# Patient Record
Sex: FEMALE | Race: WHITE | NOT HISPANIC OR LATINO | URBAN - METROPOLITAN AREA
[De-identification: names, ages, dates, MRNs, and addresses within clinical notes are randomized per-mention and may not be internally consistent; named-entity substitution may affect disease eponyms.]

---

## 2023-02-16 ENCOUNTER — INPATIENT (INPATIENT)
Facility: HOSPITAL | Age: 63
LOS: 8 days | Discharge: HOME | DRG: 433 | End: 2023-02-25
Attending: STUDENT IN AN ORGANIZED HEALTH CARE EDUCATION/TRAINING PROGRAM | Admitting: STUDENT IN AN ORGANIZED HEALTH CARE EDUCATION/TRAINING PROGRAM
Payer: COMMERCIAL

## 2023-02-16 VITALS
SYSTOLIC BLOOD PRESSURE: 91 MMHG | RESPIRATION RATE: 16 BRPM | HEART RATE: 100 BPM | WEIGHT: 192.02 LBS | TEMPERATURE: 97 F | OXYGEN SATURATION: 98 % | DIASTOLIC BLOOD PRESSURE: 58 MMHG | HEIGHT: 68 IN

## 2023-02-16 LAB
ALBUMIN SERPL ELPH-MCNC: 2.8 G/DL — LOW (ref 3.3–5)
ALP SERPL-CCNC: 161 U/L — HIGH (ref 40–120)
ALT FLD-CCNC: 14 U/L — SIGNIFICANT CHANGE UP (ref 10–45)
ANION GAP SERPL CALC-SCNC: 18 MMOL/L — HIGH (ref 5–17)
APTT BLD: 34.3 SEC — SIGNIFICANT CHANGE UP (ref 27.5–35.5)
AST SERPL-CCNC: 69 U/L — HIGH (ref 10–40)
BASE EXCESS BLDV CALC-SCNC: -7.6 MMOL/L — LOW (ref -2–3)
BILIRUB SERPL-MCNC: 12.9 MG/DL — HIGH (ref 0.2–1.2)
BUN SERPL-MCNC: 35 MG/DL — HIGH (ref 7–23)
CA-I SERPL-SCNC: 1.19 MMOL/L — SIGNIFICANT CHANGE UP (ref 1.15–1.33)
CALCIUM SERPL-MCNC: 9.2 MG/DL — SIGNIFICANT CHANGE UP (ref 8.4–10.5)
CHLORIDE BLDV-SCNC: 106 MMOL/L — SIGNIFICANT CHANGE UP (ref 96–108)
CHLORIDE SERPL-SCNC: 102 MMOL/L — SIGNIFICANT CHANGE UP (ref 96–108)
CO2 BLDV-SCNC: 20 MMOL/L — LOW (ref 22–26)
CO2 SERPL-SCNC: 14 MMOL/L — LOW (ref 22–31)
CREAT SERPL-MCNC: 2.1 MG/DL — HIGH (ref 0.5–1.3)
EGFR: 26 ML/MIN/1.73M2 — LOW
ETHANOL SERPL-MCNC: <10 MG/DL — SIGNIFICANT CHANGE UP (ref 0–10)
FLUAV AG NPH QL: SIGNIFICANT CHANGE UP
FLUBV AG NPH QL: SIGNIFICANT CHANGE UP
GAS PNL BLDV: 133 MMOL/L — LOW (ref 136–145)
GAS PNL BLDV: SIGNIFICANT CHANGE UP
GAS PNL BLDV: SIGNIFICANT CHANGE UP
GLUCOSE BLDV-MCNC: 106 MG/DL — HIGH (ref 70–99)
GLUCOSE SERPL-MCNC: 115 MG/DL — HIGH (ref 70–99)
HCO3 BLDV-SCNC: 19 MMOL/L — LOW (ref 22–29)
HCT VFR BLD CALC: 29.1 % — LOW (ref 34.5–45)
HCT VFR BLDA CALC: 29 % — LOW (ref 34.5–46.5)
HGB BLD CALC-MCNC: 9.7 G/DL — LOW (ref 11.7–16.1)
HGB BLD-MCNC: 9.2 G/DL — LOW (ref 11.5–15.5)
INR BLD: 1.6 RATIO — HIGH (ref 0.88–1.16)
LACTATE BLDV-MCNC: 3.3 MMOL/L — HIGH (ref 0.5–2)
MAGNESIUM SERPL-MCNC: 1.8 MG/DL — SIGNIFICANT CHANGE UP (ref 1.6–2.6)
MCHC RBC-ENTMCNC: 31.6 GM/DL — LOW (ref 32–36)
MCHC RBC-ENTMCNC: 36.8 PG — HIGH (ref 27–34)
MCV RBC AUTO: 116.4 FL — HIGH (ref 80–100)
NT-PROBNP SERPL-SCNC: 516 PG/ML — HIGH (ref 0–300)
PCO2 BLDV: 41 MMHG — SIGNIFICANT CHANGE UP (ref 39–42)
PH BLDV: 7.27 — LOW (ref 7.32–7.43)
PHOSPHATE SERPL-MCNC: 3.4 MG/DL — SIGNIFICANT CHANGE UP (ref 2.5–4.5)
PLATELET # BLD AUTO: 124 K/UL — LOW (ref 150–400)
PO2 BLDV: 29 MMHG — SIGNIFICANT CHANGE UP (ref 25–45)
POTASSIUM BLDV-SCNC: 4.6 MMOL/L — SIGNIFICANT CHANGE UP (ref 3.5–5.1)
POTASSIUM SERPL-MCNC: 4.2 MMOL/L — SIGNIFICANT CHANGE UP (ref 3.5–5.3)
POTASSIUM SERPL-SCNC: 4.2 MMOL/L — SIGNIFICANT CHANGE UP (ref 3.5–5.3)
PROT SERPL-MCNC: 8 G/DL — SIGNIFICANT CHANGE UP (ref 6–8.3)
PROTHROM AB SERPL-ACNC: 18.5 SEC — HIGH (ref 10.5–13.4)
RBC # BLD: 2.5 M/UL — LOW (ref 3.8–5.2)
RBC # FLD: 14.6 % — HIGH (ref 10.3–14.5)
RSV RNA NPH QL NAA+NON-PROBE: SIGNIFICANT CHANGE UP
SAO2 % BLDV: 33.7 % — LOW (ref 67–88)
SARS-COV-2 RNA SPEC QL NAA+PROBE: SIGNIFICANT CHANGE UP
SODIUM SERPL-SCNC: 134 MMOL/L — LOW (ref 135–145)
WBC # BLD: 9.56 K/UL — SIGNIFICANT CHANGE UP (ref 3.8–10.5)
WBC # FLD AUTO: 9.56 K/UL — SIGNIFICANT CHANGE UP (ref 3.8–10.5)

## 2023-02-16 PROCEDURE — 71046 X-RAY EXAM CHEST 2 VIEWS: CPT | Mod: 26

## 2023-02-16 PROCEDURE — 99285 EMERGENCY DEPT VISIT HI MDM: CPT

## 2023-02-16 NOTE — ED ADULT NURSE NOTE - OBJECTIVE STATEMENT
63YO female with PMH breat cancer, GERD and HTN via walk in presenting with complaints of  abdominal pain. Pt states for the last month she has been experiencing skin color discoloration with generalized pain on the left side, with in the last week pt noticed yellowish skin pigmentation, feelings of dizziness, and generalized weakness. pt was taken off HTN medication due to hypotensive BP. Pt had a GI apt today and was informed to come to ED due to low blood pressure and jaundice. Pt Axox4, respirations even, & non-labored. radial pulses strong and equal bilaterally. Skin warm, dry, and jaundice. pt denies HA, palpations, SOB, chest pain, nausea or urinary symptoms. Pt placed in position of comfort.  at bedside. Bed in lowest position, wheels locked, appropriate side rails raised. Pt denies needs at this time.

## 2023-02-17 ENCOUNTER — RESULT REVIEW (OUTPATIENT)
Age: 63
End: 2023-02-17

## 2023-02-17 DIAGNOSIS — Z98.890 OTHER SPECIFIED POSTPROCEDURAL STATES: Chronic | ICD-10-CM

## 2023-02-17 DIAGNOSIS — R17 UNSPECIFIED JAUNDICE: ICD-10-CM

## 2023-02-17 LAB
ALBUMIN FLD-MCNC: 0.6 G/DL — SIGNIFICANT CHANGE UP
APPEARANCE UR: ABNORMAL
APPEARANCE UR: ABNORMAL
B PERT IGG+IGM PNL SER: CLEAR — SIGNIFICANT CHANGE UP
BACTERIA # UR AUTO: ABNORMAL
BASOPHILS # BLD AUTO: 0 K/UL — SIGNIFICANT CHANGE UP (ref 0–0.2)
BASOPHILS NFR BLD AUTO: 0 % — SIGNIFICANT CHANGE UP (ref 0–2)
BILIRUB UR-MCNC: ABNORMAL
BILIRUB UR-MCNC: ABNORMAL
BURR CELLS BLD QL SMEAR: PRESENT — SIGNIFICANT CHANGE UP
COLOR FLD: YELLOW — SIGNIFICANT CHANGE UP
COLOR SPEC: ABNORMAL
COLOR SPEC: ABNORMAL
COMMENT - URINE: SIGNIFICANT CHANGE UP
DIFF PNL FLD: NEGATIVE — SIGNIFICANT CHANGE UP
DIFF PNL FLD: NEGATIVE — SIGNIFICANT CHANGE UP
EOSINOPHIL # BLD AUTO: 0.42 K/UL — SIGNIFICANT CHANGE UP (ref 0–0.5)
EOSINOPHIL NFR BLD AUTO: 4.4 % — SIGNIFICANT CHANGE UP (ref 0–6)
FLUID INTAKE SUBSTANCE CLASS: SIGNIFICANT CHANGE UP
GIANT PLATELETS BLD QL SMEAR: PRESENT — SIGNIFICANT CHANGE UP
GLUCOSE FLD-MCNC: 112 MG/DL — SIGNIFICANT CHANGE UP
GLUCOSE UR QL: NEGATIVE — SIGNIFICANT CHANGE UP
GLUCOSE UR QL: NEGATIVE — SIGNIFICANT CHANGE UP
KETONES UR-MCNC: SIGNIFICANT CHANGE UP
KETONES UR-MCNC: SIGNIFICANT CHANGE UP
LDH SERPL L TO P-CCNC: 43 U/L — SIGNIFICANT CHANGE UP
LEUKOCYTE ESTERASE UR-ACNC: ABNORMAL
LEUKOCYTE ESTERASE UR-ACNC: ABNORMAL
LYMPHOCYTES # BLD AUTO: 1.24 K/UL — SIGNIFICANT CHANGE UP (ref 1–3.3)
LYMPHOCYTES # BLD AUTO: 13 % — SIGNIFICANT CHANGE UP (ref 13–44)
LYMPHOCYTES # FLD: 48 % — SIGNIFICANT CHANGE UP
MACROCYTES BLD QL: SIGNIFICANT CHANGE UP
MANUAL SMEAR VERIFICATION: SIGNIFICANT CHANGE UP
MESOTHL CELL # FLD: 20 % — SIGNIFICANT CHANGE UP
MONOCYTES # BLD AUTO: 0.33 K/UL — SIGNIFICANT CHANGE UP (ref 0–0.9)
MONOCYTES NFR BLD AUTO: 3.5 % — SIGNIFICANT CHANGE UP (ref 2–14)
MONOS+MACROS # FLD: 24 % — SIGNIFICANT CHANGE UP
NEUTROPHILS # BLD AUTO: 7.56 K/UL — HIGH (ref 1.8–7.4)
NEUTROPHILS NFR BLD AUTO: 79.1 % — HIGH (ref 43–77)
NEUTROPHILS-BODY FLUID: 8 % — SIGNIFICANT CHANGE UP
NITRITE UR-MCNC: NEGATIVE — SIGNIFICANT CHANGE UP
NITRITE UR-MCNC: NEGATIVE — SIGNIFICANT CHANGE UP
OVALOCYTES BLD QL SMEAR: SLIGHT — SIGNIFICANT CHANGE UP
PH UR: 6 — SIGNIFICANT CHANGE UP (ref 5–8)
PH UR: 6.5 — SIGNIFICANT CHANGE UP (ref 5–8)
PLAT MORPH BLD: NORMAL — SIGNIFICANT CHANGE UP
POIKILOCYTOSIS BLD QL AUTO: SIGNIFICANT CHANGE UP
PROT FLD-MCNC: 1.5 G/DL — SIGNIFICANT CHANGE UP
PROT UR-MCNC: ABNORMAL
PROT UR-MCNC: ABNORMAL
RBC BLD AUTO: ABNORMAL
RBC CASTS # UR COMP ASSIST: SIGNIFICANT CHANGE UP /HPF (ref 0–4)
RCV VOL RI: < 2000 /UL — SIGNIFICANT CHANGE UP (ref 0–0)
SP GR SPEC: 1.03 — HIGH (ref 1.01–1.02)
SP GR SPEC: 1.05 — HIGH (ref 1.01–1.02)
TARGETS BLD QL SMEAR: SLIGHT — SIGNIFICANT CHANGE UP
TOTAL NUCLEATED CELL COUNT, BODY FLUID: 100 /UL — SIGNIFICANT CHANGE UP
TUBE TYPE: SIGNIFICANT CHANGE UP
UROBILINOGEN FLD QL: ABNORMAL
UROBILINOGEN FLD QL: ABNORMAL
WBC UR QL: >50

## 2023-02-17 PROCEDURE — 88112 CYTOPATH CELL ENHANCE TECH: CPT | Mod: 26

## 2023-02-17 PROCEDURE — 49083 ABD PARACENTESIS W/IMAGING: CPT

## 2023-02-17 PROCEDURE — 93976 VASCULAR STUDY: CPT | Mod: 26

## 2023-02-17 PROCEDURE — 93970 EXTREMITY STUDY: CPT | Mod: 26

## 2023-02-17 PROCEDURE — 76770 US EXAM ABDO BACK WALL COMP: CPT | Mod: 26,59

## 2023-02-17 PROCEDURE — 49082 ABD PARACENTESIS: CPT

## 2023-02-17 PROCEDURE — 99222 1ST HOSP IP/OBS MODERATE 55: CPT | Mod: GC

## 2023-02-17 PROCEDURE — 88305 TISSUE EXAM BY PATHOLOGIST: CPT | Mod: 26

## 2023-02-17 PROCEDURE — 74177 CT ABD & PELVIS W/CONTRAST: CPT | Mod: 26,MA

## 2023-02-17 RX ORDER — MIDODRINE HYDROCHLORIDE 2.5 MG/1
10 TABLET ORAL ONCE
Refills: 0 | Status: COMPLETED | OUTPATIENT
Start: 2023-02-17 | End: 2023-02-17

## 2023-02-17 RX ORDER — ALBUMIN HUMAN 25 %
250 VIAL (ML) INTRAVENOUS ONCE
Refills: 0 | Status: COMPLETED | OUTPATIENT
Start: 2023-02-17 | End: 2023-02-17

## 2023-02-17 RX ORDER — SODIUM CHLORIDE 9 MG/ML
500 INJECTION, SOLUTION INTRAVENOUS ONCE
Refills: 0 | Status: COMPLETED | OUTPATIENT
Start: 2023-02-17 | End: 2023-02-17

## 2023-02-17 RX ORDER — HEPARIN SODIUM 5000 [USP'U]/ML
5000 INJECTION INTRAVENOUS; SUBCUTANEOUS EVERY 8 HOURS
Refills: 0 | Status: DISCONTINUED | OUTPATIENT
Start: 2023-02-17 | End: 2023-02-25

## 2023-02-17 RX ORDER — SODIUM BICARBONATE 1 MEQ/ML
50 SYRINGE (ML) INTRAVENOUS ONCE
Refills: 0 | Status: COMPLETED | OUTPATIENT
Start: 2023-02-17 | End: 2023-02-17

## 2023-02-17 RX ORDER — ALBUMIN HUMAN 25 %
50 VIAL (ML) INTRAVENOUS ONCE
Refills: 0 | Status: COMPLETED | OUTPATIENT
Start: 2023-02-17 | End: 2023-02-17

## 2023-02-17 RX ORDER — LANOLIN ALCOHOL/MO/W.PET/CERES
3 CREAM (GRAM) TOPICAL AT BEDTIME
Refills: 0 | Status: DISCONTINUED | OUTPATIENT
Start: 2023-02-17 | End: 2023-02-25

## 2023-02-17 RX ORDER — CEFTRIAXONE 500 MG/1
1000 INJECTION, POWDER, FOR SOLUTION INTRAMUSCULAR; INTRAVENOUS ONCE
Refills: 0 | Status: DISCONTINUED | OUTPATIENT
Start: 2023-02-17 | End: 2023-02-17

## 2023-02-17 RX ORDER — SODIUM CHLORIDE 9 MG/ML
500 INJECTION INTRAMUSCULAR; INTRAVENOUS; SUBCUTANEOUS ONCE
Refills: 0 | Status: COMPLETED | OUTPATIENT
Start: 2023-02-17 | End: 2023-02-17

## 2023-02-17 RX ORDER — ONDANSETRON 8 MG/1
4 TABLET, FILM COATED ORAL EVERY 8 HOURS
Refills: 0 | Status: DISCONTINUED | OUTPATIENT
Start: 2023-02-17 | End: 2023-02-19

## 2023-02-17 RX ORDER — ACETAMINOPHEN 500 MG
650 TABLET ORAL EVERY 6 HOURS
Refills: 0 | Status: DISCONTINUED | OUTPATIENT
Start: 2023-02-17 | End: 2023-02-25

## 2023-02-17 RX ADMIN — Medication 50 MILLILITER(S): at 18:21

## 2023-02-17 RX ADMIN — SODIUM CHLORIDE 500 MILLILITER(S): 9 INJECTION, SOLUTION INTRAVENOUS at 00:50

## 2023-02-17 RX ADMIN — SODIUM CHLORIDE 500 MILLILITER(S): 9 INJECTION INTRAMUSCULAR; INTRAVENOUS; SUBCUTANEOUS at 05:39

## 2023-02-17 RX ADMIN — Medication 50 MILLIEQUIVALENT(S): at 22:21

## 2023-02-17 RX ADMIN — MIDODRINE HYDROCHLORIDE 10 MILLIGRAM(S): 2.5 TABLET ORAL at 21:09

## 2023-02-17 RX ADMIN — Medication 125 MILLILITER(S): at 05:54

## 2023-02-17 RX ADMIN — HEPARIN SODIUM 5000 UNIT(S): 5000 INJECTION INTRAVENOUS; SUBCUTANEOUS at 21:13

## 2023-02-17 NOTE — H&P ADULT - NSHPPHYSICALEXAM_GEN_ALL_CORE
11/03/20 1945   PICC Triple Lumen 10/08/20 Right Basilic   Placement Date/Time: 10/08/20 2057   Hand Hygiene Completed: Yes  Size (Fr): 5  Description (optional): (c)   Length (cm): 40 cm  Orientation: Right  Location: Basilic  Site Prep: Chlorhexidine isopropyl alcohol  All 5 Sterile Barriers Used (Gloves, G...   #3 Lumen Status No blood return  (unable to flush or give blood return)   Line Care Cap changed   Dressing Type Border Dressing;Transparent;Securing device;Antimicrobial dressing/disc   Dressing Intervention Dressing changed   Liquid Adhesive Applied   Dressing Change Due 11/10/20       I was asked to look at PICC line due to its sluggishness and inability to give blood return. I changed the dressing and found kink at insertion site.I repositioned the line and was able to flush without difficulty, and all 3 lumens gave blood return. Primary nurse Jackie and Jhony both informed.        
GENERAL: NAD, AAOx3, jaundiced  HEAD:  Atraumatic, Normocephalic  EYES: EOMI, PERRLA, sclera icterus  NECK: Supple, No JVD, No LAD  CHEST/LUNG: CTABL, No wheeze  HEART: Regular rate and rhythm; No murmurs, rubs, or gallops  ABDOMEN: Soft, mild tenderness RUQ, neg zavala, distended; Bowel sounds present  EXTREMITIES:  2+ Peripheral Pulses, +1 le edema bl

## 2023-02-17 NOTE — ED PROVIDER NOTE - PROGRESS NOTE DETAILS
Debora Mccurdy, PGY1, MD: pt's maps now 64 Debora Mccurdy, PGY1, MD: pts maps 55, will give 500 fluid bolus and reassess. pt with liver disease, pt can likely tolerate maps of 60. Debora Mccurdy, PGY1, MD: pts maps 55, will give 500 fluid bolus and reassess. pt with liver disease, pt can likely tolerate maps of 60.  GI team emailed. Debora Mccurdy, PGY1, MD:  Patient with evidence of UTI on UA, reassessed patient at bedside patient adamantly denies any dysuria, pelvic  pain, or increased urinary frequency.  Patient noticed some darker urine yesterday but did not think anything of it.  Patient still without any fevers, elevation white count, abdominal pain.  Will defer treatment of UTI and abx coverage for SBP at this time given no physical evidence of active infection.  Patient counseled on this and agrees with plan to defer ceftriaxone until after she gets an inpatient paracentesis.

## 2023-02-17 NOTE — CONSULT NOTE ADULT - ASSESSMENT
Interventional Radiology    Evaluate for Procedure: Paracentesis     HPI:62-year-old female history of breast cancer (diagnosed 3 years ago status post lumpectomy, previously on Letrozole), alcohol use disorder (last drink 1/20/23 with no reported withdrawal), s/p live hepatitis vaccine, hypertension presenting for evaluation of jaundice. Decompensated liver cirrhosis likely related to alcohol use disorder with ascites, consulted for paracentesis.    Allergies:   Medications (Abx/Cardiac/Anticoagulation/Blood Products)      Data:  172.7  87.1  T(C): 36.8  HR: 81  BP: 91/53  RR: 19  SpO2: 98%    -WBC 9.56 / HgB 9.2 / Hct 29.1 / Plt 124  -Na 134 / Cl 102 / BUN 35 / Glucose 115  -K 4.2 / CO2 14 / Cr 2.10  -ALT 14 / Alk Phos 161 / T.Bili 12.9  -INR 1.60 / PTT 34.3    Flu With COVID-19 By ALICIA (02.16.23 @ 22:38)    SARS-CoV-2 Result: [facility]: This Respiratory Panel uses polymerase chain reaction (PCR) to detect for  influenza A; influenza B; respiratory syncytial virus; and SARS-CoV-2.  This test was validated by [facility] and is in use under the FDA  Emergency Use Authorization (EUA) for clinical labs CLIA-certified to  perform high complexity testing. Test results should be correlated with  clinical presentation, patient history, and epidemiology.    Influenza A Result: [facility]    Influenza B Result: [facility]    Resp Syn Virus Result: [facility]          Radiology:     Assessment/Plan::62-year-old female history of breast cancer (diagnosed 3 years ago status post lumpectomy, previously on Letrozole), alcohol use disorder (last drink 1/20/23 with no reported withdrawal), s/p live hepatitis vaccine, hypertension presenting for evaluation of jaundice. Decompensated liver cirrhosis likely related to alcohol use disorder with ascites, consulted for paracentesis.    - Will plan for procedure Paracentesis on 2/17.  - Place order under Lino.   - Pt  Doesn't need to be NPO.  - Please hold Alll A/C.

## 2023-02-17 NOTE — H&P ADULT - ASSESSMENT
63yo F PMHx breast CA sp lumpectomy, etoh abuse, hepatitis, HTN, sent by GI for jaundice and abd distention.     # cirrhosis with ascites  # hepatitis  # hx etoh abuse  # hyperbilirubinemia  CT a/p Cirrhosis, small to moderate volume abdominopelvic ascites, and sequela of portal hypertension. Contracted gallbladder with gallstones  GI consult called  UA with leukos and WBC, pt without urinary symptoms  MRCP   ABD US ro PVT, acute debbie    # ELIZABETH  renal consult called  renal US ro hydro    # breast CA sp lumpectomy    # HTN    Please call [employer] with questions [phone].   63yo F PMHx breast CA sp lumpectomy, etoh abuse, hepatitis, HTN, sent by GI for jaundice and abd distention.     # cirrhosis with ascites  # hepatitis  # hx etoh abuse  # hyperbilirubinemia  CT a/p Cirrhosis, small to moderate volume abdominopelvic ascites, and sequela of portal hypertension. Contracted gallbladder with gallstones  GI consult called  UA with leukos and WBC, pt without urinary symptoms, hold abx  MRCP ro CBD stone  ABD US ro PVT, acute debbie  hep panel  doppler LE ro dvt    # ELIZABETH  renal consult called  renal US ro hydro  urine studies    # breast CA sp lumpectomy  was taking letrozole but was told to stop with jaundice    # HTN  outpt stopped medication for hypotension    dvt ppx HSQ    dw pt and  at bedside    Please call [employer] with questions [phone].

## 2023-02-17 NOTE — ED PROVIDER NOTE - PHYSICAL EXAMINATION
GENERAL: well appearing in no acute distress, non-toxic appearing, grossly jaundice   HEAD: normocephalic, atraumatic  HEENT: scleral icterus, oral mucosa moist, uvula midline, sublingual jaundice   CARDIAC: regular rate and rhythm, no appreciable murmurs, 2+ pulses in UE/LE b/l  PULM: normal breath sounds, clear to ascultation bilaterally, no rales, rhonchi, wheezing  GI: abdomen distended, soft, nontender, no guarding, rebound tenderness, liver nonpalpable   : no suprapubic tenderness  NEURO: no focal motor or sensory deficits, no tremor, AAOx3  MSK: no peripheral edema, no calf tenderness b/l  SKIN: well-perfused, extremities warm, no visible rashes, diffusely jaundiced.   PSYCH: appropriate mood and affect

## 2023-02-17 NOTE — CONSULT NOTE ADULT - ATTENDING COMMENTS
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 May need liver transplant evaluation if does not improve

## 2023-02-17 NOTE — CONSULT NOTE ADULT - SUBJECTIVE AND OBJECTIVE BOX
Chief Complaint:  Patient is a 62y old  Female who presents with a chief complaint of     Date of service: 23 @ 09:40    HPI:    The patient is a 62-year-old female history of breast cancer dx'ed 3 years ago status post lumpectomy, alcohol use disorder stopped drinking on 23, 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 Pt saw PCP when she noted she was turning yellow in , PCP did blood work and sent pt to GI. Patient was evaluated in her GIs office Dr. Lazcano who sent her into the emergency department for admission for MRCP and further evaluation.  Patient states she used to have 2 glasses of vodka daily for 3 years to treat her chronic pain from her breast cancer.  Patient quit cold turkey on  without experiencing any symptoms of withdrawal.  Patient states her eyes and skin have been progressively getting more yellow since .  Patient denies nausea, vomiting, fevers, chills, abdominal pain, dark stools, pale stools, changes in urine color.  Patient reports she was on Letrozole for her breast cancer, and was taking Tylenol daily at recommended doses to treat her pain, she is concerned that the drug interaction may have damaged her liver.  Patient has stopped taking Tylenol for several months now.            Allergies:  No Known Allergies      Home Medications:    Hospital Medications:  acetaminophen     Tablet .. 650 milliGRAM(s) Oral every 6 hours PRN  aluminum hydroxide/magnesium hydroxide/simethicone Suspension 30 milliLiter(s) Oral every 4 hours PRN  heparin   Injectable 5000 Unit(s) SubCutaneous every 8 hours  melatonin 3 milliGRAM(s) Oral at bedtime PRN  ondansetron Injectable 4 milliGRAM(s) IV Push every 8 hours PRN      PMHX/PSHX:  Breast cancer, left    Mild alcohol use disorder    H/O hepatitis    H/O lumpectomy        Family history:      Social History:   Denies ethanol use.  Denies illicit drug use.    ROS:     General:  No wt loss, fevers, chills, night sweats, fatigue,   Eyes:  Good vision, no reported pain  ENT:  No sore throat, pain, runny nose, dysphagia  CV:  No pain, palpitations, hypo/hypertension  Resp:  No dyspnea, cough, tachypnea, wheezing  GI:  See HPI  :  No pain, bleeding, incontinence, nocturia  Muscle:  No pain, weakness  Neuro:  No weakness, tingling, memory problems  Psych:  No fatigue, insomnia, mood problems, depression  Endocrine:  No polyuria, polydipsia, cold/heat intolerance  Heme:  No petechiae, ecchymosis, easy bruisability  Integumentary:  No rash, edema      PHYSICAL EXAM:     GENERAL:  Appears stated age, well-groomed, well-nourished, no distress  HEENT:  NC/AT,  conjunctivae anicteric, clear and pink,   NECK: supple, trachea midline  CHEST:  Full & symmetric excursion, no increased effort, breath sounds clear  HEART:  Regular rhythm, no JVD  ABDOMEN:  Soft, non-tender, non-distended, normoactive bowel sounds,  no masses , no hepatosplenomegaly  EXTREMITIES:  no cyanosis,clubbing or edema  SKIN:  No rash, erythema, or, ecchymoses, no jaundice  NEURO:  Alert, non-focal, no asterixis  PSYCH: Appropriate affect, oriented to place and time  RECTAL: Deferred      Vital Signs:  Vital Signs Last 24 Hrs  T(C): 36.8 (2023 08:51), Max: 36.8 (2023 08:51)  T(F): 98.2 (2023 08:51), Max: 98.2 (2023 08:51)  HR: 88 (2023 08:51) (79 - 100)  BP: 89/53 (2023 08:51) (78/44 - 107/48)  BP(mean): 65 (2023 06:00) (58 - 70)  RR: 19 (2023 08:51) (16 - 20)  SpO2: 97% (2023 08:51) (97% - 100%)    Parameters below as of 2023 08:51  Patient On (Oxygen Delivery Method): room air      Daily Height in cm: 172.72 (2023 16:27)    Daily     LABS: Labs personally reviewed by me:                        9.2    9.56  )-----------( 124      ( 2023 22:40 )             29.1     02-16    134<L>  |  102  |  35<H>  ----------------------------<  115<H>  4.2   |  14<L>  |  2.10<H>    Ca    9.2      2023 22:40  Phos  3.4       Mg     1.8         TPro  8.0  /  Alb  2.8<L>  /  TBili  12.9<H>  /  DBili  x   /  AST  69<H>  /  ALT  14  /  AlkPhos  161<H>      LIVER FUNCTIONS - ( 2023 22:40 )  Alb: 2.8 g/dL / Pro: 8.0 g/dL / ALK PHOS: 161 U/L / ALT: 14 U/L / AST: 69 U/L / GGT: x           PT/INR - ( 2023 22:40 )   PT: 18.5 sec;   INR: 1.60 ratio         PTT - ( 2023 22:40 )  PTT:34.3 sec  Urinalysis Basic - ( 2023 03:20 )    Color: Dark Orange / Appearance: Turbid / S.052 / pH: x  Gluc: x / Ketone: Trace  / Bili: Large / Urobili: 4 mg/dL   Blood: x / Protein: 30 mg/dL / Nitrite: Negative   Leuk Esterase: Large / RBC: 5-10 /hpf / WBC >50   Sq Epi: x / Non Sq Epi: x / Bacteria: Many          Imaging personally reviewed by me:           Chief Complaint:  Patient is a 62y old  Female who presents with a chief complaint of     Date of service: 23 @ 09:40    HPI:    The patient is a 62 year old female history of breast cancer dx'ed 3 years ago s/p lumpectomy, alcohol use disorder (stopped drinking on 23), hepatitis secondary to live hepatitis vaccine, HTN who presented with jaundice and abdominal distention since . Was seen by per PCP in January when she developed jaundice. Saw GI Dr. Lazcano who sent her to ED for further workup. On exam today, patient sclera and skin jaundiced. States she began drinking daily 3 years ago when she developed breast cancer and had pain from it. Admits to drinking 2 glasses of selzer with vodka daily in the evening after work. States she stopped drinking "cold turkey" on , denies any withdrawal symptoms. Labs reveal elevated LFTs; states she was taking Letrozole for breast cancer and Tylenol (at recommended safe amount) for pain.      Denies abdominal pain, nausea, vomiting, diarrhea, melena, brbpr. Reports occasional pain in throat when eating. Has never had a colonoscopy or endoscopy.       Allergies:  No Known Allergies      Home Medications:    Hospital Medications:  acetaminophen     Tablet .. 650 milliGRAM(s) Oral every 6 hours PRN  aluminum hydroxide/magnesium hydroxide/simethicone Suspension 30 milliLiter(s) Oral every 4 hours PRN  heparin   Injectable 5000 Unit(s) SubCutaneous every 8 hours  melatonin 3 milliGRAM(s) Oral at bedtime PRN  ondansetron Injectable 4 milliGRAM(s) IV Push every 8 hours PRN      PMHX/PSHX:  Breast cancer, left    Mild alcohol use disorder    H/O hepatitis    H/O lumpectomy        Family history:      Social History:   Denies ethanol use.  Denies illicit drug use.    ROS:     General:  No wt loss, fevers, chills, night sweats, fatigue,   Eyes:  Good vision, no reported pain  ENT:  No sore throat, pain, runny nose, dysphagia  CV:  No pain, palpitations, hypo/hypertension  Resp:  No dyspnea, cough, tachypnea, wheezing  GI:  See HPI  :  No pain, bleeding, incontinence, nocturia  Muscle:  No pain, weakness  Neuro:  No weakness, tingling, memory problems  Psych:  No fatigue, insomnia, mood problems, depression  Endocrine:  No polyuria, polydipsia, cold/heat intolerance  Heme:  No petechiae, ecchymosis, easy bruisability  Integumentary:  No rash, edema      PHYSICAL EXAM:     GENERAL:  Appears stated age, well-groomed, well-nourished, no distress  HEENT:  NC/AT,  conjunctivae anicteric, clear and pink,   NECK: supple, trachea midline  CHEST:  Full & symmetric excursion, no increased effort, breath sounds clear  HEART:  Regular rhythm, no JVD  ABDOMEN:  Soft, non-tender, +distended, normoactive bowel sounds,  no masses , no hepatosplenomegaly  EXTREMITIES:  no cyanosis,clubbing or edema  SKIN:  No rash, erythema, or, ecchymoses, no jaundice  NEURO:  Alert, non-focal, no asterixis  PSYCH: Appropriate affect, oriented to place and time  RECTAL: Deferred      Vital Signs:  Vital Signs Last 24 Hrs  T(C): 36.8 (2023 08:51), Max: 36.8 (2023 08:51)  T(F): 98.2 (2023 08:51), Max: 98.2 (2023 08:51)  HR: 88 (2023 08:51) (79 - 100)  BP: 89/53 (2023 08:51) (78/44 - 107/48)  BP(mean): 65 (2023 06:00) (58 - 70)  RR: 19 (2023 08:51) (16 - 20)  SpO2: 97% (2023 08:51) (97% - 100%)    Parameters below as of 2023 08:51  Patient On (Oxygen Delivery Method): room air      Daily Height in cm: 172.72 (2023 16:27)    Daily     LABS: Labs personally reviewed by me:                        9.2    9.56  )-----------( 124      ( 2023 22:40 )             29.1     02-16    134<L>  |  102  |  35<H>  ----------------------------<  115<H>  4.2   |  14<L>  |  2.10<H>    Ca    9.2      2023 22:40  Phos  3.4     02-  Mg     1.8     -    TPro  8.0  /  Alb  2.8<L>  /  TBili  12.9<H>  /  DBili  x   /  AST  69<H>  /  ALT  14  /  AlkPhos  161<H>  -16    LIVER FUNCTIONS - ( 2023 22:40 )  Alb: 2.8 g/dL / Pro: 8.0 g/dL / ALK PHOS: 161 U/L / ALT: 14 U/L / AST: 69 U/L / GGT: x           PT/INR - ( 2023 22:40 )   PT: 18.5 sec;   INR: 1.60 ratio         PTT - ( 2023 22:40 )  PTT:34.3 sec  Urinalysis Basic - ( 2023 03:20 )    Color: Dark Orange / Appearance: Turbid / S.052 / pH: x  Gluc: x / Ketone: Trace  / Bili: Large / Urobili: 4 mg/dL   Blood: x / Protein: 30 mg/dL / Nitrite: Negative   Leuk Esterase: Large / RBC: 5-10 /hpf / WBC >50   Sq Epi: x / Non Sq Epi: x / Bacteria: Many          Imaging personally reviewed by me:

## 2023-02-17 NOTE — ED PROVIDER NOTE - ATTENDING CONTRIBUTION TO CARE
Attending (Anthony Edwards M.D.):  I have personally seen and examined this patient. I have performed a substantive portion of the visit including all aspects of the medical decision making. Resident and/or ACP note reviewed. I agree on the plan of care except where noted.

## 2023-02-17 NOTE — ED PROVIDER NOTE - CLINICAL SUMMARY MEDICAL DECISION MAKING FREE TEXT BOX
62-year-old female history of breast cancer dx'ed 3 years ago status post lumpectomy, alcohol use disorder stopped drinking on 1/20 with no sequela, 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  Patient was evaluated in her GIs office Dr. Lazcano who sent her into the emergency department for admission for MRCP and further evaluation. Patient is afebrile with pressures of 91/58, otherwise hemodynamically stable with diffuse jaundice, abdominal distention, nausea no abdominal pain.    Concern for acute liver failure secondary to possible alcohol use disorder versus medication induced liver damage versus cirrhosis versus biliary obstruction from possible mass versus gallstone.  Patient is afebrile with no abdominal pain, unlikely to be an acute SBP at this time, will get CT abdomen pelvis to evaluate for possible ascites, if there is a tappable pocket we will do paracentesis.  If there is an elevated white count will consider covering with ceftriaxone.  Patient will be admitted, will consult GI.  Will maintain maps above 60

## 2023-02-17 NOTE — PRE PROCEDURE NOTE - PRE PROCEDURE EVALUATION
Interventional Radiology    HPI:  62-year-old female history of breast cancer (diagnosed 3 years ago status post lumpectomy, previously on Letrozole), alcohol use disorder (last drink 1/20/23 with no reported withdrawal), s/p live hepatitis vaccine, hypertension presenting for evaluation of jaundice. Decompensated liver cirrhosis likely related to alcohol use disorder with ascites, consulted for paracentesis.  Per hepatology team , perform diagnostic paracentesis 2/2 low SBP.     Allergies:   Medications (Abx/Cardiac/Anticoagulation/Blood Products)      Data:    T(C): 36.8  HR: 82  BP: 87/50  RR: 18  SpO2: 99%    Jaundice    Handoff    MEWS Score    Breast cancer, left    Mild alcohol use disorder    H/O hepatitis    Painless jaundice    H/O lumpectomy    ABD ASCITIS    90+    [email or web address]        Exam  General: No acute distress  Chest: Non labored breathing    -WBC 9.56 / HgB 9.2 / Hct 29.1 / Plt 124  -Na 134 / Cl 102 / BUN 35 / Glucose 115  -K 4.2 / CO2 14 / Cr 2.10  -ALT 14 / Alk Phos 161 / T.Bili 12.9  -INR1.60    Imaging:     Plan: 62y Female presents for diagnostic paracentesis.   -Risks/Benefits/alternatives explained with the patient  and witnessed informed consent obtained.

## 2023-02-17 NOTE — CONSULT NOTE ADULT - ASSESSMENT
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 Pt saw PCP when she noted she was turning yellow in Jan, PCP did blood work and sent pt to GI. Patient was evaluated in her [facility] office Dr. Lazcano who sent her into the emergency department for admission for MRCP and further evaluation.  Patient states she used to have 2 glasses of vodka daily for 3 years to treat her chronic pain from her breast cancer.  Patient quit cold turkey on 1/20 without experiencing any symptoms of withdrawal.  Patient states her eyes and skin have been progressively getting more yellow since 1/20.  Patient denies nausea, vomiting, fevers, chills, abdominal pain, dark stools, pale stools, changes in urine color.  Patient reports she was on Letrozole for her breast cancer, and was taking Tylenol daily at recommended doses to treat her pain, she is concerned that the drug interaction may have damaged her liver.  Patient has stopped taking Tylenol for several months now. Nephrology consulted for renal failure.     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 Pt saw PCP when she noted she was turning yellow in Jan, PCP did blood work and sent pt to GI. Patient was evaluated in her [facility] office Dr. Lazcano who sent her into the emergency department for admission for MRCP and further evaluation.  Patient states she used to have 2 glasses of vodka daily for 3 years to treat her chronic pain from her breast cancer.  Patient quit cold turkey on 1/20 without experiencing any symptoms of withdrawal.  Patient states her eyes and skin have been progressively getting more yellow since 1/20.  Patient denies nausea, vomiting, fevers, chills, abdominal pain, dark stools, pale stools, changes in urine color.  Patient reports she was on Letrozole for her breast cancer, and was taking Tylenol daily at recommended doses to treat her pain, she is concerned that the drug interaction may have damaged her liver.  Patient has stopped taking Tylenol for several months now. Nephrology consulted for renal failure.     A/P    ELIZABETH   unclear baseline   possibly 2/2 hepatorenal   ascites   may need paracentesis,   max 10L of fluid removal with 1:1 albumin   suggest to get urine Na, Urine cr  pending US Abdomen   ct with contrast 02/17/23 - monitor contrast induce nephropathy   will avoid ivf in setting of fluid overload   Avoid further nephrotoxins, NSAIDS, RCA   monitor BMP, U/O closely     Proteinuria/ hematuria   +Leuk Esterase: Large  repeat UA     Acidosis with anion gap   lactate is elevated  monitor  62-year-old female history of breast cancer dx'ed 3 years ago status post lumpectomy, alcohol use disorder stopped drinking on 1/20/23, 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 Pt saw PCP when she noted she was turning yellow in Jan, PCP did blood work and sent pt to GI. Patient was evaluated in her [facility] office Dr. Lazcano who sent her into the emergency department for admission for MRCP and further evaluation.  Patient states she used to have 2 glasses of vodka daily for 3 years to treat her chronic pain from her breast cancer.  Patient quit cold turkey on 1/20 without experiencing any symptoms of withdrawal.  Patient states her eyes and skin have been progressively getting more yellow since 1/20.  Patient denies nausea, vomiting, fevers, chills, abdominal pain, dark stools, pale stools, changes in urine color.  Patient reports she was on Letrozole for her breast cancer, and was taking Tylenol daily at recommended doses to treat her pain, she is concerned that the drug interaction may have damaged her liver.  Patient has stopped taking Tylenol for several months now. Nephrology consulted for renal failure.     A/P    ELIZABETH   unclear baseline   possibly 2/2 hepatorenal   ascites   may need paracentesis,   max 10L of fluid removal with 1:1 albumin   suggest to get urine Na, Urine cr  pending US Abdomen   ct with contrast 02/17/23 - monitor contrast induce nephropathy   will avoid ivf in setting of fluid overload   Avoid further nephrotoxins, NSAIDS, RCA   monitor BMP, U/O closely     hyponatremia   2/2 fluid overload   fluid restriction <1L a day   monitor Na     Proteinuria/ hematuria   +Leuk Esterase: Large  repeat UA     Acidosis with anion gap   lactate is elevated  monitor  62-year-old female history of breast cancer dx'ed 3 years ago status post lumpectomy, alcohol use disorder stopped drinking on 1/20/23, 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 Pt saw PCP when she noted she was turning yellow in Jan, PCP did blood work and sent pt to GI. Patient was evaluated in her [facility] office Dr. Lazcano who sent her into the emergency department for admission for MRCP and further evaluation.  Patient states she used to have 2 glasses of vodka daily for 3 years to treat her chronic pain from her breast cancer.  Patient quit cold turkey on 1/20 without experiencing any symptoms of withdrawal.  Patient states her eyes and skin have been progressively getting more yellow since 1/20.  Patient denies nausea, vomiting, fevers, chills, abdominal pain, dark stools, pale stools, changes in urine color.  Patient reports she was on Letrozole for her breast cancer, and was taking Tylenol daily at recommended doses to treat her pain, she is concerned that the drug interaction may have damaged her liver.  Patient has stopped taking Tylenol for several months now. Nephrology consulted for renal failure.     A/P    ELIZABETH   unclear baseline   possibly 2/2 hepatorenal   ascites   may need paracentesis,   max 10L of fluid removal with 1:1 albumin   suggest to get urine Na, Urine cr  pending US Abdomen   ct with contrast 02/17/23 - monitor contrast induce nephropathy   will avoid ivf in setting of fluid overload   Avoid further nephrotoxins, NSAIDS, RCA   monitor BMP, U/O closely     hyponatremia   2/2 fluid overload   fluid restriction <1L a day   monitor Na     Proteinuria/ hematuria   +Leuk Esterase: Large  repeat UA     Acidosis with anion gap   lactate is elevated  suggest to give 1ample of bicarb   lasix prn   monitor

## 2023-02-17 NOTE — PROVIDER CONTACT NOTE (OTHER) - BACKGROUND
pts BP has been hypotensive as seen on flowsheets. Pt has already received albumin this AM as well as 500ml NS bolus.

## 2023-02-17 NOTE — ED PROVIDER NOTE - NSICDXPASTMEDICALHX_GEN_ALL_CORE_FT
PAST MEDICAL HISTORY:  Breast cancer, left     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    Mild alcohol use disorder

## 2023-02-17 NOTE — H&P ADULT - NSHPLABSRESULTS_GEN_ALL_CORE
LABS:                        9.2    9.56  )-----------( 124      ( 2023 22:40 )             29.1     02-16    134<L>  |  102  |  35<H>  ----------------------------<  115<H>  4.2   |  14<L>  |  2.10<H>    Ca    9.2      2023 22:40  Phos  3.4     02-16  Mg     1.8     -    TPro  8.0  /  Alb  2.8<L>  /  TBili  12.9<H>  /  DBili  x   /  AST  69<H>  /  ALT  14  /  AlkPhos  161<H>      PT/INR - ( 2023 22:40 )   PT: 18.5 sec;   INR: 1.60 ratio         PTT - ( 2023 22:40 )  PTT:34.3 sec  CAPILLARY BLOOD GLUCOSE      POCT Blood Glucose.: 106 mg/dL (2023 17:34)        Urinalysis Basic - ( 2023 03:20 )    Color: Dark Orange / Appearance: Turbid / S.052 / pH: x  Gluc: x / Ketone: Trace  / Bili: Large / Urobili: 4 mg/dL   Blood: x / Protein: 30 mg/dL / Nitrite: Negative   Leuk Esterase: Large / RBC: 5-10 /hpf / WBC >50   Sq Epi: x / Non Sq Epi: x / Bacteria: Many        RADIOLOGY & ADDITIONAL TESTS:    Imaging Personally Reviewed:  [x] YES  [ ] NO    Consultant(s) Notes Reviewed:  [x] YES  [ ] NO    Care Discussed with Consultants/Other Providers [x] YES  [ ] NO

## 2023-02-17 NOTE — CONSULT NOTE ADULT - SUBJECTIVE AND OBJECTIVE BOX
HPI:    Allergies:  No Known Allergies      Home Medications:    Hospital Medications:  acetaminophen     Tablet .. 650 milliGRAM(s) Oral every 6 hours PRN  aluminum hydroxide/magnesium hydroxide/simethicone Suspension 30 milliLiter(s) Oral every 4 hours PRN  melatonin 3 milliGRAM(s) Oral at bedtime PRN  ondansetron Injectable 4 milliGRAM(s) IV Push every 8 hours PRN      PMHX/PSHX:  Breast cancer, left    Mild alcohol use disorder    H/O hepatitis    H/O lumpectomy        Family history:      Denies family history of colon cancer/polyps, stomach cancer/polyps, pancreatic cancer/masses, liver cancer/disease, ovarian cancer and endometrial cancer.    Social History:   Tob: Denies  EtOH: Denies  Illicit Drugs: Denies    ROS:     General:  No wt loss, fevers, chills, night sweats, fatigue  Eyes:  Good vision, no reported pain  ENT:  No sore throat, pain, runny nose, dysphagia  CV:  No pain, palpitations, hypo/hypertension  Pulm:  No dyspnea, cough, tachypnea, wheezing  GI:  see HPI  :  No pain, bleeding, incontinence, nocturia  Muscle:  No pain, weakness  Neuro:  No weakness, tingling, memory problems  Psych:  No fatigue, insomnia, mood problems, depression  Endocrine:  No polyuria, polydipsia, cold/heat intolerance  Heme:  No petechiae, ecchymosis, easy bruisability  Skin:  No rash, tattoos, scars, edema    PHYSICAL EXAM:     GENERAL:  No acute distress  HEENT:  NCAT, no scleral icterus   CHEST:  no respiratory distress  HEART:  Regular rate and rhythm  ABDOMEN:  Soft, non-tender, non-distended, normoactive bowel sounds,  no masses  EXTREMITIES: No edema  SKIN:  No rash/erythema/ecchymoses/petechiae/wounds/abscess/warm/dry  NEURO:  Alert and oriented x 3, no asterixis    Vital Signs:  Vital Signs Last 24 Hrs  T(C): 36.8 (2023 08:51), Max: 36.8 (2023 08:51)  T(F): 98.2 (2023 08:51), Max: 98.2 (2023 08:51)  HR: 88 (2023 08:51) (79 - 100)  BP: 89/53 (2023 08:51) (78/44 - 107/48)  BP(mean): 65 (2023 06:00) (58 - 70)  RR: 19 (2023 08:51) (16 - 20)  SpO2: 97% (2023 08:51) (97% - 100%)    Parameters below as of 2023 08:51  Patient On (Oxygen Delivery Method): room air      Daily Height in cm: 172.72 (2023 16:27)    Daily     LABS:                        9.2    9.56  )-----------( 124      ( 2023 22:40 )             29.1     Mean Cell Volume: 116.4 fl (- @ 22:40)    -    134<L>  |  102  |  35<H>  ----------------------------<  115<H>  4.2   |  14<L>  |  2.10<H>    Ca    9.2      2023 22:40  Phos  3.4     -  Mg     1.8     -    TPro  8.0  /  Alb  2.8<L>  /  TBili  12.9<H>  /  DBili  x   /  AST  69<H>  /  ALT  14  /  AlkPhos  161<H>  -16    LIVER FUNCTIONS - ( 2023 22:40 )  Alb: 2.8 g/dL / Pro: 8.0 g/dL / ALK PHOS: 161 U/L / ALT: 14 U/L / AST: 69 U/L / GGT: x           PT/INR - ( 2023 22:40 )   PT: 18.5 sec;   INR: 1.60 ratio         PTT - ( 2023 22:40 )  PTT:34.3 sec  Urinalysis Basic - ( 2023 03:20 )    Color: Dark Orange / Appearance: Turbid / S.052 / pH: x  Gluc: x / Ketone: Trace  / Bili: Large / Urobili: 4 mg/dL   Blood: x / Protein: 30 mg/dL / Nitrite: Negative   Leuk Esterase: Large / RBC: 5-10 /hpf / WBC >50   Sq Epi: x / Non Sq Epi: x / Bacteria: Many                              9.2    9.56  )-----------( 124      ( 2023 22:40 )             29.1       Imaging:             HPI: 62-year-old female history of breast cancer (diagnosed 3 years ago status post lumpectomy, previously on Letrozole), alcohol use disorder (last drink 23 with no reported withdrawal), s/p live hepatitis vaccine, hypertension presenting for evaluation of jaundice.    Patient reports alcohol use with maximum 4 alcoholic beverages on weekends prior to diagnosis of breast cancer. Since three years ago, patient with average 2 cups of vodka per day, although she may occasionally have 4 drinks per day. Unclear if alcohol use actually more than reported. Patient had also been on Tylenol at recommended dosages for pain control but has not had Tylenol for several months. New medications in the past 6 months include valsartan and HCTZ. Patient denies recreational drugs, herbal supplements, former smoker with 1 pack per week for 10 years.     Patient follows with [facility] for majority of care and went to [facility] for GI. Labs from  with alk phosp 193, AST 83, ALT 24, Tbili 14.8. Labs from  with Alk phosp 342, , ALT 23, Tbili 4.5.     Allergies:  No Known Allergies      Home Medications:    Hospital Medications:  acetaminophen     Tablet .. 650 milliGRAM(s) Oral every 6 hours PRN  aluminum hydroxide/magnesium hydroxide/simethicone Suspension 30 milliLiter(s) Oral every 4 hours PRN  melatonin 3 milliGRAM(s) Oral at bedtime PRN  ondansetron Injectable 4 milliGRAM(s) IV Push every 8 hours PRN      PMHX/PSHX:  Breast cancer, left    Mild alcohol use disorder    H/O hepatitis    H/O lumpectomy        Family history:      Denies family history of colon cancer/polyps, stomach cancer/polyps, pancreatic cancer/masses, liver cancer/disease, ovarian cancer and endometrial cancer.    Social History:   Tob: Denies  EtOH: Denies  Illicit Drugs: Denies    ROS:     General:  No wt loss, fevers, chills, night sweats, fatigue  Eyes:  Good vision, no reported pain  ENT:  No sore throat, pain, runny nose, dysphagia  CV:  No pain, palpitations, hypo/hypertension  Pulm:  No dyspnea, cough, tachypnea, wheezing  GI:  see HPI  :  No pain, bleeding, incontinence, nocturia  Muscle:  No pain, weakness  Neuro:  No weakness, tingling, memory problems  Psych:  No fatigue, insomnia, mood problems, depression  Endocrine:  No polyuria, polydipsia, cold/heat intolerance  Heme:  No petechiae, ecchymosis, easy bruisability  Skin:  No rash, tattoos, scars, edema    PHYSICAL EXAM:     GENERAL:  No acute distress, pt is lying in stretcher comfortably   HEENT:  NCAT, + scleral icterus   CHEST:  no respiratory distress  HEART:  Regular rate and rhythm  ABDOMEN:  Soft, non-tender, mildly distended, no masses  EXTREMITIES: No edema  SKIN:  No rash/erythema/ecchymoses/petechiae/wounds/abscess/warm/dry  NEURO:  Alert and oriented x 3, no asterixis    Vital Signs:  Vital Signs Last 24 Hrs  T(C): 36.8 (2023 08:51), Max: 36.8 (2023 08:51)  T(F): 98.2 (2023 08:51), Max: 98.2 (2023 08:51)  HR: 88 (2023 08:51) (79 - 100)  BP: 89/53 (2023 08:51) (78/44 - 107/48)  BP(mean): 65 (2023 06:00) (58 - 70)  RR: 19 (2023 08:51) (16 - 20)  SpO2: 97% (2023 08:51) (97% - 100%)    Parameters below as of 2023 08:51  Patient On (Oxygen Delivery Method): room air      Daily Height in cm: 172.72 (2023 16:27)    Daily     LABS:                        9.2    9.56  )-----------( 124      ( 2023 22:40 )             29.1     Mean Cell Volume: 116.4 fl (-23 @ 22:40)    02-16    134<L>  |  102  |  35<H>  ----------------------------<  115<H>  4.2   |  14<L>  |  2.10<H>    Ca    9.2      2023 22:40  Phos  3.4     02-  Mg     1.8     02-16    TPro  8.0  /  Alb  2.8<L>  /  TBili  12.9<H>  /  DBili  x   /  AST  69<H>  /  ALT  14  /  AlkPhos  161<H>      LIVER FUNCTIONS - ( 2023 22:40 )  Alb: 2.8 g/dL / Pro: 8.0 g/dL / ALK PHOS: 161 U/L / ALT: 14 U/L / AST: 69 U/L / GGT: x           PT/INR - ( 2023 22:40 )   PT: 18.5 sec;   INR: 1.60 ratio         PTT - ( 2023 22:40 )  PTT:34.3 sec  Urinalysis Basic - ( 2023 03:20 )    Color: Dark Orange / Appearance: Turbid / S.052 / pH: x  Gluc: x / Ketone: Trace  / Bili: Large / Urobili: 4 mg/dL   Blood: x / Protein: 30 mg/dL / Nitrite: Negative   Leuk Esterase: Large / RBC: 5-10 /hpf / WBC >50   Sq Epi: x / Non Sq Epi: x / Bacteria: Many                              9.2    9.56  )-----------( 124      ( 2023 22:40 )             29.1       Imaging:

## 2023-02-17 NOTE — CONSULT NOTE ADULT - SUBJECTIVE AND OBJECTIVE BOX
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 Pt saw PCP when she noted she was turning yellow in , PCP did blood work and sent pt to GI. Patient was evaluated in her GIs office Dr. Lazcano who sent her into the emergency department for admission for MRCP and further evaluation.  Patient states she used to have 2 glasses of vodka daily for 3 years to treat her chronic pain from her breast cancer.  Patient quit cold turkey on  without experiencing any symptoms of withdrawal.  Patient states her eyes and skin have been progressively getting more yellow since .  Patient denies nausea, vomiting, fevers, chills, abdominal pain, dark stools, pale stools, changes in urine color.  Patient reports she was on Letrozole for her breast cancer, and was taking Tylenol daily at recommended doses to treat her pain, she is concerned that the drug interaction may have damaged her liver.  Patient has stopped taking Tylenol for several months now. Nephrology consulted for renal failure.         Allergies:  No Known Allergies      PAST MEDICAL & SURGICAL HISTORY:  Breast cancer, left      Mild alcohol use disorder      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      H/O lumpectomy          Home Medications Reviewed    Hospital Medications:   MEDICATIONS  (STANDING):  heparin   Injectable 5000 Unit(s) SubCutaneous every 8 hours      SOCIAL HISTORY:  alcohol use disorder     FAMILY HISTORY:      REVIEW OF SYSTEMS:  CONSTITUTIONAL: No weakness, fevers or chills  EYES/ENT: No visual changes;  No vertigo or throat pain   NECK: No pain or stiffness  RESPIRATORY: No cough, wheezing, hemoptysis; No shortness of breath  CARDIOVASCULAR: No chest pain or palpitations.  GASTROINTESTINAL: No abdominal or epigastric pain. No nausea, vomiting, or hematemesis; No diarrhea or constipation. No melena or hematochezia.  GENITOURINARY: No dysuria, frequency, foamy urine, urinary urgency, incontinence or hematuria  NEUROLOGICAL: No numbness or weakness  SKIN: No itching, burning, rashes, or lesions   VASCULAR: No bilateral lower extremity edema.   All other review of systems is negative unless indicated above.    VITALS:  T(F): 98.2 (23 @ 08:51), Max: 98.2 (23 @ 08:51)  HR: 88 (23 @ 08:51)  BP: 89/53 (23 @ 08:51)  RR: 19 (23 @ 08:51)  SpO2: 97% (23 @ 08:51)  Wt(kg): --    Height (cm): 172.7 ( @ :27)  Weight (kg): 87.1 ( @ :27)  BMI (kg/m2): 29.2 ( @ :27)  BSA (m2): 2.01 (:27)    PHYSICAL EXAM:  Constitutional: NAD  HEENT: anicteric sclera, oropharynx clear, MMM  Neck: No JVD  Respiratory: CTAB, no wheezes, rales or rhonchi  Cardiovascular: S1, S2, RRR  Gastrointestinal: BS+, soft, NT/ND  Extremities: No cyanosis or clubbing. No peripheral edema  Neurological: A/O x 3, no focal deficits  Psychiatric: Normal mood, normal affect  : No CVA tenderness. No corrales.   Skin: No rashes  Vascular Access:    LABS:      134<L>  |  102  |  35<H>  ----------------------------<  115<H>  4.2   |  14<L>  |  2.10<H>    Ca    9.2      2023 22:40  Phos  3.4       Mg     1.8         TPro  8.0  /  Alb  2.8<L>  /  TBili  12.9<H>  /  DBili      /  AST  69<H>  /  ALT  14  /  AlkPhos  161<H>      Creatinine Trend: 2.10 <--                        9.2    9.56  )-----------( 124      ( 2023 22:40 )             29.1     Urine Studies:  Urinalysis Basic - ( 2023 03:20 )    Color: Dark Orange / Appearance: Turbid / S.052 / pH:   Gluc:  / Ketone: Trace  / Bili: Large / Urobili: 4 mg/dL   Blood:  / Protein: 30 mg/dL / Nitrite: Negative   Leuk Esterase: Large / RBC: 5-10 /hpf / WBC >50   Sq Epi:  / Non Sq Epi:  / Bacteria: Many          RADIOLOGY & ADDITIONAL STUDIES:                 [facility] [place] [facility]   MD CHARMAINE KOENIG MD, DO ANGELA WONG, PA INJUNG KO NP    TEL:  OFFICE: [phone]    From 5pm-7am answering service [phone]    --- INITIAL RENAL CONSULT NOTE ---date of service 23 @ 10:12    HPI:  62-year-old female history of breast cancer dx'ed 3 years ago status post lumpectomy, alcohol use disorder stopped drinking on 23, 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 Pt saw PCP when she noted she was turning yellow in , PCP did blood work and sent pt to GI. Patient was evaluated in her GIs office Dr. Lazcano who sent her into the emergency department for admission for MRCP and further evaluation.  Patient states she used to have 2 glasses of vodka daily for 3 years to treat her chronic pain from her breast cancer.  Patient quit cold turkey on  without experiencing any symptoms of withdrawal.  Patient states her eyes and skin have been progressively getting more yellow since .  Patient denies nausea, vomiting, fevers, chills, abdominal pain, dark stools, pale stools, changes in urine color.  Patient reports she was on Letrozole for her breast cancer, and was taking Tylenol daily at recommended doses to treat her pain, she is concerned that the drug interaction may have damaged her liver.  Patient has stopped taking Tylenol for several months now. Nephrology consulted for renal failure.         Allergies:  No Known Allergies      PAST MEDICAL & SURGICAL HISTORY:  Breast cancer, left      Mild alcohol use disorder      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      H/O lumpectomy          Home Medications Reviewed    Hospital Medications:   MEDICATIONS  (STANDING):  heparin   Injectable 5000 Unit(s) SubCutaneous every 8 hours      SOCIAL HISTORY:  alcohol use disorder     FAMILY HISTORY:      REVIEW OF SYSTEMS:  CONSTITUTIONAL: + weakness,no fevers or chills  EYES/ENT: No visual changes;  No vertigo or throat pain   NECK: No pain or stiffness  RESPIRATORY: No cough, wheezing, hemoptysis; No shortness of breath  CARDIOVASCULAR: No chest pain or palpitations.  GASTROINTESTINAL: No abdominal or epigastric pain. No nausea, vomiting, or hematemesis; No diarrhea or constipation. No melena or hematochezia.  GENITOURINARY: No dysuria, frequency, foamy urine, urinary urgency, incontinence or hematuria  NEUROLOGICAL: No numbness or weakness  SKIN: No itching, burning, rashes, or lesions   VASCULAR: +bilateral lower extremity edema.   All other review of systems is negative unless indicated above.    VITALS:  T(F): 98.2 (23 @ 08:51), Max: 98.2 (23 @ 08:51)  HR: 88 (23 @ 08:51)  BP: 89/53 (23 @ 08:51)  RR: 19 (23 @ 08:51)  SpO2: 97% (23 @ 08:51)  Wt(kg): --    Height (cm): 172.7 ( @ :27)  Weight (kg): 87.1 (:27)  BMI (kg/m2): 29.2 (:27)  BSA (m2): 2.01 (:27)    PHYSICAL EXAM:  Constitutional: NAD  HEENT: anicteric sclera, oropharynx clear, MMM  Neck: No JVD  Respiratory: CTAB, no wheezes, rales or rhonchi  Cardiovascular: S1, S2, RRR  Gastrointestinal: ascites   Extremities: No cyanosis or clubbing. + peripheral edema  Neurological: A/O x 3, no focal deficits  Psychiatric: Normal mood, normal affect  : No CVA tenderness. No corrales.   Skin: jaundice       LABS:      134<L>  |  102  |  35<H>  ----------------------------<  115<H>  4.2   |  14<L>  |  2.10<H>    Ca    9.2      2023 22:40  Phos  3.4       Mg     1.8         TPro  8.0  /  Alb  2.8<L>  /  TBili  12.9<H>  /  DBili      /  AST  69<H>  /  ALT  14  /  AlkPhos  161<H>      Creatinine Trend: 2.10 <--                        9.2    9.56  )-----------( 124      ( 2023 22:40 )             29.1     Urine Studies:  Urinalysis Basic - ( 2023 03:20 )    Color: Dark Orange / Appearance: Turbid / S.052 / pH:   Gluc:  / Ketone: Trace  / Bili: Large / Urobili: 4 mg/dL   Blood:  / Protein: 30 mg/dL / Nitrite: Negative   Leuk Esterase: Large / RBC: 5-10 /hpf / WBC >50   Sq Epi:  / Non Sq Epi:  / Bacteria: Many          RADIOLOGY & ADDITIONAL STUDIES:

## 2023-02-17 NOTE — CONSULT NOTE ADULT - ASSESSMENT
Impression:     Recommendations:   - send Hepatitis A IgM  - send Hepatitis B core IgM, core Ab total, surface Ag, surface Ab  - send Hepatitis E Ab and RNA (requires yellow downtime form for lab)  - please send THAI, anti-mitochondrial antibody, anti-smooth muscle antibody, immunoglobulins (IgG, IgM, IgA quantitative) for autoimmune etiologies   - acetaminophen level     All recommendations are tentative until note is attested by attending.     Carol Almeida, PGY-4   Gastroenterology/Hepatology Fellow  Available on [employer] Teams  [phone] ([facility] Short Range Pager)  [phone] ([facility] Long Range Pager)    After 5pm, please contact the on-call GI fellow. [phone] 62-year-old female history of breast cancer (diagnosed 3 years ago status post lumpectomy, previously on Letrozole), alcohol use disorder (last drink 1/20/23 with no reported withdrawal), s/p live hepatitis vaccine, hypertension presenting for evaluation of jaundice.    #decompensated liver cirrhosis likely related to alcohol use disorder   #?alcohol use disorder   - ascites: small to moderate volume ascites   - HCC: CT with no liver lesions (2/17)   - HE: negative   - Varices: unknown  - splenomegaly on CT 2/17   - MELD 29 (2/16)     Recommendations:   - diagnostic/ therapeutic paracentesis if able   - send Hepatitis A IgM  - send Hepatitis B core IgM, core Ab total, surface Ag, surface Ab  - send Hepatitis E Ab and RNA (requires yellow downtime form for lab)  - please send THAI, anti-mitochondrial antibody, anti-smooth muscle antibody, immunoglobulins (IgG, IgM, IgA quantitative) for autoimmune etiologies   - acetaminophen level   - rest of care per primary team     All recommendations are tentative until note is attested by attending.     Carol Almeida, PGY-4   Gastroenterology/Hepatology Fellow  Available on Microsoft Teams  [phone] ([facility] Short Range Pager)  [phone] ([facility] Long Range Pager)    After 5pm, please contact the on-call GI fellow. [phone] 62-year-old female history of breast cancer (diagnosed 3 years ago status post lumpectomy, previously on Letrozole), alcohol use disorder (last drink 1/20/23 with no reported withdrawal), s/p live hepatitis vaccine, hypertension presenting for evaluation of jaundice.    #decompensated liver cirrhosis likely related to alcohol use disorder   #?alcohol use disorder   - ascites: small to moderate volume ascites   - HCC: CT with no liver lesions (2/17)   - HE: negative   - Varices: unknown  - splenomegaly on CT 2/17   - MELD 29 (2/16)     Recommendations:   - PETH level   - diagnostic/ therapeutic paracentesis if able   - send Hepatitis A IgM  - send Hepatitis B core IgM, core Ab total, surface Ag, surface Ab  - send Hepatitis E Ab and RNA (requires yellow downtime form for lab)  - please send THAI, anti-mitochondrial antibody, anti-smooth muscle antibody, immunoglobulins (IgG, IgM, IgA quantitative) for autoimmune etiologies   - acetaminophen level   - rest of care per primary team     All recommendations are tentative until note is attested by attending.     Carol Almeida, PGY-4   Gastroenterology/Hepatology Fellow  Available on Microsoft Teams  [phone] ([facility] Short Range Pager)  [phone] ([facility] Long Range Pager)    After 5pm, please contact the on-call GI fellow. [phone] 62-year-old female history of breast cancer (diagnosed 3 years ago status post lumpectomy, previously on Letrozole), alcohol use disorder (last drink 1/20/23 with no reported withdrawal), s/p live hepatitis vaccine, hypertension presenting for evaluation of jaundice.    #decompensated liver cirrhosis likely related to alcohol use disorder   #?alcohol use disorder   - ascites: small to moderate volume ascites   - HCC: CT with no liver lesions (2/17)   - HE: negative   - Varices: unknown  - splenomegaly on CT 2/17   - MELD 29 (2/16)     Recommendations:   - PETH level   - urine with tox and ethanol  - diagnostic/ therapeutic paracentesis if able   - send Hepatitis A IgM  - send Hepatitis B core IgM, core Ab total, surface Ag, surface Ab  - send Hepatitis E Ab and RNA (requires yellow downtime form for lab)  - please send THAI, anti-mitochondrial antibody, anti-smooth muscle antibody, immunoglobulins (IgG, IgM, IgA quantitative) for autoimmune etiologies   - acetaminophen level   - infectious workup with blood culutres, CXR, UA   - albumin 100 TID  - rest of care per primary team     All recommendations are tentative until note is attested by attending.     Carol Almeida, PGY-4   Gastroenterology/Hepatology Fellow  Available on [employer] Teams  [phone] ([facility] Short Range Pager)  [phone] ([facility] Long Range Pager)    After 5pm, please contact the on-call GI fellow. [phone]

## 2023-02-17 NOTE — CONSULT NOTE ADULT - ASSESSMENT
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     1. New decompensated cirrhosis 2/2 ETOH with ascites   - CT abd/ pelvis reveals small to moderate volume ascites; pending paracentesis with IR 2/17  - hepatitis serologies, autoimmune serologies pending   - pending US Abdomen doppler to r/o PVT    2. Splenomegaly on CT    3. Contracted gallbladder with gallstones  - asymptomatic     4. ELIZABETH  - per nephrology     5. Hx of breast ca, s/p lumpectomy  - on Letrazole         Attending supervision statement: I have personally seen and examined the patient. I fully participated in the care of this patient. I have made amendments to the documentation where necessary, and agree with the history, physical exam, and plan as outlined by the ACP. 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     1. New decompensated cirrhosis 2/2 ETOH with ascites   - CT abd/ pelvis reveals small to moderate volume ascites; pending paracentesis with IR 2/17  Supplement with Albumin post procedure   - hepatitis serologies, autoimmune serologies pending   - pending US Abdomen doppler to r/o PVT    2. Splenomegaly on CT  - monitor     3. Contracted gallbladder with gallstones  - asymptomatic     4. ELIZABETH  - per nephrology     5. Hx of breast ca, s/p lumpectomy  - on Letrazole         Attending supervision statement: I have personally seen and examined the patient. I fully participated in the care of this patient. I have made amendments to the documentation where necessary, and agree with the history, physical exam, and plan as outlined by the ACP.    Advanced care planning forms were discussed. Code status including forceful chest compressions, defibrillation and intubation were discussed. The risks benefits and alternatives to pertinent gastrointestinal procedures and interventions were discussed in detail and all questions were answered. Duration: 15 Minutes.    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     1. New decompensated cirrhosis 2/2 ETOH with ascites   - MELD 29  - CT abd/ pelvis reveals small to moderate volume ascites; pending paracentesis with IR 2/17  Supplement with Albumin post procedure   - hepatitis serologies, autoimmune serologies pending   - pending US Abdomen doppler to r/o PVT  - check CMP and INR daily     2. Splenomegaly on CT  - monitor     3. Contracted gallbladder with gallstones  - asymptomatic     4. ELIZABETH  - per nephrology     5. Hx of breast ca, s/p lumpectomy  - on Letrazole         Attending supervision statement: I have personally seen and examined the patient. I fully participated in the care of this patient. I have made amendments to the documentation where necessary, and agree with the history, physical exam, and plan as outlined by the ACP.    Advanced care planning forms were discussed. Code status including forceful chest compressions, defibrillation and intubation were discussed. The risks benefits and alternatives to pertinent gastrointestinal procedures and interventions were discussed in detail and all questions were answered. Duration: 15 Minutes[place]    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     1. New decompensated cirrhosis 2/2 ETOH with ascites   - MELD 29  - CT abd/ pelvis reveals small to moderate volume ascites; pending paracentesis with IR 2/17  Supplement with Albumin post procedure   - hepatitis serologies, autoimmune serologies pending   - pending US Abdomen doppler to r/o PVT  - check CMP and INR daily.  - can hold off on MRI, labs are typical for alcohol hepatitis, ducts nondilated on other imaging    2. Splenomegaly on CT  - monitor     3. Contracted gallbladder with gallstones  - asymptomatic     4. ELIZABETH  - per nephrology     5. Hx of breast ca, s/p lumpectomy  - on Letrazole         Attending supervision statement: I have personally seen and examined the patient. I fully participated in the care of this patient. I have made amendments to the documentation where necessary, and agree with the history, physical exam, and plan as outlined by the ACP.    Advanced care planning forms were discussed. Code status including forceful chest compressions, defibrillation and intubation were discussed. The risks benefits and alternatives to pertinent gastrointestinal procedures and interventions were discussed in detail and all questions were answered. Duration: 15 Minutes.    [address] [address]  Office: [phone]

## 2023-02-17 NOTE — H&P ADULT - HISTORY OF PRESENT ILLNESS
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  Patient was evaluated in her GIs office Dr. Lazcano who sent her into the emergency department for admission for MRCP and further evaluation.  Patient states she used to have 2 glasses of vodka daily for 3 years to treat her chronic pain from her breast cancer.  Patient quit cold turkey on 1/20 without experiencing any symptoms of withdrawal.  Patient states her eyes and skin have been progressively getting more yellow since 1/20.  Patient denies nausea, vomiting, fevers, chills, abdominal pain, dark stools, pale stools, changes in urine color.  Patient reports she was on Letrozole for her breast cancer, and was taking Tylenol daily at recommended doses to treat her pain, she is concerned that the drug interaction may have damaged her liver.  Patient has stopped taking Tylenol for several months now. 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 Pt saw PCP when she noted she was turning yellow in Jan, PCP did blood work and sent pt to GI. Patient was evaluated in her GIs office Dr. Lazcano who sent her into the emergency department for admission for MRCP and further evaluation.  Patient states she used to have 2 glasses of vodka daily for 3 years to treat her chronic pain from her breast cancer.  Patient quit cold turkey on 1/20 without experiencing any symptoms of withdrawal.  Patient states her eyes and skin have been progressively getting more yellow since 1/20.  Patient denies nausea, vomiting, fevers, chills, abdominal pain, dark stools, pale stools, changes in urine color.  Patient reports she was on Letrozole for her breast cancer, and was taking Tylenol daily at recommended doses to treat her pain, she is concerned that the drug interaction may have damaged her liver.  Patient has stopped taking Tylenol for several months now.

## 2023-02-17 NOTE — ED PROVIDER NOTE - OBJECTIVE STATEMENT
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  Patient was evaluated in her GIs office Dr. Lazcano who sent her into the emergency department for admission for MRCP and further evaluation.  Patient states she used to have 2 glasses of vodka daily for 3 years to treat her chronic pain from her breast cancer.  Patient quit cold turkey on 1/20 without experiencing any symptoms of withdrawal.  Patient states her eyes and skin have been progressively getting more yellow since 1/20.  Patient denies nausea, vomiting, fevers, chills, abdominal pain, dark stools, pale stools, changes in urine color.  Patient reports she was on Letrozole for her breast cancer, and was taking Tylenol daily at recommended doses to treat her pain, she is concerned that the drug interaction may have damaged her liver.  Patient has stopped taking Tylenol for several months now.

## 2023-02-18 LAB
ALBUMIN SERPL ELPH-MCNC: 2.6 G/DL — LOW (ref 3.3–5)
ALBUMIN SERPL ELPH-MCNC: 3.1 G/DL — LOW (ref 3.3–5)
ALP SERPL-CCNC: 135 U/L — HIGH (ref 40–120)
ALP SERPL-CCNC: 143 U/L — HIGH (ref 40–120)
ALT FLD-CCNC: 10 U/L — SIGNIFICANT CHANGE UP (ref 10–45)
ALT FLD-CCNC: 11 U/L — SIGNIFICANT CHANGE UP (ref 10–45)
ANION GAP SERPL CALC-SCNC: 13 MMOL/L — SIGNIFICANT CHANGE UP (ref 5–17)
ANION GAP SERPL CALC-SCNC: 15 MMOL/L — SIGNIFICANT CHANGE UP (ref 5–17)
APAP SERPL-MCNC: <15 UG/ML — SIGNIFICANT CHANGE UP (ref 10–30)
AST SERPL-CCNC: 51 U/L — HIGH (ref 10–40)
AST SERPL-CCNC: 52 U/L — HIGH (ref 10–40)
BILIRUB SERPL-MCNC: 10.4 MG/DL — HIGH (ref 0.2–1.2)
BILIRUB SERPL-MCNC: 10.5 MG/DL — HIGH (ref 0.2–1.2)
BILIRUB SERPL-MCNC: 10.5 MG/DL — HIGH (ref 0.2–1.2)
BUN SERPL-MCNC: 30 MG/DL — HIGH (ref 7–23)
BUN SERPL-MCNC: 30 MG/DL — HIGH (ref 7–23)
CALCIUM SERPL-MCNC: 8.8 MG/DL — SIGNIFICANT CHANGE UP (ref 8.4–10.5)
CALCIUM SERPL-MCNC: 9.1 MG/DL — SIGNIFICANT CHANGE UP (ref 8.4–10.5)
CHLORIDE SERPL-SCNC: 108 MMOL/L — SIGNIFICANT CHANGE UP (ref 96–108)
CHLORIDE SERPL-SCNC: 108 MMOL/L — SIGNIFICANT CHANGE UP (ref 96–108)
CO2 SERPL-SCNC: 16 MMOL/L — LOW (ref 22–31)
CO2 SERPL-SCNC: 18 MMOL/L — LOW (ref 22–31)
CREAT SERPL-MCNC: 1.37 MG/DL — HIGH (ref 0.5–1.3)
CREAT SERPL-MCNC: 1.39 MG/DL — HIGH (ref 0.5–1.3)
CREAT SERPL-MCNC: 1.42 MG/DL — HIGH (ref 0.5–1.3)
EGFR: 42 ML/MIN/1.73M2 — LOW
EGFR: 43 ML/MIN/1.73M2 — LOW
EGFR: 44 ML/MIN/1.73M2 — LOW
ETHANOL SERPL-MCNC: <10 MG/DL — SIGNIFICANT CHANGE UP (ref 0–10)
GLUCOSE SERPL-MCNC: 112 MG/DL — HIGH (ref 70–99)
GLUCOSE SERPL-MCNC: 135 MG/DL — HIGH (ref 70–99)
GRAM STN FLD: SIGNIFICANT CHANGE UP
HAV IGM SER-ACNC: SIGNIFICANT CHANGE UP
HAV IGM SER-ACNC: SIGNIFICANT CHANGE UP
HBV CORE AB SER-ACNC: SIGNIFICANT CHANGE UP
HBV CORE IGM SER-ACNC: SIGNIFICANT CHANGE UP
HBV CORE IGM SER-ACNC: SIGNIFICANT CHANGE UP
HBV SURFACE AB SER-ACNC: <3 MIU/ML — LOW
HBV SURFACE AB SER-ACNC: SIGNIFICANT CHANGE UP
HBV SURFACE AG SER-ACNC: SIGNIFICANT CHANGE UP
HBV SURFACE AG SER-ACNC: SIGNIFICANT CHANGE UP
HCT VFR BLD CALC: 24.6 % — LOW (ref 34.5–45)
HCT VFR BLD CALC: 24.9 % — LOW (ref 34.5–45)
HCV AB S/CO SERPL IA: 0.17 S/CO — SIGNIFICANT CHANGE UP (ref 0–0.99)
HCV AB SERPL-IMP: SIGNIFICANT CHANGE UP
HGB BLD-MCNC: 7.8 G/DL — LOW (ref 11.5–15.5)
HGB BLD-MCNC: 7.8 G/DL — LOW (ref 11.5–15.5)
INR BLD: 1.82 RATIO — HIGH (ref 0.88–1.16)
LACTATE SERPL-SCNC: 2 MMOL/L — SIGNIFICANT CHANGE UP (ref 0.5–2)
MCHC RBC-ENTMCNC: 31.3 GM/DL — LOW (ref 32–36)
MCHC RBC-ENTMCNC: 31.7 GM/DL — LOW (ref 32–36)
MCHC RBC-ENTMCNC: 36.6 PG — HIGH (ref 27–34)
MCHC RBC-ENTMCNC: 36.8 PG — HIGH (ref 27–34)
MCV RBC AUTO: 116 FL — HIGH (ref 80–100)
MCV RBC AUTO: 116.9 FL — HIGH (ref 80–100)
MELD SCORE WITH DIALYSIS: 35 POINTS — SIGNIFICANT CHANGE UP
MELD SCORE WITHOUT DIALYSIS: 25 POINTS — SIGNIFICANT CHANGE UP
NRBC # BLD: 0 /100 WBCS — SIGNIFICANT CHANGE UP (ref 0–0)
NRBC # BLD: 0 /100 WBCS — SIGNIFICANT CHANGE UP (ref 0–0)
PCP SPEC-MCNC: SIGNIFICANT CHANGE UP
PLATELET # BLD AUTO: 67 K/UL — LOW (ref 150–400)
PLATELET # BLD AUTO: 78 K/UL — LOW (ref 150–400)
POTASSIUM SERPL-MCNC: 3.7 MMOL/L — SIGNIFICANT CHANGE UP (ref 3.5–5.3)
POTASSIUM SERPL-MCNC: 4.4 MMOL/L — SIGNIFICANT CHANGE UP (ref 3.5–5.3)
POTASSIUM SERPL-SCNC: 3.7 MMOL/L — SIGNIFICANT CHANGE UP (ref 3.5–5.3)
POTASSIUM SERPL-SCNC: 4.4 MMOL/L — SIGNIFICANT CHANGE UP (ref 3.5–5.3)
PROT SERPL-MCNC: 7 G/DL — SIGNIFICANT CHANGE UP (ref 6–8.3)
PROT SERPL-MCNC: 7.2 G/DL — SIGNIFICANT CHANGE UP (ref 6–8.3)
PROTHROM AB SERPL-ACNC: 21 SEC — HIGH (ref 10.5–13.4)
RBC # BLD: 2.12 M/UL — LOW (ref 3.8–5.2)
RBC # BLD: 2.13 M/UL — LOW (ref 3.8–5.2)
RBC # FLD: 14.6 % — HIGH (ref 10.3–14.5)
RBC # FLD: 14.9 % — HIGH (ref 10.3–14.5)
SODIUM SERPL-SCNC: 138 MMOL/L — SIGNIFICANT CHANGE UP (ref 135–145)
SODIUM SERPL-SCNC: 139 MMOL/L — SIGNIFICANT CHANGE UP (ref 135–145)
SODIUM SERPL-SCNC: 139 MMOL/L — SIGNIFICANT CHANGE UP (ref 135–145)
SPECIMEN SOURCE: SIGNIFICANT CHANGE UP
WBC # BLD: 4.84 K/UL — SIGNIFICANT CHANGE UP (ref 3.8–10.5)
WBC # BLD: 7.68 K/UL — SIGNIFICANT CHANGE UP (ref 3.8–10.5)
WBC # FLD AUTO: 4.84 K/UL — SIGNIFICANT CHANGE UP (ref 3.8–10.5)
WBC # FLD AUTO: 7.68 K/UL — SIGNIFICANT CHANGE UP (ref 3.8–10.5)

## 2023-02-18 RX ORDER — HYDROMORPHONE HYDROCHLORIDE 2 MG/ML
0.5 INJECTION INTRAMUSCULAR; INTRAVENOUS; SUBCUTANEOUS EVERY 6 HOURS
Refills: 0 | Status: DISCONTINUED | OUTPATIENT
Start: 2023-02-18 | End: 2023-02-25

## 2023-02-18 RX ORDER — HYDROMORPHONE HYDROCHLORIDE 2 MG/ML
0.25 INJECTION INTRAMUSCULAR; INTRAVENOUS; SUBCUTANEOUS ONCE
Refills: 0 | Status: DISCONTINUED | OUTPATIENT
Start: 2023-02-18 | End: 2023-02-18

## 2023-02-18 RX ORDER — ALBUMIN HUMAN 25 %
100 VIAL (ML) INTRAVENOUS EVERY 8 HOURS
Refills: 0 | Status: DISCONTINUED | OUTPATIENT
Start: 2023-02-18 | End: 2023-02-22

## 2023-02-18 RX ORDER — CEFTRIAXONE 500 MG/1
INJECTION, POWDER, FOR SOLUTION INTRAMUSCULAR; INTRAVENOUS
Refills: 0 | Status: DISCONTINUED | OUTPATIENT
Start: 2023-02-18 | End: 2023-02-21

## 2023-02-18 RX ORDER — MORPHINE SULFATE 50 MG/1
1 CAPSULE, EXTENDED RELEASE ORAL ONCE
Refills: 0 | Status: DISCONTINUED | OUTPATIENT
Start: 2023-02-18 | End: 2023-02-18

## 2023-02-18 RX ORDER — SODIUM BICARBONATE 1 MEQ/ML
1300 SYRINGE (ML) INTRAVENOUS THREE TIMES A DAY
Refills: 0 | Status: DISCONTINUED | OUTPATIENT
Start: 2023-02-18 | End: 2023-02-25

## 2023-02-18 RX ORDER — CEFTRIAXONE 500 MG/1
2000 INJECTION, POWDER, FOR SOLUTION INTRAMUSCULAR; INTRAVENOUS EVERY 24 HOURS
Refills: 0 | Status: DISCONTINUED | OUTPATIENT
Start: 2023-02-19 | End: 2023-02-21

## 2023-02-18 RX ORDER — CEFTRIAXONE 500 MG/1
2000 INJECTION, POWDER, FOR SOLUTION INTRAMUSCULAR; INTRAVENOUS ONCE
Refills: 0 | Status: COMPLETED | OUTPATIENT
Start: 2023-02-18 | End: 2023-02-18

## 2023-02-18 RX ADMIN — HYDROMORPHONE HYDROCHLORIDE 0.5 MILLIGRAM(S): 2 INJECTION INTRAMUSCULAR; INTRAVENOUS; SUBCUTANEOUS at 18:30

## 2023-02-18 RX ADMIN — Medication 650 MILLIGRAM(S): at 04:18

## 2023-02-18 RX ADMIN — Medication 650 MILLIGRAM(S): at 17:50

## 2023-02-18 RX ADMIN — ONDANSETRON 4 MILLIGRAM(S): 8 TABLET, FILM COATED ORAL at 04:12

## 2023-02-18 RX ADMIN — Medication 1300 MILLIGRAM(S): at 15:15

## 2023-02-18 RX ADMIN — Medication 650 MILLIGRAM(S): at 17:11

## 2023-02-18 RX ADMIN — CEFTRIAXONE 100 MILLIGRAM(S): 500 INJECTION, POWDER, FOR SOLUTION INTRAMUSCULAR; INTRAVENOUS at 09:22

## 2023-02-18 RX ADMIN — Medication 50 MILLILITER(S): at 15:43

## 2023-02-18 RX ADMIN — HEPARIN SODIUM 5000 UNIT(S): 5000 INJECTION INTRAVENOUS; SUBCUTANEOUS at 15:16

## 2023-02-18 RX ADMIN — HYDROMORPHONE HYDROCHLORIDE 0.5 MILLIGRAM(S): 2 INJECTION INTRAMUSCULAR; INTRAVENOUS; SUBCUTANEOUS at 17:52

## 2023-02-18 RX ADMIN — ONDANSETRON 4 MILLIGRAM(S): 8 TABLET, FILM COATED ORAL at 16:39

## 2023-02-18 RX ADMIN — HYDROMORPHONE HYDROCHLORIDE 0.25 MILLIGRAM(S): 2 INJECTION INTRAMUSCULAR; INTRAVENOUS; SUBCUTANEOUS at 05:49

## 2023-02-18 NOTE — PROGRESS NOTE ADULT - ASSESSMENT
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     1. New decompensated cirrhosis 2/2 ETOH with ascites   - MELD 29 (2/17)  - CT abd/ pelvis reveals small to moderate volume ascites  - s/p paracentesis with IR 2/17; 2050ml removed  - albumin 25% 100cc q8hr  - hepatitis serologies, autoimmune serologies pending   - US Abdomen doppler negative for PVT  - check CMP and INR daily  - can hold off on MRI, labs are typical for alcohol hepatitis, ducts nondilated on other imaging    2. Splenomegaly on CT  - monitor     3. Contracted gallbladder with gallstones  - asymptomatic     4. ELIZABETH  - per nephrology     5. Hx of breast ca, s/p lumpectomy  - on Letrazole         Attending supervision statement: I have personally seen and examined the patient. I fully participated in the care of this patient. I have made amendments to the documentation where necessary, and agree with the history, physical exam, and plan as outlined by the ACP.    Advanced care planning forms were discussed. Code status including forceful chest compressions, defibrillation and intubation were discussed. The risks benefits and alternatives to pertinent gastrointestinal procedures and interventions were discussed in detail and all questions were answered. Duration: 15 Minutes.    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     1. New decompensated cirrhosis 2/2 ETOH with ascites   - MELD 29 (2/17)  - CT abd/ pelvis reveals small to moderate volume ascites  - s/p paracentesis with IR 2/17; 2050ml removed  - albumin 25% 100cc q8hr  - hepatitis serologies, autoimmune serologies pending   - US Abdomen doppler negative for PVT  - check CMP and INR daily  - infectious workup underway, blood cultures ordered   - can hold off on MRI, labs are typical for alcohol hepatitis, ducts nondilated on other imaging    2. Splenomegaly on CT  - monitor     3. Contracted gallbladder with gallstones  - asymptomatic     4. ELIZABETH  - per nephrology     5. Hx of breast ca, s/p lumpectomy  - on Letrazole         Attending supervision statement: I have personally seen and examined the patient. I fully participated in the care of this patient. I have made amendments to the documentation where necessary, and agree with the history, physical exam, and plan as outlined by the ACP.    Advanced care planning forms were discussed. Code status including forceful chest compressions, defibrillation and intubation were discussed. The risks benefits and alternatives to pertinent gastrointestinal procedures and interventions were discussed in detail and all questions were answered. Duration: 15 Minutes.    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     1. New decompensated cirrhosis 2/2 ETOH with ascites   - MELD 29 (2/17)  - CT abd/ pelvis reveals small to moderate volume ascites  - s/p paracentesis with IR 2/17; 2050ml removed  - albumin 25% 100cc q8hr  - ceftriaxone for SBP ppx   - hepatitis serologies, autoimmune serologies pending   - US Abdomen doppler negative for PVT  - check CMP and INR daily  - infectious workup underway, blood cultures ordered   - can hold off on MRI, labs are typical for alcohol hepatitis, ducts nondilated on other imaging    2. Splenomegaly on CT  - monitor     3. Contracted gallbladder with gallstones  - asymptomatic     4. ELIZABETH  - per nephrology     5. Hx of breast ca, s/p lumpectomy  - on Letrazole         Attending supervision statement: I have personally seen and examined the patient. I fully participated in the care of this patient. I have made amendments to the documentation where necessary, and agree with the history, physical exam, and plan as outlined by the ACP.    Advanced care planning forms were discussed. Code status including forceful chest compressions, defibrillation and intubation were discussed. The risks benefits and alternatives to pertinent gastrointestinal procedures and interventions were discussed in detail and all questions were answered. Duration: 15 Minutes.    [address] [address]  Office: [phone]

## 2023-02-18 NOTE — PROGRESS NOTE ADULT - ASSESSMENT
62-year-old female history of breast cancer (diagnosed 3 years ago status post lumpectomy, previously on Letrozole), alcohol use disorder (last drink 1/20/23 with no reported withdrawal), s/p live hepatitis vaccine, hypertension presenting for evaluation of jaundice.    #decompensated liver cirrhosis likely related to alcohol use disorder   #?alcohol use disorder   - ascites: small to moderate volume ascites, s/p 2 L removed on 2/17 negative for SBP  - HCC: CT with no liver lesions (2/17)   - HE: negative   - Varices: unknown  - splenomegaly on CT 2/17   - MELD 29 (2/16) , no labs today to calculate MELD    Recommendations:   - f/up PETH level    - f/up  Hepatitis A IgM  - f/up Hepatitis B core IgM, core Ab total, surface Ag, surface Ab  - f/up Hepatitis E Ab and RNA (requires yellow downtime form for lab)  - please send THAI, anti-mitochondrial antibody, anti-smooth muscle antibody, immunoglobulins (IgG, IgM, IgA quantitative) for autoimmune etiologies   - acetaminophen level   - f/up blood cultures CXR, UA  - Okay to continue abx pending infectious work up   - albumin 25% 100cc q8H  - Obtain Urine sodium and Cr  - daily CMP and INR  - rest of care per primary team     Recommendations preliminary until signed by attending.     Jasson Harrison MD  Gastroenterology/Hepatology Fellow  Contact on-call GI fellow via answering service ([phone]) from 5pm-8am AND on weekends/holidays           62-year-old female history of breast cancer (diagnosed 3 years ago status post lumpectomy, previously on Letrozole), alcohol use disorder (last drink 1/20/23 with no reported withdrawal), s/p live hepatitis vaccine, hypertension presenting for evaluation of jaundice.    #decompensated liver cirrhosis likely related to alcohol use disorder   #?alcohol use disorder   - ascites: small to moderate volume ascites, s/p 2 L removed on 2/17 negative for SBP  - HCC: CT with no liver lesions (2/17)   - HE: negative   - Varices: unknown  - splenomegaly on CT 2/17   - MELD 29 (2/16) , no labs today to calculate MELD    -Infectious work up with UA +, urine culture pending. Blood culture pending. CXR clear lungs. Tap negative for SBP.     Recommendations:   - f/up PETH level    - f/up  Hepatitis A IgM  - f/up Hepatitis B core IgM, core Ab total, surface Ag, surface Ab  - f/up Hepatitis E Ab and RNA (requires yellow downtime form for lab)  - please send THAI, anti-mitochondrial antibody, anti-smooth muscle antibody, immunoglobulins (IgG, IgM, IgA quantitative) for autoimmune etiologies   - acetaminophen level   - f/up blood cultures CXR, UA  - Okay to continue abx pending infectious work up   - albumin 25% 100cc q8H  - Obtain Urine sodium and Cr  - daily CMP and INR  - rest of care per primary team     Recommendations preliminary until signed by attending.     Jasson Harrison MD  Gastroenterology/Hepatology Fellow  Contact on-call GI fellow via answering service ([phone]) from 5pm-8am AND on weekends/holidays

## 2023-02-18 NOTE — PROGRESS NOTE ADULT - SUBJECTIVE AND OBJECTIVE BOX
[facility]   MD CHARMAINE KOENIG MD RUORU WONG, PA    TEL:  FROM 9 AM to 5 PM ---OFFICE: [phone]    FROM 5 PM - 9 AM PLEASE CALL ANSWERING SERVICE: [phone]    RENAL FOLLOW UP NOTE--Date of Service 02-18-23 @ 08:17  --------------------------------------------------------------------------------  HPI:      Pt seen and examined at bedside.       PAST HISTORY  --------------------------------------------------------------------------------  No significant changes to PMH, PSH, FHx, SHx, unless otherwise noted    ALLERGIES & MEDICATIONS  --------------------------------------------------------------------------------  Allergies    No Known Allergies    Intolerances      Standing Inpatient Medications  heparin   Injectable 5000 Unit(s) SubCutaneous every 8 hours    PRN Inpatient Medications  acetaminophen     Tablet .. 650 milliGRAM(s) Oral every 6 hours PRN  aluminum hydroxide/magnesium hydroxide/simethicone Suspension 30 milliLiter(s) Oral every 4 hours PRN  melatonin 3 milliGRAM(s) Oral at bedtime PRN  ondansetron Injectable 4 milliGRAM(s) IV Push every 8 hours PRN      REVIEW OF SYSTEMS  --------------------------------------------------------------------------------  General: no fever    MSK: + edema     VITALS/PHYSICAL EXAM  --------------------------------------------------------------------------------  T(C): 37.2 (02-18-23 @ 04:36), Max: 37.2 (02-18-23 @ 04:36)  HR: 89 (02-18-23 @ 04:36) (81 - 89)  BP: 105/62 (02-18-23 @ 04:36) (87/50 - 105/62)  RR: 18 (02-18-23 @ 04:36) (18 - 19)  SpO2: 96% (02-18-23 @ 04:36) (96% - 99%)  Wt(kg): --  Height (cm): 172.7 (02-16-23 @ 16:27)  Weight (kg): 87.1 (02-16-23 @ 16:27)  BMI (kg/m2): 29.2 (02-16-23 @ 16:27)  BSA (m2): 2.01 (02-16-23 @ 16:27)      Physical Exam:  	Gen: NAD  	HEENT: MMM  	Pulm: CTA B/L  	CV: S1S2  	Abd: Soft, +BS  	Ext: +LE edema B/L                      Neuro: Awake   	Skin: Warm and Dry   	Vascular access: NO HD catheter             no antoni  LABS/STUDIES  --------------------------------------------------------------------------------              9.2    9.56  >-----------<  124      [02-16-23 @ 22:40]              29.1     134  |  102  |  35  ----------------------------<  115      [02-16-23 @ 22:40]  4.2   |  14  |  2.10        Ca     9.2     [02-16-23 @ 22:40]      Mg     1.8     [02-16-23 @ 22:40]      Phos  3.4     [02-16-23 @ 22:40]    TPro  8.0  /  Alb  2.8  /  TBili  12.9  /  DBili  x   /  AST  69  /  ALT  14  /  AlkPhos  161  [02-16-23 @ 22:40]    PT/INR: PT 18.5 , INR 1.60       [02-16-23 @ 22:40]  PTT: 34.3       [02-16-23 @ 22:40]      Creatinine Trend:  SCr 2.10 [02-16 @ 22:40]    Urinalysis - [02-17-23 @ 18:55]      Color Dark Yellow / Appearance Slightly Turbid / SG 1.035 / pH 6.5      Gluc Negative / Ketone Trace  / Bili Moderate / Urobili 6 mg/dL       Blood Negative / Protein 30 mg/dL / Leuk Est Large / Nitrite Negative      RBC 1 / WBC 63 / Hyaline 1 / Gran  / Sq Epi  / Non Sq Epi 2 / Bacteria Many

## 2023-02-18 NOTE — PROGRESS NOTE ADULT - SUBJECTIVE AND OBJECTIVE BOX
Chief Complaint:  Patient is a 62y old  Female who presents with a chief complaint of     Date of service 23 @ 10:56      Interval Events:   Patient seen and examined.  s/p paracentesis   Reports intermittent abdominal discomfort.   No nausea or vomiting.     Hospital Medications:  acetaminophen     Tablet .. 650 milliGRAM(s) Oral every 6 hours PRN  albumin human 25% IVPB 100 milliLiter(s) IV Intermittent every 8 hours  aluminum hydroxide/magnesium hydroxide/simethicone Suspension 30 milliLiter(s) Oral every 4 hours PRN  cefTRIAXone   IVPB      heparin   Injectable 5000 Unit(s) SubCutaneous every 8 hours  melatonin 3 milliGRAM(s) Oral at bedtime PRN  ondansetron Injectable 4 milliGRAM(s) IV Push every 8 hours PRN        Review of Systems:  General:  No wt loss, fevers, chills, night sweats, fatigue,   Eyes:  Good vision, no reported pain  ENT:  No sore throat, pain, runny nose, dysphagia  CV:  No pain, palpitations, hypo/hypertension  Resp:  No dyspnea, cough, tachypnea, wheezing  GI:  See HPI  :  No pain, bleeding, incontinence, nocturia  Muscle:  No pain, weakness  Neuro:  No weakness, tingling, memory problems  Psych:  No fatigue, insomnia, mood problems, depression  Endocrine:  No polyuria, polydipsia, cold/heat intolerance  Heme:  No petechiae, ecchymosis, easy bruisability  Integumentary:  No rash, edema    PHYSICAL EXAM:   Vital Signs:  Vital Signs Last 24 Hrs  T(C): 37.2 (2023 04:36), Max: 37.2 (2023 04:36)  T(F): 99 (2023 04:36), Max: 99 (2023 04:36)  HR: 89 (2023 04:36) (81 - 89)  BP: 105/62 (2023 04:36) (87/50 - 105/62)  BP(mean): --  RR: 18 (2023 04:36) (18 - 19)  SpO2: 96% (2023 04:36) (96% - 99%)    Parameters below as of 2023 04:36  Patient On (Oxygen Delivery Method): room air      Daily     Daily       PHYSICAL EXAM:     GENERAL:  Appears stated age, well-groomed, well-nourished, no distress  HEENT:  NC/AT,  conjunctivae anicteric, clear and pink,   NECK: supple, trachea midline  CHEST:  Full & symmetric excursion, no increased effort, breath sounds clear  HEART:  Regular rhythm, no JVD  ABDOMEN:  Soft, non-tender, non-distended, normoactive bowel sounds,  no masses , no hepatosplenomegaly  EXTREMITIES:  no cyanosis,clubbing or edema  SKIN:  No rash, erythema, or, ecchymoses, no jaundice  NEURO:  Alert, non-focal, no asterixis  PSYCH: Appropriate affect, oriented to place and time  RECTAL: Deferred      LABS Personally reviewed by me:                        7.8    4.84  )-----------( 67       ( 2023 10:21 )             24.6     Mean Cell Volume: 116.0 fl (23 @ 10:21)    -    134<L>  |  102  |  35<H>  ----------------------------<  115<H>  4.2   |  14<L>  |  2.10<H>    Ca    9.2      2023 22:40  Phos  3.4     -  Mg     1.8         TPro  8.0  /  Alb  2.8<L>  /  TBili  12.9<H>  /  DBili  x   /  AST  69<H>  /  ALT  14  /  AlkPhos  161<H>  16    LIVER FUNCTIONS - ( 2023 22:40 )  Alb: 2.8 g/dL / Pro: 8.0 g/dL / ALK PHOS: 161 U/L / ALT: 14 U/L / AST: 69 U/L / GGT: x           PT/INR - ( 2023 22:40 )   PT: 18.5 sec;   INR: 1.60 ratio         PTT - ( 2023 22:40 )  PTT:34.3 sec  Urinalysis Basic - ( 2023 18:55 )    Color: Dark Yellow / Appearance: Slightly Turbid / S.035 / pH: x  Gluc: x / Ketone: Trace  / Bili: Moderate / Urobili: 6 mg/dL   Blood: x / Protein: 30 mg/dL / Nitrite: Negative   Leuk Esterase: Large / RBC: 1 /hpf / WBC 63 /HPF   Sq Epi: x / Non Sq Epi: 2 / Bacteria: Many                              7.8    4.84  )-----------( 67       ( 2023 10:21 )             24.6                         9.2    9.56  )-----------( 124      ( 2023 22:40 )             29.1       Imaging personally reviewed by me:

## 2023-02-18 NOTE — CHART NOTE - NSCHARTNOTEFT_GEN_A_CORE
Notified by RN that patient is experiencing 10/10 abdominal pain despite receiving zofran and tylenol   Patient seen and examined at bedside; patient noticeably in pain with tenderness to palpation maximally in the epigastric region     Vital Signs Last 24 Hrs  T(C): 37 (18 Feb 2023 12:30), Max: 37.2 (18 Feb 2023 04:36)  T(F): 98.6 (18 Feb 2023 12:30), Max: 99 (18 Feb 2023 04:36)  HR: 77 (18 Feb 2023 12:30) (77 - 89)  BP: 89/51 (18 Feb 2023 12:30) (89/51 - 105/62)  BP(mean): --  RR: 18 (18 Feb 2023 12:30) (18 - 18)  SpO2: 95% (18 Feb 2023 12:30) (95% - 98%)    Parameters below as of 18 Feb 2023 12:30  Patient On (Oxygen Delivery Method): room air    >Discussed case with attending Dr. Conteh  >Dilaudid 0.5 mg q6h for severe pain ordered   >STAT CBC, CMP and lactate ordered   >Will continue to monitor   >Will endorse to night team       Luz Burgos PA-C  Dept of Medicine

## 2023-02-18 NOTE — PROGRESS NOTE ADULT - SUBJECTIVE AND OBJECTIVE BOX
Interval Events:   no acute events overnight  some transient abdominal pain, resolved this am    Hospital Medications:  acetaminophen     Tablet .. 650 milliGRAM(s) Oral every 6 hours PRN  albumin human 25% IVPB 100 milliLiter(s) IV Intermittent every 8 hours  aluminum hydroxide/magnesium hydroxide/simethicone Suspension 30 milliLiter(s) Oral every 4 hours PRN  cefTRIAXone   IVPB      heparin   Injectable 5000 Unit(s) SubCutaneous every 8 hours  melatonin 3 milliGRAM(s) Oral at bedtime PRN  ondansetron Injectable 4 milliGRAM(s) IV Push every 8 hours PRN      ROS: All system reviewed and negative except as mentioned above.    PHYSICAL EXAM:   Vital Signs:  Vital Signs Last 24 Hrs  T(C): 37.2 (2023 04:36), Max: 37.2 (2023 04:36)  T(F): 99 (2023 04:36), Max: 99 (2023 04:36)  HR: 89 (2023 04:36) (81 - 89)  BP: 105/62 (2023 04:36) (87/50 - 105/62)  BP(mean): --  RR: 18 (2023 04:36) (18 - 19)  SpO2: 96% (2023 04:36) (96% - 99%)    Parameters below as of 2023 04:36  Patient On (Oxygen Delivery Method): room air      Daily     Daily     GENERAL:  NAD, Appears stated age  HEENT:  NC/AT,  conjunctivae clear and pink, sclera icterus  CHEST:  Normal Effort, Breath sounds clear  HEART:  RRR, S1 + S2, no murmurs  ABDOMEN:  Soft, non-tender, non-distended, normoactive bowel sounds,  no masses  EXTREMITIES:  mild edema  SKIN:  Warm & Dry. No rash or erythema  NEURO:  Alert, oriented, no focal deficit    LABS:                        9.2    9.56  )-----------( 124      ( 2023 22:40 )             29.1       02-16    134<L>  |  102  |  35<H>  ----------------------------<  115<H>  4.2   |  14<L>  |  2.10<H>    Ca    9.2      2023 22:40  Phos  3.4     02-16  Mg     1.8     02-16    TPro  8.0  /  Alb  2.8<L>  /  TBili  12.9<H>  /  DBili  x   /  AST  69<H>  /  ALT  14  /  AlkPhos  161<H>  02-16    LIVER FUNCTIONS - ( 2023 22:40 )  Alb: 2.8 g/dL / Pro: 8.0 g/dL / ALK PHOS: 161 U/L / ALT: 14 U/L / AST: 69 U/L / GGT: x           PT/INR - ( 2023 22:40 )   PT: 18.5 sec;   INR: 1.60 ratio         PTT - ( 2023 22:40 )  PTT:34.3 sec  Urinalysis Basic - ( 2023 18:55 )    Color: Dark Yellow / Appearance: Slightly Turbid / S.035 / pH: x  Gluc: x / Ketone: Trace  / Bili: Moderate / Urobili: 6 mg/dL   Blood: x / Protein: 30 mg/dL / Nitrite: Negative   Leuk Esterase: Large / RBC: 1 /hpf / WBC 63 /HPF   Sq Epi: x / Non Sq Epi: 2 / Bacteria: Many                              9.2    9.56  )-----------( 124      ( 2023 22:40 )             29.1       Imaging: Images reviewed.    < from: US Abdomen Doppler (23 @ 16:09) >    FINDINGS:  Vasculature: The main, left and the right portal veins are patent with   normal directional waveform. The hepatic artery is also patent with a low   resistance waveform. The hepatic veins are not well visualized.  Changes of cirrhosis with portal hypertension but better evaluated on   recent CT.    IMPRESSION:  No evidence of portal vein thrombosis.    < end of copied text >  < from: CT Abdomen and Pelvis w/ IV Cont (23 @ 00:52) >    FINDINGS:  LOWER CHEST: Within normal limits.    LIVER: Cirrhotic morphology. No focal liver lesion on this single phase   study.  BILE DUCTS: Normal caliber.  GALLBLADDER: Contracted around gallstones.Irregular wall thickening could   be due to the contracted state.  SPLEEN: Splenomegaly measuring up to 16 cm.  PANCREAS: Within normal limits.  ADRENALS: Within normal limits.  KIDNEYS/URETERS: Within normal limits.    BLADDER: Underdistended, limiting evaluation  REPRODUCTIVE ORGANS: Uterus and adnexa within normallimits.    BOWEL: No bowel obstruction. Nonspecific submucosal fatty deposition in   the ascending colon. Retrocecal appendix appears normal.  PERITONEUM: Small to moderate volume abdominopelvic ascites.  VESSELS: Recanalized paraumbilical vein. Gastric and splenorenal varices.  RETROPERITONEUM/LYMPH NODES: Prominent periportal lymphadenopathy. For   reference a 1.2 cm short axis lymph noted (3:33).  ABDOMINAL WALL: Subcutaneous edema.  BONES: Degenerative changes.    IMPRESSION:  Cirrhosis, small to moderate volume abdominopelvic ascites, and sequela   of portal hypertension.    Contracted gallbladder with gallstones.      < end of copied text >

## 2023-02-18 NOTE — PROGRESS NOTE ADULT - ASSESSMENT
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 Pt saw PCP when she noted she was turning yellow in Jan, PCP did blood work and sent pt to GI. Patient was evaluated in her GIs office Dr. Lazcano who sent her into the emergency department for admission for MRCP and further evaluation.  Patient states she used to have 2 glasses of vodka daily for 3 years to treat her chronic pain from her breast cancer.  Patient quit cold turkey on 1/20 without experiencing any symptoms of withdrawal.  Patient states her eyes and skin have been progressively getting more yellow since 1/20.  Patient denies nausea, vomiting, fevers, chills, abdominal pain, dark stools, pale stools, changes in urine color.  Patient reports she was on Letrozole for her breast cancer, and was taking Tylenol daily at recommended doses to treat her pain, she is concerned that the drug interaction may have damaged her liver.  Patient has stopped taking Tylenol for several months now. Nephrology consulted for renal failure.     A/P    ELIZABETH   unclear baseline   renal failure possilby sec to hepatorenal vs pre renal  s/p paracentesis on 2/17 with 2 L removal   pending labs today  suggest to get urine Na, Urine cr  ct with contrast 02/17/23 - monitor contrast induce nephropathy   will avoid ivf in setting of fluid overload   Avoid further nephrotoxins, NSAIDS, RCA   monitor BMP, U/O closely     hyponatremia   sec to fluid overload   fluid restriction <1L a day   monitor Na     Proteinuria/ hematuria   +Leuk Esterase: Large  repeat UA     Acidosis with anion gap   Renal failure + lactate is elevated  Pending labs today  lasix prn   monitor

## 2023-02-18 NOTE — PROGRESS NOTE ADULT - ASSESSMENT
63yo F PMHx breast CA sp lumpectomy, etoh abuse, hepatitis, HTN, sent by GI for jaundice and abd distention.     # etoh liver cirrhosis with ascites  CT a/p Cirrhosis, small to moderate volume abdominopelvic ascites, and sequela of portal hypertension. Contracted gallbladder with gallstones  appreciate GI and hepatology recs, fu recs  ABD US no PVT  doppler LE no dvt  fu PETH level & u tox, fu alcohol lvl  sp diagnostic/ therapeutic paracentesis, fu cultures  fu hep panel and labs ordered  fu THAI, anti-mitochondrial antibody, anti-smooth muscle antibody, immunoglobulins (IgG, IgM, IgA quantitative) for autoimmune etiologies   cont albumin 100 TID  cont ceftriaxone for SBP ppx    # ELIZABETH  renal following, renal US no hydro    # breast CA sp lumpectomy  was taking letrozole but was told to stop with jaundice    # HTN  outpt stopped medication for hypotension    dvt ppx HSQ    Please call [facility] with questions [phone].

## 2023-02-18 NOTE — PROVIDER CONTACT NOTE (OTHER) - ACTION/TREATMENT ORDERED:
EDNA Burgos made aware. No further action ordered at this time. Will continue to monitor.
ACP made aware as per ACP due to pt receiving IVF and albumin recently, RN to recheck BP in one hour (10AM)

## 2023-02-19 LAB
-  AMIKACIN: SIGNIFICANT CHANGE UP
-  AMOXICILLIN/CLAVULANIC ACID: SIGNIFICANT CHANGE UP
-  AMPICILLIN/SULBACTAM: SIGNIFICANT CHANGE UP
-  AMPICILLIN: SIGNIFICANT CHANGE UP
-  AZTREONAM: SIGNIFICANT CHANGE UP
-  CEFAZOLIN: SIGNIFICANT CHANGE UP
-  CEFEPIME: SIGNIFICANT CHANGE UP
-  CEFOXITIN: SIGNIFICANT CHANGE UP
-  CEFTRIAXONE: SIGNIFICANT CHANGE UP
-  CEFUROXIME: SIGNIFICANT CHANGE UP
-  CIPROFLOXACIN: SIGNIFICANT CHANGE UP
-  ERTAPENEM: SIGNIFICANT CHANGE UP
-  GENTAMICIN: SIGNIFICANT CHANGE UP
-  IMIPENEM: SIGNIFICANT CHANGE UP
-  LEVOFLOXACIN: SIGNIFICANT CHANGE UP
-  MEROPENEM: SIGNIFICANT CHANGE UP
-  NITROFURANTOIN: SIGNIFICANT CHANGE UP
-  PIPERACILLIN/TAZOBACTAM: SIGNIFICANT CHANGE UP
-  TOBRAMYCIN: SIGNIFICANT CHANGE UP
-  TRIMETHOPRIM/SULFAMETHOXAZOLE: SIGNIFICANT CHANGE UP
ALBUMIN SERPL ELPH-MCNC: 3.2 G/DL — LOW (ref 3.3–5)
ALP SERPL-CCNC: 123 U/L — HIGH (ref 40–120)
ALT FLD-CCNC: 10 U/L — SIGNIFICANT CHANGE UP (ref 10–45)
ANION GAP SERPL CALC-SCNC: 10 MMOL/L — SIGNIFICANT CHANGE UP (ref 5–17)
AST SERPL-CCNC: 49 U/L — HIGH (ref 10–40)
BILIRUB SERPL-MCNC: 10.4 MG/DL — HIGH (ref 0.2–1.2)
BUN SERPL-MCNC: 28 MG/DL — HIGH (ref 7–23)
CALCIUM SERPL-MCNC: 9.1 MG/DL — SIGNIFICANT CHANGE UP (ref 8.4–10.5)
CHLORIDE SERPL-SCNC: 108 MMOL/L — SIGNIFICANT CHANGE UP (ref 96–108)
CO2 SERPL-SCNC: 21 MMOL/L — LOW (ref 22–31)
CREAT SERPL-MCNC: 1.25 MG/DL — SIGNIFICANT CHANGE UP (ref 0.5–1.3)
CULTURE RESULTS: SIGNIFICANT CHANGE UP
EGFR: 49 ML/MIN/1.73M2 — LOW
GLUCOSE SERPL-MCNC: 110 MG/DL — HIGH (ref 70–99)
HCT VFR BLD CALC: 22.6 % — LOW (ref 34.5–45)
HCT VFR BLD CALC: 25.6 % — LOW (ref 34.5–45)
HGB BLD-MCNC: 7.1 G/DL — LOW (ref 11.5–15.5)
HGB BLD-MCNC: 8.1 G/DL — LOW (ref 11.5–15.5)
INR BLD: 2.03 RATIO — HIGH (ref 0.88–1.16)
MCHC RBC-ENTMCNC: 31.4 GM/DL — LOW (ref 32–36)
MCHC RBC-ENTMCNC: 31.6 GM/DL — LOW (ref 32–36)
MCHC RBC-ENTMCNC: 36.6 PG — HIGH (ref 27–34)
MCHC RBC-ENTMCNC: 37.3 PG — HIGH (ref 27–34)
MCV RBC AUTO: 116.5 FL — HIGH (ref 80–100)
MCV RBC AUTO: 118 FL — HIGH (ref 80–100)
METHOD TYPE: SIGNIFICANT CHANGE UP
NRBC # BLD: 0 /100 WBCS — SIGNIFICANT CHANGE UP (ref 0–0)
NRBC # BLD: 0 /100 WBCS — SIGNIFICANT CHANGE UP (ref 0–0)
ORGANISM # SPEC MICROSCOPIC CNT: SIGNIFICANT CHANGE UP
ORGANISM # SPEC MICROSCOPIC CNT: SIGNIFICANT CHANGE UP
PLATELET # BLD AUTO: 62 K/UL — LOW (ref 150–400)
PLATELET # BLD AUTO: 71 K/UL — LOW (ref 150–400)
POTASSIUM SERPL-MCNC: 4.4 MMOL/L — SIGNIFICANT CHANGE UP (ref 3.5–5.3)
POTASSIUM SERPL-SCNC: 4.4 MMOL/L — SIGNIFICANT CHANGE UP (ref 3.5–5.3)
PROT SERPL-MCNC: 6.9 G/DL — SIGNIFICANT CHANGE UP (ref 6–8.3)
PROTHROM AB SERPL-ACNC: 23.7 SEC — HIGH (ref 10.5–13.4)
RBC # BLD: 1.94 M/UL — LOW (ref 3.8–5.2)
RBC # BLD: 2.17 M/UL — LOW (ref 3.8–5.2)
RBC # FLD: 14.7 % — HIGH (ref 10.3–14.5)
RBC # FLD: 14.8 % — HIGH (ref 10.3–14.5)
SODIUM SERPL-SCNC: 139 MMOL/L — SIGNIFICANT CHANGE UP (ref 135–145)
SPECIMEN SOURCE: SIGNIFICANT CHANGE UP
WBC # BLD: 4.33 K/UL — SIGNIFICANT CHANGE UP (ref 3.8–10.5)
WBC # BLD: 5.58 K/UL — SIGNIFICANT CHANGE UP (ref 3.8–10.5)
WBC # FLD AUTO: 4.33 K/UL — SIGNIFICANT CHANGE UP (ref 3.8–10.5)
WBC # FLD AUTO: 5.58 K/UL — SIGNIFICANT CHANGE UP (ref 3.8–10.5)

## 2023-02-19 RX ORDER — PHYTONADIONE (VIT K1) 5 MG
10 TABLET ORAL DAILY
Refills: 0 | Status: COMPLETED | OUTPATIENT
Start: 2023-02-19 | End: 2023-02-22

## 2023-02-19 RX ORDER — PROCHLORPERAZINE MALEATE 5 MG
10 TABLET ORAL EVERY 8 HOURS
Refills: 0 | Status: DISCONTINUED | OUTPATIENT
Start: 2023-02-19 | End: 2023-02-25

## 2023-02-19 RX ADMIN — HEPARIN SODIUM 5000 UNIT(S): 5000 INJECTION INTRAVENOUS; SUBCUTANEOUS at 15:20

## 2023-02-19 RX ADMIN — Medication 1300 MILLIGRAM(S): at 00:04

## 2023-02-19 RX ADMIN — Medication 50 MILLILITER(S): at 05:30

## 2023-02-19 RX ADMIN — Medication 50 MILLILITER(S): at 00:03

## 2023-02-19 RX ADMIN — Medication 102 MILLIGRAM(S): at 18:46

## 2023-02-19 RX ADMIN — HYDROMORPHONE HYDROCHLORIDE 0.5 MILLIGRAM(S): 2 INJECTION INTRAMUSCULAR; INTRAVENOUS; SUBCUTANEOUS at 00:04

## 2023-02-19 RX ADMIN — Medication 1300 MILLIGRAM(S): at 22:13

## 2023-02-19 RX ADMIN — Medication 1300 MILLIGRAM(S): at 05:28

## 2023-02-19 RX ADMIN — CEFTRIAXONE 100 MILLIGRAM(S): 500 INJECTION, POWDER, FOR SOLUTION INTRAMUSCULAR; INTRAVENOUS at 08:10

## 2023-02-19 RX ADMIN — HYDROMORPHONE HYDROCHLORIDE 0.5 MILLIGRAM(S): 2 INJECTION INTRAMUSCULAR; INTRAVENOUS; SUBCUTANEOUS at 20:07

## 2023-02-19 RX ADMIN — Medication 1300 MILLIGRAM(S): at 15:19

## 2023-02-19 RX ADMIN — HYDROMORPHONE HYDROCHLORIDE 0.5 MILLIGRAM(S): 2 INJECTION INTRAMUSCULAR; INTRAVENOUS; SUBCUTANEOUS at 00:44

## 2023-02-19 RX ADMIN — Medication 50 MILLILITER(S): at 15:22

## 2023-02-19 RX ADMIN — HYDROMORPHONE HYDROCHLORIDE 0.5 MILLIGRAM(S): 2 INJECTION INTRAMUSCULAR; INTRAVENOUS; SUBCUTANEOUS at 21:00

## 2023-02-19 RX ADMIN — HEPARIN SODIUM 5000 UNIT(S): 5000 INJECTION INTRAVENOUS; SUBCUTANEOUS at 22:13

## 2023-02-19 RX ADMIN — Medication 50 MILLILITER(S): at 22:11

## 2023-02-19 NOTE — PROGRESS NOTE ADULT - ASSESSMENT
61yo F PMHx breast CA sp lumpectomy, etoh abuse, hepatitis, HTN, sent by GI for jaundice and abd distention.     # etoh liver cirrhosis with ascites  CT a/p Cirrhosis, small to moderate volume abdominopelvic ascites, and sequela of portal hypertension. Contracted gallbladder with gallstones  GI and hepatology following  ABD US no PVT  doppler LE no dvt  sp diagnostic/ therapeutic paracentesis, fu cultures  cont albumin 100 TID  cont ceftriaxone for SBP ppx  daily CMP and INR    # ELIZABETH  renal following, renal US no hydro  improving    # breast CA sp lumpectomy  was taking letrozole but was told to stop with jaundice, outpt fu    # HTN  outpt stopped medication for hypotension    dvt ppx HSQ    dw  at bedside    Please call "[employer]" with questions [phone]. 63yo F PMHx breast CA sp lumpectomy, etoh abuse, hepatitis, HTN, sent by GI for jaundice and abd distention.     # etoh liver cirrhosis with ascites  # anemia, thrombocytopenia  CT a/p Cirrhosis, small to moderate volume abdominopelvic ascites, and sequela of portal hypertension. Contracted gallbladder with gallstones  GI and hepatology following  ABD US no PVT  doppler LE no dvt  sp diagnostic/ therapeutic paracentesis, fu cultures  cont albumin 100 TID  cont ceftriaxone for SBP ppx  daily CMP and INR  transfuse hgb <7    # ELIZABETH  renal following, renal US no hydro  improving    # breast CA sp lumpectomy  was taking letrozole but was told to stop with jaundice, outpt fu    # HTN  outpt stopped medication for hypotension    dvt ppx HSQ    dw  at bedside    Please call [employer] with questions [phone].

## 2023-02-19 NOTE — PROGRESS NOTE ADULT - SUBJECTIVE AND OBJECTIVE BOX
Interval Events:   No acute events, ongoing intermittent abd pain    Hospital Medications:  acetaminophen     Tablet .. 650 milliGRAM(s) Oral every 6 hours PRN  albumin human 25% IVPB 100 milliLiter(s) IV Intermittent every 8 hours  aluminum hydroxide/magnesium hydroxide/simethicone Suspension 30 milliLiter(s) Oral every 4 hours PRN  cefTRIAXone   IVPB      cefTRIAXone   IVPB 2000 milliGRAM(s) IV Intermittent every 24 hours  heparin   Injectable 5000 Unit(s) SubCutaneous every 8 hours  HYDROmorphone  Injectable 0.5 milliGRAM(s) IV Push every 6 hours PRN  melatonin 3 milliGRAM(s) Oral at bedtime PRN  ondansetron Injectable 4 milliGRAM(s) IV Push every 8 hours PRN  sodium bicarbonate 1300 milliGRAM(s) Oral three times a day      ROS: All system reviewed and negative except as mentioned above.    PHYSICAL EXAM:   Vital Signs:  Vital Signs Last 24 Hrs  T(C): 36.6 (2023 04:47), Max: 37 (2023 12:30)  T(F): 97.9 (2023 04:47), Max: 98.6 (2023 12:30)  HR: 75 (2023 04:47) (59 - 77)  BP: 93/47 (2023 04:47) (89/51 - 113/67)  BP(mean): --  RR: 18 (2023 04:47) (18 - 18)  SpO2: 98% (2023 04:47) (95% - 99%)    Parameters below as of 2023 04:47  Patient On (Oxygen Delivery Method): room air      Daily     Daily       GENERAL:  NAD, Appears stated age  HEENT:  NC/AT,  conjunctivae clear and pink, sclera icterus  CHEST:  Normal Effort, Breath sounds clear  HEART:  RRR, S1 + S2, no murmurs  ABDOMEN:  Soft, non-tender, non-distended, normoactive bowel sounds,  no masses  EXTREMITIES:  mild edema  SKIN:  Warm & Dry. No rash or erythema  NEURO:  Alert, oriented, no focal deficit    LABS:                        7.1    4.33  )-----------( 62       ( 2023 07:05 )             22.6     Mean Cell Volume: 116.5 fl (23 @ 07:05)        139  |  108  |  28<H>  ----------------------------<  110<H>  4.4   |  21<L>  |  1.25    Ca    9.1      2023 07:04    TPro  6.9  /  Alb  3.2<L>  /  TBili  10.4<H>  /  DBili  x   /  AST  49<H>  /  ALT  10  /  AlkPhos  123<H>      LIVER FUNCTIONS - ( 2023 07:04 )  Alb: 3.2 g/dL / Pro: 6.9 g/dL / ALK PHOS: 123 U/L / ALT: 10 U/L / AST: 49 U/L / GGT: x           PT/INR - ( 2023 07:05 )   PT: 23.7 sec;   INR: 2.03 ratio           Urinalysis Basic - ( 2023 18:55 )    Color: Dark Yellow / Appearance: Slightly Turbid / S.035 / pH: x  Gluc: x / Ketone: Trace  / Bili: Moderate / Urobili: 6 mg/dL   Blood: x / Protein: 30 mg/dL / Nitrite: Negative   Leuk Esterase: Large / RBC: 1 /hpf / WBC 63 /HPF   Sq Epi: x / Non Sq Epi: 2 / Bacteria: Many                              7.1    4.33  )-----------( 62       ( 2023 07:05 )             22.6                         7.8    7.68  )-----------( 78       ( 2023 18:43 )             24.9                         7.8    4.84  )-----------( 67       ( 2023 10:21 )             24.6                         9.2    9.56  )-----------( 124      ( 2023 22:40 )             29.1       Imaging: Images reviewed.

## 2023-02-19 NOTE — PROGRESS NOTE ADULT - SUBJECTIVE AND OBJECTIVE BOX
Patient is a 62y old  Female who presents with a chief complaint of jaundice    SUBJECTIVE / OVERNIGHT EVENTS:    Patient seen and examined. abd pain yesterday with nausea and vomiting. feels better today.      Vital Signs Last 24 Hrs  T(C): 36.6 (2023 04:47), Max: 37 (2023 12:30)  T(F): 97.9 (2023 04:47), Max: 98.6 (2023 12:30)  HR: 75 (2023 04:47) (59 - 77)  BP: 93/47 (2023 04:47) (89/51 - 113/67)  BP(mean): --  RR: 18 (2023 04:47) (18 - 18)  SpO2: 98% (2023 04:47) (95% - 99%)    Parameters below as of 2023 04:47  Patient On (Oxygen Delivery Method): room air      I&O's Summary    2023 07:01  -  2023 11:07  --------------------------------------------------------  IN: 240 mL / OUT: 0 mL / NET: 240 mL        PE:  GENERAL: NAD, AAOx3, mild jaundice  CHEST/LUNG: CTABL, No wheeze  HEART: Regular rate and rhythm; no murmur  ABDOMEN: Soft, Nontender, Nondistended; Bowel sounds present  EXTREMITIES:  2+ Peripheral Pulses, +1 le edema  NEURO: No focal deficits    LABS:                        7.1    4.33  )-----------( 62       ( 2023 07:05 )             22.6     02-19    139  |  108  |  28<H>  ----------------------------<  110<H>  4.4   |  21<L>  |  1.25    Ca    9.1      2023 07:04    TPro  6.9  /  Alb  3.2<L>  /  TBili  10.4<H>  /  DBili  x   /  AST  49<H>  /  ALT  10  /  AlkPhos  123<H>  02-19    PT/INR - ( 2023 07:05 )   PT: 23.7 sec;   INR: 2.03 ratio           CAPILLARY BLOOD GLUCOSE            Urinalysis Basic - ( 2023 18:55 )    Color: Dark Yellow / Appearance: Slightly Turbid / S.035 / pH: x  Gluc: x / Ketone: Trace  / Bili: Moderate / Urobili: 6 mg/dL   Blood: x / Protein: 30 mg/dL / Nitrite: Negative   Leuk Esterase: Large / RBC: 1 /hpf / WBC 63 /HPF   Sq Epi: x / Non Sq Epi: 2 / Bacteria: Many        RADIOLOGY & ADDITIONAL TESTS:    Imaging Personally Reviewed:  [x] YES  [ ] NO    Consultant(s) Notes Reviewed:  [x] YES  [ ] NO    MEDICATIONS  (STANDING):  albumin human 25% IVPB 100 milliLiter(s) IV Intermittent every 8 hours  cefTRIAXone   IVPB      cefTRIAXone   IVPB 2000 milliGRAM(s) IV Intermittent every 24 hours  heparin   Injectable 5000 Unit(s) SubCutaneous every 8 hours  sodium bicarbonate 1300 milliGRAM(s) Oral three times a day    MEDICATIONS  (PRN):  acetaminophen     Tablet .. 650 milliGRAM(s) Oral every 6 hours PRN Temp greater or equal to 38C (100.4F), Mild Pain (1 - 3)  aluminum hydroxide/magnesium hydroxide/simethicone Suspension 30 milliLiter(s) Oral every 4 hours PRN Dyspepsia  HYDROmorphone  Injectable 0.5 milliGRAM(s) IV Push every 6 hours PRN Severe Pain (7 - 10)  melatonin 3 milliGRAM(s) Oral at bedtime PRN Insomnia  ondansetron Injectable 4 milliGRAM(s) IV Push every 8 hours PRN Nausea and/or Vomiting      Care Discussed with Consultants/Other Providers [x] YES  [ ] NO    HEALTH ISSUES - PROBLEM Dx:

## 2023-02-19 NOTE — PROGRESS NOTE ADULT - ASSESSMENT
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 Pt saw PCP when she noted she was turning yellow in Jan, PCP did blood work and sent pt to GI. Patient was evaluated in her GIs office Dr. Lazcano who sent her into the emergency department for admission for MRCP and further evaluation.  Patient states she used to have 2 glasses of vodka daily for 3 years to treat her chronic pain from her breast cancer.  Patient quit cold turkey on 1/20 without experiencing any symptoms of withdrawal.  Patient states her eyes and skin have been progressively getting more yellow since 1/20.  Patient denies nausea, vomiting, fevers, chills, abdominal pain, dark stools, pale stools, changes in urine color.  Patient reports she was on Letrozole for her breast cancer, and was taking Tylenol daily at recommended doses to treat her pain, she is concerned that the drug interaction may have damaged her liver.  Patient has stopped taking Tylenol for several months now. Nephrology consulted for renal failure.     A/P    ELIZABETH   unclear baseline   renal failure possilby sec to hepatorenal vs pre renal  s/p paracentesis on 2/17 with 2 L removal   REnal function improving   suggest to get urine Na, Urine cr  ct with contrast 02/17/23 - monitor contrast induce nephropathy   will avoid ivf in setting of fluid overload   Avoid further nephrotoxins, NSAIDS, RCA   monitor BMP, U/O closely     hyponatremia   sec to fluid overload   fluid restriction <1L a day   monitor Na     Proteinuria/ hematuria   +Leuk Esterase: Large  repeat UA post tx    Acidosis with anion gap   Renal failure + lactate is elevated  improving  lasix prn   monitor

## 2023-02-19 NOTE — PROGRESS NOTE ADULT - ASSESSMENT
62-year-old female history of breast cancer (diagnosed 3 years ago status post lumpectomy, previously on Letrozole), alcohol use disorder (last drink 1/20/23 with no reported withdrawal), s/p live hepatitis vaccine, hypertension presenting for evaluation of jaundice.    #decompensated liver cirrhosis likely related to alcohol use disorder   #?alcohol use disorder   - ascites: small to moderate volume ascites, s/p 2 L removed on 2/17 negative for SBP  - HCC: CT with no liver lesions (2/17)   - HE: negative   - Varices: unknown  - splenomegaly on CT 2/17   - MELD 29 (2/16) , no labs today to calculate MELD    -Infectious work up with UA +, urine culture with E coli. Blood culture pending. CXR clear lungs. Tap negative for SBP.     Recommendations:   - f/up PETH level    - c/w abx for UTI  - albumin 25% 100cc q8H  - Please give Vit K IV x 3 days  - daily CMP and INR  - rest of care per primary team     Recommendations preliminary until signed by attending.     Jasson Harrison MD  Gastroenterology/Hepatology Fellow  Contact on-call GI fellow via answering service ([phone]) from 5pm-8am AND on weekends/holidays     62-year-old female history of breast cancer (diagnosed 3 years ago status post lumpectomy, previously on Letrozole), alcohol use disorder (last drink 1/20/23 with no reported withdrawal), s/p live hepatitis vaccine, hypertension presenting for evaluation of jaundice.    #decompensated liver cirrhosis likely related to alcohol use disorder   #?alcohol use disorder   - ascites: small to moderate volume ascites, s/p 2 L removed on 2/17 negative for SBP  - HCC: CT with no liver lesions (2/17)   - HE: negative   - Varices: unknown  - splenomegaly on CT 2/17   - MELD 26 on 2/19, improved from admission.     -Infectious work up with UA +, urine culture with E coli. Blood culture pending. CXR clear lungs. Tap negative for SBP.     Recommendations:   - f/up PETH level    - c/w abx for UTI  - albumin 25% 100cc q8H  - Please give Vit K IV x 3 days  - daily CMP and INR  - rest of care per primary team     Recommendations preliminary until signed by attending.     Jasson Harrison MD  Gastroenterology/Hepatology Fellow  Contact on-call GI fellow via answering service ([phone]) from 5pm-8am AND on weekends/holidays

## 2023-02-19 NOTE — PROGRESS NOTE ADULT - ASSESSMENT
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     1. New decompensated cirrhosis 2/2 ETOH with ascites   - MELD 29 (2/17)  - s/p paracentesis  -varices status unknown    2. Alcohol hepatitis with high MDF.  -will defer to hepatology re: Steroids after completion of abx for UTI  -inr trending up despite vit K    3. Contracted gallbladder with gallstones  - asymptomatic     4. ELIZABETH  - per nephrology     5. Hx of breast ca, s/p lumpectomy  - on Letrazole         Attending supervision statement: I have personally seen and examined the patient. I fully participated in the care of this patient. I have made amendments to the documentation where necessary, and agree with the history, physical exam, and plan as outlined by the ACP.    Advanced care planning forms were discussed. Code status including forceful chest compressions, defibrillation and intubation were discussed. The risks benefits and alternatives to pertinent gastrointestinal procedures and interventions were discussed in detail and all questions were answered. Duration: 15 Minutes.    [address] [address]  Office: [phone]

## 2023-02-19 NOTE — PROGRESS NOTE ADULT - SUBJECTIVE AND OBJECTIVE BOX
[facility] [place] [facility]   MD CHARMAINE KOENIG MD RUORU WONG, PA    TEL:  FROM 9 AM to 5 PM ---OFFICE: [phone]    FROM 5 PM - 9 AM PLEASE CALL ANSWERING SERVICE: [phone]    RENAL FOLLOW UP NOTE--Date of Service 02-19-23 @ 07:57  --------------------------------------------------------------------------------  HPI:      Pt seen and examined at bedside.   Nadeem SOB, chest pain     PAST HISTORY  --------------------------------------------------------------------------------  No significant changes to PMH, PSH, FHx, SHx, unless otherwise noted    ALLERGIES & MEDICATIONS  --------------------------------------------------------------------------------  Allergies    No Known Allergies    Intolerances      Standing Inpatient Medications  albumin human 25% IVPB 100 milliLiter(s) IV Intermittent every 8 hours  cefTRIAXone   IVPB      cefTRIAXone   IVPB 2000 milliGRAM(s) IV Intermittent every 24 hours  heparin   Injectable 5000 Unit(s) SubCutaneous every 8 hours  sodium bicarbonate 1300 milliGRAM(s) Oral three times a day    PRN Inpatient Medications  acetaminophen     Tablet .. 650 milliGRAM(s) Oral every 6 hours PRN  aluminum hydroxide/magnesium hydroxide/simethicone Suspension 30 milliLiter(s) Oral every 4 hours PRN  HYDROmorphone  Injectable 0.5 milliGRAM(s) IV Push every 6 hours PRN  melatonin 3 milliGRAM(s) Oral at bedtime PRN  ondansetron Injectable 4 milliGRAM(s) IV Push every 8 hours PRN      REVIEW OF SYSTEMS  --------------------------------------------------------------------------------  General: no fever    MSK: + edema     VITALS/PHYSICAL EXAM  --------------------------------------------------------------------------------  T(C): 36.6 (02-19-23 @ 04:47), Max: 37 (02-18-23 @ 12:30)  HR: 75 (02-19-23 @ 04:47) (59 - 77)  BP: 93/47 (02-19-23 @ 04:47) (89/51 - 113/67)  RR: 18 (02-19-23 @ 04:47) (18 - 18)  SpO2: 98% (02-19-23 @ 04:47) (95% - 99%)  Wt(kg): --        Physical Exam:  	Gen: NAD  	HEENT: MMM  	Pulm: CTA B/L  	CV: S1S2  	Abd: Soft, +BS  	Ext: + LE edema B/L                      Neuro: Awake   	Skin: Warm and Dry , jaundiced  	Vascular access: NO HD catheter             no antoni  LABS/STUDIES  --------------------------------------------------------------------------------              7.1    4.33  >-----------<  62       [02-19-23 @ 07:05]              22.6     139  |  108  |  28  ----------------------------<  110      [02-19-23 @ 07:04]  4.4   |  21  |  1.25        Ca     9.1     [02-19-23 @ 07:04]    TPro  6.9  /  Alb  3.2  /  TBili  10.4  /  DBili  x   /  AST  49  /  ALT  10  /  AlkPhos  123  [02-19-23 @ 07:04]    PT/INR: PT 23.7 , INR 2.03       [02-19-23 @ 07:05]      Creatinine Trend:  SCr 1.25 [02-19 @ 07:04]  SCr 1.42 [02-18 @ 18:43]  SCr 1.37 [02-18 @ 10:21]  SCr 2.10 [02-16 @ 22:40]    Urinalysis - [02-17-23 @ 18:55]      Color Dark Yellow / Appearance Slightly Turbid / SG 1.035 / pH 6.5      Gluc Negative / Ketone Trace  / Bili Moderate / Urobili 6 mg/dL       Blood Negative / Protein 30 mg/dL / Leuk Est Large / Nitrite Negative      RBC 1 / WBC 63 / Hyaline 1 / Gran  / Sq Epi  / Non Sq Epi 2 / Bacteria Many        HBsAb <3.0      [02-18-23 @ 10:21]  HBsAb Nonreact      [02-18-23 @ 10:21]  HBsAg Nonreact      [02-18-23 @ 10:21]  HBcAb Nonreact      [02-18-23 @ 10:21]  HCV 0.17, Nonreact      [02-18-23 @ 10:21]

## 2023-02-20 LAB
ALBUMIN SERPL ELPH-MCNC: 3.3 G/DL — SIGNIFICANT CHANGE UP (ref 3.3–5)
ALP SERPL-CCNC: 122 U/L — HIGH (ref 40–120)
ALT FLD-CCNC: 9 U/L — LOW (ref 10–45)
ANION GAP SERPL CALC-SCNC: 11 MMOL/L — SIGNIFICANT CHANGE UP (ref 5–17)
APTT BLD: 45.3 SEC — HIGH (ref 27.5–35.5)
AST SERPL-CCNC: 54 U/L — HIGH (ref 10–40)
BILIRUB SERPL-MCNC: 9.3 MG/DL — HIGH (ref 0.2–1.2)
BUN SERPL-MCNC: 20 MG/DL — SIGNIFICANT CHANGE UP (ref 7–23)
CALCIUM SERPL-MCNC: 8.7 MG/DL — SIGNIFICANT CHANGE UP (ref 8.4–10.5)
CHLORIDE SERPL-SCNC: 107 MMOL/L — SIGNIFICANT CHANGE UP (ref 96–108)
CO2 SERPL-SCNC: 20 MMOL/L — LOW (ref 22–31)
CREAT ?TM UR-MCNC: 178 MG/DL — SIGNIFICANT CHANGE UP
CREAT ?TM UR-MCNC: 178 MG/DL — SIGNIFICANT CHANGE UP
CREAT SERPL-MCNC: 0.97 MG/DL — SIGNIFICANT CHANGE UP (ref 0.5–1.3)
EGFR: 67 ML/MIN/1.73M2 — SIGNIFICANT CHANGE UP
GLUCOSE SERPL-MCNC: 88 MG/DL — SIGNIFICANT CHANGE UP (ref 70–99)
HCT VFR BLD CALC: 22.4 % — LOW (ref 34.5–45)
HGB BLD-MCNC: 7.1 G/DL — LOW (ref 11.5–15.5)
IGA FLD-MCNC: 657 MG/DL — HIGH (ref 84–499)
IGG FLD-MCNC: 2702 MG/DL — HIGH (ref 610–1660)
IGM SERPL-MCNC: 313 MG/DL — HIGH (ref 35–242)
INR BLD: 2.12 RATIO — HIGH (ref 0.88–1.16)
KAPPA LC SER QL IFE: 19.29 MG/DL — HIGH (ref 0.33–1.94)
KAPPA/LAMBDA FREE LIGHT CHAIN RATIO, SERUM: 1.38 RATIO — SIGNIFICANT CHANGE UP (ref 0.26–1.65)
LAMBDA LC SER QL IFE: 13.94 MG/DL — HIGH (ref 0.57–2.63)
MCHC RBC-ENTMCNC: 31.7 GM/DL — LOW (ref 32–36)
MCHC RBC-ENTMCNC: 37.4 PG — HIGH (ref 27–34)
MCV RBC AUTO: 117.9 FL — HIGH (ref 80–100)
NRBC # BLD: 0 /100 WBCS — SIGNIFICANT CHANGE UP (ref 0–0)
OSMOLALITY UR: 633 MOS/KG — SIGNIFICANT CHANGE UP (ref 300–900)
PLATELET # BLD AUTO: 54 K/UL — LOW (ref 150–400)
POTASSIUM SERPL-MCNC: 4.1 MMOL/L — SIGNIFICANT CHANGE UP (ref 3.5–5.3)
POTASSIUM SERPL-SCNC: 4.1 MMOL/L — SIGNIFICANT CHANGE UP (ref 3.5–5.3)
POTASSIUM UR-SCNC: 50 MMOL/L — SIGNIFICANT CHANGE UP
PROT ?TM UR-MCNC: 39 MG/DL — HIGH (ref 0–12)
PROT SERPL-MCNC: 6.5 G/DL — SIGNIFICANT CHANGE UP (ref 6–8.3)
PROT/CREAT UR-RTO: 0.2 RATIO — SIGNIFICANT CHANGE UP (ref 0–0.2)
PROTHROM AB SERPL-ACNC: 24.6 SEC — HIGH (ref 10.5–13.4)
RBC # BLD: 1.9 M/UL — LOW (ref 3.8–5.2)
RBC # FLD: 14.8 % — HIGH (ref 10.3–14.5)
SODIUM SERPL-SCNC: 138 MMOL/L — SIGNIFICANT CHANGE UP (ref 135–145)
SODIUM UR-SCNC: 44 MMOL/L — SIGNIFICANT CHANGE UP
SODIUM UR-SCNC: 44 MMOL/L — SIGNIFICANT CHANGE UP
UUN UR-MCNC: 1079 MG/DL — SIGNIFICANT CHANGE UP
WBC # BLD: 3.97 K/UL — SIGNIFICANT CHANGE UP (ref 3.8–10.5)
WBC # FLD AUTO: 3.97 K/UL — SIGNIFICANT CHANGE UP (ref 3.8–10.5)

## 2023-02-20 RX ORDER — SENNA PLUS 8.6 MG/1
2 TABLET ORAL AT BEDTIME
Refills: 0 | Status: DISCONTINUED | OUTPATIENT
Start: 2023-02-20 | End: 2023-02-25

## 2023-02-20 RX ORDER — POLYETHYLENE GLYCOL 3350 17 G/17G
17 POWDER, FOR SOLUTION ORAL DAILY
Refills: 0 | Status: DISCONTINUED | OUTPATIENT
Start: 2023-02-20 | End: 2023-02-21

## 2023-02-20 RX ADMIN — Medication 102 MILLIGRAM(S): at 11:53

## 2023-02-20 RX ADMIN — HYDROMORPHONE HYDROCHLORIDE 0.5 MILLIGRAM(S): 2 INJECTION INTRAMUSCULAR; INTRAVENOUS; SUBCUTANEOUS at 20:35

## 2023-02-20 RX ADMIN — HYDROMORPHONE HYDROCHLORIDE 0.5 MILLIGRAM(S): 2 INJECTION INTRAMUSCULAR; INTRAVENOUS; SUBCUTANEOUS at 14:03

## 2023-02-20 RX ADMIN — Medication 50 MILLILITER(S): at 14:03

## 2023-02-20 RX ADMIN — HYDROMORPHONE HYDROCHLORIDE 0.5 MILLIGRAM(S): 2 INJECTION INTRAMUSCULAR; INTRAVENOUS; SUBCUTANEOUS at 14:30

## 2023-02-20 RX ADMIN — Medication 50 MILLILITER(S): at 22:52

## 2023-02-20 RX ADMIN — POLYETHYLENE GLYCOL 3350 17 GRAM(S): 17 POWDER, FOR SOLUTION ORAL at 20:05

## 2023-02-20 RX ADMIN — CEFTRIAXONE 100 MILLIGRAM(S): 500 INJECTION, POWDER, FOR SOLUTION INTRAMUSCULAR; INTRAVENOUS at 09:00

## 2023-02-20 RX ADMIN — SENNA PLUS 2 TABLET(S): 8.6 TABLET ORAL at 22:52

## 2023-02-20 RX ADMIN — HEPARIN SODIUM 5000 UNIT(S): 5000 INJECTION INTRAVENOUS; SUBCUTANEOUS at 06:53

## 2023-02-20 RX ADMIN — HEPARIN SODIUM 5000 UNIT(S): 5000 INJECTION INTRAVENOUS; SUBCUTANEOUS at 14:03

## 2023-02-20 RX ADMIN — HYDROMORPHONE HYDROCHLORIDE 0.5 MILLIGRAM(S): 2 INJECTION INTRAMUSCULAR; INTRAVENOUS; SUBCUTANEOUS at 07:38

## 2023-02-20 RX ADMIN — HYDROMORPHONE HYDROCHLORIDE 0.5 MILLIGRAM(S): 2 INJECTION INTRAMUSCULAR; INTRAVENOUS; SUBCUTANEOUS at 08:34

## 2023-02-20 RX ADMIN — Medication 1300 MILLIGRAM(S): at 06:53

## 2023-02-20 RX ADMIN — Medication 1300 MILLIGRAM(S): at 14:04

## 2023-02-20 RX ADMIN — HYDROMORPHONE HYDROCHLORIDE 0.5 MILLIGRAM(S): 2 INJECTION INTRAMUSCULAR; INTRAVENOUS; SUBCUTANEOUS at 20:05

## 2023-02-20 RX ADMIN — Medication 1300 MILLIGRAM(S): at 23:00

## 2023-02-20 RX ADMIN — Medication 50 MILLILITER(S): at 06:52

## 2023-02-20 RX ADMIN — HEPARIN SODIUM 5000 UNIT(S): 5000 INJECTION INTRAVENOUS; SUBCUTANEOUS at 23:00

## 2023-02-20 NOTE — PROGRESS NOTE ADULT - ASSESSMENT
61yo F PMHx breast CA sp lumpectomy, etoh abuse, hepatitis, HTN, sent by GI for jaundice and abd distention.     # etoh liver cirrhosis with ascites  # anemia, thrombocytopenia  CT a/p Cirrhosis, small to moderate volume abdominopelvic ascites, and sequela of portal hypertension. Contracted gallbladder with gallstones  GI and hepatology following  ABD US no PVT  doppler LE no dvt  sp diagnostic/ therapeutic paracentesis, cultures NTD  cont albumin 100 TID  ceftriaxone day 3/3, stop abx tomorrow  daily CMP and INR  transfuse hgb <7    # ELIZABETH  renal following, renal US no hydro  improving    # breast CA sp lumpectomy  was taking letrozole but was told to stop with jaundice, outpt fu    # HTN  outpt stopped medication for hypotension    dvt ppx HSQ    dw  at bedside    Please call [employer] with questions [phone].

## 2023-02-20 NOTE — PROGRESS NOTE ADULT - SUBJECTIVE AND OBJECTIVE BOX
[facility] [place] [facility]   MD CHARMAINE KOENIG MD, PA KRISTINE SOLTANPOUR, DO INJUNG KO, NP    TEL:  OFFICE: [phone]    From 5pm-7am Answering Service [phone]    -- RENAL FOLLOW UP NOTE ---Date of Service 02-20-23 @ 14:21    Patient is a 62y old  Female who presents with a chief complaint of     Patient seen and examined at bedside. No chest pain/sob    VITALS:  T(F): 98.6 (02-20-23 @ 12:53), Max: 98.6 (02-20-23 @ 12:53)  HR: 80 (02-20-23 @ 12:53)  BP: 109/66 (02-20-23 @ 12:53)  RR: 18 (02-20-23 @ 12:53)  SpO2: 99% (02-20-23 @ 12:53)  Wt(kg): --    02-19 @ 07:01  -  02-20 @ 07:00  --------------------------------------------------------  IN: 480 mL / OUT: 0 mL / NET: 480 mL          PHYSICAL EXAM:  Constitutional: NAD  Neck: No JVD  Respiratory: CTAB, no wheezes, rales or rhonchi  Cardiovascular: S1, S2, RRR  Gastrointestinal: BS+, soft, NT/ND  Extremities: No peripheral edema    Hospital Medications:   MEDICATIONS  (STANDING):  albumin human 25% IVPB 100 milliLiter(s) IV Intermittent every 8 hours  cefTRIAXone   IVPB      cefTRIAXone   IVPB 2000 milliGRAM(s) IV Intermittent every 24 hours  heparin   Injectable 5000 Unit(s) SubCutaneous every 8 hours  phytonadione  IVPB 10 milliGRAM(s) IV Intermittent daily  sodium bicarbonate 1300 milliGRAM(s) Oral three times a day      LABS:  02-20    138  |  107  |  20  ----------------------------<  88  4.1   |  20<L>  |  0.97    Ca    8.7      20 Feb 2023 07:08    TPro  6.5  /  Alb  3.3  /  TBili  9.3<H>  /  DBili      /  AST  54<H>  /  ALT  9<L>  /  AlkPhos  122<H>  02-20    Creatinine Trend: 0.97 <--, 1.25 <--, 1.42 <--, 1.37 <--, 2.10 <--    Albumin, Serum: 3.3 g/dL (02-20 @ 07:08)                              7.1    3.97  )-----------( 54       ( 20 Feb 2023 07:08 )             22.4     Urine Studies:  Urinalysis - [02-17-23 @ 18:55]      Color Dark Yellow / Appearance Slightly Turbid / SG 1.035 / pH 6.5      Gluc Negative / Ketone Trace  / Bili Moderate / Urobili 6 mg/dL       Blood Negative / Protein 30 mg/dL / Leuk Est Large / Nitrite Negative      RBC 1 / WBC 63 / Hyaline 1 / Gran  / Sq Epi  / Non Sq Epi 2 / Bacteria Many    Urine Creatinine 178      [02-20-23 @ 01:04]  Urine Protein 39      [02-20-23 @ 01:04]  Urine Sodium 44      [02-20-23 @ 01:04]  Urine Urea Nitrogen 1079      [02-20-23 @ 01:04]  Urine Potassium 50      [02-20-23 @ 01:04]  Urine Osmolality 633      [02-20-23 @ 01:04]      HBsAb <3.0      [02-18-23 @ 10:21]  HBsAb Nonreact      [02-18-23 @ 10:21]  HBsAg Nonreact      [02-18-23 @ 10:21]  HBcAb Nonreact      [02-18-23 @ 10:21]  HCV 0.17, Nonreact      [02-18-23 @ 10:21]      RADIOLOGY & ADDITIONAL STUDIES:

## 2023-02-20 NOTE — PROGRESS NOTE ADULT - SUBJECTIVE AND OBJECTIVE BOX
Patient is a 62y old  Female who presents with a chief complaint of abd pain    SUBJECTIVE / OVERNIGHT EVENTS:    Patient seen and examined. feels fine. some upper burning.       Vital Signs Last 24 Hrs  T(C): 36.8 (20 Feb 2023 05:45), Max: 37.1 (19 Feb 2023 11:54)  T(F): 98.3 (20 Feb 2023 05:45), Max: 98.7 (19 Feb 2023 11:54)  HR: 84 (20 Feb 2023 05:45) (71 - 84)  BP: 100/63 (20 Feb 2023 05:45) (98/63 - 102/68)  BP(mean): --  RR: 18 (20 Feb 2023 05:45) (18 - 18)  SpO2: 99% (20 Feb 2023 05:45) (96% - 99%)    Parameters below as of 20 Feb 2023 05:45  Patient On (Oxygen Delivery Method): room air      I&O's Summary    19 Feb 2023 07:01  -  20 Feb 2023 07:00  --------------------------------------------------------  IN: 480 mL / OUT: 0 mL / NET: 480 mL        PE:  GENERAL: NAD, AAOx3, jaundice, scleral icterus  CHEST/LUNG: CTABL, No wheeze  HEART: Regular rate and rhythm; no murmur  ABDOMEN: Soft, Nontender, Nondistended; Bowel sounds present  EXTREMITIES:  2+ Peripheral Pulses, +1 le edema  NEURO: No focal deficits      LABS:                        7.1    3.97  )-----------( 54       ( 20 Feb 2023 07:08 )             22.4     02-20    138  |  107  |  20  ----------------------------<  88  4.1   |  20<L>  |  0.97    Ca    8.7      20 Feb 2023 07:08    TPro  6.5  /  Alb  3.3  /  TBili  9.3<H>  /  DBili  x   /  AST  54<H>  /  ALT  9<L>  /  AlkPhos  122<H>  02-20    PT/INR - ( 20 Feb 2023 10:26 )   PT: 24.6 sec;   INR: 2.12 ratio         PTT - ( 20 Feb 2023 10:26 )  PTT:45.3 sec  CAPILLARY BLOOD GLUCOSE                RADIOLOGY & ADDITIONAL TESTS:    Imaging Personally Reviewed:  [x] YES  [ ] NO    Consultant(s) Notes Reviewed:  [x] YES  [ ] NO    MEDICATIONS  (STANDING):  albumin human 25% IVPB 100 milliLiter(s) IV Intermittent every 8 hours  cefTRIAXone   IVPB      cefTRIAXone   IVPB 2000 milliGRAM(s) IV Intermittent every 24 hours  heparin   Injectable 5000 Unit(s) SubCutaneous every 8 hours  phytonadione  IVPB 10 milliGRAM(s) IV Intermittent daily  sodium bicarbonate 1300 milliGRAM(s) Oral three times a day    MEDICATIONS  (PRN):  acetaminophen     Tablet .. 650 milliGRAM(s) Oral every 6 hours PRN Temp greater or equal to 38C (100.4F), Mild Pain (1 - 3)  aluminum hydroxide/magnesium hydroxide/simethicone Suspension 30 milliLiter(s) Oral every 4 hours PRN Dyspepsia  HYDROmorphone  Injectable 0.5 milliGRAM(s) IV Push every 6 hours PRN Severe Pain (7 - 10)  melatonin 3 milliGRAM(s) Oral at bedtime PRN Insomnia  prochlorperazine   IVPB 10 milliGRAM(s) IV Intermittent every 8 hours PRN nausea      Care Discussed with Consultants/Other Providers [x] YES  [ ] NO    HEALTH ISSUES - PROBLEM Dx:

## 2023-02-20 NOTE — PROGRESS NOTE ADULT - ASSESSMENT
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 Pt saw PCP when she noted she was turning yellow in Jan, PCP did blood work and sent pt to GI. Patient was evaluated in her GIs office Dr. Lazcano who sent her into the emergency department for admission for MRCP and further evaluation.  Patient states she used to have 2 glasses of vodka daily for 3 years to treat her chronic pain from her breast cancer.  Patient quit cold turkey on 1/20 without experiencing any symptoms of withdrawal.  Patient states her eyes and skin have been progressively getting more yellow since 1/20.  Patient denies nausea, vomiting, fevers, chills, abdominal pain, dark stools, pale stools, changes in urine color.  Patient reports she was on Letrozole for her breast cancer, and was taking Tylenol daily at recommended doses to treat her pain, she is concerned that the drug interaction may have damaged her liver.  Patient has stopped taking Tylenol for several months now. Nephrology consulted for renal failure.     A/P    ELIZABETH   unclear baseline   renal failure possilby sec to hepatorenal vs pre renal  s/p paracentesis on 2/17 with 2 L removal   REnal function improved  ct with contrast 02/17/23 - monitor contrast induce nephropathy   will avoid ivf in setting of fluid overload   Avoid further nephrotoxins, NSAIDS, RCA   monitor BMP, U/O closely     hyponatremia   sec to fluid overload   improved   fluid restriction <1L a day   monitor Na     Proteinuria/ hematuria   +Leuk Esterase: Large  repeat UA post tx    Acidosis with/without anion gap   Renal failure + lactate is elevated  improving  on oral bicarb   monitor

## 2023-02-20 NOTE — PROGRESS NOTE ADULT - SUBJECTIVE AND OBJECTIVE BOX
Chief Complaint:  Patient is a 62y old  Female who presents with a chief complaint of     Date of service 02-20-23 @ 09:34      Interval Events:     Hospital Medications:  acetaminophen     Tablet .. 650 milliGRAM(s) Oral every 6 hours PRN  albumin human 25% IVPB 100 milliLiter(s) IV Intermittent every 8 hours  aluminum hydroxide/magnesium hydroxide/simethicone Suspension 30 milliLiter(s) Oral every 4 hours PRN  cefTRIAXone   IVPB      cefTRIAXone   IVPB 2000 milliGRAM(s) IV Intermittent every 24 hours  heparin   Injectable 5000 Unit(s) SubCutaneous every 8 hours  HYDROmorphone  Injectable 0.5 milliGRAM(s) IV Push every 6 hours PRN  melatonin 3 milliGRAM(s) Oral at bedtime PRN  phytonadione  IVPB 10 milliGRAM(s) IV Intermittent daily  prochlorperazine   IVPB 10 milliGRAM(s) IV Intermittent every 8 hours PRN  sodium bicarbonate 1300 milliGRAM(s) Oral three times a day        Review of Systems:  General:  No wt loss, fevers, chills, night sweats, fatigue,   Eyes:  Good vision, no reported pain  ENT:  No sore throat, pain, runny nose, dysphagia  CV:  No pain, palpitations, hypo/hypertension  Resp:  No dyspnea, cough, tachypnea, wheezing  GI:  See HPI  :  No pain, bleeding, incontinence, nocturia  Muscle:  No pain, weakness  Neuro:  No weakness, tingling, memory problems  Psych:  No fatigue, insomnia, mood problems, depression  Endocrine:  No polyuria, polydipsia, cold/heat intolerance  Heme:  No petechiae, ecchymosis, easy bruisability  Integumentary:  No rash, edema    PHYSICAL EXAM:   Vital Signs:  Vital Signs Last 24 Hrs  T(C): 36.8 (20 Feb 2023 05:45), Max: 37.1 (19 Feb 2023 11:54)  T(F): 98.3 (20 Feb 2023 05:45), Max: 98.7 (19 Feb 2023 11:54)  HR: 84 (20 Feb 2023 05:45) (71 - 84)  BP: 100/63 (20 Feb 2023 05:45) (98/63 - 102/68)  BP(mean): --  RR: 18 (20 Feb 2023 05:45) (18 - 18)  SpO2: 99% (20 Feb 2023 05:45) (96% - 99%)    Parameters below as of 20 Feb 2023 05:45  Patient On (Oxygen Delivery Method): room air      Daily     Daily       PHYSICAL EXAM:     GENERAL:  Appears stated age, well-groomed, well-nourished, no distress  HEENT:  NC/AT,  conjunctivae anicteric, clear and pink,   NECK: supple, trachea midline  CHEST:  Full & symmetric excursion, no increased effort, breath sounds clear  HEART:  Regular rhythm, no JVD  ABDOMEN:  Soft, non-tender, non-distended, normoactive bowel sounds,  no masses , no hepatosplenomegaly  EXTREMITIES:  no cyanosis,clubbing or edema  SKIN:  No rash, erythema, or, ecchymoses, no jaundice  NEURO:  Alert, non-focal, no asterixis  PSYCH: Appropriate affect, oriented to place and time  RECTAL: Deferred      LABS Personally reviewed by me:                        7.1    3.97  )-----------( 54       ( 20 Feb 2023 07:08 )             22.4     Mean Cell Volume: 117.9 fl (02-20-23 @ 07:08)    02-20    138  |  107  |  20  ----------------------------<  88  4.1   |  20<L>  |  0.97    Ca    8.7      20 Feb 2023 07:08    TPro  6.5  /  Alb  3.3  /  TBili  9.3<H>  /  DBili  x   /  AST  54<H>  /  ALT  9<L>  /  AlkPhos  122<H>  02-20    LIVER FUNCTIONS - ( 20 Feb 2023 07:08 )  Alb: 3.3 g/dL / Pro: 6.5 g/dL / ALK PHOS: 122 U/L / ALT: 9 U/L / AST: 54 U/L / GGT: x           PT/INR - ( 19 Feb 2023 07:05 )   PT: 23.7 sec;   INR: 2.03 ratio                                     7.1    3.97  )-----------( 54       ( 20 Feb 2023 07:08 )             22.4                         8.1    5.58  )-----------( 71       ( 19 Feb 2023 12:00 )             25.6                         7.1    4.33  )-----------( 62       ( 19 Feb 2023 07:05 )             22.6                         7.8    7.68  )-----------( 78       ( 18 Feb 2023 18:43 )             24.9                         7.8    4.84  )-----------( 67       ( 18 Feb 2023 10:21 )             24.6       Imaging personally reviewed by me:             Chief Complaint:  Patient is a 62y old  Female who presents with a chief complaint of     Date of service 02-20-23 @ 09:34      Interval Events:   feels improved, tolerating PO diet     Hospital Medications:  acetaminophen     Tablet .. 650 milliGRAM(s) Oral every 6 hours PRN  albumin human 25% IVPB 100 milliLiter(s) IV Intermittent every 8 hours  aluminum hydroxide/magnesium hydroxide/simethicone Suspension 30 milliLiter(s) Oral every 4 hours PRN  cefTRIAXone   IVPB      cefTRIAXone   IVPB 2000 milliGRAM(s) IV Intermittent every 24 hours  heparin   Injectable 5000 Unit(s) SubCutaneous every 8 hours  HYDROmorphone  Injectable 0.5 milliGRAM(s) IV Push every 6 hours PRN  melatonin 3 milliGRAM(s) Oral at bedtime PRN  phytonadione  IVPB 10 milliGRAM(s) IV Intermittent daily  prochlorperazine   IVPB 10 milliGRAM(s) IV Intermittent every 8 hours PRN  sodium bicarbonate 1300 milliGRAM(s) Oral three times a day        Review of Systems:  General:  No wt loss, fevers, chills, night sweats, fatigue,   Eyes:  Good vision, no reported pain  ENT:  No sore throat, pain, runny nose, dysphagia  CV:  No pain, palpitations, hypo/hypertension  Resp:  No dyspnea, cough, tachypnea, wheezing  GI:  See HPI  :  No pain, bleeding, incontinence, nocturia  Muscle:  No pain, weakness  Neuro:  No weakness, tingling, memory problems  Psych:  No fatigue, insomnia, mood problems, depression  Endocrine:  No polyuria, polydipsia, cold/heat intolerance  Heme:  No petechiae, ecchymosis, easy bruisability  Integumentary:  No rash, edema    PHYSICAL EXAM:   Vital Signs:  Vital Signs Last 24 Hrs  T(C): 36.8 (20 Feb 2023 05:45), Max: 37.1 (19 Feb 2023 11:54)  T(F): 98.3 (20 Feb 2023 05:45), Max: 98.7 (19 Feb 2023 11:54)  HR: 84 (20 Feb 2023 05:45) (71 - 84)  BP: 100/63 (20 Feb 2023 05:45) (98/63 - 102/68)  BP(mean): --  RR: 18 (20 Feb 2023 05:45) (18 - 18)  SpO2: 99% (20 Feb 2023 05:45) (96% - 99%)    Parameters below as of 20 Feb 2023 05:45  Patient On (Oxygen Delivery Method): room air      Daily     Daily       PHYSICAL EXAM:     GENERAL:  Appears stated age, well-groomed, well-nourished, no distress  HEENT:  NC/AT,  conjunctivae anicteric, clear and pink,   NECK: supple, trachea midline  CHEST:  Full & symmetric excursion, no increased effort, breath sounds clear  HEART:  Regular rhythm, no JVD  ABDOMEN:  Soft, non-tender, non-distended, normoactive bowel sounds,  no masses , no hepatosplenomegaly  EXTREMITIES:  no cyanosis,clubbing or edema  SKIN:  No rash, erythema, or, ecchymoses, no jaundice  NEURO:  Alert, non-focal, no asterixis  PSYCH: Appropriate affect, oriented to place and time  RECTAL: Deferred      LABS Personally reviewed by me:                        7.1    3.97  )-----------( 54       ( 20 Feb 2023 07:08 )             22.4     Mean Cell Volume: 117.9 fl (02-20-23 @ 07:08)    02-20    138  |  107  |  20  ----------------------------<  88  4.1   |  20<L>  |  0.97    Ca    8.7      20 Feb 2023 07:08    TPro  6.5  /  Alb  3.3  /  TBili  9.3<H>  /  DBili  x   /  AST  54<H>  /  ALT  9<L>  /  AlkPhos  122<H>  02-20    LIVER FUNCTIONS - ( 20 Feb 2023 07:08 )  Alb: 3.3 g/dL / Pro: 6.5 g/dL / ALK PHOS: 122 U/L / ALT: 9 U/L / AST: 54 U/L / GGT: x           PT/INR - ( 19 Feb 2023 07:05 )   PT: 23.7 sec;   INR: 2.03 ratio                                     7.1    3.97  )-----------( 54       ( 20 Feb 2023 07:08 )             22.4                         8.1    5.58  )-----------( 71       ( 19 Feb 2023 12:00 )             25.6                         7.1    4.33  )-----------( 62       ( 19 Feb 2023 07:05 )             22.6                         7.8    7.68  )-----------( 78       ( 18 Feb 2023 18:43 )             24.9                         7.8    4.84  )-----------( 67       ( 18 Feb 2023 10:21 )             24.6       Imaging personally reviewed by me:

## 2023-02-20 NOTE — PROGRESS NOTE ADULT - ASSESSMENT
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     1. New decompensated cirrhosis 2/2 ETOH with ascites   - MELD 29 (2/17)  - s/p paracentesis  -varices status unknown    2. Alcohol hepatitis with high MDF.  -will defer to hepatology re: Steroids after completion of abx for UTI  -inr trending up despite vit K    3. Contracted gallbladder with gallstones  - asymptomatic     4. ELIZABETH  - per nephrology     5. 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     1. Decompensated ETOH cirrhosis c/b ascites   - MELD 29 (2/17) > 24 today   - s/p paracentesis 2/17, no SBP  - HCC screening: CT A/P 2/17 no focal liver lesions  - Gastric and splenorenal varices on CT, no recent endoscopy   - to complete 3 haroldo of Vit K   - tx hepatology following     2. UTI   - on abx    3. asymptomatic cholelithiasis    4. ELIZABETH, resolved    5. Hx of breast ca, s/p lumpectomy  - on [facility]  [address] [place] [id number]  Office: [phone]

## 2023-02-21 LAB
ALBUMIN SERPL ELPH-MCNC: 3.6 G/DL — SIGNIFICANT CHANGE UP (ref 3.3–5)
ALP SERPL-CCNC: 102 U/L — SIGNIFICANT CHANGE UP (ref 40–120)
ALT FLD-CCNC: 9 U/L — LOW (ref 10–45)
ANION GAP SERPL CALC-SCNC: 13 MMOL/L — SIGNIFICANT CHANGE UP (ref 5–17)
APTT BLD: 42.7 SEC — HIGH (ref 27.5–35.5)
AST SERPL-CCNC: 47 U/L — HIGH (ref 10–40)
BILIRUB SERPL-MCNC: 10.5 MG/DL — HIGH (ref 0.2–1.2)
BLD GP AB SCN SERPL QL: NEGATIVE — SIGNIFICANT CHANGE UP
BUN SERPL-MCNC: 16 MG/DL — SIGNIFICANT CHANGE UP (ref 7–23)
CALCIUM SERPL-MCNC: 8.8 MG/DL — SIGNIFICANT CHANGE UP (ref 8.4–10.5)
CHLORIDE SERPL-SCNC: 105 MMOL/L — SIGNIFICANT CHANGE UP (ref 96–108)
CO2 SERPL-SCNC: 21 MMOL/L — LOW (ref 22–31)
CREAT SERPL-MCNC: 0.87 MG/DL — SIGNIFICANT CHANGE UP (ref 0.5–1.3)
EGFR: 76 ML/MIN/1.73M2 — SIGNIFICANT CHANGE UP
GLUCOSE SERPL-MCNC: 118 MG/DL — HIGH (ref 70–99)
HCT VFR BLD CALC: 22.3 % — LOW (ref 34.5–45)
HGB BLD-MCNC: 7 G/DL — CRITICAL LOW (ref 11.5–15.5)
INR BLD: 2.05 RATIO — HIGH (ref 0.88–1.16)
INR BLD: 2.05 RATIO — HIGH (ref 0.88–1.16)
MCHC RBC-ENTMCNC: 31.4 GM/DL — LOW (ref 32–36)
MCHC RBC-ENTMCNC: 37.2 PG — HIGH (ref 27–34)
MCV RBC AUTO: 118.6 FL — HIGH (ref 80–100)
MELD SCORE WITH DIALYSIS: 36 POINTS — SIGNIFICANT CHANGE UP
MELD SCORE WITHOUT DIALYSIS: 23 POINTS — SIGNIFICANT CHANGE UP
MITOCHONDRIA AB SER-ACNC: SIGNIFICANT CHANGE UP
NRBC # BLD: 0 /100 WBCS — SIGNIFICANT CHANGE UP (ref 0–0)
PLATELET # BLD AUTO: 56 K/UL — LOW (ref 150–400)
POTASSIUM SERPL-MCNC: 3.6 MMOL/L — SIGNIFICANT CHANGE UP (ref 3.5–5.3)
POTASSIUM SERPL-SCNC: 3.6 MMOL/L — SIGNIFICANT CHANGE UP (ref 3.5–5.3)
PROT SERPL-MCNC: 6.6 G/DL — SIGNIFICANT CHANGE UP (ref 6–8.3)
PROTHROM AB SERPL-ACNC: 23.7 SEC — HIGH (ref 10.5–13.4)
PROTHROM AB SERPL-ACNC: 23.8 SEC — HIGH (ref 10.5–13.4)
RBC # BLD: 1.88 M/UL — LOW (ref 3.8–5.2)
RBC # FLD: 14.7 % — HIGH (ref 10.3–14.5)
RH IG SCN BLD-IMP: POSITIVE — SIGNIFICANT CHANGE UP
SMOOTH MUSCLE AB SER-ACNC: SIGNIFICANT CHANGE UP
SODIUM SERPL-SCNC: 139 MMOL/L — SIGNIFICANT CHANGE UP (ref 135–145)
WBC # BLD: 4.15 K/UL — SIGNIFICANT CHANGE UP (ref 3.8–10.5)
WBC # FLD AUTO: 4.15 K/UL — SIGNIFICANT CHANGE UP (ref 3.8–10.5)

## 2023-02-21 PROCEDURE — 99233 SBSQ HOSP IP/OBS HIGH 50: CPT | Mod: GC

## 2023-02-21 RX ORDER — PREDNISOLONE 5 MG
40 TABLET ORAL DAILY
Refills: 0 | Status: DISCONTINUED | OUTPATIENT
Start: 2023-02-21 | End: 2023-02-25

## 2023-02-21 RX ORDER — POLYETHYLENE GLYCOL 3350 17 G/17G
17 POWDER, FOR SOLUTION ORAL
Refills: 0 | Status: DISCONTINUED | OUTPATIENT
Start: 2023-02-21 | End: 2023-02-25

## 2023-02-21 RX ADMIN — HYDROMORPHONE HYDROCHLORIDE 0.5 MILLIGRAM(S): 2 INJECTION INTRAMUSCULAR; INTRAVENOUS; SUBCUTANEOUS at 15:38

## 2023-02-21 RX ADMIN — HYDROMORPHONE HYDROCHLORIDE 0.5 MILLIGRAM(S): 2 INJECTION INTRAMUSCULAR; INTRAVENOUS; SUBCUTANEOUS at 03:23

## 2023-02-21 RX ADMIN — Medication 102 MILLIGRAM(S): at 11:46

## 2023-02-21 RX ADMIN — HYDROMORPHONE HYDROCHLORIDE 0.5 MILLIGRAM(S): 2 INJECTION INTRAMUSCULAR; INTRAVENOUS; SUBCUTANEOUS at 02:53

## 2023-02-21 RX ADMIN — Medication 50 MILLILITER(S): at 21:45

## 2023-02-21 RX ADMIN — Medication 1300 MILLIGRAM(S): at 13:43

## 2023-02-21 RX ADMIN — Medication 1300 MILLIGRAM(S): at 21:46

## 2023-02-21 RX ADMIN — HYDROMORPHONE HYDROCHLORIDE 0.5 MILLIGRAM(S): 2 INJECTION INTRAMUSCULAR; INTRAVENOUS; SUBCUTANEOUS at 09:13

## 2023-02-21 RX ADMIN — HEPARIN SODIUM 5000 UNIT(S): 5000 INJECTION INTRAVENOUS; SUBCUTANEOUS at 23:08

## 2023-02-21 RX ADMIN — Medication 1300 MILLIGRAM(S): at 06:36

## 2023-02-21 RX ADMIN — SENNA PLUS 2 TABLET(S): 8.6 TABLET ORAL at 21:47

## 2023-02-21 RX ADMIN — Medication 50 MILLILITER(S): at 06:18

## 2023-02-21 RX ADMIN — Medication 50 MILLILITER(S): at 13:43

## 2023-02-21 RX ADMIN — HYDROMORPHONE HYDROCHLORIDE 0.5 MILLIGRAM(S): 2 INJECTION INTRAMUSCULAR; INTRAVENOUS; SUBCUTANEOUS at 21:47

## 2023-02-21 RX ADMIN — HYDROMORPHONE HYDROCHLORIDE 0.5 MILLIGRAM(S): 2 INJECTION INTRAMUSCULAR; INTRAVENOUS; SUBCUTANEOUS at 10:53

## 2023-02-21 RX ADMIN — HEPARIN SODIUM 5000 UNIT(S): 5000 INJECTION INTRAVENOUS; SUBCUTANEOUS at 06:37

## 2023-02-21 RX ADMIN — HEPARIN SODIUM 5000 UNIT(S): 5000 INJECTION INTRAVENOUS; SUBCUTANEOUS at 13:43

## 2023-02-21 RX ADMIN — POLYETHYLENE GLYCOL 3350 17 GRAM(S): 17 POWDER, FOR SOLUTION ORAL at 10:17

## 2023-02-21 RX ADMIN — HYDROMORPHONE HYDROCHLORIDE 0.5 MILLIGRAM(S): 2 INJECTION INTRAMUSCULAR; INTRAVENOUS; SUBCUTANEOUS at 17:08

## 2023-02-21 NOTE — PROGRESS NOTE ADULT - SUBJECTIVE AND OBJECTIVE BOX
[facility]   MD CHARMAINE KOENIG MD, PA KRISTINE SOLTANPOUR, DO INJUNG KO, NP    TEL:  OFFICE: [phone]    From 5pm-7am Answering Service [phone]    -- RENAL FOLLOW UP NOTE ---Date of Service 02-21-23 @ 13:30    Patient is a 62y old  Female who presents with a chief complaint of     Patient seen and examined at bedside. No chest pain/sob    VITALS:  T(F): 98.8 (02-21-23 @ 11:53), Max: 98.8 (02-21-23 @ 11:53)  HR: 109 (02-21-23 @ 11:53)  BP: 95/61 (02-21-23 @ 11:53)  RR: 18 (02-21-23 @ 11:53)  SpO2: 97% (02-21-23 @ 11:53)  Wt(kg): --    02-20 @ 07:01  -  02-21 @ 07:00  --------------------------------------------------------  IN: 360 mL / OUT: 0 mL / NET: 360 mL    02-21 @ 07:01  -  02-21 @ 13:30  --------------------------------------------------------  IN: 240 mL / OUT: 0 mL / NET: 240 mL          PHYSICAL EXAM:  Constitutional: NAD  Neck: No JVD  Respiratory: CTAB, no wheezes, rales or rhonchi  Cardiovascular: S1, S2, RRR  Gastrointestinal: BS+, soft, NT/ND  Extremities: No peripheral edema    Hospital Medications:   MEDICATIONS  (STANDING):  albumin human 25% IVPB 100 milliLiter(s) IV Intermittent every 8 hours  heparin   Injectable 5000 Unit(s) SubCutaneous every 8 hours  phytonadione  IVPB 10 milliGRAM(s) IV Intermittent daily  polyethylene glycol 3350 17 Gram(s) Oral two times a day  senna 2 Tablet(s) Oral at bedtime  sodium bicarbonate 1300 milliGRAM(s) Oral three times a day      LABS:  02-21    139  |  105  |  16  ----------------------------<  118<H>  3.6   |  21<L>  |  0.87    Ca    8.8      21 Feb 2023 10:16    TPro  6.6  /  Alb  3.6  /  TBili  10.5<H>  /  DBili      /  AST  47<H>  /  ALT  9<L>  /  AlkPhos  102  02-21    Creatinine Trend: 0.87 <--, 0.97 <--, 1.25 <--, 1.42 <--, 1.37 <--, 2.10 <--    Albumin, Serum: 3.6 g/dL (02-21 @ 10:16)                              7.0    4.15  )-----------( 56       ( 21 Feb 2023 10:16 )             22.3     Urine Studies:  Urinalysis - [02-17-23 @ 18:55]      Color Dark Yellow / Appearance Slightly Turbid / SG 1.035 / pH 6.5      Gluc Negative / Ketone Trace  / Bili Moderate / Urobili 6 mg/dL       Blood Negative / Protein 30 mg/dL / Leuk Est Large / Nitrite Negative      RBC 1 / WBC 63 / Hyaline 1 / Gran  / Sq Epi  / Non Sq Epi 2 / Bacteria Many    Urine Creatinine 178      [02-20-23 @ 01:04]  Urine Protein 39      [02-20-23 @ 01:04]  Urine Sodium 44      [02-20-23 @ 01:04]  Urine Urea Nitrogen 1079      [02-20-23 @ 01:04]  Urine Potassium 50      [02-20-23 @ 01:04]  Urine Osmolality 633      [02-20-23 @ 01:04]      HBsAb <3.0      [02-18-23 @ 10:21]  HBsAb Nonreact      [02-18-23 @ 10:21]  HBsAg Nonreact      [02-18-23 @ 10:21]  HBcAb Nonreact      [02-18-23 @ 10:21]  HCV 0.17, Nonreact      [02-18-23 @ 10:21]    Free Light Chains: kappa 19.29, lambda 13.94, ratio = 1.38      [02-18 @ 10:21]    RADIOLOGY & ADDITIONAL STUDIES:

## 2023-02-21 NOTE — CONSULT NOTE ADULT - ASSESSMENT
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     Decompensated alcoholic liver cirrhosis with ascites  S/p paracentesis 2/17 with removal of 2050ml ascitic fluid   cultures negative to date  no evidence of SBP  Hepatology and GI following     E.coli UTI  UA with pyuria on admission  urine culture with pansensitive E.coli   No current urinary symptoms noted, no pain  Renal U/S with no hydronephrosis  afebrile and WBC wnl, ELIZABETH resolved   discontinue ceftriaxone - complete 3d course on 2/20  monitor off antibiotics   monitor temps/CBC      D/w Dr. Wero Syed M.D.  [facility], Division of Infectious Diseases  [phone]  After 5pm on weekdays and all day on weekends - please call [phone]

## 2023-02-21 NOTE — PROGRESS NOTE ADULT - SUBJECTIVE AND OBJECTIVE BOX
Patient is a 62y old  Female who presents with a chief complaint of jaubdice    SUBJECTIVE / OVERNIGHT EVENTS:    Patient seen and examined. sp 3 days of ceft. no complaints.      Vital Signs Last 24 Hrs  T(C): 37.1 (21 Feb 2023 04:27), Max: 37.1 (21 Feb 2023 04:27)  T(F): 98.7 (21 Feb 2023 04:27), Max: 98.7 (21 Feb 2023 04:27)  HR: 84 (21 Feb 2023 04:27) (74 - 84)  BP: 104/64 (21 Feb 2023 04:27) (103/68 - 109/66)  BP(mean): --  RR: 18 (21 Feb 2023 04:27) (18 - 18)  SpO2: 96% (21 Feb 2023 04:27) (96% - 99%)    Parameters below as of 21 Feb 2023 04:27  Patient On (Oxygen Delivery Method): room air      I&O's Summary    20 Feb 2023 07:01  -  21 Feb 2023 07:00  --------------------------------------------------------  IN: 360 mL / OUT: 0 mL / NET: 360 mL        PE:  GENERAL: NAD, AAOx3, jaundice, scleral icterus  CHEST/LUNG: CTABL, No wheeze  HEART: Regular rate and rhythm; no murmur  ABDOMEN: Soft, Nontender, Nondistended; Bowel sounds present  EXTREMITIES:  2+ Peripheral Pulses, +1 le edema  NEURO: No focal deficits    LABS:                        7.1    3.97  )-----------( 54       ( 20 Feb 2023 07:08 )             22.4     02-20    138  |  107  |  20  ----------------------------<  88  4.1   |  20<L>  |  0.97    Ca    8.7      20 Feb 2023 07:08    TPro  6.5  /  Alb  3.3  /  TBili  9.3<H>  /  DBili  x   /  AST  54<H>  /  ALT  9<L>  /  AlkPhos  122<H>  02-20    PT/INR - ( 21 Feb 2023 06:39 )   PT: 23.7 sec;   INR: 2.05 ratio         PTT - ( 21 Feb 2023 06:39 )  PTT:42.7 sec  CAPILLARY BLOOD GLUCOSE                RADIOLOGY & ADDITIONAL TESTS:    Imaging Personally Reviewed:  [x] YES  [ ] NO    Consultant(s) Notes Reviewed:  [x] YES  [ ] NO    MEDICATIONS  (STANDING):  albumin human 25% IVPB 100 milliLiter(s) IV Intermittent every 8 hours  heparin   Injectable 5000 Unit(s) SubCutaneous every 8 hours  phytonadione  IVPB 10 milliGRAM(s) IV Intermittent daily  polyethylene glycol 3350 17 Gram(s) Oral daily  senna 2 Tablet(s) Oral at bedtime  sodium bicarbonate 1300 milliGRAM(s) Oral three times a day    MEDICATIONS  (PRN):  acetaminophen     Tablet .. 650 milliGRAM(s) Oral every 6 hours PRN Temp greater or equal to 38C (100.4F), Mild Pain (1 - 3)  aluminum hydroxide/magnesium hydroxide/simethicone Suspension 30 milliLiter(s) Oral every 4 hours PRN Dyspepsia  HYDROmorphone  Injectable 0.5 milliGRAM(s) IV Push every 6 hours PRN Severe Pain (7 - 10)  melatonin 3 milliGRAM(s) Oral at bedtime PRN Insomnia  prochlorperazine   IVPB 10 milliGRAM(s) IV Intermittent every 8 hours PRN nausea      Care Discussed with Consultants/Other Providers [x] YES  [ ] NO    HEALTH ISSUES - PROBLEM Dx:       Patient is a 62y old  Female who presents with a chief complaint of jaubdice    SUBJECTIVE / OVERNIGHT EVENTS:    Patient seen and examined. sp 3 days of ceft. no complaints except no BM.      Vital Signs Last 24 Hrs  T(C): 37.1 (21 Feb 2023 04:27), Max: 37.1 (21 Feb 2023 04:27)  T(F): 98.7 (21 Feb 2023 04:27), Max: 98.7 (21 Feb 2023 04:27)  HR: 84 (21 Feb 2023 04:27) (74 - 84)  BP: 104/64 (21 Feb 2023 04:27) (103/68 - 109/66)  BP(mean): --  RR: 18 (21 Feb 2023 04:27) (18 - 18)  SpO2: 96% (21 Feb 2023 04:27) (96% - 99%)    Parameters below as of 21 Feb 2023 04:27  Patient On (Oxygen Delivery Method): room air      I&O's Summary    20 Feb 2023 07:01  -  21 Feb 2023 07:00  --------------------------------------------------------  IN: 360 mL / OUT: 0 mL / NET: 360 mL        PE:  GENERAL: NAD, AAOx3, jaundice, scleral icterus  CHEST/LUNG: CTABL, No wheeze  HEART: Regular rate and rhythm; no murmur  ABDOMEN: Soft, Nontender, Nondistended; Bowel sounds present  EXTREMITIES:  2+ Peripheral Pulses, +1 le edema  NEURO: No focal deficits    LABS:                        7.1    3.97  )-----------( 54       ( 20 Feb 2023 07:08 )             22.4     02-20    138  |  107  |  20  ----------------------------<  88  4.1   |  20<L>  |  0.97    Ca    8.7      20 Feb 2023 07:08    TPro  6.5  /  Alb  3.3  /  TBili  9.3<H>  /  DBili  x   /  AST  54<H>  /  ALT  9<L>  /  AlkPhos  122<H>  02-20    PT/INR - ( 21 Feb 2023 06:39 )   PT: 23.7 sec;   INR: 2.05 ratio         PTT - ( 21 Feb 2023 06:39 )  PTT:42.7 sec  CAPILLARY BLOOD GLUCOSE                RADIOLOGY & ADDITIONAL TESTS:    Imaging Personally Reviewed:  [x] YES  [ ] NO    Consultant(s) Notes Reviewed:  [x] YES  [ ] NO    MEDICATIONS  (STANDING):  albumin human 25% IVPB 100 milliLiter(s) IV Intermittent every 8 hours  heparin   Injectable 5000 Unit(s) SubCutaneous every 8 hours  phytonadione  IVPB 10 milliGRAM(s) IV Intermittent daily  polyethylene glycol 3350 17 Gram(s) Oral daily  senna 2 Tablet(s) Oral at bedtime  sodium bicarbonate 1300 milliGRAM(s) Oral three times a day    MEDICATIONS  (PRN):  acetaminophen     Tablet .. 650 milliGRAM(s) Oral every 6 hours PRN Temp greater or equal to 38C (100.4F), Mild Pain (1 - 3)  aluminum hydroxide/magnesium hydroxide/simethicone Suspension 30 milliLiter(s) Oral every 4 hours PRN Dyspepsia  HYDROmorphone  Injectable 0.5 milliGRAM(s) IV Push every 6 hours PRN Severe Pain (7 - 10)  melatonin 3 milliGRAM(s) Oral at bedtime PRN Insomnia  prochlorperazine   IVPB 10 milliGRAM(s) IV Intermittent every 8 hours PRN nausea      Care Discussed with Consultants/Other Providers [x] YES  [ ] NO    HEALTH ISSUES - PROBLEM Dx:

## 2023-02-21 NOTE — CONSULT NOTE ADULT - SUBJECTIVE AND OBJECTIVE BOX
[facility] INFECTIOUS DISEASES  PRAFUL Menendez S. Shah, Y. Patel, G. 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 Pt saw PCP when she noted she was turning yellow in Jan, PCP did blood work and sent pt to GI. Patient was evaluated in her GIs office Dr. Lazcano who sent her into the emergency department for admission for MRCP and further evaluation.  Patient states she used to have 2 glasses of vodka daily for 3 years to treat her chronic pain from her breast cancer.  Patient quit cold turkey on 1/20 without experiencing any symptoms of withdrawal.  Patient states her eyes and skin have been progressively getting more yellow since 1/20.  Patient denies nausea, vomiting, fevers, chills, abdominal pain, dark stools, pale stools, changes in urine color.  Patient reports she was on Letrozole for her breast cancer, and was taking Tylenol daily at recommended doses to treat her pain, she is concerned that the drug interaction may have damaged her liver.  Patient has stopped taking Tylenol for several months now. (17 Feb 2023 08:28)  Patient seen and examined at bedside this morning, son at bedside. Patient state she she has upper abdominal pain, does feel it is better after taking pain medication. She states she has no dysuria, urgency, increased frequency, hesitancy, suprapubic or flank pain. Denies nausea or vomiting. States she has not had a BM in the last 4 days. Has not felt any fever or chills.   ROS: 14 point review of systems completed, pertinent positives and negatives as per HPI.    Allergies: No Known Allergies    PMH -- Breast cancer, left  Mild alcohol use disorder  H/O hepatitis    PSH -- H/O lumpectomy  FH -- noncontributory   Social History -- stopped alcohol use in January, denies current tobacco or illicit drug use    Physical Exam--  Vital Signs Last 24 Hrs  T(F): 98.8 (21 Feb 2023 11:53), Max: 98.8 (21 Feb 2023 11:53)  HR: 109 (21 Feb 2023 11:53) (74 - 109)  BP: 95/61 (21 Feb 2023 11:53) (95/61 - 104/64)  RR: 18 (21 Feb 2023 11:53) (18 - 18)  SpO2: 97% (21 Feb 2023 11:53) (96% - 97%)  General: no acute distress  HEENT: NC/AT, EOMI, scleral icterus, neck supple  Lungs: Clear bilaterally without rales, wheezing or rhonchi  Heart: S1, S2 present, RRR. No murmur, rub or gallop.  Abdomen: Soft. Distended. Mild TTP at paracentesis site   Neuro: AAOx3, no obvious focal deficits   Extremities: No cyanosis. LE edema.   Skin: Warm. Dry. Jaundiced. No visible rash.   Psychiatric: Appropriate affect and mood for situation.   Lines: PIV    Laboratory & Imaging Data--  CBC:                       7.0    4.15  )-----------( 56       ( 21 Feb 2023 10:16 )             22.3     WBC Count: 4.15 K/uL (02-21-23 @ 10:16)  WBC Count: 3.97 K/uL (02-20-23 @ 07:08)  WBC Count: 5.58 K/uL (02-19-23 @ 12:00)  WBC Count: 4.33 K/uL (02-19-23 @ 07:05)  WBC Count: 7.68 K/uL (02-18-23 @ 18:43)  WBC Count: 4.84 K/uL (02-18-23 @ 10:21)  WBC Count: 9.56 K/uL (02-16-23 @ 22:40)    CMP: 02-21    139  |  105  |  16  ----------------------------<  118<H>  3.6   |  21<L>  |  0.87    Ca    8.8      21 Feb 2023 10:16    TPro  6.6  /  Alb  3.6  /  TBili  10.5<H>  /  DBili  x   /  AST  47<H>  /  ALT  9<L>  /  AlkPhos  102  02-21    LIVER FUNCTIONS - ( 21 Feb 2023 10:16 )  Alb: 3.6 g/dL / Pro: 6.6 g/dL / ALK PHOS: 102 U/L / ALT: 9 U/L / AST: 47 U/L / GGT: x           Microbiology: reviewed  Culture - Blood (collected 02-18-23 @ 09:48)  Source: .Blood Blood-Peripheral  Preliminary Report (02-19-23 @ 18:01):    No growth to date.    Culture - Fungal, Body Fluid (collected 02-17-23 @ 19:07)  Source: Peritoneal Peritoneal Fluid  Preliminary Report (02-18-23 @ 07:41):    Testing in progress    Culture - Body Fluid with Gram Stain (collected 02-17-23 @ 19:07)  Source: Peritoneal Peritoneal Fluid  Gram Stain (02-18-23 @ 02:54):    polymorphonuclear leukocytes seen    No organisms seen    by cytocentrifuge  Preliminary Report (02-19-23 @ 09:56):    No growth    Culture - Urine (collected 02-17-23 @ 03:20)  Source: Clean Catch Clean Catch (Midstream)  Final Report (02-19-23 @ 11:03):    >100,000 CFU/ml Escherichia coli  Organism: Escherichia coli (02-19-23 @ 11:03)  Organism: Escherichia coli (02-19-23 @ 11:03)      -  Amikacin: S <=16      -  Amoxicillin/Clavulanic Acid: S <=8/4      -  Ampicillin: S <=8 These ampicillin results predict results for amoxicillin      -  Ampicillin/Sulbactam: S <=4/2 Enterobacter, Klebsiella aerogenes, Citrobacter, and Serratia may develop resistance during prolonged therapy (3-4 days)      -  Aztreonam: S <=4      -  Cefazolin: S <=2 For uncomplicated UTI with K. pneumoniae, E. coli, or P. mirablis: TIN <=16 is sensitive and TIN >=32 is resistant. This also predicts results for oral agents cefaclor, cefdinir, cefpodoxime, cefprozil, cefuroxime axetil, cephalexin and locarbef for uncomplicated UTI. Note that some isolates may be susceptible to these agents while testing resistant to cefazolin.      -  Cefepime: S <=2      -  Cefoxitin: S <=8      -  Ceftriaxone: S <=1 Enterobacter, Klebsiella aerogenes, Citrobacter, and Serratia may develop resistance during prolonged therapy      -  Cefuroxime: S <=4      -  Ciprofloxacin: S <=0.25      -  Ertapenem: S <=0.5      -  Gentamicin: S <=2      -  Imipenem: S <=1      -  Levofloxacin: S <=0.5      -  Meropenem: S <=1      -  Nitrofurantoin: S <=32 Should not be used to treat pyelonephritis      -  Piperacillin/Tazobactam: S <=8      -  Tobramycin: S <=2      -  Trimethoprim/Sulfamethoxazole: S <=0.5/9.5      Method Type: TIN    SARS-CoV-2 Result: [facility] (16 Feb 2023 22:38)    Radiology--reviewed    Active Medications--  acetaminophen     Tablet .. 650 milliGRAM(s) Oral every 6 hours PRN  albumin human 25% IVPB 100 milliLiter(s) IV Intermittent every 8 hours  aluminum hydroxide/magnesium hydroxide/simethicone Suspension 30 milliLiter(s) Oral every 4 hours PRN  heparin   Injectable 5000 Unit(s) SubCutaneous every 8 hours  HYDROmorphone  Injectable 0.5 milliGRAM(s) IV Push every 6 hours PRN  melatonin 3 milliGRAM(s) Oral at bedtime PRN  phytonadione  IVPB 10 milliGRAM(s) IV Intermittent daily  polyethylene glycol 3350 17 Gram(s) Oral two times a day  prochlorperazine   IVPB 10 milliGRAM(s) IV Intermittent every 8 hours PRN  senna 2 Tablet(s) Oral at bedtime  sodium bicarbonate 1300 milliGRAM(s) Oral three times a day    Current Antimicrobials:     Prior/Completed Antimicrobials:  cefTRIAXone   IVPB    Immunologic:

## 2023-02-21 NOTE — CHART NOTE - NSCHARTNOTEFT_GEN_A_CORE
62-year-old female history of breast cancer (diagnosed 3 years ago status post lumpectomy, previously on Letrozole), alcohol use disorder (last drink 1/20/23 with no reported withdrawal) now presenting with constipation that now resolved. Hgb 7.0, one unit prbc and prednisone 40mg  ordered as recommended by Dr. Almeida- Hepatologist. 62-year-old female history of breast cancer (diagnosed 3 years ago status post lumpectomy, previously on Letrozole), alcohol use disorder (last drink 1/20/23 with no reported withdrawal) now presenting with jaundice, hepatology following;  Hgb 7.0, one unit prbc and prednisone 40mg  ordered as recommended by Dr. Almeida- Hepatologist.

## 2023-02-21 NOTE — PROGRESS NOTE ADULT - ASSESSMENT
61yo F PMHx breast CA sp lumpectomy, etoh abuse, hepatitis, HTN, sent by GI for jaundice and abd distention.     # etoh liver cirrhosis with ascites  # anemia, thrombocytopenia  CT a/p Cirrhosis, small to moderate volume abdominopelvic ascites, and sequela of portal hypertension. Contracted gallbladder with gallstones  sp diagnostic/ therapeutic paracentesis, cultures NTD  ABD US no PVT  doppler LE no dvt  cont albumin 100 TID  completed ceftriaxone 3 days for UTI, no signs of SBP  daily CMP and INR  transfuse hgb <7  fu hepatology recs    # ELIZABETH  renal following, renal US no hydro    # breast CA sp lumpectomy  was taking letrozole now on hold, outpt fu    # HTN  meds on hold for hypotension    dvt ppx HSQ    dw  at bedside    Please call [facility] with questions [phone]. 61yo F PMHx breast CA sp lumpectomy, etoh abuse, hepatitis, HTN, sent by GI for jaundice and abd distention.     # etoh liver cirrhosis with ascites  # anemia, thrombocytopenia  CT a/p Cirrhosis, small to moderate volume abdominopelvic ascites, and sequela of portal hypertension. Contracted gallbladder with gallstones  sp diagnostic/ therapeutic paracentesis, cultures NTD  ABD US no PVT  doppler LE no dvt  cont albumin 100 TID  completed ceftriaxone 3 days for UTI, no signs of SBP  daily CMP and INR  transfuse hgb <7  fu hepatology recs  miralax BIC, add tap water enema, lactulose if no BM    # ELIZABETH  renal following, renal US no hydro    # breast CA sp lumpectomy  was taking letrozole now on hold, outpt fu    # HTN  meds on hold for hypotension    dvt ppx HSQ    dw  at bedside    Please call [facility] with questions [phone].

## 2023-02-21 NOTE — PROGRESS NOTE ADULT - ASSESSMENT
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     1. Decompensated ETOH cirrhosis c/b ascites   - MELD 29 (2/17) > 24 today   - s/p paracentesis 2/17, no SBP  - HCC screening: CT A/P 2/17 no focal liver lesions  - Gastric and splenorenal varices on CT, no recent endoscopy   - to complete 3 haroldo of Vit K   - tx hepatology following     2. UTI   - on abx    3. asymptomatic cholelithiasis    4. ELIZABETH, resolved    5. 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     1. Decompensated ETOH cirrhosis c/b ascites   - MELD 29 (2/17) > 24 today   - s/p paracentesis 2/17, no SBP  - HCC screening: CT A/P 2/17 no focal liver lesions  - Gastric and splenorenal varices on CT, no recent endoscopy   - to complete 3 haroldo of Vit K   - tx hepatology following: started on prednisolone 40mg (D1: 2/21)     2. UTI   - on abx    3. asymptomatic cholelithiasis    4. ELIZABETH, resolved    5. Hx of breast ca, s/p lumpectomy  - on Letrazole     6. Anemia, possibly dilutional   - no overt GI bleeding  - recc transfusing 1 unit PRBC  - trend CBCs    7. Constipation  - MIralax 17g BID, senna 2 tabs qhs  - prn tap water enemas      Attending supervision statement: I have personally seen and examined the patient. I fully participated in the care of this patient. I have made amendments to the documentation where necessary, and agree with the history, physical exam, and plan as outlined by the ACP.    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     1. alc hep vs. decompensated ETOH cirrhosis   - MELD 29 (2/17) > 24 (2/20)  - s/p paracentesis 2/17, no SBP  - HCC screening: CT A/P 2/17 no focal liver lesions  - Gastric and splenorenal varices on CT, no recent endoscopy   - to complete 3 doses of Vit K   - tx hepatology following: started on prednisolone 40mg (D1: 2/21)     2. UTI   - on abx    3. asymptomatic cholelithiasis    4. ELIZABETH, resolved    5. Hx of breast ca, s/p lumpectomy  - on Letrazole     6. Anemia, possibly dilutional   - no overt GI bleeding  - recc transfusing 1 unit PRBC  - trend CBCs    7. Constipation  - Miralax 17g BID, senna 2 tabs qhs  - prn tap water enemas  - if no improvement, start Lactulose 15 ml BID       Attending supervision statement: I have personally seen and examined the patient. I fully participated in the care of this patient. I have made amendments to the documentation where necessary, and agree with the history, physical exam, and plan as outlined by the ACP.    [address] [address]  Office: [phone]

## 2023-02-21 NOTE — PROGRESS NOTE ADULT - SUBJECTIVE AND OBJECTIVE BOX
Chief Complaint:  Patient is a 62y old  Female who presents with a chief complaint of     Date of service 02-21-23 @ 09:04      Interval Events:     Hospital Medications:  acetaminophen     Tablet .. 650 milliGRAM(s) Oral every 6 hours PRN  albumin human 25% IVPB 100 milliLiter(s) IV Intermittent every 8 hours  aluminum hydroxide/magnesium hydroxide/simethicone Suspension 30 milliLiter(s) Oral every 4 hours PRN  heparin   Injectable 5000 Unit(s) SubCutaneous every 8 hours  HYDROmorphone  Injectable 0.5 milliGRAM(s) IV Push every 6 hours PRN  melatonin 3 milliGRAM(s) Oral at bedtime PRN  phytonadione  IVPB 10 milliGRAM(s) IV Intermittent daily  polyethylene glycol 3350 17 Gram(s) Oral daily  prochlorperazine   IVPB 10 milliGRAM(s) IV Intermittent every 8 hours PRN  senna 2 Tablet(s) Oral at bedtime  sodium bicarbonate 1300 milliGRAM(s) Oral three times a day        Review of Systems:  General:  No wt loss, fevers, chills, night sweats, fatigue,   Eyes:  Good vision, no reported pain  ENT:  No sore throat, pain, runny nose, dysphagia  CV:  No pain, palpitations, hypo/hypertension  Resp:  No dyspnea, cough, tachypnea, wheezing  GI:  See HPI  :  No pain, bleeding, incontinence, nocturia  Muscle:  No pain, weakness  Neuro:  No weakness, tingling, memory problems  Psych:  No fatigue, insomnia, mood problems, depression  Endocrine:  No polyuria, polydipsia, cold/heat intolerance  Heme:  No petechiae, ecchymosis, easy bruisability  Integumentary:  No rash, edema    PHYSICAL EXAM:   Vital Signs:  Vital Signs Last 24 Hrs  T(C): 37.1 (21 Feb 2023 04:27), Max: 37.1 (21 Feb 2023 04:27)  T(F): 98.7 (21 Feb 2023 04:27), Max: 98.7 (21 Feb 2023 04:27)  HR: 84 (21 Feb 2023 04:27) (74 - 84)  BP: 104/64 (21 Feb 2023 04:27) (103/68 - 109/66)  BP(mean): --  RR: 18 (21 Feb 2023 04:27) (18 - 18)  SpO2: 96% (21 Feb 2023 04:27) (96% - 99%)    Parameters below as of 21 Feb 2023 04:27  Patient On (Oxygen Delivery Method): room air      Daily     Daily       PHYSICAL EXAM:     GENERAL:  Appears stated age, well-groomed, well-nourished, no distress  HEENT:  NC/AT,  conjunctivae anicteric, clear and pink,   NECK: supple, trachea midline  CHEST:  Full & symmetric excursion, no increased effort, breath sounds clear  HEART:  Regular rhythm, no JVD  ABDOMEN:  Soft, non-tender, non-distended, normoactive bowel sounds,  no masses , no hepatosplenomegaly  EXTREMITIES:  no cyanosis,clubbing or edema  SKIN:  No rash, erythema, or, ecchymoses, no jaundice  NEURO:  Alert, non-focal, no asterixis  PSYCH: Appropriate affect, oriented to place and time  RECTAL: Deferred      LABS Personally reviewed by me:                        7.1    3.97  )-----------( 54       ( 20 Feb 2023 07:08 )             22.4       02-20    138  |  107  |  20  ----------------------------<  88  4.1   |  20<L>  |  0.97    Ca    8.7      20 Feb 2023 07:08    TPro  6.5  /  Alb  3.3  /  TBili  9.3<H>  /  DBili  x   /  AST  54<H>  /  ALT  9<L>  /  AlkPhos  122<H>  02-20    LIVER FUNCTIONS - ( 20 Feb 2023 07:08 )  Alb: 3.3 g/dL / Pro: 6.5 g/dL / ALK PHOS: 122 U/L / ALT: 9 U/L / AST: 54 U/L / GGT: x           PT/INR - ( 21 Feb 2023 06:39 )   PT: 23.7 sec;   INR: 2.05 ratio         PTT - ( 21 Feb 2023 06:39 )  PTT:42.7 sec                            7.1    3.97  )-----------( 54       ( 20 Feb 2023 07:08 )             22.4                         8.1    5.58  )-----------( 71       ( 19 Feb 2023 12:00 )             25.6                         7.1    4.33  )-----------( 62       ( 19 Feb 2023 07:05 )             22.6                         7.8    7.68  )-----------( 78       ( 18 Feb 2023 18:43 )             24.9                         7.8    4.84  )-----------( 67       ( 18 Feb 2023 10:21 )             24.6       Imaging personally reviewed by me:             Chief Complaint:  Patient is a 62y old  Female who presents with a chief complaint of     Date of service 02-21-23 @ 09:04      Interval Events:   Patient seen and examined.   Endorses constipation, last BM x ~5 days ago.  Given enema overnight and had small BM.     Hospital Medications:  acetaminophen     Tablet .. 650 milliGRAM(s) Oral every 6 hours PRN  albumin human 25% IVPB 100 milliLiter(s) IV Intermittent every 8 hours  aluminum hydroxide/magnesium hydroxide/simethicone Suspension 30 milliLiter(s) Oral every 4 hours PRN  heparin   Injectable 5000 Unit(s) SubCutaneous every 8 hours  HYDROmorphone  Injectable 0.5 milliGRAM(s) IV Push every 6 hours PRN  melatonin 3 milliGRAM(s) Oral at bedtime PRN  phytonadione  IVPB 10 milliGRAM(s) IV Intermittent daily  polyethylene glycol 3350 17 Gram(s) Oral daily  prochlorperazine   IVPB 10 milliGRAM(s) IV Intermittent every 8 hours PRN  senna 2 Tablet(s) Oral at bedtime  sodium bicarbonate 1300 milliGRAM(s) Oral three times a day        Review of Systems:  General:  No wt loss, fevers, chills, night sweats, fatigue,   Eyes:  Good vision, no reported pain  ENT:  No sore throat, pain, runny nose, dysphagia  CV:  No pain, palpitations, hypo/hypertension  Resp:  No dyspnea, cough, tachypnea, wheezing  GI:  See HPI  :  No pain, bleeding, incontinence, nocturia  Muscle:  No pain, weakness  Neuro:  No weakness, tingling, memory problems  Psych:  No fatigue, insomnia, mood problems, depression  Endocrine:  No polyuria, polydipsia, cold/heat intolerance  Heme:  No petechiae, ecchymosis, easy bruisability  Integumentary:  No rash, edema    PHYSICAL EXAM:   Vital Signs:  Vital Signs Last 24 Hrs  T(C): 37.1 (21 Feb 2023 04:27), Max: 37.1 (21 Feb 2023 04:27)  T(F): 98.7 (21 Feb 2023 04:27), Max: 98.7 (21 Feb 2023 04:27)  HR: 84 (21 Feb 2023 04:27) (74 - 84)  BP: 104/64 (21 Feb 2023 04:27) (103/68 - 109/66)  BP(mean): --  RR: 18 (21 Feb 2023 04:27) (18 - 18)  SpO2: 96% (21 Feb 2023 04:27) (96% - 99%)    Parameters below as of 21 Feb 2023 04:27  Patient On (Oxygen Delivery Method): room air      Daily     Daily       PHYSICAL EXAM:     GENERAL:  Appears stated age, well-groomed, well-nourished, no distress  HEENT:  NC/AT,  conjunctivae anicteric, clear and pink,   NECK: supple, trachea midline  CHEST:  Full & symmetric excursion, no increased effort, breath sounds clear  HEART:  Regular rhythm, no JVD  ABDOMEN:  Soft, non-tender, non-distended, normoactive bowel sounds,  no masses , no hepatosplenomegaly  EXTREMITIES:  no cyanosis,clubbing or edema  SKIN:  No rash, erythema, or, ecchymoses, no jaundice  NEURO:  Alert, non-focal, no asterixis  PSYCH: Appropriate affect, oriented to place and time  RECTAL: Deferred      LABS Personally reviewed by me:                        7.1    3.97  )-----------( 54       ( 20 Feb 2023 07:08 )             22.4       02-20    138  |  107  |  20  ----------------------------<  88  4.1   |  20<L>  |  0.97    Ca    8.7      20 Feb 2023 07:08    TPro  6.5  /  Alb  3.3  /  TBili  9.3<H>  /  DBili  x   /  AST  54<H>  /  ALT  9<L>  /  AlkPhos  122<H>  02-20    LIVER FUNCTIONS - ( 20 Feb 2023 07:08 )  Alb: 3.3 g/dL / Pro: 6.5 g/dL / ALK PHOS: 122 U/L / ALT: 9 U/L / AST: 54 U/L / GGT: x           PT/INR - ( 21 Feb 2023 06:39 )   PT: 23.7 sec;   INR: 2.05 ratio         PTT - ( 21 Feb 2023 06:39 )  PTT:42.7 sec                            7.1    3.97  )-----------( 54       ( 20 Feb 2023 07:08 )             22.4                         8.1    5.58  )-----------( 71       ( 19 Feb 2023 12:00 )             25.6                         7.1    4.33  )-----------( 62       ( 19 Feb 2023 07:05 )             22.6                         7.8    7.68  )-----------( 78       ( 18 Feb 2023 18:43 )             24.9                         7.8    4.84  )-----------( 67       ( 18 Feb 2023 10:21 )             24.6       Imaging personally reviewed by me:             Chief Complaint:  Patient is a 62y old  Female who presents with a chief complaint of     Date of service 02-21-23 @ 09:04      Interval Events:   Patient seen and examined.   Constipation, last BM x ~5 days ago.  Given enema overnight and had small BM.     Hospital Medications:  acetaminophen     Tablet .. 650 milliGRAM(s) Oral every 6 hours PRN  albumin human 25% IVPB 100 milliLiter(s) IV Intermittent every 8 hours  aluminum hydroxide/magnesium hydroxide/simethicone Suspension 30 milliLiter(s) Oral every 4 hours PRN  heparin   Injectable 5000 Unit(s) SubCutaneous every 8 hours  HYDROmorphone  Injectable 0.5 milliGRAM(s) IV Push every 6 hours PRN  melatonin 3 milliGRAM(s) Oral at bedtime PRN  phytonadione  IVPB 10 milliGRAM(s) IV Intermittent daily  polyethylene glycol 3350 17 Gram(s) Oral daily  prochlorperazine   IVPB 10 milliGRAM(s) IV Intermittent every 8 hours PRN  senna 2 Tablet(s) Oral at bedtime  sodium bicarbonate 1300 milliGRAM(s) Oral three times a day        Review of Systems:  General:  No wt loss, fevers, chills, night sweats, fatigue,   Eyes:  Good vision, no reported pain  ENT:  No sore throat, pain, runny nose, dysphagia  CV:  No pain, palpitations, hypo/hypertension  Resp:  No dyspnea, cough, tachypnea, wheezing  GI:  See HPI  :  No pain, bleeding, incontinence, nocturia  Muscle:  No pain, weakness  Neuro:  No weakness, tingling, memory problems  Psych:  No fatigue, insomnia, mood problems, depression  Endocrine:  No polyuria, polydipsia, cold/heat intolerance  Heme:  No petechiae, ecchymosis, easy bruisability  Integumentary:  No rash, edema    PHYSICAL EXAM:   Vital Signs:  Vital Signs Last 24 Hrs  T(C): 37.1 (21 Feb 2023 04:27), Max: 37.1 (21 Feb 2023 04:27)  T(F): 98.7 (21 Feb 2023 04:27), Max: 98.7 (21 Feb 2023 04:27)  HR: 84 (21 Feb 2023 04:27) (74 - 84)  BP: 104/64 (21 Feb 2023 04:27) (103/68 - 109/66)  BP(mean): --  RR: 18 (21 Feb 2023 04:27) (18 - 18)  SpO2: 96% (21 Feb 2023 04:27) (96% - 99%)    Parameters below as of 21 Feb 2023 04:27  Patient On (Oxygen Delivery Method): room air      Daily     Daily       PHYSICAL EXAM:     GENERAL:  Appears stated age, well-groomed, well-nourished, no distress  HEENT:  NC/AT,  conjunctivae anicteric, clear and pink,   NECK: supple, trachea midline  CHEST:  Full & symmetric excursion, no increased effort, breath sounds clear  HEART:  Regular rhythm, no JVD  ABDOMEN:  Soft, non-tender, non-distended, normoactive bowel sounds,  no masses , no hepatosplenomegaly  EXTREMITIES:  no cyanosis,clubbing or edema  SKIN:  No rash, erythema, or, ecchymoses, no jaundice  NEURO:  Alert, non-focal, no asterixis  PSYCH: Appropriate affect, oriented to place and time  RECTAL: Deferred      LABS Personally reviewed by me:                        7.1    3.97  )-----------( 54       ( 20 Feb 2023 07:08 )             22.4       02-20    138  |  107  |  20  ----------------------------<  88  4.1   |  20<L>  |  0.97    Ca    8.7      20 Feb 2023 07:08    TPro  6.5  /  Alb  3.3  /  TBili  9.3<H>  /  DBili  x   /  AST  54<H>  /  ALT  9<L>  /  AlkPhos  122<H>  02-20    LIVER FUNCTIONS - ( 20 Feb 2023 07:08 )  Alb: 3.3 g/dL / Pro: 6.5 g/dL / ALK PHOS: 122 U/L / ALT: 9 U/L / AST: 54 U/L / GGT: x           PT/INR - ( 21 Feb 2023 06:39 )   PT: 23.7 sec;   INR: 2.05 ratio         PTT - ( 21 Feb 2023 06:39 )  PTT:42.7 sec                            7.1    3.97  )-----------( 54       ( 20 Feb 2023 07:08 )             22.4                         8.1    5.58  )-----------( 71       ( 19 Feb 2023 12:00 )             25.6                         7.1    4.33  )-----------( 62       ( 19 Feb 2023 07:05 )             22.6                         7.8    7.68  )-----------( 78       ( 18 Feb 2023 18:43 )             24.9                         7.8    4.84  )-----------( 67       ( 18 Feb 2023 10:21 )             24.6       Imaging personally reviewed by me:

## 2023-02-21 NOTE — PROGRESS NOTE ADULT - ASSESSMENT
62-year-old female history of breast cancer (diagnosed 3 years ago status post lumpectomy, previously on Letrozole), alcohol use disorder (last drink 1/20/23 with no reported withdrawal), s/p live hepatitis vaccine, hypertension presenting for evaluation of jaundice.    #decompensated liver cirrhosis likely related to alcohol use disorder   #?alcohol use disorder   - ascites: small to moderate volume ascites, s/p 2 L removed on 2/17 negative for SBP  - HCC: CT with no liver lesions (2/17)   - HE: negative   - Varices: unknown  - splenomegaly on CT 2/17   - MELD 24 on 2/20, improved from admission.     -Infectious work up with UA +, urine culture with E coli. Blood culture pending. CXR clear lungs. Tap negative for SBP.     Recommendations:   - f/up PETH level    - c/w abx for UTI  - albumin 25% 100cc q8H  - Please give Vit K IV x 3 days  - please give tap water enema today for no BM since thursday   - please increase Miralax to TID scheduled, can titrate for 3-5 BM daily   - daily CMP and INR  - rest of care per primary team     All recommendations are tentative until note is attested by attending.     Carol Almeida, PGY-4   Gastroenterology/Hepatology Fellow  Available on [employer] Teams  [phone] ([employer] Short Range Pager)  [phone] ([facility] Long Range Pager)    After 5pm, please contact the on-call GI fellow. [phone] 62-year-old female history of breast cancer (diagnosed 3 years ago status post lumpectomy, previously on Letrozole), alcohol use disorder (last drink 1/20/23 with no reported withdrawal), s/p live hepatitis vaccine, hypertension presenting for evaluation of jaundice.    #decompensated liver cirrhosis likely related to alcohol use disorder   #?alcohol use disorder   - ascites: small to moderate volume ascites, s/p 2 L removed on 2/17 negative for SBP  - HCC: CT with no liver lesions (2/17)   - HE: negative   - Varices: unknown  - splenomegaly on CT 2/17   - MELD 24 on 2/20, improved from admission.     -Infectious work up with UA +, urine culture with E coli. Blood culture pending. CXR clear lungs. Tap negative for SBP.     Recommendations:   - f/up PETH level    - c/w abx for UTI  - albumin 25% 100cc q8H  - Please give Vit K IV x 3 days  - start prednisolone 40mg (D1: 2/21)   - please transfuse 1U pRBC (anemia likely dilutional)   - please give tap water enema today for no BM since thursday   - please increase Miralax to TID scheduled, can titrate for 3-5 BM daily   - daily CMP and INR  - rest of care per primary team     All recommendations are tentative until note is attested by attending.     Carol Almeida, PGY-4   Gastroenterology/Hepatology Fellow  Available on [employer] Teams  [phone] ([employer] Short Range Pager)  [phone] ([facility] Long Range Pager)    After 5pm, please contact the on-call GI fellow. [phone]

## 2023-02-21 NOTE — PROGRESS NOTE ADULT - SUBJECTIVE AND OBJECTIVE BOX
Gastroenterology/Hepatology Progress Note    Interval Events:   - patient with no BM since Thursday     Allergies:  No Known Allergies      Hospital Medications:  acetaminophen     Tablet .. 650 milliGRAM(s) Oral every 6 hours PRN  albumin human 25% IVPB 100 milliLiter(s) IV Intermittent every 8 hours  aluminum hydroxide/magnesium hydroxide/simethicone Suspension 30 milliLiter(s) Oral every 4 hours PRN  heparin   Injectable 5000 Unit(s) SubCutaneous every 8 hours  HYDROmorphone  Injectable 0.5 milliGRAM(s) IV Push every 6 hours PRN  melatonin 3 milliGRAM(s) Oral at bedtime PRN  phytonadione  IVPB 10 milliGRAM(s) IV Intermittent daily  polyethylene glycol 3350 17 Gram(s) Oral daily  prochlorperazine   IVPB 10 milliGRAM(s) IV Intermittent every 8 hours PRN  senna 2 Tablet(s) Oral at bedtime  sodium bicarbonate 1300 milliGRAM(s) Oral three times a day      ROS: 14 point ROS negative unless otherwise state in subjective    PHYSICAL EXAM:   Vital Signs:  Vital Signs Last 24 Hrs  T(C): 37.1 (21 Feb 2023 04:27), Max: 37.1 (21 Feb 2023 04:27)  T(F): 98.7 (21 Feb 2023 04:27), Max: 98.7 (21 Feb 2023 04:27)  HR: 84 (21 Feb 2023 04:27) (74 - 84)  BP: 104/64 (21 Feb 2023 04:27) (103/68 - 109/66)  BP(mean): --  RR: 18 (21 Feb 2023 04:27) (18 - 18)  SpO2: 96% (21 Feb 2023 04:27) (96% - 99%)    Parameters below as of 21 Feb 2023 04:27  Patient On (Oxygen Delivery Method): room air      Daily     Daily     GENERAL:  NAD, Appears stated age  HEENT:  NC/AT,  conjunctivae clear and pink, sclera icterus  CHEST:  Normal Effort, Breath sounds clear  HEART:  RRR, no murmurs  ABDOMEN:  Soft, non-tender, non-distended, normoactive bowel sounds,  no masses  EXTREMITIES:  mild edema  SKIN:  Warm & Dry. No rash or erythema. Jaundiced   NEURO:  Alert, oriented, no focal deficit    LABS:                        7.1    3.97  )-----------( 54       ( 20 Feb 2023 07:08 )             22.4       02-20    138  |  107  |  20  ----------------------------<  88  4.1   |  20<L>  |  0.97    Ca    8.7      20 Feb 2023 07:08    TPro  6.5  /  Alb  3.3  /  TBili  9.3<H>  /  DBili  x   /  AST  54<H>  /  ALT  9<L>  /  AlkPhos  122<H>  02-20    LIVER FUNCTIONS - ( 20 Feb 2023 07:08 )  Alb: 3.3 g/dL / Pro: 6.5 g/dL / ALK PHOS: 122 U/L / ALT: 9 U/L / AST: 54 U/L / GGT: x           PT/INR - ( 21 Feb 2023 06:39 )   PT: 23.7 sec;   INR: 2.05 ratio         PTT - ( 21 Feb 2023 06:39 )  PTT:42.7 sec          Imaging:

## 2023-02-22 LAB
ALBUMIN SERPL ELPH-MCNC: 4 G/DL — SIGNIFICANT CHANGE UP (ref 3.3–5)
ALP SERPL-CCNC: 99 U/L — SIGNIFICANT CHANGE UP (ref 40–120)
ALT FLD-CCNC: 9 U/L — LOW (ref 10–45)
ANA PAT FLD IF-IMP: ABNORMAL
ANA TITR SER: ABNORMAL
ANION GAP SERPL CALC-SCNC: 14 MMOL/L — SIGNIFICANT CHANGE UP (ref 5–17)
AST SERPL-CCNC: 43 U/L — HIGH (ref 10–40)
BILIRUB SERPL-MCNC: 13.8 MG/DL — HIGH (ref 0.2–1.2)
BUN SERPL-MCNC: 15 MG/DL — SIGNIFICANT CHANGE UP (ref 7–23)
CALCIUM SERPL-MCNC: 9 MG/DL — SIGNIFICANT CHANGE UP (ref 8.4–10.5)
CHLORIDE SERPL-SCNC: 104 MMOL/L — SIGNIFICANT CHANGE UP (ref 96–108)
CO2 SERPL-SCNC: 20 MMOL/L — LOW (ref 22–31)
CREAT SERPL-MCNC: 0.85 MG/DL — SIGNIFICANT CHANGE UP (ref 0.5–1.3)
EGFR: 78 ML/MIN/1.73M2 — SIGNIFICANT CHANGE UP
GLUCOSE SERPL-MCNC: 201 MG/DL — HIGH (ref 70–99)
HCT VFR BLD CALC: 23.6 % — LOW (ref 34.5–45)
HGB BLD-MCNC: 7.5 G/DL — LOW (ref 11.5–15.5)
INR BLD: 2.06 RATIO — HIGH (ref 0.88–1.16)
MCHC RBC-ENTMCNC: 31.8 GM/DL — LOW (ref 32–36)
MCHC RBC-ENTMCNC: 36.4 PG — HIGH (ref 27–34)
MCV RBC AUTO: 114.6 FL — HIGH (ref 80–100)
NRBC # BLD: 0 /100 WBCS — SIGNIFICANT CHANGE UP (ref 0–0)
PLATELET # BLD AUTO: 54 K/UL — LOW (ref 150–400)
POTASSIUM SERPL-MCNC: 3.9 MMOL/L — SIGNIFICANT CHANGE UP (ref 3.5–5.3)
POTASSIUM SERPL-SCNC: 3.9 MMOL/L — SIGNIFICANT CHANGE UP (ref 3.5–5.3)
PROT SERPL-MCNC: 7 G/DL — SIGNIFICANT CHANGE UP (ref 6–8.3)
PROTHROM AB SERPL-ACNC: 23.9 SEC — HIGH (ref 10.5–13.4)
RBC # BLD: 2.06 M/UL — LOW (ref 3.8–5.2)
RBC # FLD: 17.3 % — HIGH (ref 10.3–14.5)
SODIUM SERPL-SCNC: 138 MMOL/L — SIGNIFICANT CHANGE UP (ref 135–145)
WBC # BLD: 3.94 K/UL — SIGNIFICANT CHANGE UP (ref 3.8–10.5)
WBC # FLD AUTO: 3.94 K/UL — SIGNIFICANT CHANGE UP (ref 3.8–10.5)

## 2023-02-22 RX ADMIN — POLYETHYLENE GLYCOL 3350 17 GRAM(S): 17 POWDER, FOR SOLUTION ORAL at 19:04

## 2023-02-22 RX ADMIN — Medication 40 MILLIGRAM(S): at 05:30

## 2023-02-22 RX ADMIN — HEPARIN SODIUM 5000 UNIT(S): 5000 INJECTION INTRAVENOUS; SUBCUTANEOUS at 05:25

## 2023-02-22 RX ADMIN — Medication 50 MILLILITER(S): at 04:55

## 2023-02-22 RX ADMIN — HYDROMORPHONE HYDROCHLORIDE 0.5 MILLIGRAM(S): 2 INJECTION INTRAMUSCULAR; INTRAVENOUS; SUBCUTANEOUS at 20:32

## 2023-02-22 RX ADMIN — HYDROMORPHONE HYDROCHLORIDE 0.5 MILLIGRAM(S): 2 INJECTION INTRAMUSCULAR; INTRAVENOUS; SUBCUTANEOUS at 12:50

## 2023-02-22 RX ADMIN — HYDROMORPHONE HYDROCHLORIDE 0.5 MILLIGRAM(S): 2 INJECTION INTRAMUSCULAR; INTRAVENOUS; SUBCUTANEOUS at 13:05

## 2023-02-22 RX ADMIN — HEPARIN SODIUM 5000 UNIT(S): 5000 INJECTION INTRAVENOUS; SUBCUTANEOUS at 23:01

## 2023-02-22 RX ADMIN — Medication 1300 MILLIGRAM(S): at 04:54

## 2023-02-22 RX ADMIN — POLYETHYLENE GLYCOL 3350 17 GRAM(S): 17 POWDER, FOR SOLUTION ORAL at 05:25

## 2023-02-22 RX ADMIN — Medication 1300 MILLIGRAM(S): at 23:02

## 2023-02-22 RX ADMIN — HYDROMORPHONE HYDROCHLORIDE 0.5 MILLIGRAM(S): 2 INJECTION INTRAMUSCULAR; INTRAVENOUS; SUBCUTANEOUS at 04:56

## 2023-02-22 RX ADMIN — Medication 102 MILLIGRAM(S): at 12:53

## 2023-02-22 RX ADMIN — SENNA PLUS 2 TABLET(S): 8.6 TABLET ORAL at 23:01

## 2023-02-22 RX ADMIN — Medication 1300 MILLIGRAM(S): at 14:28

## 2023-02-22 NOTE — PROGRESS NOTE ADULT - SUBJECTIVE AND OBJECTIVE BOX
[facility]   MD CHARMAINE KOENIG MD, PA KRISTINE SOLTANPOUR, DO INJUNG KO, NP    TEL:  OFFICE: [phone]    From 5pm-7am Answering Service [phone]    -- RENAL FOLLOW UP NOTE ---Date of Service 02-22-23 @ 14:24    Patient is a 62y old  Female who presents with a chief complaint of     Patient seen and examined at bedside. No chest pain/sob    VITALS:  T(F): 98.3 (02-22-23 @ 12:47), Max: 98.8 (02-22-23 @ 04:05)  HR: 80 (02-22-23 @ 12:47)  BP: 108/68 (02-22-23 @ 12:47)  RR: 18 (02-22-23 @ 12:47)  SpO2: 95% (02-22-23 @ 12:47)  Wt(kg): --    02-21 @ 07:01  -  02-22 @ 07:00  --------------------------------------------------------  IN: 240 mL / OUT: 0 mL / NET: 240 mL          PHYSICAL EXAM:  Constitutional: NAD  Neck: No JVD  Respiratory: CTAB, no wheezes, rales or rhonchi  Cardiovascular: S1, S2, RRR  Gastrointestinal: BS+, soft, NT/ND  Extremities: No peripheral edema    Hospital Medications:   MEDICATIONS  (STANDING):  heparin   Injectable 5000 Unit(s) SubCutaneous every 8 hours  polyethylene glycol 3350 17 Gram(s) Oral two times a day  prednisoLONE    3 mG/mL Solution (ORAPRED) 40 milliGRAM(s) Oral daily  senna 2 Tablet(s) Oral at bedtime  sodium bicarbonate 1300 milliGRAM(s) Oral three times a day      LABS:  02-22    138  |  104  |  15  ----------------------------<  201<H>  3.9   |  20<L>  |  0.85    Ca    9.0      22 Feb 2023 10:36    TPro  7.0  /  Alb  4.0  /  TBili  13.8<H>  /  DBili      /  AST  43<H>  /  ALT  9<L>  /  AlkPhos  99  02-22    Creatinine Trend: 0.85 <--, 0.87 <--, 0.97 <--, 1.25 <--, 1.42 <--, 1.37 <--, 2.10 <--    Albumin, Serum: 4.0 g/dL (02-22 @ 10:36)                              7.5    3.94  )-----------( 54       ( 22 Feb 2023 07:34 )             23.6     Urine Studies:  Urinalysis - [02-17-23 @ 18:55]      Color Dark Yellow / Appearance Slightly Turbid / SG 1.035 / pH 6.5      Gluc Negative / Ketone Trace  / Bili Moderate / Urobili 6 mg/dL       Blood Negative / Protein 30 mg/dL / Leuk Est Large / Nitrite Negative      RBC 1 / WBC 63 / Hyaline 1 / Gran  / Sq Epi  / Non Sq Epi 2 / Bacteria Many    Urine Creatinine 178      [02-20-23 @ 01:04]  Urine Protein 39      [02-20-23 @ 01:04]  Urine Sodium 44      [02-20-23 @ 01:04]  Urine Urea Nitrogen 1079      [02-20-23 @ 01:04]  Urine Potassium 50      [02-20-23 @ 01:04]  Urine Osmolality 633      [02-20-23 @ 01:04]      HBsAb <3.0      [02-18-23 @ 10:21]  HBsAb Nonreact      [02-18-23 @ 10:21]  HBsAg Nonreact      [02-18-23 @ 10:21]  HBcAb Nonreact      [02-18-23 @ 10:21]  HCV 0.17, Nonreact      [02-18-23 @ 10:21]    THAI: titer 1:640, pattern Homogeneous      [02-18-23 @ 10:21]  Free Light Chains: kappa 19.29, lambda 13.94, ratio = 1.38      [02-18 @ 10:21]    RADIOLOGY & ADDITIONAL STUDIES:

## 2023-02-22 NOTE — PROGRESS NOTE ADULT - SUBJECTIVE AND OBJECTIVE BOX
[facility] DISE[facility]ES  PRAFUL Menendez Y. Patel, S. Shah, G. Casimir  [phone]  ([phone] - weekdays after 5pm and weekends)    Name: ABBY MALAGON  Age/Gender: 62y Female  MRN: [id number]    Interval History:  Patient seen and examined this morning.   Feels better, mild abd pain, had BM yesterday.   Patient with no new complaints.   Notes reviewed  No concerning overnight events  Afebrile   Allergies: No Known Allergies    Objective:  Vitals:   T(F): 98.8 (02-22-23 @ 04:05), Max: 98.8 (02-21-23 @ 11:53)  HR: 77 (02-22-23 @ 04:05) (77 - 109)  BP: 106/67 (02-22-23 @ 04:05) (95/61 - 118/73)  RR: 18 (02-22-23 @ 04:05) (18 - 18)  SpO2: 95% (02-22-23 @ 04:05) (95% - 97%)  Physical Examination:  General: no acute distress  HEENT: NC/AT, scleral icterus, neck supple  Respiratory: no acc muscle use, breathing comfortably  Cardiovascular: S1 and S2 present  Gastrointestinal: normal appearing, distended  Neuro: AAOx3, no obvious focal deficits   Extremities: LE edema, no cyanosis  Skin: no visible rash; jaundice     Laboratory Studies:  CBC:                       7.0    4.15  )-----------( 56       ( 21 Feb 2023 10:16 )             22.3     WBC Trend:  4.15 02-21-23 @ 10:16  3.97 02-20-23 @ 07:08  5.58 02-19-23 @ 12:00  4.33 02-19-23 @ 07:05  7.68 02-18-23 @ 18:43  4.84 02-18-23 @ 10:21  9.56 02-16-23 @ 22:40    CMP: 02-21    139  |  105  |  16  ----------------------------<  118<H>  3.6   |  21<L>  |  0.87    Ca    8.8      21 Feb 2023 10:16    TPro  6.6  /  Alb  3.6  /  TBili  10.5<H>  /  DBili  x   /  AST  47<H>  /  ALT  9<L>  /  AlkPhos  102  02-21    Creatinine, Serum: 0.87 mg/dL (02-21-23 @ 10:16)  Creatinine, Serum: 0.97 mg/dL (02-20-23 @ 07:08)  Creatinine, Serum: 1.25 mg/dL (02-19-23 @ 07:04)  Creatinine, Serum: 1.42 mg/dL (02-18-23 @ 18:43)  Creatinine, Serum: 1.37 mg/dL (02-18-23 @ 10:21)  Creatinine, Serum: 1.39 mg/dL (02-18-23 @ 10:21)  Creatinine, Serum: 2.10 mg/dL (02-16-23 @ 22:40)    LIVER FUNCTIONS - ( 21 Feb 2023 10:16 )  Alb: 3.6 g/dL / Pro: 6.6 g/dL / ALK PHOS: 102 U/L / ALT: 9 U/L / AST: 47 U/L / GGT: x           Microbiology: reviewed   Culture - Blood (collected 02-18-23 @ 09:48)  Source: .Blood Blood-Peripheral  Preliminary Report (02-19-23 @ 18:01):    No growth to date.    Culture - Fungal, Body Fluid (collected 02-17-23 @ 19:07)  Source: Peritoneal Peritoneal Fluid  Preliminary Report (02-18-23 @ 07:41):    Testing in progress    Culture - Body Fluid with Gram Stain (collected 02-17-23 @ 19:07)  Source: Peritoneal Peritoneal Fluid  Gram Stain (02-18-23 @ 02:54):    polymorphonuclear leukocytes seen    No organisms seen    by cytocentrifuge  Preliminary Report (02-19-23 @ 09:56):    No growth    Culture - Urine (collected 02-17-23 @ 03:20)  Source: Clean Catch Clean Catch (Midstream)  Final Report (02-19-23 @ 11:03):    >100,000 CFU/ml Escherichia coli  Organism: Escherichia coli (02-19-23 @ 11:03)  Organism: Escherichia coli (02-19-23 @ 11:03)      -  Amikacin: S <=16      -  Amoxicillin/Clavulanic Acid: S <=8/4      -  Ampicillin: S <=8 These ampicillin results predict results for amoxicillin      -  Ampicillin/Sulbactam: S <=4/2 Enterobacter, Klebsiella aerogenes, Citrobacter, and Serratia may develop resistance during prolonged therapy (3-4 days)      -  Aztreonam: S <=4      -  Cefazolin: S <=2 For uncomplicated UTI with K. pneumoniae, E. coli, or P. mirablis: TIN <=16 is sensitive and TIN >=32 is resistant. This also predicts results for oral agents cefaclor, cefdinir, cefpodoxime, cefprozil, cefuroxime axetil, cephalexin and locarbef for uncomplicated UTI. Note that some isolates may be susceptible to these agents while testing resistant to cefazolin.      -  Cefepime: S <=2      -  Cefoxitin: S <=8      -  Ceftriaxone: S <=1 Enterobacter, Klebsiella aerogenes, Citrobacter, and Serratia may develop resistance during prolonged therapy      -  Cefuroxime: S <=4      -  Ciprofloxacin: S <=0.25      -  Ertapenem: S <=0.5      -  Gentamicin: S <=2      -  Imipenem: S <=1      -  Levofloxacin: S <=0.5      -  Meropenem: S <=1      -  Nitrofurantoin: S <=32 Should not be used to treat pyelonephritis      -  Piperacillin/Tazobactam: S <=8      -  Tobramycin: S <=2      -  Trimethoprim/Sulfamethoxazole: S <=0.5/9.5      Method Type: TIN    SARS-CoV-2 Result: [facility] (16 Feb 2023 22:38)    Radiology: reviewed     Medications:  acetaminophen     Tablet .. 650 milliGRAM(s) Oral every 6 hours PRN  albumin human 25% IVPB 100 milliLiter(s) IV Intermittent every 8 hours  aluminum hydroxide/magnesium hydroxide/simethicone Suspension 30 milliLiter(s) Oral every 4 hours PRN  heparin   Injectable 5000 Unit(s) SubCutaneous every 8 hours  HYDROmorphone  Injectable 0.5 milliGRAM(s) IV Push every 6 hours PRN  melatonin 3 milliGRAM(s) Oral at bedtime PRN  phytonadione  IVPB 10 milliGRAM(s) IV Intermittent daily  polyethylene glycol 3350 17 Gram(s) Oral two times a day  prednisoLONE    3 mG/mL Solution (ORAPRED) 40 milliGRAM(s) Oral daily  prochlorperazine   IVPB 10 milliGRAM(s) IV Intermittent every 8 hours PRN  senna 2 Tablet(s) Oral at bedtime  sodium bicarbonate 1300 milliGRAM(s) Oral three times a day    Prior/Completed Antimicrobials:  cefTRIAXone   IVPB

## 2023-02-22 NOTE — PROGRESS NOTE ADULT - ASSESSMENT
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     1. alc hep vs. decompensated ETOH cirrhosis   - MELD 29 (2/17) > 24 (2/20)  - s/p paracentesis 2/17, no SBP  - HCC screening: CT A/P 2/17 no focal liver lesions  - Gastric and splenorenal varices on CT, no recent endoscopy   - to complete 3 doses of Vit K   - tx hepatology following: started on prednisolone 40mg (D1: 2/21)   - bilirubin increasing, ?transplant candidate- per hepatology     2. UTI   - on abx    3. asymptomatic cholelithiasis    4. ELIZABETH, resolved    5. Hx of breast ca, s/p lumpectomy  - on Letrazole     6. Anemia, possibly dilutional   - no overt GI bleeding  - s/p 1 unit PRBC yesterday  - trend CBCs    7. Constipation  - Miralax 17g BID, senna 2 tabs qhs  - prn tap water enemas  - if no improvement, start Lactulose 15 ml BID       Attending supervision statement: I have personally seen and examined the patient. I fully participated in the care of this patient. I have made amendments to the documentation where necessary, and agree with the history, physical exam, and plan as outlined by the ACP.    [address] [address]  Office: [phone]

## 2023-02-22 NOTE — PROGRESS NOTE ADULT - SUBJECTIVE AND OBJECTIVE BOX
Patient is a 62y old  Female who presents with a chief complaint of     SUBJECTIVE / OVERNIGHT EVENTS:  Patient seen and examined.   No over night events.      Vital Signs Last 24 Hrs  T(C): 36.8 (22 Feb 2023 11:15), Max: 37.1 (21 Feb 2023 11:53)  T(F): 98.2 (22 Feb 2023 11:15), Max: 98.8 (21 Feb 2023 11:53)  HR: 71 (22 Feb 2023 11:15) (71 - 109)  BP: 108/67 (22 Feb 2023 11:15) (95/61 - 118/73)  BP(mean): --  RR: 18 (22 Feb 2023 11:15) (18 - 18)  SpO2: 97% (22 Feb 2023 11:15) (95% - 97%)    Parameters below as of 22 Feb 2023 11:15  Patient On (Oxygen Delivery Method): room air      I&O's Summary    21 Feb 2023 07:01  -  22 Feb 2023 07:00  --------------------------------------------------------  IN: 240 mL / OUT: 0 mL / NET: 240 mL          PE:  GENERAL: NAD, AAOx3, jaundice, scleral icterus  CHEST/LUNG: CTABL, No wheeze  HEART: Regular rate and rhythm; no murmur  ABDOMEN: Soft, Nontender, Nondistended; Bowel sounds present  EXTREMITIES:  2+ Peripheral Pulses, +1 le edema  NEURO: No focal deficits    LABS:                        7.5    3.94  )-----------( 54       ( 22 Feb 2023 07:34 )             23.6     02-22    138  |  104  |  15  ----------------------------<  201<H>  3.9   |  20<L>  |  0.85    Ca    9.0      22 Feb 2023 10:36    TPro  7.0  /  Alb  4.0  /  TBili  13.8<H>  /  DBili  x   /  AST  43<H>  /  ALT  9<L>  /  AlkPhos  99  02-22    PT/INR - ( 22 Feb 2023 10:36 )   PT: 23.9 sec;   INR: 2.06 ratio         PTT - ( 21 Feb 2023 06:39 )  PTT:42.7 sec  CAPILLARY BLOOD GLUCOSE                RADIOLOGY & ADDITIONAL TESTS:    Imaging Personally Reviewed:  [x] YES  [ ] NO    Consultant(s) Notes Reviewed:  [x] YES  [ ] NO      MEDICATIONS  (STANDING):  heparin   Injectable 5000 Unit(s) SubCutaneous every 8 hours  phytonadione  IVPB 10 milliGRAM(s) IV Intermittent daily  polyethylene glycol 3350 17 Gram(s) Oral two times a day  prednisoLONE    3 mG/mL Solution (ORAPRED) 40 milliGRAM(s) Oral daily  senna 2 Tablet(s) Oral at bedtime  sodium bicarbonate 1300 milliGRAM(s) Oral three times a day    MEDICATIONS  (PRN):  acetaminophen     Tablet .. 650 milliGRAM(s) Oral every 6 hours PRN Temp greater or equal to 38C (100.4F), Mild Pain (1 - 3)  aluminum hydroxide/magnesium hydroxide/simethicone Suspension 30 milliLiter(s) Oral every 4 hours PRN Dyspepsia  HYDROmorphone  Injectable 0.5 milliGRAM(s) IV Push every 6 hours PRN Severe Pain (7 - 10)  melatonin 3 milliGRAM(s) Oral at bedtime PRN Insomnia  prochlorperazine   IVPB 10 milliGRAM(s) IV Intermittent every 8 hours PRN nausea      Care Discussed with Consultants/Other Providers [x] YES  [ ] NO    HEALTH ISSUES - PROBLEM Dx:

## 2023-02-22 NOTE — PROGRESS NOTE ADULT - SUBJECTIVE AND OBJECTIVE BOX
Chief Complaint:  Patient is a 62y old  Female who presents with a chief complaint of     Date of service 02-22-23 @ 12:50      Interval Events:   Patient seen and examined.   Endorses abdominal discomfort and pain  from breast surgeries.   s/p 1 unit PRBC yesterday.     Hospital Medications:  acetaminophen     Tablet .. 650 milliGRAM(s) Oral every 6 hours PRN  aluminum hydroxide/magnesium hydroxide/simethicone Suspension 30 milliLiter(s) Oral every 4 hours PRN  heparin   Injectable 5000 Unit(s) SubCutaneous every 8 hours  HYDROmorphone  Injectable 0.5 milliGRAM(s) IV Push every 6 hours PRN  melatonin 3 milliGRAM(s) Oral at bedtime PRN  phytonadione  IVPB 10 milliGRAM(s) IV Intermittent daily  polyethylene glycol 3350 17 Gram(s) Oral two times a day  prednisoLONE    3 mG/mL Solution (ORAPRED) 40 milliGRAM(s) Oral daily  prochlorperazine   IVPB 10 milliGRAM(s) IV Intermittent every 8 hours PRN  senna 2 Tablet(s) Oral at bedtime  sodium bicarbonate 1300 milliGRAM(s) Oral three times a day        Review of Systems:  General:  No wt loss, fevers, chills, night sweats, fatigue,   Eyes:  Good vision, no reported pain  ENT:  No sore throat, pain, runny nose, dysphagia  CV:  No pain, palpitations, hypo/hypertension  Resp:  No dyspnea, cough, tachypnea, wheezing  GI:  See HPI  :  No pain, bleeding, incontinence, nocturia  Muscle:  No pain, weakness  Neuro:  No weakness, tingling, memory problems  Psych:  No fatigue, insomnia, mood problems, depression  Endocrine:  No polyuria, polydipsia, cold/heat intolerance  Heme:  No petechiae, ecchymosis, easy bruisability  Integumentary:  No rash, edema    PHYSICAL EXAM:   Vital Signs:  Vital Signs Last 24 Hrs  T(C): 36.8 (22 Feb 2023 12:47), Max: 37.1 (22 Feb 2023 04:05)  T(F): 98.3 (22 Feb 2023 12:47), Max: 98.8 (22 Feb 2023 04:05)  HR: 80 (22 Feb 2023 12:47) (71 - 80)  BP: 108/68 (22 Feb 2023 12:47) (104/68 - 118/73)  BP(mean): --  RR: 18 (22 Feb 2023 12:47) (18 - 18)  SpO2: 95% (22 Feb 2023 12:47) (95% - 97%)    Parameters below as of 22 Feb 2023 12:47  Patient On (Oxygen Delivery Method): room air      Daily     Daily       PHYSICAL EXAM:     GENERAL:  Appears stated age, well-groomed, well-nourished, no distress  HEENT:  NC/AT,  conjunctivae anicteric, clear and pink,   NECK: supple, trachea midline  CHEST:  Full & symmetric excursion, no increased effort, breath sounds clear  HEART:  Regular rhythm, no JVD  ABDOMEN:  Soft, non-tender, non-distended, normoactive bowel sounds,  no masses , no hepatosplenomegaly  EXTREMITIES:  no cyanosis,clubbing or edema  SKIN:  No rash, erythema, or, ecchymoses, no jaundice  NEURO:  Alert, non-focal, no asterixis  PSYCH: Appropriate affect, oriented to place and time  RECTAL: Deferred      LABS Personally reviewed by me:                        7.5    3.94  )-----------( 54       ( 22 Feb 2023 07:34 )             23.6     Mean Cell Volume: 114.6 fl (02-22-23 @ 07:34)    02-22    138  |  104  |  15  ----------------------------<  201<H>  3.9   |  20<L>  |  0.85    Ca    9.0      22 Feb 2023 10:36    TPro  7.0  /  Alb  4.0  /  TBili  13.8<H>  /  DBili  x   /  AST  43<H>  /  ALT  9<L>  /  AlkPhos  99  02-22    LIVER FUNCTIONS - ( 22 Feb 2023 10:36 )  Alb: 4.0 g/dL / Pro: 7.0 g/dL / ALK PHOS: 99 U/L / ALT: 9 U/L / AST: 43 U/L / GGT: x           PT/INR - ( 22 Feb 2023 10:36 )   PT: 23.9 sec;   INR: 2.06 ratio         PTT - ( 21 Feb 2023 06:39 )  PTT:42.7 sec                            7.5    3.94  )-----------( 54       ( 22 Feb 2023 07:34 )             23.6                         7.0    4.15  )-----------( 56       ( 21 Feb 2023 10:16 )             22.3                         7.1    3.97  )-----------( 54       ( 20 Feb 2023 07:08 )             22.4       Imaging personally reviewed by me:

## 2023-02-22 NOTE — PROGRESS NOTE ADULT - ASSESSMENT
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     Decompensated alcoholic liver cirrhosis with ascites  S/p paracentesis 2/17 with removal of 2050ml ascitic fluid   cultures negative to date  no evidence of SBP  Hepatology and GI following     E.coli UTI  UA with pyuria on admission  urine culture with pansensitive E.coli   No current urinary symptoms noted  Renal U/S with no hydronephrosis  afebrile and WBC wnl, ELIZABETH resolved   s/p ceftriaxone - completed 3d course on 2/20  continue to observe off antibiotics   monitor temps/CBC      David Syed M.D.  [facility], Division of Infectious Diseases  [phone]  After 5pm on weekdays and all day on weekends - please call [phone]

## 2023-02-23 LAB
ALBUMIN SERPL ELPH-MCNC: 3.7 G/DL — SIGNIFICANT CHANGE UP (ref 3.3–5)
ALP SERPL-CCNC: 98 U/L — SIGNIFICANT CHANGE UP (ref 40–120)
ALT FLD-CCNC: 9 U/L — LOW (ref 10–45)
ANION GAP SERPL CALC-SCNC: 12 MMOL/L — SIGNIFICANT CHANGE UP (ref 5–17)
AST SERPL-CCNC: 39 U/L — SIGNIFICANT CHANGE UP (ref 10–40)
BILIRUB SERPL-MCNC: 11.6 MG/DL — HIGH (ref 0.2–1.2)
BUN SERPL-MCNC: 22 MG/DL — SIGNIFICANT CHANGE UP (ref 7–23)
CALCIUM SERPL-MCNC: 8.9 MG/DL — SIGNIFICANT CHANGE UP (ref 8.4–10.5)
CHLORIDE SERPL-SCNC: 106 MMOL/L — SIGNIFICANT CHANGE UP (ref 96–108)
CO2 SERPL-SCNC: 21 MMOL/L — LOW (ref 22–31)
CREAT SERPL-MCNC: 1 MG/DL — SIGNIFICANT CHANGE UP (ref 0.5–1.3)
CULTURE RESULTS: SIGNIFICANT CHANGE UP
EGFR: 64 ML/MIN/1.73M2 — SIGNIFICANT CHANGE UP
GLUCOSE SERPL-MCNC: 139 MG/DL — HIGH (ref 70–99)
HCT VFR BLD CALC: 23.3 % — LOW (ref 34.5–45)
HGB BLD-MCNC: 7.5 G/DL — LOW (ref 11.5–15.5)
INR BLD: 1.89 RATIO — HIGH (ref 0.88–1.16)
MCHC RBC-ENTMCNC: 32.2 GM/DL — SIGNIFICANT CHANGE UP (ref 32–36)
MCHC RBC-ENTMCNC: 36.8 PG — HIGH (ref 27–34)
MCV RBC AUTO: 114.2 FL — HIGH (ref 80–100)
NRBC # BLD: 0 /100 WBCS — SIGNIFICANT CHANGE UP (ref 0–0)
PLATELET # BLD AUTO: 48 K/UL — LOW (ref 150–400)
POTASSIUM SERPL-MCNC: 3.6 MMOL/L — SIGNIFICANT CHANGE UP (ref 3.5–5.3)
POTASSIUM SERPL-SCNC: 3.6 MMOL/L — SIGNIFICANT CHANGE UP (ref 3.5–5.3)
PROT SERPL-MCNC: 6.8 G/DL — SIGNIFICANT CHANGE UP (ref 6–8.3)
PROTHROM AB SERPL-ACNC: 22.1 SEC — HIGH (ref 10.5–13.4)
RBC # BLD: 2.04 M/UL — LOW (ref 3.8–5.2)
RBC # FLD: 17.2 % — HIGH (ref 10.3–14.5)
SODIUM SERPL-SCNC: 139 MMOL/L — SIGNIFICANT CHANGE UP (ref 135–145)
SPECIMEN SOURCE: SIGNIFICANT CHANGE UP
WBC # BLD: 5.65 K/UL — SIGNIFICANT CHANGE UP (ref 3.8–10.5)
WBC # FLD AUTO: 5.65 K/UL — SIGNIFICANT CHANGE UP (ref 3.8–10.5)

## 2023-02-23 RX ADMIN — SENNA PLUS 2 TABLET(S): 8.6 TABLET ORAL at 21:46

## 2023-02-23 RX ADMIN — HEPARIN SODIUM 5000 UNIT(S): 5000 INJECTION INTRAVENOUS; SUBCUTANEOUS at 05:23

## 2023-02-23 RX ADMIN — HEPARIN SODIUM 5000 UNIT(S): 5000 INJECTION INTRAVENOUS; SUBCUTANEOUS at 13:52

## 2023-02-23 RX ADMIN — Medication 40 MILLIGRAM(S): at 05:24

## 2023-02-23 RX ADMIN — HYDROMORPHONE HYDROCHLORIDE 0.5 MILLIGRAM(S): 2 INJECTION INTRAMUSCULAR; INTRAVENOUS; SUBCUTANEOUS at 11:55

## 2023-02-23 RX ADMIN — HEPARIN SODIUM 5000 UNIT(S): 5000 INJECTION INTRAVENOUS; SUBCUTANEOUS at 21:45

## 2023-02-23 RX ADMIN — HYDROMORPHONE HYDROCHLORIDE 0.5 MILLIGRAM(S): 2 INJECTION INTRAMUSCULAR; INTRAVENOUS; SUBCUTANEOUS at 05:31

## 2023-02-23 RX ADMIN — HYDROMORPHONE HYDROCHLORIDE 0.5 MILLIGRAM(S): 2 INJECTION INTRAMUSCULAR; INTRAVENOUS; SUBCUTANEOUS at 19:56

## 2023-02-23 RX ADMIN — Medication 1300 MILLIGRAM(S): at 05:23

## 2023-02-23 RX ADMIN — HYDROMORPHONE HYDROCHLORIDE 0.5 MILLIGRAM(S): 2 INJECTION INTRAMUSCULAR; INTRAVENOUS; SUBCUTANEOUS at 11:25

## 2023-02-23 RX ADMIN — HYDROMORPHONE HYDROCHLORIDE 0.5 MILLIGRAM(S): 2 INJECTION INTRAMUSCULAR; INTRAVENOUS; SUBCUTANEOUS at 20:24

## 2023-02-23 RX ADMIN — Medication 1300 MILLIGRAM(S): at 21:46

## 2023-02-23 RX ADMIN — Medication 1300 MILLIGRAM(S): at 13:52

## 2023-02-23 NOTE — PROGRESS NOTE ADULT - SUBJECTIVE AND OBJECTIVE BOX
[facility]   MD CHARMAINE KOENIG MD, DO KARLIE SORENSEN NP    TEL:  OFFICE: [phone]    From 5pm-7am Answering Service [phone]    -- RENAL FOLLOW UP NOTE ---Date of Service 02-23-23 @ 14:27    Patient is a 62y old  Female who presents with a chief complaint of     Patient seen and examined at bedside. No chest pain/sob    VITALS:  T(F): 98.6 (02-23-23 @ 11:21), Max: 98.6 (02-23-23 @ 11:21)  HR: 64 (02-23-23 @ 11:21)  BP: 100/66 (02-23-23 @ 11:21)  RR: 18 (02-23-23 @ 11:21)  SpO2: 95% (02-23-23 @ 11:21)  Wt(kg): --    02-23 @ 07:01  -  02-23 @ 14:27  --------------------------------------------------------  IN: 200 mL / OUT: 0 mL / NET: 200 mL          PHYSICAL EXAM:  Constitutional: NAD  Neck: No JVD  Respiratory: CTAB, no wheezes, rales or rhonchi  Cardiovascular: S1, S2, RRR  Gastrointestinal: BS+, soft, NT/ND  Extremities: No peripheral edema    Hospital Medications:   MEDICATIONS  (STANDING):  heparin   Injectable 5000 Unit(s) SubCutaneous every 8 hours  polyethylene glycol 3350 17 Gram(s) Oral two times a day  prednisoLONE    3 mG/mL Solution (ORAPRED) 40 milliGRAM(s) Oral daily  senna 2 Tablet(s) Oral at bedtime  sodium bicarbonate 1300 milliGRAM(s) Oral three times a day      LABS:  02-23    139  |  106  |  22  ----------------------------<  139<H>  3.6   |  21<L>  |  1.00    Ca    8.9      23 Feb 2023 06:56    TPro  6.8  /  Alb  3.7  /  TBili  11.6<H>  /  DBili      /  AST  39  /  ALT  9<L>  /  AlkPhos  98  02-23    Creatinine Trend: 1.00 <--, 0.85 <--, 0.87 <--, 0.97 <--, 1.25 <--, 1.42 <--, 1.37 <--, 2.10 <--    Albumin, Serum: 3.7 g/dL (02-23 @ 06:56)                              7.5    5.65  )-----------( 48       ( 23 Feb 2023 06:54 )             23.3     Urine Studies:  Urinalysis - [02-17-23 @ 18:55]      Color Dark Yellow / Appearance Slightly Turbid / SG 1.035 / pH 6.5      Gluc Negative / Ketone Trace  / Bili Moderate / Urobili 6 mg/dL       Blood Negative / Protein 30 mg/dL / Leuk Est Large / Nitrite Negative      RBC 1 / WBC 63 / Hyaline 1 / Gran  / Sq Epi  / Non Sq Epi 2 / Bacteria Many    Urine Creatinine 178      [02-20-23 @ 01:04]  Urine Protein 39      [02-20-23 @ 01:04]  Urine Sodium 44      [02-20-23 @ 01:04]  Urine Urea Nitrogen 1079      [02-20-23 @ 01:04]  Urine Potassium 50      [02-20-23 @ 01:04]  Urine Osmolality 633      [02-20-23 @ 01:04]      HBsAb <3.0      [02-18-23 @ 10:21]  HBsAb Nonreact      [02-18-23 @ 10:21]  HBsAg Nonreact      [02-18-23 @ 10:21]  HBcAb Nonreact      [02-18-23 @ 10:21]  HCV 0.17, Nonreact      [02-18-23 @ 10:21]    THAI: titer 1:640, pattern Homogeneous      [02-18-23 @ 10:21]  Free Light Chains: kappa 19.29, lambda 13.94, ratio = 1.38      [02-18 @ 10:21]    RADIOLOGY & ADDITIONAL STUDIES:

## 2023-02-23 NOTE — PROGRESS NOTE ADULT - SUBJECTIVE AND OBJECTIVE BOX
[facility] DIV[facility]EASES  PRAFUL Menendez Y. Patel, S. Shah, G. Casimir  [phone]  ([phone] - weekdays after 5pm and weekends)    Name: ABBY MALAGON  Age/Gender: 62y Female  MRN: [id number]    Interval History:  Patient seen and examined this morning.   Resting comfortably, has no new complaints  Notes reviewed. Afebrile   Allergies: No Known Allergies    Objective:  Vitals:   T(F): 97.7 (02-23-23 @ 05:02), Max: 98.3 (02-22-23 @ 12:47)  HR: 69 (02-23-23 @ 05:02) (69 - 80)  BP: 98/62 (02-23-23 @ 05:02) (98/62 - 108/68)  RR: 18 (02-23-23 @ 05:02) (18 - 18)  SpO2: 93% (02-23-23 @ 05:02) (93% - 97%)  Physical Examination:  General: no acute distress  HEENT: NC/AT, scleral icterus, neck supple  Respiratory: no acc muscle use, breathing comfortably  Cardiovascular: S1 and S2 present  Gastrointestinal: normal appearing, distended  Neuro: AAOx3, no obvious focal deficits   Extremities: LE edema, no cyanosis  Skin: no visible rash; jaundice    Laboratory Studies:  CBC:                       7.5    3.94  )-----------( 54       ( 22 Feb 2023 07:34 )             23.6     WBC Trend:  3.94 02-22-23 @ 07:34  4.15 02-21-23 @ 10:16  3.97 02-20-23 @ 07:08  5.58 02-19-23 @ 12:00  4.33 02-19-23 @ 07:05  7.68 02-18-23 @ 18:43  4.84 02-18-23 @ 10:21  9.56 02-16-23 @ 22:40    CMP: 02-22    138  |  104  |  15  ----------------------------<  201<H>  3.9   |  20<L>  |  0.85    Ca    9.0      22 Feb 2023 10:36    TPro  7.0  /  Alb  4.0  /  TBili  13.8<H>  /  DBili  x   /  AST  43<H>  /  ALT  9<L>  /  AlkPhos  99  02-22    Creatinine, Serum: 0.85 mg/dL (02-22-23 @ 10:36)  Creatinine, Serum: 0.87 mg/dL (02-21-23 @ 10:16)  Creatinine, Serum: 0.97 mg/dL (02-20-23 @ 07:08)  Creatinine, Serum: 1.25 mg/dL (02-19-23 @ 07:04)  Creatinine, Serum: 1.42 mg/dL (02-18-23 @ 18:43)  Creatinine, Serum: 1.37 mg/dL (02-18-23 @ 10:21)  Creatinine, Serum: 1.39 mg/dL (02-18-23 @ 10:21)  Creatinine, Serum: 2.10 mg/dL (02-16-23 @ 22:40)    LIVER FUNCTIONS - ( 22 Feb 2023 10:36 )  Alb: 4.0 g/dL / Pro: 7.0 g/dL / ALK PHOS: 99 U/L / ALT: 9 U/L / AST: 43 U/L / GGT: x           Microbiology: reviewed   Culture - Blood (collected 02-18-23 @ 09:48)  Source: .Blood Blood-Peripheral  Preliminary Report (02-19-23 @ 18:01):    No growth to date.    Culture - Fungal, Body Fluid (collected 02-17-23 @ 19:07)  Source: Peritoneal Peritoneal Fluid  Preliminary Report (02-22-23 @ 15:03):    Culture is being performed. Fungal cultures are held for 4 weeks.    Culture - Body Fluid with Gram Stain (collected 02-17-23 @ 19:07)  Source: Peritoneal Peritoneal Fluid  Gram Stain (02-18-23 @ 02:54):    polymorphonuclear leukocytes seen    No organisms seen    by cytocentrifuge  Preliminary Report (02-19-23 @ 09:56):    No growth    Culture - Urine (collected 02-17-23 @ 03:20)  Source: Clean Catch Clean Catch (Midstream)  Final Report (02-19-23 @ 11:03):    >100,000 CFU/ml Escherichia coli  Organism: Escherichia coli (02-19-23 @ 11:03)  Organism: Escherichia coli (02-19-23 @ 11:03)      -  Amikacin: S <=16      -  Amoxicillin/Clavulanic Acid: S <=8/4      -  Ampicillin: S <=8 These ampicillin results predict results for amoxicillin      -  Ampicillin/Sulbactam: S <=4/2 Enterobacter, Klebsiella aerogenes, Citrobacter, and Serratia may develop resistance during prolonged therapy (3-4 days)      -  Aztreonam: S <=4      -  Cefazolin: S <=2 For uncomplicated UTI with K. pneumoniae, E. coli, or P. mirablis: TIN <=16 is sensitive and TIN >=32 is resistant. This also predicts results for oral agents cefaclor, cefdinir, cefpodoxime, cefprozil, cefuroxime axetil, cephalexin and locarbef for uncomplicated UTI. Note that some isolates may be susceptible to these agents while testing resistant to cefazolin.      -  Cefepime: S <=2      -  Cefoxitin: S <=8      -  Ceftriaxone: S <=1 Enterobacter, Klebsiella aerogenes, Citrobacter, and Serratia may develop resistance during prolonged therapy      -  Cefuroxime: S <=4      -  Ciprofloxacin: S <=0.25      -  Ertapenem: S <=0.5      -  Gentamicin: S <=2      -  Imipenem: S <=1      -  Levofloxacin: S <=0.5      -  Meropenem: S <=1      -  Nitrofurantoin: S <=32 Should not be used to treat pyelonephritis      -  Piperacillin/Tazobactam: S <=8      -  Tobramycin: S <=2      -  Trimethoprim/Sulfamethoxazole: S <=0.5/9.5      Method Type: TIN    SARS-CoV-2 Result: [facility] (16 Feb 2023 22:38)    Radiology: reviewed     Medications:  acetaminophen     Tablet .. 650 milliGRAM(s) Oral every 6 hours PRN  aluminum hydroxide/magnesium hydroxide/simethicone Suspension 30 milliLiter(s) Oral every 4 hours PRN  heparin   Injectable 5000 Unit(s) SubCutaneous every 8 hours  HYDROmorphone  Injectable 0.5 milliGRAM(s) IV Push every 6 hours PRN  melatonin 3 milliGRAM(s) Oral at bedtime PRN  polyethylene glycol 3350 17 Gram(s) Oral two times a day  prednisoLONE    3 mG/mL Solution (ORAPRED) 40 milliGRAM(s) Oral daily  prochlorperazine   IVPB 10 milliGRAM(s) IV Intermittent every 8 hours PRN  senna 2 Tablet(s) Oral at bedtime  sodium bicarbonate 1300 milliGRAM(s) Oral three times a day    Prior/Completed Antimicrobials:  cefTRIAXone   IVPB

## 2023-02-23 NOTE — PROGRESS NOTE ADULT - ASSESSMENT
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     Decompensated alcoholic liver cirrhosis with ascites  S/p paracentesis 2/17 with removal of 2050ml ascitic fluid   cultures negative to date  no evidence of SBP  Hepatology and GI following, started on prednisolone     E.coli UTI  UA with pyuria on admission  urine culture with pansensitive E.coli   No current urinary symptoms noted  Renal U/S with no hydronephrosis  afebrile and WBC wnl, ELIZABETH resolved   s/p ceftriaxone - completed 3d course on 2/20  continue to observe off antibiotics   monitor temps/CBC      David Syed M.D.  [facility], Division of Infectious Diseases  [phone]  After 5pm on weekdays and all day on weekends - please call [phone]

## 2023-02-23 NOTE — PROGRESS NOTE ADULT - SUBJECTIVE AND OBJECTIVE BOX
Patient is a 62y old  Female who presents with a chief complaint of     SUBJECTIVE / OVERNIGHT EVENTS:  Patient seen and examined .   Doing well.     Vital Signs Last 24 Hrs  T(C): 37 (23 Feb 2023 11:21), Max: 37 (23 Feb 2023 11:21)  T(F): 98.6 (23 Feb 2023 11:21), Max: 98.6 (23 Feb 2023 11:21)  HR: 64 (23 Feb 2023 11:21) (64 - 69)  BP: 100/66 (23 Feb 2023 11:21) (98/62 - 104/66)  BP(mean): --  RR: 18 (23 Feb 2023 11:21) (18 - 18)  SpO2: 95% (23 Feb 2023 11:21) (93% - 95%)    Parameters below as of 23 Feb 2023 11:21  Patient On (Oxygen Delivery Method): room air      I&O's Summary    23 Feb 2023 07:01  -  23 Feb 2023 13:30  --------------------------------------------------------  IN: 200 mL / OUT: 0 mL / NET: 200 mL        PE:  GENERAL: NAD, AAOx3, jaundice, scleral icterus  CHEST/LUNG: CTABL, No wheeze  HEART: Regular rate and rhythm; no murmur  ABDOMEN: Soft, Nontender, Nondistended; Bowel sounds present  EXTREMITIES:  2+ Peripheral Pulses, +1 le edema  NEURO: No focal deficits    LABS:                        7.5    5.65  )-----------( 48       ( 23 Feb 2023 06:54 )             23.3     02-23    139  |  106  |  22  ----------------------------<  139<H>  3.6   |  21<L>  |  1.00    Ca    8.9      23 Feb 2023 06:56    TPro  6.8  /  Alb  3.7  /  TBili  11.6<H>  /  DBili  x   /  AST  39  /  ALT  9<L>  /  AlkPhos  98  02-23    PT/INR - ( 23 Feb 2023 06:56 )   PT: 22.1 sec;   INR: 1.89 ratio           CAPILLARY BLOOD GLUCOSE                RADIOLOGY & ADDITIONAL TESTS:    Imaging Personally Reviewed:  [x] YES  [ ] NO    Consultant(s) Notes Reviewed:  [x] YES  [ ] NO      MEDICATIONS  (STANDING):  heparin   Injectable 5000 Unit(s) SubCutaneous every 8 hours  polyethylene glycol 3350 17 Gram(s) Oral two times a day  prednisoLONE    3 mG/mL Solution (ORAPRED) 40 milliGRAM(s) Oral daily  senna 2 Tablet(s) Oral at bedtime  sodium bicarbonate 1300 milliGRAM(s) Oral three times a day    MEDICATIONS  (PRN):  acetaminophen     Tablet .. 650 milliGRAM(s) Oral every 6 hours PRN Temp greater or equal to 38C (100.4F), Mild Pain (1 - 3)  aluminum hydroxide/magnesium hydroxide/simethicone Suspension 30 milliLiter(s) Oral every 4 hours PRN Dyspepsia  HYDROmorphone  Injectable 0.5 milliGRAM(s) IV Push every 6 hours PRN Severe Pain (7 - 10)  melatonin 3 milliGRAM(s) Oral at bedtime PRN Insomnia  prochlorperazine   IVPB 10 milliGRAM(s) IV Intermittent every 8 hours PRN nausea      Care Discussed with Consultants/Other Providers [x] YES  [ ] NO    HEALTH ISSUES - PROBLEM Dx:

## 2023-02-23 NOTE — PROGRESS NOTE ADULT - ASSESSMENT
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 Pt saw PCP when she noted she was turning yellow in Jan, PCP did blood work and sent pt to GI. Patient was evaluated in her GIs office Dr. Lazcano who sent her into the emergency department for admission for MRCP and further evaluation.  Patient states she used to have 2 glasses of vodka daily for 3 years to treat her chronic pain from her breast cancer.  Patient quit cold turkey on 1/20 without experiencing any symptoms of withdrawal.  Patient states her eyes and skin have been progressively getting more yellow since 1/20.  Patient denies nausea, vomiting, fevers, chills, abdominal pain, dark stools, pale stools, changes in urine color.  Patient reports she was on Letrozole for her breast cancer, and was taking Tylenol daily at recommended doses to treat her pain, she is concerned that the drug interaction may have damaged her liver.  Patient has stopped taking Tylenol for several months now. Nephrology consulted for renal failure.     A/P    ELIZABETH   unclear baseline   renal failure possilby sec to hepatorenal vs pre renal  s/p paracentesis on 2/17 with 2 L removal   REnal function improved  ct with contrast 02/17/23 - monitor contrast induce nephropathy   on steroid per hepatology -BUN remains stable   will avoid ivf in setting of fluid overload   Avoid further nephrotoxins, NSAIDS, RCA   monitor BMP, U/O closely     hyponatremia   sec to fluid overload   improved   fluid restriction <1L a day   monitor Na     Proteinuria/ hematuria   +Leuk Esterase: Large  repeat UA post tx    Acidosis with/without anion gap   Renal failure + lactate is elevated  continue oral bicarb   monitor

## 2023-02-23 NOTE — PROGRESS NOTE ADULT - ASSESSMENT
61yo F PMHx breast CA sp lumpectomy, etoh abuse, hepatitis, HTN, sent by GI for jaundice and abd distention.     #Etoh liver cirrhosis with ascites  #Anemia, thrombocytopenia  CT a/p Cirrhosis, small to moderate volume abdominopelvic ascites, and sequela of portal hypertension. Contracted gallbladder with gallstones  sp diagnostic/ therapeutic paracentesis, cultures NTD  ABD US no PVT  doppler LE no dvt  completed ceftriaxone 3 days for UTI, no signs of SBP  daily CMP and INR  transfuse hgb <7  fu hepatology recs--started on prednisone; bilirubin improving today; f/u am bilirubin   miralax BId, add tap water enema, lactulose if no BM    # ELIZABETH  renal following, renal US no hydro    # breast CA sp lumpectomy  was taking letrozole now on hold, outpt fu    # HTN  meds on hold for hypotension    dvt ppx HSQ    dw  at bedside    Please call [facility] with questions [phone].

## 2023-02-24 ENCOUNTER — TRANSCRIPTION ENCOUNTER (OUTPATIENT)
Age: 63
End: 2023-02-24

## 2023-02-24 LAB
ALBUMIN SERPL ELPH-MCNC: 3.8 G/DL — SIGNIFICANT CHANGE UP (ref 3.3–5)
ALP SERPL-CCNC: 131 U/L — HIGH (ref 40–120)
ALT FLD-CCNC: 11 U/L — SIGNIFICANT CHANGE UP (ref 10–45)
ANION GAP SERPL CALC-SCNC: 14 MMOL/L — SIGNIFICANT CHANGE UP (ref 5–17)
AST SERPL-CCNC: 44 U/L — HIGH (ref 10–40)
BILIRUB SERPL-MCNC: 11.1 MG/DL — HIGH (ref 0.2–1.2)
BUN SERPL-MCNC: 28 MG/DL — HIGH (ref 7–23)
CALCIUM SERPL-MCNC: 9.2 MG/DL — SIGNIFICANT CHANGE UP (ref 8.4–10.5)
CHLORIDE SERPL-SCNC: 105 MMOL/L — SIGNIFICANT CHANGE UP (ref 96–108)
CO2 SERPL-SCNC: 21 MMOL/L — LOW (ref 22–31)
CREAT SERPL-MCNC: 1.08 MG/DL — SIGNIFICANT CHANGE UP (ref 0.5–1.3)
EGFR: 59 ML/MIN/1.73M2 — LOW
GLUCOSE SERPL-MCNC: 108 MG/DL — HIGH (ref 70–99)
HCT VFR BLD CALC: 24.1 % — LOW (ref 34.5–45)
HEV IGM SER QL: SIGNIFICANT CHANGE UP
HGB BLD-MCNC: 7.7 G/DL — LOW (ref 11.5–15.5)
INR BLD: 1.71 RATIO — HIGH (ref 0.88–1.16)
MCHC RBC-ENTMCNC: 32 GM/DL — SIGNIFICANT CHANGE UP (ref 32–36)
MCHC RBC-ENTMCNC: 36.7 PG — HIGH (ref 27–34)
MCV RBC AUTO: 114.8 FL — HIGH (ref 80–100)
NRBC # BLD: 0 /100 WBCS — SIGNIFICANT CHANGE UP (ref 0–0)
PLATELET # BLD AUTO: 57 K/UL — LOW (ref 150–400)
POTASSIUM SERPL-MCNC: 3.8 MMOL/L — SIGNIFICANT CHANGE UP (ref 3.5–5.3)
POTASSIUM SERPL-SCNC: 3.8 MMOL/L — SIGNIFICANT CHANGE UP (ref 3.5–5.3)
PROT SERPL-MCNC: 7.1 G/DL — SIGNIFICANT CHANGE UP (ref 6–8.3)
PROTHROM AB SERPL-ACNC: 19.8 SEC — HIGH (ref 10.5–13.4)
RBC # BLD: 2.1 M/UL — LOW (ref 3.8–5.2)
RBC # FLD: 17.2 % — HIGH (ref 10.3–14.5)
SODIUM SERPL-SCNC: 140 MMOL/L — SIGNIFICANT CHANGE UP (ref 135–145)
WBC # BLD: 6.48 K/UL — SIGNIFICANT CHANGE UP (ref 3.8–10.5)
WBC # FLD AUTO: 6.48 K/UL — SIGNIFICANT CHANGE UP (ref 3.8–10.5)

## 2023-02-24 PROCEDURE — 99232 SBSQ HOSP IP/OBS MODERATE 35: CPT | Mod: GC

## 2023-02-24 RX ADMIN — HYDROMORPHONE HYDROCHLORIDE 0.5 MILLIGRAM(S): 2 INJECTION INTRAMUSCULAR; INTRAVENOUS; SUBCUTANEOUS at 12:11

## 2023-02-24 RX ADMIN — HEPARIN SODIUM 5000 UNIT(S): 5000 INJECTION INTRAVENOUS; SUBCUTANEOUS at 14:02

## 2023-02-24 RX ADMIN — HEPARIN SODIUM 5000 UNIT(S): 5000 INJECTION INTRAVENOUS; SUBCUTANEOUS at 22:15

## 2023-02-24 RX ADMIN — HYDROMORPHONE HYDROCHLORIDE 0.5 MILLIGRAM(S): 2 INJECTION INTRAMUSCULAR; INTRAVENOUS; SUBCUTANEOUS at 11:41

## 2023-02-24 RX ADMIN — SENNA PLUS 2 TABLET(S): 8.6 TABLET ORAL at 22:16

## 2023-02-24 RX ADMIN — HEPARIN SODIUM 5000 UNIT(S): 5000 INJECTION INTRAVENOUS; SUBCUTANEOUS at 05:06

## 2023-02-24 RX ADMIN — HYDROMORPHONE HYDROCHLORIDE 0.5 MILLIGRAM(S): 2 INJECTION INTRAMUSCULAR; INTRAVENOUS; SUBCUTANEOUS at 05:06

## 2023-02-24 RX ADMIN — Medication 1300 MILLIGRAM(S): at 14:02

## 2023-02-24 RX ADMIN — Medication 1300 MILLIGRAM(S): at 22:15

## 2023-02-24 RX ADMIN — HYDROMORPHONE HYDROCHLORIDE 0.5 MILLIGRAM(S): 2 INJECTION INTRAMUSCULAR; INTRAVENOUS; SUBCUTANEOUS at 22:21

## 2023-02-24 RX ADMIN — Medication 1300 MILLIGRAM(S): at 05:06

## 2023-02-24 RX ADMIN — Medication 40 MILLIGRAM(S): at 05:06

## 2023-02-24 RX ADMIN — HYDROMORPHONE HYDROCHLORIDE 0.5 MILLIGRAM(S): 2 INJECTION INTRAMUSCULAR; INTRAVENOUS; SUBCUTANEOUS at 22:51

## 2023-02-24 NOTE — PROGRESS NOTE ADULT - SUBJECTIVE AND OBJECTIVE BOX
Chief Complaint:  Patient is a 62y old  Female who presents with a chief complaint of Jandice/abd distention (24 Feb 2023 09:23)      Date of service 02-24-23 @ 14:07      Interval Events:   Patient seen and examined.   Feels okay. Having BMs.     Hospital Medications:  acetaminophen     Tablet .. 650 milliGRAM(s) Oral every 6 hours PRN  aluminum hydroxide/magnesium hydroxide/simethicone Suspension 30 milliLiter(s) Oral every 4 hours PRN  heparin   Injectable 5000 Unit(s) SubCutaneous every 8 hours  HYDROmorphone  Injectable 0.5 milliGRAM(s) IV Push every 6 hours PRN  melatonin 3 milliGRAM(s) Oral at bedtime PRN  polyethylene glycol 3350 17 Gram(s) Oral two times a day  prednisoLONE    3 mG/mL Solution (ORAPRED) 40 milliGRAM(s) Oral daily  prochlorperazine   IVPB 10 milliGRAM(s) IV Intermittent every 8 hours PRN  senna 2 Tablet(s) Oral at bedtime  sodium bicarbonate 1300 milliGRAM(s) Oral three times a day        Review of Systems:  General:  No wt loss, fevers, chills, night sweats, fatigue,   Eyes:  Good vision, no reported pain  ENT:  No sore throat, pain, runny nose, dysphagia  CV:  No pain, palpitations, hypo/hypertension  Resp:  No dyspnea, cough, tachypnea, wheezing  GI:  See HPI  :  No pain, bleeding, incontinence, nocturia  Muscle:  No pain, weakness  Neuro:  No weakness, tingling, memory problems  Psych:  No fatigue, insomnia, mood problems, depression  Endocrine:  No polyuria, polydipsia, cold/heat intolerance  Heme:  No petechiae, ecchymosis, easy bruisability  Integumentary:  No rash, edema    PHYSICAL EXAM:   Vital Signs:  Vital Signs Last 24 Hrs  T(C): 36.6 (24 Feb 2023 12:16), Max: 36.9 (24 Feb 2023 04:35)  T(F): 97.9 (24 Feb 2023 12:16), Max: 98.4 (24 Feb 2023 04:35)  HR: 67 (24 Feb 2023 12:16) (66 - 68)  BP: 97/57 (24 Feb 2023 12:16) (97/57 - 109/64)  BP(mean): --  RR: 18 (24 Feb 2023 12:16) (18 - 18)  SpO2: 96% (24 Feb 2023 12:16) (94% - 98%)    Parameters below as of 24 Feb 2023 12:16  Patient On (Oxygen Delivery Method): room air      Daily     Daily       PHYSICAL EXAM:     GENERAL:  Appears stated age, well-groomed, well-nourished, no distress  HEENT:  NC/AT,  conjunctivae anicteric, clear and pink,   NECK: supple, trachea midline  CHEST:  Full & symmetric excursion, no increased effort, breath sounds clear  HEART:  Regular rhythm, no JVD  ABDOMEN:  Soft, non-tender, non-distended, normoactive bowel sounds,  no masses , no hepatosplenomegaly  EXTREMITIES:  no cyanosis,clubbing or edema  SKIN:  No rash, erythema, or, ecchymoses, no jaundice  NEURO:  Alert, non-focal, no asterixis  PSYCH: Appropriate affect, oriented to place and time  RECTAL: Deferred      LABS Personally reviewed by me:                        7.7    6.48  )-----------( 57       ( 24 Feb 2023 07:07 )             24.1     Mean Cell Volume: 114.8 fl (02-24-23 @ 07:07)    02-24    140  |  105  |  28<H>  ----------------------------<  108<H>  3.8   |  21<L>  |  1.08    Ca    9.2      24 Feb 2023 07:06    TPro  7.1  /  Alb  3.8  /  TBili  11.1<H>  /  DBili  x   /  AST  44<H>  /  ALT  11  /  AlkPhos  131<H>  02-24    LIVER FUNCTIONS - ( 24 Feb 2023 07:06 )  Alb: 3.8 g/dL / Pro: 7.1 g/dL / ALK PHOS: 131 U/L / ALT: 11 U/L / AST: 44 U/L / GGT: x           PT/INR - ( 24 Feb 2023 07:07 )   PT: 19.8 sec;   INR: 1.71 ratio                                     7.7    6.48  )-----------( 57       ( 24 Feb 2023 07:07 )             24.1                         7.5    5.65  )-----------( 48       ( 23 Feb 2023 06:54 )             23.3                         7.5    3.94  )-----------( 54       ( 22 Feb 2023 07:34 )             23.6       Imaging personally reviewed by me:

## 2023-02-24 NOTE — DISCHARGE NOTE PROVIDER - NSDCCPCAREPLAN_GEN_ALL_CORE_FT
PRINCIPAL DISCHARGE DIAGNOSIS  Diagnosis: Painless jaundice  Assessment and Plan of Treatment: Improved      SECONDARY DISCHARGE DIAGNOSES  Diagnosis: Cirrhosis of liver with ascites  Assessment and Plan of Treatment: S/p paracentesis 2/17 with removal of 2050ml ascitic fluid   cultures negative to date  no evidence of infection    Diagnosis: Acute UTI  Assessment and Plan of Treatment: HOME CARE INSTRUCTIONS  Drink enough water and fluids to keep your urine clear or pale yellow.  Avoid caffeine, tea, and carbonated beverages. They tend to irritate your bladder.  Empty your bladder often. Avoid holding urine for long periods of time.  Empty your bladder before and after sexual intercourse.  After a bowel movement, women should cleanse from front to back. Use each tissue only once.  SEEK MEDICAL CARE IF:  You have back pain.  You develop a fever.  Your symptoms do not begin to resolve within 3 days.  SEEK IMMEDIATE MEDICAL CARE IF:  You have severe back pain or lower abdominal pain.  You develop chills.  You have nausea or vomiting.  You have continued burning or discomfort with urination.    Diagnosis: Alcohol abuse  Assessment and Plan of Treatment: Alcohol Abuse  Alcohol intoxication occurs when the amount of alcohol that a person has consumed impairs his or her ability to mentally and physically function. Chronic alcohol consumption can also lead to a variety of health issues including neurological disease, stomach disease, heart disease, liver disease, etc. Do not drive after drinking alcohol. Drinking enough alcohol to end up in an Emergency Room suggests you may have an alcohol abuse problem. Seek help at a drug addiction center.  SEEK IMMEDIATE MEDICAL CARE IF YOU HAVE ANY OF THE FOLLOWING SYMPTOMS: seizures, vomiting blood, blood in your stool, lightheadedness/dizziness, or becoming shaky to tremulous when you stop drinking.     PRINCIPAL DISCHARGE DIAGNOSIS  Diagnosis: Painless jaundice  Assessment and Plan of Treatment: Improved. Continue Prednoisolone 40mg for 7 days and follow up with Hepatology Dr Hand within 1 week for tapering dose of Prednisolone instructions which will taper down over a 28 day cycle. Call office to confirm appointment. No alcohol consumption is critical to your recovery.      SECONDARY DISCHARGE DIAGNOSES  Diagnosis: Cirrhosis of liver with ascites  Assessment and Plan of Treatment: S/p paracentesis 2/17 with removal of 2050ml ascitic fluid   cultures negative to date  no evidence of infection    Diagnosis: Acute UTI  Assessment and Plan of Treatment: HOME CARE INSTRUCTIONS  You had an acute kidney injury and need to follow up with the kidney doctor Dr Eduardo Sanchez after discharge from hospital. Limit water/fluid intake to 1 liter daily and continue the Bicarb until seen by Dr Sanchez in a week. Call office to confirm follow up appointment.  Drink enough water and fluids to keep your urine clear or pale yellow.  Avoid caffeine, tea, and carbonated beverages. They tend to irritate your bladder.  Empty your bladder often. Avoid holding urine for long periods of time.  Empty your bladder before and after sexual intercourse.  After a bowel movement, women should cleanse from front to back. Use each tissue only once.  SEEK MEDICAL CARE IF:  You have back pain.  You develop a fever.  Your symptoms do not begin to resolve within 3 days.  SEEK IMMEDIATE MEDICAL CARE IF:  You have severe back pain or lower abdominal pain.  You develop chills.  You have nausea or vomiting.  You have continued burning or discomfort with urination.    Diagnosis: Alcohol abuse  Assessment and Plan of Treatment: Alcohol Abuse  Alcohol intoxication occurs when the amount of alcohol that a person has consumed impairs his or her ability to mentally and physically function. Chronic alcohol consumption can also lead to a variety of health issues including neurological disease, stomach disease, heart disease, liver disease, etc. Do not drive after drinking alcohol. Drinking enough alcohol to end up in an Emergency Room suggests you may have an alcohol abuse problem. Seek help at a drug addiction center.  SEEK IMMEDIATE MEDICAL CARE IF YOU HAVE ANY OF THE FOLLOWING SYMPTOMS: seizures, vomiting blood, blood in your stool, lightheadedness/dizziness, or becoming shaky to tremulous when you stop drinking.

## 2023-02-24 NOTE — PROGRESS NOTE ADULT - ASSESSMENT
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     Decompensated alcoholic liver cirrhosis with ascites  S/p paracentesis 2/17 with removal of 2050ml ascitic fluid   cultures negative to date  no evidence of SBP  Hepatology and GI following, started on prednisolone     E.coli UTI  UA with pyuria on admission  urine culture with pansensitive E.coli   No current urinary symptoms noted  Renal U/S with no hydronephrosis  afebrile and WBC wnl, ELIZABETH resolved   s/p ceftriaxone - completed 3d course on 2/20  continue to observe off antibiotics   monitor temps/CBC    Stable from ID standpoint  Over the weekend Dr. Magdy Zepeda will be covering for our group.  If you have any questions, concerns or new micro data, please reach out to them at [phone].    David Syed M.D.  [facility], Division of Infectious Diseases  [phone]  After 5pm on weekdays and all day on weekends - please call [phone]

## 2023-02-24 NOTE — PROGRESS NOTE ADULT - ASSESSMENT
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     1. alc hep vs. decompensated ETOH cirrhosis   - MELD 29 (2/17) > 24 (2/20)  - s/p paracentesis 2/17, no SBP  - HCC screening: CT A/P 2/17 no focal liver lesions  - Gastric and splenorenal varices on CT, no recent endoscopy   - to complete 3 doses of Vit K   - added high protein Ensures to diet   - tx hepatology following: started on prednisolone 40mg (D1: 2/21)   - bilirubin increasing, ?transplant candidate- per hepatology   - f/u outpatient hepatology clinic     2. UTI   - on abx    3. asymptomatic cholelithiasis    4. ELIZABETH, resolved    5. Hx of breast ca, s/p lumpectomy  - on Letrazole     6. Anemia, possibly dilutional   - no overt GI bleeding  - s/p 1 unit PRBC 2/21  - trend CBCs    7. Constipation-- resolved   - cw Miralax 17g BID, senna 2 tabs qhs  - prn tap water enemas        Attending supervision statement: I have personally seen and examined the patient. I fully participated in the care of this patient. I have made amendments to the documentation where necessary, and agree with the history, physical exam, and plan as outlined by the ACP.    [address] [address]  Office: [phone]

## 2023-02-24 NOTE — DISCHARGE NOTE PROVIDER - CARE PROVIDERS DIRECT ADDRESSES
,DirectAddress_Unknown ,DirectAddress_Unknown,[email or web address],DirectAddress_Unknown

## 2023-02-24 NOTE — PROGRESS NOTE ADULT - SUBJECTIVE AND OBJECTIVE BOX
[facility]   MD CHARMAINE KOENIG MD, PA KRISTINE SOLTANPOUR, DO INJUNG KO, NP    TEL:  OFFICE: [phone]    From 5pm-7am Answering Service [phone]    -- RENAL FOLLOW UP NOTE ---Date of Service 02-24-23 @ 13:05    Patient is a 62y old  Female who presents with a chief complaint of Jandice/abd distention (24 Feb 2023 09:23)      Patient seen and examined at bedside. No chest pain/sob    VITALS:  T(F): 97.9 (02-24-23 @ 12:16), Max: 98.4 (02-24-23 @ 04:35)  HR: 67 (02-24-23 @ 12:16)  BP: 97/57 (02-24-23 @ 12:16)  RR: 18 (02-24-23 @ 12:16)  SpO2: 96% (02-24-23 @ 12:16)  Wt(kg): --    02-23 @ 07:01  -  02-24 @ 07:00  --------------------------------------------------------  IN: 200 mL / OUT: 0 mL / NET: 200 mL          PHYSICAL EXAM:  Constitutional: NAD  Neck: No JVD  Respiratory: CTAB, no wheezes, rales or rhonchi  Cardiovascular: S1, S2, RRR  Gastrointestinal: BS+, soft, NT/ND  Extremities: No peripheral edema    Hospital Medications:   MEDICATIONS  (STANDING):  heparin   Injectable 5000 Unit(s) SubCutaneous every 8 hours  polyethylene glycol 3350 17 Gram(s) Oral two times a day  prednisoLONE    3 mG/mL Solution (ORAPRED) 40 milliGRAM(s) Oral daily  senna 2 Tablet(s) Oral at bedtime  sodium bicarbonate 1300 milliGRAM(s) Oral three times a day      LABS:  02-24    140  |  105  |  28<H>  ----------------------------<  108<H>  3.8   |  21<L>  |  1.08    Ca    9.2      24 Feb 2023 07:06    TPro  7.1  /  Alb  3.8  /  TBili  11.1<H>  /  DBili      /  AST  44<H>  /  ALT  11  /  AlkPhos  131<H>  02-24    Creatinine Trend: 1.08 <--, 1.00 <--, 0.85 <--, 0.87 <--, 0.97 <--, 1.25 <--, 1.42 <--, 1.37 <--    Albumin, Serum: 3.8 g/dL (02-24 @ 07:06)                              7.7    6.48  )-----------( 57       ( 24 Feb 2023 07:07 )             24.1     Urine Studies:  Urinalysis - [02-17-23 @ 18:55]      Color Dark Yellow / Appearance Slightly Turbid / SG 1.035 / pH 6.5      Gluc Negative / Ketone Trace  / Bili Moderate / Urobili 6 mg/dL       Blood Negative / Protein 30 mg/dL / Leuk Est Large / Nitrite Negative      RBC 1 / WBC 63 / Hyaline 1 / Gran  / Sq Epi  / Non Sq Epi 2 / Bacteria Many    Urine Creatinine 178      [02-20-23 @ 01:04]  Urine Protein 39      [02-20-23 @ 01:04]  Urine Sodium 44      [02-20-23 @ 01:04]  Urine Urea Nitrogen 1079      [02-20-23 @ 01:04]  Urine Potassium 50      [02-20-23 @ 01:04]  Urine Osmolality 633      [02-20-23 @ 01:04]      HBsAb <3.0      [02-18-23 @ 10:21]  HBsAb Nonreact      [02-18-23 @ 10:21]  HBsAg Nonreact      [02-18-23 @ 10:21]  HBcAb Nonreact      [02-18-23 @ 10:21]  HCV 0.17, Nonreact      [02-18-23 @ 10:21]    THAI: titer 1:640, pattern Homogeneous      [02-18-23 @ 10:21]  Free Light Chains: kappa 19.29, lambda 13.94, ratio = 1.38      [02-18 @ 10:21]    RADIOLOGY & ADDITIONAL STUDIES:

## 2023-02-24 NOTE — DISCHARGE NOTE PROVIDER - HOSPITAL COURSE
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     Decompensated alcoholic liver cirrhosis with ascites  S/p paracentesis 2/17 with removal of 2050ml ascitic fluid   cultures negative to date  no evidence of SBP  Hepatology and GI following     E.coli UTI  UA with pyuria on admission  urine culture with pansensitive E.coli   No current urinary symptoms noted  Renal U/S with no hydronephrosis  afebrile and WBC wnl, ELIZABETH resolved   s/p ceftriaxone - completed 3d course on 2/20  continue to observe off antibiotics   monitor temps/CBC    # ELIZABETH  renal following, renal US no hydro    # breast CA sp lumpectomy  was taking letrozole now on hold, outpt fu    # HTN  meds on hold for hypotension    Acute issues resolved  D/C to home.   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   Admitted with decompensated alcoholic liver cirrhosis with ascites, s/p paracentesis 2/17 with removal of 2050ml ascitic fluid   cultures negative to date, no evidence of SBP, Hepatology and GI followed.  Pt also with E.coli UTI, urine culture with pansensitive E.coli, s/p ceftriaxone - completed 3d course on 2/20, afebrile and WBC wnl,   s/p ceftriaxone - completed 3d course on 2/20.  Course c/b ELIZABETH-renal followed renal US no hydro, ELIZABETH resolved.  Pt with breast CA sp lumpectomy, was taking letrozole now on hold, outpt follow up.  Pt also with HTN.    Acute issues resolved  D/C to home.   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   Admitted with decompensated alcoholic liver cirrhosis with ascites, s/p paracentesis 2/17 with removal of 2050ml ascitic fluid   cultures negative to date, no evidence of SBP, Hepatology and GI followed. Pt to continue on Prednisolone 40mg QD with weekly taper thru 28 day course. Must have ETOH cessation. Pt also with E.coli UTI, urine culture with pansensitive E.coli, s/p ceftriaxone - completed 3d course on 2/20, afebrile and WBC wnl,   s/p ceftriaxone - completed 3d course on 2/20.  Course c/b ELIZABETH-renal followed renal US no hydro, ELIZABETH resolved, to F/U with Nephro outpt.  Pt with breast CA sp lumpectomy, was taking letrozole now on hold, outpt follow up.  Pt also with HTN.    Acute issues resolved  D/C to home w/F/U with PMD Dr Shamika Valentin within 1 week and Hepatology Dr Hand in 1 week

## 2023-02-24 NOTE — PROGRESS NOTE ADULT - SUBJECTIVE AND OBJECTIVE BOX
Gastroenterology/Hepatology Progress Note    Interval Events:   - no acute ON events     Allergies:  No Known Allergies      Hospital Medications:  acetaminophen     Tablet .. 650 milliGRAM(s) Oral every 6 hours PRN  aluminum hydroxide/magnesium hydroxide/simethicone Suspension 30 milliLiter(s) Oral every 4 hours PRN  heparin   Injectable 5000 Unit(s) SubCutaneous every 8 hours  HYDROmorphone  Injectable 0.5 milliGRAM(s) IV Push every 6 hours PRN  melatonin 3 milliGRAM(s) Oral at bedtime PRN  polyethylene glycol 3350 17 Gram(s) Oral two times a day  prednisoLONE    3 mG/mL Solution (ORAPRED) 40 milliGRAM(s) Oral daily  prochlorperazine   IVPB 10 milliGRAM(s) IV Intermittent every 8 hours PRN  senna 2 Tablet(s) Oral at bedtime  sodium bicarbonate 1300 milliGRAM(s) Oral three times a day      ROS: 14 point ROS negative unless otherwise state in subjective    PHYSICAL EXAM:   Vital Signs:  Vital Signs Last 24 Hrs  T(C): 36.9 (24 Feb 2023 04:35), Max: 37 (23 Feb 2023 11:21)  T(F): 98.4 (24 Feb 2023 04:35), Max: 98.6 (23 Feb 2023 11:21)  HR: 68 (24 Feb 2023 04:35) (64 - 68)  BP: 109/64 (24 Feb 2023 04:35) (99/62 - 109/64)  BP(mean): --  RR: 18 (24 Feb 2023 04:35) (18 - 18)  SpO2: 98% (24 Feb 2023 04:35) (94% - 98%)    Parameters below as of 24 Feb 2023 04:35  Patient On (Oxygen Delivery Method): room air      Daily     Daily     GENERAL:  NAD, Appears stated age  HEENT:  NC/AT,  sclera icterus  CHEST:  Normal Effort, Breath sounds clear  HEART:  RRR, no murmurs  ABDOMEN:  Soft, non-tender, distended, normoactive bowel sounds,  no masses  EXTREMITIES:  mild edema  SKIN:  Warm & Dry. No rash or erythema. Jaundiced   NEURO:  Alert, oriented, no focal deficit    LABS:                        7.7    6.48  )-----------( 57       ( 24 Feb 2023 07:07 )             24.1     Mean Cell Volume: 114.8 fl (02-24-23 @ 07:07)    02-24    140  |  105  |  28<H>  ----------------------------<  108<H>  3.8   |  21<L>  |  1.08    Ca    9.2      24 Feb 2023 07:06    TPro  7.1  /  Alb  3.8  /  TBili  11.1<H>  /  DBili  x   /  AST  44<H>  /  ALT  11  /  AlkPhos  131<H>  02-24    LIVER FUNCTIONS - ( 24 Feb 2023 07:06 )  Alb: 3.8 g/dL / Pro: 7.1 g/dL / ALK PHOS: 131 U/L / ALT: 11 U/L / AST: 44 U/L / GGT: x           PT/INR - ( 24 Feb 2023 07:07 )   PT: 19.8 sec;   INR: 1.71 ratio                   Imaging:

## 2023-02-24 NOTE — DISCHARGE NOTE PROVIDER - PROVIDER TOKENS
PROVIDER:[TOKEN:[[id number]],FOLLOWUP:[1 week]] PROVIDER:[TOKEN:[[id number]],FOLLOWUP:[1 week]],PROVIDER:[TOKEN:[[id number]],FOLLOWUP:[1 week]],PROVIDER:[TOKEN:[[id number]],FOLLOWUP:[1 week]]

## 2023-02-24 NOTE — PROGRESS NOTE ADULT - ATTENDING COMMENTS
62F with breast CA - chronic ETOH use with ACLF likely 2/2 ETOH Hepatitis  UTI treated with Abx  MDF > 32  High MELD  Had extensive discussion with patient in regards to natural hx of liver disease, and cirrhosis  Hope that with steroids liver function may recover  If there is steroid failure and liver tests and function worsen transplant evaluation will need to be considered  Patient is present with her   There is use of Letrozole - has a possibility of DILI but unlikely in this situation    Would start patient on 40mg Prednisolone   Check Lille score Day 4  Transfuse 1u PRBC - likely dilutional from albumin - can stop albumin  Daily MELD
62F ACLF from ETOH Hepatitis and ETOH Cirrhosis - treating with steroids with current favorable Maddreys score on Day 3  Would continue Prednisolone 40mg Daily and taper weekly as outpatient for 28d course  Needs follow up with me next week - already emailed  Discussed further in regards to ETOH Cessation and possible need for transplant in future if no further clinical improvement

## 2023-02-24 NOTE — PROGRESS NOTE ADULT - SUBJECTIVE AND OBJECTIVE BOX
Patient is a 62y old  Female who presents with a chief complaint of Jandice/abd distention (24 Feb 2023 09:23)      SUBJECTIVE / OVERNIGHT EVENTS:  Patient seen and examined.   Feels better today.      Vital Signs Last 24 Hrs  T(C): 36.6 (24 Feb 2023 12:16), Max: 36.9 (24 Feb 2023 04:35)  T(F): 97.9 (24 Feb 2023 12:16), Max: 98.4 (24 Feb 2023 04:35)  HR: 67 (24 Feb 2023 12:16) (66 - 68)  BP: 97/57 (24 Feb 2023 12:16) (97/57 - 109/64)  BP(mean): --  RR: 18 (24 Feb 2023 12:16) (18 - 18)  SpO2: 96% (24 Feb 2023 12:16) (94% - 98%)    Parameters below as of 24 Feb 2023 12:16  Patient On (Oxygen Delivery Method): room air      I&O's Summary    23 Feb 2023 07:01  -  24 Feb 2023 07:00  --------------------------------------------------------  IN: 200 mL / OUT: 0 mL / NET: 200 mL        PE:  GENERAL: NAD, AAOx3, jaundice, scleral icterus  CHEST/LUNG: CTABL, No wheeze  HEART: Regular rate and rhythm; no murmur  ABDOMEN: Soft, Nontender, Nondistended; Bowel sounds present  EXTREMITIES:  2+ Peripheral Pulses, +1 le edema  NEURO: No focal deficits  LABS:                        7.7    6.48  )-----------( 57       ( 24 Feb 2023 07:07 )             24.1     02-24    140  |  105  |  28<H>  ----------------------------<  108<H>  3.8   |  21<L>  |  1.08    Ca    9.2      24 Feb 2023 07:06    TPro  7.1  /  Alb  3.8  /  TBili  11.1<H>  /  DBili  x   /  AST  44<H>  /  ALT  11  /  AlkPhos  131<H>  02-24    PT/INR - ( 24 Feb 2023 07:07 )   PT: 19.8 sec;   INR: 1.71 ratio           CAPILLARY BLOOD GLUCOSE                RADIOLOGY & ADDITIONAL TESTS:    Imaging Personally Reviewed:  [x] YES  [ ] NO    Consultant(s) Notes Reviewed:  [x] YES  [ ] NO      MEDICATIONS  (STANDING):  heparin   Injectable 5000 Unit(s) SubCutaneous every 8 hours  polyethylene glycol 3350 17 Gram(s) Oral two times a day  prednisoLONE    3 mG/mL Solution (ORAPRED) 40 milliGRAM(s) Oral daily  senna 2 Tablet(s) Oral at bedtime  sodium bicarbonate 1300 milliGRAM(s) Oral three times a day    MEDICATIONS  (PRN):  acetaminophen     Tablet .. 650 milliGRAM(s) Oral every 6 hours PRN Temp greater or equal to 38C (100.4F), Mild Pain (1 - 3)  aluminum hydroxide/magnesium hydroxide/simethicone Suspension 30 milliLiter(s) Oral every 4 hours PRN Dyspepsia  HYDROmorphone  Injectable 0.5 milliGRAM(s) IV Push every 6 hours PRN Severe Pain (7 - 10)  melatonin 3 milliGRAM(s) Oral at bedtime PRN Insomnia  prochlorperazine   IVPB 10 milliGRAM(s) IV Intermittent every 8 hours PRN nausea      Care Discussed with Consultants/Other Providers [x] YES  [ ] NO    HEALTH ISSUES - PROBLEM Dx:

## 2023-02-24 NOTE — DISCHARGE NOTE PROVIDER - CARE PROVIDER_API CALL
GRADY [facility]  Internal Medicine  [address] [place] [address]  Phone: [phone]  Fax: [phone]  Follow Up Time: 1 week   GRADY LISA  Internal Medicine  [address] [place] [address]  Phone: [phone]  Fax: [phone]  Follow Up Time: 1 week    Eduardo Sanchez)  Internal Medicine  [address] [place] [address]  Phone: [phone]  Fax: [phone]  Follow Up Time: 1 week    Rene Hand)  Gastroenterology; Internal Medicine; Transplant Hepatology  [address] [place] [address]  Phone: [phone]  Fax: [phone]  Follow Up Time: 1 week

## 2023-02-24 NOTE — PROGRESS NOTE ADULT - SUBJECTIVE AND OBJECTIVE BOX
[facility] DIV[facility] OF INFECTIOUS DISEASES  PRAFUL Menendez Y. Patel, S. Shah, G. Casimir  [phone]  ([phone] - weekdays after 5pm and weekends)    Name: ABBY MALAGON  Age/Gender: 62y Female  MRN: [id number]    Interval History:  Patient seen and examined this morning.   Feeling better, no new complaints.   Notes reviewed  No concerning overnight events  Afebrile   Allergies: No Known Allergies    Objective:  Vitals:   T(F): 98.4 (02-24-23 @ 04:35), Max: 98.6 (02-23-23 @ 11:21)  HR: 68 (02-24-23 @ 04:35) (64 - 68)  BP: 109/64 (02-24-23 @ 04:35) (99/62 - 109/64)  RR: 18 (02-24-23 @ 04:35) (18 - 18)  SpO2: 98% (02-24-23 @ 04:35) (94% - 98%)  Physical Examination:  General: no acute distress  HEENT: NC/AT, scleral icterus, neck supple  Respiratory: no acc muscle use, breathing comfortably  Cardiovascular: S1 and S2 present  Gastrointestinal: normal appearing, distended  Neuro: AAOx3, no obvious focal deficits   Extremities: LE edema, no cyanosis  Skin: no visible rash; jaundice    Laboratory Studies:  CBC:                       7.7    6.48  )-----------( 57       ( 24 Feb 2023 07:07 )             24.1     WBC Trend:  6.48 02-24-23 @ 07:07  5.65 02-23-23 @ 06:54  3.94 02-22-23 @ 07:34  4.15 02-21-23 @ 10:16  3.97 02-20-23 @ 07:08  5.58 02-19-23 @ 12:00  4.33 02-19-23 @ 07:05  7.68 02-18-23 @ 18:43  4.84 02-18-23 @ 10:21    CMP: 02-24    140  |  105  |  28<H>  ----------------------------<  108<H>  3.8   |  21<L>  |  1.08    Ca    9.2      24 Feb 2023 07:06    TPro  7.1  /  Alb  3.8  /  TBili  11.1<H>  /  DBili  x   /  AST  44<H>  /  ALT  11  /  AlkPhos  131<H>  02-24    Creatinine, Serum: 1.08 mg/dL (02-24-23 @ 07:06)  Creatinine, Serum: 1.00 mg/dL (02-23-23 @ 06:56)  Creatinine, Serum: 0.85 mg/dL (02-22-23 @ 10:36)  Creatinine, Serum: 0.87 mg/dL (02-21-23 @ 10:16)  Creatinine, Serum: 0.97 mg/dL (02-20-23 @ 07:08)  Creatinine, Serum: 1.25 mg/dL (02-19-23 @ 07:04)  Creatinine, Serum: 1.42 mg/dL (02-18-23 @ 18:43)  Creatinine, Serum: 1.37 mg/dL (02-18-23 @ 10:21)  Creatinine, Serum: 1.39 mg/dL (02-18-23 @ 10:21)    LIVER FUNCTIONS - ( 24 Feb 2023 07:06 )  Alb: 3.8 g/dL / Pro: 7.1 g/dL / ALK PHOS: 131 U/L / ALT: 11 U/L / AST: 44 U/L / GGT: x           Microbiology: reviewed   Culture - Blood (collected 02-18-23 @ 09:48)  Source: .Blood Blood-Peripheral  Final Report (02-23-23 @ 18:00):    No Growth Final    Culture - Fungal, Body Fluid (collected 02-17-23 @ 19:07)  Source: Peritoneal Peritoneal Fluid  Preliminary Report (02-22-23 @ 15:03):    Culture is being performed. Fungal cultures are held for 4 weeks.    Culture - Body Fluid with Gram Stain (collected 02-17-23 @ 19:07)  Source: Peritoneal Peritoneal Fluid  Gram Stain (02-18-23 @ 02:54):    polymorphonuclear leukocytes seen    No organisms seen    by cytocentrifuge  Final Report (02-23-23 @ 09:08):    No growth at 5 days    Culture - Urine (collected 02-17-23 @ 03:20)  Source: Clean Catch Clean Catch (Midstream)  Final Report (02-19-23 @ 11:03):    >100,000 CFU/ml Escherichia coli  Organism: Escherichia coli (02-19-23 @ 11:03)  Organism: Escherichia coli (02-19-23 @ 11:03)      -  Amikacin: S <=16      -  Amoxicillin/Clavulanic Acid: S <=8/4      -  Ampicillin: S <=8 These ampicillin results predict results for amoxicillin      -  Ampicillin/Sulbactam: S <=4/2 Enterobacter, Klebsiella aerogenes, Citrobacter, and Serratia may develop resistance during prolonged therapy (3-4 days)      -  Aztreonam: S <=4      -  Cefazolin: S <=2 For uncomplicated UTI with K. pneumoniae, E. coli, or P. mirablis: TIN <=16 is sensitive and TIN >=32 is resistant. This also predicts results for oral agents cefaclor, cefdinir, cefpodoxime, cefprozil, cefuroxime axetil, cephalexin and locarbef for uncomplicated UTI. Note that some isolates may be susceptible to these agents while testing resistant to cefazolin.      -  Cefepime: S <=2      -  Cefoxitin: S <=8      -  Ceftriaxone: S <=1 Enterobacter, Klebsiella aerogenes, Citrobacter, and Serratia may develop resistance during prolonged therapy      -  Cefuroxime: S <=4      -  Ciprofloxacin: S <=0.25      -  Ertapenem: S <=0.5      -  Gentamicin: S <=2      -  Imipenem: S <=1      -  Levofloxacin: S <=0.5      -  Meropenem: S <=1      -  Nitrofurantoin: S <=32 Should not be used to treat pyelonephritis      -  Piperacillin/Tazobactam: S <=8      -  Tobramycin: S <=2      -  Trimethoprim/Sulfamethoxazole: S <=0.5/9.5      Method Type: TIN    SARS-CoV-2 Result: Cash (16 Feb 2023 22:38)    Radiology: reviewed     Medications:  acetaminophen     Tablet .. 650 milliGRAM(s) Oral every 6 hours PRN  aluminum hydroxide/magnesium hydroxide/simethicone Suspension 30 milliLiter(s) Oral every 4 hours PRN  heparin   Injectable 5000 Unit(s) SubCutaneous every 8 hours  HYDROmorphone  Injectable 0.5 milliGRAM(s) IV Push every 6 hours PRN  melatonin 3 milliGRAM(s) Oral at bedtime PRN  polyethylene glycol 3350 17 Gram(s) Oral two times a day  prednisoLONE    3 mG/mL Solution (ORAPRED) 40 milliGRAM(s) Oral daily  prochlorperazine   IVPB 10 milliGRAM(s) IV Intermittent every 8 hours PRN  senna 2 Tablet(s) Oral at bedtime  sodium bicarbonate 1300 milliGRAM(s) Oral three times a day    Prior/Completed Antimicrobials:  cefTRIAXone   IVPB

## 2023-02-24 NOTE — PROGRESS NOTE ADULT - ASSESSMENT
61yo F PMHx breast CA sp lumpectomy, etoh abuse, hepatitis, HTN, sent by GI for jaundice and abd distention.     #Etoh liver cirrhosis with ascites  #Anemia, thrombocytopenia  CT a/p Cirrhosis, small to moderate volume abdominopelvic ascites, and sequela of portal hypertension. Contracted gallbladder with gallstones  sp diagnostic/ therapeutic paracentesis, cultures NTD  ABD US no PVT  doppler LE no dvt  completed ceftriaxone 3 days for UTI, no signs of SBP  daily CMP and INR  transfuse hgb <7  fu hepatology recs--started on prednisone; bilirubin improving today; f/u am bilirubin   miralax BId, add tap water enema, lactulose if no BM    # ELIZABETH  renal following, renal US no hydro    # breast CA sp lumpectomy  was taking letrozole now on hold, outpt fu    # HTN  meds on hold for hypotension    dvt ppx HSQ    dw  at bedside    Please call [employer] with questions [phone].

## 2023-02-24 NOTE — DISCHARGE NOTE PROVIDER - NSDCMRMEDTOKEN_GEN_ALL_CORE_FT
Artificial Tears ophthalmic solution: 1 drop(s) to each affected eye , As Needed  esomeprazole 40 mg oral delayed release capsule: 1 cap(s) orally once a day   aluminum hydroxide-magnesium hydroxide 200 mg-200 mg/5 mL oral suspension: 30 milliliter(s) orally every 4 hours, As needed, Dyspepsia  Artificial Tears ophthalmic solution: 1 drop(s) to each affected eye , As Needed  prednisoLONE (as sodium phosphate) 15 mg/5 mL oral liquid: 13.33 milliliter(s) orally once a day  sodium bicarbonate 650 mg oral tablet: 2 tab(s) orally 3 times a day

## 2023-02-24 NOTE — PROGRESS NOTE ADULT - ASSESSMENT
62-year-old female history of breast cancer (diagnosed 3 years ago status post lumpectomy, previously on Letrozole), alcohol use disorder (last drink 1/20/23 with no reported withdrawal), s/p live hepatitis vaccine, hypertension presenting for evaluation of jaundice.    #decompensated liver cirrhosis likely related to alcohol use disorder   #?alcohol use disorder   - ascites: small to moderate volume ascites, s/p 2 L removed on 2/17 negative for SBP  - HCC: CT with no liver lesions (2/17)   - HE: negative   - Varices: unknown  - splenomegaly on CT 2/17   - MELD 22 on 2/24    #alcoholic hepatitis   - Maddrey score on 2/21 59.7 (PT 23.7, Tbili 10.5)   - Lille score on 2/25   - s/p Vit K x3d     -Infectious work up with UA +, urine culture with E coli. Blood culture pending. CXR clear lungs. Tap negative for SBP.     Recommendations:   - f/up PETH level    - start prednisolone 40mg (D1: 2/21)   - Miralax to TID scheduled, can titrate for 3-5 BM daily   - daily CMP and INR  - rest of care per primary team   - Please provide patient with Hepatology Clinic outpatient follow up number for an appointment; [phone] ([facility]) or [phone] ([facility] at [address] [place] [address]) when patient is ready for discharge     All recommendations are tentative until note is attested by attending.     Carol Almeida, PGY-4   Gastroenterology/Hepatology Fellow  Available on [employer] Teams  [phone] ([facility] Short Range Pager)  [phone] ([facility] Long Range Pager)    After 5pm, please contact the on-call GI fellow. [phone] 62-year-old female history of breast cancer (diagnosed 3 years ago status post lumpectomy, previously on Letrozole), alcohol use disorder (last drink 1/20/23 with no reported withdrawal), s/p live hepatitis vaccine, hypertension presenting for evaluation of jaundice.    #decompensated liver cirrhosis likely related to alcohol use disorder   #?alcohol use disorder   - ascites: small to moderate volume ascites, s/p 2 L removed on 2/17 negative for SBP  - HCC: CT with no liver lesions (2/17)   - HE: negative   - Varices: unknown  - splenomegaly on CT 2/17   - MELD 22 on 2/24    #alcoholic hepatitis   - Maddrey score on 2/21 59.7 (PT 23.7, Creatinine 0.87, Tbili 10.5, albumin 3.6)   - Lille score on 2/25   - s/p Vit K x3d     -Infectious work up with UA +, urine culture with E coli. Blood culture pending. CXR clear lungs. Tap negative for SBP.     Recommendations:   - f/up PETH level    - start prednisolone 40mg (D1: 2/21)   - Miralax to TID scheduled, can titrate for 3-5 BM daily   - daily CMP and INR  - rest of care per primary team   - Please provide patient with Hepatology Clinic outpatient follow up number for an appointment; [phone] ([facility]) or [phone] ([facility] at [address] [place] [address]) when patient is ready for discharge     All recommendations are tentative until note is attested by attending.     Carol Almeida, PGY-4   Gastroenterology/Hepatology Fellow  Available on [employer] Teams  [phone] ([facility] Short Range Pager)  [phone] ([facility] Long Range Pager)    After 5pm, please contact the on-call GI fellow. [phone]

## 2023-02-25 ENCOUNTER — TRANSCRIPTION ENCOUNTER (OUTPATIENT)
Age: 63
End: 2023-02-25

## 2023-02-25 VITALS
DIASTOLIC BLOOD PRESSURE: 61 MMHG | TEMPERATURE: 98 F | RESPIRATION RATE: 18 BRPM | SYSTOLIC BLOOD PRESSURE: 104 MMHG | OXYGEN SATURATION: 96 % | HEART RATE: 69 BPM

## 2023-02-25 LAB
ALBUMIN SERPL ELPH-MCNC: 3.7 G/DL — SIGNIFICANT CHANGE UP (ref 3.3–5)
ALP SERPL-CCNC: 137 U/L — HIGH (ref 40–120)
ALT FLD-CCNC: 14 U/L — SIGNIFICANT CHANGE UP (ref 10–45)
ANION GAP SERPL CALC-SCNC: 13 MMOL/L — SIGNIFICANT CHANGE UP (ref 5–17)
AST SERPL-CCNC: 49 U/L — HIGH (ref 10–40)
BILIRUB SERPL-MCNC: 10 MG/DL — HIGH (ref 0.2–1.2)
BUN SERPL-MCNC: 34 MG/DL — HIGH (ref 7–23)
CALCIUM SERPL-MCNC: 9 MG/DL — SIGNIFICANT CHANGE UP (ref 8.4–10.5)
CHLORIDE SERPL-SCNC: 105 MMOL/L — SIGNIFICANT CHANGE UP (ref 96–108)
CO2 SERPL-SCNC: 22 MMOL/L — SIGNIFICANT CHANGE UP (ref 22–31)
CREAT SERPL-MCNC: 1.08 MG/DL — SIGNIFICANT CHANGE UP (ref 0.5–1.3)
EGFR: 59 ML/MIN/1.73M2 — LOW
GLUCOSE SERPL-MCNC: 100 MG/DL — HIGH (ref 70–99)
INR BLD: 1.73 RATIO — HIGH (ref 0.88–1.16)
PHOSPHATIDYLETHANOL (PETH) - RESULT: NEGATIVE NG/ML — SIGNIFICANT CHANGE UP
POTASSIUM SERPL-MCNC: 3.8 MMOL/L — SIGNIFICANT CHANGE UP (ref 3.5–5.3)
POTASSIUM SERPL-SCNC: 3.8 MMOL/L — SIGNIFICANT CHANGE UP (ref 3.5–5.3)
PROT SERPL-MCNC: 7.3 G/DL — SIGNIFICANT CHANGE UP (ref 6–8.3)
PROTHROM AB SERPL-ACNC: 20.2 SEC — HIGH (ref 10.5–13.4)
SODIUM SERPL-SCNC: 140 MMOL/L — SIGNIFICANT CHANGE UP (ref 135–145)

## 2023-02-25 PROCEDURE — 84132 ASSAY OF SERUM POTASSIUM: CPT

## 2023-02-25 PROCEDURE — 86900 BLOOD TYPING SEROLOGIC ABO: CPT

## 2023-02-25 PROCEDURE — P9045: CPT

## 2023-02-25 PROCEDURE — 89051 BODY FLUID CELL COUNT: CPT

## 2023-02-25 PROCEDURE — 96374 THER/PROPH/DIAG INJ IV PUSH: CPT

## 2023-02-25 PROCEDURE — 88305 TISSUE EXAM BY PATHOLOGIST: CPT

## 2023-02-25 PROCEDURE — 36430 TRANSFUSION BLD/BLD COMPNT: CPT

## 2023-02-25 PROCEDURE — 85025 COMPLETE CBC W/AUTO DIFF WBC: CPT

## 2023-02-25 PROCEDURE — 87040 BLOOD CULTURE FOR BACTERIA: CPT

## 2023-02-25 PROCEDURE — 86255 FLUORESCENT ANTIBODY SCREEN: CPT

## 2023-02-25 PROCEDURE — 87070 CULTURE OTHR SPECIMN AEROBIC: CPT

## 2023-02-25 PROCEDURE — 80053 COMPREHEN METABOLIC PANEL: CPT

## 2023-02-25 PROCEDURE — 83605 ASSAY OF LACTIC ACID: CPT

## 2023-02-25 PROCEDURE — 83880 ASSAY OF NATRIURETIC PEPTIDE: CPT

## 2023-02-25 PROCEDURE — 99285 EMERGENCY DEPT VISIT HI MDM: CPT

## 2023-02-25 PROCEDURE — 86709 HEPATITIS A IGM ANTIBODY: CPT

## 2023-02-25 PROCEDURE — 87075 CULTR BACTERIA EXCEPT BLOOD: CPT

## 2023-02-25 PROCEDURE — 81001 URINALYSIS AUTO W/SCOPE: CPT

## 2023-02-25 PROCEDURE — 82962 GLUCOSE BLOOD TEST: CPT

## 2023-02-25 PROCEDURE — 83935 ASSAY OF URINE OSMOLALITY: CPT

## 2023-02-25 PROCEDURE — 85018 HEMOGLOBIN: CPT

## 2023-02-25 PROCEDURE — 71046 X-RAY EXAM CHEST 2 VIEWS: CPT

## 2023-02-25 PROCEDURE — 82042 OTHER SOURCE ALBUMIN QUAN EA: CPT

## 2023-02-25 PROCEDURE — 96375 TX/PRO/DX INJ NEW DRUG ADDON: CPT

## 2023-02-25 PROCEDURE — 82784 ASSAY IGA/IGD/IGG/IGM EACH: CPT

## 2023-02-25 PROCEDURE — 86850 RBC ANTIBODY SCREEN: CPT

## 2023-02-25 PROCEDURE — 87637 SARSCOV2&INF A&B&RSV AMP PRB: CPT

## 2023-02-25 PROCEDURE — C1729: CPT

## 2023-02-25 PROCEDURE — P9047: CPT

## 2023-02-25 PROCEDURE — 86705 HEP B CORE ANTIBODY IGM: CPT

## 2023-02-25 PROCEDURE — P9016: CPT

## 2023-02-25 PROCEDURE — 49083 ABD PARACENTESIS W/IMAGING: CPT

## 2023-02-25 PROCEDURE — 74177 CT ABD & PELVIS W/CONTRAST: CPT | Mod: MA

## 2023-02-25 PROCEDURE — 87086 URINE CULTURE/COLONY COUNT: CPT

## 2023-02-25 PROCEDURE — 84157 ASSAY OF PROTEIN OTHER: CPT

## 2023-02-25 PROCEDURE — 86704 HEP B CORE ANTIBODY TOTAL: CPT

## 2023-02-25 PROCEDURE — 84156 ASSAY OF PROTEIN URINE: CPT

## 2023-02-25 PROCEDURE — 76770 US EXAM ABDO BACK WALL COMP: CPT

## 2023-02-25 PROCEDURE — 82565 ASSAY OF CREATININE: CPT

## 2023-02-25 PROCEDURE — 86923 COMPATIBILITY TEST ELECTRIC: CPT

## 2023-02-25 PROCEDURE — 86038 ANTINUCLEAR ANTIBODIES: CPT

## 2023-02-25 PROCEDURE — 93970 EXTREMITY STUDY: CPT

## 2023-02-25 PROCEDURE — 82947 ASSAY GLUCOSE BLOOD QUANT: CPT

## 2023-02-25 PROCEDURE — 84295 ASSAY OF SERUM SODIUM: CPT

## 2023-02-25 PROCEDURE — 86381 MITOCHONDRIAL ANTIBODY EACH: CPT

## 2023-02-25 PROCEDURE — 85730 THROMBOPLASTIN TIME PARTIAL: CPT

## 2023-02-25 PROCEDURE — 85610 PROTHROMBIN TIME: CPT

## 2023-02-25 PROCEDURE — 84300 ASSAY OF URINE SODIUM: CPT

## 2023-02-25 PROCEDURE — 86901 BLOOD TYPING SEROLOGIC RH(D): CPT

## 2023-02-25 PROCEDURE — 82330 ASSAY OF CALCIUM: CPT

## 2023-02-25 PROCEDURE — 87340 HEPATITIS B SURFACE AG IA: CPT

## 2023-02-25 PROCEDURE — 86706 HEP B SURFACE ANTIBODY: CPT

## 2023-02-25 PROCEDURE — 93975 VASCULAR STUDY: CPT

## 2023-02-25 PROCEDURE — 82570 ASSAY OF URINE CREATININE: CPT

## 2023-02-25 PROCEDURE — 84100 ASSAY OF PHOSPHORUS: CPT

## 2023-02-25 PROCEDURE — 96376 TX/PRO/DX INJ SAME DRUG ADON: CPT

## 2023-02-25 PROCEDURE — 87205 SMEAR GRAM STAIN: CPT

## 2023-02-25 PROCEDURE — 86790 VIRUS ANTIBODY NOS: CPT

## 2023-02-25 PROCEDURE — 83615 LACTATE (LD) (LDH) ENZYME: CPT

## 2023-02-25 PROCEDURE — 36415 COLL VENOUS BLD VENIPUNCTURE: CPT

## 2023-02-25 PROCEDURE — 82945 GLUCOSE OTHER FLUID: CPT

## 2023-02-25 PROCEDURE — 84540 ASSAY OF URINE/UREA-N: CPT

## 2023-02-25 PROCEDURE — 84133 ASSAY OF URINE POTASSIUM: CPT

## 2023-02-25 PROCEDURE — 82435 ASSAY OF BLOOD CHLORIDE: CPT

## 2023-02-25 PROCEDURE — 80307 DRUG TEST PRSMV CHEM ANLYZR: CPT

## 2023-02-25 PROCEDURE — 85014 HEMATOCRIT: CPT

## 2023-02-25 PROCEDURE — 82803 BLOOD GASES ANY COMBINATION: CPT

## 2023-02-25 PROCEDURE — 87102 FUNGUS ISOLATION CULTURE: CPT

## 2023-02-25 PROCEDURE — 83735 ASSAY OF MAGNESIUM: CPT

## 2023-02-25 PROCEDURE — 80074 ACUTE HEPATITIS PANEL: CPT

## 2023-02-25 PROCEDURE — 88112 CYTOPATH CELL ENHANCE TECH: CPT

## 2023-02-25 PROCEDURE — G0480: CPT

## 2023-02-25 PROCEDURE — 87186 SC STD MICRODIL/AGAR DIL: CPT

## 2023-02-25 PROCEDURE — 85027 COMPLETE CBC AUTOMATED: CPT

## 2023-02-25 RX ORDER — ESOMEPRAZOLE MAGNESIUM 40 MG/1
1 CAPSULE, DELAYED RELEASE ORAL
Qty: 0 | Refills: 0 | DISCHARGE

## 2023-02-25 RX ORDER — PREDNISOLONE 5 MG
13.33 TABLET ORAL
Qty: 399.9 | Refills: 0
Start: 2023-02-25 | End: 2023-03-26

## 2023-02-25 RX ORDER — SODIUM BICARBONATE 1 MEQ/ML
2 SYRINGE (ML) INTRAVENOUS
Qty: 84 | Refills: 0
Start: 2023-02-25 | End: 2023-03-10

## 2023-02-25 RX ADMIN — HYDROMORPHONE HYDROCHLORIDE 0.5 MILLIGRAM(S): 2 INJECTION INTRAMUSCULAR; INTRAVENOUS; SUBCUTANEOUS at 13:30

## 2023-02-25 RX ADMIN — HEPARIN SODIUM 5000 UNIT(S): 5000 INJECTION INTRAVENOUS; SUBCUTANEOUS at 05:11

## 2023-02-25 RX ADMIN — HYDROMORPHONE HYDROCHLORIDE 0.5 MILLIGRAM(S): 2 INJECTION INTRAMUSCULAR; INTRAVENOUS; SUBCUTANEOUS at 14:15

## 2023-02-25 RX ADMIN — HEPARIN SODIUM 5000 UNIT(S): 5000 INJECTION INTRAVENOUS; SUBCUTANEOUS at 13:20

## 2023-02-25 RX ADMIN — Medication 40 MILLIGRAM(S): at 05:12

## 2023-02-25 RX ADMIN — HYDROMORPHONE HYDROCHLORIDE 0.5 MILLIGRAM(S): 2 INJECTION INTRAMUSCULAR; INTRAVENOUS; SUBCUTANEOUS at 05:11

## 2023-02-25 RX ADMIN — Medication 1300 MILLIGRAM(S): at 05:11

## 2023-02-25 RX ADMIN — Medication 1300 MILLIGRAM(S): at 13:20

## 2023-02-25 NOTE — PROGRESS NOTE ADULT - SUBJECTIVE AND OBJECTIVE BOX
Chief Complaint:  Patient is a 62y old  Female who presents with a chief complaint of Jandice/abd distention (24 Feb 2023 09:23)      Date of service 02-25-23 @ 09:08      Interval Events:     Hospital Medications:  acetaminophen     Tablet .. 650 milliGRAM(s) Oral every 6 hours PRN  aluminum hydroxide/magnesium hydroxide/simethicone Suspension 30 milliLiter(s) Oral every 4 hours PRN  heparin   Injectable 5000 Unit(s) SubCutaneous every 8 hours  HYDROmorphone  Injectable 0.5 milliGRAM(s) IV Push every 6 hours PRN  melatonin 3 milliGRAM(s) Oral at bedtime PRN  polyethylene glycol 3350 17 Gram(s) Oral two times a day  prednisoLONE    3 mG/mL Solution (ORAPRED) 40 milliGRAM(s) Oral daily  prochlorperazine   IVPB 10 milliGRAM(s) IV Intermittent every 8 hours PRN  senna 2 Tablet(s) Oral at bedtime  sodium bicarbonate 1300 milliGRAM(s) Oral three times a day        Review of Systems:  General:  No wt loss, fevers, chills, night sweats, fatigue,   Eyes:  Good vision, no reported pain  ENT:  No sore throat, pain, runny nose, dysphagia  CV:  No pain, palpitations, hypo/hypertension  Resp:  No dyspnea, cough, tachypnea, wheezing  GI:  See HPI  :  No pain, bleeding, incontinence, nocturia  Muscle:  No pain, weakness  Neuro:  No weakness, tingling, memory problems  Psych:  No fatigue, insomnia, mood problems, depression  Endocrine:  No polyuria, polydipsia, cold/heat intolerance  Heme:  No petechiae, ecchymosis, easy bruisability  Integumentary:  No rash, edema    PHYSICAL EXAM:   Vital Signs:  Vital Signs Last 24 Hrs  T(C): 36.9 (25 Feb 2023 04:26), Max: 36.9 (25 Feb 2023 04:26)  T(F): 98.5 (25 Feb 2023 04:26), Max: 98.5 (25 Feb 2023 04:26)  HR: 64 (25 Feb 2023 04:26) (64 - 69)  BP: 99/58 (25 Feb 2023 04:26) (97/57 - 105/62)  BP(mean): --  RR: 17 (25 Feb 2023 04:26) (17 - 18)  SpO2: 95% (25 Feb 2023 04:26) (95% - 97%)    Parameters below as of 25 Feb 2023 04:26  Patient On (Oxygen Delivery Method): room air      Daily     Daily       PHYSICAL EXAM:     GENERAL:  Appears stated age, well-groomed, well-nourished, no distress  HEENT:  NC/AT,  conjunctivae anicteric, clear and pink,   NECK: supple, trachea midline  CHEST:  Full & symmetric excursion, no increased effort, breath sounds clear  HEART:  Regular rhythm, no JVD  ABDOMEN:  Soft, non-tender, non-distended, normoactive bowel sounds,  no masses , no hepatosplenomegaly  EXTREMITIES:  no cyanosis,clubbing or edema  SKIN:  No rash, erythema, or, ecchymoses, no jaundice  NEURO:  Alert, non-focal, no asterixis  PSYCH: Appropriate affect, oriented to place and time  RECTAL: Deferred      LABS Personally reviewed by me:                        7.7    6.48  )-----------( 57       ( 24 Feb 2023 07:07 )             24.1       02-25    140  |  105  |  34<H>  ----------------------------<  100<H>  3.8   |  22  |  1.08    Ca    9.0      25 Feb 2023 07:19    TPro  7.3  /  Alb  3.7  /  TBili  10.0<H>  /  DBili  x   /  AST  49<H>  /  ALT  14  /  AlkPhos  137<H>  02-25    LIVER FUNCTIONS - ( 25 Feb 2023 07:19 )  Alb: 3.7 g/dL / Pro: 7.3 g/dL / ALK PHOS: 137 U/L / ALT: 14 U/L / AST: 49 U/L / GGT: x           PT/INR - ( 25 Feb 2023 07:19 )   PT: 20.2 sec;   INR: 1.73 ratio                                     7.7    6.48  )-----------( 57       ( 24 Feb 2023 07:07 )             24.1                         7.5    5.65  )-----------( 48       ( 23 Feb 2023 06:54 )             23.3       Imaging personally reviewed by me:             Chief Complaint:  Patient is a 62y old  Female who presents with a chief complaint of Jandice/abd distention (24 Feb 2023 09:23)      Date of service 02-25-23 @ 09:08      Interval Events:   no acute events    Hospital Medications:  acetaminophen     Tablet .. 650 milliGRAM(s) Oral every 6 hours PRN  aluminum hydroxide/magnesium hydroxide/simethicone Suspension 30 milliLiter(s) Oral every 4 hours PRN  heparin   Injectable 5000 Unit(s) SubCutaneous every 8 hours  HYDROmorphone  Injectable 0.5 milliGRAM(s) IV Push every 6 hours PRN  melatonin 3 milliGRAM(s) Oral at bedtime PRN  polyethylene glycol 3350 17 Gram(s) Oral two times a day  prednisoLONE    3 mG/mL Solution (ORAPRED) 40 milliGRAM(s) Oral daily  prochlorperazine   IVPB 10 milliGRAM(s) IV Intermittent every 8 hours PRN  senna 2 Tablet(s) Oral at bedtime  sodium bicarbonate 1300 milliGRAM(s) Oral three times a day        Review of Systems:  General:  No wt loss, fevers, chills, night sweats, fatigue,   Eyes:  Good vision, no reported pain  ENT:  No sore throat, pain, runny nose, dysphagia  CV:  No pain, palpitations, hypo/hypertension  Resp:  No dyspnea, cough, tachypnea, wheezing  GI:  See HPI  :  No pain, bleeding, incontinence, nocturia  Muscle:  No pain, weakness  Neuro:  No weakness, tingling, memory problems  Psych:  No fatigue, insomnia, mood problems, depression  Endocrine:  No polyuria, polydipsia, cold/heat intolerance  Heme:  No petechiae, ecchymosis, easy bruisability  Integumentary:  No rash, edema    PHYSICAL EXAM:   Vital Signs:  Vital Signs Last 24 Hrs  T(C): 36.9 (25 Feb 2023 04:26), Max: 36.9 (25 Feb 2023 04:26)  T(F): 98.5 (25 Feb 2023 04:26), Max: 98.5 (25 Feb 2023 04:26)  HR: 64 (25 Feb 2023 04:26) (64 - 69)  BP: 99/58 (25 Feb 2023 04:26) (97/57 - 105/62)  BP(mean): --  RR: 17 (25 Feb 2023 04:26) (17 - 18)  SpO2: 95% (25 Feb 2023 04:26) (95% - 97%)    Parameters below as of 25 Feb 2023 04:26  Patient On (Oxygen Delivery Method): room air      Daily     Daily       PHYSICAL EXAM:     GENERAL:  Appears stated age, well-groomed, well-nourished, no distress  HEENT:  NC/AT,  conjunctivae anicteric, clear and pink,   NECK: supple, trachea midline  CHEST:  Full & symmetric excursion, no increased effort, breath sounds clear  HEART:  Regular rhythm, no JVD  ABDOMEN:  Soft, non-tender, non-distended, normoactive bowel sounds,  no masses , no hepatosplenomegaly  EXTREMITIES:  no cyanosis,clubbing or edema  SKIN:  No rash, erythema, or, ecchymoses, no jaundice  NEURO:  Alert, non-focal, no asterixis  PSYCH: Appropriate affect, oriented to place and time  RECTAL: Deferred      LABS Personally reviewed by me:                        7.7    6.48  )-----------( 57       ( 24 Feb 2023 07:07 )             24.1       02-25    140  |  105  |  34<H>  ----------------------------<  100<H>  3.8   |  22  |  1.08    Ca    9.0      25 Feb 2023 07:19    TPro  7.3  /  Alb  3.7  /  TBili  10.0<H>  /  DBili  x   /  AST  49<H>  /  ALT  14  /  AlkPhos  137<H>  02-25    LIVER FUNCTIONS - ( 25 Feb 2023 07:19 )  Alb: 3.7 g/dL / Pro: 7.3 g/dL / ALK PHOS: 137 U/L / ALT: 14 U/L / AST: 49 U/L / GGT: x           PT/INR - ( 25 Feb 2023 07:19 )   PT: 20.2 sec;   INR: 1.73 ratio                                     7.7    6.48  )-----------( 57       ( 24 Feb 2023 07:07 )             24.1                         7.5    5.65  )-----------( 48       ( 23 Feb 2023 06:54 )             23.3       Imaging personally reviewed by me:

## 2023-02-25 NOTE — DISCHARGE NOTE NURSING/CASE MANAGEMENT/SOCIAL WORK - NSDCPEFALRISK_GEN_ALL_CORE
For information on Fall & Injury Prevention, visit: [email or web address] OR  [email or web address] OR  https://www.cdc.gov/steadi/patient.html

## 2023-02-25 NOTE — PROGRESS NOTE ADULT - ASSESSMENT
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     1. alc hep superimposed on ETOH cirrhosis, high MELD  - s/p paracentesis 2/17, no SBP  - HCC screening: CT A/P 2/17 no focal liver lesions  - Gastric and splenorenal varices on CT, no recent endoscopy   - tx hepatology following: started on prednisolone 40 mg QD, Day 5. Lille score < 0.45.  - has outpt followup schedule with tx hepatology     2. UTI   - on abx    3. asymptomatic cholelithiasis    4. ELIZABETH, resolved    5. Hx of breast ca, s/p lumpectomy  - on Letrazole     6. Anemia, no overt GI bleeding. Hgb stable.     7. 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     1. alc hep superimposed on ETOH cirrhosis, high MELD  - s/p paracentesis 2/17, no SBP  - HCC screening: CT A/P 2/17 no focal liver lesions  - Gastric and splenorenal varices on CT, no recent endoscopy   - tx hepatology following: started on prednisolone 40 mg QD, Day 5. Lille score < 0.45.  - planned for outpt followup with tx hepatology     2. UTI   - on abx    3. asymptomatic cholelithiasis    4. ELIZABETH, resolved    5. Hx of breast ca, s/p lumpectomy  - on Letrazole     6. Anemia, no overt GI bleeding. Hgb stable.     7. Constipation-- resolved   - cw Miralax 17g BID, senna 2 tabs qhs  - prn tap water enemas    [facility] [address] [address]  Office: [phone]

## 2023-02-25 NOTE — PROGRESS NOTE ADULT - PROVIDER SPECIALTY LIST ADULT
Gastroenterology
Hepatology
Internal Medicine
Nephrology
Gastroenterology
Hepatology
Infectious Disease
Infectious Disease
Internal Medicine
Nephrology
Gastroenterology
Hepatology
Hepatology
Infectious Disease
Internal Medicine
Nephrology

## 2023-02-25 NOTE — PROGRESS NOTE ADULT - ASSESSMENT
61yo F PMHx breast CA sp lumpectomy, etoh abuse, hepatitis, HTN, sent by GI for jaundice and abd distention.     #Etoh liver cirrhosis with ascites  #Anemia, thrombocytopenia  CT a/p Cirrhosis, small to moderate volume abdominopelvic ascites, and sequela of portal hypertension. Contracted gallbladder with gallstones  sp diagnostic/ therapeutic paracentesis, cultures NTD  ABD US no PVT  doppler LE no dvt  completed ceftriaxone 3 days for UTI, no signs of SBP  daily CMP and INR  transfuse hgb <7  fu hepatology recs--started on prednisone; bilirubin improving today  miralax BId, add tap water enema, lactulose if no BM    # ELIZABETH  renal following, renal US no hydro    # breast CA sp lumpectomy  was taking letrozole now on hold, outpt fu    # HTN  meds on hold for hypotension    dvt ppx HSQ    dw  at bedside    Dispo: discharge on prednisolone 40 per hepatology, with outpatient follow up with Dr. Rene Hand.    Please call Hitch with questions [phone].

## 2023-02-25 NOTE — DISCHARGE NOTE NURSING/CASE MANAGEMENT/SOCIAL WORK - PATIENT PORTAL LINK FT
You can access the FollowMyHealth Patient Portal offered by [facility] by registering at the following website: [email or web address]. By joining [facility]’s FollowMyHealth portal, you will also be able to view your health information using other applications (apps) compatible with our system.

## 2023-02-25 NOTE — PROGRESS NOTE ADULT - SUBJECTIVE AND OBJECTIVE BOX
[facility]   MD CHARMAINE KOENIG MD KRISTINE SOLTANPOUR, EDNA AVINA    TEL:  OFFICE: [phone]  From 5pm-7am Answering Service [phone]    -- RENAL FOLLOW UP NOTE ---Date of Service 02-25-23 @ 12:02    Patient is a 62y old  Female who presents with a chief complaint of Jandice/abd distention (24 Feb 2023 09:23)      Patient seen and examined at bedside. No chest pain/sob    VITALS:  T(F): 98.1 (02-25-23 @ 15:40), Max: 98.6 (02-25-23 @ 11:29)  HR: 69 (02-25-23 @ 15:40)  BP: 104/61 (02-25-23 @ 15:40)  RR: 18 (02-25-23 @ 15:40)  SpO2: 96% (02-25-23 @ 15:40)  Wt(kg): --        PHYSICAL EXAM:  General: NAD  Neck: No JVD  Respiratory: CTAB, no wheezes, rales or rhonchi  Cardiovascular: S1, S2, RRR  Gastrointestinal: BS+, soft, NT/ND  Extremities: No peripheral edema    Hospital Medications:   MEDICATIONS  (STANDING):  heparin   Injectable 5000 Unit(s) SubCutaneous every 8 hours  polyethylene glycol 3350 17 Gram(s) Oral two times a day  prednisoLONE    3 mG/mL Solution (ORAPRED) 40 milliGRAM(s) Oral daily  senna 2 Tablet(s) Oral at bedtime  sodium bicarbonate 1300 milliGRAM(s) Oral three times a day      LABS:  02-25    140  |  105  |  34<H>  ----------------------------<  100<H>  3.8   |  22  |  1.08    Ca    9.0      25 Feb 2023 07:19    TPro  7.3  /  Alb  3.7  /  TBili  10.0<H>  /  DBili      /  AST  49<H>  /  ALT  14  /  AlkPhos  137<H>  02-25    Creatinine Trend: 1.08 <--, 1.08 <--, 1.00 <--, 0.85 <--, 0.87 <--, 0.97 <--, 1.25 <--, 1.42 <--    Albumin, Serum: 3.7 g/dL (02-25 @ 07:19)                              7.7    6.48  )-----------( 57       ( 24 Feb 2023 07:07 )             24.1     Urine Studies:  Urinalysis - [02-17-23 @ 18:55]      Color Dark Yellow / Appearance Slightly Turbid / SG 1.035 / pH 6.5      Gluc Negative / Ketone Trace  / Bili Moderate / Urobili 6 mg/dL       Blood Negative / Protein 30 mg/dL / Leuk Est Large / Nitrite Negative      RBC 1 / WBC 63 / Hyaline 1 / Gran  / Sq Epi  / Non Sq Epi 2 / Bacteria Many    Urine Creatinine 178      [02-20-23 @ 01:04]  Urine Protein 39      [02-20-23 @ 01:04]  Urine Sodium 44      [02-20-23 @ 01:04]  Urine Urea Nitrogen 1079      [02-20-23 @ 01:04]  Urine Potassium 50      [02-20-23 @ 01:04]  Urine Osmolality 633      [02-20-23 @ 01:04]      HBsAb <3.0      [02-18-23 @ 10:21]  HBsAb Nonreact      [02-18-23 @ 10:21]  HBsAg Nonreact      [02-18-23 @ 10:21]  HBcAb Nonreact      [02-18-23 @ 10:21]  HCV 0.17, Nonreact      [02-18-23 @ 10:21]    THAI: titer 1:640, pattern Homogeneous      [02-18-23 @ 10:21]  Free Light Chains: kappa 19.29, lambda 13.94, ratio = 1.38      [02-18 @ 10:21]    RADIOLOGY & ADDITIONAL STUDIES:

## 2023-02-25 NOTE — PROGRESS NOTE ADULT - ASSESSMENT
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 . Nephrology consulted for renal failure.     A/P    ELIZABETH   unclear baseline   Scr improving  sec to hepatorenal vs pre renal  s/p paracentesis on 2/17 with 2 L removal   ct with contrast 02/17/23 - monitor contrast induce nephropathy   on steroid per hepatology -BUN remains stable    Avoid further nephrotoxins, NSAIDS, RCA   monitor BMP, U/O closely     hyponatremia   resolved  fluid restriction <1L a day   monitor Na     Proteinuria/ hematuria   +Leuk Esterase: Large  repeat UA post tx    Acidosis with/without anion gap  resolved  discontinue bicarb

## 2023-02-25 NOTE — PROGRESS NOTE ADULT - SUBJECTIVE AND OBJECTIVE BOX
Patient is a 62y old  Female who presents with a chief complaint of Jandice/abd distention (24 Feb 2023 09:23)      SUBJECTIVE / OVERNIGHT EVENTS:  Patient seen and examined.   Doing well,  no complaints.       Vital Signs Last 24 Hrs  T(C): 37 (25 Feb 2023 11:29), Max: 37 (25 Feb 2023 11:29)  T(F): 98.6 (25 Feb 2023 11:29), Max: 98.6 (25 Feb 2023 11:29)  HR: 66 (25 Feb 2023 11:29) (64 - 69)  BP: 107/61 (25 Feb 2023 11:29) (97/57 - 107/61)  BP(mean): --  RR: 18 (25 Feb 2023 11:29) (17 - 18)  SpO2: 96% (25 Feb 2023 11:29) (95% - 97%)    Parameters below as of 25 Feb 2023 11:29  Patient On (Oxygen Delivery Method): room air      I&O's Summary      PE:  GENERAL: NAD, AAOx3, jaundice, scleral icterus  CHEST/LUNG: CTABL, No wheeze  HEART: Regular rate and rhythm; no murmur  ABDOMEN: Soft, Nontender, Nondistended; Bowel sounds present  EXTREMITIES:  2+ Peripheral Pulses, +1 le edema  NEURO: No focal deficits    LABS:                        7.7    6.48  )-----------( 57       ( 24 Feb 2023 07:07 )             24.1     02-25    140  |  105  |  34<H>  ----------------------------<  100<H>  3.8   |  22  |  1.08    Ca    9.0      25 Feb 2023 07:19    TPro  7.3  /  Alb  3.7  /  TBili  10.0<H>  /  DBili  x   /  AST  49<H>  /  ALT  14  /  AlkPhos  137<H>  02-25    PT/INR - ( 25 Feb 2023 07:19 )   PT: 20.2 sec;   INR: 1.73 ratio           CAPILLARY BLOOD GLUCOSE                RADIOLOGY & ADDITIONAL TESTS:    Imaging Personally Reviewed:  [x] YES  [ ] NO    Consultant(s) Notes Reviewed:  [x] YES  [ ] NO      MEDICATIONS  (STANDING):  heparin   Injectable 5000 Unit(s) SubCutaneous every 8 hours  polyethylene glycol 3350 17 Gram(s) Oral two times a day  prednisoLONE    3 mG/mL Solution (ORAPRED) 40 milliGRAM(s) Oral daily  senna 2 Tablet(s) Oral at bedtime  sodium bicarbonate 1300 milliGRAM(s) Oral three times a day    MEDICATIONS  (PRN):  acetaminophen     Tablet .. 650 milliGRAM(s) Oral every 6 hours PRN Temp greater or equal to 38C (100.4F), Mild Pain (1 - 3)  aluminum hydroxide/magnesium hydroxide/simethicone Suspension 30 milliLiter(s) Oral every 4 hours PRN Dyspepsia  HYDROmorphone  Injectable 0.5 milliGRAM(s) IV Push every 6 hours PRN Severe Pain (7 - 10)  melatonin 3 milliGRAM(s) Oral at bedtime PRN Insomnia  prochlorperazine   IVPB 10 milliGRAM(s) IV Intermittent every 8 hours PRN nausea      Care Discussed with Consultants/Other Providers [x] YES  [ ] NO    HEALTH ISSUES - PROBLEM Dx:

## 2023-02-27 PROBLEM — F10.10 ALCOHOL ABUSE, UNCOMPLICATED: Chronic | Status: ACTIVE | Noted: 2023-02-17

## 2023-02-27 PROBLEM — Z86.19 PERSONAL HISTORY OF OTHER INFECTIOUS AND PARASITIC DISEASES: Chronic | Status: ACTIVE | Noted: 2023-02-17

## 2023-02-27 PROBLEM — Z00.00 ENCOUNTER FOR PREVENTIVE HEALTH EXAMINATION: Status: ACTIVE | Noted: 2023-02-27

## 2023-02-27 PROBLEM — C50.912 MALIGNANT NEOPLASM OF UNSPECIFIED SITE OF LEFT FEMALE BREAST: Chronic | Status: ACTIVE | Noted: 2023-02-17

## 2023-02-27 LAB — NON-GYNECOLOGICAL CYTOLOGY STUDY: SIGNIFICANT CHANGE UP

## 2023-03-02 ENCOUNTER — APPOINTMENT (OUTPATIENT)
Dept: HEPATOLOGY | Facility: CLINIC | Age: 63
End: 2023-03-02

## 2023-03-02 ENCOUNTER — TRANSCRIPTION ENCOUNTER (OUTPATIENT)
Age: 63
End: 2023-03-02

## 2023-03-02 ENCOUNTER — LABORATORY RESULT (OUTPATIENT)
Age: 63
End: 2023-03-02

## 2023-03-02 ENCOUNTER — APPOINTMENT (OUTPATIENT)
Dept: HEPATOLOGY | Facility: CLINIC | Age: 63
End: 2023-03-02
Payer: COMMERCIAL

## 2023-03-02 VITALS
HEART RATE: 88 BPM | SYSTOLIC BLOOD PRESSURE: 140 MMHG | WEIGHT: 201 LBS | RESPIRATION RATE: 16 BRPM | BODY MASS INDEX: 30.46 KG/M2 | OXYGEN SATURATION: 97 % | DIASTOLIC BLOOD PRESSURE: 80 MMHG | HEIGHT: 68 IN | TEMPERATURE: 97.8 F

## 2023-03-02 LAB
ALBUMIN SERPL ELPH-MCNC: 3.9 G/DL
ALP BLD-CCNC: 136 U/L
ALT SERPL-CCNC: 30 U/L
ANION GAP SERPL CALC-SCNC: 14 MMOL/L
AST SERPL-CCNC: 70 U/L
BILIRUB SERPL-MCNC: 11 MG/DL
BUN SERPL-MCNC: 26 MG/DL
CALCIUM SERPL-MCNC: 9.4 MG/DL
CHLORIDE SERPL-SCNC: 101 MMOL/L
CO2 SERPL-SCNC: 25 MMOL/L
CREAT SERPL-MCNC: 0.88 MG/DL
EGFR: 75 ML/MIN/1.73M2
GLUCOSE SERPL-MCNC: 148 MG/DL
INR PPP: 1.58 RATIO
POTASSIUM SERPL-SCNC: 3.9 MMOL/L
PROT SERPL-MCNC: 7.1 G/DL
PT BLD: 18.4 SEC
SODIUM SERPL-SCNC: 140 MMOL/L

## 2023-03-02 PROCEDURE — 99214 OFFICE O/P EST MOD 30 MIN: CPT

## 2023-03-03 LAB
BASOPHILS # BLD AUTO: 0 K/UL
BASOPHILS NFR BLD AUTO: 0 %
EOSINOPHIL # BLD AUTO: 0.02 K/UL
EOSINOPHIL NFR BLD AUTO: 0.2 %
HCT VFR BLD CALC: 29.2 %
HGB BLD-MCNC: 8.8 G/DL
IMM GRANULOCYTES NFR BLD AUTO: 0.8 %
LYMPHOCYTES # BLD AUTO: 0.57 K/UL
LYMPHOCYTES NFR BLD AUTO: 6.5 %
MAN DIFF?: NORMAL
MCHC RBC-ENTMCNC: 30.1 GM/DL
MCHC RBC-ENTMCNC: 35.1 PG
MCV RBC AUTO: 116.3 FL
MONOCYTES # BLD AUTO: 0.46 K/UL
MONOCYTES NFR BLD AUTO: 5.3 %
NEUTROPHILS # BLD AUTO: 7.63 K/UL
NEUTROPHILS NFR BLD AUTO: 87.2 %
PLATELET # BLD AUTO: 56 K/UL
RBC # BLD: 2.51 M/UL
RBC # FLD: 16.7 %
WBC # FLD AUTO: 8.75 K/UL

## 2023-03-03 NOTE — ASSESSMENT
[FreeTextEntry1] : Ms. ABBY MALAGON is a 62 year old female with a PMH of breast CA s/p lumpectomy, ETOH abuse, and HTN. She has remained abstinent from alcohol since Jan 20, 2023, when she was initially diagnosed with cirrhosis.\par \par PLAN\par #Alcoholic Hepatitis/Decompensated Cirrhosis\par She was discharged on 13.33 mL of Prednisolone. Plan is to decrease to 10mL of prednisolone starting tomorrow.\par \par #Ascites\par Last paracentesis was 2/17: 2L removed; negative for SBP\par Patient was discharged on sodium bicarbonate and noted to have bilateral lower extremity edema and mild abdominal distension. Advised her to stop sodium bicarbonate.\par \par #HCC Surveillance\par CT Abd performed during hospitalization on 2/17: no liver lesions\par \par Discussed plan with Dr Hand.\par Repeat labs today. Dr Hand will call with results.\par Return to clinic next week with labs.

## 2023-03-03 NOTE — HISTORY OF PRESENT ILLNESS
[de-identified] : Ms. ABBY MALAGON is a 62 year old female with a PMH of breast CA s/p lumpectomy, ETOH abuse, and HTN. She was recently admitted to [facility] from 2/17-2/25 for abdominal distention and jaundice. She originally saw PCP in Jan 2023 when she noted she was turning yellow, PCP did bloodwork and referred her to GI- Dr Lazcano. Dr Lazcano sent her to ER for admission for MRCP and further eval. \par She states she used to have 2 glasses of vodka daily for 3 years to treat her chronic pain from her breast cancer - her last drink was 1/20/23.\par She reports she was on Letrozole for her breast caner and was taking Tylenol daily at recommended doses to treat her pain, she is concerned that the drug interaction may have damaged her liver. 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

## 2023-03-09 LAB
ALBUMIN SERPL ELPH-MCNC: 3.7 G/DL
ALP BLD-CCNC: 132 U/L
ALT SERPL-CCNC: 41 U/L
ANION GAP SERPL CALC-SCNC: 13 MMOL/L
AST SERPL-CCNC: 73 U/L
BASOPHILS # BLD AUTO: 0.01 K/UL
BASOPHILS NFR BLD AUTO: 0.1 %
BILIRUB SERPL-MCNC: 10.8 MG/DL
BUN SERPL-MCNC: 20 MG/DL
CALCIUM SERPL-MCNC: 9.2 MG/DL
CHLORIDE SERPL-SCNC: 105 MMOL/L
CO2 SERPL-SCNC: 23 MMOL/L
CREAT SERPL-MCNC: 0.76 MG/DL
EGFR: 90 ML/MIN/1.73M2
EOSINOPHIL # BLD AUTO: 0.12 K/UL
EOSINOPHIL NFR BLD AUTO: 1.3 %
GLUCOSE SERPL-MCNC: 116 MG/DL
HCT VFR BLD CALC: 29.1 %
HGB BLD-MCNC: 9.3 G/DL
IMM GRANULOCYTES NFR BLD AUTO: 0.6 %
INR PPP: 1.55 RATIO
LYMPHOCYTES # BLD AUTO: 1.03 K/UL
LYMPHOCYTES NFR BLD AUTO: 10.8 %
MAN DIFF?: NORMAL
MCHC RBC-ENTMCNC: 32 GM/DL
MCHC RBC-ENTMCNC: 36.9 PG
MCV RBC AUTO: 115.5 FL
MONOCYTES # BLD AUTO: 0.58 K/UL
MONOCYTES NFR BLD AUTO: 6.1 %
NEUTROPHILS # BLD AUTO: 7.76 K/UL
NEUTROPHILS NFR BLD AUTO: 81.1 %
PLATELET # BLD AUTO: 60 K/UL
POTASSIUM SERPL-SCNC: 3.3 MMOL/L
PROT SERPL-MCNC: 7.1 G/DL
PT BLD: 18.1 SEC
RBC # BLD: 2.52 M/UL
RBC # FLD: 17.2 %
SODIUM SERPL-SCNC: 141 MMOL/L
WBC # FLD AUTO: 9.56 K/UL

## 2023-03-14 ENCOUNTER — LABORATORY RESULT (OUTPATIENT)
Age: 63
End: 2023-03-14

## 2023-03-15 ENCOUNTER — APPOINTMENT (OUTPATIENT)
Dept: HEPATOLOGY | Facility: CLINIC | Age: 63
End: 2023-03-15
Payer: COMMERCIAL

## 2023-03-15 VITALS
SYSTOLIC BLOOD PRESSURE: 151 MMHG | OXYGEN SATURATION: 98 % | HEART RATE: 80 BPM | WEIGHT: 204 LBS | BODY MASS INDEX: 30.92 KG/M2 | TEMPERATURE: 98.7 F | HEIGHT: 68 IN | DIASTOLIC BLOOD PRESSURE: 82 MMHG | RESPIRATION RATE: 14 BRPM

## 2023-03-15 LAB
ALBUMIN SERPL ELPH-MCNC: 3.8 G/DL
ALP BLD-CCNC: 143 U/L
ALT SERPL-CCNC: 37 U/L
ANION GAP SERPL CALC-SCNC: 14 MMOL/L
AST SERPL-CCNC: 61 U/L
BASOPHILS # BLD AUTO: 0.01 K/UL
BASOPHILS NFR BLD AUTO: 0.2 %
BILIRUB SERPL-MCNC: 11.8 MG/DL
BUN SERPL-MCNC: 16 MG/DL
CALCIUM SERPL-MCNC: 9.6 MG/DL
CHLORIDE SERPL-SCNC: 106 MMOL/L
CO2 SERPL-SCNC: 21 MMOL/L
CREAT SERPL-MCNC: 0.74 MG/DL
EGFR: 93 ML/MIN/1.73M2
EOSINOPHIL # BLD AUTO: 0.04 K/UL
EOSINOPHIL NFR BLD AUTO: 0.6 %
GLUCOSE SERPL-MCNC: 127 MG/DL
HCT VFR BLD CALC: 29.8 %
HGB BLD-MCNC: 9.2 G/DL
IMM GRANULOCYTES NFR BLD AUTO: 0.5 %
INR PPP: 1.56 RATIO
LYMPHOCYTES # BLD AUTO: 0.46 K/UL
LYMPHOCYTES NFR BLD AUTO: 7.3 %
MAN DIFF?: NORMAL
MCHC RBC-ENTMCNC: 30.9 GM/DL
MCHC RBC-ENTMCNC: 35.7 PG
MCV RBC AUTO: 115.5 FL
MONOCYTES # BLD AUTO: 0.22 K/UL
MONOCYTES NFR BLD AUTO: 3.5 %
NEUTROPHILS # BLD AUTO: 5.58 K/UL
NEUTROPHILS NFR BLD AUTO: 87.9 %
PLATELET # BLD AUTO: 50 K/UL
POTASSIUM SERPL-SCNC: 4 MMOL/L
PROT SERPL-MCNC: 7.4 G/DL
PT BLD: 18.5 SEC
RBC # BLD: 2.58 M/UL
RBC # FLD: 17.8 %
SODIUM SERPL-SCNC: 140 MMOL/L
WBC # FLD AUTO: 6.34 K/UL

## 2023-03-15 PROCEDURE — 99215 OFFICE O/P EST HI 40 MIN: CPT

## 2023-03-15 RX ORDER — SPIRONOLACTONE 50 MG/1
50 TABLET ORAL DAILY
Qty: 90 | Refills: 3 | Status: DISCONTINUED | COMMUNITY
Start: 2023-03-09 | End: 2023-03-15

## 2023-03-16 NOTE — HISTORY OF PRESENT ILLNESS
[de-identified] : Ms. ABBY MALAGON is a 62 year old female with a PMH of breast CA s/p lumpectomy, ETOH abuse, and HTN. She was recently admitted to [facility] from 2/17-2/25 for abdominal distention and jaundice. She originally saw PCP in Jan 2023 when she noted she was turning yellow, PCP did bloodwork and referred her to GI- Dr Lazcano. Dr Lazcano sent her to ER for admission for MRCP and further eval. \par \par She states she used to have 2 glasses of vodka daily for 3 years to treat her chronic pain from her breast cancer - her last drink was 1/20/23.\par She reports she was on Letrozole for her breast caner and was taking Tylenol daily at recommended doses to treat her pain, she is concerned that the drug interaction may have damaged her liver. 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

## 2023-03-16 NOTE — PHYSICAL EXAM
[Scleral Icterus] : Scleral Icterus noted [Spider Angioma] : Spider angioma(s) was(were) observed [Abdominal  Ascites] : ascites [Jaundice] : Jaundice [General Appearance - Alert] : alert [General Appearance - In No Acute Distress] : in no acute distress [PERRL With Normal Accommodation] : pupils were equal in size, round, and reactive to light [Extraocular Movements] : extraocular movements were intact [Auscultation Breath Sounds / Voice Sounds] : lungs were clear to auscultation bilaterally [] : no respiratory distress [Heart Rate And Rhythm] : heart rate was normal and rhythm regular [Heart Sounds] : normal S1 and S2 [Heart Sounds Gallop] : no gallops [Murmurs] : no murmurs [Heart Sounds Pericardial Friction Rub] : no pericardial rub [Full Pulse] : the pedal pulses are present [Edema] : there was no peripheral edema [Abdomen Soft] : soft [Abdomen Tenderness] : non-tender [Abnormal Walk] : normal gait [Nail Clubbing] : no clubbing  or cyanosis of the fingernails [Musculoskeletal - Swelling] : no joint swelling seen [Motor Tone] : muscle strength and tone were normal [Deep Tendon Reflexes (DTR)] : deep tendon reflexes were 2+ and symmetric [Sensation] : the sensory exam was normal to light touch and pinprick [No Focal Deficits] : no focal deficits [Oriented To Time, Place, And Person] : oriented to person, place, and time [Impaired Insight] : insight and judgment were intact [Affect] : the affect was normal [Asterixis] : no asterixis observed [Depression] : no depression [Hallucinations] : ~T no ~M hallucinations [FreeTextEntry1] : +LE Edema

## 2023-03-16 NOTE — ASSESSMENT
[FreeTextEntry1] : 62F with ETOH Cirrhosis  / ETOH hepatitis presenting for follow up \par Lille score favorable but bili has not improved - will rapidly taper off steroids\par \par Reduce Prednisolone to 5ML/day x 3 days then 3ML/Day x 3 days then off\par For LE Edema will Increase 100mg Aldactone Start 40mg Lasix\par Low salt diet encouraged\par \par Discussed natural history of cirrhosis with patient\par Discussed salt restriction and abstinence from ETOH\par Stressed importance of close follow up - including regular interval labs and screening for HCC\par \par Her imaging for HCC is up to date - will repeat in 5 months\par She is due for EGD/Colon - will discuss at next appointment\par \par Her MELD score remains > 20\par I discussed that with time things may improve but steroids don’t appear to be working at this time\par If MELD remains elevated will consider referral for OLT evaluation\par No evidence of encephalopathy or asterixis\par \par Repeat labs in 2 weeks\par \par RTC 4 weeks

## 2023-03-18 LAB
CULTURE RESULTS: SIGNIFICANT CHANGE UP
SPECIMEN SOURCE: SIGNIFICANT CHANGE UP

## 2023-03-31 LAB
ALBUMIN SERPL ELPH-MCNC: 3.2 G/DL
ALP BLD-CCNC: 159 U/L
ALT SERPL-CCNC: 20 U/L
ANION GAP SERPL CALC-SCNC: 14 MMOL/L
AST SERPL-CCNC: 54 U/L
BASOPHILS # BLD AUTO: 0.04 K/UL
BASOPHILS NFR BLD AUTO: 0.6 %
BILIRUB SERPL-MCNC: 13.1 MG/DL
BUN SERPL-MCNC: 15 MG/DL
CALCIUM SERPL-MCNC: 9.1 MG/DL
CHLORIDE SERPL-SCNC: 98 MMOL/L
CO2 SERPL-SCNC: 24 MMOL/L
CREAT SERPL-MCNC: 0.86 MG/DL
EGFR: 77 ML/MIN/1.73M2
EOSINOPHIL # BLD AUTO: 0.13 K/UL
EOSINOPHIL NFR BLD AUTO: 2 %
GLUCOSE SERPL-MCNC: 119 MG/DL
HCT VFR BLD CALC: 31.4 %
HGB BLD-MCNC: 10.2 G/DL
IMM GRANULOCYTES NFR BLD AUTO: 0.3 %
INR PPP: 1.5 RATIO
LYMPHOCYTES # BLD AUTO: 1.7 K/UL
LYMPHOCYTES NFR BLD AUTO: 26.7 %
MAN DIFF?: NORMAL
MCHC RBC-ENTMCNC: 32.5 GM/DL
MCHC RBC-ENTMCNC: 36.3 PG
MCV RBC AUTO: 111.7 FL
MONOCYTES # BLD AUTO: 0.6 K/UL
MONOCYTES NFR BLD AUTO: 9.4 %
NEUTROPHILS # BLD AUTO: 3.87 K/UL
NEUTROPHILS NFR BLD AUTO: 61 %
PLATELET # BLD AUTO: 75 K/UL
POTASSIUM SERPL-SCNC: 3.4 MMOL/L
PROT SERPL-MCNC: 7.3 G/DL
PT BLD: 17.8 SEC
RBC # BLD: 2.81 M/UL
RBC # FLD: 17.5 %
SODIUM SERPL-SCNC: 136 MMOL/L
WBC # FLD AUTO: 6.36 K/UL

## 2023-04-14 ENCOUNTER — INPATIENT (INPATIENT)
Facility: HOSPITAL | Age: 63
LOS: 2 days | Discharge: HOME | DRG: 433 | End: 2023-04-17
Attending: HOSPITALIST | Admitting: HOSPITALIST
Payer: COMMERCIAL

## 2023-04-14 VITALS
SYSTOLIC BLOOD PRESSURE: 117 MMHG | RESPIRATION RATE: 16 BRPM | TEMPERATURE: 98 F | HEART RATE: 102 BPM | WEIGHT: 179.9 LBS | HEIGHT: 68 IN | DIASTOLIC BLOOD PRESSURE: 72 MMHG | OXYGEN SATURATION: 95 %

## 2023-04-14 DIAGNOSIS — Z98.890 OTHER SPECIFIED POSTPROCEDURAL STATES: Chronic | ICD-10-CM

## 2023-04-14 DIAGNOSIS — R18.8 OTHER ASCITES: ICD-10-CM

## 2023-04-14 LAB
ALBUMIN FLD-MCNC: 0.6 G/DL — SIGNIFICANT CHANGE UP
ALBUMIN SERPL ELPH-MCNC: 2.8 G/DL — LOW (ref 3.3–5)
ALP SERPL-CCNC: 167 U/L — HIGH (ref 40–120)
ALT FLD-CCNC: 17 U/L — SIGNIFICANT CHANGE UP (ref 10–45)
ANION GAP SERPL CALC-SCNC: 10 MMOL/L — SIGNIFICANT CHANGE UP (ref 5–17)
ANION GAP SERPL CALC-SCNC: 10 MMOL/L — SIGNIFICANT CHANGE UP (ref 5–17)
ANISOCYTOSIS BLD QL: SLIGHT — SIGNIFICANT CHANGE UP
APTT BLD: 33.4 SEC — SIGNIFICANT CHANGE UP (ref 27.5–35.5)
AST SERPL-CCNC: 58 U/L — HIGH (ref 10–40)
B PERT IGG+IGM PNL SER: ABNORMAL
BASOPHILS # BLD AUTO: 0 K/UL — SIGNIFICANT CHANGE UP (ref 0–0.2)
BASOPHILS NFR BLD AUTO: 0 % — SIGNIFICANT CHANGE UP (ref 0–2)
BILIRUB SERPL-MCNC: 8.1 MG/DL — HIGH (ref 0.2–1.2)
BUN SERPL-MCNC: 16 MG/DL — SIGNIFICANT CHANGE UP (ref 7–23)
BUN SERPL-MCNC: 17 MG/DL — SIGNIFICANT CHANGE UP (ref 7–23)
CALCIUM SERPL-MCNC: 8.1 MG/DL — LOW (ref 8.4–10.5)
CALCIUM SERPL-MCNC: 8.5 MG/DL — SIGNIFICANT CHANGE UP (ref 8.4–10.5)
CHLORIDE SERPL-SCNC: 102 MMOL/L — SIGNIFICANT CHANGE UP (ref 96–108)
CHLORIDE SERPL-SCNC: 106 MMOL/L — SIGNIFICANT CHANGE UP (ref 96–108)
CO2 SERPL-SCNC: 21 MMOL/L — LOW (ref 22–31)
CO2 SERPL-SCNC: 22 MMOL/L — SIGNIFICANT CHANGE UP (ref 22–31)
COLOR FLD: YELLOW — SIGNIFICANT CHANGE UP
CREAT SERPL-MCNC: 0.72 MG/DL — SIGNIFICANT CHANGE UP (ref 0.5–1.3)
CREAT SERPL-MCNC: 0.85 MG/DL — SIGNIFICANT CHANGE UP (ref 0.5–1.3)
DACRYOCYTES BLD QL SMEAR: SLIGHT — SIGNIFICANT CHANGE UP
EGFR: 78 ML/MIN/1.73M2 — SIGNIFICANT CHANGE UP
EGFR: 95 ML/MIN/1.73M2 — SIGNIFICANT CHANGE UP
EOSINOPHIL # BLD AUTO: 0.22 K/UL — SIGNIFICANT CHANGE UP (ref 0–0.5)
EOSINOPHIL NFR BLD AUTO: 3.4 % — SIGNIFICANT CHANGE UP (ref 0–6)
FLUAV AG NPH QL: SIGNIFICANT CHANGE UP
FLUBV AG NPH QL: SIGNIFICANT CHANGE UP
FLUID INTAKE SUBSTANCE CLASS: SIGNIFICANT CHANGE UP
GLUCOSE FLD-MCNC: 124 MG/DL — SIGNIFICANT CHANGE UP
GLUCOSE SERPL-MCNC: 144 MG/DL — HIGH (ref 70–99)
GLUCOSE SERPL-MCNC: 93 MG/DL — SIGNIFICANT CHANGE UP (ref 70–99)
HCT VFR BLD CALC: 28.5 % — LOW (ref 34.5–45)
HGB BLD-MCNC: 9.4 G/DL — LOW (ref 11.5–15.5)
INR BLD: 1.57 RATIO — HIGH (ref 0.88–1.16)
LDH SERPL L TO P-CCNC: 50 U/L — SIGNIFICANT CHANGE UP
LIDOCAIN IGE QN: 62 U/L — HIGH (ref 7–60)
LYMPHOCYTES # BLD AUTO: 1.19 K/UL — SIGNIFICANT CHANGE UP (ref 1–3.3)
LYMPHOCYTES # BLD AUTO: 18.1 % — SIGNIFICANT CHANGE UP (ref 13–44)
LYMPHOCYTES # FLD: 73 % — SIGNIFICANT CHANGE UP
MACROCYTES BLD QL: SLIGHT — SIGNIFICANT CHANGE UP
MAGNESIUM SERPL-MCNC: 1.8 MG/DL — SIGNIFICANT CHANGE UP (ref 1.6–2.6)
MANUAL SMEAR VERIFICATION: SIGNIFICANT CHANGE UP
MCHC RBC-ENTMCNC: 33 GM/DL — SIGNIFICANT CHANGE UP (ref 32–36)
MCHC RBC-ENTMCNC: 36.9 PG — HIGH (ref 27–34)
MCV RBC AUTO: 111.8 FL — HIGH (ref 80–100)
MESOTHL CELL # FLD: 7 % — SIGNIFICANT CHANGE UP
MONOCYTES # BLD AUTO: 0.45 K/UL — SIGNIFICANT CHANGE UP (ref 0–0.9)
MONOCYTES NFR BLD AUTO: 6.9 % — SIGNIFICANT CHANGE UP (ref 2–14)
MONOS+MACROS # FLD: 18 % — SIGNIFICANT CHANGE UP
NEUTROPHILS # BLD AUTO: 4.66 K/UL — SIGNIFICANT CHANGE UP (ref 1.8–7.4)
NEUTROPHILS NFR BLD AUTO: 69.8 % — SIGNIFICANT CHANGE UP (ref 43–77)
NEUTROPHILS-BODY FLUID: 2 % — SIGNIFICANT CHANGE UP
NEUTS BAND # BLD: 0.9 % — SIGNIFICANT CHANGE UP (ref 0–8)
PHOSPHATE SERPL-MCNC: 2.2 MG/DL — LOW (ref 2.5–4.5)
PLAT MORPH BLD: NORMAL — SIGNIFICANT CHANGE UP
PLATELET # BLD AUTO: 92 K/UL — LOW (ref 150–400)
POIKILOCYTOSIS BLD QL AUTO: SLIGHT — SIGNIFICANT CHANGE UP
POLYCHROMASIA BLD QL SMEAR: SLIGHT — SIGNIFICANT CHANGE UP
POTASSIUM SERPL-MCNC: 2.8 MMOL/L — CRITICAL LOW (ref 3.5–5.3)
POTASSIUM SERPL-MCNC: 3.2 MMOL/L — LOW (ref 3.5–5.3)
POTASSIUM SERPL-SCNC: 2.8 MMOL/L — CRITICAL LOW (ref 3.5–5.3)
POTASSIUM SERPL-SCNC: 3.2 MMOL/L — LOW (ref 3.5–5.3)
PROT FLD-MCNC: 1.4 G/DL — SIGNIFICANT CHANGE UP
PROT SERPL-MCNC: 7.3 G/DL — SIGNIFICANT CHANGE UP (ref 6–8.3)
PROTHROM AB SERPL-ACNC: 18.1 SEC — HIGH (ref 10.5–13.4)
RBC # BLD: 2.55 M/UL — LOW (ref 3.8–5.2)
RBC # FLD: 17 % — HIGH (ref 10.3–14.5)
RBC BLD AUTO: ABNORMAL
RCV VOL RI: < 2000 /UL — SIGNIFICANT CHANGE UP (ref 0–0)
RSV RNA NPH QL NAA+NON-PROBE: SIGNIFICANT CHANGE UP
SARS-COV-2 RNA SPEC QL NAA+PROBE: SIGNIFICANT CHANGE UP
SODIUM SERPL-SCNC: 134 MMOL/L — LOW (ref 135–145)
SODIUM SERPL-SCNC: 137 MMOL/L — SIGNIFICANT CHANGE UP (ref 135–145)
TOTAL NUCLEATED CELL COUNT, BODY FLUID: 168 /UL — SIGNIFICANT CHANGE UP
TUBE TYPE: SIGNIFICANT CHANGE UP
VARIANT LYMPHS # BLD: 0.9 % — SIGNIFICANT CHANGE UP (ref 0–6)
WBC # BLD: 6.59 K/UL — SIGNIFICANT CHANGE UP (ref 3.8–10.5)
WBC # FLD AUTO: 6.59 K/UL — SIGNIFICANT CHANGE UP (ref 3.8–10.5)

## 2023-04-14 PROCEDURE — 49083 ABD PARACENTESIS W/IMAGING: CPT

## 2023-04-14 PROCEDURE — 76700 US EXAM ABDOM COMPLETE: CPT | Mod: 26

## 2023-04-14 PROCEDURE — 99285 EMERGENCY DEPT VISIT HI MDM: CPT

## 2023-04-14 PROCEDURE — 71046 X-RAY EXAM CHEST 2 VIEWS: CPT | Mod: 26

## 2023-04-14 RX ORDER — ONDANSETRON 8 MG/1
4 TABLET, FILM COATED ORAL EVERY 8 HOURS
Refills: 0 | Status: ACTIVE | OUTPATIENT
Start: 2023-04-14 | End: 2024-03-12

## 2023-04-14 RX ORDER — ALBUMIN HUMAN 25 %
50 VIAL (ML) INTRAVENOUS ONCE
Refills: 0 | Status: COMPLETED | OUTPATIENT
Start: 2023-04-14 | End: 2023-04-14

## 2023-04-14 RX ORDER — ACETAMINOPHEN 500 MG
1000 TABLET ORAL ONCE
Refills: 0 | Status: COMPLETED | OUTPATIENT
Start: 2023-04-14 | End: 2023-04-14

## 2023-04-14 RX ORDER — POTASSIUM CHLORIDE 20 MEQ
40 PACKET (EA) ORAL ONCE
Refills: 0 | Status: COMPLETED | OUTPATIENT
Start: 2023-04-14 | End: 2023-04-14

## 2023-04-14 RX ORDER — ACETAMINOPHEN 500 MG
650 TABLET ORAL EVERY 6 HOURS
Refills: 0 | Status: ACTIVE | OUTPATIENT
Start: 2023-04-14 | End: 2024-03-12

## 2023-04-14 RX ORDER — PANTOPRAZOLE SODIUM 20 MG/1
40 TABLET, DELAYED RELEASE ORAL
Refills: 0 | Status: ACTIVE | OUTPATIENT
Start: 2023-04-14 | End: 2024-03-12

## 2023-04-14 RX ORDER — LANOLIN ALCOHOL/MO/W.PET/CERES
3 CREAM (GRAM) TOPICAL AT BEDTIME
Refills: 0 | Status: ACTIVE | OUTPATIENT
Start: 2023-04-14 | End: 2024-03-12

## 2023-04-14 RX ORDER — ESOMEPRAZOLE MAGNESIUM 40 MG/1
1 CAPSULE, DELAYED RELEASE ORAL
Refills: 0 | DISCHARGE

## 2023-04-14 RX ORDER — OXYCODONE HYDROCHLORIDE 5 MG/1
5 TABLET ORAL EVERY 6 HOURS
Refills: 0 | Status: DISCONTINUED | OUTPATIENT
Start: 2023-04-14 | End: 2023-04-14

## 2023-04-14 RX ORDER — POTASSIUM CHLORIDE 20 MEQ
10 PACKET (EA) ORAL
Refills: 0 | Status: COMPLETED | OUTPATIENT
Start: 2023-04-14 | End: 2023-04-14

## 2023-04-14 RX ORDER — HYDROMORPHONE HYDROCHLORIDE 2 MG/ML
0.25 INJECTION INTRAMUSCULAR; INTRAVENOUS; SUBCUTANEOUS ONCE
Refills: 0 | Status: DISCONTINUED | OUTPATIENT
Start: 2023-04-14 | End: 2023-04-14

## 2023-04-14 RX ADMIN — Medication 50 MILLILITER(S): at 18:45

## 2023-04-14 RX ADMIN — Medication 100 MILLIEQUIVALENT(S): at 15:23

## 2023-04-14 RX ADMIN — Medication 1000 MILLIGRAM(S): at 13:48

## 2023-04-14 RX ADMIN — Medication 3 MILLIGRAM(S): at 23:53

## 2023-04-14 RX ADMIN — HYDROMORPHONE HYDROCHLORIDE 0.25 MILLIGRAM(S): 2 INJECTION INTRAMUSCULAR; INTRAVENOUS; SUBCUTANEOUS at 21:00

## 2023-04-14 RX ADMIN — Medication 50 MILLILITER(S): at 19:02

## 2023-04-14 RX ADMIN — HYDROMORPHONE HYDROCHLORIDE 0.25 MILLIGRAM(S): 2 INJECTION INTRAMUSCULAR; INTRAVENOUS; SUBCUTANEOUS at 20:29

## 2023-04-14 RX ADMIN — Medication 40 MILLIEQUIVALENT(S): at 14:13

## 2023-04-14 RX ADMIN — Medication 650 MILLIGRAM(S): at 23:53

## 2023-04-14 RX ADMIN — Medication 400 MILLIGRAM(S): at 12:38

## 2023-04-14 RX ADMIN — Medication 100 MILLIEQUIVALENT(S): at 14:14

## 2023-04-14 RX ADMIN — Medication 100 MILLIEQUIVALENT(S): at 16:37

## 2023-04-14 NOTE — ED PROVIDER NOTE - CARE PLAN
1 Principal Discharge DX:	Abdominal ascites   Principal Discharge DX:	Abdominal ascites  Secondary Diagnosis:	Hypokalemia

## 2023-04-14 NOTE — ED CLERICAL - NS ED CLERK NOTE PRE-ARRIVAL INFORMATION; ADDITIONAL PRE-ARRIVAL INFORMATION
CC/Reason For referral:  abdominal pain, ascites.  alcohol liver cirrhosis. will need paracentesis  Preferred Consultant(if applicable):  Who admits for you (if needed): [facility]   Do you have documents you would like to fax over?  Would you still like to speak to an ED attending?  please call after patient is seen

## 2023-04-14 NOTE — H&P ADULT - NSHPREVIEWOFSYSTEMS_GEN_ALL_CORE
REVIEW OF SYSTEMS:  General: no weakness, no fever/chills, no weight loss/gain, +abd discomfort, +abd distention   Skin/Breast: no rash, no jaundice  Ophthalmologic: no vision changes, no dry eyes   Respiratory and Thorax: no cough, no wheezing, no hemoptysis, no dyspnea  Cardiovascular: no chest pain, no shortness of breath, no orthopnea  Gastrointestinal: no n/v/d, +abdominal pain, no dysphagia   Genitourinary: no dysuria, no frequency, no nocturia, no hematuria  Musculoskeletal: no trauma, no sprain/strain, no myalgias, no arthralgias, no fracture  Neurological: no HA, no dizziness, no weakness, no numbness  Psychiatric: no depression, no SI/HI  Hematology/Lymphatics: no easy bruising  Endocrine: no heat or cold intolerance. no weight gain or loss  Allergic/Immunologic: no allergy or recent reaction

## 2023-04-14 NOTE — H&P ADULT - NSHPADDITIONALINFOADULT_GEN_ALL_CORE
d/w audrey and KERRY Shaffer.  d/w RN.  d/w IR.    - Dr. TARA Guadalupe ([facility])  - [phone] d/w pt and KERRY Shaffer.  d/w RN.  d/w IR.  d/w the  at bedside.     - Dr. TARA Guadalupe ([facility])  - [phone]

## 2023-04-14 NOTE — H&P ADULT - NSHPLABSRESULTS_GEN_ALL_CORE
LABS:                        9.4    6.59  )-----------( 92       ( 14 Apr 2023 12:59 )             28.5     04-14    134<L>  |  102  |  17  ----------------------------<  144<H>  2.8<LL>   |  22  |  0.85    Ca    8.5      14 Apr 2023 12:59  Phos  2.2     04-14  Mg     1.8     04-14    TPro  7.3  /  Alb  2.8<L>  /  TBili  8.1<H>  /  DBili  x   /  AST  58<H>  /  ALT  17  /  AlkPhos  167<H>  04-14    PT/INR - ( 14 Apr 2023 12:59 )   PT: 18.1 sec;   INR: 1.57 ratio         PTT - ( 14 Apr 2023 12:59 )  PTT:33.4 sec  CAPILLARY BLOOD GLUCOSE                RADIOLOGY & ADDITIONAL TESTS:    Imaging Personally Reviewed:  [x] YES  [ ] NO    Consultant(s) Notes Reviewed:  [x] YES  [ ] NO    Care Discussed with Consultants/Other Providers [x] YES  [ ] NO

## 2023-04-14 NOTE — CHART NOTE - NSCHARTNOTEFT_GEN_A_CORE
62-year-old female history of breast cancer (diagnosed 3 years ago status post lumpectomy, previously on Letrozole), alcohol use disorder (last drink 1/20/23 with no reported withdrawal), s/p live hepatitis vaccine, hypertension presenting for evaluation of jaundice. Decompensated liver cirrhosis likely related to alcohol use disorder with ascites, last para 2/2023 w/ 2100cc removed.       -  IR consulted today 4/14  for para- patient large vol ascites. uncomfortable  -  3:1 albumin replacement.     -  place ir procedure order under pa jalili

## 2023-04-14 NOTE — PRE PROCEDURE NOTE - PRE PROCEDURE EVALUATION
Interventional Radiology    HPI: 63y female history of breast cancer (diagnosed 3 years ago status post lumpectomy, previously on Letrozole), alcohol use disorder (last drink 1/20/23 with no reported withdrawal), s/p live hepatitis vaccine, hypertension presenting for evaluation of jaundice. Decompensated liver cirrhosis likely related to alcohol use disorder with ascites, last para 2/2023 w/ 2100cc removed.   3:1 albumin replacement.       Allergies: No Known Allergies    Medications (Abx/Cardiac/Anticoagulation/Blood Products)      Data:  172.7  81.6  T(C): 36.7  HR: 82  BP: 103/60  RR: 20  SpO2: 100%    Other ascites    Handoff    MEWS Score    Breast cancer, left    Mild alcohol use disorder    H/O hepatitis    Abdominal ascites    H/O lumpectomy    NEED MY ABD DRAINED    47    Hypokalemia    [email or web address]        Exam  General: No acute distress  Chest: Non labored breathing    -WBC 6.59 / HgB 9.4 / Hct 28.5 / Plt 92  -Na 134 / Cl 102 / BUN 17 / Glucose 144  -K 2.8 / CO2 22 / Cr 0.85  -ALT 17 / Alk Phos 167 / T.Bili 8.1  -INR1.57    Imaging:     Plan: 63y Female presents for paracentesis.  -Risks/Benefits/alternatives explained with the patient and/or healthcare proxy and witnessed informed consent obtained.

## 2023-04-14 NOTE — H&P ADULT - HISTORY OF PRESENT ILLNESS
63 y F, hx of liver cirrhosis 2/2 alcohol use, breast CA (s/p surgery, radiation/chemo, in remission), now sent in by outpatient GI provider for paracentesis. Patient reports that her last paracentesis was in February - removed 2100 mL of fluid. Now complaining of RUQ pain, abdominal distension. Denies fever, nausea, vomiting, diarrhea, blood in stool, chest pain, shortness of breath. Last drink was in Jan 2023. D/w IR, tentative plan for procedure this evening. Pt not on any blood thinners, not on any NSAID.

## 2023-04-14 NOTE — PHARMACOTHERAPY INTERVENTION NOTE - COMMENTS
Confirmed home medications with patient and pharmacy, updated in Outpatient Medication Review.    Home Medications:  omeprazole 20 mg oral delayed release tablet: 1 tab(s) orally every other day    Patient filled furosemide 40mg and spironolactone 100mg at pharmacy but says she has not been taking them. Pharmacy also filled valsartan 80mg, but patient never picked up, and has not been taking that either.    Katie Calderon, PharmD  PGY1 Pharmacy Resident  Available on Inst[employer] [id number]

## 2023-04-14 NOTE — ED ADULT NURSE NOTE - NSIMPLEMENTINTERV_GEN_ALL_ED
Implemented All Universal Safety Interventions:  [place] to call system. Call bell, personal items and telephone within reach. Instruct patient to call for assistance. Room bathroom lighting operational. Non-slip footwear when patient is off stretcher. Physically safe environment: no spills, clutter or unnecessary equipment. Stretcher in lowest position, wheels locked, appropriate side rails in place.

## 2023-04-14 NOTE — PROCEDURE NOTE - PROCEDURE FINDINGS AND DETAILS
US guided paracentesis performed in the LLQ, via 5 Fr Yueh centesis catheter. Total volume of 6800cc clear yellow fluid drained.

## 2023-04-14 NOTE — ED ADULT NURSE NOTE - NURSING MUSC JOINTS
no pain, swelling or deformity of joints
84y female pt PMHx HTN, CVA, dementia, non verbal who presents to the ED with daughter for concerns of UTI. Per daughter, patient has been grimacing, placing hands down diaper and eating less for the past day. Usually experiences similar symptoms with UTIs in the past. Denies fevers, chills, hematuria, vomiting or diarrhea. Denies other changes in behavior, fevers, vomiting, diarrhea, or rash.

## 2023-04-14 NOTE — PATIENT PROFILE ADULT - FALL HARM RISK - RISK INTERVENTIONS
Assistance OOB with selected safe patient handling equipment/Assistance with ambulation/Communicate Fall Risk and Risk Factors to all staff, patient, and family/Monitor gait and stability/Reinforce activity limits and safety measures with patient and family/Sit up slowly, dangle for a short time, stand at bedside before walking/Use of alarms - bed, chair and/or voice tab/Visual Cue: Yellow wristband/Bed in lowest position, wheels locked, appropriate side rails in place/Call bell, personal items and telephone in reach/Instruct patient to call for assistance before getting out of bed or chair/Non-slip footwear when patient is out of bed/[place] to call system/Physically safe environment - no spills, clutter or unnecessary equipment/Purposeful Proactive Rounding/Room/bathroom lighting operational, light cord in reach

## 2023-04-14 NOTE — H&P ADULT - NSHPPHYSICALEXAM_GEN_ALL_CORE
Vital Signs Last 24 Hrs  T(C): 36.7 (14 Apr 2023 16:45), Max: 36.8 (14 Apr 2023 11:26)  T(F): 98 (14 Apr 2023 16:45), Max: 98.2 (14 Apr 2023 11:26)  HR: 82 (14 Apr 2023 16:45) (82 - 102)  BP: 103/60 (14 Apr 2023 16:45) (103/60 - 119/75)  BP(mean): 73 (14 Apr 2023 16:45) (73 - 73)  RR: 20 (14 Apr 2023 16:45) (16 - 20)  SpO2: 100% (14 Apr 2023 16:45) (95% - 100%)    Parameters below as of 14 Apr 2023 16:45  Patient On (Oxygen Delivery Method): room air      PHYSICAL EXAM:  GENERAL: NAD, well-developed, comfortable, on room air  HEAD:  Atraumatic, Normocephalic  EYES: EOMI, PERRLA, sclera icterus  NECK: Supple, No JVD  CHEST/LUNG: mild decrease breath sounds bilaterally; No wheeze   HEART: Regular rate and rhythm; No murmurs, rubs, or gallops  ABDOMEN: Soft, +mild diffuse abd tenderness, +distended; Bowel sounds present  Neuro: AAOx3, no focal weakness, 5/5 b/l extremity strength  EXTREMITIES:  2+ Peripheral Pulses, No clubbing, cyanosis, or edema  SKIN: No rashes or lesions

## 2023-04-14 NOTE — ED ADULT NURSE NOTE - OBJECTIVE STATEMENT
63 year old female presented to ED from home with  at bedside with c/o of abdominal distension, hx liver cirrhosis, referred to ED for paracentesis. last paracentesis was in February. pt denies CP, SOB, nausea/vomiting, numbness/tingling, fever, cough, chills, dizziness, headache, blurred vision, neuro intact. pt a&ox3, lung sounds clear, heart rate regular, abdomen soft nontender distended to palp. skin intact but jaundiced, sclera of eye jaundiced. IV in right AC 20G and patent. Will continue to monitor and assess while offering support and reassurance.

## 2023-04-14 NOTE — ED PROVIDER NOTE - OBJECTIVE STATEMENT
63 y F, hx of liver cirrhosis 2/2 alcohol use, breast CA in remission, now sent in by outpatient GI provider for paracentesis. Patient reports that her last paracentesis was in February - removed 2L of fluid. Now complaining of RUQ pain, abdominal distension. Denies fever, nausea, vomiting, diarrhea, blood in stool, chest pain, shortness of breath. Last drink was in Jan 2023.

## 2023-04-14 NOTE — ED PROVIDER NOTE - ATTENDING CONTRIBUTION TO CARE
------------ATTENDING NOTE------------  pt w/  c/o weeks of gradually increasing abdominal swelling from ascites, feels fullness in abdomen limited bending/twisting, feels slight sob from abdominal distension, no fevers, +fluid wave w/o peritoneal findings, awaiting labs/imaging and close reassessments -->  - Abe Corcoran MD   ----------------------------------------------- ------------ATTENDING NOTE------------  pt w/  c/o weeks of gradually increasing abdominal swelling from ascites, feels fullness in abdomen limited bending/twisting, feels slight sob from abdominal distension, no fevers, +fluid wave w/o peritoneal findings, awaiting labs/imaging and close reassessments --> admitting for continued khan, Hepatology consult, correct lyes, optimize medical mgmt, therapeutic paracentesis, outpt needs assessments.  - Abe Corcoran MD   -----------------------------------------------

## 2023-04-14 NOTE — ED PROVIDER NOTE - PHYSICAL EXAMINATION
Gen: Patient is NAD, AAOx3, jaundiced   HEENT: NCAT, normal conjunctiva, sclera is icteric, tongue midline, oral mucosa moist  Lung: CTAB, no respiratory distress, no wheezes/rhonchi/rales B/L, speaking in full sentences  CV: RRR, no murmurs, rubs or gallops, distal pulses 2+ b/l  Abd: soft, RUQ tenderness, distended abdomen, +fluid wave, no guarding, no rigidity, no rebound tenderness, no CVA tenderness   MSK: no visible deformities, ROM normal in UE/LE  Neuro: No focal sensory or motor deficits  Skin: Warm, well perfused, macular rash noted in upper chest, no leg swelling  Psych: normal affect, calm

## 2023-04-14 NOTE — H&P ADULT - ASSESSMENT
63 y F, hx of liver cirrhosis 2/2 alcohol use, breast CA (s/p surgery, radiation/chemo, in remission), now sent in by outpatient GI provider for paracentesis. Patient reports that her last paracentesis was in February - removed 2100 mL of fluid. Now complaining of RUQ pain, abdominal distension. Denies fever, nausea, vomiting, diarrhea, blood in stool, chest pain, shortness of breath. Last drink was in Jan 2023. D/w IR, tentative plan for procedure this evening. Pt not on any blood thinners, not on any NSAID.  63 y F, hx of liver cirrhosis 2/2 alcohol use, breast CA (s/p surgery, radiation/chemo, in remission), now sent in by outpatient GI provider for paracentesis. Patient reports that her last paracentesis was in February - removed 2100 mL of fluid. Now complaining of RUQ pain, abdominal distension. Denies fever, nausea, vomiting, diarrhea, blood in stool, chest pain, shortness of breath. Last drink was in Jan 2023. D/w IR, tentative plan for procedure this evening. Pt not on any blood thinners, not on any NSAID.       # Abdominal distention: EtOH liver cirrhosis with ascites  - recent admission for similar event, s/p 2100 ml fluid removed.   - abd US with mod to large ascities  - uncomfortable. (states Oxy make her hallucinate. tolerated dilaudid IV in the past, low dose x 1 ordered)  - already received 1000 ml IV Tylenol. caution with liver cirrhosis.  - Leg edema much improved per pt and the    - no signs of infection.    - Pt completed prednisolone course per hepatology last admission. outpatient follow up with Dr. Rene Hand.  - given abd discomfort, pt and family eager to get procedure done.  - d/w ED (Dr. Corcoran left already. Dr. Melendez covering. 30 pts in the waiting room, will not be able to do procedure).  - d/w IR, case accepted. Mostly will be therapeutic. (can send diagnositic basic fluid studies if possible). Already had recent work up.     # Constipation  - miralax, add tap water enema, lactulose if no BM  - states miralax doesn't work well    #Anemia, thrombocytopenia  - due to cirrhosis  - close monitoring       # Hx of Breast CA sp lumpectomy, radiation/ chemo.  was taking letrozole now on hold, outpt fu    # Hypotension  borderline BP.   albumin IV post paracentesis    dvt ppx: hold AC given pending procedure.

## 2023-04-14 NOTE — ED PROVIDER NOTE - NS ED ROS FT
Constitutional:  (-) fever, (-) chills, (-) lethargy  Eyes:  (-) eye pain (-) visual changes  ENMT: (-) nasal discharge, (-) sore throat. (-) neck pain or stiffness  Cardiac: (-) chest pain (-) palpitations  Respiratory:  (-) cough (-) respiratory distress.   GI:  (-) nausea (-) vomiting (-) diarrhea (+) abdominal pain (+) abdominal distension  :  (-) dysuria (-) frequency (-) burning.  MS:  (-) back pain (-) joint pain.  Neuro:  (-) headache (-) numbness (-) tingling (-) focal weakness  Skin:  (-) rash  Except as documented in the HPI,  all other systems are negative

## 2023-04-15 LAB
ANION GAP SERPL CALC-SCNC: 11 MMOL/L — SIGNIFICANT CHANGE UP (ref 5–17)
BUN SERPL-MCNC: 16 MG/DL — SIGNIFICANT CHANGE UP (ref 7–23)
CALCIUM SERPL-MCNC: 7.9 MG/DL — LOW (ref 8.4–10.5)
CHLORIDE SERPL-SCNC: 104 MMOL/L — SIGNIFICANT CHANGE UP (ref 96–108)
CO2 SERPL-SCNC: 21 MMOL/L — LOW (ref 22–31)
CREAT SERPL-MCNC: 0.73 MG/DL — SIGNIFICANT CHANGE UP (ref 0.5–1.3)
EGFR: 94 ML/MIN/1.73M2 — SIGNIFICANT CHANGE UP
GLUCOSE SERPL-MCNC: 98 MG/DL — SIGNIFICANT CHANGE UP (ref 70–99)
GRAM STN FLD: SIGNIFICANT CHANGE UP
HCT VFR BLD CALC: 25.7 % — LOW (ref 34.5–45)
HCV AB S/CO SERPL IA: 0.25 S/CO — SIGNIFICANT CHANGE UP (ref 0–0.99)
HCV AB SERPL-IMP: SIGNIFICANT CHANGE UP
HGB BLD-MCNC: 8.3 G/DL — LOW (ref 11.5–15.5)
MAGNESIUM SERPL-MCNC: 1.7 MG/DL — SIGNIFICANT CHANGE UP (ref 1.6–2.6)
MCHC RBC-ENTMCNC: 32.3 GM/DL — SIGNIFICANT CHANGE UP (ref 32–36)
MCHC RBC-ENTMCNC: 36.2 PG — HIGH (ref 27–34)
MCV RBC AUTO: 112.2 FL — HIGH (ref 80–100)
NRBC # BLD: 0 /100 WBCS — SIGNIFICANT CHANGE UP (ref 0–0)
PLATELET # BLD AUTO: 71 K/UL — LOW (ref 150–400)
POTASSIUM SERPL-MCNC: 3.2 MMOL/L — LOW (ref 3.5–5.3)
POTASSIUM SERPL-SCNC: 3.2 MMOL/L — LOW (ref 3.5–5.3)
RBC # BLD: 2.29 M/UL — LOW (ref 3.8–5.2)
RBC # FLD: 16.9 % — HIGH (ref 10.3–14.5)
SODIUM SERPL-SCNC: 136 MMOL/L — SIGNIFICANT CHANGE UP (ref 135–145)
SPECIMEN SOURCE: SIGNIFICANT CHANGE UP
WBC # BLD: 4.32 K/UL — SIGNIFICANT CHANGE UP (ref 3.8–10.5)
WBC # FLD AUTO: 4.32 K/UL — SIGNIFICANT CHANGE UP (ref 3.8–10.5)

## 2023-04-15 PROCEDURE — 99233 SBSQ HOSP IP/OBS HIGH 50: CPT

## 2023-04-15 PROCEDURE — 74176 CT ABD & PELVIS W/O CONTRAST: CPT | Mod: 26

## 2023-04-15 RX ORDER — HYDROMORPHONE HYDROCHLORIDE 2 MG/ML
0.25 INJECTION INTRAMUSCULAR; INTRAVENOUS; SUBCUTANEOUS ONCE
Refills: 0 | Status: DISCONTINUED | OUTPATIENT
Start: 2023-04-15 | End: 2023-04-15

## 2023-04-15 RX ORDER — SENNA PLUS 8.6 MG/1
2 TABLET ORAL AT BEDTIME
Refills: 0 | Status: ACTIVE | OUTPATIENT
Start: 2023-04-15 | End: 2024-03-13

## 2023-04-15 RX ORDER — MAGNESIUM SULFATE 500 MG/ML
2 VIAL (ML) INJECTION ONCE
Refills: 0 | Status: COMPLETED | OUTPATIENT
Start: 2023-04-15 | End: 2023-04-15

## 2023-04-15 RX ORDER — SPIRONOLACTONE 25 MG/1
50 TABLET, FILM COATED ORAL DAILY
Refills: 0 | Status: DISCONTINUED | OUTPATIENT
Start: 2023-04-15 | End: 2023-04-17

## 2023-04-15 RX ORDER — FUROSEMIDE 40 MG
20 TABLET ORAL DAILY
Refills: 0 | Status: DISCONTINUED | OUTPATIENT
Start: 2023-04-15 | End: 2023-04-17

## 2023-04-15 RX ORDER — POTASSIUM CHLORIDE 20 MEQ
40 PACKET (EA) ORAL EVERY 4 HOURS
Refills: 0 | Status: COMPLETED | OUTPATIENT
Start: 2023-04-15 | End: 2023-04-15

## 2023-04-15 RX ADMIN — HYDROMORPHONE HYDROCHLORIDE 0.25 MILLIGRAM(S): 2 INJECTION INTRAMUSCULAR; INTRAVENOUS; SUBCUTANEOUS at 01:42

## 2023-04-15 RX ADMIN — HYDROMORPHONE HYDROCHLORIDE 0.25 MILLIGRAM(S): 2 INJECTION INTRAMUSCULAR; INTRAVENOUS; SUBCUTANEOUS at 19:20

## 2023-04-15 RX ADMIN — HYDROMORPHONE HYDROCHLORIDE 0.25 MILLIGRAM(S): 2 INJECTION INTRAMUSCULAR; INTRAVENOUS; SUBCUTANEOUS at 22:20

## 2023-04-15 RX ADMIN — Medication 40 MILLIEQUIVALENT(S): at 18:54

## 2023-04-15 RX ADMIN — HYDROMORPHONE HYDROCHLORIDE 0.25 MILLIGRAM(S): 2 INJECTION INTRAMUSCULAR; INTRAVENOUS; SUBCUTANEOUS at 12:27

## 2023-04-15 RX ADMIN — PANTOPRAZOLE SODIUM 40 MILLIGRAM(S): 20 TABLET, DELAYED RELEASE ORAL at 05:11

## 2023-04-15 RX ADMIN — HYDROMORPHONE HYDROCHLORIDE 0.25 MILLIGRAM(S): 2 INJECTION INTRAMUSCULAR; INTRAVENOUS; SUBCUTANEOUS at 18:53

## 2023-04-15 RX ADMIN — Medication 25 GRAM(S): at 12:27

## 2023-04-15 RX ADMIN — HYDROMORPHONE HYDROCHLORIDE 0.25 MILLIGRAM(S): 2 INJECTION INTRAMUSCULAR; INTRAVENOUS; SUBCUTANEOUS at 23:00

## 2023-04-15 RX ADMIN — Medication 40 MILLIEQUIVALENT(S): at 16:23

## 2023-04-15 RX ADMIN — HYDROMORPHONE HYDROCHLORIDE 0.25 MILLIGRAM(S): 2 INJECTION INTRAMUSCULAR; INTRAVENOUS; SUBCUTANEOUS at 01:13

## 2023-04-15 RX ADMIN — HYDROMORPHONE HYDROCHLORIDE 0.25 MILLIGRAM(S): 2 INJECTION INTRAMUSCULAR; INTRAVENOUS; SUBCUTANEOUS at 12:50

## 2023-04-15 RX ADMIN — Medication 650 MILLIGRAM(S): at 00:32

## 2023-04-15 NOTE — PROGRESS NOTE ADULT - NSPROGADDITIONALINFOA_GEN_ALL_CORE
d/w pt and the  at bedside.  d/w hepatology.    - Dr. TARA Guadalupe ([facility])  - [phone]

## 2023-04-15 NOTE — CONSULT NOTE ADULT - SUBJECTIVE AND OBJECTIVE BOX
63-year-old patient with  cirrhosis related to alcohol use disorder  She is admitted to the hospital because of abdominal distention and ascites.  Paracentesis was done and there was no evidence of SBP.  Review of systems remarkable for fatigue, occasional right upper quadrant abdominal pain.  No history of encephalopathy.  No history of GI bleeding.  No nausea vomiting or diarrhea.  No chest pain or shortness of breath.  Labs and imaging were reviewed  Stable vital signs  Evidence of moderate muscle loss on upper extremities and upper body.  Evidence of a spider angioma on upper chest.  Pulse oximetry is 97% on room air  Mildly distended abdomen with no tenderness.  Mild edema of extremities  Awake alert and oriented    Impression/suggestions  Decompensated cirrhosis with ascites.  Patient is currently off diuretics.  I recommend to start the diuretics with spironolactone 50 mg and Lasix 20 mg.  This is probably less than what she needs but a trial of higher dose in the past was not well received by the patient.  Patient has evidence of muscle loss.  Value of high-protein low-salt diet was discussed with her and  in details.  Transplant work-up can be initiated as inpatient or outpatient depends on her progress.  A dobutamine stress echo will be a part of transplant work-up  Recent history of breast cancer is a barrier to transplant listing.  She had lumpectomy and radiation therapy.  The natural history of her cancer and the chance of 5-year recurrence will be discussed with oncologist to further clarify her transplant candidacy.  Please check UA and urine sodium  No obvious clinical evidence of cholecystitis.  Patient is getting HIDA scan considering the findings of gallbladder wall thickness seen on CAT scan.

## 2023-04-15 NOTE — CONSULT NOTE ADULT - SUBJECTIVE AND OBJECTIVE BOX
Chief Complaint:  Patient is a 63y old  Female who presents with a chief complaint of abdominal distention (14 Apr 2023 17:42)      Date of service: 04-15-23 @ 10:02    HPI:    The patient is a 63 year old female with a PMH of breast cancer (dx 3 years ago) s/p lumpectomy, alcohol use disorder (stopped drinking on 1/20/23), ETOH Cirrhosis, HTN, and recent admission in 02/2023 for alc hep, now presents with abdominal distention. Her last paracentesis was in February. The patient denies dysphagia, nausea and vomiting, abdominal pain, diarrhea, unintentional weight loss, change in bowel habits or NSAID use.      Allergies:  No Known Allergies      Home Medications:    Hospital Medications:  acetaminophen     Tablet .. 650 milliGRAM(s) Oral every 6 hours PRN  aluminum hydroxide/magnesium hydroxide/simethicone Suspension 30 milliLiter(s) Oral every 4 hours PRN  melatonin 3 milliGRAM(s) Oral at bedtime PRN  ondansetron Injectable 4 milliGRAM(s) IV Push every 8 hours PRN  pantoprazole    Tablet 40 milliGRAM(s) Oral before breakfast      PMHX/PSHX:  Breast cancer, left    Mild alcohol use disorder    H/O hepatitis    H/O lumpectomy        Family history:      Social History:   Denies ethanol use.  Denies illicit drug use.    ROS:     General:  No wt loss, fevers, chills, night sweats, fatigue,   Eyes:  Good vision, no reported pain  ENT:  No sore throat, pain, runny nose, dysphagia  CV:  No pain, palpitations, hypo/hypertension  Resp:  No dyspnea, cough, tachypnea, wheezing  GI:  See HPI  :  No pain, bleeding, incontinence, nocturia  Muscle:  No pain, weakness  Neuro:  No weakness, tingling, memory problems  Psych:  No fatigue, insomnia, mood problems, depression  Endocrine:  No polyuria, polydipsia, cold/heat intolerance  Heme:  No petechiae, ecchymosis, easy bruisability  Integumentary:  No rash, edema      PHYSICAL EXAM:     GENERAL:  Appears stated age, well-groomed, well-nourished, no distress  HEENT:  NC/AT,  conjunctivae anicteric, clear and pink,   NECK: supple, trachea midline  CHEST:  Full & symmetric excursion, no increased effort, breath sounds clear  HEART:  Regular rhythm, no JVD  ABDOMEN:  Soft, non-tender, non-distended, normoactive bowel sounds,  no masses , no hepatosplenomegaly  EXTREMITIES:  no cyanosis,clubbing or edema  SKIN:  No rash, erythema, or, ecchymoses, no jaundice  NEURO:  Alert, non-focal, no asterixis  PSYCH: Appropriate affect, oriented to place and time  RECTAL: Deferred      Vital Signs:  Vital Signs Last 24 Hrs  T(C): 36.7 (15 Apr 2023 05:42), Max: 36.8 (14 Apr 2023 11:26)  T(F): 98 (15 Apr 2023 05:42), Max: 98.2 (14 Apr 2023 11:26)  HR: 84 (15 Apr 2023 05:42) (79 - 102)  BP: 95/57 (15 Apr 2023 05:42) (95/57 - 119/75)  BP(mean): 73 (14 Apr 2023 16:45) (73 - 73)  RR: 18 (15 Apr 2023 05:42) (16 - 20)  SpO2: 98% (15 Apr 2023 05:42) (95% - 100%)    Parameters below as of 15 Apr 2023 05:42  Patient On (Oxygen Delivery Method): room air      Daily Height in cm: 172.72 (14 Apr 2023 11:26)    Daily     LABS: Labs personally reviewed by me:                        Sulema.3    4.32  )-----------( 71       ( 15 Apr 2023 07:15 )             25.7     04-15    136  |  104  |  16  ----------------------------<  98  3.2<L>   |  21<L>  |  0.73    Ca    7.9<L>      15 Apr 2023 07:15  Phos  2.2     04-14  Mg     1.7     04-15    TPro  7.3  /  Alb  2.8<L>  /  TBili  8.1<H>  /  DBili  x   /  AST  58<H>  /  ALT  17  /  AlkPhos  167<H>  04-14    LIVER FUNCTIONS - ( 14 Apr 2023 12:59 )  Alb: 2.8 g/dL / Pro: 7.3 g/dL / ALK PHOS: 167 U/L / ALT: 17 U/L / AST: 58 U/L / GGT: x           PT/INR - ( 14 Apr 2023 12:59 )   PT: 18.1 sec;   INR: 1.57 ratio         PTT - ( 14 Apr 2023 12:59 )  PTT:33.4 sec    Amylase Serum--      Lipase serum62       Ammonia--      Imaging personally reviewed by me:           Chief Complaint:  Patient is a 63y old  Female who presents with a chief complaint of abdominal distention (14 Apr 2023 17:42)      Date of service: 04-15-23 @ 10:02    HPI:    The patient is a 63 year old female with a PMH of breast cancer (dx 3 years ago) s/p lumpectomy, alcohol use disorder (stopped drinking on 1/20/23), ETOH Cirrhosis, HTN, and recent admission in 02/2023 for alc hep, now presents with abdominal pain. Since discharge she has been following with Dr. Hand from Hepatology. She was on Spironolactone 100 mg and Lasix 40 mg for ~ 1 week but stopped them as she lost a lot of weight and was concerned. More constipated on diuretics, bad reaction to Miralax, takes OTC stool softener PRN. Recently notes increasing abdominal distention. No confusion or bleeding symptoms. Also has RUQ pain. She is s/p paracentesis yesterday - the pressure discomfort has improved but still has significant RUQ pain. No nausea/vomiting, lower abdominal pain.      Allergies:  No Known Allergies      Home Medications:    Hospital Medications:  acetaminophen     Tablet .. 650 milliGRAM(s) Oral every 6 hours PRN  aluminum hydroxide/magnesium hydroxide/simethicone Suspension 30 milliLiter(s) Oral every 4 hours PRN  melatonin 3 milliGRAM(s) Oral at bedtime PRN  ondansetron Injectable 4 milliGRAM(s) IV Push every 8 hours PRN  pantoprazole    Tablet 40 milliGRAM(s) Oral before breakfast      PMHX/PSHX:  Breast cancer, left    Mild alcohol use disorder    H/O hepatitis    H/O lumpectomy        Family history:      Social History:   Denies ethanol use.  Denies illicit drug use.    ROS:     General:  No wt loss, fevers, chills, night sweats, fatigue,   Eyes:  Good vision, no reported pain  ENT:  No sore throat, pain, runny nose, dysphagia  CV:  No pain, palpitations, hypo/hypertension  Resp:  No dyspnea, cough, tachypnea, wheezing  GI:  See HPI  :  No pain, bleeding, incontinence, nocturia  Muscle:  No pain, weakness  Neuro:  No weakness, tingling, memory problems  Psych:  No fatigue, insomnia, mood problems, depression  Endocrine:  No polyuria, polydipsia, cold/heat intolerance  Heme:  No petechiae, ecchymosis, easy bruisability  Integumentary:  No rash, edema      PHYSICAL EXAM:     GENERAL:  Appears stated age, well-groomed, well-nourished, no distress  HEENT:  NC/AT,  conjunctivae anicteric, clear and pink,   NECK: supple, trachea midline  CHEST:  Full & symmetric excursion, no increased effort, breath sounds clear  HEART:  Regular rhythm, no JVD  ABDOMEN:  Soft, non-tender, non-distended, normoactive bowel sounds,  no masses , no hepatosplenomegaly  EXTREMITIES:  no cyanosis,clubbing or edema  SKIN:  No rash, erythema, or, ecchymoses, no jaundice  NEURO:  Alert, non-focal, no asterixis  PSYCH: Appropriate affect, oriented to place and time  RECTAL: Deferred      Vital Signs:  Vital Signs Last 24 Hrs  T(C): 36.7 (15 Apr 2023 05:42), Max: 36.8 (14 Apr 2023 11:26)  T(F): 98 (15 Apr 2023 05:42), Max: 98.2 (14 Apr 2023 11:26)  HR: 84 (15 Apr 2023 05:42) (79 - 102)  BP: 95/57 (15 Apr 2023 05:42) (95/57 - 119/75)  BP(mean): 73 (14 Apr 2023 16:45) (73 - 73)  RR: 18 (15 Apr 2023 05:42) (16 - 20)  SpO2: 98% (15 Apr 2023 05:42) (95% - 100%)    Parameters below as of 15 Apr 2023 05:42  Patient On (Oxygen Delivery Method): room air      Daily Height in cm: 172.72 (14 Apr 2023 11:26)    Daily     LABS: Labs personally reviewed by me:                        8.3    4.32  )-----------( 71       ( 15 Apr 2023 07:15 )             25.7     04-15    136  |  104  |  16  ----------------------------<  98  3.2<L>   |  21<L>  |  0.73    Ca    7.9<L>      15 Apr 2023 07:15  Phos  2.2     04-14  Mg     1.7     04-15    TPro  7.3  /  Alb  2.8<L>  /  TBili  8.1<H>  /  DBili  x   /  AST  58<H>  /  ALT  17  /  AlkPhos  167<H>  04-14    LIVER FUNCTIONS - ( 14 Apr 2023 12:59 )  Alb: 2.8 g/dL / Pro: 7.3 g/dL / ALK PHOS: 167 U/L / ALT: 17 U/L / AST: 58 U/L / GGT: x           PT/INR - ( 14 Apr 2023 12:59 )   PT: 18.1 sec;   INR: 1.57 ratio         PTT - ( 14 Apr 2023 12:59 )  PTT:33.4 sec    Amylase Serum--      Lipase serum62       Ammonia--      Imaging personally reviewed by me:

## 2023-04-15 NOTE — CONSULT NOTE ADULT - ASSESSMENT
1. CPT C ETOH Cirrhosis, c/b ascites   - MELD-Na 22   - awaiting LVP   - HCC screening: CT A/P 2/17 no focal liver lesions  - Gastric and splenorenal varices on CT, no recent endoscopy     2. Hx of breast ca, s/p lumpectomy  - on Letrazole     3. Anemia, no overt GI bleeding. Hgb stable.  - iron panel, B12, folate ordered        I had a prolonged conversation with the patient regarding the hospital course, differential diagnosis, results of diagnostic tests this far, and therapeutic modalities available. Plan of care discussed with the patient after the evaluation. Patient expresses a clear understanding of the plan of care. Sixty five minutes spent on the total encounter, of which more than fifty percent of the encounter was spent on counseling and/or coordinating care by the attending physician.    Advanced care planning forms were discussed. Code status including forceful chest compressions, defibrillation and intubation were discussed. The risks benefits and alternatives to pertinent gastrointestinal procedures and interventions were discussed in detail and all questions were answered. Duration: 15 Minutes.    Ariela Stevenson M.D.   Gastroenterology and Hepatology  [address] [place], [place]  Office: [phone]  Cell: [phone]   1. CPT C ETOH Cirrhosis, c/b ascites. MELD-NA 22. No HE or bleeding symptoms.    - s/p LVP, no SBP  - would consider resuming diuretics  - will consult tx hepatology; pt follow with Dr. Hand  - HCC screening: CT A/P 2/17 no focal liver lesions  - Gastric and splenorenal varices on CT, no recent endoscopy     2. RUQ pain. No improvement after paracentesis. Pt with known cholelithiases, abd US shows mild GB wall thickening, CBD 5 mm. No fever or leukocytosis so acute debbie less likely.    - obtain HIDA scan     3. Constipation   - start senna 2 tabs QHS   - dulcolax suppository PRN     4. Anemia, no overt GI bleeding. Hgb stable.  - iron panel, B12, folate ordered      5. Hx of breast ca, s/p lumpectomy      I had a prolonged conversation with the patient regarding the hospital course, differential diagnosis, results of diagnostic tests this far, and therapeutic modalities available. Plan of care discussed with the patient after the evaluation. Patient expresses a clear understanding of the plan of care. Sixty five minutes spent on the total encounter, of which more than fifty percent of the encounter was spent on counseling and/or coordinating care by the attending physician.    Advanced care planning forms were discussed. Code status including forceful chest compressions, defibrillation and intubation were discussed. The risks benefits and alternatives to pertinent gastrointestinal procedures and interventions were discussed in detail and all questions were answered. Duration: 15 Minutes.    Ariela Stevenson M.D.   Gastroenterology and Hepatology  [address] [place], [place]  Office: [phone]  Cell: [phone]

## 2023-04-15 NOTE — CHART NOTE - NSCHARTNOTEFT_GEN_A_CORE
Patient was seen for abdominal pain earlier.   A & O X3, follows commands. reports dull abdominal pain 6-7/10, started 3 days before admission. reports poor appetite.   Denies dizziness, vision changes, SOB, nausea, diarrhea  + diffuse abdominal tenderness, Bowel sound present, soft.   Discussed with Dr Guadalupe. - Will check CT of A/P noncontrast. s/p Dilausis 0.25 mg IV X1    Rosa Gutierrez NP-C  #[id number]

## 2023-04-15 NOTE — CONSULT NOTE ADULT - SUBJECTIVE AND OBJECTIVE BOX
HPI:    Allergies:  No Known Allergies    Home Medications:  omeprazole 20 mg oral delayed release tablet: 1 tab(s) orally every other day (14 Apr 2023 15:50)    Hospital Medications:  acetaminophen     Tablet .. 650 milliGRAM(s) Oral every 6 hours PRN  aluminum hydroxide/magnesium hydroxide/simethicone Suspension 30 milliLiter(s) Oral every 4 hours PRN  melatonin 3 milliGRAM(s) Oral at bedtime PRN  ondansetron Injectable 4 milliGRAM(s) IV Push every 8 hours PRN  pantoprazole    Tablet 40 milliGRAM(s) Oral before breakfast  potassium chloride    Tablet ER 40 milliEquivalent(s) Oral every 4 hours    PMHX/PSHX:  Breast cancer, left    Mild alcohol use disorder    H/O hepatitis    H/O lumpectomy        Family history:      Denies family history of colon cancer/polyps, stomach cancer/polyps, pancreatic cancer/masses, liver cancer/disease, ovarian cancer and endometrial cancer.    Social History:   Tob: Denies  EtOH: Denies  Illicit Drugs: Denies    ROS:     General:  No wt loss, fevers, chills, night sweats, fatigue  Eyes:  Good vision, no reported pain  ENT:  No sore throat, pain, runny nose, dysphagia  CV:  No pain, palpitations, hypo/hypertension  Pulm:  No dyspnea, cough, tachypnea, wheezing  GI:  see HPI  :  No pain, bleeding, incontinence, nocturia  Muscle:  No pain, weakness  Neuro:  No weakness, tingling, memory problems  Psych:  No fatigue, insomnia, mood problems, depression  Endocrine:  No polyuria, polydipsia, cold/heat intolerance  Heme:  No petechiae, ecchymosis, easy bruisability  Skin:  No rash, tattoos, scars, edema    PHYSICAL EXAM:     GENERAL:  No acute distress  HEENT:  NCAT, no scleral icterus   CHEST:  no respiratory distress  HEART:  Regular rate and rhythm  ABDOMEN:  Soft, non-tender, non-distended, normoactive bowel sounds,  no masses  EXTREMITIES: No edema  SKIN:  No rash/erythema/ecchymoses/petechiae/wounds/abscess/warm/dry  NEURO:  Alert and oriented x 3, no asterixis    Vital Signs:  Vital Signs Last 24 Hrs  T(C): 36.6 (15 Apr 2023 13:50), Max: 36.8 (15 Apr 2023 09:50)  T(F): 97.9 (15 Apr 2023 13:50), Max: 98.2 (15 Apr 2023 09:50)  HR: 82 (15 Apr 2023 13:50) (79 - 92)  BP: 101/64 (15 Apr 2023 13:50) (95/57 - 113/63)  BP(mean): --  RR: 18 (15 Apr 2023 13:50) (18 - 18)  SpO2: 96% (15 Apr 2023 13:50) (96% - 100%)    Parameters below as of 15 Apr 2023 13:50  Patient On (Oxygen Delivery Method): room air      Daily     Daily     LABS:                        8.3    4.32  )-----------( 71       ( 15 Apr 2023 07:15 )             25.7     Mean Cell Volume: 112.2 fl (04-15-23 @ 07:15)    04-15    136  |  104  |  16  ----------------------------<  98  3.2<L>   |  21<L>  |  0.73    Ca    7.9<L>      15 Apr 2023 07:15  Phos  2.2     04-14  Mg     1.7     04-15    TPro  7.3  /  Alb  2.8<L>  /  TBili  8.1<H>  /  DBili  x   /  AST  58<H>  /  ALT  17  /  AlkPhos  167<H>  04-14    LIVER FUNCTIONS - ( 14 Apr 2023 12:59 )  Alb: 2.8 g/dL / Pro: 7.3 g/dL / ALK PHOS: 167 U/L / ALT: 17 U/L / AST: 58 U/L / GGT: x           PT/INR - ( 14 Apr 2023 12:59 )   PT: 18.1 sec;   INR: 1.57 ratio         PTT - ( 14 Apr 2023 12:59 )  PTT:33.4 sec                            8.3    4.32  )-----------( 71       ( 15 Apr 2023 07:15 )             25.7                         9.4    6.59  )-----------( 92       ( 14 Apr 2023 12:59 )             28.5       Imaging:

## 2023-04-16 ENCOUNTER — TRANSCRIPTION ENCOUNTER (OUTPATIENT)
Age: 63
End: 2023-04-16

## 2023-04-16 LAB
ANION GAP SERPL CALC-SCNC: 10 MMOL/L — SIGNIFICANT CHANGE UP (ref 5–17)
BUN SERPL-MCNC: 17 MG/DL — SIGNIFICANT CHANGE UP (ref 7–23)
CALCIUM SERPL-MCNC: 8.4 MG/DL — SIGNIFICANT CHANGE UP (ref 8.4–10.5)
CHLORIDE SERPL-SCNC: 106 MMOL/L — SIGNIFICANT CHANGE UP (ref 96–108)
CO2 SERPL-SCNC: 19 MMOL/L — LOW (ref 22–31)
CREAT SERPL-MCNC: 0.72 MG/DL — SIGNIFICANT CHANGE UP (ref 0.5–1.3)
EGFR: 95 ML/MIN/1.73M2 — SIGNIFICANT CHANGE UP
FERRITIN SERPL-MCNC: 110 NG/ML — SIGNIFICANT CHANGE UP (ref 15–150)
FOLATE SERPL-MCNC: 15.5 NG/ML — SIGNIFICANT CHANGE UP
GLUCOSE SERPL-MCNC: 87 MG/DL — SIGNIFICANT CHANGE UP (ref 70–99)
HCT VFR BLD CALC: 27.1 % — LOW (ref 34.5–45)
HGB BLD-MCNC: 8.6 G/DL — LOW (ref 11.5–15.5)
IRON SATN MFR SERPL: 54 % — HIGH (ref 14–50)
IRON SATN MFR SERPL: 71 UG/DL — SIGNIFICANT CHANGE UP (ref 30–160)
MAGNESIUM SERPL-MCNC: 2.2 MG/DL — SIGNIFICANT CHANGE UP (ref 1.6–2.6)
MCHC RBC-ENTMCNC: 31.7 GM/DL — LOW (ref 32–36)
MCHC RBC-ENTMCNC: 36 PG — HIGH (ref 27–34)
MCV RBC AUTO: 113.4 FL — HIGH (ref 80–100)
NRBC # BLD: 0 /100 WBCS — SIGNIFICANT CHANGE UP (ref 0–0)
PLATELET # BLD AUTO: 70 K/UL — LOW (ref 150–400)
POTASSIUM SERPL-MCNC: 4 MMOL/L — SIGNIFICANT CHANGE UP (ref 3.5–5.3)
POTASSIUM SERPL-SCNC: 4 MMOL/L — SIGNIFICANT CHANGE UP (ref 3.5–5.3)
RBC # BLD: 2.39 M/UL — LOW (ref 3.8–5.2)
RBC # FLD: 16.8 % — HIGH (ref 10.3–14.5)
SODIUM SERPL-SCNC: 135 MMOL/L — SIGNIFICANT CHANGE UP (ref 135–145)
TIBC SERPL-MCNC: 131 UG/DL — LOW (ref 220–430)
UIBC SERPL-MCNC: 60 UG/DL — LOW (ref 110–370)
VIT B12 SERPL-MCNC: 1579 PG/ML — HIGH (ref 232–1245)
WBC # BLD: 4.17 K/UL — SIGNIFICANT CHANGE UP (ref 3.8–10.5)
WBC # FLD AUTO: 4.17 K/UL — SIGNIFICANT CHANGE UP (ref 3.8–10.5)

## 2023-04-16 PROCEDURE — 78226 HEPATOBILIARY SYSTEM IMAGING: CPT | Mod: 26

## 2023-04-16 PROCEDURE — 99232 SBSQ HOSP IP/OBS MODERATE 35: CPT

## 2023-04-16 RX ORDER — HYDROMORPHONE HYDROCHLORIDE 2 MG/ML
0.25 INJECTION INTRAMUSCULAR; INTRAVENOUS; SUBCUTANEOUS ONCE
Refills: 0 | Status: DISCONTINUED | OUTPATIENT
Start: 2023-04-16 | End: 2023-04-16

## 2023-04-16 RX ADMIN — HYDROMORPHONE HYDROCHLORIDE 0.25 MILLIGRAM(S): 2 INJECTION INTRAMUSCULAR; INTRAVENOUS; SUBCUTANEOUS at 21:07

## 2023-04-16 RX ADMIN — SPIRONOLACTONE 50 MILLIGRAM(S): 25 TABLET, FILM COATED ORAL at 05:36

## 2023-04-16 RX ADMIN — SENNA PLUS 2 TABLET(S): 8.6 TABLET ORAL at 05:35

## 2023-04-16 RX ADMIN — HYDROMORPHONE HYDROCHLORIDE 0.25 MILLIGRAM(S): 2 INJECTION INTRAMUSCULAR; INTRAVENOUS; SUBCUTANEOUS at 05:35

## 2023-04-16 RX ADMIN — PANTOPRAZOLE SODIUM 40 MILLIGRAM(S): 20 TABLET, DELAYED RELEASE ORAL at 05:35

## 2023-04-16 RX ADMIN — Medication 20 MILLIGRAM(S): at 08:14

## 2023-04-16 RX ADMIN — HYDROMORPHONE HYDROCHLORIDE 0.25 MILLIGRAM(S): 2 INJECTION INTRAMUSCULAR; INTRAVENOUS; SUBCUTANEOUS at 21:37

## 2023-04-16 RX ADMIN — HYDROMORPHONE HYDROCHLORIDE 0.25 MILLIGRAM(S): 2 INJECTION INTRAMUSCULAR; INTRAVENOUS; SUBCUTANEOUS at 13:15

## 2023-04-16 RX ADMIN — HYDROMORPHONE HYDROCHLORIDE 0.25 MILLIGRAM(S): 2 INJECTION INTRAMUSCULAR; INTRAVENOUS; SUBCUTANEOUS at 05:48

## 2023-04-16 RX ADMIN — HYDROMORPHONE HYDROCHLORIDE 0.25 MILLIGRAM(S): 2 INJECTION INTRAMUSCULAR; INTRAVENOUS; SUBCUTANEOUS at 12:59

## 2023-04-16 RX ADMIN — SENNA PLUS 2 TABLET(S): 8.6 TABLET ORAL at 21:40

## 2023-04-16 NOTE — DISCHARGE NOTE PROVIDER - HOSPITAL COURSE
63    # Abdominal distention: EtOH liver cirrhosis with ascites  - recent admission for similar event, s/p 2100 ml fluid removed.   - abd US with mod to large ascities  - uncomfortable. (states Oxy make her hallucinate. tolerated dilaudid IV in the past, low dose x 1 ordered)  - already received 1000 ml IV Tylenol. caution with liver cirrhosis.  - Leg edema much improved per pt and the    - no signs of infection.    - Pt completed prednisolone course per hepatology last admission. outpatient follow up with Dr. López Htut.  - given abd discomfort, pt and family eager to get procedure done.  - d/w ED (Dr. Corcoran left already. Dr. Melendez covering. 30 pts in the waiting room, will not be able to do procedure).  - d/w IR, case accepted. Mostly will be therapeutic. (can send diagnositic basic fluid studies if possible). Already had recent work up.     # Constipation  - miralax, add tap water enema, lactulose if no BM  - states miralax doesn't work well    #Anemia, thrombocytopenia  - due to cirrhosis  - close monitoring       # Hx of Breast CA sp lumpectomy, radiation/ chemo.  was taking letrozole now on hold, outpt fu    # Hypotension  borderline BP.   albumin IV post paracentesis    dvt ppx: hold AC given pending procedure.            y F, hx of liver cirrhosis 2/2 alcohol use, breast CA (s/p surgery, radiation/chemo, in remission), now sent in by outpatient GI provider for paracentesis. 64 yo F, hx of liver cirrhosis 2/2 alcohol use, breast CA (s/p surgery, radiation/chemo, in remission),   - sent in by outpatient GI provider for paracentesis.    # Abdominal distention: EtOH liver cirrhosis with ascites  - recent admission for similar event, s/p 2100 ml fluid removed.   - abd US with mod to large ascities  - uncomfortable. (states Oxy make her hallucinate. tolerated dilaudid IV in the past, low dose x 1 ordered)  - already received 1000 ml IV Tylenol. caution with liver cirrhosis.  - Leg edema much improved per pt and the    - no signs of infection.    - Pt completed prednisolone course per hepatology last admission. outpatient follow up with Dr. Rene Mirzaut.  - d/w IR, s/p therapeutic paracentesis (can send diagnositic basic fluid studies if possible). Already had recent work up.     # Constipation  - miralax, add tap water enema, lactulose if no BM  - continue senna    #Anemia, thrombocytopenia  - due to cirrhosis  - close monitoring       # Hx of Breast CA sp lumpectomy, radiation/ chemo.  was taking letrozole now on hold, outpt fu    # Hypotension  borderline BP.   albumin IV post paracentesis    dvt ppx: hold AC given pending procedure.   Cleared by hepatology/transplant for

## 2023-04-16 NOTE — DISCHARGE NOTE PROVIDER - CARE PROVIDER_API CALL
Jose Alberto Guadalupe ()  Internal Medicine  [address] [place] [address]  Phone: [phone]  Fax: [phone]  Follow Up Time:    Rene Hand)  Gastroenterology; Internal Medicine; Transplant Hepatology  [address] [place] [address]  Phone: [phone]  Fax: [phone]  Follow Up Time: 1-3 days

## 2023-04-16 NOTE — DISCHARGE NOTE PROVIDER - NSDCFUSCHEDAPPT_GEN_ALL_CORE_FT
Rene Hand  [facility]  HEPATOLOGY [address] [facility]   Scheduled Appointment: 04/19/2023    [facility]  TRANSPLANT [address] [facility]   Scheduled Appointment: 05/16/2023    Jaleesa Solomon  [facility]  TRANSPLANT [address] [facility]   Scheduled Appointment: 05/16/2023

## 2023-04-16 NOTE — DISCHARGE NOTE PROVIDER - NSDCCPCAREPLAN_GEN_ALL_CORE_FT
PRINCIPAL DISCHARGE DIAGNOSIS  Diagnosis: Abdominal ascites  Assessment and Plan of Treatment: s/p paracentesis, follow up with hepatology for repeat paracentesis outpatient      SECONDARY DISCHARGE DIAGNOSES  Diagnosis: Hypokalemia  Assessment and Plan of Treatment: resolved    Diagnosis: Cirrhosis of liver  Assessment and Plan of Treatment: Follow up hepatology

## 2023-04-16 NOTE — DISCHARGE NOTE PROVIDER - PROVIDER TOKENS
PROVIDER:[TOKEN:[[id number]]] PROVIDER:[TOKEN:[[id number]],FOLLOWUP:[1-3 days]]

## 2023-04-16 NOTE — DISCHARGE NOTE PROVIDER - NSDCMRMEDTOKEN_GEN_ALL_CORE_FT
omeprazole 20 mg oral delayed release tablet: 1 tab(s) orally every other day   furosemide 40 mg oral tablet: 1 tab(s) orally once a day  pantoprazole 40 mg oral delayed release tablet: 1 tab(s) orally once a day (before a meal)  senna leaf extract oral tablet: 2 tab(s) orally once a day (at bedtime)  spironolactone 25 mg oral tablet: 4 tab(s) orally once a day

## 2023-04-17 ENCOUNTER — TRANSCRIPTION ENCOUNTER (OUTPATIENT)
Age: 63
End: 2023-04-17

## 2023-04-17 VITALS
RESPIRATION RATE: 18 BRPM | HEART RATE: 88 BPM | TEMPERATURE: 99 F | OXYGEN SATURATION: 97 % | DIASTOLIC BLOOD PRESSURE: 63 MMHG | SYSTOLIC BLOOD PRESSURE: 105 MMHG

## 2023-04-17 LAB
ALBUMIN SERPL ELPH-MCNC: 2.5 G/DL — LOW (ref 3.3–5)
ALP SERPL-CCNC: 126 U/L — HIGH (ref 40–120)
ALT FLD-CCNC: 13 U/L — SIGNIFICANT CHANGE UP (ref 10–45)
ANION GAP SERPL CALC-SCNC: 10 MMOL/L — SIGNIFICANT CHANGE UP (ref 5–17)
AST SERPL-CCNC: 48 U/L — HIGH (ref 10–40)
BILIRUB SERPL-MCNC: 6.6 MG/DL — HIGH (ref 0.2–1.2)
BUN SERPL-MCNC: 18 MG/DL — SIGNIFICANT CHANGE UP (ref 7–23)
CALCIUM SERPL-MCNC: 8.6 MG/DL — SIGNIFICANT CHANGE UP (ref 8.4–10.5)
CHLORIDE SERPL-SCNC: 105 MMOL/L — SIGNIFICANT CHANGE UP (ref 96–108)
CO2 SERPL-SCNC: 20 MMOL/L — LOW (ref 22–31)
CREAT SERPL-MCNC: 0.77 MG/DL — SIGNIFICANT CHANGE UP (ref 0.5–1.3)
EGFR: 88 ML/MIN/1.73M2 — SIGNIFICANT CHANGE UP
GLUCOSE SERPL-MCNC: 100 MG/DL — HIGH (ref 70–99)
HCT VFR BLD CALC: 27.4 % — LOW (ref 34.5–45)
HGB BLD-MCNC: 8.9 G/DL — LOW (ref 11.5–15.5)
MCHC RBC-ENTMCNC: 32.5 GM/DL — SIGNIFICANT CHANGE UP (ref 32–36)
MCHC RBC-ENTMCNC: 36.5 PG — HIGH (ref 27–34)
MCV RBC AUTO: 112.3 FL — HIGH (ref 80–100)
NRBC # BLD: 0 /100 WBCS — SIGNIFICANT CHANGE UP (ref 0–0)
PLATELET # BLD AUTO: 68 K/UL — LOW (ref 150–400)
POTASSIUM SERPL-MCNC: 3.7 MMOL/L — SIGNIFICANT CHANGE UP (ref 3.5–5.3)
POTASSIUM SERPL-SCNC: 3.7 MMOL/L — SIGNIFICANT CHANGE UP (ref 3.5–5.3)
PROT SERPL-MCNC: 6.3 G/DL — SIGNIFICANT CHANGE UP (ref 6–8.3)
RBC # BLD: 2.44 M/UL — LOW (ref 3.8–5.2)
RBC # FLD: 16.8 % — HIGH (ref 10.3–14.5)
SODIUM SERPL-SCNC: 135 MMOL/L — SIGNIFICANT CHANGE UP (ref 135–145)
WBC # BLD: 4.7 K/UL — SIGNIFICANT CHANGE UP (ref 3.8–10.5)
WBC # FLD AUTO: 4.7 K/UL — SIGNIFICANT CHANGE UP (ref 3.8–10.5)

## 2023-04-17 PROCEDURE — 99233 SBSQ HOSP IP/OBS HIGH 50: CPT

## 2023-04-17 PROCEDURE — 83550 IRON BINDING TEST: CPT

## 2023-04-17 PROCEDURE — 82728 ASSAY OF FERRITIN: CPT

## 2023-04-17 PROCEDURE — 82746 ASSAY OF FOLIC ACID SERUM: CPT

## 2023-04-17 PROCEDURE — 87637 SARSCOV2&INF A&B&RSV AMP PRB: CPT

## 2023-04-17 PROCEDURE — 87102 FUNGUS ISOLATION CULTURE: CPT

## 2023-04-17 PROCEDURE — 85027 COMPLETE CBC AUTOMATED: CPT

## 2023-04-17 PROCEDURE — 96374 THER/PROPH/DIAG INJ IV PUSH: CPT

## 2023-04-17 PROCEDURE — 85730 THROMBOPLASTIN TIME PARTIAL: CPT

## 2023-04-17 PROCEDURE — 89051 BODY FLUID CELL COUNT: CPT

## 2023-04-17 PROCEDURE — 93320 DOPPLER ECHO COMPLETE: CPT

## 2023-04-17 PROCEDURE — 83735 ASSAY OF MAGNESIUM: CPT

## 2023-04-17 PROCEDURE — 76705 ECHO EXAM OF ABDOMEN: CPT | Mod: 26

## 2023-04-17 PROCEDURE — P9047: CPT

## 2023-04-17 PROCEDURE — 93018 CV STRESS TEST I&R ONLY: CPT

## 2023-04-17 PROCEDURE — 82042 OTHER SOURCE ALBUMIN QUAN EA: CPT

## 2023-04-17 PROCEDURE — 84100 ASSAY OF PHOSPHORUS: CPT

## 2023-04-17 PROCEDURE — 78494 HEART IMAGE SPECT: CPT

## 2023-04-17 PROCEDURE — 83615 LACTATE (LD) (LDH) ENZYME: CPT

## 2023-04-17 PROCEDURE — C1729: CPT

## 2023-04-17 PROCEDURE — 85025 COMPLETE CBC W/AUTO DIFF WBC: CPT

## 2023-04-17 PROCEDURE — 86803 HEPATITIS C AB TEST: CPT

## 2023-04-17 PROCEDURE — 85610 PROTHROMBIN TIME: CPT

## 2023-04-17 PROCEDURE — 87070 CULTURE OTHR SPECIMN AEROBIC: CPT

## 2023-04-17 PROCEDURE — 93017 CV STRESS TEST TRACING ONLY: CPT

## 2023-04-17 PROCEDURE — 76376 3D RENDER W/INTRP POSTPROCES: CPT

## 2023-04-17 PROCEDURE — 78226 HEPATOBILIARY SYSTEM IMAGING: CPT

## 2023-04-17 PROCEDURE — 83540 ASSAY OF IRON: CPT

## 2023-04-17 PROCEDURE — 71046 X-RAY EXAM CHEST 2 VIEWS: CPT

## 2023-04-17 PROCEDURE — 82945 GLUCOSE OTHER FLUID: CPT

## 2023-04-17 PROCEDURE — 78494 HEART IMAGE SPECT: CPT | Mod: 26

## 2023-04-17 PROCEDURE — 74176 CT ABD & PELVIS W/O CONTRAST: CPT

## 2023-04-17 PROCEDURE — 76705 ECHO EXAM OF ABDOMEN: CPT

## 2023-04-17 PROCEDURE — 80053 COMPREHEN METABOLIC PANEL: CPT

## 2023-04-17 PROCEDURE — 81003 URINALYSIS AUTO W/O SCOPE: CPT

## 2023-04-17 PROCEDURE — 93016 CV STRESS TEST SUPVJ ONLY: CPT

## 2023-04-17 PROCEDURE — 76700 US EXAM ABDOM COMPLETE: CPT

## 2023-04-17 PROCEDURE — 82607 VITAMIN B-12: CPT

## 2023-04-17 PROCEDURE — 84157 ASSAY OF PROTEIN OTHER: CPT

## 2023-04-17 PROCEDURE — 96375 TX/PRO/DX INJ NEW DRUG ADDON: CPT

## 2023-04-17 PROCEDURE — 87075 CULTR BACTERIA EXCEPT BLOOD: CPT

## 2023-04-17 PROCEDURE — 80048 BASIC METABOLIC PNL TOTAL CA: CPT

## 2023-04-17 PROCEDURE — 49083 ABD PARACENTESIS W/IMAGING: CPT

## 2023-04-17 PROCEDURE — 94640 AIRWAY INHALATION TREATMENT: CPT

## 2023-04-17 PROCEDURE — 99285 EMERGENCY DEPT VISIT HI MDM: CPT

## 2023-04-17 PROCEDURE — 83690 ASSAY OF LIPASE: CPT

## 2023-04-17 PROCEDURE — A9537: CPT

## 2023-04-17 PROCEDURE — 36415 COLL VENOUS BLD VENIPUNCTURE: CPT

## 2023-04-17 PROCEDURE — 93325 DOPPLER ECHO COLOR FLOW MAPG: CPT

## 2023-04-17 PROCEDURE — 87205 SMEAR GRAM STAIN: CPT

## 2023-04-17 PROCEDURE — 76376 3D RENDER W/INTRP POSTPROCES: CPT | Mod: 26

## 2023-04-17 RX ORDER — SPIRONOLACTONE 25 MG/1
100 TABLET, FILM COATED ORAL DAILY
Refills: 0 | Status: DISCONTINUED | OUTPATIENT
Start: 2023-04-18 | End: 2023-04-17

## 2023-04-17 RX ORDER — HYDROMORPHONE HYDROCHLORIDE 2 MG/ML
0.25 INJECTION INTRAMUSCULAR; INTRAVENOUS; SUBCUTANEOUS ONCE
Refills: 0 | Status: DISCONTINUED | OUTPATIENT
Start: 2023-04-17 | End: 2023-04-17

## 2023-04-17 RX ORDER — FUROSEMIDE 40 MG
40 TABLET ORAL DAILY
Refills: 0 | Status: DISCONTINUED | OUTPATIENT
Start: 2023-04-18 | End: 2023-04-17

## 2023-04-17 RX ORDER — PANTOPRAZOLE SODIUM 20 MG/1
1 TABLET, DELAYED RELEASE ORAL
Qty: 30 | Refills: 0
Start: 2023-04-17 | End: 2023-05-16

## 2023-04-17 RX ORDER — OMEPRAZOLE 10 MG/1
1 CAPSULE, DELAYED RELEASE ORAL
Refills: 0 | DISCHARGE

## 2023-04-17 RX ORDER — SPIRONOLACTONE 25 MG/1
4 TABLET, FILM COATED ORAL
Qty: 120 | Refills: 0
Start: 2023-04-17 | End: 2023-05-16

## 2023-04-17 RX ORDER — FUROSEMIDE 40 MG
1 TABLET ORAL
Qty: 30 | Refills: 0
Start: 2023-04-17 | End: 2023-05-16

## 2023-04-17 RX ORDER — SENNA PLUS 8.6 MG/1
2 TABLET ORAL
Qty: 60 | Refills: 0
Start: 2023-04-17 | End: 2023-05-16

## 2023-04-17 RX ADMIN — Medication 20 MILLIGRAM(S): at 05:33

## 2023-04-17 RX ADMIN — HYDROMORPHONE HYDROCHLORIDE 0.25 MILLIGRAM(S): 2 INJECTION INTRAMUSCULAR; INTRAVENOUS; SUBCUTANEOUS at 03:28

## 2023-04-17 RX ADMIN — HYDROMORPHONE HYDROCHLORIDE 0.25 MILLIGRAM(S): 2 INJECTION INTRAMUSCULAR; INTRAVENOUS; SUBCUTANEOUS at 11:47

## 2023-04-17 RX ADMIN — PANTOPRAZOLE SODIUM 40 MILLIGRAM(S): 20 TABLET, DELAYED RELEASE ORAL at 05:33

## 2023-04-17 RX ADMIN — SPIRONOLACTONE 50 MILLIGRAM(S): 25 TABLET, FILM COATED ORAL at 05:33

## 2023-04-17 RX ADMIN — HYDROMORPHONE HYDROCHLORIDE 0.25 MILLIGRAM(S): 2 INJECTION INTRAMUSCULAR; INTRAVENOUS; SUBCUTANEOUS at 03:58

## 2023-04-17 NOTE — DIETITIAN INITIAL EVALUATION ADULT - LITERATURE/VIDEOS GIVEN
HPI     ARLYN: 1 year  Glasses? no  Contacts? no  H/o eye surgery, injections or laser: no  H/o eye injury: no  Known eye conditions? no  Family h/o eye conditions? no  Eye gtts?no    (-) Flashes (-) Floaters (-) Mucous   (+) Tearing (-) Itching (-) Burning   (-) Headaches (-) Eye Pain/discomfort (-) Irritation   (-) Redness (-) Double vision (-) Blurry vision    Diabetic? (+)  A1c?  (Hemoglobin A1C       Date                     Value               Ref Range             Status                01/21/2020               9.0 (H)             4.0 - 5.6 %           Final              Comment:    ADA Screening Guidelines:  5.7-6.4%  Consistent with   prediabetes  >or=6.5%  Consistent with diabetes  High levels of fetal   hemoglobin interfere with the HbA1C  assay. Heterozygous hemoglobin   variants (HbS, HgC, etc)do  not significantly interfere with this assay.     However, presence of multiple variants may affect accuracy.    ----------)        Last edited by Rosa Martinez, OD on 2/17/2020  3:07 PM. (History)            Assessment /Plan     For exam results, see Encounter Report.    Type 2 diabetes mellitus without retinopathy    Astigmatism of right eye with presbyopia    Hyperopia with astigmatism and presbyopia, left    Nuclear sclerosis of both eyes      1. BS control. No signs of diabetic retinopathy. Monitor with annual exam.  2-3. SRx released to patient. Patient educated on lens options. Normal ocular health. RTC 1 year for routine exam.   4. Nuclear sclerotic cataract - not visually significant. Observe.                   
Cirrhosis Nutrition Therapy

## 2023-04-17 NOTE — DIETITIAN INITIAL EVALUATION ADULT - NS FNS DIET ORDER
Diet, Regular:   DASH/TLC {Sodium & Cholesterol Restricted} (DASH) (04-14-23 @ 20:48) [Active]

## 2023-04-17 NOTE — PROGRESS NOTE ADULT - ASSESSMENT
1. CPT C ETOH Cirrhosis, c/b ascites. MELD-NA 22. No HE or bleeding symptoms.    - s/p LVP, no SBP  - hepatology recs appreciated > diuretics resumed  - HCC screening: CT A/P 2/17 no focal liver lesions  - needs elective outpt EGD for EV screening   - pending abdominal US to r/o residual ascites, ?need for paracentesis     2. RUQ pain. No improvement after paracentesis. Pt with known cholelithiases, abd US shows mild GB wall thickening, CBD 5 mm. No fever or leukocytosis so acute debbie less likely.    - HIDA negative     3. Constipation   - conitnue senna 2 tabs QHS   - dulcolax suppository PRN     4. Anemia, no overt GI bleeding. Hgb stable.  - folate wnl, ferritin wnl, b12 elevated, total iron wnl    5. Hx of breast ca, s/p lumpectomy      Attending supervision statement: I have personally seen and examined the patient. I fully participated in the care of this patient. I have made amendments to the documentation where necessary, and agree with the history, physical exam, and plan as outlined by the ACP.    [facility]  Gastroenterology and Hepatology  [address] [place] [id number]  Office: [phone]  Cell: [phone]  
63 y F, hx of liver cirrhosis 2/2 alcohol use, breast CA (s/p surgery, radiation/chemo, in remission), now sent in by outpatient GI provider for paracentesis. Patient reports that her last paracentesis was in February - removed 2100 mL of fluid. Now complaining of RUQ pain, abdominal distension. Denies fever, nausea, vomiting, diarrhea, blood in stool, chest pain, shortness of breath. Last drink was in Jan 2023. D/w IR, tentative plan for procedure this evening. Pt not on any blood thinners, not on any NSAID.       # Abdominal distention: EtOH liver cirrhosis with ascites  - recent admission for similar event, s/p 2100 ml fluid removed.   - abd US with mod to large ascities  - uncomfortable. (states Oxy make her hallucinate. tolerated dilaudid IV in the past, low dose x 1 ordered)  - already received 1000 ml IV Tylenol. caution with liver cirrhosis.  - Leg edema much improved per pt and the    - no signs of infection.    - Pt completed prednisolone course per hepatology last admission. outpatient follow up with Dr. Rene Hand.  - IR consulted, pt s/p 6800 ml fluid removed. abd distention much improved.  - Hepatology started Lasix/Spironolactone. Dobutamine stress test as part of transplant eval.  - intermittent abd pain. on PRN IV dilaudid 0.25 mg  - HIDA scan for thicken gb wall   - GI/hepatology evals appreciated.     # Constipation  - miralax, add tap water enema, lactulose if no BM  - states miralax doesn't work well    #Anemia, thrombocytopenia  - due to cirrhosis  - close monitoring       # Hx of Breast CA sp lumpectomy, radiation/ chemo.  was taking letrozole now on hold, outpt fu    # Hypotension  borderline BP.   albumin IV post paracentesis    dvt ppx: hold AC given recent procedure.  will consider starting hep SQ in am.     
1. CPT C ETOH Cirrhosis, c/b ascites. MELD-NA 22. No HE or bleeding symptoms.    - s/p LVP, no SBP  - hepatology recs appreciated > diuretics resumed  - HCC screening: CT A/P 2/17 no focal liver lesions  - needs elective outpt EGD for EV screening     2. RUQ pain. No improvement after paracentesis. Pt with known cholelithiases, abd US shows mild GB wall thickening, CBD 5 mm. No fever or leukocytosis so acute debbie less likely.    - pending HIDA scan    3. Constipation   - conitnue senna 2 tabs QHS   - dulcolax suppository PRN     4. Anemia, no overt GI bleeding. Hgb stable.  - iron panel, B12, folate ordered      5. Hx of breast ca, s/p lumpectomy      Ariela Stevenson M.D.   Gastroenterology and Hepatology  [address] [place], [place]  Office: [phone]  Cell: [phone]  
63 y F, hx of liver cirrhosis 2/2 alcohol use, breast CA (s/p surgery, radiation/chemo, in remission), now sent in by outpatient GI provider for paracentesis. Patient reports that her last paracentesis was in February - removed 2100 mL of fluid. Now complaining of RUQ pain, abdominal distension. Denies fever, nausea, vomiting, diarrhea, blood in stool, chest pain, shortness of breath. Last drink was in Jan 2023. D/w IR, tentative plan for procedure this evening. Pt not on any blood thinners, not on any NSAID.       # Abdominal distention: EtOH liver cirrhosis with ascites  - recent admission for similar event, s/p 2100 ml fluid removed.   - abd US with mod to large ascities  - uncomfortable. (states Oxy make her hallucinate. tolerated dilaudid IV in the past, low dose x 1 ordered)  - already received 1000 ml IV Tylenol. caution with liver cirrhosis.  - Leg edema much improved per pt and the    - no signs of infection.    - Pt completed prednisolone course per hepatology last admission. outpatient follow up with Dr. Rene Hand.  - IR consulted, pt s/p 6800 ml fluid removed. abd distention much improved.  - Hepatology started Lasix/Spironolactone. Dobutamine stress test as part of transplant eval.  - intermittent abd pain. on PRN IV dilaudid 0.25 mg  - HIDA scan 4/16/23 (-) for acute cholecystitis  - repeat limited abd U/S ordered 4/17/23  - GI/hepatology evals appreciated.     # Constipation  - miralax, add tap water enema, lactulose if no BM  - states miralax doesn't work well    #Anemia, thrombocytopenia  - due to cirrhosis  - close monitoring     # Hx of Breast CA sp lumpectomy, radiation/ chemo.  was taking letrozole now on hold, outpt fu    # Hypotension  borderline BP.   albumin IV post paracentesis    dvt ppx: hold AC given recent procedure.  will consider starting hep SQ if prolonged bed rest    
Impression/suggestions  Decompensated cirrhosis with ascites.    She is currently on Lasix 20 mg and Aldactone 50 mg  Transplant work-up can be initiated as and outpatient   She has an appointment with her hepatologist this week in our office.    A dobutamine stress echo was performed during this hospitalization.  She will need an outpatient EGD to screen for varices with Dr. Hand.  Recent history of breast cancer is a barrier to transplant listing.  She had lumpectomy and radiation therapy.  The natural history of her cancer and the chance of 5-year recurrence will be discussed with oncologist to further clarify her transplant candidacy.  No obvious clinical evidence of cholecystitis.  Negative HIDA scan.   Given that she has evidence of ascites on exam as well as imaging we have recommended that she gets a paracentesis while in the hospital. Alternatively, if she does not want to wait  her diuretics can be increased to Lasix 40 mg and Aldactone 100 mg to see if her ascites goes down. We can also arrange outpatient paracentesis for her.  We will discuss this with her hospitalist.  Monitor CBC, CMP and INR while in the hospital.     Joleen Garibay  GI Fellow        
63 y F, hx of liver cirrhosis 2/2 alcohol use, breast CA (s/p surgery, radiation/chemo, in remission), now sent in by outpatient GI provider for paracentesis. Patient reports that her last paracentesis was in February - removed 2100 mL of fluid. Now complaining of RUQ pain, abdominal distension. Denies fever, nausea, vomiting, diarrhea, blood in stool, chest pain, shortness of breath. Last drink was in Jan 2023. D/w IR, tentative plan for procedure this evening. Pt not on any blood thinners, not on any NSAID.       # Abdominal distention: EtOH liver cirrhosis with ascites  - recent admission for similar event, s/p 2100 ml fluid removed.   - abd US with mod to large ascities  - uncomfortable. (states Oxy make her hallucinate. tolerated dilaudid IV in the past, low dose x 1 ordered)  - already received 1000 ml IV Tylenol. caution with liver cirrhosis.  - Leg edema much improved per pt and the    - no signs of infection.    - Pt completed prednisolone course per hepatology last admission. outpatient follow up with Dr. Rene Hand.  - IR consulted, pt s/p 6800 ml fluid removed. abd distention much improved.  - Hepatology started Lasix/Spironolactone. Dobutamine stress test as part of transplant eval.  - intermittent abd pain. on PRN IV dilaudid 0.25 mg  - HIDA scan 4/16/23 (-) for acute cholecystitis  - GI/hepatology evals appreciated.     # Constipation  - miralax, add tap water enema, lactulose if no BM  - states miralax doesn't work well    #Anemia, thrombocytopenia  - due to cirrhosis  - close monitoring       # Hx of Breast CA sp lumpectomy, radiation/ chemo.  was taking letrozole now on hold, outpt fu    # Hypotension  borderline BP.   albumin IV post paracentesis    dvt ppx: hold AC given recent procedure.  will consider starting hep SQ if prolonged bed rest

## 2023-04-17 NOTE — DISCHARGE NOTE NURSING/CASE MANAGEMENT/SOCIAL WORK - PATIENT PORTAL LINK FT
57 M hx of HTN, HLD, CAD/MI/PCI, T2DM on insulin/metformin with hx of DKA, afib on Eliquis, chronic pain syndrome (s/p C2-6 fusion) pw dizziness and weakness with hypotension and +COVID    # COVID  high risk   normal sats.  IV Remdesivir.   check inflammatory markers per COVID protocol  DVT proph - on Eliquis already.   vit C, D, zinc.    #Hypotension   related likely to poor appetite  hold Coreg for now.   IVFs overnight.   check trops x 2.     #T2DM on insulin with hx of DKA  cont lantus 22 qam  Lispro 6 u tid.     #chronic pain syndrome  cont methadone and dilaudid.  see chart note for istop.     #anxiety  cont zoloft.  cont klonopin  see chart note for istop.  You can access the FollowMyHealth Patient Portal offered by [facility] by registering at the following website: [email or web address]. By joining [employer]’s FollowMyHealth portal, you will also be able to view your health information using other applications (apps) compatible with our system.

## 2023-04-17 NOTE — DIETITIAN INITIAL EVALUATION ADULT - PERTINENT LABORATORY DATA
04-17    135  |  105  |  18  ----------------------------<  100<H>  3.7   |  20<L>  |  0.77    Ca    8.6      17 Apr 2023 07:01  Mg     2.2     04-16    TPro  6.3  /  Alb  2.5<L>  /  TBili  6.6<H>  /  DBili  x   /  AST  48<H>  /  ALT  13  /  AlkPhos  126<H>  04-17

## 2023-04-17 NOTE — PROGRESS NOTE ADULT - ATTENDING COMMENTS
Patient is seen at bedside.  No change in clinical exam  Moderate ascites on clinical exam  Patient is on spironolactone 50 mg and furosemide 20 mg  Dobutamine stress echo did not show ischemic heart disease  Patient has an appointment on Wednesday with her hepatologist in our office.  My suggestion is to increase the diuretics to spironolactone 100 mg and furosemide 40 mg with a close follow-up to be done in the next 2 days.  She may require another paracentesis if she does not respond to diuretics.  She will contact her GI physician to arrange outpatient upper endoscopy.    I have educated her and  regarding the value of low-salt high-protein diet.  Fall precaution was discussed.    please check CBC, CMP and PT/INR if she stays in the hospital.  I will be happy to transfer her to our service if she requires more hospitalization.

## 2023-04-17 NOTE — DIETITIAN INITIAL EVALUATION ADULT - OTHER INFO
Weight: pt noted with weight fluctuations due to fluids shifts, started on diuretics in house (Lasix, aldactone). Weight per [facility] as follows 193lbs (2/16/23), 201lbs (3/5/23). Current dosing weight is 179.5 lbs (4/14). Pt s/p paracentesis on 4/14, with 6.8L fluid removed, suspect additional weight loss, will continue to monitor.

## 2023-04-17 NOTE — DIETITIAN INITIAL EVALUATION ADULT - ORAL INTAKE PTA/DIET HISTORY
Pt reports overall fair to good PO intake and appetite PTA, consumes regular diet, likes to cooks at home. Pt noted with past hospital admission in February for abdominal pain and distention. NKFA. Pt denies chewing/swallowing difficulty, nausea, vomiting, diarrhea, constipation.

## 2023-04-17 NOTE — DIETITIAN INITIAL EVALUATION ADULT - ADD RECOMMEND
1) Continue current diet as tolerated. 2) Encourage PO intake of protein-rich foods. 3) Diet education provided, reinforce as needed.  1) Continue current diet as tolerated. 2) Encourage PO intake of protein-rich foods. 3) Recommend addition of Ensure Plus High Protein 1x daily. 4) Diet education provided, reinforce as needed. 5) Malnutrition alert placed in EMR.

## 2023-04-17 NOTE — DIETITIAN INITIAL EVALUATION ADULT - PERSON TAUGHT/METHOD
Physician Notification of Admission      To: Maddie Sharif P.A.-C.    [address]  [place] [address]    From: No att. providers found    Re: Iris Rowland, 2016    Admitted on: 7/18/2019  2:54 PM    Admitting Diagnosis:    Bright red blood per rectum  Bright red blood per rectum    Dear Maddie Sharif P.A.-C.,      Our records indicate that we have admitted a patient to [facility] department who has listed you as their primary care provider, and we wanted to make sure you were aware of this admission. We strive to improve patient care by facilitating active communication with our medical colleagues from around the region.    To speak with a member of the patients care team, please contact the [facility] Pediatric department at [phone].   Thank you for allowing us to participate in the care of your patient.      verbal instruction/written material/patient instructed

## 2023-04-17 NOTE — DIETITIAN INITIAL EVALUATION ADULT - REASON FOR ADMISSION
Other ascites    Chart reviewed, events noted. This is a "63 y F, hx of liver cirrhosis 2/2 alcohol use, breast CA (s/p surgery, radiation/chemo, in remission), now sent in by outpatient GI provider for paracentesis. Patient reports that her last paracentesis was in February - removed 2100 mL of fluid. Now complaining of RUQ pain, abdominal distension. Denies fever, nausea, vomiting, diarrhea, blood in stool, chest pain, shortness of breath. Last drink was in Jan 2023. " s/p paracentesis on 4/14, 6.8L fluid removal.

## 2023-04-17 NOTE — PROGRESS NOTE ADULT - SUBJECTIVE AND OBJECTIVE BOX
Pt seen and examined at bedside. She feels that her abdomen is more distended today.     PERTINENT REVIEW OF SYSTEMS:  CONSTITUTIONAL: No weakness, fevers or chills  HEENT: No visual changes; No vertigo or throat pain   GASTROINTESTINAL: Abdominal distention  NEUROLOGICAL: No numbness or weakness  SKIN: No itching, burning, rashes, or lesions     Allergies    No Known Allergies    Intolerances      MEDICATIONS:  MEDICATIONS  (STANDING):  furosemide    Tablet 20 milliGRAM(s) Oral daily  pantoprazole    Tablet 40 milliGRAM(s) Oral before breakfast  senna 2 Tablet(s) Oral at bedtime  spironolactone 50 milliGRAM(s) Oral daily    MEDICATIONS  (PRN):  acetaminophen     Tablet .. 650 milliGRAM(s) Oral every 6 hours PRN Temp greater or equal to 38C (100.4F), Mild Pain (1 - 3)  aluminum hydroxide/magnesium hydroxide/simethicone Suspension 30 milliLiter(s) Oral every 4 hours PRN Dyspepsia  melatonin 3 milliGRAM(s) Oral at bedtime PRN Insomnia  ondansetron Injectable 4 milliGRAM(s) IV Push every 8 hours PRN Nausea and/or Vomiting    Vital Signs Last 24 Hrs  T(C): 36.8 (2023 13:10), Max: 37.1 (2023 05:53)  T(F): 98.2 (2023 13:10), Max: 98.8 (2023 05:53)  HR: 90 (2023 13:10) (86 - 90)  BP: 113/64 (2023 13:10) (101/65 - 113/64)  BP(mean): --  RR: 18 (2023 13:10) (17 - 18)  SpO2: 98% (2023 13:10) (98% - 99%)    Parameters below as of 2023 13:10  Patient On (Oxygen Delivery Method): room air        -16 @ 07:01  -   @ 07:00  --------------------------------------------------------  IN: 320 mL / OUT: 700 mL / NET: -380 mL     @ 07:01  -   @ 16:04  --------------------------------------------------------  IN: 250 mL / OUT: 400 mL / NET: -150 mL      PHYSICAL EXAM:    General: Well developed; well nourished; in no acute distress  HEENT: MMM, conjunctiva and scleral icterus   Gastrointestinal: Soft non-tender, mild to moderate ascites  Skin: Warm and dry. No obvious rash    LABS:                        8.9    4.70  )-----------( 68       ( 2023 07:06 )             27.4         135  |  105  |  18  ----------------------------<  100<H>  3.7   |  20<L>  |  0.77    Ca    8.6      2023 07:01  Mg     2.2         TPro  6.3  /  Alb  2.5<L>  /  TBili  6.6<H>  /  DBili  x   /  AST  48<H>  /  ALT  13  /  AlkPhos  126<H>            Urinalysis Basic - ( 2023 11:55 )    Color: Light Yellow / Appearance: Clear / S.008 / pH: x  Gluc: x / Ketone: Negative  / Bili: Negative / Urobili: Negative   Blood: x / Protein: Negative / Nitrite: Negative   Leuk Esterase: Negative / RBC: x / WBC x   Sq Epi: x / Non Sq Epi: x / Bacteria: x                Culture - Fungal, Body Fluid (collected 2023 19:52)  Source: Peritoneal Peritoneal Fluid  Preliminary Report (2023 10:57):    Testing in progress    Culture - Body Fluid with Gram Stain (collected 2023 19:52)  Source: Ascites Fl Ascites Fluid  Gram Stain (15 Apr 2023 07:00):    No polymorphonuclear leukocytes seen    No organisms seen    by cytocentrifuge  Preliminary Report (2023 08:43):    No growth      RADIOLOGY & ADDITIONAL STUDIES:
 @ bedside  All ?s answered to the best of my abilities    Vital Signs Last 24 Hrs  T(C): 36.7 (2023 16:03), Max: 36.8 (15 Apr 2023 21:05)  T(F): 98 (2023 16:03), Max: 98.3 (15 Apr 2023 21:05)  HR: 83 (2023 16:03) (82 - 90)  BP: 107/68 (2023 16:03) (104/62 - 110/65)  BP(mean): --  RR: 18 (2023 16:03) (18 - 18)  SpO2: 99% (2023 16:03) (96% - 99%)    I&O's Summary    04-15-23 @ 07:01  -  23 @ 07:00  --------------------------------------------------------  IN: 600 mL / OUT: 400 mL / NET: 200 mL    23 @ 07:01  -  23 @ 17:15  --------------------------------------------------------  IN: 320 mL / OUT: 300 mL / NET: 20 mL          PHYSICAL EXAM:  GENERAL: NAD, thin-female, comfortable on room air  HEAD:  Atraumatic, Normocephalic  EYES: EOMI, PERRLA, conjunctiva and sclera clear  NECK: Supple, No JVD  CHEST/LUNG: mild decrease breath sounds bilaterally; No wheeze   HEART: Regular rate and rhythm; No murmurs, rubs, or gallops  ABDOMEN: Soft, Nontender, distend abd much improved; Bowel sounds present  Neuro: AAOx3, no focal weakness   EXTREMITIES:  2+ Peripheral Pulses, No clubbing, cyanosis; mild LE edema b/l    LABS:                        8.6    4.17  )-----------( 70       ( 2023 07:08 )             27.1     04-16    135  |  106  |  17  ----------------------------<  87  4.0   |  19<L>  |  0.72    Ca    8.4      2023 07:11  Mg     2.2             CAPILLARY BLOOD GLUCOSE            Urinalysis Basic - ( 2023 11:55 )    Color: Light Yellow / Appearance: Clear / S.008 / pH: x  Gluc: x / Ketone: Negative  / Bili: Negative / Urobili: Negative   Blood: x / Protein: Negative / Nitrite: Negative   Leuk Esterase: Negative / RBC: x / WBC x   Sq Epi: x / Non Sq Epi: x / Bacteria: x        RADIOLOGY & ADDITIONAL TESTS:    Imaging Personally Reviewed:  [x] YES  [ ] NO    Case discussed with NPP:  [X] YES  [ ] NO
  63-year-old patient with  cirrhosis related to alcohol use disorder  She is admitted to the hospital because of abdominal distention and ascites.  Paracentesis was done and there was no evidence of SBP.  Review of systems remarkable for fatigue, occasional right upper quadrant abdominal pain.  No history of encephalopathy.  No history of GI bleeding.  No nausea vomiting or diarrhea.  No chest pain or shortness of breath.  Labs and imaging were reviewed  Stable vital signs  Evidence of moderate muscle loss on upper extremities and upper body.  Evidence of a spider angioma on upper chest.  Mildly distended abdomen with no tenderness.  Mild edema of extremities  Awake alert and oriented  No HE    Impression/suggestions  Decompensated cirrhosis with ascites.    Patient was off diuretics at the time of admission.  Patient has evidence of muscle loss.  Value of high-protein low-salt diet was discussed with her and  in details.  Transplant work-up can be initiated as inpatient.   She has an appointment with her hepatologist this week in our office.  I have provided her with my contact info as a backup.  A dobutamine stress echo will be a part of transplant work-up  Recent history of breast cancer is a barrier to transplant listing.  She had lumpectomy and radiation therapy.  The natural history of her cancer and the chance of 5-year recurrence will be discussed with oncologist to further clarify her transplant candidacy.  No obvious clinical evidence of cholecystitis.  Negative HIDA scan.       
  Chief Complaint:  Patient is a 63y old  Female who presents with a chief complaint of Abdominal distention (16 Apr 2023 05:59)      Date of service 04-16-23 @ 07:58      Interval Events:   c/o RUQ pain    Hospital Medications:  acetaminophen     Tablet .. 650 milliGRAM(s) Oral every 6 hours PRN  aluminum hydroxide/magnesium hydroxide/simethicone Suspension 30 milliLiter(s) Oral every 4 hours PRN  furosemide    Tablet 20 milliGRAM(s) Oral daily  melatonin 3 milliGRAM(s) Oral at bedtime PRN  ondansetron Injectable 4 milliGRAM(s) IV Push every 8 hours PRN  pantoprazole    Tablet 40 milliGRAM(s) Oral before breakfast  senna 2 Tablet(s) Oral at bedtime  spironolactone 50 milliGRAM(s) Oral daily        Review of Systems:  General:  No wt loss, fevers, chills, night sweats, fatigue,   Eyes:  Good vision, no reported pain  ENT:  No sore throat, pain, runny nose, dysphagia  CV:  No pain, palpitations, hypo/hypertension  Resp:  No dyspnea, cough, tachypnea, wheezing  GI:  See HPI  :  No pain, bleeding, incontinence, nocturia  Muscle:  No pain, weakness  Neuro:  No weakness, tingling, memory problems  Psych:  No fatigue, insomnia, mood problems, depression  Endocrine:  No polyuria, polydipsia, cold/heat intolerance  Heme:  No petechiae, ecchymosis, easy bruisability  Integumentary:  No rash, edema    PHYSICAL EXAM:   Vital Signs:  Vital Signs Last 24 Hrs  T(C): 36.8 (16 Apr 2023 05:03), Max: 36.9 (15 Apr 2023 16:47)  T(F): 98.3 (16 Apr 2023 05:03), Max: 98.4 (15 Apr 2023 16:47)  HR: 83 (16 Apr 2023 05:03) (79 - 95)  BP: 104/62 (16 Apr 2023 05:03) (99/62 - 110/65)  BP(mean): --  RR: 18 (16 Apr 2023 05:03) (18 - 18)  SpO2: 97% (16 Apr 2023 05:03) (96% - 97%)    Parameters below as of 16 Apr 2023 05:03  Patient On (Oxygen Delivery Method): room air      Daily     Daily       PHYSICAL EXAM:     GENERAL:  Appears stated age, well-groomed, well-nourished, no distress  HEENT:  NC/AT,  conjunctivae anicteric, clear and pink,   NECK: supple, trachea midline  CHEST:  Full & symmetric excursion, no increased effort, breath sounds clear  HEART:  Regular rhythm, no JVD  ABDOMEN:  Soft, non-tender, non-distended, normoactive bowel sounds,  no masses , no hepatosplenomegaly  EXTREMITIES:  no cyanosis,clubbing or edema  SKIN:  No rash, erythema, or, ecchymoses, no jaundice  NEURO:  Alert, non-focal, no asterixis  PSYCH: Appropriate affect, oriented to place and time  RECTAL: Deferred      LABS Personally reviewed by me:                        8.6    4.17  )-----------( 70       ( 16 Apr 2023 07:08 )             27.1     Mean Cell Volume: 113.4 fl (04-16-23 @ 07:08)    04-15    136  |  104  |  16  ----------------------------<  98  3.2<L>   |  21<L>  |  0.73    Ca    7.9<L>      15 Apr 2023 07:15  Phos  2.2     04-14  Mg     1.7     04-15    TPro  7.3  /  Alb  2.8<L>  /  TBili  8.1<H>  /  DBili  x   /  AST  58<H>  /  ALT  17  /  AlkPhos  167<H>  04-14    LIVER FUNCTIONS - ( 14 Apr 2023 12:59 )  Alb: 2.8 g/dL / Pro: 7.3 g/dL / ALK PHOS: 167 U/L / ALT: 17 U/L / AST: 58 U/L / GGT: x           PT/INR - ( 14 Apr 2023 12:59 )   PT: 18.1 sec;   INR: 1.57 ratio         PTT - ( 14 Apr 2023 12:59 )  PTT:33.4 sec                            8.6    4.17  )-----------( 70       ( 16 Apr 2023 07:08 )             27.1                         8.3    4.32  )-----------( 71       ( 15 Apr 2023 07:15 )             25.7                         9.4    6.59  )-----------( 92       ( 14 Apr 2023 12:59 )             28.5       Imaging personally reviewed by me:          
SUBJECTIVE/ OVERNIGHT EVENTS:  Pt seen and examined at bedside.   No overnight event.  Feeling better. intermittent abd pain. on PRN IV dilaudid 0.25 mg  no cp, no sob, no n/v/d.   s/p 6800 ml fluid removed  the  at bedside.  hepatology at bedside.     --------------------------------------------------------------------------------------------  LABS:                        8.3    4.32  )-----------( 71       ( 15 Apr 2023 07:15 )             25.7     04-15    136  |  104  |  16  ----------------------------<  98  3.2<L>   |  21<L>  |  0.73    Ca    7.9<L>      15 Apr 2023 07:15  Phos  2.2     04-14  Mg     1.7     04-15    TPro  7.3  /  Alb  2.8<L>  /  TBili  8.1<H>  /  DBili  x   /  AST  58<H>  /  ALT  17  /  AlkPhos  167<H>  04-14    PT/INR - ( 14 Apr 2023 12:59 )   PT: 18.1 sec;   INR: 1.57 ratio         PTT - ( 14 Apr 2023 12:59 )  PTT:33.4 sec  CAPILLARY BLOOD GLUCOSE                RADIOLOGY & ADDITIONAL TESTS:    Imaging Personally Reviewed:  [x] YES  [ ] NO    Consultant(s) Notes Reviewed:  [x] YES  [ ] NO    MEDICATIONS  (STANDING):  furosemide    Tablet 20 milliGRAM(s) Oral daily  pantoprazole    Tablet 40 milliGRAM(s) Oral before breakfast  spironolactone 50 milliGRAM(s) Oral daily    MEDICATIONS  (PRN):  acetaminophen     Tablet .. 650 milliGRAM(s) Oral every 6 hours PRN Temp greater or equal to 38C (100.4F), Mild Pain (1 - 3)  aluminum hydroxide/magnesium hydroxide/simethicone Suspension 30 milliLiter(s) Oral every 4 hours PRN Dyspepsia  melatonin 3 milliGRAM(s) Oral at bedtime PRN Insomnia  ondansetron Injectable 4 milliGRAM(s) IV Push every 8 hours PRN Nausea and/or Vomiting      Care Discussed with Consultants/Other Providers [x] YES  [ ] NO    Vital Signs Last 24 Hrs  T(C): 36.9 (15 Apr 2023 16:47), Max: 36.9 (15 Apr 2023 16:47)  T(F): 98.4 (15 Apr 2023 16:47), Max: 98.4 (15 Apr 2023 16:47)  HR: 95 (15 Apr 2023 16:47) (79 - 95)  BP: 105/66 (15 Apr 2023 16:47) (95/57 - 105/66)  BP(mean): --  RR: 18 (15 Apr 2023 16:47) (18 - 18)  SpO2: 96% (15 Apr 2023 16:47) (96% - 98%)    Parameters below as of 15 Apr 2023 16:47  Patient On (Oxygen Delivery Method): room air      I&O's Summary    14 Apr 2023 07:01  -  15 Apr 2023 07:00  --------------------------------------------------------  IN: 0 mL / OUT: 450 mL / NET: -450 mL    15 Apr 2023 07:01  -  15 Apr 2023 19:58  --------------------------------------------------------  IN: 360 mL / OUT: 200 mL / NET: 160 mL        PHYSICAL EXAM:  GENERAL: NAD, thin-female, comfortable on room air  HEAD:  Atraumatic, Normocephalic  EYES: EOMI, PERRLA, conjunctiva and sclera clear  NECK: Supple, No JVD  CHEST/LUNG: mild decrease breath sounds bilaterally; No wheeze   HEART: Regular rate and rhythm; No murmurs, rubs, or gallops  ABDOMEN: Soft, Nontender, distend abd much improved; Bowel sounds present  Neuro: AAOx3, no focal weakness   EXTREMITIES:  2+ Peripheral Pulses, No clubbing, cyanosis, or edema  SKIN: No rashes or lesions 
  Chief Complaint:  Patient is a 63y old  Female who presents with a chief complaint of abdominal distention (2023 11:29)      Date of service 23 @ 13:23      Interval Events:   Patient seen and examined.   Had stress test this morning.   Having BMs., Tolerating diet.   Pain controlled, no nausea or vomiting.     Hospital Medications:  acetaminophen     Tablet .. 650 milliGRAM(s) Oral every 6 hours PRN  aluminum hydroxide/magnesium hydroxide/simethicone Suspension 30 milliLiter(s) Oral every 4 hours PRN  furosemide    Tablet 20 milliGRAM(s) Oral daily  melatonin 3 milliGRAM(s) Oral at bedtime PRN  ondansetron Injectable 4 milliGRAM(s) IV Push every 8 hours PRN  pantoprazole    Tablet 40 milliGRAM(s) Oral before breakfast  senna 2 Tablet(s) Oral at bedtime  spironolactone 50 milliGRAM(s) Oral daily        Review of Systems:  General:  No wt loss, fevers, chills, night sweats, fatigue,   Eyes:  Good vision, no reported pain  ENT:  No sore throat, pain, runny nose, dysphagia  CV:  No pain, palpitations, hypo/hypertension  Resp:  No dyspnea, cough, tachypnea, wheezing  GI:  See HPI  :  No pain, bleeding, incontinence, nocturia  Muscle:  No pain, weakness  Neuro:  No weakness, tingling, memory problems  Psych:  No fatigue, insomnia, mood problems, depression  Endocrine:  No polyuria, polydipsia, cold/heat intolerance  Heme:  No petechiae, ecchymosis, easy bruisability  Integumentary:  No rash, edema    PHYSICAL EXAM:   Vital Signs:  Vital Signs Last 24 Hrs  T(C): 36.8 (2023 13:10), Max: 37.1 (2023 05:53)  T(F): 98.2 (2023 13:10), Max: 98.8 (2023 05:53)  HR: 90 (2023 13:10) (83 - 90)  BP: 113/64 (2023 13:10) (101/65 - 113/64)  BP(mean): --  RR: 18 (2023 13:10) (17 - 18)  SpO2: 98% (2023 13:10) (98% - 99%)    Parameters below as of 2023 13:10  Patient On (Oxygen Delivery Method): room air      Daily     Daily       PHYSICAL EXAM:     GENERAL:  Appears stated age, well-groomed, well-nourished, no distress  HEENT:  NC/AT,  conjunctivae anicteric, clear and pink,   NECK: supple, trachea midline  CHEST:  Full & symmetric excursion, no increased effort, breath sounds clear  HEART:  Regular rhythm, no JVD  ABDOMEN:  Soft, non-tender, +distended, normoactive bowel sounds,  no masses , no hepatosplenomegaly  EXTREMITIES:  no cyanosis,clubbing or edema  SKIN:  No rash, erythema, or, ecchymoses, no jaundice  NEURO:  Alert, non-focal, no asterixis  PSYCH: Appropriate affect, oriented to place and time  RECTAL: Deferred      LABS Personally reviewed by me:                        8.9    4.70  )-----------( 68       ( 2023 07:06 )             27.4     Mean Cell Volume: 112.3 fl (- @ 07:06)        135  |  105  |  18  ----------------------------<  100<H>  3.7   |  20<L>  |  0.77    Ca    8.6      2023 07:01  Mg     2.2     -    TPro  6.3  /  Alb  2.5<L>  /  TBili  6.6<H>  /  DBili  x   /  AST  48<H>  /  ALT  13  /  AlkPhos  126<H>      LIVER FUNCTIONS - ( 2023 07:01 )  Alb: 2.5 g/dL / Pro: 6.3 g/dL / ALK PHOS: 126 U/L / ALT: 13 U/L / AST: 48 U/L / GGT: x             Urinalysis Basic - ( 2023 11:55 )    Color: Light Yellow / Appearance: Clear / S.008 / pH: x  Gluc: x / Ketone: Negative  / Bili: Negative / Urobili: Negative   Blood: x / Protein: Negative / Nitrite: Negative   Leuk Esterase: Negative / RBC: x / WBC x   Sq Epi: x / Non Sq Epi: x / Bacteria: x                              8.9    4.70  )-----------( 68       ( 2023 07:06 )             27.4                         8.6    4.17  )-----------( 70       ( 2023 07:08 )             27.1                         8.3    4.32  )-----------( 71       ( 15 Apr 2023 07:15 )             25.7       Imaging personally reviewed by me:          
 @ bedside  BM this morning  Pt feels abdomen feels more distended despite the BM    Vital Signs Last 24 Hrs  T(C): 37.1 (2023 05:53), Max: 37.1 (2023 05:53)  T(F): 98.8 (2023 05:53), Max: 98.8 (2023 05:53)  HR: 88 (2023 05:53) (83 - 88)  BP: 101/65 (2023 05:53) (101/65 - 113/62)  BP(mean): --  RR: 18 (2023 05:53) (17 - 18)  SpO2: 98% (2023 05:53) (98% - 99%)    I&O's Summary    23 @ 07:01  -  23 @ 07:00  --------------------------------------------------------  IN: 320 mL / OUT: 700 mL / NET: -380 mL        PHYSICAL EXAM:  GENERAL: NAD, thin-female, comfortable on room air  HEAD:  Atraumatic, Normocephalic  EYES: EOMI, PERRLA, conjunctiva and sclera clear  NECK: Supple, No JVD  CHEST/LUNG: mild decrease breath sounds bilaterally; No wheeze   HEART: Regular rate and rhythm; No murmurs, rubs, or gallops  ABDOMEN: Soft, Nontender, distend abd much improved; Bowel sounds present  Neuro: AAOx3, no focal weakness   EXTREMITIES:  2+ Peripheral Pulses, No clubbing, cyanosis; mild LE edema b/l    LABS:                        8.9    4.70  )-----------( 68       ( 2023 07:06 )             27.4     -    135  |  105  |  18  ----------------------------<  100<H>  3.7   |  20<L>  |  0.77    Ca    8.6      2023 07:01  Mg     2.2     -    TPro  6.3  /  Alb  2.5<L>  /  TBili  6.6<H>  /  DBili  x   /  AST  48<H>  /  ALT  13  /  AlkPhos  126<H>  04-17      CAPILLARY BLOOD GLUCOSE            Urinalysis Basic - ( 2023 11:55 )    Color: Light Yellow / Appearance: Clear / S.008 / pH: x  Gluc: x / Ketone: Negative  / Bili: Negative / Urobili: Negative   Blood: x / Protein: Negative / Nitrite: Negative   Leuk Esterase: Negative / RBC: x / WBC x   Sq Epi: x / Non Sq Epi: x / Bacteria: x        RADIOLOGY & ADDITIONAL TESTS:    Imaging Personally Reviewed:  [x] YES  [ ] NO    Case discussed with NPP:  [X] YES  [ ] NO

## 2023-04-17 NOTE — DIETITIAN INITIAL EVALUATION ADULT - SIGNS/SYMPTOMS
7.25% weight loss x 2 months, meeting </= 75% of estimated needs >/= 1 month pt with  cirrhosis related to alcohol use disorder

## 2023-04-17 NOTE — PROGRESS NOTE ADULT - PROVIDER SPECIALTY LIST ADULT
Gastroenterology
Internal Medicine
Hepatology
Internal Medicine
Gastroenterology
Internal Medicine
Transplant Hepatology

## 2023-04-17 NOTE — DIETITIAN INITIAL EVALUATION ADULT - PERTINENT MEDS FT
MEDICATIONS  (STANDING):  furosemide    Tablet 20 milliGRAM(s) Oral daily  pantoprazole    Tablet 40 milliGRAM(s) Oral before breakfast  senna 2 Tablet(s) Oral at bedtime  spironolactone 50 milliGRAM(s) Oral daily    MEDICATIONS  (PRN):  acetaminophen     Tablet .. 650 milliGRAM(s) Oral every 6 hours PRN Temp greater or equal to 38C (100.4F), Mild Pain (1 - 3)  aluminum hydroxide/magnesium hydroxide/simethicone Suspension 30 milliLiter(s) Oral every 4 hours PRN Dyspepsia  melatonin 3 milliGRAM(s) Oral at bedtime PRN Insomnia  ondansetron Injectable 4 milliGRAM(s) IV Push every 8 hours PRN Nausea and/or Vomiting

## 2023-04-17 NOTE — PROGRESS NOTE ADULT - NUTRITIONAL ASSESSMENT
This patient has been assessed with a concern for Malnutrition and has been determined to have a diagnosis/diagnoses of Moderate protein-calorie malnutrition.    This patient is being managed with:   Diet Regular-  DASH/TLC {Sodium & Cholesterol Restricted} (DASH)  Entered: Apr 14 2023  8:48PM    The following pending diet order is being considered for treatment of Moderate protein-calorie malnutrition:  Diet DASH/TLC-  Sodium & Cholesterol Restricted  Supplement Feeding Modality:  Oral  Ensure Plus High Protein Cans or Servings Per Day:  1       Frequency:  Daily  Entered: Apr 17 2023 10:56AM

## 2023-04-17 NOTE — DIETITIAN INITIAL EVALUATION ADULT - ENERGY INTAKE
in-house pt reports poor PO intake, consuming < 50% of meals which patient attributes to dislike of institutionalized foods. Denies any nausea, emesis. Last bowel movement 4/17 . Encourage intake of low sodium foods and prioritizing lean protein with meals. Written information provided.  Fair (50-75%) In-house pt reports poor PO intake x 3 -4 days, consuming < 50% of meals which patient attributes to dislike of institutionalized foods. Denies any nausea, emesis. Last bowel movement 4/17 . Encourage intake of low sodium foods and prioritizing lean protein with meals. Discussed with patient importance of PO intake and prioritizing sources of protein to maintain lean body mass. Written information provided regarding Cirrhosis Nutrition Therapy.

## 2023-04-19 ENCOUNTER — APPOINTMENT (OUTPATIENT)
Dept: HEPATOLOGY | Facility: CLINIC | Age: 63
End: 2023-04-19
Payer: COMMERCIAL

## 2023-04-19 VITALS
SYSTOLIC BLOOD PRESSURE: 111 MMHG | RESPIRATION RATE: 14 BRPM | DIASTOLIC BLOOD PRESSURE: 68 MMHG | TEMPERATURE: 98.2 F | BODY MASS INDEX: 25.61 KG/M2 | HEIGHT: 68 IN | HEART RATE: 95 BPM | OXYGEN SATURATION: 99 % | WEIGHT: 169 LBS

## 2023-04-19 PROCEDURE — 99215 OFFICE O/P EST HI 40 MIN: CPT

## 2023-04-19 NOTE — PHYSICAL EXAM
[Scleral Icterus] : Scleral Icterus noted [Spider Angioma] : Spider angioma(s) was(were) observed [Abdominal  Ascites] : ascites [Jaundice] : Jaundice [General Appearance - Alert] : alert [General Appearance - In No Acute Distress] : in no acute distress [PERRL With Normal Accommodation] : pupils were equal in size, round, and reactive to light [Extraocular Movements] : extraocular movements were intact [] : no respiratory distress [Auscultation Breath Sounds / Voice Sounds] : lungs were clear to auscultation bilaterally [Heart Rate And Rhythm] : heart rate was normal and rhythm regular [Heart Sounds] : normal S1 and S2 [Heart Sounds Gallop] : no gallops [Murmurs] : no murmurs [Heart Sounds Pericardial Friction Rub] : no pericardial rub [Full Pulse] : the pedal pulses are present [Edema] : there was no peripheral edema [Abdomen Soft] : soft [Abdomen Tenderness] : non-tender [Nail Clubbing] : no clubbing  or cyanosis of the fingernails [Abnormal Walk] : normal gait [Musculoskeletal - Swelling] : no joint swelling seen [Motor Tone] : muscle strength and tone were normal [Deep Tendon Reflexes (DTR)] : deep tendon reflexes were 2+ and symmetric [Sensation] : the sensory exam was normal to light touch and pinprick [No Focal Deficits] : no focal deficits [Oriented To Time, Place, And Person] : oriented to person, place, and time [Impaired Insight] : insight and judgment were intact [Affect] : the affect was normal [Ascites Fluid Wave] : positive ascites fluid wave [Ascites Shifting Dullness] : shifting dullness if ascites [Ascites Tense] : ascites is not tense [Asterixis] : no asterixis observed [Depression] : no depression [Hallucinations] : ~T no ~M hallucinations [FreeTextEntry1] : +LE Edema

## 2023-04-19 NOTE — HISTORY OF PRESENT ILLNESS
[FreeTextEntry1] : Ms. ABBY MALAGON is a 62 year old female with a PMH of breast CA s/p lumpectomy, ETOH abuse, and HTN. She was recently admitted to [facility] from 2/17-2/25 for abdominal distention and jaundice. She originally saw PCP in Jan 2023 when she noted she was turning yellow, PCP did bloodwork and referred her to GI- Dr Lazcano. \par \par Dr Lazcano sent her to ER for admission for MRCP and further eval. \par \par Jai Jaleesa\par [phone]\par \par She states she used to have 2 glasses of vodka daily for 3 years to treat her chronic pain from her breast cancer - her last drink was 1/20/23.\par \par She reports she was on Letrozole for her breast caner and was taking Tylenol daily at recommended doses to treat her pain, she is concerned that the drug interaction may have damaged her liver. 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.\par \par Had paracentesis 2.1L in hospital in Feb\par \par She was discharged with steroid taper due to favorable Lille score however bilirubin has not improved\par Her LOV with me 3/15 - MELD score still elevated - referred for OLT evaluation\par Patient with increased abd distention and LE edema - increased diuretics to 100mg aldactone and 40mg lasix with low salt diet\par \par Interval Hx\par - Steroids tapered off\par - admitted to [facility] with increasing abd distention - had paracentesis done - 6.8L - negative for SBP\par - discharged on lasix 40mg and aldactone 50mg \par - prior to admission patient was not fully taking increased dose of diuetics - sticking with low salt diet\par - she had OLT evaluation scheduled for next month\par - she has no residual edema at this time \par - no f/c/n/v/cp/sob since d/c\par - no encephalopathy \par \par CT:\par Age-indeterminate L5 compression fracture though new from 2/17/2023.\par Cirrhosis with evidence of portal hypertension including splenomegaly, varices and moderate volume ascites. Increased ascites when compared with 2/17/2023.\par Contracted gallbladder with gallstones.\par \par

## 2023-04-19 NOTE — ASSESSMENT
[FreeTextEntry1] : 62F with ETOH Cirrhosis  / ETOH hepatitis presenting for follow up - MELD 20\par For LE Edema and ascites  will Increase 100mg Aldactone Continue Lasix 40mg daily\par Low salt diet encouraged\par \par Discussed natural history of cirrhosis with patient\par Discussed salt restriction and abstinence from ETOH\par Stressed importance of close follow up - including regular interval labs and screening for HCC\par \par Her imaging for HCC is up to date - will repeat 6 months - recent imaging in hospital\par She is due for EGD/Colon - should be arranged with Dr. Lazcano - outpatient GI vs Dr. Stevenson whose group saw her in hospital\par \par Her MELD score remains around 20\par Referred for OLT evakuation - for next month\par RTC 2 weeks with labs\par \par Breast cancer hx needs further clarification - Stage 1 disease typically does not require any waiting time to transplant. She had hx of lumpectomy with radiation and hormonal therapy\par Treatment occuredat [facility] \par \par Hopeful patients liver disease can continue to stabilize without need for transplant but given ascites and portal HTN - would need to proceed with transplant evaluation

## 2023-04-19 NOTE — PHYSICAL EXAM
[Scleral Icterus] : Scleral Icterus noted [Spider Angioma] : Spider angioma(s) was(were) observed [Abdominal  Ascites] : ascites [Jaundice] : Jaundice [General Appearance - Alert] : alert [General Appearance - In No Acute Distress] : in no acute distress [PERRL With Normal Accommodation] : pupils were equal in size, round, and reactive to light [Extraocular Movements] : extraocular movements were intact [] : no respiratory distress [Auscultation Breath Sounds / Voice Sounds] : lungs were clear to auscultation bilaterally [Heart Rate And Rhythm] : heart rate was normal and rhythm regular [Heart Sounds] : normal S1 and S2 [Heart Sounds Gallop] : no gallops [Murmurs] : no murmurs [Heart Sounds Pericardial Friction Rub] : no pericardial rub [Full Pulse] : the pedal pulses are present [Edema] : there was no peripheral edema [Abdomen Tenderness] : non-tender [Abdomen Soft] : soft [Nail Clubbing] : no clubbing  or cyanosis of the fingernails [Abnormal Walk] : normal gait [Musculoskeletal - Swelling] : no joint swelling seen [Motor Tone] : muscle strength and tone were normal [Deep Tendon Reflexes (DTR)] : deep tendon reflexes were 2+ and symmetric [Sensation] : the sensory exam was normal to light touch and pinprick [No Focal Deficits] : no focal deficits [Oriented To Time, Place, And Person] : oriented to person, place, and time [Impaired Insight] : insight and judgment were intact [Affect] : the affect was normal [Ascites Fluid Wave] : positive ascites fluid wave [Ascites Shifting Dullness] : shifting dullness if ascites [Ascites Tense] : ascites is not tense [Asterixis] : no asterixis observed [Depression] : no depression [Hallucinations] : ~T no ~M hallucinations [FreeTextEntry1] : +LE Edema

## 2023-04-19 NOTE — HISTORY OF PRESENT ILLNESS
[FreeTextEntry1] : Ms. ABBY MALAGON is a 62 year old female with a PMH of breast CA s/p lumpectomy, ETOH abuse, and HTN. She was recently admitted to [facility] from 2/17-2/25 for abdominal distention and jaundice. She originally saw PCP in Jan 2023 when she noted she was turning yellow, PCP did bloodwork and referred her to GI- Dr Lazcano. \par \par Dr Lazcano sent her to ER for admission for MRCP and further eval. \par \par Jai Jaleesa\par [phone]\par \par She states she used to have 2 glasses of vodka daily for 3 years to treat her chronic pain from her breast cancer - her last drink was 1/20/23.\par \par She reports she was on Letrozole for her breast caner and was taking Tylenol daily at recommended doses to treat her pain, she is concerned that the drug interaction may have damaged her liver. 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.\par \par Had paracentesis 2.1L in hospital in Feb\par \par She was discharged with steroid taper due to favorable Lille score however bilirubin has not improved\par Her LOV with me 3/15 - MELD score still elevated - referred for OLT evaluation\par Patient with increased abd distention and LE edema - increased diuretics to 100mg aldactone and 40mg lasix with low salt diet\par \par Interval Hx\par - Steroids tapered off\par - admitted to [facility] with increasing abd distention - had paracentesis done - 6.8L - negative for SBP\par - discharged on lasix 40mg and aldactone 50mg \par - prior to admission patient was not fully taking increased dose of diuetics - sticking with low salt diet\par - she had OLT evaluation scheduled for next month\par - she has no residual edema at this time \par - no f/c/n/v/cp/sob since d/c\par - no encephalopathy \par \par CT:\par Age-indeterminate L5 compression fracture though new from 2/17/2023.\par Cirrhosis with evidence of portal hypertension including splenomegaly, varices and moderate volume ascites. Increased ascites when compared with 2/17/2023.\par Contracted gallbladder with gallstones.\par \par

## 2023-04-19 NOTE — ASSESSMENT
[FreeTextEntry1] : 62F with ETOH Cirrhosis  / ETOH hepatitis presenting for follow up - MELD 20\par For LE Edema and ascites  will Increase 100mg Aldactone Continue Lasix 40mg daily\par Low salt diet encouraged\par \par Discussed natural history of cirrhosis with patient\par Discussed salt restriction and abstinence from ETOH\par Stressed importance of close follow up - including regular interval labs and screening for HCC\par \par Her imaging for HCC is up to date - will repeat 6 months - recent imaging in hospital\par She is due for EGD/Colon - should be arranged with Dr. Lazcano - outpatient GI vs Dr. Stevenson whose group saw her in hospital\par \par Her MELD score remains around 20\par Referred for OLT evakuation - for next month\par RTC 2 weeks with labs\par \par Breast cancer hx needs further clarification - Stage 1 disease typically does not require any waiting time to transplant. She had hx of lumpectomy with radiation and hormonal therapy\par Treatment occuredat [facility] \par \par Hopeful patients liver disease can continue to stabilize without need for transplant but given ascites and portal HTN - would need to proceed with transplant evaluation

## 2023-04-20 LAB
CULTURE RESULTS: SIGNIFICANT CHANGE UP
SPECIMEN SOURCE: SIGNIFICANT CHANGE UP

## 2023-05-02 LAB
ALBUMIN SERPL ELPH-MCNC: 2.9 G/DL
ALP BLD-CCNC: 131 U/L
ALT SERPL-CCNC: 17 U/L
ANION GAP SERPL CALC-SCNC: 12 MMOL/L
AST SERPL-CCNC: 56 U/L
BASOPHILS # BLD AUTO: 0.03 K/UL
BASOPHILS NFR BLD AUTO: 0.4 %
BILIRUB SERPL-MCNC: 6.8 MG/DL
BUN SERPL-MCNC: 20 MG/DL
CALCIUM SERPL-MCNC: 8.9 MG/DL
CHLORIDE SERPL-SCNC: 99 MMOL/L
CO2 SERPL-SCNC: 24 MMOL/L
CREAT SERPL-MCNC: 0.94 MG/DL
EGFR: 69 ML/MIN/1.73M2
EOSINOPHIL # BLD AUTO: 0.28 K/UL
EOSINOPHIL NFR BLD AUTO: 4.1 %
GLUCOSE SERPL-MCNC: 116 MG/DL
HCT VFR BLD CALC: 30 %
HGB BLD-MCNC: 9.7 G/DL
IMM GRANULOCYTES NFR BLD AUTO: 0.1 %
INR PPP: 1.42 RATIO
LYMPHOCYTES # BLD AUTO: 1.52 K/UL
LYMPHOCYTES NFR BLD AUTO: 22.1 %
MAN DIFF?: NORMAL
MCHC RBC-ENTMCNC: 32.3 GM/DL
MCHC RBC-ENTMCNC: 36.3 PG
MCV RBC AUTO: 112.4 FL
MONOCYTES # BLD AUTO: 0.6 K/UL
MONOCYTES NFR BLD AUTO: 8.7 %
NEUTROPHILS # BLD AUTO: 4.45 K/UL
NEUTROPHILS NFR BLD AUTO: 64.6 %
PLATELET # BLD AUTO: 102 K/UL
POTASSIUM SERPL-SCNC: 3.5 MMOL/L
PROT SERPL-MCNC: 7.7 G/DL
PT BLD: 16.8 SEC
RBC # BLD: 2.67 M/UL
RBC # FLD: 15.2 %
SODIUM SERPL-SCNC: 135 MMOL/L
WBC # FLD AUTO: 6.89 K/UL

## 2023-05-04 ENCOUNTER — APPOINTMENT (OUTPATIENT)
Dept: HEPATOLOGY | Facility: CLINIC | Age: 63
End: 2023-05-04
Payer: COMMERCIAL

## 2023-05-04 VITALS
HEART RATE: 95 BPM | RESPIRATION RATE: 15 BRPM | BODY MASS INDEX: 25.61 KG/M2 | WEIGHT: 169 LBS | OXYGEN SATURATION: 99 % | TEMPERATURE: 97.8 F | HEIGHT: 68 IN | SYSTOLIC BLOOD PRESSURE: 113 MMHG | DIASTOLIC BLOOD PRESSURE: 75 MMHG

## 2023-05-04 PROCEDURE — 99214 OFFICE O/P EST MOD 30 MIN: CPT

## 2023-05-04 RX ORDER — PREDNISOLONE ORAL 15 MG/5ML
15 SOLUTION ORAL
Refills: 0 | Status: DISCONTINUED | COMMUNITY
Start: 2023-03-02 | End: 2023-05-04

## 2023-05-04 RX ORDER — ALUMINUM HYDROXIDE, MAGNESIUM HYDROXIDE, AND DIMETHICONE 200; 20; 200 MG/5ML; MG/5ML; MG/5ML
200-200-20 SUSPENSION ORAL
Refills: 0 | Status: DISCONTINUED | COMMUNITY
Start: 2023-03-02 | End: 2023-05-04

## 2023-05-05 NOTE — HISTORY OF PRESENT ILLNESS
[FreeTextEntry1] : Ms. ABBY MALAGON is a 63 year old female with a PMH of breast CA s/p lumpectomy, ETOH abuse, and HTN. She was recently admitted to [facility] from 2/17-2/25 for abdominal distention and jaundice. She originally saw PCP in Jan 2023 when she noted she was turning yellow, PCP did bloodwork and referred her to GI- Dr Lazcano. \par \par Dr Lazcano sent her to ER for admission for MRCP and further eval. \par \par Jai Jaleesa\par [phone]\par \par She states she used to have 2 glasses of vodka daily for 3 years to treat her chronic pain from her breast cancer - her last drink was 1/20/23.\par \par She reports she was on Letrozole for her breast caner and was taking Tylenol daily at recommended doses to treat her pain, she is concerned that the drug interaction may have damaged her liver. She has since stopped taking Tylenol. 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.\par \par Had paracentesis 2.1L in hospital in Feb\par \par She was discharged with steroid taper due to favorable Lille score however bilirubin has not improved\par Her LOV with me 3/15 - MELD score still elevated - referred for OLT evaluation\par Patient with increased abd distention and LE edema - increased diuretics to 100mg aldactone and 40mg lasix with low salt diet\par \par Interval Hx\par - Steroids tapered off\par - admitted to [facility] with increasing abd distention - had paracentesis done - 6.8L - negative for SBP\par - discharged on lasix 40mg and aldactone 50mg - now on BID\par - prior to admission patient was not fully taking increased dose of diuetics - sticking with low salt diet\par - she had OLT evaluation scheduled for next week\par - she has no residual edema at this time \par - no f/c/n/v/cp/sob since d/c\par - no encephalopathy \par \par CT:\par Age-indeterminate L5 compression fracture though new from 2/17/2023.\par Cirrhosis with evidence of portal hypertension including splenomegaly, varices and moderate volume ascites. Increased ascites when compared with 2/17/2023.\par Contracted gallbladder with gallstones.\par \par

## 2023-05-05 NOTE — PHYSICAL EXAM
[Scleral Icterus] : Scleral Icterus noted [Spider Angioma] : Spider angioma(s) was(were) observed [Abdominal  Ascites] : ascites [Ascites Fluid Wave] : positive ascites fluid wave [Ascites Tense] : ascites is not tense [Ascites Shifting Dullness] : shifting dullness if ascites [Asterixis] : no asterixis observed [Jaundice] : Jaundice [Depression] : no depression [Hallucinations] : ~T no ~M hallucinations [General Appearance - Alert] : alert [General Appearance - In No Acute Distress] : in no acute distress [PERRL With Normal Accommodation] : pupils were equal in size, round, and reactive to light [Extraocular Movements] : extraocular movements were intact [] : no respiratory distress [Auscultation Breath Sounds / Voice Sounds] : lungs were clear to auscultation bilaterally [Heart Rate And Rhythm] : heart rate was normal and rhythm regular [Heart Sounds] : normal S1 and S2 [Heart Sounds Gallop] : no gallops [Heart Sounds Pericardial Friction Rub] : no pericardial rub [Murmurs] : no murmurs [Full Pulse] : the pedal pulses are present [Edema] : there was no peripheral edema [Abdomen Soft] : soft [Abdomen Tenderness] : non-tender [Abnormal Walk] : normal gait [Nail Clubbing] : no clubbing  or cyanosis of the fingernails [Musculoskeletal - Swelling] : no joint swelling seen [Motor Tone] : muscle strength and tone were normal [FreeTextEntry1] : +LE Edema  [Deep Tendon Reflexes (DTR)] : deep tendon reflexes were 2+ and symmetric [Sensation] : the sensory exam was normal to light touch and pinprick [No Focal Deficits] : no focal deficits [Oriented To Time, Place, And Person] : oriented to person, place, and time [Impaired Insight] : insight and judgment were intact [Affect] : the affect was normal

## 2023-05-05 NOTE — ASSESSMENT
[FreeTextEntry1] : 63F with ETOH Cirrhosis  / ETOH hepatitis presenting for follow up - MELD 20\par For LE Edema and ascites  will Increase 50mg  BID Aldactone Continue Lasix 40mg daily\par Low salt diet encouraged\par \par Discussed natural history of cirrhosis with patient\par Discussed salt restriction and abstinence from ETOH\par Stressed importance of close follow up - including regular interval labs and screening for HCC\par \par Her imaging for HCC is up to date - will repeat 6 months\par She is due for EGD/Colon - should be arranged with Dr. Lazcano - outpatient GI vs Dr. Stevenson whose group saw her in hospital\par \par Her MELD score remains around 19-20\par OLT eval next week\par \par Breast cancer hx needs further clarification - Stage 1 disease typically does not require any waiting time to transplant. She had hx of lumpectomy with radiation and hormonal therapy\par Treatment occurred [facility] \par \par Hopeful patients liver disease can continue to stabilize without need for transplant but given ascites and portal HTN with recent admission - would need to proceed with transplant evaluation

## 2023-05-11 LAB — PHOSPHATIDYETHANOL (PETH), WHOLE BLOOD: NEGATIVE

## 2023-05-13 LAB
CULTURE RESULTS: SIGNIFICANT CHANGE UP
SPECIMEN SOURCE: SIGNIFICANT CHANGE UP

## 2023-05-16 ENCOUNTER — APPOINTMENT (OUTPATIENT)
Dept: TRANSPLANT | Facility: CLINIC | Age: 63
End: 2023-05-16
Payer: COMMERCIAL

## 2023-05-16 ENCOUNTER — LABORATORY RESULT (OUTPATIENT)
Age: 63
End: 2023-05-16

## 2023-05-16 ENCOUNTER — NON-APPOINTMENT (OUTPATIENT)
Age: 63
End: 2023-05-16

## 2023-05-16 VITALS
TEMPERATURE: 98 F | OXYGEN SATURATION: 100 % | WEIGHT: 171 LBS | SYSTOLIC BLOOD PRESSURE: 101 MMHG | BODY MASS INDEX: 25.91 KG/M2 | RESPIRATION RATE: 15 BRPM | DIASTOLIC BLOOD PRESSURE: 63 MMHG | HEIGHT: 68 IN | HEART RATE: 93 BPM

## 2023-05-16 LAB
ESTIMATED AVERAGE GLUCOSE: 82 MG/DL
ETHANOL BLD-MCNC: <10 MG/DL
HBA1C MFR BLD HPLC: 4.5 %
INR PPP: 1.5 RATIO
PT BLD: 17.5 SEC

## 2023-05-16 PROCEDURE — 99205 OFFICE O/P NEW HI 60 MIN: CPT

## 2023-05-16 NOTE — PLAN
Spontaneous, unlabored and symmetrical [Patient agrees to proceed with the full evaluation for liver transplantation.] : Patient agrees to proceed with the full evaluation for liver transplantation.

## 2023-05-16 NOTE — REASON FOR VISIT
[Initial] : an initial visit for [Liver Transplant Evaluation] : liver transplant evaluation [Consent for evaluation completed] : Consent for evaluation completed

## 2023-05-16 NOTE — PHYSICAL EXAM
[Alert] : alert [No Acute Distress] : no acute distress [Scleral Icterus] : scleral icterus [No Thyromegaly] : no thyromegaly [No Abnormal Masses] : no abnormal masses [Clear to Auscultation] : lungs were clear to auscultation bilaterally [Breathing Comfortably on room air] : breathing comfortably on room air [Normal Rate] : normal rate [Regular Rhythm] : regular rhythm [Abdominal Ascites] : abdominal ascites [Soft] : soft [No Masses] : no abdominal mass palpated [Spider Angioma] : spider angioma [Jaundice] : jaundice [FreeTextEntry1] : mild lower ext edema

## 2023-05-16 NOTE — ASSESSMENT
[Good candidate] : a good candidate. We should proceed with our protocol for evaluation for liver transplantation. [FreeTextEntry1] : 63 year old female with alcoholic cirrhosis\par decompensated with jaundice and recurrent ascites\par good candidate for transplant evaluation\par MELD around 18; been improving\par history of breast cancer in 2020; probably ok to proceed with transplant evaluation\par imaging; no HCC

## 2023-05-16 NOTE — END OF VISIT
[Attestation] : The patient was explained alternatives, benefits and risk of liver transplantation, including but not limited to infection, bleeding, hepatic artery or portal vein thrombosis, primary dysfunction or primary non-function of the liver allograft, cardiopulmonary arrest, intra-operative death and other surgical, medical and psychosocial risks as outlined in the evaluation consent form.  The patient understands these risks and is willing to proceed with liver transplantation. The patient was also explained the need to remain on lifelong anti-rejection medications.  We discussed the risks and side effects of immunosuppressive medications including, but not limited to infection, cancer, weight gain, new onset or worsening of diabetes or hypertension in a temporary or permanent state, kidney dysfunction, water retention, back pain, constipation, diarrhea, dizziness, headache, joint pain, loss of appetite, nausea, stomach pain or upset, trouble sleeping, vomiting, and mental or mood changes.  An overview of the follow-up protocol was reviewed including outpatient visits, blood tests and the potential for hospital readmission.  The patient understands these risks and is willing to proceed with liver transplantation. We also discussed the available donor organ pool. We discussed the assessment of the  donor including age, cause of death, cardiac arrest, electrolyte abnormalities, course and length of hospital stay, use of vasopressors, hepatitis and HIV testing. We reviewed organ donor risk factors that could affect the success of the graft or the health of the patient, including, but not limited to, the donor's history, condition or age of the organs used, or the patient's potential risk of jasmin the human immunodeficiency virus and other infectious diseases if the disease cannot be detected in an infected donor.  We discussed and defined the option of an extended criteria for cadaveric donors (Hepatitis B core Ab positive donor, Hepatitis C Ab positive donor, steatosis, older donors, split livers and DCD donors) and early and late outcomes of graft survival after transplantation. The options of  donor liver transplantation vs. live donor liver transplantation were discussed with the patient.  Differences between donation after cardiac death (DCD) compared to donation after brain death (DBD) liver transplantation were also fully disclosed and included lower graft survival rates, the increased incidence of hepatic artery stenosis, bile duct injury, ischemic cholangiopathy and increased re transplant rates seen in recipients of DCD donor livers. The use of the [place] Public Health Services (PHS) Guideline has defined some donors as "Increased Risk Donors" based on their history which may suggest socially increased risk behaviors was discussed. The patient is aware that if PHS increased risk donor is offered to the candidate, the transplant team will discuss the specifics to assist with making an informed decision. We discussed our post-transplant protocol of serology testing if the candidate receives an organ that is PHS increased risk. The patient was made aware that it is against the law to be paid or to pay to donate an organ.  If any money was given or will be given in exchange for receiving an organ, the patient may be subject to criminal prosecution, and any insurance coverage may no longer apply and patient may become personally responsible for all the health care costs associated with the donation, and private health information will be available to law enforcement agencies. We explained that we store vessels for subsequent later use in transplants.  Again, we discussed the extensive testing done on  donors prior to donation, however despite an extensive evaluation on the donor, there is potential risk a recipient may contract infectious diseases (HIV or Hepatitis) or cancer if they cannot be detected in the donor. In the cases where PHS Increased Risk donor vessels are used, we test the recipient per protocol post-transplant for any potential infectious disease transmission. The patient understands that there is the potential of use of  donor vessels and PHS increased risk donor vessels. The patient understands these risks and is willing to receive potentially PHS increased risk donor vessels. We also discussed the MELD allocation system in depth and the one-year observed and expected patient and graft survival rates according to latest data from the Scientific Registry for Transplant Recipients. These center specific outcomes were provided in comparison to the national one-year averages as described in the evaluation consent forms. Prior to signing consent, the patient was given an opportunity to ask questions.  After all concerns were addressed, informed consent was signed. Patient is aware that they may withdraw their consent for transplantation at any time.  Further, the patient is aware of the right to refuse an organ offer without penalty at any time.

## 2023-05-16 NOTE — HISTORY OF PRESENT ILLNESS
[Alcoholic Liver Disease] : Alcoholic Liver Disease [Ascites] : Ascites [FreeTextEntry1] : 17 [[id number]] : 63 year old female with alcoholic cirrhosis\par she first knew of her disease in January 2023 when she was admitted with jaundice and ascites\par she had LVP twice since then; January and march\par no GI bleeding and no encephalopathy \par she has long history of alcohol use which increased in the past 3 years; vodka daily\par \par PMH; history of breast cancer in 2020; treated with lumpectomy and radiation therapy; she was also on letrozole; now stopped\par social; stopped drinking in january 2023\par non smoker\par  and has one 30 year old daughter\par she is a dentist and still works\par \par \par \par

## 2023-05-17 LAB
ABO + RH PNL BLD: NORMAL
ABO + RH PNL BLD: NORMAL
AFP-TM SERPL-MCNC: 5.6 NG/ML
ALBUMIN SERPL ELPH-MCNC: 2.9 G/DL
ALP BLD-CCNC: 171 U/L
ALT SERPL-CCNC: 14 U/L
ANION GAP SERPL CALC-SCNC: 16 MMOL/L
AST SERPL-CCNC: 56 U/L
BASOPHILS # BLD AUTO: 0.01 K/UL
BASOPHILS NFR BLD AUTO: 0.2 %
BILIRUB SERPL-MCNC: 5.8 MG/DL
BUN SERPL-MCNC: 19 MG/DL
CALCIUM SERPL-MCNC: 8.9 MG/DL
CHLORIDE SERPL-SCNC: 101 MMOL/L
CHOLEST SERPL-MCNC: 198 MG/DL
CMV IGG SERPL QL: >10 U/ML
CMV IGG SERPL-IMP: POSITIVE
CO2 SERPL-SCNC: 20 MMOL/L
COVID-19 SPIKE DOMAIN ANTIBODY INTERPRETATION: POSITIVE
CREAT SERPL-MCNC: 0.96 MG/DL
EBV EA AB SER IA-ACNC: 18.1 U/ML
EBV EA AB TITR SER IF: POSITIVE
EBV EA IGG SER QL IA: >600 U/ML
EBV EA IGG SER-ACNC: POSITIVE
EBV EA IGM SER IA-ACNC: NEGATIVE
EBV PATRN SPEC IB-IMP: NORMAL
EBV VCA IGG SER IA-ACNC: >750 U/ML
EBV VCA IGM SER QL IA: 19 U/ML
EGFR: 67 ML/MIN/1.73M2
EOSINOPHIL # BLD AUTO: 0.11 K/UL
EOSINOPHIL NFR BLD AUTO: 1.9 %
EPSTEIN-BARR VIRUS CAPSID ANTIGEN IGG: POSITIVE
GLUCOSE SERPL-MCNC: 117 MG/DL
HBV CORE IGG+IGM SER QL: NONREACTIVE
HBV SURFACE AB SER QL: NONREACTIVE
HBV SURFACE AB SERPL IA-ACNC: <3 MIU/ML
HBV SURFACE AG SER QL: NONREACTIVE
HCG SERPL-MCNC: <1 MIU/ML
HCT VFR BLD CALC: 30.6 %
HCV AB SER QL: NONREACTIVE
HCV S/CO RATIO: 0.24 S/CO
HDLC SERPL-MCNC: 28 MG/DL
HEPATITIS A IGG ANTIBODY: REACTIVE
HGB BLD-MCNC: 9.9 G/DL
HIV1+2 AB SPEC QL IA.RAPID: NONREACTIVE
HSV 1+2 IGG SER IA-IMP: NEGATIVE
HSV 1+2 IGG SER IA-IMP: POSITIVE
HSV1 IGG SER QL: 45.5 INDEX
HSV2 IGG SER QL: 0.29 INDEX
IMM GRANULOCYTES NFR BLD AUTO: 0.2 %
LDLC SERPL CALC-MCNC: 144 MG/DL
LYMPHOCYTES # BLD AUTO: 1.01 K/UL
LYMPHOCYTES NFR BLD AUTO: 17.7 %
MAGNESIUM SERPL-MCNC: 1.5 MG/DL
MAN DIFF?: NORMAL
MCHC RBC-ENTMCNC: 32.4 GM/DL
MCHC RBC-ENTMCNC: 36.3 PG
MCV RBC AUTO: 112.1 FL
MONOCYTES # BLD AUTO: 0.41 K/UL
MONOCYTES NFR BLD AUTO: 7.2 %
NEUTROPHILS # BLD AUTO: 4.16 K/UL
NEUTROPHILS NFR BLD AUTO: 72.8 %
NONHDLC SERPL-MCNC: 171 MG/DL
PHOSPHATE SERPL-MCNC: 2.4 MG/DL
PLATELET # BLD AUTO: 87 K/UL
POTASSIUM SERPL-SCNC: 3.2 MMOL/L
PROT SERPL-MCNC: 8.2 G/DL
RBC # BLD: 2.73 M/UL
RBC # FLD: 14.5 %
RUBV IGG FLD-ACNC: 29.7 INDEX
RUBV IGG SER-IMP: POSITIVE
SARS-COV-2 AB SERPL IA-ACNC: >250 U/ML
SODIUM SERPL-SCNC: 138 MMOL/L
T GONDII AB SER-IMP: NEGATIVE
T GONDII IGG SER QL: <3 IU/ML
T PALLIDUM AB SER QL IA: NEGATIVE
TRIGL SERPL-MCNC: 131 MG/DL
VZV AB TITR SER: POSITIVE
VZV IGG SER IF-ACNC: 2875 INDEX
WBC # FLD AUTO: 5.71 K/UL

## 2023-05-18 LAB
M TB IFN-G BLD-IMP: NEGATIVE
QUANTIFERON TB PLUS MITOGEN MINUS NIL: 0.94 IU/ML
QUANTIFERON TB PLUS NIL: 0.01 IU/ML
QUANTIFERON TB PLUS TB1 MINUS NIL: 0 IU/ML
QUANTIFERON TB PLUS TB2 MINUS NIL: 0.01 IU/ML
STRONGYLOIDES AB SER IA-ACNC: NEGATIVE

## 2023-05-20 NOTE — PATIENT PROFILE ADULT - FALL HARM RISK - HARM RISK INTERVENTIONS
8 (severe pain)
Communicate Risk of Fall with Harm to all staff/Reinforce activity limits and safety measures with patient and family/Tailored Fall Risk Interventions/Visual Cue: Yellow wristband and red socks/Bed in lowest position, wheels locked, appropriate side rails in place/Call bell, personal items and telephone in reach/Instruct patient to call for assistance before getting out of bed or chair/Non-slip footwear when patient is out of bed/[place] to call system/Physically safe environment - no spills, clutter or unnecessary equipment/Purposeful Proactive Rounding/Room/bathroom lighting operational, light cord in reach

## 2023-05-22 LAB — DRUG ABUSE PANEL-9, SERUM: NORMAL

## 2023-05-23 ENCOUNTER — NON-APPOINTMENT (OUTPATIENT)
Age: 63
End: 2023-05-23

## 2023-05-25 ENCOUNTER — APPOINTMENT (OUTPATIENT)
Dept: ULTRASOUND IMAGING | Facility: IMAGING CENTER | Age: 63
End: 2023-05-25
Payer: COMMERCIAL

## 2023-05-25 ENCOUNTER — RESULT REVIEW (OUTPATIENT)
Age: 63
End: 2023-05-25

## 2023-05-25 ENCOUNTER — OUTPATIENT (OUTPATIENT)
Dept: OUTPATIENT SERVICES | Facility: HOSPITAL | Age: 63
LOS: 1 days | End: 2023-05-25
Payer: COMMERCIAL

## 2023-05-25 DIAGNOSIS — K70.31 ALCOHOLIC CIRRHOSIS OF LIVER WITH ASCITES: ICD-10-CM

## 2023-05-25 DIAGNOSIS — Z98.890 OTHER SPECIFIED POSTPROCEDURAL STATES: Chronic | ICD-10-CM

## 2023-05-25 PROCEDURE — 49083 ABD PARACENTESIS W/IMAGING: CPT

## 2023-05-26 ENCOUNTER — APPOINTMENT (OUTPATIENT)
Dept: CARDIOLOGY | Facility: CLINIC | Age: 63
End: 2023-05-26
Payer: COMMERCIAL

## 2023-05-26 ENCOUNTER — NON-APPOINTMENT (OUTPATIENT)
Age: 63
End: 2023-05-26

## 2023-05-26 VITALS
RESPIRATION RATE: 17 BRPM | DIASTOLIC BLOOD PRESSURE: 75 MMHG | HEART RATE: 88 BPM | WEIGHT: 158 LBS | BODY MASS INDEX: 23.95 KG/M2 | SYSTOLIC BLOOD PRESSURE: 114 MMHG | HEIGHT: 68 IN | OXYGEN SATURATION: 100 %

## 2023-05-26 PROCEDURE — 93000 ELECTROCARDIOGRAM COMPLETE: CPT | Mod: NC

## 2023-05-26 PROCEDURE — 99204 OFFICE O/P NEW MOD 45 MIN: CPT

## 2023-05-26 NOTE — DISCUSSION/SUMMARY
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 \par Recent echocardiogram and stress echocardiogram as above.\par Continue spironolactone and furosemide for fluid volume management. \par \par Most recent  mg/dL. Will discuss with hepatology for possible treatment options towards overall cardiovascular risk reduction.\par \par No further cardiac testing is required prior to transplant consideration.

## 2023-05-26 NOTE — HISTORY OF PRESENT ILLNESS
[FreeTextEntry1] : Teresa Aguilar presents today for  preoperative cardiovascular evaluation prior to possible liver transplant.\par She is a 63 year old with alcoholic liver disease diagnosed in January of this year during hospitalization for jaundice and ascites. Since that time she has had LVP x 3, most recently a removal of 6 L yesterday. She denies any issues with encephalopathy and is yet to undergo endoscopy. She has a long history of alcohol use which increased following her breast cancer diagnosis. She would drink vodka nightly to help manage her pain. Last drink was in 2023.\par \par She has known cardiac risk factors of hypertension (previously on medications, became hypotensive in 2023, now maintained on oral diuretics), dyslipidemia ( mg/dL) and a strong family history of heart disease. \par \par Otherwise her history is significant for breast cancer s/p lumpectomy x 3 with removal of 2 lymph nodes (treated with chemotherapeutics), basal cell carcinoma removed from left face, and right ganglion cyst removal.\par \par Today she reports feeling well and has no specific complaints. \par She does not formally exercise, but denies any chest discomfort, shortness of breath, palpitations, lightheadedness or syncope. She has no known history of stroke, diabetes mellitus or smoking. \par \par She is working as a dentist. \par Her father is . He  7 years ago with end stage dementia. \par She has a strong family history of heart disease. Her family is in [facility].\par She is  and has one daughter, age 31.

## 2023-05-26 NOTE — CARDIOLOGY SUMMARY
[de-identified] : CONCLUSIONS:\par \par  1. Normal left ventricular cavity size. The left ventricular wall thickness is normal. The left ventricular systolic function is normal.\par  2. The patient underwent pharmacological stress testing using the IV dobutamine protocol.\par  3. Following dobutamine infusion, normal augmentaion in thickening of all segments with a decrease in cavity size is seen.\par  4. The heart rate response was normal.\par  5. Physiologic blood pressure response to exercise.\par \par ________________________________________________________________________________________\par Procedure/Exam Protocol\par Dobutamine Pharmacological stress echocardiogram was performed. The patient underwent pharmacological stress testing using the Dobutamine protocol.\par  \par Stress Test Results\par     Heart Rate: Rest: 85 bpm --- Peak: 137 bpm (86 % max predicted).\par    HR Response: The heart rate response was normal.\par Blood Pressure: Rest: 128/81 mmHg --- Peak: 115/64 mmHg.\par    BP Response: Physiologic blood pressure response to exercise.\par \par  \par +--------+-------------+----------------+--------------+\par |  Time  | Dobutamine  |Heart Rate (bpm)|Blood Pressure|\par +--------+-------------+----------------+--------------+\par |Baseline|Pre-Injection|       85       |    128/81    |\par +--------+-------------+----------------+--------------+\par \par  \par ---------------------------------------------------------------------------------------------------------------------------------------------------------ECG:\par Stress ECG: No ischemic ST segment changes.\par \par ________________________________________________________________________________________\par FINDINGS:\par  \par Baseline Echo Findings:\par The baseline left ventricular cavity size was normal.\par ________________________________________________________________________________________\par STRESS FINDINGS\par  \par Stress ECG and Blood Pressure:\par Physiologic blood pressure response to exercise.\par  \par Stress Echo Findings:\par Immediate post-stress, the left ventricular cavity size was normal. Following dobutamine infusion, normal augmentaion in thickening of all segments with a decrease in cavity size is seen.\par Heart Rates:                          Blood Pressures:\par  \par Max Age Predicted HR (MAPHR): 159     Blood Pressure Response: physiologic\par Target HR (85% of MAPHR):     135 bpm\par

## 2023-05-26 NOTE — PHYSICAL EXAM

## 2023-05-31 ENCOUNTER — OUTPATIENT (OUTPATIENT)
Dept: OUTPATIENT SERVICES | Facility: HOSPITAL | Age: 63
LOS: 1 days | End: 2023-05-31
Payer: COMMERCIAL

## 2023-05-31 ENCOUNTER — APPOINTMENT (OUTPATIENT)
Dept: CT IMAGING | Facility: IMAGING CENTER | Age: 63
End: 2023-05-31
Payer: COMMERCIAL

## 2023-05-31 DIAGNOSIS — Z01.818 ENCOUNTER FOR OTHER PREPROCEDURAL EXAMINATION: ICD-10-CM

## 2023-05-31 DIAGNOSIS — Z98.890 OTHER SPECIFIED POSTPROCEDURAL STATES: Chronic | ICD-10-CM

## 2023-05-31 PROCEDURE — 71250 CT THORAX DX C-: CPT | Mod: 26

## 2023-05-31 PROCEDURE — 71250 CT THORAX DX C-: CPT

## 2023-06-01 LAB — PHOSPHATIDYETHANOL (PETH), WHOLE BLOOD: NORMAL

## 2023-06-07 ENCOUNTER — APPOINTMENT (OUTPATIENT)
Dept: OBGYN | Facility: CLINIC | Age: 63
End: 2023-06-07
Payer: COMMERCIAL

## 2023-06-07 VITALS
SYSTOLIC BLOOD PRESSURE: 133 MMHG | BODY MASS INDEX: 25.31 KG/M2 | WEIGHT: 167 LBS | DIASTOLIC BLOOD PRESSURE: 80 MMHG | HEIGHT: 68 IN

## 2023-06-07 PROCEDURE — 99386 PREV VISIT NEW AGE 40-64: CPT

## 2023-06-07 NOTE — HISTORY OF PRESENT ILLNESS
[Patient reported mammogram was normal] : Patient reported mammogram was normal [FreeTextEntry1] : Patient on liver transplant list \par Patient with breast cancer 2020\par Patient with lumpectomy on the left side\par RT and chemo \par She is not on letrozole secondary to side effects  [Mammogramdate] : 2022 [PapSmeardate] : 4 years ago  [TextBox_31] : normal

## 2023-06-07 NOTE — PHYSICAL EXAM
[Chaperone Declined] : Patient declined chaperone [Appropriately responsive] : appropriately responsive [Alert] : alert [No Acute Distress] : no acute distress [No Lymphadenopathy] : no lymphadenopathy [Non-tender] : non-tender [No Lesions] : no lesions [No Mass] : no mass [Oriented x3] : oriented x3 [FreeTextEntry6] : left s/p lumpectomy and RT. Scarring and lumps present  [FreeTextEntry7] : distended /hepatomegaly [___cm] : a ~M [unfilled] ~Ucm superior lateral quadrant mass was palpated

## 2023-06-07 NOTE — PLAN
[FreeTextEntry1] : Breast cancer 2020- follow with breast surgeon and alternates MRI/mammo q 6 months \par on liver transplant list\par Pap done

## 2023-06-08 ENCOUNTER — NON-APPOINTMENT (OUTPATIENT)
Age: 63
End: 2023-06-08

## 2023-06-12 LAB
CYTOLOGY CVX/VAG DOC THIN PREP: ABNORMAL
HPV HIGH+LOW RISK DNA PNL CVX: NOT DETECTED

## 2023-06-14 ENCOUNTER — APPOINTMENT (OUTPATIENT)
Dept: HEPATOLOGY | Facility: CLINIC | Age: 63
End: 2023-06-14

## 2023-06-19 ENCOUNTER — LABORATORY RESULT (OUTPATIENT)
Age: 63
End: 2023-06-19

## 2023-06-20 LAB
ALBUMIN SERPL ELPH-MCNC: 2.7 G/DL
ALP BLD-CCNC: 155 U/L
ALT SERPL-CCNC: 15 U/L
ANION GAP SERPL CALC-SCNC: 11 MMOL/L
AST SERPL-CCNC: 56 U/L
BILIRUB SERPL-MCNC: 5.6 MG/DL
BUN SERPL-MCNC: 18 MG/DL
CALCIUM SERPL-MCNC: 8.5 MG/DL
CHLORIDE SERPL-SCNC: 102 MMOL/L
CO2 SERPL-SCNC: 22 MMOL/L
CREAT SERPL-MCNC: 0.96 MG/DL
EGFR: 67 ML/MIN/1.73M2
GLUCOSE SERPL-MCNC: 162 MG/DL
INR PPP: 1.49 RATIO
POTASSIUM SERPL-SCNC: 4 MMOL/L
PROT SERPL-MCNC: 7.5 G/DL
PT BLD: 17.3 SEC
SODIUM SERPL-SCNC: 135 MMOL/L

## 2023-06-21 ENCOUNTER — APPOINTMENT (OUTPATIENT)
Dept: INFECTIOUS DISEASE | Facility: CLINIC | Age: 63
End: 2023-06-21
Payer: COMMERCIAL

## 2023-06-21 ENCOUNTER — APPOINTMENT (OUTPATIENT)
Dept: HEPATOLOGY | Facility: CLINIC | Age: 63
End: 2023-06-21
Payer: COMMERCIAL

## 2023-06-21 VITALS
HEART RATE: 85 BPM | DIASTOLIC BLOOD PRESSURE: 73 MMHG | SYSTOLIC BLOOD PRESSURE: 118 MMHG | HEIGHT: 68 IN | RESPIRATION RATE: 14 BRPM | WEIGHT: 172 LBS | TEMPERATURE: 97 F | OXYGEN SATURATION: 100 % | BODY MASS INDEX: 26.07 KG/M2

## 2023-06-21 DIAGNOSIS — Z23 ENCOUNTER FOR IMMUNIZATION: ICD-10-CM

## 2023-06-21 DIAGNOSIS — Z71.89 OTHER SPECIFIED COUNSELING: ICD-10-CM

## 2023-06-21 PROCEDURE — 99205 OFFICE O/P NEW HI 60 MIN: CPT | Mod: 95

## 2023-06-21 PROCEDURE — 99214 OFFICE O/P EST MOD 30 MIN: CPT

## 2023-06-21 RX ORDER — ZOSTER VACCINE RECOMBINANT, ADJUVANTED 50 MCG/0.5
50 KIT INTRAMUSCULAR
Qty: 1 | Refills: 1 | Status: ACTIVE | COMMUNITY
Start: 2023-06-21 | End: 1900-01-01

## 2023-06-21 RX ORDER — PNEUMOCOCCAL 20-VALENT CONJUGATE VACCINE 2.2; 2.2; 2.2; 2.2; 2.2; 2.2; 2.2; 2.2; 2.2; 2.2; 2.2; 2.2; 2.2; 2.2; 2.2; 2.2; 4.4; 2.2; 2.2; 2.2 UG/.5ML; UG/.5ML; UG/.5ML; UG/.5ML; UG/.5ML; UG/.5ML; UG/.5ML; UG/.5ML; UG/.5ML; UG/.5ML; UG/.5ML; UG/.5ML; UG/.5ML; UG/.5ML; UG/.5ML; UG/.5ML; UG/.5ML; UG/.5ML; UG/.5ML; UG/.5ML
INJECTION, SUSPENSION INTRAMUSCULAR
Qty: 1 | Refills: 0 | Status: ACTIVE | COMMUNITY
Start: 2023-06-21 | End: 1900-01-01

## 2023-06-22 LAB — ETHYL GLUCURONIDE UR QL SCN: NEGATIVE

## 2023-06-23 NOTE — HISTORY OF PRESENT ILLNESS
[FreeTextEntry1] : Ms. ABBY MALAGON is a 63 year old female with a PMH of breast CA s/p lumpectomy, ETOH abuse, and HTN. She was recently admitted to [facility] from 2/17-2/25 for abdominal distention and jaundice. She originally saw PCP in Jan 2023 when she noted she was turning yellow, PCP did bloodwork and referred her to GI- Dr Lazcano. \par \par Dr Lazcano sent her to ER for admission for MRCP and further eval. \par \par Jai Jaleesa\par [phone]\par \par She states she used to have 2 glasses of vodka daily for 3 years to treat her chronic pain from her breast cancer - her last drink was 1/20/23.\par \par She reports she was on Letrozole for her breast caner and was taking Tylenol daily at recommended doses to treat her pain, she is concerned that the drug interaction may have damaged her liver. She has since stopped taking Tylenol. 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.\par \par Had paracentesis 2.1L in hospital in Feb\par \par She was discharged with steroid taper due to favorable Lille score however bilirubin has not improved and steroids stopped\par Patient with increased abd distention and LE edema - increased diuretics to 100mg aldactone and 40mg lasix with low salt diet\par \par LOV 5/4/23\par Interval Hx\par - admitted to [facility] with increasing abd distention - had paracentesis done - 6.8L - negative for SBP\par - prior to admission patient was not fully taking increased dose of diuretics - sticking with low salt diet\par - She is currently under eval for OLT\par - she has recurrent edema and recurrent ascites and is due for another paracentesis next week\par - no f/c/n/v/cp/sob since d/c\par - no encephalopathy \par - she saw Transplant ID today as well

## 2023-06-23 NOTE — PHYSICAL EXAM
[Scleral Icterus] : Scleral Icterus noted [Spider Angioma] : Spider angioma(s) was(were) observed [Abdominal  Ascites] : ascites [Ascites Fluid Wave] : positive ascites fluid wave [Ascites Shifting Dullness] : shifting dullness if ascites [Jaundice] : Jaundice [General Appearance - Alert] : alert [General Appearance - In No Acute Distress] : in no acute distress [PERRL With Normal Accommodation] : pupils were equal in size, round, and reactive to light [Extraocular Movements] : extraocular movements were intact [] : no respiratory distress [Auscultation Breath Sounds / Voice Sounds] : lungs were clear to auscultation bilaterally [Heart Rate And Rhythm] : heart rate was normal and rhythm regular [Heart Sounds] : normal S1 and S2 [Heart Sounds Gallop] : no gallops [Murmurs] : no murmurs [Heart Sounds Pericardial Friction Rub] : no pericardial rub [Full Pulse] : the pedal pulses are present [Edema] : there was no peripheral edema [Abdomen Soft] : soft [Abdomen Tenderness] : non-tender [Abnormal Walk] : normal gait [Nail Clubbing] : no clubbing  or cyanosis of the fingernails [Musculoskeletal - Swelling] : no joint swelling seen [Motor Tone] : muscle strength and tone were normal [Deep Tendon Reflexes (DTR)] : deep tendon reflexes were 2+ and symmetric [Sensation] : the sensory exam was normal to light touch and pinprick [No Focal Deficits] : no focal deficits [Oriented To Time, Place, And Person] : oriented to person, place, and time [Impaired Insight] : insight and judgment were intact [Affect] : the affect was normal [Ascites Tense] : ascites is not tense [Asterixis] : no asterixis observed [Depression] : no depression [Hallucinations] : ~T no ~M hallucinations [FreeTextEntry1] : +LE Edema

## 2023-06-23 NOTE — ASSESSMENT
[FreeTextEntry1] : 63F with ETOH Cirrhosis  / ETOH hepatitis presenting for follow up - MELD 20\par For LE Edema and ascites  will increase to 40mg  Lasix BID and Aldactone 100mg BID\par Low salt diet encouraged\par Planned paracentesis this week\par \par Discussed natural history of cirrhosis with patient\par Discussed salt restriction and abstinence from ETOH\par Stressed importance of close follow up - including regular interval labs and screening for HCC\par \par Her MELD score remains around 19-20\par \par Breast cancer hx needs further clarification - Stage 1 disease typically does not require any waiting time to transplant. She had hx of lumpectomy with radiation and hormonal therapy\par Treatment occurred [facility] \par Will have visit with oncology for clearance\par \par Plan:\par Para next week - ordered for repeat 4-6 W after\par EGD/COLOn  - need to find availability for procedure \par F/U Psych\par Increase diuretics to BID\par no overt evidence of encephalopathy at this time\par F/U Oncology

## 2023-06-24 PROBLEM — Z71.89 COUNSELING ON HEALTH PROMOTION AND DISEASE PREVENTION: Status: ACTIVE | Noted: 2023-06-24

## 2023-06-26 RX ORDER — ZOSTER VACCINE RECOMBINANT, ADJUVANTED 50 MCG/0.5
50 KIT INTRAMUSCULAR
Qty: 2 | Refills: 0 | Status: DISCONTINUED | COMMUNITY
Start: 2023-06-26 | End: 2023-06-26

## 2023-06-26 NOTE — ASSESSMENT
[FreeTextEntry1] : \par A. 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 However, her recent hepatitis B serologies are not consistent with this scenario with HBsAg as well as HBV core IgG and surface IgG all negative. We had an extensive discussion on the significance and meaning of the most recent hepatitis B serologies and the need to vaccinate in order to provide protection in case of future exposure. she eventually agreed with vaccination\par \par -----------------\par B. PRE-TRANSPLANT EVALUATION AND PREVENTIVE CARE\par -------\par - No current contraindication from the Infectious Diseases standpoint to proceed with listing/transplantation\par -------\par 1. Additional/Routine ID screening\par \par --COVID19 Colin Antibody pos\par --HAV IgG:  pos\par --HBV IgG: neg\par --HCV Ab: neg\par --HSV 1/2 Ig pos, 2 neg\par --EBV Serology:  pos\par --CMV IgG:  pos\par --VZV IgG:  pos\par --Measles, Mumps and Rubella IgG:  pos measles, pending mumps and rubella\par --QuantiFeron-TB Gold: neg\par --HIV Ab/Ag neg\par --Syphilis Screen:  neg\par --Toxoplasma IgG:  neg\par \par --Strongyloides Ab:  neg\par -  given past exposures, added brucella and Q fever serologies- she will get these with next labs ordered by Dr Roberson\par \par -----\par 2. Immunizations \par COVID19: received 3 doses\par Influenza: ?\par Pneumococcal: no\par HAV: immune\par HBV: non immune\par MMR: immune to measles\par Varicella:  IgG pos\par Shingles:  no\par Tdap: no recent \par HPV: no\par \par -Overall, vaccinations are recommended ideally in the pre-transplant period, at least 14 days prior to transplantation. \par -If transplantation is felt imminent, then immunizations should be deferred to the post-transplant period, at least 4-6 months post-transplant with the exception of COVID-19 vaccination and the flu vaccine, which can be started as early as 1 month post-transplant. Similarly, all incomplete schedules prior to transplantation should be deferred to 4-6 months post-transplant. \par - No live vaccinations should be administered unless transplantation is not anticipated or planned within the 4 weeks following vaccine administration.\par \par Instructed patient to get vaccinated for \par - The seasonal flu vaccine. this should be repeated yearly. \par - Begin COVID-19 Pfizer or Moderna primary series\par - COVID-19  booster (bivalent)\par - Heplislav First/second dose (Hepatitis B). Will need a second dose in 1 month if not transplanted at the time of due vaccine.- ordered for clinic administration\par - Tdap  (tetanus, diphteria, pertussis): once, with follow up Td every 10 years needed- ordered for clinic administration. Instructed patient to swing by the clinic to get vaccinated\par - Prevnar 20 (pneumococcal vaccine): once with no additional pneumococcal vaccine needed- ordered script to [facility]\par - Shingrix (Herpes Zoster): Will need a second dose 2 to 6 months later if not transplanted at the time of due vaccine.- ordered script to [facility] - pending insurance approval\par \par 3. PERIOPERATIVE AND POST-TRANSPLANT PROPHYLAXIS\par \par Perioperative ANTIBACTERIAL prophylaxis:\par - No history of MDRO: prophylaxis per protocol \par - History of MDRO and thus recommend altering routine prophylaxis. \par \par ANTIFUNGAL prophylaxis: Only if at the time of transplantation meets HIGH RISK CRITERIA for invasive fungal infection\par Consider CASPOFUNGIN to begin ONLY at the time of OR and continued post-transplant . Once liver enzymes stabilize, patient can be switched to fluconazole 200 mg daily until discharge or for a maximum of 3 weeks, whichever comes first. \par High-Risk for IFIs classified as having 2 ore more criteria\par •	Increased packed RBC Transfusion requirement at surgery (>10 units)\par •	Increased OR time (>6 hours)\par •	Major re-operation within 30 days of initial liver transplant\par •	Renal replacement therapy at and/or after transplantation\par •	Re-transplantation\par •	Fulminant Liver failure\par •	Living-Donor recipients (high risk for biliary complications)\par •      MELD >30\par \par VIRAL Prophylaxis;\par --- CMV PROPHYLAXIS: based on donor/recipient IgG status\par CMV R+: intermediate risk of reactivation.  Valcyte for 3 months , ideally 900 mg daily to avoid resistances. Alternatively, a preemptive approach may be elected of weekly plasma PCR checks and early treatment with reactivation.\par Recommend not decreasing valcyte dose for neutropenia/leucopenia as this could lead to resistance development. A preemptive approach is preferred in those instances or use of letermovir as prophylaxis. \par Once antivirals are discontinued, recommend serial CMV PCR weekly for 1 months, then every 2 weeks for 1 months, then monthly until 6 months have passed to detect early reactivations.Patients should be  advised to contact transplant center for evaluation of fever, myalgias, malaise, headache, diarrhea and other new complaints\par \par --- HSV/VZV prophylaxis:\par Patients who do not receive valcyte during the first month post-transplant should receive acyclovir 400 mg bid to prevent early HSV reactivation or in rare instances, donor derived HSV/VZV. longer duration may be considered in patients with history of recurrent HSV/VZV.\par \par PCP/Toxoplasmosis: \par --- PCP prophylaxis: \par PO TMP-SMX 1 SS or DS daily (if at high risk of toxoplasmosis) for at least 6 months per protocol\par Alternatives for TMP-SMX allergic/intolerant patients \par ·	Atovaquone 1500 PO once daily (also prevents toxoplasma)\par if NOT at high risk for Toxoplasmosis\par ·	Dapsone 100 mg PO once daily (after excluding G6PD deficiency), sulfone derivative - low risk of cross-reactivity with mild sulfa allergy\par .       pentamidine monthly: least preferred option; \par \par ---Toxoplasma prophylaxis: \par If Toxoplasma D+/R-  this is high risk of primary toxoplasma infection status post transplant (or toxoplasma reactivation in seropositive heart transplant patients), recommend PO TMP-SMX DS daily or atovaquone 750 mg BID for at 6 months to a year. \par \par \par \par \par

## 2023-06-26 NOTE — HISTORY OF PRESENT ILLNESS
[FreeTextEntry1] : Jun 21 2023 10:00AM \par \par Underlying Disease(s) Requiring Transplant: \par ---------------------------------------------------------------------------------\par Ms. ABBY MALAGON  is a 63 year  year-old F   undergoing evaluation for transplantation. Infectious diseases (ID) has been consulted for assessment of infection risks and to render an opinion as to whether or not they are a suitable candidate for transplantation from the infectious diseases standpoint.\par \par 63 year old female with a PMH of breast CA s/p lumpectomy, ETOH abuse, and HTN. She was recently admitted to [facility] from 2/17-2/25/23 for abdominal distention and jaundice. Current MELD is 19-20\par \par \par ---\par \par -------------------\par Pre-transplant Screening: \par as noted below\par --------------------------------\par Pertinent Imaging\par --------------------------------------------------------\par Past/current ID issues (taken from patient report and records)\par \par History of SBP: No, next paracentesis next week\par \par history of hospitalization due to infection: no\par History of bacteremia, sepsis:no\par History of endocarditis: no\par History of MDRO: no\par History of pneumonia:no\par History of Flu/COVID 19: no\par History of recurrent UTIs:treated for UTi in february 2023\par History of infectious diarrhea, Cdiff: \par History of dental infection: Not since she was 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,  she is a dentist \par History of meningitis:no\par History of sinusitis:no\par Childhood history of recurrent pharyngitis/strep throat; ear infections, sinusitis: She has had many episodes of Strep throat, last 5 years\par History of HPV/high grade dysplasia: \par History of STD: no\par History of MC herpes:no\par History of Shingles: no\par History of VV Candidiasis:no\par History of FUO: no\par History of OM, skin soft tissue infections: no\par --\par History of tonsillectomy/appendectomy/splenectomy:no\par History of metal hardware (e.g:PPM/defibrillator, prosthesis) no metal \par ----------------------------------------------------------------------------------------------------------------------------------\par Childhood illnesses\par Chickenpox: yes\par  no Measles,no Rubella,no Scarlet fever, RH fever: NO \par ---------------------------------------------------------------------------------------------------------------------------------\par Exposure/Travel History:   Born in [place] \par Current Residence (Born and raised): [facility] Lived in other states: no\par Travel within [place] (including [place]  or [place]): [place], [place], [place], [place]\par Foreign travel history:  2 weeks in [place], [place] for 2 weeks, [place]\par Work: Dentist- \par Hobbies: travelling, gardening.\par -------------------------------------------------------------------------------------------------------------------------\par Tuberculosis exposure history:    no\par History of screening, if yes, treatment: \par exposure: no history of contacts, has not lived/stayed/volunteered in any facility such as shelter, longterm, correctional,  base. Never been homeless.  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\par ----------------------------------------------------------------------------------------------------------------------------\par Animal Exposure history:\par •	Domestic : Cat sit daughter's cat, HAd a cat 3 years.\par •	Wildlife :no\par •	Livestock animals: had a place as a child, lambs, sheep, chicken, no cows\par •	Birds :no\par •	Fishes  mother had a parrot\par •	other:\par \par -----------------------------------------------------------------------------------------------------------------------------\par Dietary Habits:  Reviewed risks of consuming and advised to avoid:\par •	Raw animal or dairy products -unpasteurized milk /cheese.\par •	Raw seafood -sushi\par •	Raw eggs -\par •	Raw or undercooked meat/poultry -  \par •	Unpasteurized milk products  -  \par •	Home made sausage - no\par •     Unwashed vegetables -\par \par Reviewed food safety as it relates to infection risks in the setting of transplantation in the immunocompromised host.  \par Reviewed CDC guidelines for the prevention of Listeria in the immunocompromised host.   \par  \par Environment:\par •	Residence water supply: city/island\par •	Reviewed of occupational and recreational exposure risks as they relate to infection in the setting of transplantation.\par \par ---------------------------------------------------------------------\par Prior or current immunosuppressive therapies: \par ----------------------------------------------------------------------\par \par -----\par Vaccine:                     Y/N;  Date vaccine administered\par  \par •	Tdap/Td           no\par •	Prevnar		no\par •	Pneumovax 	no\par •	Hepatitis A	immune\par •	Hepatitis B	non immune\par •	Influenza	 never- no allergy, \par •	Shingles            no\par •	HPV                   \par •	COVID 19:  2+ booster, Feb 10, 2022, HAd a fever for 3 days \par •	Other	\par \par Vaccines: 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s are generally avoided in the transplant recipient following transplantation.  Live vaccines are permitted up to one month prior to transplant.  \par \par --------------------------------------------------------------------------------------------------------------------------------------------------\par ROS\par GENERAL: denies chills, , night sweats, weight loss. \par PSYCH: denies depression, anxiety, suicidal ideation, hallucination, and delusions\par SKIN: no rash or lesions; no color changes, no abnormal nevi,no  dryness, and no jaundice  \par EYES: denies visual changes, floaters, pain, inflammation, blurred vision, and discharge\par ENT: denies tinnitus, vertigo, epistaxis, oral lesion, and decreased acuity\par PULM: denies, hemoptysis, pleurisy\par CVS: denies angina, palpitations,+ orthopnea, no syncope, or heart murmur\par GI: denies constipation, diarrhea, melena, abdominal pain, nausea. \par : denies dysuria, frequency, discharge, incontinence, stones or macroscopic hematuria\par MS: no arthralgias, no erythema or swelling, no myalgias, no edema, or lower back pain. \par CNS: denies numbness, dizziness, seizure, or tremor\par ENDO: denies heat/cold intolerance, polyuria, polydipsia, malaise.  \par HEME: denies bruising, bleeding, lymphadenopathy, anemia, and calf pain\par ---------------------------------------------------------------------------------------------------------------------------------------------------------\par Physical Exam:\par telehealth visit \par \par \par

## 2023-06-28 ENCOUNTER — APPOINTMENT (OUTPATIENT)
Dept: ULTRASOUND IMAGING | Facility: IMAGING CENTER | Age: 63
End: 2023-06-28
Payer: COMMERCIAL

## 2023-06-28 ENCOUNTER — RESULT REVIEW (OUTPATIENT)
Age: 63
End: 2023-06-28

## 2023-06-28 ENCOUNTER — OUTPATIENT (OUTPATIENT)
Dept: OUTPATIENT SERVICES | Facility: HOSPITAL | Age: 63
LOS: 1 days | End: 2023-06-28
Payer: COMMERCIAL

## 2023-06-28 DIAGNOSIS — Z98.890 OTHER SPECIFIED POSTPROCEDURAL STATES: Chronic | ICD-10-CM

## 2023-06-28 DIAGNOSIS — K70.31 ALCOHOLIC CIRRHOSIS OF LIVER WITH ASCITES: ICD-10-CM

## 2023-06-28 PROCEDURE — 49083 ABD PARACENTESIS W/IMAGING: CPT

## 2023-06-29 ENCOUNTER — NON-APPOINTMENT (OUTPATIENT)
Age: 63
End: 2023-06-29

## 2023-07-05 ENCOUNTER — OUTPATIENT (OUTPATIENT)
Dept: OUTPATIENT SERVICES | Facility: HOSPITAL | Age: 63
LOS: 1 days | Discharge: HOME | End: 2023-07-05

## 2023-07-05 DIAGNOSIS — Z98.890 OTHER SPECIFIED POSTPROCEDURAL STATES: Chronic | ICD-10-CM

## 2023-07-06 DIAGNOSIS — F10.21 ALCOHOL DEPENDENCE, IN REMISSION: ICD-10-CM

## 2023-07-06 DIAGNOSIS — K74.60 UNSPECIFIED CIRRHOSIS OF LIVER: ICD-10-CM

## 2023-07-06 DIAGNOSIS — Z12.11 ENCOUNTER FOR SCREENING FOR MALIGNANT NEOPLASM OF COLON: ICD-10-CM

## 2023-07-07 ENCOUNTER — TRANSCRIPTION ENCOUNTER (OUTPATIENT)
Age: 63
End: 2023-07-07

## 2023-07-10 LAB
PETH 16:0/18:1: NEGATIVE NG/ML
PETH 16:0/18:2: NEGATIVE NG/ML
PETH COMMENTS: NORMAL

## 2023-07-11 ENCOUNTER — TRANSCRIPTION ENCOUNTER (OUTPATIENT)
Age: 63
End: 2023-07-11

## 2023-07-11 ENCOUNTER — RESULT REVIEW (OUTPATIENT)
Age: 63
End: 2023-07-11

## 2023-07-11 ENCOUNTER — APPOINTMENT (OUTPATIENT)
Dept: HEPATOLOGY | Facility: HOSPITAL | Age: 63
End: 2023-07-11

## 2023-07-11 ENCOUNTER — OUTPATIENT (OUTPATIENT)
Dept: OUTPATIENT SERVICES | Facility: HOSPITAL | Age: 63
LOS: 1 days | End: 2023-07-11
Payer: COMMERCIAL

## 2023-07-11 VITALS
DIASTOLIC BLOOD PRESSURE: 54 MMHG | OXYGEN SATURATION: 100 % | WEIGHT: 154.98 LBS | SYSTOLIC BLOOD PRESSURE: 115 MMHG | TEMPERATURE: 98 F | RESPIRATION RATE: 18 BRPM | HEIGHT: 69 IN | HEART RATE: 66 BPM

## 2023-07-11 VITALS
HEART RATE: 81 BPM | OXYGEN SATURATION: 99 % | SYSTOLIC BLOOD PRESSURE: 108 MMHG | DIASTOLIC BLOOD PRESSURE: 53 MMHG | RESPIRATION RATE: 17 BRPM

## 2023-07-11 DIAGNOSIS — Z98.890 OTHER SPECIFIED POSTPROCEDURAL STATES: Chronic | ICD-10-CM

## 2023-07-11 DIAGNOSIS — Z87.39 PERSONAL HISTORY OF OTHER DISEASES OF THE MUSCULOSKELETAL SYSTEM AND CONNECTIVE TISSUE: Chronic | ICD-10-CM

## 2023-07-11 DIAGNOSIS — Z01.818 ENCOUNTER FOR OTHER PREPROCEDURAL EXAMINATION: ICD-10-CM

## 2023-07-11 PROCEDURE — 88305 TISSUE EXAM BY PATHOLOGIST: CPT

## 2023-07-11 PROCEDURE — 43251 EGD REMOVE LESION SNARE: CPT

## 2023-07-11 PROCEDURE — 88305 TISSUE EXAM BY PATHOLOGIST: CPT | Mod: 26

## 2023-07-11 PROCEDURE — 43235 EGD DIAGNOSTIC BRUSH WASH: CPT

## 2023-07-11 PROCEDURE — C1889: CPT

## 2023-07-11 PROCEDURE — 45385 COLONOSCOPY W/LESION REMOVAL: CPT | Mod: PT

## 2023-07-11 DEVICE — CLIP RESOLUTION 360 ULTRA 235CM 20/BX: Type: IMPLANTABLE DEVICE | Status: FUNCTIONAL

## 2023-07-11 DEVICE — CLIP RESOLUTION 360 235CM: Type: IMPLANTABLE DEVICE | Status: FUNCTIONAL

## 2023-07-11 RX ORDER — SODIUM CHLORIDE 9 MG/ML
500 INJECTION INTRAMUSCULAR; INTRAVENOUS; SUBCUTANEOUS
Refills: 0 | Status: COMPLETED | OUTPATIENT
Start: 2023-07-11 | End: 2023-07-11

## 2023-07-11 RX ADMIN — SODIUM CHLORIDE 30 MILLILITER(S): 9 INJECTION INTRAMUSCULAR; INTRAVENOUS; SUBCUTANEOUS at 10:38

## 2023-07-11 NOTE — ASU PATIENT PROFILE, ADULT - NSICDXPASTMEDICALHX_GEN_ALL_CORE_FT
PAST MEDICAL HISTORY:  Breast cancer, left     GERD (gastroesophageal reflux disease)     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    Mild alcohol use disorder     Skin cancer, basal cell

## 2023-07-11 NOTE — PRE PROCEDURE NOTE - PRE PROCEDURE EVALUATION
Pre-Endoscopy Evaluation    Attending Physician:  Htut    Procedure: EGD + Colonoscopy     Indication for Procedure: Variceal Screening and CRC SCreening     Pertinent History: See [employer] chart    PAST MEDICAL & SURGICAL HISTORY:  Breast cancer, left      Mild alcohol use disorder      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      GERD (gastroesophageal reflux disease)      Skin cancer, basal cell      H/O lumpectomy      History of removal of skin mole      H/O ganglion cyst          Allergies    No Known Allergies    Intolerances        Medications: MEDICATIONS  (STANDING):  sodium chloride 0.9%. 500 milliLiter(s) (30 mL/Hr) IV Continuous <Continuous>    MEDICATIONS  (PRN):      Pertinent lab data:                      Physical Examination:  Daily Height in cm: 175.26 (11 Jul 2023 10:26)    Daily   Vital Signs Last 24 Hrs  T(C): 36.4 (11 Jul 2023 10:26), Max: 36.4 (11 Jul 2023 09:14)  T(F): 97.5 (11 Jul 2023 09:14), Max: 97.5 (11 Jul 2023 09:14)  HR: 66 (11 Jul 2023 10:26) (66 - 66)  BP: 115/54 (11 Jul 2023 10:26) (115/54 - 115/54)  BP(mean): --  RR: 18 (11 Jul 2023 10:26) (18 - 18)  SpO2: 100% (11 Jul 2023 10:26) (100% - 100%)    Parameters below as of 11 Jul 2023 09:14  Patient On (Oxygen Delivery Method): room air      Constitutional: NAD  HEENT: PERRLA, EOMI,    Neck:  No JVD  Respiratory: CTAB/L  Cardiovascular: S1 and S2  Gastrointestinal: BS+, soft, NT/ND  Extremities: No peripheral edema  Neurological: A/O x 3, no focal deficits  Psychiatric: Normal mood, normal affect  : No Rodriguez  Skin: No rashes    Comments:    ASA Class: I []  II []  III [x]  IV []    The patient is a suitable candidate for the planned procedure unless box checked [ ]  No, explain:

## 2023-07-11 NOTE — ASU DISCHARGE PLAN (ADULT/PEDIATRIC) - NS MD DC FALL RISK RISK
For information on Fall & Injury Prevention, visit: [email or web address] OR  [email or web address] OR  https://www.cdc.gov/steadi/patient.html

## 2023-07-11 NOTE — ASU PATIENT PROFILE, ADULT - NSICDXPASTSURGICALHX_GEN_ALL_CORE_FT
PAST SURGICAL HISTORY:  H/O ganglion cyst     H/O lumpectomy     History of removal of skin mole

## 2023-07-11 NOTE — ASU PATIENT PROFILE, ADULT - FALL HARM RISK - UNIVERSAL INTERVENTIONS
Bed in lowest position, wheels locked, appropriate side rails in place/Call bell, personal items and telephone in reach/Instruct patient to call for assistance before getting out of bed or chair/Non-slip footwear when patient is out of bed/[place] to call system/Physically safe environment - no spills, clutter or unnecessary equipment/Purposeful Proactive Rounding/Room/bathroom lighting operational, light cord in reach

## 2023-07-11 NOTE — ASU DISCHARGE PLAN (ADULT/PEDIATRIC) - CALL YOUR DOCTOR IF YOU HAVE ANY OF THE FOLLOWING:
100.3/Bleeding that does not stop/Pain not relieved by Medications/Fever greater than (need to indicate Fahrenheit or Celsius)/Nausea and vomiting that does not stop

## 2023-07-13 LAB — SURGICAL PATHOLOGY STUDY: SIGNIFICANT CHANGE UP

## 2023-07-17 ENCOUNTER — TRANSCRIPTION ENCOUNTER (OUTPATIENT)
Age: 63
End: 2023-07-17

## 2023-07-25 ENCOUNTER — APPOINTMENT (OUTPATIENT)
Dept: HEPATOLOGY | Facility: CLINIC | Age: 63
End: 2023-07-25
Payer: COMMERCIAL

## 2023-07-25 LAB
ALBUMIN SERPL ELPH-MCNC: 3.1 G/DL
ALP BLD-CCNC: 165 U/L
ALT SERPL-CCNC: 19 U/L
ANION GAP SERPL CALC-SCNC: 11 MMOL/L
AST SERPL-CCNC: 57 U/L
BILIRUB SERPL-MCNC: 5.2 MG/DL
BUN SERPL-MCNC: 20 MG/DL
CALCIUM SERPL-MCNC: 8.7 MG/DL
CHLORIDE SERPL-SCNC: 102 MMOL/L
CO2 SERPL-SCNC: 23 MMOL/L
CREAT SERPL-MCNC: 1.1 MG/DL
EGFR: 57 ML/MIN/1.73M2
GLUCOSE SERPL-MCNC: 91 MG/DL
INR PPP: 1.35 RATIO
POTASSIUM SERPL-SCNC: 3.5 MMOL/L
PROT SERPL-MCNC: 7.6 G/DL
PT BLD: 15.7 SEC
SODIUM SERPL-SCNC: 135 MMOL/L

## 2023-07-27 ENCOUNTER — APPOINTMENT (OUTPATIENT)
Dept: ULTRASOUND IMAGING | Facility: IMAGING CENTER | Age: 63
End: 2023-07-27
Payer: COMMERCIAL

## 2023-07-27 ENCOUNTER — OUTPATIENT (OUTPATIENT)
Dept: OUTPATIENT SERVICES | Facility: HOSPITAL | Age: 63
LOS: 1 days | End: 2023-07-27
Payer: COMMERCIAL

## 2023-07-27 ENCOUNTER — RESULT REVIEW (OUTPATIENT)
Age: 63
End: 2023-07-27

## 2023-07-27 DIAGNOSIS — Z98.890 OTHER SPECIFIED POSTPROCEDURAL STATES: Chronic | ICD-10-CM

## 2023-07-27 DIAGNOSIS — K70.31 ALCOHOLIC CIRRHOSIS OF LIVER WITH ASCITES: ICD-10-CM

## 2023-07-27 DIAGNOSIS — Z87.39 PERSONAL HISTORY OF OTHER DISEASES OF THE MUSCULOSKELETAL SYSTEM AND CONNECTIVE TISSUE: Chronic | ICD-10-CM

## 2023-07-27 PROBLEM — C44.91 BASAL CELL CARCINOMA OF SKIN, UNSPECIFIED: Chronic | Status: ACTIVE | Noted: 2023-07-11

## 2023-07-27 PROBLEM — K21.9 GASTRO-ESOPHAGEAL REFLUX DISEASE WITHOUT ESOPHAGITIS: Chronic | Status: ACTIVE | Noted: 2023-07-11

## 2023-07-27 PROCEDURE — 49083 ABD PARACENTESIS W/IMAGING: CPT

## 2023-07-28 LAB — ETHYL GLUCURONIDE UR QL SCN: NEGATIVE

## 2023-08-01 ENCOUNTER — APPOINTMENT (OUTPATIENT)
Dept: HEPATOLOGY | Facility: CLINIC | Age: 63
End: 2023-08-01
Payer: COMMERCIAL

## 2023-08-01 LAB
PETH 16:0/18:1: NEGATIVE NG/ML
PETH 16:0/18:2: NEGATIVE NG/ML
PETH COMMENTS: NORMAL

## 2023-08-01 PROCEDURE — 99214 OFFICE O/P EST MOD 30 MIN: CPT

## 2023-08-01 RX ORDER — POLYETHYLENE GLYCOL 3350 AND ELECTROLYTES WITH LEMON FLAVOR 236; 22.74; 6.74; 5.86; 2.97 G/4L; G/4L; G/4L; G/4L; G/4L
236 POWDER, FOR SOLUTION ORAL
Qty: 1 | Refills: 0 | Status: DISCONTINUED | COMMUNITY
Start: 2023-07-06 | End: 2023-08-01

## 2023-08-01 NOTE — ASSESSMENT
[FreeTextEntry1] : 63F with ETOH Cirrhosis  / ETOH hepatitis presenting for follow up - MELD 20 For LE Edema and ascites - still requiring intermittent Para on BID Lasix and Aldactone Low salt diet encouraged Planned paracentesis later this month  Discussed natural history of cirrhosis with patient Discussed salt restriction and abstinence from ETOH Stressed importance of close follow up - including regular interval labs and screening for HCC  Her MELD score remains around 19-20  She met with Oncology for clearance She will complete enrollment in [facility]  RTC 1 month but if patient otherwise stable can space visits out to q3M Aristeo get labs at next visit and also complete enrollment into resea biomarker study

## 2023-08-16 ENCOUNTER — RX RENEWAL (OUTPATIENT)
Age: 63
End: 2023-08-16

## 2023-08-29 ENCOUNTER — APPOINTMENT (OUTPATIENT)
Dept: HEPATOLOGY | Facility: CLINIC | Age: 63
End: 2023-08-29
Payer: COMMERCIAL

## 2023-08-29 ENCOUNTER — NON-APPOINTMENT (OUTPATIENT)
Age: 63
End: 2023-08-29

## 2023-08-29 VITALS
BODY MASS INDEX: 24.55 KG/M2 | HEIGHT: 68 IN | TEMPERATURE: 97.1 F | WEIGHT: 162 LBS | DIASTOLIC BLOOD PRESSURE: 77 MMHG | SYSTOLIC BLOOD PRESSURE: 114 MMHG | RESPIRATION RATE: 14 BRPM | OXYGEN SATURATION: 100 % | HEART RATE: 68 BPM

## 2023-08-29 PROCEDURE — 99214 OFFICE O/P EST MOD 30 MIN: CPT

## 2023-08-30 ENCOUNTER — TRANSCRIPTION ENCOUNTER (OUTPATIENT)
Age: 63
End: 2023-08-30

## 2023-08-30 LAB
AFP-TM SERPL-MCNC: 6.1 NG/ML
ALBUMIN SERPL ELPH-MCNC: 3 G/DL
ALP BLD-CCNC: 157 U/L
ALT SERPL-CCNC: 19 U/L
ANION GAP SERPL CALC-SCNC: 13 MMOL/L
AST SERPL-CCNC: 55 U/L
BILIRUB SERPL-MCNC: 6.3 MG/DL
BUN SERPL-MCNC: 20 MG/DL
CALCIUM SERPL-MCNC: 8.6 MG/DL
CHLORIDE SERPL-SCNC: 99 MMOL/L
CO2 SERPL-SCNC: 20 MMOL/L
CREAT SERPL-MCNC: 1.16 MG/DL
EGFR: 54 ML/MIN/1.73M2
GLUCOSE SERPL-MCNC: 110 MG/DL
HCT VFR BLD CALC: 29.4 %
HGB BLD-MCNC: 9.3 G/DL
INR PPP: 1.26 RATIO
MAGNESIUM SERPL-MCNC: 1.6 MG/DL
MCHC RBC-ENTMCNC: 31.6 GM/DL
MCHC RBC-ENTMCNC: 34.2 PG
MCV RBC AUTO: 108.1 FL
PLATELET # BLD AUTO: 107 K/UL
POTASSIUM SERPL-SCNC: 3.8 MMOL/L
PROT SERPL-MCNC: 7.8 G/DL
PT BLD: 14.1 SEC
RBC # BLD: 2.72 M/UL
RBC # FLD: 16.9 %
SODIUM SERPL-SCNC: 133 MMOL/L
WBC # FLD AUTO: 6.55 K/UL

## 2023-09-01 NOTE — ASSESSMENT
Suha Garcia is a 59 year old female presenting for Broan cpe and pap  Chief Complaint   Patient presents with   • Physical     cpe and pap BROAN     Denies Latex allergy or sensitivity.    Medication verified and med list updated  Refills needed today: Yes    Health Maintenance Summary     Topic Due On Due Status Completed On    MAMMOGRAM - BREAST CANCER SCREENING Jul 19, 2018 Not Due Jul 19, 2016    Colorectal Cancer Screening - Colonoscopy Jul 27, 2021 Not Due Jul 27, 2011    PAP SMEAR - CERVICAL CANCER SCREENING May 2, 2017 Overdue May 2, 2014    Immunization - TDAP Pregnancy  Hidden     IMMUNIZATION - DTaP/Tdap/Td Dec 20, 2021 Not Due Dec 20, 2011    Immunization-Influenza Sep 1, 2017 Not Due Dec 8, 2016        Patient is due for topics as listed above, she wishes to discuss with provider.    Last lab results:   No results found for: HGBA1C  CHOLESTEROL (mg/dL)   Date Value   07/18/2016 238 (H)     HDL (mg/dL)   Date Value   07/18/2016 58     TRIGLYCERIDE (mg/dL)   Date Value   06/10/2015 190 (H)     CALCULATED LDL (mg/dL)   Date Value   06/10/2015 173 (H)     No results found for: URMIC, UCR, MALBCR  No results found for: IFOB      NO AD on file   [FreeTextEntry1] : 63F with ETOH Cirrhosis  / ETOH hepatitis presenting for follow up - MELD ~20 For LE Edema and ascites - still requiring intermittent Para on BID Lasix and Aldactone - plan for para next week - with recent episode of relative hypotension can take night time lasix every other day instead of daily Low salt diet encouraged  Discussed natural history of cirrhosis with patient Discussed salt restriction and abstinence from ETOH Stressed importance of close follow up - including regular interval labs and screening for HCC  She met with Oncology for clearance and awaitnig confirmation from Derm given hx of Basal Cell Carcinoma  I am hopeful to present her for transplant listing soon once available date is collected - as they have been reportedly sent

## 2023-09-01 NOTE — HISTORY OF PRESENT ILLNESS
[FreeTextEntry1] : Ms. ABBY MALAGON is a 63 year old female with a PMH of breast CA s/p lumpectomy, ETOH abuse, and HTN. She was recently admitted to [facility] from 2/17-2/25 for abdominal distention and jaundice. She originally saw PCP in Jan 2023 when she noted she was turning yellow, PCP did bloodwork and referred her to GI- Dr Lazcano.   Dr Lazcano sent her to ER for admission for MRCP and further eval.   Jai Jaleesa [phone]  She states she used to have 2 glasses of vodka daily for 3 years to treat her chronic pain from her breast cancer - her last drink was 1/20/23.  She reports she was on Letrozole for her breast caner and was taking Tylenol daily at recommended doses to treat her pain, she is concerned that the drug interaction may have damaged her liver. She has since stopped taking Tylenol. 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  She was discharged with steroid taper due to favorable Lille score however bilirubin has not improved and steroids stopped Patient with increased abd distention and LE edema - increased diuretics to 100mg aldactone and 40mg lasix with low salt diet  LOV 8/2023 Interval Hx - Multiple paracentesis since initial admission - usually once a month or every 3 weeks however last tap the amount of fluid removed was only 4L  - no f/c/n/v/cp/sob since d/c - no encephalopathy  - Colonoscopy and EGD done - multiple polyps removed - 2 adenomas  - Attended [facility] to [facility] - encouraged to continue enrollment to complete OLT eval - Seen Oncology/Breast - for clearance - also seen by Derm for hx of BCC  Feels well and is maintained on BID Lasix 40Mg and Aldactone 100mg No confusion Not sleeping well because of frequent urination She has episode this past weekend with some dizziness - BP remains 105/55

## 2023-09-05 ENCOUNTER — TRANSCRIPTION ENCOUNTER (OUTPATIENT)
Age: 63
End: 2023-09-05

## 2023-09-06 ENCOUNTER — APPOINTMENT (OUTPATIENT)
Dept: ULTRASOUND IMAGING | Facility: IMAGING CENTER | Age: 63
End: 2023-09-06
Payer: COMMERCIAL

## 2023-09-06 ENCOUNTER — OUTPATIENT (OUTPATIENT)
Dept: OUTPATIENT SERVICES | Facility: HOSPITAL | Age: 63
LOS: 1 days | End: 2023-09-06
Payer: COMMERCIAL

## 2023-09-06 ENCOUNTER — RESULT REVIEW (OUTPATIENT)
Age: 63
End: 2023-09-06

## 2023-09-06 DIAGNOSIS — Z98.890 OTHER SPECIFIED POSTPROCEDURAL STATES: Chronic | ICD-10-CM

## 2023-09-06 DIAGNOSIS — K70.31 ALCOHOLIC CIRRHOSIS OF LIVER WITH ASCITES: ICD-10-CM

## 2023-09-06 DIAGNOSIS — Z87.39 PERSONAL HISTORY OF OTHER DISEASES OF THE MUSCULOSKELETAL SYSTEM AND CONNECTIVE TISSUE: Chronic | ICD-10-CM

## 2023-09-06 PROCEDURE — P9047: CPT

## 2023-09-06 PROCEDURE — 49083 ABD PARACENTESIS W/IMAGING: CPT

## 2023-10-04 ENCOUNTER — APPOINTMENT (OUTPATIENT)
Dept: ULTRASOUND IMAGING | Facility: IMAGING CENTER | Age: 63
End: 2023-10-04
Payer: COMMERCIAL

## 2023-10-04 ENCOUNTER — OUTPATIENT (OUTPATIENT)
Dept: OUTPATIENT SERVICES | Facility: HOSPITAL | Age: 63
LOS: 1 days | End: 2023-10-04
Payer: COMMERCIAL

## 2023-10-04 ENCOUNTER — APPOINTMENT (OUTPATIENT)
Dept: HEPATOLOGY | Facility: CLINIC | Age: 63
End: 2023-10-04
Payer: COMMERCIAL

## 2023-10-04 VITALS
HEART RATE: 83 BPM | SYSTOLIC BLOOD PRESSURE: 109 MMHG | DIASTOLIC BLOOD PRESSURE: 74 MMHG | HEIGHT: 68 IN | BODY MASS INDEX: 25.76 KG/M2 | OXYGEN SATURATION: 100 % | WEIGHT: 170 LBS | TEMPERATURE: 97.6 F | RESPIRATION RATE: 14 BRPM

## 2023-10-04 DIAGNOSIS — Z87.39 PERSONAL HISTORY OF OTHER DISEASES OF THE MUSCULOSKELETAL SYSTEM AND CONNECTIVE TISSUE: Chronic | ICD-10-CM

## 2023-10-04 DIAGNOSIS — Z98.890 OTHER SPECIFIED POSTPROCEDURAL STATES: Chronic | ICD-10-CM

## 2023-10-04 DIAGNOSIS — K70.31 ALCOHOLIC CIRRHOSIS OF LIVER WITH ASCITES: ICD-10-CM

## 2023-10-04 PROCEDURE — 99214 OFFICE O/P EST MOD 30 MIN: CPT

## 2023-10-04 PROCEDURE — 76705 ECHO EXAM OF ABDOMEN: CPT | Mod: 26

## 2023-10-04 PROCEDURE — 76705 ECHO EXAM OF ABDOMEN: CPT

## 2023-10-09 LAB
AFP-TM SERPL-MCNC: 5.3 NG/ML
ALBUMIN SERPL ELPH-MCNC: 2.9 G/DL
ALP BLD-CCNC: 168 U/L
ALT SERPL-CCNC: 20 U/L
ANION GAP SERPL CALC-SCNC: 12 MMOL/L
AST SERPL-CCNC: 59 U/L
BASOPHILS # BLD AUTO: 0.03 K/UL
BASOPHILS NFR BLD AUTO: 0.5 %
BILIRUB SERPL-MCNC: 6.1 MG/DL
BUN SERPL-MCNC: 17 MG/DL
CALCIUM SERPL-MCNC: 9.1 MG/DL
CHLORIDE SERPL-SCNC: 99 MMOL/L
CO2 SERPL-SCNC: 22 MMOL/L
CREAT SERPL-MCNC: 1.14 MG/DL
EGFR: 55 ML/MIN/1.73M2
EOSINOPHIL # BLD AUTO: 0.13 K/UL
EOSINOPHIL NFR BLD AUTO: 2.3 %
GLUCOSE SERPL-MCNC: 131 MG/DL
HCT VFR BLD CALC: 29.4 %
HGB BLD-MCNC: 9.2 G/DL
IMM GRANULOCYTES NFR BLD AUTO: 0.5 %
INR PPP: 1.25 RATIO
LYMPHOCYTES # BLD AUTO: 1.16 K/UL
LYMPHOCYTES NFR BLD AUTO: 20.9 %
MAGNESIUM SERPL-MCNC: 1.8 MG/DL
MAN DIFF?: NORMAL
MCHC RBC-ENTMCNC: 31.3 GM/DL
MCHC RBC-ENTMCNC: 34.2 PG
MCV RBC AUTO: 109.3 FL
MONOCYTES # BLD AUTO: 0.47 K/UL
MONOCYTES NFR BLD AUTO: 8.5 %
NEUTROPHILS # BLD AUTO: 3.72 K/UL
NEUTROPHILS NFR BLD AUTO: 67.3 %
PETH 16:0/18:1: NEGATIVE NG/ML
PETH 16:0/18:2: NEGATIVE NG/ML
PETH COMMENTS: NORMAL
PLATELET # BLD AUTO: 86 K/UL
POTASSIUM SERPL-SCNC: 4.1 MMOL/L
PROT SERPL-MCNC: 7.8 G/DL
PT BLD: 14 SEC
RBC # BLD: 2.69 M/UL
RBC # FLD: 16.4 %
SODIUM SERPL-SCNC: 133 MMOL/L
WBC # FLD AUTO: 5.54 K/UL

## 2023-10-18 ENCOUNTER — RESULT REVIEW (OUTPATIENT)
Age: 63
End: 2023-10-18

## 2023-10-18 ENCOUNTER — OUTPATIENT (OUTPATIENT)
Dept: OUTPATIENT SERVICES | Facility: HOSPITAL | Age: 63
LOS: 1 days | End: 2023-10-18
Payer: COMMERCIAL

## 2023-10-18 ENCOUNTER — APPOINTMENT (OUTPATIENT)
Dept: ULTRASOUND IMAGING | Facility: IMAGING CENTER | Age: 63
End: 2023-10-18
Payer: COMMERCIAL

## 2023-10-18 DIAGNOSIS — Z87.39 PERSONAL HISTORY OF OTHER DISEASES OF THE MUSCULOSKELETAL SYSTEM AND CONNECTIVE TISSUE: Chronic | ICD-10-CM

## 2023-10-18 DIAGNOSIS — K70.31 ALCOHOLIC CIRRHOSIS OF LIVER WITH ASCITES: ICD-10-CM

## 2023-10-18 DIAGNOSIS — Z98.890 OTHER SPECIFIED POSTPROCEDURAL STATES: Chronic | ICD-10-CM

## 2023-10-18 PROCEDURE — 49083 ABD PARACENTESIS W/IMAGING: CPT

## 2023-10-18 PROCEDURE — P9047: CPT

## 2023-10-25 ENCOUNTER — OUTPATIENT (OUTPATIENT)
Dept: OUTPATIENT SERVICES | Facility: HOSPITAL | Age: 63
LOS: 1 days | Discharge: HOME | End: 2023-10-25

## 2023-10-25 DIAGNOSIS — Z98.890 OTHER SPECIFIED POSTPROCEDURAL STATES: Chronic | ICD-10-CM

## 2023-10-25 DIAGNOSIS — Z87.39 PERSONAL HISTORY OF OTHER DISEASES OF THE MUSCULOSKELETAL SYSTEM AND CONNECTIVE TISSUE: Chronic | ICD-10-CM

## 2023-10-26 DIAGNOSIS — F10.20 ALCOHOL DEPENDENCE, UNCOMPLICATED: ICD-10-CM

## 2023-11-15 ENCOUNTER — APPOINTMENT (OUTPATIENT)
Dept: PSYCHIATRY | Facility: HOSPITAL | Age: 63
End: 2023-11-15

## 2023-11-15 ENCOUNTER — APPOINTMENT (OUTPATIENT)
Dept: HEPATOLOGY | Facility: CLINIC | Age: 63
End: 2023-11-15
Payer: COMMERCIAL

## 2023-11-15 ENCOUNTER — OUTPATIENT (OUTPATIENT)
Dept: OUTPATIENT SERVICES | Facility: HOSPITAL | Age: 63
LOS: 1 days | End: 2023-11-15
Payer: COMMERCIAL

## 2023-11-15 VITALS
OXYGEN SATURATION: 100 % | HEART RATE: 89 BPM | SYSTOLIC BLOOD PRESSURE: 113 MMHG | WEIGHT: 174 LBS | DIASTOLIC BLOOD PRESSURE: 71 MMHG | BODY MASS INDEX: 26.37 KG/M2 | HEIGHT: 68 IN | TEMPERATURE: 98.2 F

## 2023-11-15 DIAGNOSIS — Z01.818 ENCOUNTER FOR OTHER PREPROCEDURAL EXAMINATION: ICD-10-CM

## 2023-11-15 DIAGNOSIS — Z87.39 PERSONAL HISTORY OF OTHER DISEASES OF THE MUSCULOSKELETAL SYSTEM AND CONNECTIVE TISSUE: Chronic | ICD-10-CM

## 2023-11-15 DIAGNOSIS — F10.90 ALCOHOL USE, UNSPECIFIED, UNCOMPLICATED: ICD-10-CM

## 2023-11-15 DIAGNOSIS — Z98.890 OTHER SPECIFIED POSTPROCEDURAL STATES: Chronic | ICD-10-CM

## 2023-11-15 DIAGNOSIS — Z00.8 ENCOUNTER FOR OTHER GENERAL EXAMINATION: ICD-10-CM

## 2023-11-15 DIAGNOSIS — F41.9 ANXIETY DISORDER, UNSPECIFIED: ICD-10-CM

## 2023-11-15 PROCEDURE — 90791 PSYCH DIAGNOSTIC EVALUATION: CPT

## 2023-11-15 PROCEDURE — 99214 OFFICE O/P EST MOD 30 MIN: CPT

## 2023-11-16 ENCOUNTER — NON-APPOINTMENT (OUTPATIENT)
Age: 63
End: 2023-11-16

## 2023-11-16 LAB
ALBUMIN SERPL ELPH-MCNC: 3.1 G/DL
ALP BLD-CCNC: 156 U/L
ALT SERPL-CCNC: 23 U/L
ANION GAP SERPL CALC-SCNC: 9 MMOL/L
AST SERPL-CCNC: 54 U/L
BASOPHILS # BLD AUTO: 0.02 K/UL
BASOPHILS NFR BLD AUTO: 0.4 %
BILIRUB SERPL-MCNC: 3.6 MG/DL
BUN SERPL-MCNC: 22 MG/DL
CALCIUM SERPL-MCNC: 9 MG/DL
CHLORIDE SERPL-SCNC: 102 MMOL/L
CO2 SERPL-SCNC: 23 MMOL/L
CREAT SERPL-MCNC: 1.22 MG/DL
EGFR: 50 ML/MIN/1.73M2
EOSINOPHIL # BLD AUTO: 0.13 K/UL
EOSINOPHIL NFR BLD AUTO: 2.3 %
GLUCOSE SERPL-MCNC: 139 MG/DL
HCT VFR BLD CALC: 28.5 %
HGB BLD-MCNC: 9 G/DL
IMM GRANULOCYTES NFR BLD AUTO: 0.2 %
INR PPP: 1.18 RATIO
LYMPHOCYTES # BLD AUTO: 0.98 K/UL
LYMPHOCYTES NFR BLD AUTO: 17.3 %
MAN DIFF?: NORMAL
MCHC RBC-ENTMCNC: 31.6 GM/DL
MCHC RBC-ENTMCNC: 34.7 PG
MCV RBC AUTO: 110 FL
MONOCYTES # BLD AUTO: 0.47 K/UL
MONOCYTES NFR BLD AUTO: 8.3 %
NEUTROPHILS # BLD AUTO: 4.05 K/UL
NEUTROPHILS NFR BLD AUTO: 71.5 %
PLATELET # BLD AUTO: 95 K/UL
POTASSIUM SERPL-SCNC: 4 MMOL/L
PROT SERPL-MCNC: 7.9 G/DL
PT BLD: 13.3 SEC
RBC # BLD: 2.59 M/UL
RBC # FLD: 16 %
SODIUM SERPL-SCNC: 134 MMOL/L
WBC # FLD AUTO: 5.66 K/UL

## 2023-11-17 ENCOUNTER — RESULT REVIEW (OUTPATIENT)
Age: 63
End: 2023-11-17

## 2023-11-17 ENCOUNTER — APPOINTMENT (OUTPATIENT)
Dept: ULTRASOUND IMAGING | Facility: IMAGING CENTER | Age: 63
End: 2023-11-17
Payer: COMMERCIAL

## 2023-11-17 ENCOUNTER — OUTPATIENT (OUTPATIENT)
Dept: OUTPATIENT SERVICES | Facility: HOSPITAL | Age: 63
LOS: 1 days | End: 2023-11-17
Payer: COMMERCIAL

## 2023-11-17 DIAGNOSIS — Z98.890 OTHER SPECIFIED POSTPROCEDURAL STATES: Chronic | ICD-10-CM

## 2023-11-17 DIAGNOSIS — Z87.39 PERSONAL HISTORY OF OTHER DISEASES OF THE MUSCULOSKELETAL SYSTEM AND CONNECTIVE TISSUE: Chronic | ICD-10-CM

## 2023-11-17 DIAGNOSIS — K70.31 ALCOHOLIC CIRRHOSIS OF LIVER WITH ASCITES: ICD-10-CM

## 2023-11-17 PROCEDURE — 49083 ABD PARACENTESIS W/IMAGING: CPT

## 2023-11-17 PROCEDURE — P9047: CPT

## 2023-11-20 LAB
PETH 16:0/18:1: NEGATIVE NG/ML
PETH 16:0/18:2: NEGATIVE NG/ML
PETH COMMENTS: NORMAL

## 2023-12-12 ENCOUNTER — LABORATORY RESULT (OUTPATIENT)
Age: 63
End: 2023-12-12

## 2023-12-12 ENCOUNTER — APPOINTMENT (OUTPATIENT)
Dept: HEPATOLOGY | Facility: CLINIC | Age: 63
End: 2023-12-12
Payer: COMMERCIAL

## 2023-12-12 VITALS
BODY MASS INDEX: 26.52 KG/M2 | TEMPERATURE: 97 F | DIASTOLIC BLOOD PRESSURE: 73 MMHG | HEIGHT: 68 IN | OXYGEN SATURATION: 100 % | SYSTOLIC BLOOD PRESSURE: 120 MMHG | HEART RATE: 91 BPM | WEIGHT: 175 LBS

## 2023-12-12 PROCEDURE — 99214 OFFICE O/P EST MOD 30 MIN: CPT

## 2023-12-13 ENCOUNTER — TRANSCRIPTION ENCOUNTER (OUTPATIENT)
Age: 63
End: 2023-12-13

## 2023-12-13 LAB
ALBUMIN SERPL ELPH-MCNC: 3 G/DL
ALP BLD-CCNC: 141 U/L
ALT SERPL-CCNC: 17 U/L
ANION GAP SERPL CALC-SCNC: 12 MMOL/L
AST SERPL-CCNC: 41 U/L
BILIRUB SERPL-MCNC: 3 MG/DL
BUN SERPL-MCNC: 20 MG/DL
CALCIUM SERPL-MCNC: 9.2 MG/DL
CHLORIDE SERPL-SCNC: 101 MMOL/L
CO2 SERPL-SCNC: 22 MMOL/L
CREAT SERPL-MCNC: 1.14 MG/DL
EGFR: 55 ML/MIN/1.73M2
GLUCOSE SERPL-MCNC: 128 MG/DL
INR PPP: 1.2 RATIO
MAGNESIUM SERPL-MCNC: 1.7 MG/DL
POTASSIUM SERPL-SCNC: 3.5 MMOL/L
PROT SERPL-MCNC: 7.3 G/DL
PT BLD: 13.5 SEC
SODIUM SERPL-SCNC: 135 MMOL/L

## 2023-12-13 NOTE — REVIEW OF SYSTEMS
[Fever] : no fever [Chills] : no chills [Eye Pain] : no eye pain [Red Eyes] : eyes not red [Earache] : no earache [Suicidal] : not suicidal [Sleep Disturbances] : no sleep disturbances

## 2023-12-13 NOTE — PHYSICAL EXAM
[Auscultation Breath Sounds / Voice Sounds] : lungs were clear to auscultation bilaterally [Heart Rate And Rhythm] : heart rate was normal and rhythm regular [Heart Sounds] : normal S1 and S2 [Heart Sounds Gallop] : no gallops [Murmurs] : no murmurs [Heart Sounds Pericardial Friction Rub] : no pericardial rub [Bowel Sounds] : normal bowel sounds [Abdomen Soft] : soft [Abdomen Tenderness] : non-tender [Abdomen Mass (___ Cm)] : no abdominal mass palpated [Abnormal Walk] : normal gait [Nail Clubbing] : no clubbing  or cyanosis of the fingernails [Musculoskeletal - Swelling] : no joint swelling seen [Motor Tone] : muscle strength and tone were normal [Skin Color & Pigmentation] : normal skin color and pigmentation [Skin Turgor] : normal skin turgor [] : no rash [Deep Tendon Reflexes (DTR)] : deep tendon reflexes were 2+ and symmetric [Sensation] : the sensory exam was normal to light touch and pinprick [No Focal Deficits] : no focal deficits [Oriented To Time, Place, And Person] : oriented to person, place, and time [Impaired Insight] : insight and judgment were intact [Affect] : the affect was normal [Asterixis] : no asterixis observed [Jaundice] : No jaundice [Depression] : no depression [Hallucinations] : ~T no ~M hallucinations [FreeTextEntry1] : +Umbilical hernia, reducible

## 2023-12-13 NOTE — HISTORY OF PRESENT ILLNESS
[FreeTextEntry1] : Ms. ABBY MALAGON is a 63 year old female with a PMH of breast CA s/p lumpectomy, ETOH abuse, and HTN. She was recently admitted to [facility] from 2/17-2/25 for abdominal distention and jaundice. She originally saw PCP in Jan 2023 when she noted she was turning yellow, PCP did bloodwork and referred her to GI- Dr Lazcano.   Jai Lazcano [phone]  She states she used to have 2 glasses of vodka daily for 3 years to treat her chronic pain from her breast cancer - her last drink was 1/20/23.  She reports she was on Letrozole for her breast caner and was taking Tylenol daily at recommended doses to treat her pain, she is concerned that the drug interaction may have damaged her liver. She has since stopped taking Tylenol. 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  She was discharged with steroid taper due to favorable Lille score however bilirubin has not improved and steroids stopped Patient with increased abd distention and LE edema currently in diuretics Has received intermittent paracentesis   LOV 11/223 Interval Hx - Last paracentesis 11/17 removed 7L - no f/c/n/v/cp/sob, encephalopathy  - Colonoscopy and EGD done - multiple polyps removed - 2 adenomas  - Attended [facility] to [facility] but has missed some follow up appointments - has appointment tomorrow - seen by Psych last visit - reiterated importance of RPP as part of transplant program - Seen Oncology/Breast - for clearance - also seen by Derm for hx of BCC  Feels well and is maintained on BID Lasix 40Mg and Aldactone 100mg. Having some difficulty falling asleep. Reports umbilical hernia is not bothering her. Denies any other acute GI complaints.   MELD 19 on last visit  Discussed role of TIPS on last visit Has paracentesis appointment for next week Has abd distention - worsened since last paracentesis No F/C/N/V/CP/SOb

## 2023-12-13 NOTE — ASSESSMENT
[FreeTextEntry1] : 63F with decompensated ETOH Cirrhosis with recurrent ascites requiring monthly LVP MELD has been improving - last 19  Still pending psychosocial clearance for OLT evaluation which has been discussed several times An alternative and concurrent plan would be evaluation for TIPS Will obtain updated cross sectional imaging and review prior ECHO  I would still like to proceed with eventual listing if MELD > 15  Discussed natural history of cirrhosis with patient Discussed salt restriction and abstinence from ETOH Stressed importance of close follow up - including regular interval labs and screening for HCC  Continue current diuretics No other decompensating events at this time  #Kina - informed patient that if hernia is hard and not reducible she should go to ER - possible hernia repair following TIPS but not that this time  RTC in [place]

## 2023-12-14 LAB
HCT VFR BLD CALC: 30 %
HGB BLD-MCNC: 9.4 G/DL
MCHC RBC-ENTMCNC: 31.3 GM/DL
MCHC RBC-ENTMCNC: 33.6 PG
MCV RBC AUTO: 107.1 FL
PLATELET # BLD AUTO: 82 K/UL
RBC # BLD: 2.8 M/UL
RBC # FLD: 16.7 %
WBC # FLD AUTO: 5.03 K/UL

## 2023-12-15 ENCOUNTER — TRANSCRIPTION ENCOUNTER (OUTPATIENT)
Age: 63
End: 2023-12-15

## 2023-12-15 ENCOUNTER — OUTPATIENT (OUTPATIENT)
Dept: OUTPATIENT SERVICES | Facility: HOSPITAL | Age: 63
LOS: 1 days | End: 2023-12-15
Payer: COMMERCIAL

## 2023-12-15 ENCOUNTER — RESULT REVIEW (OUTPATIENT)
Age: 63
End: 2023-12-15

## 2023-12-15 VITALS — WEIGHT: 175.05 LBS | HEIGHT: 69 IN

## 2023-12-15 DIAGNOSIS — R18.8 OTHER ASCITES: ICD-10-CM

## 2023-12-15 DIAGNOSIS — Z98.890 OTHER SPECIFIED POSTPROCEDURAL STATES: Chronic | ICD-10-CM

## 2023-12-15 DIAGNOSIS — Z87.39 PERSONAL HISTORY OF OTHER DISEASES OF THE MUSCULOSKELETAL SYSTEM AND CONNECTIVE TISSUE: Chronic | ICD-10-CM

## 2023-12-15 LAB
ALBUMIN FLD-MCNC: 0.3 G/DL — SIGNIFICANT CHANGE UP
ALBUMIN FLD-MCNC: 0.3 G/DL — SIGNIFICANT CHANGE UP
B PERT IGG+IGM PNL SER: CLEAR — SIGNIFICANT CHANGE UP
B PERT IGG+IGM PNL SER: CLEAR — SIGNIFICANT CHANGE UP
COLOR FLD: YELLOW
COLOR FLD: YELLOW
FLUID INTAKE SUBSTANCE CLASS: SIGNIFICANT CHANGE UP
FLUID INTAKE SUBSTANCE CLASS: SIGNIFICANT CHANGE UP
GLUCOSE FLD-MCNC: 121 MG/DL — SIGNIFICANT CHANGE UP
GLUCOSE FLD-MCNC: 121 MG/DL — SIGNIFICANT CHANGE UP
LDH SERPL L TO P-CCNC: 42 U/L — SIGNIFICANT CHANGE UP
LDH SERPL L TO P-CCNC: 42 U/L — SIGNIFICANT CHANGE UP
LYMPHOCYTES # FLD: 89 % — SIGNIFICANT CHANGE UP
LYMPHOCYTES # FLD: 89 % — SIGNIFICANT CHANGE UP
MESOTHL CELL # FLD: 6 % — SIGNIFICANT CHANGE UP
MESOTHL CELL # FLD: 6 % — SIGNIFICANT CHANGE UP
MONOS+MACROS # FLD: 5 % — SIGNIFICANT CHANGE UP
MONOS+MACROS # FLD: 5 % — SIGNIFICANT CHANGE UP
NEUTROPHILS-BODY FLUID: 0 % — SIGNIFICANT CHANGE UP
NEUTROPHILS-BODY FLUID: 0 % — SIGNIFICANT CHANGE UP
PROT FLD-MCNC: 1.2 G/DL — SIGNIFICANT CHANGE UP
PROT FLD-MCNC: 1.2 G/DL — SIGNIFICANT CHANGE UP
RCV VOL RI: <2000 CELLS/UL — HIGH (ref 0–5)
RCV VOL RI: <2000 CELLS/UL — HIGH (ref 0–5)
TOTAL NUCLEATED CELL COUNT, BODY FLUID: 45 CELLS/UL — HIGH (ref 0–5)
TOTAL NUCLEATED CELL COUNT, BODY FLUID: 45 CELLS/UL — HIGH (ref 0–5)
TUBE TYPE: SIGNIFICANT CHANGE UP
TUBE TYPE: SIGNIFICANT CHANGE UP

## 2023-12-15 PROCEDURE — 49083 ABD PARACENTESIS W/IMAGING: CPT

## 2023-12-15 PROCEDURE — 82945 GLUCOSE OTHER FLUID: CPT

## 2023-12-15 PROCEDURE — 84157 ASSAY OF PROTEIN OTHER: CPT

## 2023-12-15 PROCEDURE — P9047: CPT

## 2023-12-15 PROCEDURE — 89051 BODY FLUID CELL COUNT: CPT

## 2023-12-15 PROCEDURE — 82042 OTHER SOURCE ALBUMIN QUAN EA: CPT

## 2023-12-15 PROCEDURE — C1729: CPT

## 2023-12-15 PROCEDURE — 87102 FUNGUS ISOLATION CULTURE: CPT

## 2023-12-15 PROCEDURE — 87075 CULTR BACTERIA EXCEPT BLOOD: CPT

## 2023-12-15 PROCEDURE — 87205 SMEAR GRAM STAIN: CPT

## 2023-12-15 PROCEDURE — 87070 CULTURE OTHR SPECIMN AEROBIC: CPT

## 2023-12-15 PROCEDURE — 83615 LACTATE (LD) (LDH) ENZYME: CPT

## 2023-12-15 RX ORDER — FUROSEMIDE 40 MG
1 TABLET ORAL
Qty: 0 | Refills: 0 | DISCHARGE
Start: 2023-12-15

## 2023-12-15 RX ORDER — ALBUMIN HUMAN 25 %
50 VIAL (ML) INTRAVENOUS
Refills: 0 | Status: COMPLETED | OUTPATIENT
Start: 2023-12-15 | End: 2023-12-15

## 2023-12-15 RX ORDER — SENNA PLUS 8.6 MG/1
2 TABLET ORAL AT BEDTIME
Refills: 0 | Status: DISCONTINUED | OUTPATIENT
Start: 2023-12-15 | End: 2023-12-15

## 2023-12-15 RX ORDER — SENNA PLUS 8.6 MG/1
2 TABLET ORAL
Qty: 0 | Refills: 0 | DISCHARGE
Start: 2023-12-15

## 2023-12-15 RX ORDER — FUROSEMIDE 40 MG
40 TABLET ORAL DAILY
Refills: 0 | Status: DISCONTINUED | OUTPATIENT
Start: 2023-12-15 | End: 2023-12-15

## 2023-12-15 RX ORDER — PANTOPRAZOLE SODIUM 20 MG/1
40 TABLET, DELAYED RELEASE ORAL
Refills: 0 | Status: DISCONTINUED | OUTPATIENT
Start: 2023-12-15 | End: 2023-12-15

## 2023-12-15 RX ORDER — ALBUMIN HUMAN 25 %
50 VIAL (ML) INTRAVENOUS ONCE
Refills: 0 | Status: COMPLETED | OUTPATIENT
Start: 2023-12-15 | End: 2023-12-15

## 2023-12-15 RX ORDER — SPIRONOLACTONE 25 MG/1
100 TABLET, FILM COATED ORAL DAILY
Refills: 0 | Status: DISCONTINUED | OUTPATIENT
Start: 2023-12-15 | End: 2023-12-15

## 2023-12-15 RX ORDER — PANTOPRAZOLE SODIUM 20 MG/1
1 TABLET, DELAYED RELEASE ORAL
Qty: 0 | Refills: 0 | DISCHARGE
Start: 2023-12-15

## 2023-12-15 RX ORDER — SPIRONOLACTONE 25 MG/1
4 TABLET, FILM COATED ORAL
Qty: 0 | Refills: 0 | DISCHARGE
Start: 2023-12-15

## 2023-12-15 RX ADMIN — Medication 100 MILLILITER(S): at 15:20

## 2023-12-15 RX ADMIN — Medication 100 MILLILITER(S): at 15:50

## 2023-12-15 RX ADMIN — Medication 100 MILLILITER(S): at 14:50

## 2023-12-15 RX ADMIN — Medication 100 MILLILITER(S): at 14:20

## 2023-12-15 NOTE — H&P ADULT - HISTORY OF PRESENT ILLNESS
Interventional Radiology    HPI: 63 year old female with a PMH of breast CA s/p lumpectomy, ETOH abuse, and HTN, ascites, last paracentesis on 11/17 with 7L removed with albumin x3 given (2:1). Presents to IR today for paracentesis.    Review of Systems:   Constitutional: No fever, weight loss, or fatigue  Respiratory: No cough, wheezing, chills or hemoptysis; No shortness of breath  Cardiovascular: No chest pain, palpitations, dizziness, or leg swelling  Gastrointestinal: Abdominal distention        Allergies: No Known Allergies    Medications (Abx/Cardiac/Anticoagulation/Blood Products)      Data:  175.3  79.4  T(C): --  HR: --  BP: --  RR: --  SpO2: --            Physical Exam  General: No acute distress, nontoxic, A&Ox3  Chest: Non labored breathing  Abdomen: distended      RADIOLOGY & ADDITIONAL TESTS:    Imaging Reviewed    Hepatology Note Reviewed from 12/13/23    Plan: 63y Female presents for therapeutic paracentesis with albumin replacement 2:1.  -Risks/Benefits/alternatives explained with the patient and/or healthcare proxy and witnessed informed consent obtained.

## 2023-12-15 NOTE — ASU DISCHARGE PLAN (ADULT/PEDIATRIC) - NS MD DC FALL RISK RISK
For information on Fall & Injury Prevention, visit: [email or web address] OR  [email or web address] OR  https://www.cdc.gov/steadi/patient.html For information on Fall & Injury Prevention, visit: [email or web address] OR  [email or web address] OR  https://www.cdc.gov/steadi/patient.html

## 2023-12-15 NOTE — ASU DISCHARGE PLAN (ADULT/PEDIATRIC) - ASU DC SPECIAL INSTRUCTIONSFT
Paracentesis    Discharge Instructions  - You have had a paracentesis.  - You may shower in 24 hours.  - Keep the area covered and dry for the next 24 hours.  - Do not perform any heavy lifting until the site is healed.  - You may resume your normal diet.  - You may resume your normal medications however you should wait 24 hours before restarting aspirin, plavix, or blood thinners.  - It is normal to experience some pain over the site for the next few days. You may take apply ice to the area (20 minutes on, 20 minutes off) and take Tylenol for that pain. Do not take more frequently than every 6 hours and do not exceed more than 3000mg of Tylenol in a 24 hour period.    Notify your primary physician and/or Interventional Radiology IMMEDIATELY if you experience any of the following       - Fever of 100.4F or 38C       - Chills or Rigors/ Shakes       - Swelling and/or Redness in the area around the biopsy site       - Worsening Pain       - Blood soaked bandages or worsening bleeding       - Lightheadedness and/or dizziness upon standing       - Chest Pain/ Tightness       - Shortness of Breath       - Difficulty walking    If you have a problem that you believe requires IMMEDIATE attention, please go to your NEAREST Emergency Room. If you believe your problem can safely wait until you speak to a physician, please call Interventional Radiology for any concerns.    Please feel free to contact us at [phone] if any problems arise. After 6PM, Monday through Friday, on weekends and on holidays, please call [phone] and ask for the radiology resident on call to be paged. Paracentesis    Discharge Instructions  - You have had a paracentesis.  - You may shower in 24 hours.  - Keep the area covered and dry for the next 24 hours.  - Do not perform any heavy lifting until the site is healed.  - You may resume your normal diet.  - You may resume your normal medications however you should wait 24 hours before restarting aspirin, plavix, or blood thinners.  - It is normal to experience some pain over the site for the next few days. You may take apply ice to the area (20 minutes on, 20 minutes off) and take Tylenol for that pain. Do not take more frequently than every 6 hours and do not exceed more than 3000mg of Tylenol in a 24 hour period.    Notify your primary physician and/or Interventional Radiology IMMEDIATELY if you experience any of the following       - Fever of 100.4F or 38C       - Chills or Rigors/ Shakes       - Swelling and/or Redness in the area around the biopsy site       - Worsening Pain       - Blood soaked bandages or worsening bleeding       - Lightheadedness and/or dizziness upon standing       - Chest Pain/ Tightness       - Shortness of Breath       - Difficulty walking    If you have a problem that you believe requires IMMEDIATE attention, please go to your NEAREST Emergency Room. If you believe your problem can safely wait until you speak to a physician, please call Interventional Radiology for any concerns.    Please feel free to contact us at [phone] if any problems arise. After 6PM, Monday through Friday, on weekends and on holidays, please call [phone] and ask for the radiology resident on call to be paged.

## 2023-12-15 NOTE — PROCEDURE NOTE - ADDITIONAL PROCEDURE DETAILS
Successful RLQ paracentesis with 8050ml clear yellow ascites fluid obtained with 5Fr drainer via ultrasound guidance. Images sent to PACS.  Albumin 25% 50ml x4 given (2:1). Fluid sent for chemistry, Fluid sent for gram stain and culture. Full report to follow.

## 2023-12-15 NOTE — ASU PATIENT PROFILE, ADULT - FALL HARM RISK - UNIVERSAL INTERVENTIONS
Bed in lowest position, wheels locked, appropriate side rails in place/Call bell, personal items and telephone in reach/Instruct patient to call for assistance before getting out of bed or chair/Non-slip footwear when patient is out of bed/[place] to call system/Physically safe environment - no spills, clutter or unnecessary equipment/Purposeful Proactive Rounding/Room/bathroom lighting operational, light cord in reach Bed in lowest position, wheels locked, appropriate side rails in place/Call bell, personal items and telephone in reach/Instruct patient to call for assistance before getting out of bed or chair/Non-slip footwear when patient is out of bed/[place] to call system/Physically safe environment - no spills, clutter or unnecessary equipment/Purposeful Proactive Rounding/Room/bathroom lighting operational, light cord in reach

## 2023-12-16 LAB
GRAM STN FLD: SIGNIFICANT CHANGE UP
GRAM STN FLD: SIGNIFICANT CHANGE UP
SPECIMEN SOURCE: SIGNIFICANT CHANGE UP
SPECIMEN SOURCE: SIGNIFICANT CHANGE UP

## 2023-12-19 LAB
PETH 16:0/18:1: NEGATIVE NG/ML
PETH 16:0/18:2: NEGATIVE NG/ML
PETH COMMENTS: NORMAL

## 2023-12-20 ENCOUNTER — APPOINTMENT (OUTPATIENT)
Dept: ULTRASOUND IMAGING | Facility: IMAGING CENTER | Age: 63
End: 2023-12-20

## 2023-12-20 ENCOUNTER — OUTPATIENT (OUTPATIENT)
Dept: OUTPATIENT SERVICES | Facility: HOSPITAL | Age: 63
LOS: 1 days | End: 2023-12-20
Payer: COMMERCIAL

## 2023-12-20 ENCOUNTER — APPOINTMENT (OUTPATIENT)
Dept: MRI IMAGING | Facility: CLINIC | Age: 63
End: 2023-12-20
Payer: COMMERCIAL

## 2023-12-20 DIAGNOSIS — Z98.890 OTHER SPECIFIED POSTPROCEDURAL STATES: Chronic | ICD-10-CM

## 2023-12-20 DIAGNOSIS — K70.31 ALCOHOLIC CIRRHOSIS OF LIVER WITH ASCITES: ICD-10-CM

## 2023-12-20 DIAGNOSIS — Z87.39 PERSONAL HISTORY OF OTHER DISEASES OF THE MUSCULOSKELETAL SYSTEM AND CONNECTIVE TISSUE: Chronic | ICD-10-CM

## 2023-12-20 LAB
CULTURE RESULTS: SIGNIFICANT CHANGE UP
CULTURE RESULTS: SIGNIFICANT CHANGE UP
SPECIMEN SOURCE: SIGNIFICANT CHANGE UP
SPECIMEN SOURCE: SIGNIFICANT CHANGE UP

## 2023-12-20 PROCEDURE — 74183 MRI ABD W/O CNTR FLWD CNTR: CPT | Mod: 26

## 2023-12-20 PROCEDURE — 74183 MRI ABD W/O CNTR FLWD CNTR: CPT

## 2023-12-20 PROCEDURE — A9585: CPT

## 2023-12-28 DIAGNOSIS — R18.8 OTHER ASCITES: ICD-10-CM

## 2024-01-05 ENCOUNTER — TRANSCRIPTION ENCOUNTER (OUTPATIENT)
Age: 64
End: 2024-01-05

## 2024-01-09 ENCOUNTER — TRANSCRIPTION ENCOUNTER (OUTPATIENT)
Age: 64
End: 2024-01-09

## 2024-01-10 ENCOUNTER — APPOINTMENT (OUTPATIENT)
Dept: ULTRASOUND IMAGING | Facility: IMAGING CENTER | Age: 64
End: 2024-01-10
Payer: COMMERCIAL

## 2024-01-10 ENCOUNTER — OUTPATIENT (OUTPATIENT)
Dept: OUTPATIENT SERVICES | Facility: HOSPITAL | Age: 64
LOS: 1 days | End: 2024-01-10
Payer: COMMERCIAL

## 2024-01-10 ENCOUNTER — RESULT REVIEW (OUTPATIENT)
Age: 64
End: 2024-01-10

## 2024-01-10 DIAGNOSIS — Z98.890 OTHER SPECIFIED POSTPROCEDURAL STATES: Chronic | ICD-10-CM

## 2024-01-10 DIAGNOSIS — Z87.39 PERSONAL HISTORY OF OTHER DISEASES OF THE MUSCULOSKELETAL SYSTEM AND CONNECTIVE TISSUE: Chronic | ICD-10-CM

## 2024-01-10 DIAGNOSIS — K70.31 ALCOHOLIC CIRRHOSIS OF LIVER WITH ASCITES: ICD-10-CM

## 2024-01-10 PROCEDURE — P9047: CPT

## 2024-01-10 PROCEDURE — 49083 ABD PARACENTESIS W/IMAGING: CPT

## 2024-01-16 ENCOUNTER — TRANSCRIPTION ENCOUNTER (OUTPATIENT)
Age: 64
End: 2024-01-16

## 2024-01-23 ENCOUNTER — APPOINTMENT (OUTPATIENT)
Dept: HEPATOLOGY | Facility: CLINIC | Age: 64
End: 2024-01-23
Payer: COMMERCIAL

## 2024-01-23 VITALS
SYSTOLIC BLOOD PRESSURE: 118 MMHG | RESPIRATION RATE: 14 BRPM | WEIGHT: 174 LBS | DIASTOLIC BLOOD PRESSURE: 75 MMHG | HEIGHT: 68 IN | OXYGEN SATURATION: 99 % | HEART RATE: 83 BPM | BODY MASS INDEX: 26.37 KG/M2 | TEMPERATURE: 96.7 F

## 2024-01-23 PROCEDURE — 99214 OFFICE O/P EST MOD 30 MIN: CPT

## 2024-01-23 NOTE — REVIEW OF SYSTEMS
[Fatigue] : fatigue [Negative] : Neurological [Fever] : no fever [Chills] : no chills [Sore throat] : no sore throat [Pain/Stiffness] : no pain/stiffness [Rhinorrhea] : no rhinorrhea

## 2024-01-23 NOTE — PHYSICAL EXAM
[No Acute Distress] : no acute distress [Scleral Icterus] : scleral icterus [Normal Hearing] : normal hearing [Hepatojugular Reflux] : no hepatojugular reflux [Supple] : the neck was supple [No Respiratory Distress] : no respiratory distress [No Accessory Muscle Use] : no accessory muscle use [Normal S1, S2] : normal S1 and S2 [No Murmurs] : no murmurs heard [Abdominal Bruit] : no abdominal bruit [Ascites Fluid Wave] : ascites fluid wave [Ascites Tense] : no ascites tense [Ascites Shifting Dullness] : ascites shifting dullness [Normal Gait] : normal gait [Spider Angioma] : spider angioma [Jaundice] : no jaundice [No Rash] : no rash [Asterixis] : no asterixis [No Focal Deficits] : no focal deficits [Normal Reflexes] : normal reflexes [No Motor Deficits] : no motor deficits [de-identified] : sarcopenic- multiple spider aniomas

## 2024-01-23 NOTE — ASSESSMENT
[FreeTextEntry1] : 63F decompensated ETOH cirrhosis listed for OLT  Repeat MELD today  Discussed natural history of cirrhosis with patient Discussed salt restriction and abstinence from ETOH Stressed importance of close follow up - including regular interval labs and screening for HCC Ongoing follow up with RPP Check PETH again - has been negative Discussed role of TIPS for management of ascites Discussed role to LDLT for earlier transplant given low MELD at this time  Will order repeat paracentesis She will need repeat cardiac testing in April MRI reviewed from December - no masses - next US in June Continue diuretics at 40mg BID Lasix and 100mg Aldactone BID  RTC 4-6W

## 2024-01-23 NOTE — HISTORY OF PRESENT ILLNESS
[FreeTextEntry1] : Ms. ABBY MALAGON is a 63 year old female with a PMH of breast CA s/p lumpectomy, ETOH abuse, and HTN. She was recently admitted to [facility] from 2/17-2/25 for abdominal distention and jaundice. She originally saw PCP in Jan 2023 when she noted she was turning yellow, PCP did bloodwork and referred her to GI- Dr Lazcano.   Jai Lazcano [phone]  She states she used to have 2 glasses of vodka daily for 3 years to treat her chronic pain from her breast cancer - her last drink was 1/20/23.  She reports she was on Letrozole for her breast caner and was taking Tylenol daily at recommended doses to treat her pain, she is concerned that the drug interaction may have damaged her liver. She has since stopped taking Tylenol. 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  She was discharged with steroid taper due to favorable Lille score however bilirubin has not improved and steroids stopped Patient with increased abd distention and LE edema currently in diuretics Has received intermittent paracentesis   LOV 12/2023 Interval Hx - Last paracentesis 11/17 removed 7L - no f/c/n/v/cp/sob, encephalopathy  - Colonoscopy and EGD done - multiple polyps removed - 2 adenomas  - Attended intake to [facility] but has missed some follow up appointments - but has had ongoing follow up this past month - seen by Psych last visit - reiterated importance of RPP as part of transplant program - Seen Oncology/Breast - for clearance - also seen by Derm for hx of BCC Feels well and is maintained on BID Lasix 40Mg and Aldactone 100mg.  Discussed role of TIPS on last visit Has paracentesis appointment for next week Has abd distention - worsened since last paracentesis She has some right flank pain   Patient listed for OLT last Friday Discussed role of LDLT as well with patient and

## 2024-01-24 LAB
ALBUMIN SERPL ELPH-MCNC: 2.9 G/DL
ALP BLD-CCNC: 111 U/L
ALT SERPL-CCNC: 13 U/L
ANION GAP SERPL CALC-SCNC: 10 MMOL/L
AST SERPL-CCNC: 36 U/L
BASOPHILS # BLD AUTO: 0.02 K/UL
BASOPHILS NFR BLD AUTO: 0.5 %
BILIRUB SERPL-MCNC: 2.6 MG/DL
BUN SERPL-MCNC: 20 MG/DL
CALCIUM SERPL-MCNC: 8.8 MG/DL
CHLORIDE SERPL-SCNC: 103 MMOL/L
CO2 SERPL-SCNC: 22 MMOL/L
CREAT SERPL-MCNC: 1.17 MG/DL
EGFR: 53 ML/MIN/1.73M2
EOSINOPHIL # BLD AUTO: 0.28 K/UL
EOSINOPHIL NFR BLD AUTO: 6.8 %
GLUCOSE SERPL-MCNC: 139 MG/DL
HCT VFR BLD CALC: 28.1 %
HGB BLD-MCNC: 8.8 G/DL
IMM GRANULOCYTES NFR BLD AUTO: 0.2 %
INR PPP: 1.27 RATIO
LYMPHOCYTES # BLD AUTO: 0.89 K/UL
LYMPHOCYTES NFR BLD AUTO: 21.5 %
MAN DIFF?: NORMAL
MCHC RBC-ENTMCNC: 31.3 GM/DL
MCHC RBC-ENTMCNC: 32.5 PG
MCV RBC AUTO: 103.7 FL
MONOCYTES # BLD AUTO: 0.34 K/UL
MONOCYTES NFR BLD AUTO: 8.2 %
NEUTROPHILS # BLD AUTO: 2.6 K/UL
NEUTROPHILS NFR BLD AUTO: 62.8 %
PLATELET # BLD AUTO: 81 K/UL
POTASSIUM SERPL-SCNC: 4.3 MMOL/L
PROT SERPL-MCNC: 6.7 G/DL
PT BLD: 14.2 SEC
RBC # BLD: 2.71 M/UL
RBC # FLD: 17 %
SODIUM SERPL-SCNC: 136 MMOL/L
WBC # FLD AUTO: 4.14 K/UL

## 2024-01-29 LAB
PETH 16:0/18:1: NEGATIVE NG/ML
PETH 16:0/18:2: NEGATIVE NG/ML
PETH COMMENTS: NORMAL

## 2024-01-30 ENCOUNTER — NON-APPOINTMENT (OUTPATIENT)
Age: 64
End: 2024-01-30

## 2024-01-31 ENCOUNTER — OUTPATIENT (OUTPATIENT)
Dept: OUTPATIENT SERVICES | Facility: HOSPITAL | Age: 64
LOS: 1 days | End: 2024-01-31
Payer: COMMERCIAL

## 2024-01-31 ENCOUNTER — APPOINTMENT (OUTPATIENT)
Dept: ULTRASOUND IMAGING | Facility: IMAGING CENTER | Age: 64
End: 2024-01-31
Payer: COMMERCIAL

## 2024-01-31 ENCOUNTER — RESULT REVIEW (OUTPATIENT)
Age: 64
End: 2024-01-31

## 2024-01-31 DIAGNOSIS — Z87.39 PERSONAL HISTORY OF OTHER DISEASES OF THE MUSCULOSKELETAL SYSTEM AND CONNECTIVE TISSUE: Chronic | ICD-10-CM

## 2024-01-31 DIAGNOSIS — Z98.890 OTHER SPECIFIED POSTPROCEDURAL STATES: Chronic | ICD-10-CM

## 2024-01-31 DIAGNOSIS — K70.31 ALCOHOLIC CIRRHOSIS OF LIVER WITH ASCITES: ICD-10-CM

## 2024-01-31 DIAGNOSIS — Z00.8 ENCOUNTER FOR OTHER GENERAL EXAMINATION: ICD-10-CM

## 2024-01-31 PROCEDURE — P9047: CPT

## 2024-01-31 PROCEDURE — 49083 ABD PARACENTESIS W/IMAGING: CPT

## 2024-02-01 ENCOUNTER — NON-APPOINTMENT (OUTPATIENT)
Age: 64
End: 2024-02-01

## 2024-02-08 ENCOUNTER — TRANSCRIPTION ENCOUNTER (OUTPATIENT)
Age: 64
End: 2024-02-08

## 2024-02-09 ENCOUNTER — TRANSCRIPTION ENCOUNTER (OUTPATIENT)
Age: 64
End: 2024-02-09

## 2024-02-09 RX ORDER — SPIRONOLACTONE 100 MG/1
100 TABLET ORAL TWICE DAILY
Qty: 180 | Refills: 3 | Status: ACTIVE | COMMUNITY
Start: 2023-03-15 | End: 1900-01-01

## 2024-02-16 ENCOUNTER — OUTPATIENT (OUTPATIENT)
Dept: OUTPATIENT SERVICES | Facility: HOSPITAL | Age: 64
LOS: 1 days | End: 2024-02-16
Payer: COMMERCIAL

## 2024-02-16 ENCOUNTER — RESULT REVIEW (OUTPATIENT)
Age: 64
End: 2024-02-16

## 2024-02-16 ENCOUNTER — APPOINTMENT (OUTPATIENT)
Dept: ULTRASOUND IMAGING | Facility: IMAGING CENTER | Age: 64
End: 2024-02-16
Payer: COMMERCIAL

## 2024-02-16 DIAGNOSIS — Z87.39 PERSONAL HISTORY OF OTHER DISEASES OF THE MUSCULOSKELETAL SYSTEM AND CONNECTIVE TISSUE: Chronic | ICD-10-CM

## 2024-02-16 DIAGNOSIS — K70.31 ALCOHOLIC CIRRHOSIS OF LIVER WITH ASCITES: ICD-10-CM

## 2024-02-16 DIAGNOSIS — Z98.890 OTHER SPECIFIED POSTPROCEDURAL STATES: Chronic | ICD-10-CM

## 2024-02-16 DIAGNOSIS — Z00.8 ENCOUNTER FOR OTHER GENERAL EXAMINATION: ICD-10-CM

## 2024-02-16 PROCEDURE — 49083 ABD PARACENTESIS W/IMAGING: CPT

## 2024-02-16 PROCEDURE — P9047: CPT

## 2024-02-20 LAB
B PERT IGG+IGM PNL SER: CLEAR
COLOR FLD: YELLOW
FLUID INTAKE SUBSTANCE CLASS: NORMAL
LYMPHOCYTES # FLD MANUAL: 77 %
MONOS+MACROS NFR FLD MANUAL: 19 %
NEUTS SEG # FLD MANUAL: 4 %
RBC # FLD MANUAL: < 2000 /UL
TOTAL CELLS COUNTED FLD: 50 /UL
TUBE TYPE: NORMAL

## 2024-02-27 ENCOUNTER — APPOINTMENT (OUTPATIENT)
Dept: HEPATOLOGY | Facility: CLINIC | Age: 64
End: 2024-02-27
Payer: COMMERCIAL

## 2024-02-27 VITALS
BODY MASS INDEX: 25.16 KG/M2 | HEIGHT: 68 IN | RESPIRATION RATE: 14 BRPM | SYSTOLIC BLOOD PRESSURE: 116 MMHG | OXYGEN SATURATION: 100 % | DIASTOLIC BLOOD PRESSURE: 70 MMHG | TEMPERATURE: 97.6 F | HEART RATE: 90 BPM | WEIGHT: 166 LBS

## 2024-02-27 PROCEDURE — 99214 OFFICE O/P EST MOD 30 MIN: CPT

## 2024-02-28 LAB
AFP-TM SERPL-MCNC: 4.5 NG/ML
ALBUMIN SERPL ELPH-MCNC: 3.1 G/DL
ALP BLD-CCNC: 132 U/L
ALT SERPL-CCNC: 18 U/L
ANION GAP SERPL CALC-SCNC: 10 MMOL/L
AST SERPL-CCNC: 40 U/L
BASOPHILS # BLD AUTO: 0.02 K/UL
BASOPHILS NFR BLD AUTO: 0.3 %
BILIRUB SERPL-MCNC: 2.5 MG/DL
BUN SERPL-MCNC: 20 MG/DL
CALCIUM SERPL-MCNC: 8.8 MG/DL
CHLORIDE SERPL-SCNC: 104 MMOL/L
CO2 SERPL-SCNC: 20 MMOL/L
CREAT SERPL-MCNC: 1.24 MG/DL
EGFR: 50 ML/MIN/1.73M2
EOSINOPHIL # BLD AUTO: 0.46 K/UL
EOSINOPHIL NFR BLD AUTO: 7.1 %
GLUCOSE SERPL-MCNC: 114 MG/DL
HCT VFR BLD CALC: 29.8 %
HGB BLD-MCNC: 9.3 G/DL
IMM GRANULOCYTES NFR BLD AUTO: 0.3 %
INR PPP: 1.25 RATIO
LYMPHOCYTES # BLD AUTO: 1.31 K/UL
LYMPHOCYTES NFR BLD AUTO: 20.1 %
MAN DIFF?: NORMAL
MCHC RBC-ENTMCNC: 31.2 GM/DL
MCHC RBC-ENTMCNC: 32.3 PG
MCV RBC AUTO: 103.5 FL
MONOCYTES # BLD AUTO: 0.64 K/UL
MONOCYTES NFR BLD AUTO: 9.8 %
NEUTROPHILS # BLD AUTO: 4.07 K/UL
NEUTROPHILS NFR BLD AUTO: 62.4 %
PLATELET # BLD AUTO: 101 K/UL
POTASSIUM SERPL-SCNC: 4.6 MMOL/L
PROT SERPL-MCNC: 7.3 G/DL
PT BLD: 14 SEC
RBC # BLD: 2.88 M/UL
RBC # FLD: 16.9 %
SODIUM SERPL-SCNC: 134 MMOL/L
WBC # FLD AUTO: 6.52 K/UL

## 2024-02-28 NOTE — ASSESSMENT
[FreeTextEntry1] : 63F with decompensated ETOH Cirrhosis listed for OLT With refractory ascites - on diuretics with adequate urine output Has reducible umbilical hernia   Discussed natural history of cirrhosis with patient Discussed salt restriction and abstinence from ETOH Stressed importance of close follow up - including regular interval labs and screening for HCC  Continue [id number] BID Continue Lasix 40mg daily  Discussed role of TIPS MELD 18 This may benefit her by limiting ascites Unable to repair hernia with scites Increased frequence of LVP - may increase lasix dosing I would be in favor or pursuing TIPS Can treat PSE if it occurs with lactulose and rifaximin   RTC in 4-6W

## 2024-02-28 NOTE — REASON FOR VISIT
[Follow-Up] : a follow-up visit for [Liver Transplant Evaluation] : liver transplant evaluation [FreeTextEntry1] : Listed for OLT

## 2024-02-28 NOTE — HISTORY OF PRESENT ILLNESS
[FreeTextEntry1] : Ms. ABBY MALAGON is a 63 year old female with a PMH of breast CA s/p lumpectomy, ETOH abuse, and HTN. She was recently admitted to [facility] from 2/17-2/25 for abdominal distention and jaundice. She originally saw PCP in Jan 2023 when she noted she was turning yellow, PCP did bloodwork and referred her to GI- Dr Lazcano.   Jai Lazcano [phone]  She states she used to have 2 glasses of vodka daily for 3 years to treat her chronic pain from her breast cancer - her last drink was 1/20/23.  She reports she was on Letrozole for her breast caner and was taking Tylenol daily at recommended doses to treat her pain, she is concerned that the drug interaction may have damaged her liver. She has since stopped taking Tylenol. 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  She was discharged with steroid taper due to favorable Lille score however bilirubin has not improved and steroids stopped Patient with increased abd distention and LE edema currently in diuretics Has received intermittent paracentesis   LOV 1/2024 Interval Hx - no f/c/n/v/cp/sob, encephalopathy  - Colonoscopy and EGD done - multiple polyps removed - 2 adenomas - repeat in 5 years - 2028 - listed for OLT  - Feels well and is maintained on BID Lasix 40Mg and Aldactone 100mg.  - having more frequent LVP with 7 + 9L paracentesis several weeks apart - has umbilical hernia that is reducible  Discussed role of TIPS - patient concerned about PSE given her job Has paracentesis appointment for next week Discussed role of LDLT as well with patient and

## 2024-02-28 NOTE — PHYSICAL EXAM
[Supple] : the neck was supple [No Respiratory Distress] : no respiratory distress [No Accessory Muscle Use] : no accessory muscle use [Abdominal Bruit] : abdominal bruit [Ascites Fluid Wave] : ascites fluid wave [Spider Angioma] : spider angioma [Asterixis] : no asterixis [de-identified] : sarcopenic with multple spider angiomas  [de-identified] : umbilical hernia soft and reducible

## 2024-03-04 ENCOUNTER — RESULT REVIEW (OUTPATIENT)
Age: 64
End: 2024-03-04

## 2024-03-04 ENCOUNTER — OUTPATIENT (OUTPATIENT)
Dept: OUTPATIENT SERVICES | Facility: HOSPITAL | Age: 64
LOS: 1 days | End: 2024-03-04
Payer: COMMERCIAL

## 2024-03-04 ENCOUNTER — APPOINTMENT (OUTPATIENT)
Dept: ULTRASOUND IMAGING | Facility: IMAGING CENTER | Age: 64
End: 2024-03-04
Payer: COMMERCIAL

## 2024-03-04 DIAGNOSIS — Z00.8 ENCOUNTER FOR OTHER GENERAL EXAMINATION: ICD-10-CM

## 2024-03-04 DIAGNOSIS — K70.31 ALCOHOLIC CIRRHOSIS OF LIVER WITH ASCITES: ICD-10-CM

## 2024-03-04 DIAGNOSIS — Z87.39 PERSONAL HISTORY OF OTHER DISEASES OF THE MUSCULOSKELETAL SYSTEM AND CONNECTIVE TISSUE: Chronic | ICD-10-CM

## 2024-03-04 DIAGNOSIS — Z98.890 OTHER SPECIFIED POSTPROCEDURAL STATES: Chronic | ICD-10-CM

## 2024-03-04 PROCEDURE — P9047: CPT

## 2024-03-04 PROCEDURE — 49083 ABD PARACENTESIS W/IMAGING: CPT

## 2024-03-12 ENCOUNTER — APPOINTMENT (OUTPATIENT)
Dept: HEPATOLOGY | Facility: CLINIC | Age: 64
End: 2024-03-12
Payer: COMMERCIAL

## 2024-03-12 PROCEDURE — 99214 OFFICE O/P EST MOD 30 MIN: CPT

## 2024-03-12 NOTE — ASSESSMENT
[FreeTextEntry1] : 63F with decompensated ETOH Cirrhosis listed for OLT With refractory ascites - on diuretics with adequate urine output Has reducible umbilical hernia   Discussed natural history of cirrhosis with patient Discussed salt restriction and abstinence from ETOH Stressed importance of close follow up - including regular interval labs and screening for HCC  Continue [id number] BID Increase Lasix 60 mg BID  Discussed role of TIPS MELD 18 Refer to IR for TIPS evaluation  RTC next month

## 2024-03-12 NOTE — REASON FOR VISIT
[Home] : at home, [unfilled] , at the time of the visit. [Medical Office: ([facility])___] : at the medical office located in  [Patient] : the patient [Self] : self [Follow-Up] : a follow-up visit for

## 2024-03-12 NOTE — HISTORY OF PRESENT ILLNESS
[FreeTextEntry1] : Ms. ABBY MALAGON is a 63 year old female with a PMH of breast CA s/p lumpectomy, ETOH abuse, and HTN. She was recently admitted to [facility] from 2/17-2/25 for abdominal distention and jaundice. She originally saw PCP in Jan 2023 when she noted she was turning yellow, PCP did bloodwork and referred her to GI- Dr Lazcano.   Jai Lazcano [phone]  She states she used to have 2 glasses of vodka daily for 3 years to treat her chronic pain from her breast cancer - her last drink was 1/20/23.  She reports she was on Letrozole for her breast caner and was taking Tylenol daily at recommended doses to treat her pain, she is concerned that the drug interaction may have damaged her liver. She has since stopped taking Tylenol. 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  She was discharged with steroid taper due to favorable Lille score however bilirubin has not improved and steroids stopped Patient with increased abd distention and LE edema currently in diuretics Has received intermittent paracentesis   LOV 2/2024  Interval Hx - no f/c/n/v/cp/sob, encephalopathy  - Colonoscopy and EGD done - multiple polyps removed - 2 adenomas - repeat in 5 years - 2028 - listed for OLT - mELD 18 - Feels well and is maintained on BID Lasix 40Mg and Aldactone 100mg.  - having more frequent LVP with 7 + 9L paracentesis several weeks apart - had pain after last paracentesis - has umbilical hernia that is reducible  Discussed role of TIPS - patient concerned about PSE given her job She is further considering TIPS at this time after discussing with family

## 2024-03-13 RX ORDER — RIFAXIMIN 550 MG/1
550 TABLET ORAL
Qty: 60 | Refills: 0 | Status: ACTIVE | COMMUNITY
Start: 2024-03-13 | End: 1900-01-01

## 2024-03-18 ENCOUNTER — TRANSCRIPTION ENCOUNTER (OUTPATIENT)
Age: 64
End: 2024-03-18

## 2024-03-22 ENCOUNTER — APPOINTMENT (OUTPATIENT)
Dept: ULTRASOUND IMAGING | Facility: IMAGING CENTER | Age: 64
End: 2024-03-22
Payer: COMMERCIAL

## 2024-03-22 ENCOUNTER — OUTPATIENT (OUTPATIENT)
Dept: OUTPATIENT SERVICES | Facility: HOSPITAL | Age: 64
LOS: 1 days | End: 2024-03-22
Payer: COMMERCIAL

## 2024-03-22 ENCOUNTER — RESULT REVIEW (OUTPATIENT)
Age: 64
End: 2024-03-22

## 2024-03-22 DIAGNOSIS — Z98.890 OTHER SPECIFIED POSTPROCEDURAL STATES: Chronic | ICD-10-CM

## 2024-03-22 DIAGNOSIS — Z00.8 ENCOUNTER FOR OTHER GENERAL EXAMINATION: ICD-10-CM

## 2024-03-22 DIAGNOSIS — K70.31 ALCOHOLIC CIRRHOSIS OF LIVER WITH ASCITES: ICD-10-CM

## 2024-03-22 DIAGNOSIS — Z87.39 PERSONAL HISTORY OF OTHER DISEASES OF THE MUSCULOSKELETAL SYSTEM AND CONNECTIVE TISSUE: Chronic | ICD-10-CM

## 2024-03-22 PROCEDURE — 49083 ABD PARACENTESIS W/IMAGING: CPT

## 2024-03-22 PROCEDURE — P9047: CPT

## 2024-03-27 ENCOUNTER — TRANSCRIPTION ENCOUNTER (OUTPATIENT)
Age: 64
End: 2024-03-27

## 2024-03-28 ENCOUNTER — TRANSCRIPTION ENCOUNTER (OUTPATIENT)
Age: 64
End: 2024-03-28

## 2024-04-01 ENCOUNTER — TRANSCRIPTION ENCOUNTER (OUTPATIENT)
Age: 64
End: 2024-04-01

## 2024-04-03 ENCOUNTER — APPOINTMENT (OUTPATIENT)
Dept: HEPATOLOGY | Facility: CLINIC | Age: 64
End: 2024-04-03
Payer: COMMERCIAL

## 2024-04-03 ENCOUNTER — LABORATORY RESULT (OUTPATIENT)
Age: 64
End: 2024-04-03

## 2024-04-03 VITALS
OXYGEN SATURATION: 100 % | WEIGHT: 168 LBS | HEART RATE: 82 BPM | TEMPERATURE: 97.8 F | HEIGHT: 68 IN | SYSTOLIC BLOOD PRESSURE: 116 MMHG | DIASTOLIC BLOOD PRESSURE: 70 MMHG | BODY MASS INDEX: 25.46 KG/M2

## 2024-04-03 PROCEDURE — 99214 OFFICE O/P EST MOD 30 MIN: CPT

## 2024-04-03 RX ORDER — BUMETANIDE 1 MG/1
1 TABLET ORAL TWICE DAILY
Qty: 60 | Refills: 3 | Status: ACTIVE | COMMUNITY
Start: 2024-04-03 | End: 1900-01-01

## 2024-04-03 NOTE — REASON FOR VISIT
[Follow-Up] : a follow-up visit for [Home] : at home, [unfilled] , at the time of the visit. [Medical Office: ([facility])___] : at the medical office located in  [Patient] : the patient [Self] : self

## 2024-04-04 ENCOUNTER — TRANSCRIPTION ENCOUNTER (OUTPATIENT)
Age: 64
End: 2024-04-04

## 2024-04-04 LAB
ALBUMIN SERPL ELPH-MCNC: 2.9 G/DL
ALP BLD-CCNC: 134 U/L
ALT SERPL-CCNC: 14 U/L
ANION GAP SERPL CALC-SCNC: 12 MMOL/L
AST SERPL-CCNC: 38 U/L
BILIRUB SERPL-MCNC: 2.1 MG/DL
BUN SERPL-MCNC: 26 MG/DL
CALCIUM SERPL-MCNC: 8.8 MG/DL
CHLORIDE SERPL-SCNC: 106 MMOL/L
CO2 SERPL-SCNC: 19 MMOL/L
CREAT SERPL-MCNC: 1.27 MG/DL
EGFR: 48 ML/MIN/1.73M2
GLUCOSE SERPL-MCNC: 133 MG/DL
HCT VFR BLD CALC: 29.9 %
HGB BLD-MCNC: 9.5 G/DL
INR PPP: 1.22 RATIO
MAGNESIUM SERPL-MCNC: 1.9 MG/DL
MCHC RBC-ENTMCNC: 31.8 GM/DL
MCHC RBC-ENTMCNC: 33.3 PG
MCV RBC AUTO: 104.9 FL
PHOSPHATE SERPL-MCNC: 3.1 MG/DL
PLATELET # BLD AUTO: 78 K/UL
POTASSIUM SERPL-SCNC: 4.9 MMOL/L
PROT SERPL-MCNC: 7.3 G/DL
PT BLD: 13.7 SEC
RBC # BLD: 2.85 M/UL
RBC # FLD: 17.1 %
SODIUM SERPL-SCNC: 137 MMOL/L
WBC # FLD AUTO: 5.68 K/UL

## 2024-04-04 RX ORDER — FUROSEMIDE 40 MG/1
40 TABLET ORAL TWICE DAILY
Qty: 180 | Refills: 1 | Status: DISCONTINUED | COMMUNITY
Start: 2023-03-15 | End: 2024-04-04

## 2024-04-04 NOTE — ASSESSMENT
[FreeTextEntry1] : 63F with decompensated ETOH Cirrhosis listed for OLT With refractory ascites - on diuretics with adequate urine output Has reducible umbilical hernia   Discussed natural history of cirrhosis with patient Discussed salt restriction and abstinence from ETOH Stressed importance of close follow up - including regular interval labs and screening for HCC  Continue [id number] BID will discontinue lasix 40 mg BID today and transition to Bumex 1 mg BID -repeat MELD labs today and in 2 weeks -completed IR eval and was told she is good candidate for TIPs based on imaging/anatomy - Given information for LDLT candidacy  MELD 18 (02/24)  RTC next month Para scheduled for a few weeks from now

## 2024-04-04 NOTE — PHYSICAL EXAM
[EOMI] : extra ocular movement intact [Normal Bowel Sounds] : normal bowel sounds [Non Tender] : non-tender [Scleral Icterus] : no scleral icterus [de-identified] : sarcopenic - many telangiectatic lesions  [de-identified] : +umbilical hernia that is reducible

## 2024-04-05 ENCOUNTER — TRANSCRIPTION ENCOUNTER (OUTPATIENT)
Age: 64
End: 2024-04-05

## 2024-04-09 LAB
PETH 16:0/18:1: NEGATIVE NG/ML
PETH 16:0/18:2: NEGATIVE NG/ML
PETH COMMENTS: NORMAL

## 2024-04-10 LAB
ALPHA-1-FETOPROTEIN-L3: 10.9 %
ALPHA-1-FETOPROTEIN: 4.2 NG/ML

## 2024-04-12 ENCOUNTER — APPOINTMENT (OUTPATIENT)
Dept: ULTRASOUND IMAGING | Facility: IMAGING CENTER | Age: 64
End: 2024-04-12
Payer: COMMERCIAL

## 2024-04-12 ENCOUNTER — RESULT REVIEW (OUTPATIENT)
Age: 64
End: 2024-04-12

## 2024-04-12 ENCOUNTER — OUTPATIENT (OUTPATIENT)
Dept: OUTPATIENT SERVICES | Facility: HOSPITAL | Age: 64
LOS: 1 days | End: 2024-04-12
Payer: COMMERCIAL

## 2024-04-12 DIAGNOSIS — Z98.890 OTHER SPECIFIED POSTPROCEDURAL STATES: Chronic | ICD-10-CM

## 2024-04-12 DIAGNOSIS — K70.31 ALCOHOLIC CIRRHOSIS OF LIVER WITH ASCITES: ICD-10-CM

## 2024-04-12 DIAGNOSIS — Z87.39 PERSONAL HISTORY OF OTHER DISEASES OF THE MUSCULOSKELETAL SYSTEM AND CONNECTIVE TISSUE: Chronic | ICD-10-CM

## 2024-04-12 DIAGNOSIS — Z00.8 ENCOUNTER FOR OTHER GENERAL EXAMINATION: ICD-10-CM

## 2024-04-12 PROCEDURE — 49083 ABD PARACENTESIS W/IMAGING: CPT

## 2024-04-19 ENCOUNTER — TRANSCRIPTION ENCOUNTER (OUTPATIENT)
Age: 64
End: 2024-04-19

## 2024-04-19 LAB
ALBUMIN SERPL ELPH-MCNC: 3 G/DL
ALP BLD-CCNC: 126 U/L
ALT SERPL-CCNC: 16 U/L
ANION GAP SERPL CALC-SCNC: 12 MMOL/L
AST SERPL-CCNC: 35 U/L
BASOPHILS # BLD AUTO: 0.01 K/UL
BASOPHILS NFR BLD AUTO: 0.2 %
BILIRUB SERPL-MCNC: 2 MG/DL
BUN SERPL-MCNC: 38 MG/DL
CALCIUM SERPL-MCNC: 8.6 MG/DL
CHLORIDE SERPL-SCNC: 102 MMOL/L
CO2 SERPL-SCNC: 20 MMOL/L
CREAT SERPL-MCNC: 1.31 MG/DL
EGFR: 46 ML/MIN/1.73M2
EOSINOPHIL # BLD AUTO: 0.32 K/UL
EOSINOPHIL NFR BLD AUTO: 7.8 %
GLUCOSE SERPL-MCNC: 162 MG/DL
HCT VFR BLD CALC: 27.4 %
HGB BLD-MCNC: 8.9 G/DL
IMM GRANULOCYTES NFR BLD AUTO: 0.2 %
INR PPP: 1.19 RATIO
LYMPHOCYTES # BLD AUTO: 0.73 K/UL
LYMPHOCYTES NFR BLD AUTO: 17.9 %
MAN DIFF?: NORMAL
MCHC RBC-ENTMCNC: 32.5 GM/DL
MCHC RBC-ENTMCNC: 32.5 PG
MCV RBC AUTO: 100 FL
MONOCYTES # BLD AUTO: 0.29 K/UL
MONOCYTES NFR BLD AUTO: 7.1 %
NEUTROPHILS # BLD AUTO: 2.72 K/UL
NEUTROPHILS NFR BLD AUTO: 66.8 %
PLATELET # BLD AUTO: 69 K/UL
POTASSIUM SERPL-SCNC: 4.1 MMOL/L
PROT SERPL-MCNC: 6.9 G/DL
PT BLD: 13.5 SEC
RBC # BLD: 2.74 M/UL
RBC # FLD: 16.3 %
SODIUM SERPL-SCNC: 134 MMOL/L
WBC # FLD AUTO: 4.08 K/UL

## 2024-04-26 ENCOUNTER — TRANSCRIPTION ENCOUNTER (OUTPATIENT)
Age: 64
End: 2024-04-26

## 2024-04-30 ENCOUNTER — TRANSCRIPTION ENCOUNTER (OUTPATIENT)
Age: 64
End: 2024-04-30

## 2024-05-03 ENCOUNTER — APPOINTMENT (OUTPATIENT)
Dept: ULTRASOUND IMAGING | Facility: IMAGING CENTER | Age: 64
End: 2024-05-03
Payer: COMMERCIAL

## 2024-05-03 ENCOUNTER — OUTPATIENT (OUTPATIENT)
Dept: OUTPATIENT SERVICES | Facility: HOSPITAL | Age: 64
LOS: 1 days | End: 2024-05-03
Payer: COMMERCIAL

## 2024-05-03 DIAGNOSIS — K70.31 ALCOHOLIC CIRRHOSIS OF LIVER WITH ASCITES: ICD-10-CM

## 2024-05-03 DIAGNOSIS — Z98.890 OTHER SPECIFIED POSTPROCEDURAL STATES: Chronic | ICD-10-CM

## 2024-05-03 DIAGNOSIS — Z00.8 ENCOUNTER FOR OTHER GENERAL EXAMINATION: ICD-10-CM

## 2024-05-03 DIAGNOSIS — Z87.39 PERSONAL HISTORY OF OTHER DISEASES OF THE MUSCULOSKELETAL SYSTEM AND CONNECTIVE TISSUE: Chronic | ICD-10-CM

## 2024-05-04 ENCOUNTER — RESULT REVIEW (OUTPATIENT)
Age: 64
End: 2024-05-04

## 2024-05-04 PROCEDURE — P9047: CPT

## 2024-05-04 PROCEDURE — 49083 ABD PARACENTESIS W/IMAGING: CPT

## 2024-05-06 ENCOUNTER — APPOINTMENT (OUTPATIENT)
Dept: HEPATOLOGY | Facility: CLINIC | Age: 64
End: 2024-05-06
Payer: COMMERCIAL

## 2024-05-06 VITALS
BODY MASS INDEX: 24.25 KG/M2 | TEMPERATURE: 98.1 F | DIASTOLIC BLOOD PRESSURE: 75 MMHG | WEIGHT: 160 LBS | SYSTOLIC BLOOD PRESSURE: 118 MMHG | HEART RATE: 82 BPM | OXYGEN SATURATION: 100 % | RESPIRATION RATE: 14 BRPM | HEIGHT: 68 IN

## 2024-05-06 PROCEDURE — 99214 OFFICE O/P EST MOD 30 MIN: CPT

## 2024-05-06 NOTE — PHYSICAL EXAM
[EOMI] : extra ocular movement intact [Normal Bowel Sounds] : normal bowel sounds [Non Tender] : non-tender [Scleral Icterus] : no scleral icterus [de-identified] : sarcopenic - many telangiectatic lesions  [de-identified] : +umbilical hernia that is reducible

## 2024-05-06 NOTE — ASSESSMENT
[FreeTextEntry1] : 63F with decompensated ETOH Cirrhosis listed for OLT With refractory ascites - on diuretics with adequate urine output Has reducible umbilical hernia   Discussed natural history of cirrhosis with patient Discussed salt restriction and abstinence from ETOH Stressed importance of close follow up - including regular interval labs and screening for HCC  Continue [id number] BID Bumex 1 mg BID -repeat MELD labs today and in 2 weeks - currently 16 - looks like can start to space out Paracentesis - Will continue BUMEX 1mg BID and increase as tolerated - ok to travel at end of the month - continued encouraging finding LDLT donors  RTC in about 4-6 weeks

## 2024-05-06 NOTE — HISTORY OF PRESENT ILLNESS
[FreeTextEntry1] : Ms. ABBY MALAGON is a 64 year old female with a PMH of breast CA s/p lumpectomy, ETOH abuse, and HTN. She was recently admitted to [facility] from 2/17-2/25 for abdominal distention and jaundice. She originally saw PCP in Jan 2023 when she noted she was turning yellow, PCP did bloodwork and referred her her GI:  Jai Lazcano [phone]  She states she used to have 2 glasses of vodka daily for 3 years to treat her chronic pain from her breast cancer - her last drink was 1/20/23.  She reports she was on Letrozole for her breast caner and was taking Tylenol daily at recommended doses to treat her pain, she is concerned that the drug interaction may have damaged her liver. She has since stopped taking Tylenol. 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 Wednesdays are her easiest day for appointments   She was discharged with steroid taper due to favorable Lille score however bilirubin has not improved and steroids stopped Patient with increased abd distention and LE edema currently in diuretics Has received intermittent paracentesis   LOV 2/2024  Interval Hx 4/3/24: - no f/c/n/v/cp/sob, encephalopathy  - Colonoscopy and EGD done - multiple polyps removed - 2 adenomas - repeat in 5 years - 2028 - listed for OLT - mELD 18 - Feels well and is maintained on BID BUMEX and Aldactone 100mg BID.   Discussed role of TIPS - patient concerned about PSE given her job She is further considering TIPS at this time after discussing with family She has a possible LDLT candidate in [place] - her niece but unclear if she will actually proceed with being evaluated for LDLT  Interval Hx - switched from Lasix to Bumex 1mg BID - last para about 1 week ago 4L removed significantly less than usual paracentesis  - she continues to think about TIPS - her latest MELD ~16 - no other issues at this time  - planning on traveling to [place] at the end of the month

## 2024-05-21 LAB
ALBUMIN SERPL ELPH-MCNC: 3.1 G/DL
ALP BLD-CCNC: 106 U/L
ALT SERPL-CCNC: 15 U/L
ANION GAP SERPL CALC-SCNC: 9 MMOL/L
AST SERPL-CCNC: 42 U/L
BASOPHILS # BLD AUTO: 0.02 K/UL
BASOPHILS NFR BLD AUTO: 0.5 %
BILIRUB SERPL-MCNC: 2.3 MG/DL
BUN SERPL-MCNC: 26 MG/DL
CALCIUM SERPL-MCNC: 8.7 MG/DL
CHLORIDE SERPL-SCNC: 104 MMOL/L
CO2 SERPL-SCNC: 22 MMOL/L
CREAT SERPL-MCNC: 1.3 MG/DL
EGFR: 46 ML/MIN/1.73M2
EOSINOPHIL # BLD AUTO: 0.34 K/UL
EOSINOPHIL NFR BLD AUTO: 9 %
GLUCOSE SERPL-MCNC: 113 MG/DL
HCT VFR BLD CALC: 26.3 %
HGB BLD-MCNC: 8.5 G/DL
IMM GRANULOCYTES NFR BLD AUTO: 0 %
INR PPP: 1.16 RATIO
LYMPHOCYTES # BLD AUTO: 0.85 K/UL
LYMPHOCYTES NFR BLD AUTO: 22.6 %
MAN DIFF?: NORMAL
MCHC RBC-ENTMCNC: 32.3 GM/DL
MCHC RBC-ENTMCNC: 33.2 PG
MCV RBC AUTO: 102.7 FL
MONOCYTES # BLD AUTO: 0.34 K/UL
MONOCYTES NFR BLD AUTO: 9 %
NEUTROPHILS # BLD AUTO: 2.21 K/UL
NEUTROPHILS NFR BLD AUTO: 58.9 %
PLATELET # BLD AUTO: 62 K/UL
POTASSIUM SERPL-SCNC: 4.7 MMOL/L
PROT SERPL-MCNC: 7.1 G/DL
PT BLD: 13.1 SEC
RBC # BLD: 2.56 M/UL
RBC # FLD: 16.2 %
SODIUM SERPL-SCNC: 135 MMOL/L
WBC # FLD AUTO: 3.76 K/UL

## 2024-05-22 DIAGNOSIS — K70.10 ALCOHOLIC HEPATITIS W/OUT ASCITES: ICD-10-CM

## 2024-05-28 ENCOUNTER — NON-APPOINTMENT (OUTPATIENT)
Age: 64
End: 2024-05-28

## 2024-05-28 LAB
PETH 16:0/18:1: NEGATIVE NG/ML
PETH 16:0/18:2: NEGATIVE NG/ML
PETH COMMENTS: NORMAL

## 2024-05-29 ENCOUNTER — OUTPATIENT (OUTPATIENT)
Dept: OUTPATIENT SERVICES | Facility: HOSPITAL | Age: 64
LOS: 1 days | End: 2024-05-29
Payer: COMMERCIAL

## 2024-05-29 ENCOUNTER — RESULT REVIEW (OUTPATIENT)
Age: 64
End: 2024-05-29

## 2024-05-29 ENCOUNTER — APPOINTMENT (OUTPATIENT)
Dept: ULTRASOUND IMAGING | Facility: IMAGING CENTER | Age: 64
End: 2024-05-29
Payer: COMMERCIAL

## 2024-05-29 DIAGNOSIS — K70.31 ALCOHOLIC CIRRHOSIS OF LIVER WITH ASCITES: ICD-10-CM

## 2024-05-29 DIAGNOSIS — Z98.890 OTHER SPECIFIED POSTPROCEDURAL STATES: Chronic | ICD-10-CM

## 2024-05-29 DIAGNOSIS — Z87.39 PERSONAL HISTORY OF OTHER DISEASES OF THE MUSCULOSKELETAL SYSTEM AND CONNECTIVE TISSUE: Chronic | ICD-10-CM

## 2024-05-29 PROCEDURE — 49083 ABD PARACENTESIS W/IMAGING: CPT

## 2024-05-29 PROCEDURE — P9047: CPT

## 2024-06-06 ENCOUNTER — APPOINTMENT (OUTPATIENT)
Dept: HEPATOLOGY | Facility: CLINIC | Age: 64
End: 2024-06-06
Payer: COMMERCIAL

## 2024-06-06 VITALS
BODY MASS INDEX: 23.95 KG/M2 | WEIGHT: 158 LBS | DIASTOLIC BLOOD PRESSURE: 69 MMHG | HEART RATE: 83 BPM | RESPIRATION RATE: 14 BRPM | SYSTOLIC BLOOD PRESSURE: 107 MMHG | HEIGHT: 68 IN | TEMPERATURE: 97.2 F | OXYGEN SATURATION: 100 %

## 2024-06-06 LAB
ALBUMIN SERPL ELPH-MCNC: 3.3 G/DL
ALP BLD-CCNC: 132 U/L
ALT SERPL-CCNC: 19 U/L
ANION GAP SERPL CALC-SCNC: 13 MMOL/L
AST SERPL-CCNC: 44 U/L
BASOPHILS # BLD AUTO: 0.02 K/UL
BASOPHILS NFR BLD AUTO: 0.5 %
BILIRUB SERPL-MCNC: 2 MG/DL
BUN SERPL-MCNC: 31 MG/DL
CALCIUM SERPL-MCNC: 9.1 MG/DL
CHLORIDE SERPL-SCNC: 104 MMOL/L
CO2 SERPL-SCNC: 19 MMOL/L
CREAT SERPL-MCNC: 1.34 MG/DL
EGFR: 45 ML/MIN/1.73M2
EOSINOPHIL # BLD AUTO: 0.35 K/UL
EOSINOPHIL NFR BLD AUTO: 8.1 %
GLUCOSE SERPL-MCNC: 135 MG/DL
HCT VFR BLD CALC: 28.8 %
HGB BLD-MCNC: 9.2 G/DL
IMM GRANULOCYTES NFR BLD AUTO: 0.5 %
INR PPP: 1.2 RATIO
LYMPHOCYTES # BLD AUTO: 0.81 K/UL
LYMPHOCYTES NFR BLD AUTO: 18.8 %
MAN DIFF?: NORMAL
MCHC RBC-ENTMCNC: 31.9 GM/DL
MCHC RBC-ENTMCNC: 32.6 PG
MCV RBC AUTO: 102.1 FL
MONOCYTES # BLD AUTO: 0.38 K/UL
MONOCYTES NFR BLD AUTO: 8.8 %
NEUTROPHILS # BLD AUTO: 2.72 K/UL
NEUTROPHILS NFR BLD AUTO: 63.3 %
PLATELET # BLD AUTO: 80 K/UL
POTASSIUM SERPL-SCNC: 4.5 MMOL/L
PROT SERPL-MCNC: 7.3 G/DL
PT BLD: 13.5 SEC
RBC # BLD: 2.82 M/UL
RBC # FLD: 15.9 %
SODIUM SERPL-SCNC: 136 MMOL/L
WBC # FLD AUTO: 4.3 K/UL

## 2024-06-06 PROCEDURE — 99214 OFFICE O/P EST MOD 30 MIN: CPT

## 2024-06-09 NOTE — PHYSICAL EXAM
[EOMI] : extra ocular movement intact [Normal Bowel Sounds] : normal bowel sounds [Non Tender] : non-tender [Scleral Icterus] : no scleral icterus [de-identified] : +umbilical hernia that is reducible  [de-identified] : sarcopenic - many telangiectatic lesions

## 2024-06-09 NOTE — HISTORY OF PRESENT ILLNESS
[FreeTextEntry1] : Ms. ABBY MALAGON is a 64 year old female with a PMH of breast CA s/p lumpectomy, ETOH abuse, and HTN. She was recently admitted to [facility] from 2/17-2/25 for abdominal distention and jaundice. She originally saw PCP in Jan 2023 when she noted she was turning yellow, PCP did bloodwork and referred her her GI:  Jai Lazcnao [phone]  She states she used to have 2 glasses of vodka daily for 3 years to treat her chronic pain from her breast cancer - her last drink was 1/20/23.  She reports she was on Letrozole for her breast caner and was taking Tylenol daily at recommended doses to treat her pain, she is concerned that the drug interaction may have damaged her liver. She has since stopped taking Tylenol. 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   Discussed role of TIPS - patient concerned about PSE given her job but have discussed IR consultation following updated ECHO and MRI She has a possible LDLT candidate in [place] - her niece but unclear if she will actually proceed with being evaluated for LDLT  Interval Hx - returned from [place] without issue - trying to space Para q3-4 weeks - Cr stable with Bumex - no episodes of PSe at this time or bleeding

## 2024-06-09 NOTE — ASSESSMENT
[FreeTextEntry1] : 64F with decompensated ETOH Cirrhosis listed for OLT with relatively low MELD With refractory ascites - on diuretics with adequate urine output Has reducible umbilical hernia - managed with abdominal binder  Discussed natural history of cirrhosis with patient Discussed salt restriction and abstinence from ETOH Stressed importance of close follow up - including regular interval labs and screening for HCC  Continue [id number] BID Bumex 1 mg BID Updated MRI and ECHO pending Schedule IR for TIPS evaluation once data updated Discussed role of TIPS again with patient Will continue possible evaluation for LDLT with niece if she steps forward  RTC in about 2 M

## 2024-06-10 LAB
PETH 16:0/18:1: NEGATIVE NG/ML
PETH 16:0/18:2: NEGATIVE NG/ML
PETH COMMENTS: NORMAL

## 2024-06-12 ENCOUNTER — NON-APPOINTMENT (OUTPATIENT)
Age: 64
End: 2024-06-12

## 2024-06-12 ENCOUNTER — APPOINTMENT (OUTPATIENT)
Dept: CARDIOLOGY | Facility: CLINIC | Age: 64
End: 2024-06-12
Payer: COMMERCIAL

## 2024-06-12 VITALS
SYSTOLIC BLOOD PRESSURE: 121 MMHG | HEART RATE: 83 BPM | DIASTOLIC BLOOD PRESSURE: 76 MMHG | WEIGHT: 165 LBS | BODY MASS INDEX: 25.09 KG/M2 | OXYGEN SATURATION: 100 %

## 2024-06-12 DIAGNOSIS — K70.31 ALCOHOLIC CIRRHOSIS OF LIVER WITH ASCITES: ICD-10-CM

## 2024-06-12 DIAGNOSIS — Z01.818 ENCOUNTER FOR OTHER PREPROCEDURAL EXAMINATION: ICD-10-CM

## 2024-06-12 DIAGNOSIS — Z87.898 PERSONAL HISTORY OF OTHER SPECIFIED CONDITIONS: ICD-10-CM

## 2024-06-12 PROCEDURE — G2211 COMPLEX E/M VISIT ADD ON: CPT

## 2024-06-12 PROCEDURE — 99205 OFFICE O/P NEW HI 60 MIN: CPT

## 2024-06-12 PROCEDURE — 93000 ELECTROCARDIOGRAM COMPLETE: CPT

## 2024-06-12 NOTE — REASON FOR VISIT
[Other: ____] : [unfilled] [FreeTextEntry1] : June 2024 - Patient returns today for follow-up of her liver transplant list status.  She reports abstinence from EtOH since January 2023. She does not exercise, but she continues to work regularly. She has no new cardiovascular complaints.

## 2024-06-12 NOTE — CARDIOLOGY SUMMARY
[de-identified] : 6/12/2024 - sinus rhythm at 80 bpm [de-identified] : 4/17/2023 -  CONCLUSIONS:  1. Normal left ventricular cavity size. The left ventricular wall thickness is normal. The left ventricular systolic function is normal.  2. The patient underwent pharmacological stress testing using the IV dobutamine protocol.  3. Following dobutamine infusion, normal augmentaion in thickening of all segments with a decrease in cavity size is seen.  4. The heart rate response was normal.  5. Physiologic blood pressure response to exercise.  ________________________________________________________________________________________ Procedure/Exam Protocol Dobutamine Pharmacological stress echocardiogram was performed. The patient underwent pharmacological stress testing using the Dobutamine protocol.   Stress Test Results     Heart Rate: Rest: 85 bpm --- Peak: 137 bpm (86 % max predicted).    HR Response: The heart rate response was normal. Blood Pressure: Rest: 128/81 mmHg --- Peak: 115/64 mmHg.    BP Response: Physiologic blood pressure response to exercise.    +--------+-------------+----------------+--------------+ |  Time  | Dobutamine  |Heart Rate (bpm)|Blood Pressure| +--------+-------------+----------------+--------------+ |Baseline|Pre-Injection|       85       |    128/81    | +--------+-------------+----------------+--------------+    ---------------------------------------------------------------------------------------------------------------------------------------------------------ECG: Stress ECG: No ischemic ST segment changes.  ________________________________________________________________________________________ FINDINGS:   Baseline Echo Findings: The baseline left ventricular cavity size was normal. ________________________________________________________________________________________ STRESS FINDINGS   Stress ECG and Blood Pressure: Physiologic blood pressure response to exercise.   Stress Echo Findings: Immediate post-stress, the left ventricular cavity size was normal. Following dobutamine infusion, normal augmentaion in thickening of all segments with a decrease in cavity size is seen. Heart Rates:                          Blood Pressures:   Max Age Predicted HR (MAPHR): 159     Blood Pressure Response: physiologic Target HR (85% of MAPHR):     135 bpm

## 2024-06-12 NOTE — DISCUSSION/SUMMARY
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  Her blood pressure is within normal limits.  Echocardiogram and stress echocardiogram from April 2023 as above. Continue spironolactone and furosemide for fluid volume management.  Paracentesis per hepatology.  Plan to repeat echo and stress echo for transplant purposes. Recommend strain imaging for patient with chemotherapy for breast ca.

## 2024-06-12 NOTE — PHYSICAL EXAM
[Well Developed] : well developed [Well Nourished] : well nourished [No Acute Distress] : no acute distress [Normal Conjunctiva] : normal conjunctiva [Normal Venous Pressure] : normal venous pressure [No Carotid Bruit] : no carotid bruit [Normal S1, S2] : normal S1, S2 [No Murmur] : no murmur [No Rub] : no rub [No Gallop] : no gallop [Clear Lung Fields] : clear lung fields [Good Air Entry] : good air entry [No Respiratory Distress] : no respiratory distress  [Soft] : abdomen soft [Non Tender] : non-tender [No Masses/organomegaly] : no masses/organomegaly [Normal Bowel Sounds] : normal bowel sounds [Normal Gait] : normal gait [No Edema] : no edema [No Cyanosis] : no cyanosis [No Clubbing] : no clubbing [No Varicosities] : no varicosities [No Rash] : no rash [No Skin Lesions] : no skin lesions [Moves all extremities] : moves all extremities [No Focal Deficits] : no focal deficits [Normal Speech] : normal speech [Alert and Oriented] : alert and oriented [Normal memory] : normal memory [de-identified] : +ascites and umbilical hernia [de-identified] : slightly jaundiced

## 2024-06-12 NOTE — HISTORY OF PRESENT ILLNESS
[FreeTextEntry1] : Teresa Aguilar presents today for  preoperative cardiovascular evaluation prior to possible liver transplant.\par  She is a 63 year old with alcoholic liver disease diagnosed in January of this year during hospitalization for jaundice and ascites. Since that time she has had LVP x 3, most recently a removal of 6 L yesterday. She denies any issues with encephalopathy and is yet to undergo endoscopy. She has a long history of alcohol use which increased following her breast cancer diagnosis. She would drink vodka nightly to help manage her pain. Last drink was in 2023.\par  \par  She has known cardiac risk factors of hypertension (previously on medications, became hypotensive in 2023, now maintained on oral diuretics), dyslipidemia ( mg/dL) and a strong family history of heart disease. \par  \par  Otherwise her history is significant for breast cancer s/p lumpectomy x 3 with removal of 2 lymph nodes (treated with chemotherapeutics), basal cell carcinoma removed from left face, and right ganglion cyst removal.\par  \par  Today she reports feeling well and has no specific complaints. \par  She does not formally exercise, but denies any chest discomfort, shortness of breath, palpitations, lightheadedness or syncope. She has no known history of stroke, diabetes mellitus or smoking. \par  \par  She is working as a dentist. \par  Her father is . He  7 years ago with end stage dementia. \par  She has a strong family history of heart disease. Her family is in [facility].\par  She is  and has one daughter, age 31.

## 2024-06-19 NOTE — PROVIDER CONTACT NOTE (OTHER) - ASSESSMENT
Received request via: Pharmacy    Was the patient seen in the last year in this department? Yes    Does the patient have an active prescription (recently filled or refills available) for medication(s) requested? No    Ph[facility]    Does the patient have USP Plus and need 100 day supply (blood pressure, diabetes and cholesterol meds only)? Yes, quantity updated to 100 days  
VSS, LLQ dsg WNL, abd distended. Pt c/o pain in RUQ, ref tylenol b/c of hepatotoxocity. Requesting IV dilaudid

## 2024-06-21 ENCOUNTER — APPOINTMENT (OUTPATIENT)
Dept: ULTRASOUND IMAGING | Facility: IMAGING CENTER | Age: 64
End: 2024-06-21
Payer: COMMERCIAL

## 2024-06-21 ENCOUNTER — OUTPATIENT (OUTPATIENT)
Dept: OUTPATIENT SERVICES | Facility: HOSPITAL | Age: 64
LOS: 1 days | End: 2024-06-21
Payer: COMMERCIAL

## 2024-06-21 ENCOUNTER — RESULT REVIEW (OUTPATIENT)
Age: 64
End: 2024-06-21

## 2024-06-21 DIAGNOSIS — Z98.890 OTHER SPECIFIED POSTPROCEDURAL STATES: Chronic | ICD-10-CM

## 2024-06-21 DIAGNOSIS — K70.31 ALCOHOLIC CIRRHOSIS OF LIVER WITH ASCITES: ICD-10-CM

## 2024-06-21 DIAGNOSIS — Z87.39 PERSONAL HISTORY OF OTHER DISEASES OF THE MUSCULOSKELETAL SYSTEM AND CONNECTIVE TISSUE: Chronic | ICD-10-CM

## 2024-06-21 DIAGNOSIS — Z00.8 ENCOUNTER FOR OTHER GENERAL EXAMINATION: ICD-10-CM

## 2024-06-21 PROCEDURE — P9047: CPT

## 2024-06-21 PROCEDURE — 49083 ABD PARACENTESIS W/IMAGING: CPT

## 2024-06-28 ENCOUNTER — OUTPATIENT (OUTPATIENT)
Dept: OUTPATIENT SERVICES | Facility: HOSPITAL | Age: 64
LOS: 1 days | End: 2024-06-28
Payer: COMMERCIAL

## 2024-06-28 ENCOUNTER — APPOINTMENT (OUTPATIENT)
Dept: MRI IMAGING | Facility: IMAGING CENTER | Age: 64
End: 2024-06-28

## 2024-06-28 DIAGNOSIS — K70.31 ALCOHOLIC CIRRHOSIS OF LIVER WITH ASCITES: ICD-10-CM

## 2024-06-28 DIAGNOSIS — Z98.890 OTHER SPECIFIED POSTPROCEDURAL STATES: Chronic | ICD-10-CM

## 2024-06-28 DIAGNOSIS — Z87.39 PERSONAL HISTORY OF OTHER DISEASES OF THE MUSCULOSKELETAL SYSTEM AND CONNECTIVE TISSUE: Chronic | ICD-10-CM

## 2024-06-28 PROCEDURE — 74183 MRI ABD W/O CNTR FLWD CNTR: CPT

## 2024-06-28 PROCEDURE — A9585: CPT

## 2024-06-28 PROCEDURE — 74183 MRI ABD W/O CNTR FLWD CNTR: CPT | Mod: 26

## 2024-07-09 ENCOUNTER — TRANSCRIPTION ENCOUNTER (OUTPATIENT)
Age: 64
End: 2024-07-09

## 2024-07-19 ENCOUNTER — OUTPATIENT (OUTPATIENT)
Dept: OUTPATIENT SERVICES | Facility: HOSPITAL | Age: 64
LOS: 1 days | End: 2024-07-19
Payer: COMMERCIAL

## 2024-07-19 ENCOUNTER — RESULT REVIEW (OUTPATIENT)
Age: 64
End: 2024-07-19

## 2024-07-19 ENCOUNTER — APPOINTMENT (OUTPATIENT)
Dept: ULTRASOUND IMAGING | Facility: IMAGING CENTER | Age: 64
End: 2024-07-19
Payer: COMMERCIAL

## 2024-07-19 DIAGNOSIS — Z98.890 OTHER SPECIFIED POSTPROCEDURAL STATES: Chronic | ICD-10-CM

## 2024-07-19 DIAGNOSIS — Z00.8 ENCOUNTER FOR OTHER GENERAL EXAMINATION: ICD-10-CM

## 2024-07-19 DIAGNOSIS — K70.31 ALCOHOLIC CIRRHOSIS OF LIVER WITH ASCITES: ICD-10-CM

## 2024-07-19 DIAGNOSIS — Z87.39 PERSONAL HISTORY OF OTHER DISEASES OF THE MUSCULOSKELETAL SYSTEM AND CONNECTIVE TISSUE: Chronic | ICD-10-CM

## 2024-07-19 PROCEDURE — 49083 ABD PARACENTESIS W/IMAGING: CPT

## 2024-07-23 ENCOUNTER — RX RENEWAL (OUTPATIENT)
Age: 64
End: 2024-07-23

## 2024-07-31 ENCOUNTER — APPOINTMENT (OUTPATIENT)
Dept: HEPATOLOGY | Facility: CLINIC | Age: 64
End: 2024-07-31
Payer: COMMERCIAL

## 2024-07-31 VITALS
WEIGHT: 162 LBS | HEART RATE: 84 BPM | BODY MASS INDEX: 24.55 KG/M2 | RESPIRATION RATE: 16 BRPM | DIASTOLIC BLOOD PRESSURE: 62 MMHG | HEIGHT: 68 IN | SYSTOLIC BLOOD PRESSURE: 103 MMHG | OXYGEN SATURATION: 100 %

## 2024-07-31 DIAGNOSIS — K70.10 ALCOHOLIC HEPATITIS W/OUT ASCITES: ICD-10-CM

## 2024-07-31 DIAGNOSIS — K70.31 ALCOHOLIC CIRRHOSIS OF LIVER WITH ASCITES: ICD-10-CM

## 2024-07-31 PROCEDURE — 99214 OFFICE O/P EST MOD 30 MIN: CPT

## 2024-07-31 NOTE — REASON FOR VISIT
[Follow-Up] : a follow-up visit for [Liver Transplant Evaluation] : liver transplant evaluation [Family Member] : family member

## 2024-08-01 LAB
ALBUMIN SERPL ELPH-MCNC: 2.9 G/DL
ALP BLD-CCNC: 156 U/L
ALT SERPL-CCNC: 19 U/L
ANION GAP SERPL CALC-SCNC: 10 MMOL/L
AST SERPL-CCNC: 45 U/L
BASOPHILS # BLD AUTO: 0.01 K/UL
BASOPHILS NFR BLD AUTO: 0.2 %
BILIRUB SERPL-MCNC: 1.8 MG/DL
BUN SERPL-MCNC: 38 MG/DL
CALCIUM SERPL-MCNC: 8.5 MG/DL
CHLORIDE SERPL-SCNC: 104 MMOL/L
CO2 SERPL-SCNC: 19 MMOL/L
CREAT SERPL-MCNC: 1.41 MG/DL
EGFR: 42 ML/MIN/1.73M2
EOSINOPHIL # BLD AUTO: 0.16 K/UL
EOSINOPHIL NFR BLD AUTO: 3.9 %
GLUCOSE SERPL-MCNC: 115 MG/DL
HCT VFR BLD CALC: 25.6 %
HGB BLD-MCNC: 8.2 G/DL
IMM GRANULOCYTES NFR BLD AUTO: 0.2 %
INR PPP: 1.11 RATIO
LYMPHOCYTES # BLD AUTO: 0.67 K/UL
LYMPHOCYTES NFR BLD AUTO: 16.4 %
MAN DIFF?: NORMAL
MCHC RBC-ENTMCNC: 32 GM/DL
MCHC RBC-ENTMCNC: 33.6 PG
MCV RBC AUTO: 104.9 FL
MONOCYTES # BLD AUTO: 0.35 K/UL
MONOCYTES NFR BLD AUTO: 8.6 %
NEUTROPHILS # BLD AUTO: 2.89 K/UL
NEUTROPHILS NFR BLD AUTO: 70.7 %
PLATELET # BLD AUTO: 64 K/UL
POTASSIUM SERPL-SCNC: 4.9 MMOL/L
PROT SERPL-MCNC: 7.1 G/DL
PT BLD: 12.5 SEC
RBC # BLD: 2.44 M/UL
RBC # FLD: 16.5 %
SODIUM SERPL-SCNC: 133 MMOL/L
WBC # FLD AUTO: 4.09 K/UL

## 2024-08-01 NOTE — ASSESSMENT
[FreeTextEntry1] : 64F with decompensated ETOH Cirrhosis listed for OLT with relatively low MELD With refractory ascites - on diuretics with adequate urine output Has reducible umbilical hernia - managed with abdominal binder  Discussed natural history of cirrhosis with patient Discussed salt restriction and abstinence from ETOH Stressed importance of close follow up - including regular interval labs and screening for HCC  Continue Aldactone 100mg BID, Bumex 1 mg BID Discussed role of TIPS again with patient, also discussed side effects of TIPS including encephalopathy  Will continue possible evaluation for LDLT if with suitable candidate - though with improving volume status may continue to defer and give chance for patient to fully recover Hepatobiliary surgery visit given umbilical hernia   RTC in about 2 M.   Carol Almeida MD, PGY6 GI/Hepatology Fellow

## 2024-08-01 NOTE — HISTORY OF PRESENT ILLNESS
[TextBox_42] : 64 year old female with a PMH of breast CA s/p lumpectomy, ETOH abuse, and HTN. She was recently admitted to [facility] from 2/17-2/25 for abdominal distention and jaundice. She originally saw PCP in Jan 2023 when she noted she was turning yellow, PCP did bloodwork and referred her her GI:  Jai Lazcano [phone]  She states she used to have 2 glasses of vodka daily for 3 years to treat her chronic pain from her breast cancer - her last drink was 1/20/23.  She reports she was on Letrozole for her breast caner and was taking Tylenol daily at recommended doses to treat her pain, she is concerned that the drug interaction may have damaged her liver. She has since stopped taking Tylenol. 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  Discussed role of TIPS - patient concerned about PSE given her job but have discussed IR consultation following updated ECHO and MRI She has a possible LDLT candidate in [place] - her niece but found to poor candidate through screening  Interval Hx - Cr stable with Bumex - improvement with volume overload; currently with paracenteses every month with less volume being removed - patient with itchiness over the site of hernia, which is currently her main concern  - no other LDLT options at this time

## 2024-08-01 NOTE — PHYSICAL EXAM
[Well Nourished] : well nourished [Healthy Appearing] : healthy appearing [No Acute Distress] : no acute distress [Normal Hearing] : normal hearing [No Respiratory Distress] : no respiratory distress [No Accessory Muscle Use] : no accessory muscle use [Normal Appearance] : normal in appearance [No Masses] : no palpable masses [Umbilical Hernia] : umbilical hernia [Normal Gait] : normal gait [Spider Angioma] : spider angioma [Location of Spider Angiomas: ___] : location of spider angiomas: [unfilled] [Scleral Icterus] : no scleral icterus [Asterixis] : no asterixis [Depression] : no depression [Suicidal Ideation] : no suicidal ideation

## 2024-08-01 NOTE — END OF VISIT
[] : Fellow [FreeTextEntry3] : 64F decompenated ETOH Cirrhosis listed for OLT Low MELD Refer for umbilical hernia repair Defer TIPS given ongoing improvement in voume status Will still need transplant i think but overall appears to be recompensated Wear abd binder  RTC 2M

## 2024-08-01 NOTE — HISTORY OF PRESENT ILLNESS
[TextBox_42] : 64 year old female with a PMH of breast CA s/p lumpectomy, ETOH abuse, and HTN. She was recently admitted to [facility] from 2/17-2/25 for abdominal distention and jaundice. She originally saw PCP in Jan 2023 when she noted she was turning yellow, PCP did bloodwork and referred her her GI:  Jai Lazcano [phone]  She states she used to have 2 glasses of vodka daily for 3 years to treat her chronic pain from her breast cancer - her last drink was 1/20/23.  She reports she was on Letrozole for her breast caner and was taking Tylenol daily at recommended doses to treat her pain, she is concerned that the drug interaction may have damaged her liver. She has since stopped taking Tylenol. 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  Discussed role of TIPS - patient concerned about PSE given her job but have discussed IR consultation following updated ECHO and MRI She has a possible LDLT candidate in [place] - her niece but found to poor candidate through screening  Interval Hx - Cr stable with Bumex - improvement with volume overload; currently with paracenteses every month with less volume being removed - patient with itchiness over the site of hernia, which is currently her main concern  - no other LDLT options at this time

## 2024-08-05 LAB
PETH 16:0/18:1: NEGATIVE NG/ML
PETH 16:0/18:2: NEGATIVE NG/ML
PETH COMMENTS: NORMAL

## 2024-08-07 LAB
ALPHA-1-FETOPROTEIN-L3: 8.7 %
ALPHA-1-FETOPROTEIN: 4.2 NG/ML

## 2024-08-08 ENCOUNTER — APPOINTMENT (OUTPATIENT)
Dept: TRANSPLANT | Facility: CLINIC | Age: 64
End: 2024-08-08

## 2024-08-08 PROCEDURE — 99203 OFFICE O/P NEW LOW 30 MIN: CPT

## 2024-08-08 NOTE — HISTORY OF PRESENT ILLNESS
[de-identified] : 65yo F, etoh cirrhosis with ascites - referred for eval and treatment of umbilical hernia no obstructive symptoms uses hernia belt/abdo binder improving ascites - last lvp 3-4 L on bumex and aldactone

## 2024-08-08 NOTE — ASSESSMENT
[FreeTextEntry1] : 63yo f, etoh cirrhosis with ascites umbilical hernia - uncomplicated discussed risks of hernia and risks of hernia repair kirill with ascites (leak, decompensation, bleeding, infection) Plan for followup appointment in 3 months - if ascites continues to improve, would be lower operative risk

## 2024-08-09 LAB — ACARBOXYPROTHROMBIN SERPL-MCNC: 3.6 NG/ML

## 2024-08-14 ENCOUNTER — APPOINTMENT (OUTPATIENT)
Dept: ULTRASOUND IMAGING | Facility: IMAGING CENTER | Age: 64
End: 2024-08-14
Payer: COMMERCIAL

## 2024-08-14 ENCOUNTER — RESULT REVIEW (OUTPATIENT)
Age: 64
End: 2024-08-14

## 2024-08-14 PROCEDURE — 76705 ECHO EXAM OF ABDOMEN: CPT | Mod: 26

## 2024-08-26 ENCOUNTER — TRANSCRIPTION ENCOUNTER (OUTPATIENT)
Age: 64
End: 2024-08-26

## 2024-08-26 DIAGNOSIS — K70.10 ALCOHOLIC HEPATITIS W/OUT ASCITES: ICD-10-CM

## 2024-08-28 ENCOUNTER — TRANSCRIPTION ENCOUNTER (OUTPATIENT)
Age: 64
End: 2024-08-28

## 2024-08-28 DIAGNOSIS — K70.31 ALCOHOLIC CIRRHOSIS OF LIVER WITH ASCITES: ICD-10-CM

## 2024-09-13 ENCOUNTER — OUTPATIENT (OUTPATIENT)
Dept: OUTPATIENT SERVICES | Facility: HOSPITAL | Age: 64
LOS: 1 days | End: 2024-09-13
Payer: COMMERCIAL

## 2024-09-13 ENCOUNTER — APPOINTMENT (OUTPATIENT)
Dept: ULTRASOUND IMAGING | Facility: IMAGING CENTER | Age: 64
End: 2024-09-13
Payer: COMMERCIAL

## 2024-09-13 ENCOUNTER — RESULT REVIEW (OUTPATIENT)
Age: 64
End: 2024-09-13

## 2024-09-13 DIAGNOSIS — Z98.890 OTHER SPECIFIED POSTPROCEDURAL STATES: Chronic | ICD-10-CM

## 2024-09-13 DIAGNOSIS — Z87.39 PERSONAL HISTORY OF OTHER DISEASES OF THE MUSCULOSKELETAL SYSTEM AND CONNECTIVE TISSUE: Chronic | ICD-10-CM

## 2024-09-13 DIAGNOSIS — K70.31 ALCOHOLIC CIRRHOSIS OF LIVER WITH ASCITES: ICD-10-CM

## 2024-09-13 PROCEDURE — 49083 ABD PARACENTESIS W/IMAGING: CPT

## 2024-09-16 ENCOUNTER — TRANSCRIPTION ENCOUNTER (OUTPATIENT)
Age: 64
End: 2024-09-16

## 2024-09-16 ENCOUNTER — EMERGENCY (EMERGENCY)
Facility: HOSPITAL | Age: 64
LOS: 1 days | Discharge: HOME | End: 2024-09-16
Attending: EMERGENCY MEDICINE
Payer: COMMERCIAL

## 2024-09-16 VITALS
OXYGEN SATURATION: 95 % | RESPIRATION RATE: 18 BRPM | DIASTOLIC BLOOD PRESSURE: 74 MMHG | TEMPERATURE: 98 F | SYSTOLIC BLOOD PRESSURE: 115 MMHG | HEART RATE: 71 BPM

## 2024-09-16 VITALS
HEART RATE: 66 BPM | OXYGEN SATURATION: 100 % | SYSTOLIC BLOOD PRESSURE: 100 MMHG | DIASTOLIC BLOOD PRESSURE: 61 MMHG | RESPIRATION RATE: 15 BRPM | TEMPERATURE: 98 F

## 2024-09-16 DIAGNOSIS — Z87.39 PERSONAL HISTORY OF OTHER DISEASES OF THE MUSCULOSKELETAL SYSTEM AND CONNECTIVE TISSUE: Chronic | ICD-10-CM

## 2024-09-16 DIAGNOSIS — Z98.890 OTHER SPECIFIED POSTPROCEDURAL STATES: Chronic | ICD-10-CM

## 2024-09-16 LAB
ALBUMIN SERPL ELPH-MCNC: 3.1 G/DL — LOW (ref 3.3–5)
ALP SERPL-CCNC: 113 U/L — SIGNIFICANT CHANGE UP (ref 40–120)
ALT FLD-CCNC: 20 U/L — SIGNIFICANT CHANGE UP (ref 10–45)
ANION GAP SERPL CALC-SCNC: 14 MMOL/L — SIGNIFICANT CHANGE UP (ref 5–17)
ANISOCYTOSIS BLD QL: SLIGHT — SIGNIFICANT CHANGE UP
APTT BLD: 34.3 SEC — SIGNIFICANT CHANGE UP (ref 24.5–35.6)
AST SERPL-CCNC: 43 U/L — HIGH (ref 10–40)
BASOPHILS # BLD AUTO: 0.03 K/UL — SIGNIFICANT CHANGE UP (ref 0–0.2)
BASOPHILS NFR BLD AUTO: 0.9 % — SIGNIFICANT CHANGE UP (ref 0–2)
BILIRUB SERPL-MCNC: 2.6 MG/DL — HIGH (ref 0.2–1.2)
BUN SERPL-MCNC: 32 MG/DL — HIGH (ref 7–23)
CALCIUM SERPL-MCNC: 9.3 MG/DL — SIGNIFICANT CHANGE UP (ref 8.4–10.5)
CHLORIDE SERPL-SCNC: 101 MMOL/L — SIGNIFICANT CHANGE UP (ref 96–108)
CO2 SERPL-SCNC: 20 MMOL/L — LOW (ref 22–31)
CREAT SERPL-MCNC: 1.5 MG/DL — HIGH (ref 0.5–1.3)
DACRYOCYTES BLD QL SMEAR: SLIGHT — SIGNIFICANT CHANGE UP
EGFR: 39 ML/MIN/1.73M2 — LOW
EOSINOPHIL # BLD AUTO: 0.34 K/UL — SIGNIFICANT CHANGE UP (ref 0–0.5)
EOSINOPHIL NFR BLD AUTO: 9.6 % — HIGH (ref 0–6)
GIANT PLATELETS BLD QL SMEAR: PRESENT — SIGNIFICANT CHANGE UP
GLUCOSE SERPL-MCNC: 105 MG/DL — HIGH (ref 70–99)
HCT VFR BLD CALC: 26.4 % — LOW (ref 34.5–45)
HGB BLD-MCNC: 8.3 G/DL — LOW (ref 11.5–15.5)
INR BLD: 1.33 RATIO — HIGH (ref 0.85–1.18)
LYMPHOCYTES # BLD AUTO: 0.74 K/UL — LOW (ref 1–3.3)
LYMPHOCYTES # BLD AUTO: 21.1 % — SIGNIFICANT CHANGE UP (ref 13–44)
MACROCYTES BLD QL: SLIGHT — SIGNIFICANT CHANGE UP
MAGNESIUM SERPL-MCNC: 2 MG/DL — SIGNIFICANT CHANGE UP (ref 1.6–2.6)
MANUAL SMEAR VERIFICATION: SIGNIFICANT CHANGE UP
MCHC RBC-ENTMCNC: 31.4 GM/DL — LOW (ref 32–36)
MCHC RBC-ENTMCNC: 32.8 PG — SIGNIFICANT CHANGE UP (ref 27–34)
MCV RBC AUTO: 104.3 FL — HIGH (ref 80–100)
MONOCYTES # BLD AUTO: 0.09 K/UL — SIGNIFICANT CHANGE UP (ref 0–0.9)
MONOCYTES NFR BLD AUTO: 2.6 % — SIGNIFICANT CHANGE UP (ref 2–14)
NEUTROPHILS # BLD AUTO: 2.32 K/UL — SIGNIFICANT CHANGE UP (ref 1.8–7.4)
NEUTROPHILS NFR BLD AUTO: 64.9 % — SIGNIFICANT CHANGE UP (ref 43–77)
NEUTS BAND # BLD: 0.9 % — SIGNIFICANT CHANGE UP (ref 0–8)
OVALOCYTES BLD QL SMEAR: SLIGHT — SIGNIFICANT CHANGE UP
PLAT MORPH BLD: ABNORMAL
PLATELET # BLD AUTO: 61 K/UL — LOW (ref 150–400)
POIKILOCYTOSIS BLD QL AUTO: SLIGHT — SIGNIFICANT CHANGE UP
POLYCHROMASIA BLD QL SMEAR: SLIGHT — SIGNIFICANT CHANGE UP
POTASSIUM SERPL-MCNC: 4 MMOL/L — SIGNIFICANT CHANGE UP (ref 3.5–5.3)
POTASSIUM SERPL-SCNC: 4 MMOL/L — SIGNIFICANT CHANGE UP (ref 3.5–5.3)
PROT SERPL-MCNC: 7.8 G/DL — SIGNIFICANT CHANGE UP (ref 6–8.3)
PROTHROM AB SERPL-ACNC: 13.8 SEC — HIGH (ref 9.5–13)
RBC # BLD: 2.53 M/UL — LOW (ref 3.8–5.2)
RBC # FLD: 15.7 % — HIGH (ref 10.3–14.5)
RBC BLD AUTO: ABNORMAL
SODIUM SERPL-SCNC: 135 MMOL/L — SIGNIFICANT CHANGE UP (ref 135–145)
WBC # BLD: 3.53 K/UL — LOW (ref 3.8–10.5)
WBC # FLD AUTO: 3.53 K/UL — LOW (ref 3.8–10.5)

## 2024-09-16 PROCEDURE — 80053 COMPREHEN METABOLIC PANEL: CPT

## 2024-09-16 PROCEDURE — 83735 ASSAY OF MAGNESIUM: CPT

## 2024-09-16 PROCEDURE — 85730 THROMBOPLASTIN TIME PARTIAL: CPT

## 2024-09-16 PROCEDURE — 85025 COMPLETE CBC W/AUTO DIFF WBC: CPT

## 2024-09-16 PROCEDURE — 85610 PROTHROMBIN TIME: CPT

## 2024-09-16 PROCEDURE — 99284 EMERGENCY DEPT VISIT MOD MDM: CPT

## 2024-09-16 PROCEDURE — 99285 EMERGENCY DEPT VISIT HI MDM: CPT

## 2024-09-16 RX ORDER — SODIUM CHLORIDE 9 MG/ML
250 INJECTION INTRAMUSCULAR; INTRAVENOUS; SUBCUTANEOUS ONCE
Refills: 0 | Status: DISCONTINUED | OUTPATIENT
Start: 2024-09-16 | End: 2024-09-16

## 2024-09-16 NOTE — ED PROVIDER NOTE - NSFOLLOWUPINSTRUCTIONS_ED_ALL_ED_FT
No signs of emergency medical condition on today's workup.  Presumptive diagnosis made as pain secondary to umbilical hernia. You were evaluated by both the Emergency Room team and the Surgical Team.    Therefore, follow up as directed and if symptoms change/worsen or any emergency conditions, please return to the ER. Follow up with Dr. Hull's clinic.     No signs of emergency medical condition on today's workup.  Presumptive diagnosis made as pain secondary to umbilical hernia. You were evaluated by both the Emergency Room team and the Surgical Team.    Therefore, follow up as directed and if symptoms change/worsen or any emergency conditions, please return to the ER.

## 2024-09-16 NOTE — ED PROVIDER NOTE - CLINICAL SUMMARY MEDICAL DECISION MAKING FREE TEXT BOX
63 yo f hx of ESLD on transplant list, umbilical hernia p/w diffuse abdominal pain. got 2L drained via paracentesis this past Friday. today is in ED for severe abdominal pain and nausea. she was seen in waiting room by Dr. grimes, and was advised she is not a candidate for umbilical hernia surgery given liver disease and the fact she is on the transplant list. on exam her is hernia soft reducible no overlying skin changes to be concerned about strangulation/incarceration, patient has no fevers/chills, sob. had a normal BM this am. no blood in her stool. will get basic labs and refer to Dr. grimes outpatient.

## 2024-09-16 NOTE — ED ADULT NURSE NOTE - SUICIDE SCREENING QUESTION 2
Daily Note     Today's date: 2019  Patient name: Ayesha Lara  : 1991  MRN: [id number]  Referring provider: Stefania Her MD  Dx:   Encounter Diagnosis     ICD-10-CM    1  Tear of lateral meniscus of left knee, current, unspecified tear type, subsequent encounter T79 [id number]                   Subjective: Reports that he felt the electrical stimulation and the IASTM performed by PT's have been helpful  States his MD wants him walking up to 3 miles a day  Objective: See treatment diary below    Daily Treatment Diary    There ex: 10 min  Bike L2 10 min  Prone Quad Stretch: 3 x 30" each  Nino LE stretching    NM: 20 min  SLS Deadlift on foam 2 x 10- 1 UE support and PT verbal cues/demonstration  TB side stepping with squats: GTB 40' x2  Squats 2 x 10  Step ups with SL balance 2 x 10 10 in    Manual 10 min  ART right Popliteus  PROM to increase knee flexion  Patellar mobs    Modalities:Pre mod stim with CP x 10 min    Assessment: Tolerated treatment well  Patient continues to show poor stability and weakness in LE's and core requiring PT verbal cues and demonstration with exercises  Plan: Continue per plan of care  No

## 2024-09-16 NOTE — ED CLERICAL - NS ED CLERK NOTE PRE-ARRIVAL INFORMATION; ADDITIONAL PRE-ARRIVAL INFORMATION
CC/Reason For referral: umbilical hernia and severe pain.  On list for liver transplant  Preferred Consultant(if applicable): transplant team  Who admits for you (if needed):  Do you have documents you would like to fax over?  Would you still like to speak to an ED attending?  Please call transplant team after patient is seen

## 2024-09-16 NOTE — ED PROVIDER NOTE - PATIENT PORTAL LINK FT
You can access the FollowMyHealth Patient Portal offered by [facility] by registering at the following website: [email or web address]. By joining [employer]’s FollowMyHealth portal, you will also be able to view your health information using other applications (apps) compatible with our system.

## 2024-09-16 NOTE — CONSULT NOTE ADULT - ASSESSMENT
64F with PMHx of breast ca s/p lumpectomy, HTN and decompensated ETOH cirrhosis. Presented to ED c/o umbilical hernia and feeling "uncomfortable", tender.   No emesis, having bowel function.      [] Umbilical hernia   - soft, reducible   - no signs of incarceration  - Continue diuresis and abdominal binder   - No urgent OR intervention at this time especially given current decompensated cirrhosis with higher chance of decompensation/leak/bleeding/infection  - Discussed with patient in detail; however, due to nature of her work ([employer]) she would prefer to pursue elective umbilical hernia repair (aware of risks)  - dc home, will f/u outpatient for scheduling   - patient seen and examined with Dr Hull in ED (triage area)

## 2024-09-16 NOTE — ED ADULT TRIAGE NOTE - CHIEF COMPLAINT QUOTE
Umbilical hernia Cirrhosis of Liver abd pain   Drainage of Friday ascites 2.3L  Wears  strap for support Helps with pain

## 2024-09-16 NOTE — CONSULT NOTE ADULT - SUBJECTIVE AND OBJECTIVE BOX
Transplant Surgery Consult Note   --------------------------------------------------------------    HPI: 64F with PMHx of breast ca s/p lumpectomy, HTN and decompensated ETOH cirrhosis. Presented to ED c/o umbilical hernia and feeling "uncomfortable", tender.   No emesis, having bowel function.       Vital Signs Last 24 Hrs  T(C): 36.8 (16 Sep 2024 11:51), Max: 36.8 (16 Sep 2024 11:51)  T(F): 98.3 (16 Sep 2024 11:51), Max: 98.3 (16 Sep 2024 11:51)  HR: 71 (16 Sep 2024 11:51) (71 - 71)  BP: 115/74 (16 Sep 2024 11:51) (115/74 - 115/74)  BP(mean): --  RR: 18 (16 Sep 2024 11:51) (18 - 18)  SpO2: 95% (16 Sep 2024 11:51) (95% - 95%)      Review of systems  Gen: No weight changes, fatigue, fevers/chills, weakness  Skin: No rashes  Head/Eyes/Ears/Mouth: No headache; Normal hearing; Normal vision w/o blurriness; No sinus pain/discomfort, sore throat  Respiratory: No dyspnea, cough, wheezing, hemoptysis  CV: No chest pain, PND, orthopnea  GI: + umbilical hernia   : No increased frequency, dysuria, hematuria, nocturia  MSK: No joint pain/swelling; no back pain; no edema  Neuro: No dizziness/lightheadedness, weakness, seizures, numbness, tingling  Heme: No easy bruising or bleeding  Endo: No heat/cold intolerance  Psych: No significant nervousness, anxiety, stress, depression  All other systems were reviewed and are negative, except as noted.    PHYSICAL EXAM:  Constitutional: Well developed / well nourished  Eyes: SUMAN   ENMT: nc/at, no thrush  Neck: supple  Respiratory: CTA B/L  Cardiovascular: RRR  Gastrointestinal: Soft abdomen, distended, + umbilical hernia, reducible, soft, non tender. No signs of incarceration   Extremities: no edema  Neurological: A&O x3

## 2024-09-16 NOTE — ED PROVIDER NOTE - PHYSICAL EXAMINATION
GENERAL: well appearing in no acute distress, non-toxic appearing  HEAD: normocephalic, atraumatic  HEENT:  no JVD  CARDIAC: regular rate and rhythm, normal S1S2, no appreciable murmurs, 2+ pulses in UE/LE b/l  PULM: normal breath sounds, clear to ascultation bilaterally, no rales, rhonchi, wheezing  GI: abdomen distended, soft, diffusely tender, palpable umbilical hernia, reducible, no guarding, rebound tenderness  : no CVA tenderness b/l, no suprapubic tenderness  NEURO: no focal motor or sensory deficits  MSK: no peripheral edema, no calf tenderness b/l

## 2024-09-16 NOTE — ED ADULT NURSE NOTE - OBJECTIVE STATEMENT
63 y/o female complaining of abdominal pain. PT is a&O x4, breathing spontaneously speaking in full sentences with pmh of Breast Ca, HTN, HLD, on liver transplant list for ETOH cirrhosis, presents to the ed for evaluation of abdominal pain for a known hernia. PT spoke to transplant team prior to Arrival to the ED and recommended ED visit. PT is endorsing pain that has worsened over time, and comes and goes. PT denies chest pain, sob, blood in urine or stool, recent alcohol use. PT lung sounds clear and equal bilat abd soft tender around periumbilical area mild distention diffusely, hernia seen in abdomen without palpation. Pt placed in patient gown, bed in lowest position to maintain safety.

## 2024-09-16 NOTE — ED PROVIDER NOTE - ATTENDING CONTRIBUTION TO CARE
64F hx cirrhosis and umbilical hernia pw discomfort at the umbilical hernia  says when she stands up, it protrudes and is very uncomfortable  moving bowels, not vomiting at all  on exam, extensive spider telangectasias, obese abd, umbilical hernia that is soft and easy to reduce, no abd ttp  seen by surgery, not a surgical candidate for hernia repair at this time  labs noted to be within pts baseline, no active intervention indicated  ok for dc home.

## 2024-09-17 ENCOUNTER — RESULT REVIEW (OUTPATIENT)
Age: 64
End: 2024-09-17

## 2024-09-17 ENCOUNTER — TRANSCRIPTION ENCOUNTER (OUTPATIENT)
Age: 64
End: 2024-09-17

## 2024-09-17 ENCOUNTER — INPATIENT (INPATIENT)
Facility: HOSPITAL | Age: 64
LOS: 6 days | Discharge: HOME HEALTH | DRG: 395 | End: 2024-09-24
Attending: TRANSPLANT SURGERY | Admitting: TRANSPLANT SURGERY
Payer: COMMERCIAL

## 2024-09-17 VITALS
HEIGHT: 68 IN | WEIGHT: 149.91 LBS | TEMPERATURE: 98 F | RESPIRATION RATE: 18 BRPM | HEART RATE: 76 BPM | SYSTOLIC BLOOD PRESSURE: 111 MMHG | DIASTOLIC BLOOD PRESSURE: 60 MMHG | OXYGEN SATURATION: 99 %

## 2024-09-17 DIAGNOSIS — Z98.890 OTHER SPECIFIED POSTPROCEDURAL STATES: Chronic | ICD-10-CM

## 2024-09-17 DIAGNOSIS — Z87.39 PERSONAL HISTORY OF OTHER DISEASES OF THE MUSCULOSKELETAL SYSTEM AND CONNECTIVE TISSUE: Chronic | ICD-10-CM

## 2024-09-17 DIAGNOSIS — K42.9 UMBILICAL HERNIA WITHOUT OBSTRUCTION OR GANGRENE: ICD-10-CM

## 2024-09-17 LAB
ALBUMIN SERPL ELPH-MCNC: 3 G/DL — LOW (ref 3.3–5)
ALBUMIN SERPL ELPH-MCNC: 3.2 G/DL — LOW (ref 3.3–5)
ALP SERPL-CCNC: 134 U/L — HIGH (ref 40–120)
ALP SERPL-CCNC: 98 U/L — SIGNIFICANT CHANGE UP (ref 40–120)
ALT FLD-CCNC: 20 U/L — SIGNIFICANT CHANGE UP (ref 10–45)
ALT FLD-CCNC: 22 U/L — SIGNIFICANT CHANGE UP (ref 10–45)
ANION GAP SERPL CALC-SCNC: 13 MMOL/L — SIGNIFICANT CHANGE UP (ref 5–17)
ANION GAP SERPL CALC-SCNC: 13 MMOL/L — SIGNIFICANT CHANGE UP (ref 5–17)
APTT BLD: 33.3 SEC — SIGNIFICANT CHANGE UP (ref 24.5–35.6)
AST SERPL-CCNC: 38 U/L — SIGNIFICANT CHANGE UP (ref 10–40)
AST SERPL-CCNC: 47 U/L — HIGH (ref 10–40)
BILIRUB SERPL-MCNC: 1.9 MG/DL — HIGH (ref 0.2–1.2)
BILIRUB SERPL-MCNC: 2 MG/DL — HIGH (ref 0.2–1.2)
BLD GP AB SCN SERPL QL: NEGATIVE — SIGNIFICANT CHANGE UP
BUN SERPL-MCNC: 27 MG/DL — HIGH (ref 7–23)
BUN SERPL-MCNC: 34 MG/DL — HIGH (ref 7–23)
CALCIUM SERPL-MCNC: 8.5 MG/DL — SIGNIFICANT CHANGE UP (ref 8.4–10.5)
CALCIUM SERPL-MCNC: 8.5 MG/DL — SIGNIFICANT CHANGE UP (ref 8.4–10.5)
CHLORIDE SERPL-SCNC: 101 MMOL/L — SIGNIFICANT CHANGE UP (ref 96–108)
CHLORIDE SERPL-SCNC: 101 MMOL/L — SIGNIFICANT CHANGE UP (ref 96–108)
CO2 SERPL-SCNC: 20 MMOL/L — LOW (ref 22–31)
CO2 SERPL-SCNC: 22 MMOL/L — SIGNIFICANT CHANGE UP (ref 22–31)
CREAT SERPL-MCNC: 1.26 MG/DL — SIGNIFICANT CHANGE UP (ref 0.5–1.3)
CREAT SERPL-MCNC: 1.43 MG/DL — HIGH (ref 0.5–1.3)
EGFR: 41 ML/MIN/1.73M2 — LOW
EGFR: 48 ML/MIN/1.73M2 — LOW
GAS PNL BLDV: SIGNIFICANT CHANGE UP
GLUCOSE SERPL-MCNC: 117 MG/DL — HIGH (ref 70–99)
GLUCOSE SERPL-MCNC: 155 MG/DL — HIGH (ref 70–99)
HCT VFR BLD CALC: 24.5 % — LOW (ref 34.5–45)
HCT VFR BLD CALC: 25.8 % — LOW (ref 34.5–45)
HGB BLD-MCNC: 7.8 G/DL — LOW (ref 11.5–15.5)
HGB BLD-MCNC: 8.1 G/DL — LOW (ref 11.5–15.5)
INR BLD: 1.35 RATIO — HIGH (ref 0.85–1.18)
MAGNESIUM SERPL-MCNC: 2 MG/DL — SIGNIFICANT CHANGE UP (ref 1.6–2.6)
MCHC RBC-ENTMCNC: 31.4 GM/DL — LOW (ref 32–36)
MCHC RBC-ENTMCNC: 31.8 GM/DL — LOW (ref 32–36)
MCHC RBC-ENTMCNC: 31.9 PG — SIGNIFICANT CHANGE UP (ref 27–34)
MCHC RBC-ENTMCNC: 33.2 PG — SIGNIFICANT CHANGE UP (ref 27–34)
MCV RBC AUTO: 101.6 FL — HIGH (ref 80–100)
MCV RBC AUTO: 104.3 FL — HIGH (ref 80–100)
NRBC # BLD: 0 /100 WBCS — SIGNIFICANT CHANGE UP (ref 0–0)
NRBC # BLD: 0 /100 WBCS — SIGNIFICANT CHANGE UP (ref 0–0)
PLATELET # BLD AUTO: 51 K/UL — LOW (ref 150–400)
PLATELET # BLD AUTO: 61 K/UL — LOW (ref 150–400)
POTASSIUM SERPL-MCNC: 3.9 MMOL/L — SIGNIFICANT CHANGE UP (ref 3.5–5.3)
POTASSIUM SERPL-MCNC: 4.2 MMOL/L — SIGNIFICANT CHANGE UP (ref 3.5–5.3)
POTASSIUM SERPL-SCNC: 3.9 MMOL/L — SIGNIFICANT CHANGE UP (ref 3.5–5.3)
POTASSIUM SERPL-SCNC: 4.2 MMOL/L — SIGNIFICANT CHANGE UP (ref 3.5–5.3)
PROT SERPL-MCNC: 7.1 G/DL — SIGNIFICANT CHANGE UP (ref 6–8.3)
PROT SERPL-MCNC: 7.6 G/DL — SIGNIFICANT CHANGE UP (ref 6–8.3)
PROTHROM AB SERPL-ACNC: 14.1 SEC — HIGH (ref 9.5–13)
RBC # BLD: 2.35 M/UL — LOW (ref 3.8–5.2)
RBC # BLD: 2.54 M/UL — LOW (ref 3.8–5.2)
RBC # FLD: 15.7 % — HIGH (ref 10.3–14.5)
RBC # FLD: 15.7 % — HIGH (ref 10.3–14.5)
RH IG SCN BLD-IMP: POSITIVE — SIGNIFICANT CHANGE UP
SODIUM SERPL-SCNC: 134 MMOL/L — LOW (ref 135–145)
SODIUM SERPL-SCNC: 136 MMOL/L — SIGNIFICANT CHANGE UP (ref 135–145)
WBC # BLD: 3.07 K/UL — LOW (ref 3.8–10.5)
WBC # BLD: 4.09 K/UL — SIGNIFICANT CHANGE UP (ref 3.8–10.5)
WBC # FLD AUTO: 3.07 K/UL — LOW (ref 3.8–10.5)
WBC # FLD AUTO: 4.09 K/UL — SIGNIFICANT CHANGE UP (ref 3.8–10.5)

## 2024-09-17 PROCEDURE — 99222 1ST HOSP IP/OBS MODERATE 55: CPT

## 2024-09-17 PROCEDURE — 49616 RPR AA HRN RCR 3-10 NCR/STRN: CPT | Mod: GC

## 2024-09-17 PROCEDURE — 99285 EMERGENCY DEPT VISIT HI MDM: CPT

## 2024-09-17 PROCEDURE — 88302 TISSUE EXAM BY PATHOLOGIST: CPT | Mod: 26

## 2024-09-17 PROCEDURE — 74177 CT ABD & PELVIS W/CONTRAST: CPT | Mod: 26,MC

## 2024-09-17 RX ORDER — ONDANSETRON HCL/PF 4 MG/2 ML
4 VIAL (ML) INJECTION ONCE
Refills: 0 | Status: COMPLETED | OUTPATIENT
Start: 2024-09-17 | End: 2024-09-17

## 2024-09-17 RX ORDER — SENNOSIDES 8.6 MG
2 TABLET ORAL AT BEDTIME
Refills: 0 | Status: DISCONTINUED | OUTPATIENT
Start: 2024-09-17 | End: 2024-09-17

## 2024-09-17 RX ORDER — SPIRONOLACTONE 25 MG/1
100 TABLET, FILM COATED ORAL DAILY
Refills: 0 | Status: DISCONTINUED | OUTPATIENT
Start: 2024-09-17 | End: 2024-09-17

## 2024-09-17 RX ORDER — FUROSEMIDE 40 MG
40 TABLET ORAL DAILY
Refills: 0 | Status: DISCONTINUED | OUTPATIENT
Start: 2024-09-17 | End: 2024-09-17

## 2024-09-17 RX ORDER — TRAMADOL HYDROCHLORIDE 50 MG/1
50 TABLET, COATED ORAL EVERY 4 HOURS
Refills: 0 | Status: DISCONTINUED | OUTPATIENT
Start: 2024-09-17 | End: 2024-09-24

## 2024-09-17 RX ORDER — PIPERACILLIN SODIUM AND TAZOBACTAM SODIUM 12; 1.5 G/60ML; G/60ML
3.38 INJECTION, POWDER, LYOPHILIZED, FOR SOLUTION INTRAVENOUS ONCE
Refills: 0 | Status: COMPLETED | OUTPATIENT
Start: 2024-09-17 | End: 2024-09-17

## 2024-09-17 RX ORDER — PANTOPRAZOLE SODIUM 40 MG/1
40 TABLET, DELAYED RELEASE ORAL
Refills: 0 | Status: DISCONTINUED | OUTPATIENT
Start: 2024-09-17 | End: 2024-09-17

## 2024-09-17 RX ORDER — ONDANSETRON HCL/PF 4 MG/2 ML
4 VIAL (ML) INJECTION EVERY 6 HOURS
Refills: 0 | Status: DISCONTINUED | OUTPATIENT
Start: 2024-09-17 | End: 2024-09-24

## 2024-09-17 RX ORDER — MORPHINE SULFATE 30 MG/1
4 TABLET, FILM COATED, EXTENDED RELEASE ORAL ONCE
Refills: 0 | Status: DISCONTINUED | OUTPATIENT
Start: 2024-09-17 | End: 2024-09-17

## 2024-09-17 RX ORDER — ONDANSETRON HCL/PF 4 MG/2 ML
4 VIAL (ML) INJECTION ONCE
Refills: 0 | Status: DISCONTINUED | OUTPATIENT
Start: 2024-09-17 | End: 2024-09-17

## 2024-09-17 RX ORDER — SPIRONOLACTONE 100 MG/1
100 TABLET, FILM COATED ORAL DAILY
Refills: 0 | Status: DISCONTINUED | OUTPATIENT
Start: 2024-09-17 | End: 2024-09-17

## 2024-09-17 RX ORDER — HYDROMORPHONE HYDROCHLORIDE 1 MG/ML
0.5 INJECTION, SOLUTION INTRAMUSCULAR; INTRAVENOUS; SUBCUTANEOUS
Refills: 0 | Status: DISCONTINUED | OUTPATIENT
Start: 2024-09-17 | End: 2024-09-18

## 2024-09-17 RX ORDER — SODIUM CHLORIDE 0.9 % (FLUSH) 0.9 %
1000 SYRINGE (ML) INJECTION ONCE
Refills: 0 | Status: DISCONTINUED | OUTPATIENT
Start: 2024-09-17 | End: 2024-09-17

## 2024-09-17 RX ORDER — SENNOSIDES 8.6 MG
2 TABLET ORAL AT BEDTIME
Refills: 0 | Status: DISCONTINUED | OUTPATIENT
Start: 2024-09-17 | End: 2024-09-24

## 2024-09-17 RX ORDER — SODIUM CHLORIDE IRRIG SOLUTION 0.9 %
1000 SOLUTION, IRRIGATION IRRIGATION ONCE
Refills: 0 | Status: COMPLETED | OUTPATIENT
Start: 2024-09-17 | End: 2024-09-17

## 2024-09-17 RX ORDER — FUROSEMIDE 10 MG/ML
40 INJECTION INTRAVENOUS DAILY
Refills: 0 | Status: DISCONTINUED | OUTPATIENT
Start: 2024-09-17 | End: 2024-09-17

## 2024-09-17 RX ORDER — CHLORHEXIDINE GLUCONATE ORAL RINSE 1.2 MG/ML
1 SOLUTION DENTAL DAILY
Refills: 0 | Status: DISCONTINUED | OUTPATIENT
Start: 2024-09-17 | End: 2024-09-24

## 2024-09-17 RX ORDER — PANTOPRAZOLE SODIUM 40 MG
40 TABLET, DELAYED RELEASE (ENTERIC COATED) ORAL
Refills: 0 | Status: DISCONTINUED | OUTPATIENT
Start: 2024-09-17 | End: 2024-09-17

## 2024-09-17 RX ORDER — PIPERACILLIN SODIUM AND TAZOBACTAM SODIUM 3; .375 G/15ML; G/15ML
3.38 INJECTION, POWDER, FOR SOLUTION INTRAVENOUS ONCE
Refills: 0 | Status: DISCONTINUED | OUTPATIENT
Start: 2024-09-17 | End: 2024-09-17

## 2024-09-17 RX ORDER — PIPERACILLIN SODIUM AND TAZOBACTAM SODIUM 3; .375 G/15ML; G/15ML
3.38 INJECTION, POWDER, FOR SOLUTION INTRAVENOUS EVERY 8 HOURS
Refills: 0 | Status: DISCONTINUED | OUTPATIENT
Start: 2024-09-17 | End: 2024-09-17

## 2024-09-17 RX ORDER — SENNA 187 MG
2 TABLET ORAL AT BEDTIME
Refills: 0 | Status: DISCONTINUED | OUTPATIENT
Start: 2024-09-17 | End: 2024-09-17

## 2024-09-17 RX ORDER — HYDROMORPHONE HYDROCHLORIDE 1 MG/ML
0.5 INJECTION, SOLUTION INTRAMUSCULAR; INTRAVENOUS; SUBCUTANEOUS
Refills: 0 | Status: DISCONTINUED | OUTPATIENT
Start: 2024-09-17 | End: 2024-09-17

## 2024-09-17 RX ORDER — SODIUM CHLORIDE 0.9 % (FLUSH) 0.9 %
1000 SYRINGE (ML) INJECTION
Refills: 0 | Status: DISCONTINUED | OUTPATIENT
Start: 2024-09-17 | End: 2024-09-17

## 2024-09-17 RX ORDER — PANTOPRAZOLE SODIUM 40 MG/1
40 TABLET, DELAYED RELEASE ORAL
Refills: 0 | Status: DISCONTINUED | OUTPATIENT
Start: 2024-09-17 | End: 2024-09-24

## 2024-09-17 RX ADMIN — MORPHINE SULFATE 4 MILLIGRAM(S): 30 TABLET, FILM COATED, EXTENDED RELEASE ORAL at 08:22

## 2024-09-17 RX ADMIN — MORPHINE SULFATE 4 MILLIGRAM(S): 30 TABLET, FILM COATED, EXTENDED RELEASE ORAL at 07:06

## 2024-09-17 RX ADMIN — Medication 1000 MILLILITER(S): at 07:06

## 2024-09-17 RX ADMIN — Medication 50 MILLILITER(S): at 20:15

## 2024-09-17 RX ADMIN — HYDROMORPHONE HYDROCHLORIDE 0.5 MILLIGRAM(S): 1 INJECTION, SOLUTION INTRAMUSCULAR; INTRAVENOUS; SUBCUTANEOUS at 20:20

## 2024-09-17 RX ADMIN — TRAMADOL HYDROCHLORIDE 50 MILLIGRAM(S): 50 TABLET, COATED ORAL at 21:47

## 2024-09-17 RX ADMIN — Medication 4 MILLIGRAM(S): at 04:06

## 2024-09-17 RX ADMIN — MORPHINE SULFATE 4 MILLIGRAM(S): 30 TABLET, FILM COATED, EXTENDED RELEASE ORAL at 11:59

## 2024-09-17 RX ADMIN — PIPERACILLIN SODIUM AND TAZOBACTAM SODIUM 200 GRAM(S): 12; 1.5 INJECTION, POWDER, LYOPHILIZED, FOR SOLUTION INTRAVENOUS at 12:17

## 2024-09-17 RX ADMIN — MORPHINE SULFATE 4 MILLIGRAM(S): 30 TABLET, FILM COATED, EXTENDED RELEASE ORAL at 04:03

## 2024-09-17 RX ADMIN — Medication 1000 MILLILITER(S): at 04:07

## 2024-09-17 RX ADMIN — HYDROMORPHONE HYDROCHLORIDE 0.5 MILLIGRAM(S): 1 INJECTION, SOLUTION INTRAMUSCULAR; INTRAVENOUS; SUBCUTANEOUS at 20:06

## 2024-09-17 RX ADMIN — TRAMADOL HYDROCHLORIDE 50 MILLIGRAM(S): 50 TABLET, COATED ORAL at 23:02

## 2024-09-17 RX ADMIN — Medication 2 TABLET(S): at 21:26

## 2024-09-17 NOTE — ED ADULT NURSE NOTE - OBJECTIVE STATEMENT
64y female w/ pmh of liver cirrhosis presents with abdominal pain. Pt states she was seen in ED yesterday for abdominal pain advised by her MD to come in due to her hernia. Pt states she was discharged home but is returning after onset of severe abdominal pain, nausea and vomiting. Pt states she had BM yesterday and is passing gas. Pt denies fever, chills, urinary symptoms, diarrhea.

## 2024-09-17 NOTE — PROGRESS NOTE ADULT - ASSESSMENT
64F with PMHx of breast ca s/p lumpectomy, HTN and decompensated ETOH cirrhosis presented to the ED 9/16 with 2-3cm reducible umbilical hernia and is now presenting to the ED on 9/17 for concern for incarcerated umbilical hernia, s/p umbilical hernia repair with no bowel resection, mesh placement, ascites drainage on 9/17    Umbilical Hernia Repair with Mesh (POD#0)  -labs appropriate post-op  -drain UBALDO q3-4H, Albumin ATC for now  -hold diuretics tonight  -strict I&Os: keep corrales o/n  -daily labs  -hold SQH for now  -SCDs/spirometry/PT in AM  -bowel regimen

## 2024-09-17 NOTE — BRIEF OPERATIVE NOTE - NSICDXBRIEFPROCEDURE_GEN_ALL_CORE_FT
PROCEDURES:  Repair, hernia, nonreducible, recurrent, abdominal wall, anterior, greater than 10 cm, with mesh insertion 17-Sep-2024 14:50:55  Nuria Mkceon

## 2024-09-17 NOTE — ED PROVIDER NOTE - PHYSICAL EXAMINATION
PHYSICAL EXAM:  Vital Signs Last 24 Hrs  T(C): 36.6 (17 Sep 2024 04:33), Max: 36.8 (16 Sep 2024 11:51)  T(F): 97.9 (17 Sep 2024 04:33), Max: 98.3 (16 Sep 2024 11:51)  HR: 73 (17 Sep 2024 07:05) (66 - 80)  BP: 111/99 (17 Sep 2024 07:05) (100/61 - 118/69)  BP(mean): --  RR: 16 (17 Sep 2024 07:05) (15 - 18)  SpO2: 100% (17 Sep 2024 07:05) (95% - 100%)    Parameters below as of 17 Sep 2024 07:05  Patient On (Oxygen Delivery Method): room air        GENERAL: In moderate distress due to pain  HEENT: EOMI, no conjunctival injection, no scleral icterus, moist mucous membranes  RESPIRATORY: Normal respiratory effort  CARDIOVASCULAR: Regular rate and rhythm; No lower extremity edema  GI: Hernia not soft, not reducible  MUSCULOSKELETAL: no clubbing or cyanosis of digits; no joint swelling or tenderness to palpation  NEURO: CN 2-12 grossly intact, moves all limbs spontaneously  PSYCH: A+O to person, place, and time; affect appropriate

## 2024-09-17 NOTE — H&P ADULT - HISTORY OF PRESENT ILLNESS
64F with PMHx of breast ca s/p lumpectomy, HTN and decompensated ETOH cirrhosis presented to the ED 9/16 with 2-3cm reducible umbilical hernia and is now presenting to the ED on 9/17 for worsening umbilical hernia pain, nausea, and vomiting. Patient admits to worsening pain starting at midnight on 9/17, along with several episodes of nonbloody emesis. Patient states she has had no bowel movements and has not been able to keep any food down. Pt admits to hernia increasing in size, denies chest pain, SOB, dizziness weakness, changes in mental status.

## 2024-09-17 NOTE — ED PROVIDER NOTE - ATTENDING CONTRIBUTION TO CARE
[facility] I was the supervising attending. I have independently seen face-to-face and examined the patient with the resident. I have reviewed the history and physical and discussed the MDM with the resident. I agree with the assessment and plan as presented unless otherwise documented as follows:    64F hx cirrhosis on transplant list, umbilical hernia, prior breast CA in remission s/p chemo/radiation & lumpectomy, presenting w/ abdominal pain and nausea/vomiting. Was seen in this ED on 9/16/24 for pain to umbilical hernia, evaluated by transplant surgery and deemed not a surgical candidate/OK for outpatient f/u, had bloodwork done and was discharged. Reports that after returning home, she had acute worsening of her pain with worsened bulge of the hernia and multiple episodes of nausea/vomiting. Otherwise denies fevers/chills, urinary complaints, CP/SOB. Appears moderately uncomfortable, awake and alert. Exam notable for large umbilical hernia, exquisitely TTP, no overlying skin changes, mild diffuse TTP to rest of abdomen. Concern for incarcerated vs. early strangulated umbilical hernia. Will obtain labs, CTAP, consult transplant surgery. -Cori Acuña MD (Attending)

## 2024-09-17 NOTE — H&P ADULT - NSHPREVIEWOFSYSTEMS_GEN_ALL_CORE
Gen: No weight changes, fatigue, fevers/chills, weakness  Skin: No rashes  Head/Eyes/Ears/Mouth: No headache; Normal hearing; Normal vision w/o blurriness; No sinus pain/discomfort, sore throat  Respiratory: No dyspnea, cough, wheezing, hemoptysis  CV: No chest pain, PND, orthopnea  GI: admits to painful umbilical hernia, nausea and vomiting. denies hematochezia, melena   : No increased frequency, dysuria, hematuria, nocturia  MSK: No joint pain/swelling; no back pain; no edema  Neuro: No dizziness/lightheadedness, weakness, seizures, numbness, tingling  Heme: No easy bruising or bleeding  Endo: No heat/cold intolerance  Psych: No significant nervousness, anxiety, stress, depression  All other systems were reviewed and are negative, except as noted.

## 2024-09-17 NOTE — ED PROVIDER NOTE - PROGRESS NOTE DETAILS
Patient signed out to me Desi Babcock to review CT scan. Patient with umbilical hernia presenting with abdominal pain. CT abd pelvis showing suspected partial or developing SBO. Strangulation cannot be excluded. Surgery and Transplant Surgery notified. Seen by Transplant Surgery, admit.

## 2024-09-17 NOTE — ED PROVIDER NOTE - NS ED ROS FT
GENERAL: No fever, no chills  PULM: No shortness of breath  CARDIOVASCULAR: No chest pain, no palpitations  GI: +Abdominal pain, nausea, vomiting  : No dysuria, no urinary frequency  MSK: No arthralgia, no myalgia  SKIN: No rashes, no lesions, no itching  NEURO: No weakness, no loss of sensation

## 2024-09-17 NOTE — CONSULT NOTE ADULT - ASSESSMENT
ASSESSMENT:  65yo female PMH ESLD on transplant list, umbilical hernia, breast cancer Jan 2020 s/p chemo, radiation, lumpectomy, presenting with worsening abdominal pain. She came to the ED yesterday, Sept 16 for abdominal pain associated with her umbilical hernia but hernia was soft and reducible and she was discharged. Hernia is no longer reducible. She is being followed by Dr. Hull for her hernia and Dr. Joseph for her liver.   Per patient, patient is to go to the OR today with Dr. Hull for hernia repair. CT A/P performed indicating borderline dilated small bowel proximal to the umbilical hernia, with transition point at the hernia neck, suspect partial or developing small bowel obstruction. The small bowel within the hernia sac is mildly thick-walled. Strangulation is difficult to entirely excluded. No pneumatosis. Small to moderate volume of ascites.        PLAN:  -General Surgery will follow  -Pain/nausea control PRN  -Serial abdominal exams  - C/w monitoring  - Plan to be discussed with attending, Dr. Wero Garcia Surgery  p. [phone]  ASSESSMENT:  65yo female PMH ESLD on transplant list, umbilical hernia, breast cancer Jan 2020 s/p chemo, radiation, lumpectomy, presenting with worsening abdominal pain. She came to the ED yesterday, Sept 16 for abdominal pain associated with her umbilical hernia but hernia was soft and reducible and she was discharged. Hernia is no longer reducible. She is being followed by Dr. Hull for her hernia and Dr. Joseph for her liver.   Per patient, patient is to go to the OR today with Dr. Hull for hernia repair. CT A/P performed indicating borderline dilated small bowel proximal to the umbilical hernia, with transition point at the hernia neck, suspect partial or developing small bowel obstruction. The small bowel within the hernia sac is mildly thick-walled. Strangulation is difficult to entirely excluded. No pneumatosis. Small to moderate volume of ascites.      PLAN:  -General Surgery will follow  -Pain/nausea control PRN  -Serial abdominal exams  - C/w monitoring  - Plan to be discussed with attending, Dr. Wero Garcia Surgery  p. [phone]  ASSESSMENT:  63yo female PMH ESLD on transplant list, umbilical hernia, breast cancer Jan 2020 s/p chemo, radiation, lumpectomy, presenting with worsening abdominal pain. She came to the ED yesterday, Sept 16 for abdominal pain associated with her umbilical hernia but hernia was soft and reducible and she was discharged. Hernia is no longer reducible. She is being followed by Dr. Hull for her hernia and Dr. Joseph for her liver.   Per patient, patient is to go to the OR today with Dr. Hull for hernia repair. CT A/P performed indicating borderline dilated small bowel proximal to the umbilical hernia, with transition point at the hernia neck, suspect partial or developing small bowel obstruction. The small bowel within the hernia sac is mildly thick-walled. Strangulation is difficult to entirely excluded. No pneumatosis. Small to moderate volume of ascites.      PLAN:  - Per primary team, patient to go to OR today for hernia repair- general surgery team not required for surgical intervention  - Plan discussed with attending, Dr. Conteh  - Please call  back for any concerns    [facility]  [phone] (pager)

## 2024-09-17 NOTE — BRIEF OPERATIVE NOTE - SPECIMENS
Refill approved for 1 month.  Corinne Landon needs to be seen for an appointment before further refills are given.    
hernia sac

## 2024-09-17 NOTE — H&P ADULT - ASSESSMENT
64F with PMHx of breast ca s/p lumpectomy, HTN and decompensated ETOH cirrhosis presented to the ED 9/16 with 2-3cm reducible umbilical hernia and is now presenting to the ED on 9/17 for worsening umbilical hernia pain, nausea, and vomiting. Patient admits to worsening pain starting at midnight on 9/17, along with several episodes of nonbloody emesis.    [] umbilical hernia repair   - Zosyn and IVF pre-op   - hold diuretics   - SCDs/SQH   - strict I&Os  - NPO   - pain control   - bowel reg   - post op check 4hr after sx   -incentive spirometry       []HTN   - spironolactone and furosemide (home meds) held  - monitor VS

## 2024-09-17 NOTE — CONSULT NOTE ADULT - SUBJECTIVE AND OBJECTIVE BOX
HPI:  65yo female PMH ESLD on transplant list, umbilical hernia, breast cancer Jan 2020 s/p chemo, radiation, lumpectomy, presenting with worsening abdominal pain. She came to the ED yesterday, Sept 16 for abdominal pain associated with her umbilical hernia but hernia was soft and reducible and she was discharged.  At midnight 9/17, her pain worsened and she developed nausea and emesis and returned to the hospital. She notes that her hernia is no longer reducible. She is being followed by Dr. Hull for her hernia and Dr. Joseph for her liver. Surgery team being consulted for incidental SBO on CT scan.      PAST MEDICAL & SURGICAL HISTORY:  Breast cancer, left      Mild alcohol use disorder      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      GERD (gastroesophageal reflux disease)      Skin cancer, basal cell      H/O lumpectomy      History of removal of skin mole      H/O ganglion cyst          Allergies    No Known Allergies    Intolerances        MEDICATIONS  (STANDING):  sodium chloride 0.9% Bolus 1000 milliLiter(s) IV Bolus once    MEDICATIONS  (PRN):  HYDROmorphone  Injectable 0.5 milliGRAM(s) IV Push every 10 minutes PRN Moderate Pain (4 - 6)  ondansetron Injectable 4 milliGRAM(s) IV Push once PRN Nausea and/or Vomiting      SOCIAL HISTORY:    FAMILY HISTORY:        Vital Signs Last 24 Hrs  T(C): 36.5 (17 Sep 2024 10:18), Max: 36.6 (17 Sep 2024 04:33)  T(F): 97.7 (17 Sep 2024 10:18), Max: 97.9 (17 Sep 2024 04:33)  HR: 72 (17 Sep 2024 10:18) (66 - 80)  BP: 109/69 (17 Sep 2024 10:18) (100/61 - 118/69)  BP(mean): --  RR: 18 (17 Sep 2024 10:18) (15 - 18)  SpO2: 98% (17 Sep 2024 10:18) (98% - 100%)    Parameters below as of 17 Sep 2024 10:18  Patient On (Oxygen Delivery Method): room air        I&O's Summary      LABS:                        8.1    4.09  )-----------( 61       ( 17 Sep 2024 04:20 )             25.8     09-17    134[L]  |  101  |  34[H]  ----------------------------<  155[H]  3.9   |  20[L]  |  1.43[H]    Ca    8.5      17 Sep 2024 04:20  Mg     2.0     09-17    TPro  7.6  /  Alb  3.0[L]  /  TBili  1.9[H]  /  DBili  x   /  AST  47[H]  /  ALT  22  /  AlkPhos  134[H]  09-17    LIVER FUNCTIONS - ( 17 Sep 2024 04:20 )  Alb: 3.0 g/dL / Pro: 7.6 g/dL / ALK PHOS: 134 U/L / ALT: 22 U/L / AST: 47 U/L / GGT: x           PT/INR - ( 17 Sep 2024 04:20 )   PT: 14.1 sec;   INR: 1.35 ratio         PTT - ( 17 Sep 2024 04:20 )  PTT:33.3 sec  Urinalysis Basic - ( 17 Sep 2024 04:20 )    Color: x / Appearance: x / SG: x / pH: x  Gluc: 155 mg/dL / Ketone: x  / Bili: x / Urobili: x   Blood: x / Protein: x / Nitrite: x   Leuk Esterase: x / RBC: x / WBC x   Sq Epi: x / Non Sq Epi: x / Bacteria: x      CAPILLARY BLOOD GLUCOSE          Cultures:      PHYSICAL EXAM:  General: Mild distress, resting comfortably  Pulmonary: Normal resp effort, CTA-B  Cardiovascular: NSR  Abdominal: Ventral hernia, non reducible firm, tender  Extremities: FROM, normal strength, no clubbing/cyanosis/erythema/edema  Neuro: A/O x 3, CNs II-XII grossly intact, normal sensation, no focal deficits      RADIOLOGY & ADDITIONAL STUDIES:  < from: CT Abdomen and Pelvis w/ IV Cont (09.17.24 @ 07:22) >    ACC: [id number] EXAM:  CT ABDOMEN AND PELVIS IC   ORDERED BY: NEIDA SCHUMACHER     PROCEDURE DATE:  09/17/2024          INTERPRETATION:  CLINICAL INFORMATION: Abdominal pain. Evaluate umbilical   hernia for strangulation/incarceration. The umbilical herniais   reportedly soft and reducible on physical exam.    COMPARISON: CT abdomen pelvis 4/15/2023 and 2/17/2023. MRI abdomen   6/28/2024.    CONTRAST/COMPLICATIONS:  IV Contrast: Omnipaque 350  90 cc administered   10 cc discarded  Oral Contrast: NONE  Complications: None reported at time of study completion    PROCEDURE:  CT of the Abdomen and Pelvis was performed.  Sagittal and coronal reformats were performed.    FINDINGS:  LOWER CHEST: Mild bibasilar subsegmental atelectasis. Coronary artery   calcifications. Again seen are foci of fat necrosis in the bilateral   breasts.    LIVER: Cirrhosis. Mildly heterogeneous attenuation. Ill-defined hypodense   region in the hepatic dome measuring 3.3 cm (series 301 image 21), not   seen on the noncontrast CT of 4/15/2023 or the contrast enhanced CT of   2/17/2023. No correlate is identified on the prior MRI of 6/28/2024.  BILE DUCTS: Normal caliber.  GALLBLADDER: Cholelithiasis.  SPLEEN: Splenomegaly. Measures 18.0 cm.  PANCREAS: Within normal limits.  ADRENALS: Within normal limits.  KIDNEYS/URETERS: No hydronephrosis. Subcentimeter right upper pole   hypodensity, too small to characterize.    BLADDER: Within normal limits.  REPRODUCTIVE ORGANS: Mildly thickened appearance of the endometrial   complex to 12 mm.    BOWEL: Gastric wall thickening, which may be exaggerated by   underdistention. Pancolonic wall thickening, most significantly in the   right colon. Borderline distended and partially fecalized small bowel   proximal to an umbilical hernia, with transition point at the hernia   neck. The segment of small bowel within the hernia sac contains some   fluid but is nondilated, is mildly thick-walled, and is without   pneumatosis (series 601 image 79). The small bowel distal to thehernia   sac is decompressed.  PERITONEUM/RETROPERITONEUM: Small to moderate volume of ascites.  VESSELS: Atherosclerotic changes. Small perigastric and paraesophageal   varices. Mesenteric and perirectal varices/collaterals.  LYMPH NODES: No lymphadenopathy.  ABDOMINAL WALL: Moderate-sized umbilical hernia containing small bowel   and fluid, with additional details as above.  BONES: Degenerative changes. Mild to moderate compression fracture   deformity at L5, unchanged from 4/15/2023.    IMPRESSION:  *  Borderline dilated small bowel proximal to the umbilical hernia, with   transition point at the hernia neck, suspect partial or developing small   bowel obstruction. The small bowel within the hernia sac is mildly   thick-walled. Fluid within the hernia sac is nonspecific in the setting   of ascites. Strangulation is difficult to entirely excluded. No   pneumatosis. Correlate with physical exam for incarceration/reducibility.  *  Pancolonic wall thickening, most significantly in the right colon,   which may be due to portal hypertensive colopathy or other nonspecific   pancolitis. Gastric wall thickening, which may be related to   underdistention, portal hypertensive gastropathy, or gastritis.  *  Cirrhosis with evidence for portal hypertension. Indeterminate   ill-defined hypodense region at the hepatic dome measuring 3.3 cm, not   seen on previous imaging. It is uncertain if this is related to   background liver heterogeneity or development of a mass lesion. Suggest   further characterization with contrast enhanced abdominal MRI.  *  Mildly thickened appearance of the endometrial complex. Suggest   evaluation with pelvic ultrasound.  *  Small to moderate volume of ascites.    --- End of Report ---          ZAID TRAN MD;Resident Radiologist  This document has been electronically signed.  DEJA PRATT MD; Attending Radiologist  This document has been electronically signed. Sep 17 2024  8:39AM    < end of copied text >    
64 year old female with a PMH of breast CA s/p lumpectomy, ETOH abuse, and HTN. She was recently admitted to [facility] from 2/17-2/25 for abdominal distention and jaundice. She originally saw PCP in Jan 2023 when she noted she was turning yellow, PCP did bloodwork and referred her her GI: Jai Lazcano     She states she used to have 2 glasses of vodka daily for 3 years to treat her chronic pain from her breast cancer - her last drink was 1/20/23.    She reports she was on Letrozole for her breast caner and was taking Tylenol daily at recommended doses to treat her pain, she is concerned that the drug interaction may have damaged her liver. She has since stopped taking Tylenol. 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  Wednesdays are her easiest day for appointments    She was discharged with steroid taper due to favorable Lille score however bilirubin has not improved and steroids stopped  Patient with increased abd distention and LE edema currently on diuretics  Has received intermittent paracentesis but has spaced out > 2 months now  She is listed for OLT with MELD in 17-18 range    She is well known to me  She has an umbilical hernia that is soft and reducible and had outpatient evaluation for surgical repair  Presented yesterday to ED with pain at hernia site  Was soft and reducible and was d/c with outpatient follow up and represents today with incarceration  Plan for OR intervention today with Dr. Hull    She denies any recent ETOH use  She has no LDLT candidates  We discussed role of TIPS if ascites were to get worse again

## 2024-09-17 NOTE — CONSULT NOTE ADULT - ASSESSMENT
64F ETOH Cirrhosis listed for OLT with MELD 17 - now with umbilical hernia incarceration  For repair and reduction today by Dr. Hull    Pain control per surgery  Daily MELD score  Continue to assess need for transplant  Abx   Cultures pending    Will follow

## 2024-09-17 NOTE — H&P ADULT - NSHPPHYSICALEXAM_GEN_ALL_CORE
PHYSICAL EXAM:  Constitutional: Well developed / well nourished, in acute pain   Eyes: Anicteric, PERRLA  ENMT: nc/at  Respiratory: CTA B/L  Cardiovascular: RRR  Gastrointestinal: ~3 cm irreducible umbilical hernia. Soft abdomen, non distended  Genitourinary: Voiding spontaneously  Vascular: Palpable dp pulses bilaterally  Neurological: A&O x3  Skin: discolored umbilical hernia   Musculoskeletal: Moving all extremities  Psychiatric: Responsive

## 2024-09-17 NOTE — PROGRESS NOTE ADULT - SUBJECTIVE AND OBJECTIVE BOX
Transplant Surgery - POC  --------------------------------------------------------------  64F with PMHx of breast ca s/p lumpectomy, HTN and decompensated ETOH cirrhosis presented to the ED 9/16 with 2-3cm reducible umbilical hernia and is now presenting to the ED on 9/17 for worsening umbilical hernia pain, nausea, and vomiting. Patient admits to worsening pain starting at midnight on 9/17, along with several episodes of nonbloody emesis. Patient states she has had no bowel movements and has not been able to keep any food down. Pt admits to hernia increasing in size.    Emergently taken to OR as imaging concerning for incarcerating [facility].   s/p umbilical hernia repair with mesh, ascites drainage 1.5L    Interval events:  Afebrile, VSS  JPs draining ascites, Albumin started  pain controlled      Potential Discharge date: TBD    Education:  Medications    Plan of care:  See Below    MEDICATIONS  (STANDING):  albumin human 25% IVPB 100 milliLiter(s) IV Intermittent every 6 hours  HYDROmorphone  Injectable 0.5 milliGRAM(s) IV Push four times a day    MEDICATIONS  (PRN):  HYDROmorphone  Injectable 0.5 milliGRAM(s) IV Push every 10 minutes PRN Moderate Pain (4 - 6)  ondansetron Injectable 4 milliGRAM(s) IV Push once PRN Nausea and/or Vomiting  ondansetron Injectable 4 milliGRAM(s) IV Push every 6 hours PRN Nausea  traMADol 50 milliGRAM(s) Oral every 4 hours PRN Moderate Pain (4 - 6)      PAST MEDICAL & SURGICAL HISTORY:  Breast cancer, left      Mild alcohol use disorder      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      GERD (gastroesophageal reflux disease)      Skin cancer, basal cell      H/O lumpectomy      History of removal of skin mole      H/O ganglion cyst          Vital Signs Last 24 Hrs  T(C): 36.8 (17 Sep 2024 16:30), Max: 36.8 (17 Sep 2024 16:30)  T(F): 98.2 (17 Sep 2024 16:30), Max: 98.2 (17 Sep 2024 16:30)  HR: 61 (17 Sep 2024 17:45) (61 - 80)  BP: 100/56 (17 Sep 2024 17:45) (100/56 - 124/67)  BP(mean): 75 (17 Sep 2024 17:45) (75 - 89)  RR: 16 (17 Sep 2024 17:45) (16 - 18)  SpO2: 97% (17 Sep 2024 17:45) (97% - 100%)    Parameters below as of 17 Sep 2024 17:45  Patient On (Oxygen Delivery Method): room air        I&O's Summary    17 Sep 2024 07:01  -  17 Sep 2024 18:08  --------------------------------------------------------  IN: 0 mL / OUT: 750 mL / NET: -750 mL                              7.8    3.07  )-----------( 51       ( 17 Sep 2024 15:27 )             24.5     09-17    136  |  101  |  27[H]  ----------------------------<  117[H]  4.2   |  22  |  1.26    Ca    8.5      17 Sep 2024 15:27  Mg     2.0     09-17    TPro  7.1  /  Alb  3.2[L]  /  TBili  2.0[H]  /  DBili  x   /  AST  38  /  ALT  20  /  AlkPhos  98  09-17            Review of systems  All other systems were reviewed and are negative, except as noted.      PHYSICAL EXAM:  Constitutional: Well developed / well nourished  Eyes: Anicteric, PERRLA  ENMT: nc/at  Neck: supple  Respiratory: CTA B/L  Cardiovascular: RRR  Gastrointestinal: Soft, non distended, mild tenderness at the incision site; incision c/d/i; JPs ascites/ss  Genitourinary: Urinary catheter in place  Extremities: SCD's in place and working bilaterally  Vascular: Palpable dp pulses bilaterally  Neurological: A&O x3  Skin: no rashes, ulcerations or lesions;  Musculoskeletal: Moving all extremities  Psychiatric: Responsive

## 2024-09-17 NOTE — H&P ADULT - NSHPPHYSICALEXAM_GEN_ALL_CORE
Constitutional: Well developed / well nourished, in acute pain   Eyes: Anicteric, PERRLA  ENMT: nc/at  Respiratory: CTA B/L  Cardiovascular: RRR  Gastrointestinal: ~3 cm irreducible umbilical hernia. Soft abdomen, non distended  Genitourinary: Voiding spontaneously  Vascular: Palpable dp pulses bilaterally  Neurological: A&O x3  Skin: discolored umbilical hernia   Musculoskeletal: Moving all extremities  Psychiatric: Responsive

## 2024-09-17 NOTE — H&P ADULT - NSHPREVIEWOFSYSTEMS_GEN_ALL_CORE
Review of systems  Gen: No weight changes, fatigue, fevers/chills, weakness  Skin: No rashes  Head/Eyes/Ears/Mouth: No headache; Normal hearing; Normal vision w/o blurriness; No sinus pain/discomfort, sore throat  Respiratory: No dyspnea, cough, wheezing, hemoptysis  CV: No chest pain, PND, orthopnea  GI: admits to painful umbilical hernia, nausea and vomiting. denies hematochezia, melena   : No increased frequency, dysuria, hematuria, nocturia  MSK: No joint pain/swelling; no back pain; no edema  Neuro: No dizziness/lightheadedness, weakness, seizures, numbness, tingling  Heme: No easy bruising or bleeding  Endo: No heat/cold intolerance  Psych: No significant nervousness, anxiety, stress, depression  All other systems were reviewed and are negative, except as noted.

## 2024-09-17 NOTE — ED PROVIDER NOTE - OBJECTIVE STATEMENT
65yo female PMH ESLD on transplant list, umbilical hernia, breast cancer Jan 2020 s/p chemo, radiation, lumpectomy, presenting with worsening abdominal pain. She came to the ED yesterday Sept 16 for abdominal pain associated with her umbilical hernia but hernia was soft and reducible and she was discharged. At midnight today, her pain worsened and she developed nausea and emesis and returned to the hospital. She notes that today her hernia is no longer reducible. 65yo female PMH ESLD on transplant list, umbilical hernia, breast cancer Jan 2020 s/p chemo, radiation, lumpectomy, presenting with worsening abdominal pain. She came to the ED yesterday Sept 16 for abdominal pain associated with her umbilical hernia but hernia was soft and reducible and she was discharged.   At midnight today, her pain worsened and she developed nausea and emesis and returned to the hospital. She notes that today her hernia is no longer reducible.

## 2024-09-17 NOTE — ED PROVIDER NOTE - CLINICAL SUMMARY MEDICAL DECISION MAKING FREE TEXT BOX
Patient signed out to me Desi Babcock: Patient with umbilical hernia presenting with worsening abdominal pain, nausea, vomiting. On exam hernia is not reducible; per patient this is new as of today at midnight. CT abd pelvis showing suspected partial or developing SBO. Strangulation cannot be excluded. Surgery and Transplant Surgery notified. ZR  pt sign out to me  at 7 am    Patient with umbilical hernia presenting with worsening abdominal pain, nausea, vomiting. On exam hernia is not reducible; per patient this is new as of today at midnight. CT abd pelvis showing suspected partial or developing SBO. Strangulation cannot be excluded. Surgery and Transplant Surgery notified.

## 2024-09-17 NOTE — BRIEF OPERATIVE NOTE - OPERATION/FINDINGS
Incarcerated umbilical hernia with ascitic fluid. Loop of bowel viable without any signs of ischemia. Hernia sac resected and defect closed primarily. 2 adria drain left in place.  Incarcerated umbilical hernia with ascitic fluid 1.5L drained intraoperative. Loop of bowel viable without any signs of ischemia. Hernia sac resected and defect closed primarily. 2 adria drain left in place.

## 2024-09-17 NOTE — PRE-ANESTHESIA EVALUATION ADULT - NSANTHPMHFT_GEN_ALL_CORE
64F with PMHx of breast ca s/p lumpectomy, HTN and decompensated ETOH cirrhosis presented to the ED 9/16 with 2-3cm reducible umbilical hernia and is now presenting to the ED on 9/17 for worsening umbilical hernia pain, nausea, and vomiting. Last vomit (dry heaving with no gastric contents 4 hrs ago)

## 2024-09-17 NOTE — H&P ADULT - ASSESSMENT
64F with PMHx of breast ca s/p lumpectomy, HTN and decompensated ETOH cirrhosis presented to the ED 9/16 with 2-3cm reducible umbilical hernia and is now presenting to the ED on 9/17 for worsening umbilical hernia pain, nausea, and vomiting. Patient admits to worsening pain starting at midnight on 9/17, along with several episodes of nonbloody emesis.    [] umbilical hernia repair   - Zosyn and IVF pre-op   - SCDs/SQH   - strict I&Os  - NPO   - pain control   - bowel reg   - post op check 4hr after sx   -incentive spirometry       []HTN   - cont home meds   - monitor VS    64F with PMHx of breast ca s/p lumpectomy, HTN and decompensated ETOH cirrhosis presented to the ED 9/16 with 2-3cm reducible umbilical hernia and is now presenting to the ED on 9/17 for worsening umbilical hernia pain, nausea, and vomiting. Patient admits to worsening pain starting at midnight on 9/17, along with several episodes of nonbloody emesis.    [] umbilical hernia repair   - Zosyn and IVF pre-op   - hold diuretics   - SCDs/SQH   - strict I&Os  - NPO   - pain control   - bowel reg   - post op check 4hr after sx   -incentive spirometry       []HTN   - spironolactone and furosemide (home meds) held  - monitor VS

## 2024-09-18 LAB
ALBUMIN SERPL ELPH-MCNC: 3.6 G/DL — SIGNIFICANT CHANGE UP (ref 3.3–5)
ALP SERPL-CCNC: 77 U/L — SIGNIFICANT CHANGE UP (ref 40–120)
ALT FLD-CCNC: 16 U/L — SIGNIFICANT CHANGE UP (ref 10–45)
ANION GAP SERPL CALC-SCNC: 13 MMOL/L — SIGNIFICANT CHANGE UP (ref 5–17)
APTT BLD: 32.8 SEC — SIGNIFICANT CHANGE UP (ref 24.5–35.6)
AST SERPL-CCNC: 32 U/L — SIGNIFICANT CHANGE UP (ref 10–40)
BASOPHILS # BLD AUTO: 0 K/UL — SIGNIFICANT CHANGE UP (ref 0–0.2)
BASOPHILS NFR BLD AUTO: 0 % — SIGNIFICANT CHANGE UP (ref 0–2)
BILIRUB SERPL-MCNC: 2.2 MG/DL — HIGH (ref 0.2–1.2)
BLD GP AB SCN SERPL QL: NEGATIVE — SIGNIFICANT CHANGE UP
BUN SERPL-MCNC: 30 MG/DL — HIGH (ref 7–23)
CALCIUM SERPL-MCNC: 8.6 MG/DL — SIGNIFICANT CHANGE UP (ref 8.4–10.5)
CHLORIDE SERPL-SCNC: 102 MMOL/L — SIGNIFICANT CHANGE UP (ref 96–108)
CO2 SERPL-SCNC: 19 MMOL/L — LOW (ref 22–31)
CREAT SERPL-MCNC: 1.28 MG/DL — SIGNIFICANT CHANGE UP (ref 0.5–1.3)
EGFR: 47 ML/MIN/1.73M2 — LOW
EOSINOPHIL # BLD AUTO: 0 K/UL — SIGNIFICANT CHANGE UP (ref 0–0.5)
EOSINOPHIL NFR BLD AUTO: 0 % — SIGNIFICANT CHANGE UP (ref 0–6)
GLUCOSE SERPL-MCNC: 116 MG/DL — HIGH (ref 70–99)
HCT VFR BLD CALC: 22 % — LOW (ref 34.5–45)
HGB BLD-MCNC: 7.1 G/DL — LOW (ref 11.5–15.5)
IMM GRANULOCYTES NFR BLD AUTO: 1.1 % — HIGH (ref 0–0.9)
INR BLD: 1.37 RATIO — HIGH (ref 0.85–1.18)
LYMPHOCYTES # BLD AUTO: 0.35 K/UL — LOW (ref 1–3.3)
LYMPHOCYTES # BLD AUTO: 7.6 % — LOW (ref 13–44)
MAGNESIUM SERPL-MCNC: 2.3 MG/DL — SIGNIFICANT CHANGE UP (ref 1.6–2.6)
MCHC RBC-ENTMCNC: 32.3 GM/DL — SIGNIFICANT CHANGE UP (ref 32–36)
MCHC RBC-ENTMCNC: 33.5 PG — SIGNIFICANT CHANGE UP (ref 27–34)
MCV RBC AUTO: 103.8 FL — HIGH (ref 80–100)
MONOCYTES # BLD AUTO: 0.24 K/UL — SIGNIFICANT CHANGE UP (ref 0–0.9)
MONOCYTES NFR BLD AUTO: 5.2 % — SIGNIFICANT CHANGE UP (ref 2–14)
NEUTROPHILS # BLD AUTO: 3.94 K/UL — SIGNIFICANT CHANGE UP (ref 1.8–7.4)
NEUTROPHILS NFR BLD AUTO: 86.1 % — HIGH (ref 43–77)
NRBC # BLD: 0 /100 WBCS — SIGNIFICANT CHANGE UP (ref 0–0)
PHOSPHATE SERPL-MCNC: 4.6 MG/DL — HIGH (ref 2.5–4.5)
PLATELET # BLD AUTO: 47 K/UL — LOW (ref 150–400)
POTASSIUM SERPL-MCNC: 4.5 MMOL/L — SIGNIFICANT CHANGE UP (ref 3.5–5.3)
POTASSIUM SERPL-SCNC: 4.5 MMOL/L — SIGNIFICANT CHANGE UP (ref 3.5–5.3)
PROT SERPL-MCNC: 7 G/DL — SIGNIFICANT CHANGE UP (ref 6–8.3)
PROTHROM AB SERPL-ACNC: 14.9 SEC — HIGH (ref 9.5–13)
RBC # BLD: 2.12 M/UL — LOW (ref 3.8–5.2)
RBC # FLD: 15.4 % — HIGH (ref 10.3–14.5)
RH IG SCN BLD-IMP: POSITIVE — SIGNIFICANT CHANGE UP
SODIUM SERPL-SCNC: 134 MMOL/L — LOW (ref 135–145)
WBC # BLD: 4.58 K/UL — SIGNIFICANT CHANGE UP (ref 3.8–10.5)
WBC # FLD AUTO: 4.58 K/UL — SIGNIFICANT CHANGE UP (ref 3.8–10.5)

## 2024-09-18 RX ORDER — FUROSEMIDE 10 MG/ML
40 INJECTION INTRAVENOUS ONCE
Refills: 0 | Status: COMPLETED | OUTPATIENT
Start: 2024-09-18 | End: 2024-09-18

## 2024-09-18 RX ORDER — FUROSEMIDE 10 MG/ML
40 INJECTION INTRAVENOUS DAILY
Refills: 0 | Status: DISCONTINUED | OUTPATIENT
Start: 2024-09-18 | End: 2024-09-18

## 2024-09-18 RX ORDER — SPIRONOLACTONE 100 MG/1
100 TABLET, FILM COATED ORAL DAILY
Refills: 0 | Status: DISCONTINUED | OUTPATIENT
Start: 2024-09-18 | End: 2024-09-18

## 2024-09-18 RX ORDER — INFLUENZA VIRUS VACCINE 15; 15; 15; 15 UG/.5ML; UG/.5ML; UG/.5ML; UG/.5ML
0.5 SUSPENSION INTRAMUSCULAR ONCE
Refills: 0 | Status: DISCONTINUED | OUTPATIENT
Start: 2024-09-18 | End: 2024-09-23

## 2024-09-18 RX ORDER — HYDROMORPHONE HYDROCHLORIDE 1 MG/ML
0.2 INJECTION, SOLUTION INTRAMUSCULAR; INTRAVENOUS; SUBCUTANEOUS ONCE
Refills: 0 | Status: DISCONTINUED | OUTPATIENT
Start: 2024-09-18 | End: 2024-09-18

## 2024-09-18 RX ADMIN — TRAMADOL HYDROCHLORIDE 50 MILLIGRAM(S): 50 TABLET, COATED ORAL at 17:29

## 2024-09-18 RX ADMIN — TRAMADOL HYDROCHLORIDE 50 MILLIGRAM(S): 50 TABLET, COATED ORAL at 03:30

## 2024-09-18 RX ADMIN — TRAMADOL HYDROCHLORIDE 50 MILLIGRAM(S): 50 TABLET, COATED ORAL at 13:32

## 2024-09-18 RX ADMIN — Medication 50 MILLILITER(S): at 17:28

## 2024-09-18 RX ADMIN — PANTOPRAZOLE SODIUM 40 MILLIGRAM(S): 40 TABLET, DELAYED RELEASE ORAL at 05:17

## 2024-09-18 RX ADMIN — HYDROMORPHONE HYDROCHLORIDE 0.5 MILLIGRAM(S): 1 INJECTION, SOLUTION INTRAMUSCULAR; INTRAVENOUS; SUBCUTANEOUS at 00:45

## 2024-09-18 RX ADMIN — TRAMADOL HYDROCHLORIDE 50 MILLIGRAM(S): 50 TABLET, COATED ORAL at 07:25

## 2024-09-18 RX ADMIN — TRAMADOL HYDROCHLORIDE 50 MILLIGRAM(S): 50 TABLET, COATED ORAL at 14:30

## 2024-09-18 RX ADMIN — Medication 50 MILLILITER(S): at 22:16

## 2024-09-18 RX ADMIN — TRAMADOL HYDROCHLORIDE 50 MILLIGRAM(S): 50 TABLET, COATED ORAL at 06:34

## 2024-09-18 RX ADMIN — TRAMADOL HYDROCHLORIDE 50 MILLIGRAM(S): 50 TABLET, COATED ORAL at 18:25

## 2024-09-18 RX ADMIN — TRAMADOL HYDROCHLORIDE 50 MILLIGRAM(S): 50 TABLET, COATED ORAL at 23:15

## 2024-09-18 RX ADMIN — CHLORHEXIDINE GLUCONATE ORAL RINSE 1 APPLICATION(S): 1.2 SOLUTION DENTAL at 12:23

## 2024-09-18 RX ADMIN — HYDROMORPHONE HYDROCHLORIDE 0.5 MILLIGRAM(S): 1 INJECTION, SOLUTION INTRAMUSCULAR; INTRAVENOUS; SUBCUTANEOUS at 00:12

## 2024-09-18 RX ADMIN — Medication 2 TABLET(S): at 22:17

## 2024-09-18 RX ADMIN — HYDROMORPHONE HYDROCHLORIDE 0.2 MILLIGRAM(S): 1 INJECTION, SOLUTION INTRAMUSCULAR; INTRAVENOUS; SUBCUTANEOUS at 09:10

## 2024-09-18 RX ADMIN — FUROSEMIDE 40 MILLIGRAM(S): 10 INJECTION INTRAVENOUS at 17:30

## 2024-09-18 RX ADMIN — Medication 50 MILLILITER(S): at 00:12

## 2024-09-18 RX ADMIN — HYDROMORPHONE HYDROCHLORIDE 0.2 MILLIGRAM(S): 1 INJECTION, SOLUTION INTRAMUSCULAR; INTRAVENOUS; SUBCUTANEOUS at 09:35

## 2024-09-18 RX ADMIN — TRAMADOL HYDROCHLORIDE 50 MILLIGRAM(S): 50 TABLET, COATED ORAL at 22:17

## 2024-09-18 RX ADMIN — Medication 50 MILLILITER(S): at 05:17

## 2024-09-18 RX ADMIN — Medication 50 MILLILITER(S): at 12:22

## 2024-09-18 RX ADMIN — TRAMADOL HYDROCHLORIDE 50 MILLIGRAM(S): 50 TABLET, COATED ORAL at 02:47

## 2024-09-18 NOTE — PHYSICAL THERAPY INITIAL EVALUATION ADULT - PLANNED THERAPY INTERVENTIONS, PT EVAL
GOAL: Pt will negotiate 1 flight of steps +[facility] independently in  2 weeks./balance training/bed mobility training/gait training/strengthening/transfer training

## 2024-09-18 NOTE — PROGRESS NOTE ADULT - ASSESSMENT
64F with PMHx of breast ca s/p lumpectomy, HTN and decompensated ETOH cirrhosis presented to the ED on 9/17 for worsening umbilical hernia pain, nausea, and vomiting from incarcerated umbilical hernia now s/p UHR w mesh on 9/17.    Umbilical Hernia Repair with Mesh (POD#1)  -drain UBALDO q3-4H, Albumin ATC for now  -strict I&Os: will consider removing corrales today  -daily labs  -SCDs/spirometry/PT  -bowel regimen   64F with PMHx of breast ca s/p lumpectomy, HTN and decompensated ETOH cirrhosis presented to the ED on 9/17 for worsening umbilical hernia pain, nausea, and vomiting from incarcerated umbilical hernia now s/p UHR w mesh on 9/17.    Umbilical Hernia Repair with Mesh (POD#1)  -drain UBALDO q6H, Albumin ATC for now  -strict I&Os  -daily labs  -SCDs/spirometry/PT  -bowel regimen  - dc corrales  - adat   - restart home diuretics when BP permits

## 2024-09-18 NOTE — PATIENT PROFILE ADULT - FALL HARM RISK - HARM RISK INTERVENTIONS

## 2024-09-18 NOTE — PHYSICAL THERAPY INITIAL EVALUATION ADULT - PERTINENT HX OF CURRENT PROBLEM, REHAB EVAL
D64F with PMHx of breast ca s/p lumpectomy, HTN and decompensated ETOH cirrhosis presented to the ED 9/16 with 2-3cm reducible umbilical hernia and is now presenting to the ED on 9/17 for worsening umbilical hernia pain, nausea, and vomiting. Patient admits to worsening pain starting at midnight on 9/17, along with several episodes of nonbloody emesis. Patient states she has had no bowel movements and has not been able to keep any food down. Pt admits to hernia increasing in size. Emergently taken to OR as imaging concerning for incarcerating UH. s/p umbilical hernia repair with mesh, ascites drainage 1.5L

## 2024-09-18 NOTE — PATIENT PROFILE ADULT - PATIENT'S SEXUAL ORIENTATION
NOTIFICATION RETURN TO WORK / SCHOOL 
 
7/10/2019 2:51 PM 
 
Mr. Jose Luis Fang [id number] [facility] [id number] To Whom It May Concern: 
 
Jose Luis Fang is currently under the care of [place]. He is able to go back to work as far as vascular standpoint. If there are questions or concerns please have the patient contact our office. Sincerely, CARA Fletcher 
 
                                
 
 Heterosexual

## 2024-09-18 NOTE — PHYSICAL THERAPY INITIAL EVALUATION ADULT - PHYSICAL ASSIST/NONPHYSICAL ASSIST: SIT/SUPINE, REHAB EVAL
The patient is a 85y Female complaining of fall. verbal cues/nonverbal cues (demo/gestures)/1 person assist

## 2024-09-18 NOTE — PROGRESS NOTE ADULT - SUBJECTIVE AND OBJECTIVE BOX
Transplant Surgery - Multidisciplinary Rounds  --------------------------------------------------------------  s/p umbilical hernia repair w mesh     Date:   9/17/24      POD#1    Present:   Patient seen and examined with multidisciplinary Transplant team including Surgeon, Hepatologist, Surgical/Hepatology fellow, ACP,  Pharmacist, Nutritionis,  and bedside RN during AM rounds.   Disciplines not in attendance will be notified of the plan.     HPI:   64F with PMHx of breast ca s/p lumpectomy, HTN and decompensated ETOH cirrhosis presented to the ED on 9/17 for worsening umbilical hernia pain, nausea, and vomiting from incarcerated umbilical hernia. Patient admits to worsening pain starting at midnight on 9/17, along with several episodes of nonbloody emesis. Patient states she has had no bowel movements and has not been able to keep any food down. Pt admits to hernia increasing in size, denies chest pain, SOB, dizziness weakness, changes in mental status.    Interval Events:  s/p umbilical hernia repair with mesh, 1.5L ascites drained intraop  afebrile, VSS o/n  corrales remained for accurate i/o  UOP adequate overnight  Albumin started for continued drainage of ascites via intra-abdominal UBALDO      Potential Discharge date: TBD  Education:  Medications  Plan of care:  See Below    MEDICATIONS  (STANDING):  albumin human 25% IVPB 100 milliLiter(s) IV Intermittent every 6 hours  chlorhexidine 2% Cloths 1 Application(s) Topical daily  pantoprazole    Tablet 40 milliGRAM(s) Oral before breakfast  senna 2 Tablet(s) Oral at bedtime    MEDICATIONS  (PRN):  ondansetron Injectable 4 milliGRAM(s) IV Push every 6 hours PRN Nausea  traMADol 50 milliGRAM(s) Oral every 4 hours PRN Moderate Pain (4 - 6)      PAST MEDICAL & SURGICAL HISTORY:  Breast cancer, left      Mild alcohol use disorder      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      GERD (gastroesophageal reflux disease)      Skin cancer, basal cell      H/O lumpectomy      History of removal of skin mole      H/O ganglion cyst          Vital Signs Last 24 Hrs  T(C): 36.5 (18 Sep 2024 00:08), Max: 36.8 (17 Sep 2024 16:30)  T(F): 97.7 (18 Sep 2024 00:08), Max: 98.2 (17 Sep 2024 16:30)  HR: 73 (18 Sep 2024 00:08) (61 - 80)  BP: 103/55 (18 Sep 2024 00:08) (100/56 - 124/67)  BP(mean): 76 (18 Sep 2024 00:08) (74 - 89)  RR: 18 (18 Sep 2024 00:08) (16 - 18)  SpO2: 98% (18 Sep 2024 00:08) (97% - 100%)    Parameters below as of 18 Sep 2024 00:08  Patient On (Oxygen Delivery Method): room air        I&O's Summary    17 Sep 2024 07:01  -  18 Sep 2024 01:43  --------------------------------------------------------  IN: 20 mL / OUT: 1450 mL / NET: -1430 mL                              7.8    3.07  )-----------( 51       ( 17 Sep 2024 15:27 )             24.5     09-17    136  |  101  |  27[H]  ----------------------------<  117[H]  4.2   |  22  |  1.26    Ca    8.5      17 Sep 2024 15:27  Mg     2.0     09-17    TPro  7.1  /  Alb  3.2[L]  /  TBili  2.0[H]  /  DBili  x   /  AST  38  /  ALT  20  /  AlkPhos  98  09-17        Review of systems  All other systems were reviewed and are negative, except as noted.      PHYSICAL EXAM:  Constitutional: Well developed / well nourished  Eyes: PERRLA  ENMT: nc/at, no thrush  Neck: supple,   Respiratory: CTA B/L  Cardiovascular: RRR  Gastrointestinal: Soft abdomen, ND, appropriate incisional TTP. incision c/d/i, SQ UBALDO with serosanguinous drainage, intraabdominal UBALDO with ascites/serous fluid  Genitourinary: Urinary catheter in place  Extremities: SCD's in place and working bilaterally  Vascular: Palpable dp pulses bilaterally.   Neurological: A&O x3  Skin: no rashes, ulcerations, lesions  Musculoskeletal: Moving all extremities  Psychiatric: Responsive     Transplant Surgery - Multidisciplinary Rounds  --------------------------------------------------------------  s/p umbilical hernia repair w mesh     Date:   9/17/24      POD#1    Present:   Patient seen and examined with multidisciplinary Transplant team including Surgeon Dr. Hull, Hepatologist Dr. Hand, Surgical/Hepatology fellow, FELIPE Martinez /Des,  Pharmacist,   and bedside RN during AM rounds.   Disciplines not in attendance will be notified of the plan.     HPI:   64F with PMHx of breast ca s/p lumpectomy, HTN and decompensated ETOH cirrhosis presented to the ED on 9/17 for worsening umbilical hernia pain, nausea, and vomiting from incarcerated umbilical hernia. Patient admits to worsening pain starting at midnight on 9/17, along with several episodes of nonbloody emesis. Patient states she has had no bowel movements and has not been able to keep any food down. Pt admits to hernia increasing in size, denies chest pain, SOB, dizziness weakness, changes in mental status.    Interval Events:  s/p umbilical hernia repair with mesh, 1.5L ascites drained intraop  afebrile, VSS o/n  corrales remained for accurate i/o  UOP adequate overnight  Albumin started for continued drainage of ascites via intra-abdominal UBALDO      Potential Discharge date: TBD  Education:  Medications  Plan of care:  See Below      MEDICATIONS  (STANDING):  chlorhexidine 2% Cloths 1 Application(s) Topical daily  influenza   Vaccine 0.5 milliLiter(s) IntraMuscular once  pantoprazole    Tablet 40 milliGRAM(s) Oral before breakfast  senna 2 Tablet(s) Oral at bedtime    MEDICATIONS  (PRN):  ondansetron Injectable 4 milliGRAM(s) IV Push every 6 hours PRN Nausea  traMADol 50 milliGRAM(s) Oral every 4 hours PRN Moderate Pain (4 - 6)      PAST MEDICAL & SURGICAL HISTORY:  Breast cancer, left      Mild alcohol use disorder      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      GERD (gastroesophageal reflux disease)      Skin cancer, basal cell      H/O lumpectomy      History of removal of skin mole      H/O ganglion cyst          Vital Signs Last 24 Hrs  T(C): 36.6 (18 Sep 2024 12:44), Max: 36.8 (17 Sep 2024 16:30)  T(F): 97.9 (18 Sep 2024 12:44), Max: 98.3 (18 Sep 2024 08:59)  HR: 71 (18 Sep 2024 12:44) (61 - 92)  BP: 99/58 (18 Sep 2024 12:44) (99/58 - 110/57)  BP(mean): 76 (18 Sep 2024 05:00) (74 - 79)  RR: 18 (18 Sep 2024 12:44) (16 - 18)  SpO2: 99% (18 Sep 2024 12:44) (97% - 99%)    Parameters below as of 18 Sep 2024 12:44  Patient On (Oxygen Delivery Method): room air        I&O's Summary    17 Sep 2024 07:01  -  18 Sep 2024 07:00  --------------------------------------------------------  IN: 20 mL / OUT: 1570 mL / NET: -1550 mL    18 Sep 2024 07:01  -  18 Sep 2024 16:24  --------------------------------------------------------  IN: 740 mL / OUT: 703 mL / NET: 37 mL                              7.1    4.58  )-----------( 47       ( 18 Sep 2024 06:36 )             22.0     09-18    134[L]  |  102  |  30[H]  ----------------------------<  116[H]  4.5   |  19[L]  |  1.28    Ca    8.6      18 Sep 2024 06:36  Phos  4.6     09-18  Mg     2.3     09-18    TPro  7.0  /  Alb  3.6  /  TBili  2.2[H]  /  DBili  x   /  AST  32  /  ALT  16  /  AlkPhos  77  09-18      Review of systems  All other systems were reviewed and are negative, except as noted.      PHYSICAL EXAM:  Constitutional: Well developed / well nourished  Eyes: PERRLA  ENMT: nc/at, no thrush  Neck: supple,   Respiratory: CTA B/L  Cardiovascular: RRR  Gastrointestinal: Soft abdomen, ND, appropriate incisional TTP. incision c/d/i, SQ UBALDO with serosanguinous drainage, intraabdominal UBALDO with ascites/serous fluid  Genitourinary: Urinary catheter in place  Extremities: SCD's in place and working bilaterally  Vascular: Palpable dp pulses bilaterally.   Neurological: A&O x4  Skin: no rashes, ulcerations, lesions  Musculoskeletal: Moving all extremities  Psychiatric: Responsive

## 2024-09-18 NOTE — PHYSICAL THERAPY INITIAL EVALUATION ADULT - ADDITIONAL COMMENTS
PTA pt was independent with functional mobility and ADLs. Pt lives in  with her family 1 flight of steps to negotiate

## 2024-09-19 LAB
ALBUMIN SERPL ELPH-MCNC: 4.1 G/DL — SIGNIFICANT CHANGE UP (ref 3.3–5)
ALP SERPL-CCNC: 75 U/L — SIGNIFICANT CHANGE UP (ref 40–120)
ALT FLD-CCNC: 17 U/L — SIGNIFICANT CHANGE UP (ref 10–45)
ANION GAP SERPL CALC-SCNC: 13 MMOL/L — SIGNIFICANT CHANGE UP (ref 5–17)
APTT BLD: 30.7 SEC — SIGNIFICANT CHANGE UP (ref 24.5–35.6)
AST SERPL-CCNC: 37 U/L — SIGNIFICANT CHANGE UP (ref 10–40)
BASOPHILS # BLD AUTO: 0.01 K/UL — SIGNIFICANT CHANGE UP (ref 0–0.2)
BASOPHILS # BLD AUTO: 0.01 K/UL — SIGNIFICANT CHANGE UP (ref 0–0.2)
BASOPHILS NFR BLD AUTO: 0.2 % — SIGNIFICANT CHANGE UP (ref 0–2)
BASOPHILS NFR BLD AUTO: 0.2 % — SIGNIFICANT CHANGE UP (ref 0–2)
BILIRUB SERPL-MCNC: 1.9 MG/DL — HIGH (ref 0.2–1.2)
BUN SERPL-MCNC: 41 MG/DL — HIGH (ref 7–23)
CALCIUM SERPL-MCNC: 8.6 MG/DL — SIGNIFICANT CHANGE UP (ref 8.4–10.5)
CHLORIDE SERPL-SCNC: 98 MMOL/L — SIGNIFICANT CHANGE UP (ref 96–108)
CO2 SERPL-SCNC: 20 MMOL/L — LOW (ref 22–31)
CREAT SERPL-MCNC: 1.45 MG/DL — HIGH (ref 0.5–1.3)
EGFR: 41 ML/MIN/1.73M2 — LOW
EOSINOPHIL # BLD AUTO: 0.18 K/UL — SIGNIFICANT CHANGE UP (ref 0–0.5)
EOSINOPHIL # BLD AUTO: 0.28 K/UL — SIGNIFICANT CHANGE UP (ref 0–0.5)
EOSINOPHIL NFR BLD AUTO: 3.6 % — SIGNIFICANT CHANGE UP (ref 0–6)
EOSINOPHIL NFR BLD AUTO: 4.6 % — SIGNIFICANT CHANGE UP (ref 0–6)
GLUCOSE BLDC GLUCOMTR-MCNC: 122 MG/DL — HIGH (ref 70–99)
GLUCOSE BLDC GLUCOMTR-MCNC: 161 MG/DL — HIGH (ref 70–99)
GLUCOSE SERPL-MCNC: 93 MG/DL — SIGNIFICANT CHANGE UP (ref 70–99)
HCT VFR BLD CALC: 21.6 % — LOW (ref 34.5–45)
HCT VFR BLD CALC: 24.2 % — LOW (ref 34.5–45)
HGB BLD-MCNC: 6.7 G/DL — CRITICAL LOW (ref 11.5–15.5)
HGB BLD-MCNC: 7.8 G/DL — LOW (ref 11.5–15.5)
IMM GRANULOCYTES NFR BLD AUTO: 0.4 % — SIGNIFICANT CHANGE UP (ref 0–0.9)
IMM GRANULOCYTES NFR BLD AUTO: 0.5 % — SIGNIFICANT CHANGE UP (ref 0–0.9)
INR BLD: 1.38 RATIO — HIGH (ref 0.85–1.18)
LYMPHOCYTES # BLD AUTO: 0.75 K/UL — LOW (ref 1–3.3)
LYMPHOCYTES # BLD AUTO: 0.81 K/UL — LOW (ref 1–3.3)
LYMPHOCYTES # BLD AUTO: 12.3 % — LOW (ref 13–44)
LYMPHOCYTES # BLD AUTO: 16.3 % — SIGNIFICANT CHANGE UP (ref 13–44)
MAGNESIUM SERPL-MCNC: 2.4 MG/DL — SIGNIFICANT CHANGE UP (ref 1.6–2.6)
MCHC RBC-ENTMCNC: 31 GM/DL — LOW (ref 32–36)
MCHC RBC-ENTMCNC: 32.2 GM/DL — SIGNIFICANT CHANGE UP (ref 32–36)
MCHC RBC-ENTMCNC: 32.5 PG — SIGNIFICANT CHANGE UP (ref 27–34)
MCHC RBC-ENTMCNC: 32.9 PG — SIGNIFICANT CHANGE UP (ref 27–34)
MCV RBC AUTO: 102.1 FL — HIGH (ref 80–100)
MCV RBC AUTO: 104.9 FL — HIGH (ref 80–100)
MONOCYTES # BLD AUTO: 0.42 K/UL — SIGNIFICANT CHANGE UP (ref 0–0.9)
MONOCYTES # BLD AUTO: 0.68 K/UL — SIGNIFICANT CHANGE UP (ref 0–0.9)
MONOCYTES NFR BLD AUTO: 11.2 % — SIGNIFICANT CHANGE UP (ref 2–14)
MONOCYTES NFR BLD AUTO: 8.5 % — SIGNIFICANT CHANGE UP (ref 2–14)
NEUTROPHILS # BLD AUTO: 3.53 K/UL — SIGNIFICANT CHANGE UP (ref 1.8–7.4)
NEUTROPHILS # BLD AUTO: 4.34 K/UL — SIGNIFICANT CHANGE UP (ref 1.8–7.4)
NEUTROPHILS NFR BLD AUTO: 71 % — SIGNIFICANT CHANGE UP (ref 43–77)
NEUTROPHILS NFR BLD AUTO: 71.2 % — SIGNIFICANT CHANGE UP (ref 43–77)
NRBC # BLD: 0 /100 WBCS — SIGNIFICANT CHANGE UP (ref 0–0)
NRBC # BLD: 0 /100 WBCS — SIGNIFICANT CHANGE UP (ref 0–0)
PHOSPHATE SERPL-MCNC: 2.9 MG/DL — SIGNIFICANT CHANGE UP (ref 2.5–4.5)
PLATELET # BLD AUTO: 47 K/UL — LOW (ref 150–400)
PLATELET # BLD AUTO: 51 K/UL — LOW (ref 150–400)
POTASSIUM SERPL-MCNC: 3.9 MMOL/L — SIGNIFICANT CHANGE UP (ref 3.5–5.3)
POTASSIUM SERPL-SCNC: 3.9 MMOL/L — SIGNIFICANT CHANGE UP (ref 3.5–5.3)
PROT SERPL-MCNC: 7.1 G/DL — SIGNIFICANT CHANGE UP (ref 6–8.3)
PROTHROM AB SERPL-ACNC: 14.4 SEC — HIGH (ref 9.5–13)
RBC # BLD: 2.06 M/UL — LOW (ref 3.8–5.2)
RBC # BLD: 2.37 M/UL — LOW (ref 3.8–5.2)
RBC # FLD: 15.9 % — HIGH (ref 10.3–14.5)
RBC # FLD: 16.8 % — HIGH (ref 10.3–14.5)
SODIUM SERPL-SCNC: 131 MMOL/L — LOW (ref 135–145)
WBC # BLD: 4.97 K/UL — SIGNIFICANT CHANGE UP (ref 3.8–10.5)
WBC # BLD: 6.09 K/UL — SIGNIFICANT CHANGE UP (ref 3.8–10.5)
WBC # FLD AUTO: 4.97 K/UL — SIGNIFICANT CHANGE UP (ref 3.8–10.5)
WBC # FLD AUTO: 6.09 K/UL — SIGNIFICANT CHANGE UP (ref 3.8–10.5)

## 2024-09-19 RX ORDER — FUROSEMIDE 10 MG/ML
40 INJECTION INTRAVENOUS
Refills: 0 | Status: COMPLETED | OUTPATIENT
Start: 2024-09-19 | End: 2024-09-20

## 2024-09-19 RX ADMIN — PANTOPRAZOLE SODIUM 40 MILLIGRAM(S): 40 TABLET, DELAYED RELEASE ORAL at 05:21

## 2024-09-19 RX ADMIN — TRAMADOL HYDROCHLORIDE 50 MILLIGRAM(S): 50 TABLET, COATED ORAL at 08:43

## 2024-09-19 RX ADMIN — TRAMADOL HYDROCHLORIDE 50 MILLIGRAM(S): 50 TABLET, COATED ORAL at 19:52

## 2024-09-19 RX ADMIN — TRAMADOL HYDROCHLORIDE 50 MILLIGRAM(S): 50 TABLET, COATED ORAL at 03:15

## 2024-09-19 RX ADMIN — CHLORHEXIDINE GLUCONATE ORAL RINSE 1 APPLICATION(S): 1.2 SOLUTION DENTAL at 11:53

## 2024-09-19 RX ADMIN — Medication 2 TABLET(S): at 21:54

## 2024-09-19 RX ADMIN — Medication 50 MILLILITER(S): at 13:23

## 2024-09-19 RX ADMIN — TRAMADOL HYDROCHLORIDE 50 MILLIGRAM(S): 50 TABLET, COATED ORAL at 13:28

## 2024-09-19 RX ADMIN — TRAMADOL HYDROCHLORIDE 50 MILLIGRAM(S): 50 TABLET, COATED ORAL at 11:52

## 2024-09-19 RX ADMIN — TRAMADOL HYDROCHLORIDE 50 MILLIGRAM(S): 50 TABLET, COATED ORAL at 02:34

## 2024-09-19 RX ADMIN — TRAMADOL HYDROCHLORIDE 50 MILLIGRAM(S): 50 TABLET, COATED ORAL at 07:43

## 2024-09-19 RX ADMIN — TRAMADOL HYDROCHLORIDE 50 MILLIGRAM(S): 50 TABLET, COATED ORAL at 20:47

## 2024-09-19 RX ADMIN — Medication 50 MILLILITER(S): at 05:22

## 2024-09-19 RX ADMIN — FUROSEMIDE 40 MILLIGRAM(S): 10 INJECTION INTRAVENOUS at 10:16

## 2024-09-19 NOTE — PROGRESS NOTE ADULT - ASSESSMENT
64F with PMHx of breast ca s/p lumpectomy, HTN and decompensated ETOH cirrhosis presented to the ED on 9/17 for worsening umbilical hernia pain, nausea, and vomiting from incarcerated umbilical hernia now s/p UHR w mesh on 9/17.    [ ] Umbilical Hernia Repair with Mesh (POD#2)  -drain UBALDO q6H, Albumin ATC for now  -strict I&Os  -daily labs  -SCDs/spirometry/PT  -bowel regimen  - dc corrales  - adat   - restart home diuretics when BP permits

## 2024-09-19 NOTE — PROGRESS NOTE ADULT - SUBJECTIVE AND OBJECTIVE BOX
Transplant Surgery - Multidisciplinary Rounds  --------------------------------------------------------------  s/p umbilical hernia repair w mesh     Date:   9/17/24      POD#2    Present:   Patient seen and examined with multidisciplinary Transplant team including Surgeon Dr. Hull, Hepatologist Dr. Hand, Surgical/Hepatology fellow, FELIPE Pryor,  Pharmacist,   and bedside RN during AM rounds.   Disciplines not in attendance will be notified of the plan.     HPI: 64F with PMHx of breast ca s/p lumpectomy, HTN and decompensated ETOH cirrhosis presented to the ED on 9/17 for worsening umbilical hernia pain, nausea, and vomiting from incarcerated umbilical hernia. Patient admits to worsening pain starting at midnight on 9/17, along with several episodes of nonbloody emesis. Patient states she has had no bowel movements and has not been able to keep any food down. Pt admits to hernia increasing in size, denies chest pain, SOB, dizziness weakness, changes in mental status.    Interval Events:                      Potential Discharge date: TBD  Education:  Medications  Plan of care:  See Below     MEDICATIONS  (STANDING):  albumin human 25% IVPB 100 milliLiter(s) IV Intermittent every 8 hours  chlorhexidine 2% Cloths 1 Application(s) Topical daily  influenza   Vaccine 0.5 milliLiter(s) IntraMuscular once  pantoprazole    Tablet 40 milliGRAM(s) Oral before breakfast  senna 2 Tablet(s) Oral at bedtime    MEDICATIONS  (PRN):  ondansetron Injectable 4 milliGRAM(s) IV Push every 6 hours PRN Nausea  traMADol 50 milliGRAM(s) Oral every 4 hours PRN Moderate Pain (4 - 6)    PAST MEDICAL & SURGICAL HISTORY:  Breast cancer, left  Mild alcohol use disorder  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  GERD (gastroesophageal reflux disease)  Skin cancer, basal cell  H/O lumpectomy  History of removal of skin mole  H/O ganglion cyst    Vital Signs Last 24 Hrs  T(C): 36.8 (19 Sep 2024 05:00), Max: 36.8 (18 Sep 2024 08:59)  T(F): 98.2 (19 Sep 2024 05:00), Max: 98.3 (18 Sep 2024 08:59)  HR: 70 (19 Sep 2024 05:00) (70 - 92)  BP: 106/58 (19 Sep 2024 05:00) (99/58 - 114/61)  BP(mean): 77 (19 Sep 2024 00:33) (77 - 77)  RR: 18 (19 Sep 2024 05:00) (18 - 18)  SpO2: 96% (19 Sep 2024 05:00) (96% - 99%)  Parameters below as of 19 Sep 2024 05:00  Patient On (Oxygen Delivery Method): room air    I&O's Summary    17 Sep 2024 07:01  -  18 Sep 2024 07:00  --------------------------------------------------------  IN: 20 mL / OUT: 1570 mL / NET: -1550 mL    18 Sep 2024 07:01  -  19 Sep 2024 06:32  --------------------------------------------------------  IN: 1120 mL / OUT: 2053 mL / NET: -933 mL                        7.1    4.58  )-----------( 47       ( 18 Sep 2024 06:36 )             22.0     09-18    134[L]  |  102  |  30[H]  ----------------------------<  116[H]  4.5   |  19[L]  |  1.28    Ca    8.6      18 Sep 2024 06:36  Phos  4.6     09-18  Mg     2.3     09-18  TPro  7.0  /  Alb  3.6  /  TBili  2.2[H]  /  DBili  x   /  AST  32  /  ALT  16  /  AlkPhos  77  09-18      Review of systems  All other systems were reviewed and are negative, except as noted.    PHYSICAL EXAM:  Constitutional: Well developed / well nourished  Eyes: PERRLA  ENMT: nc/at, no thrush  Neck: supple,   Respiratory: CTA B/L  Cardiovascular: RRR  Gastrointestinal: Soft abdomen, ND, appropriate incisional TTP. incision c/d/i, SQ UBALDO with serosanguinous drainage, intraabdominal UBALDO with ascites/serous fluid  Genitourinary: Urinary catheter in place  Extremities: SCD's in place and working bilaterally  Vascular: Palpable dp pulses bilaterally.   Neurological: A&O x4  Skin: no rashes, ulcerations, lesions  Musculoskeletal: Moving all extremities  Psychiatric: Responsive  Transplant Surgery - Multidisciplinary Rounds  --------------------------------------------------------------  s/p umbilical hernia repair w mesh     Date:   9/17/24      POD#2    Present:   Patient seen and examined with multidisciplinary Transplant team including Surgeon Dr. Hull, Hepatologist Dr. Hand, Surgical/Hepatology fellow, FELIPE Pryor,  Pharmacist,   and bedside RN during AM rounds.   Disciplines not in attendance will be notified of the plan.     HPI: 64F with PMHx of breast ca s/p lumpectomy, HTN and decompensated ETOH cirrhosis presented to the ED on 9/17 for worsening umbilical hernia pain, nausea, and vomiting from incarcerated umbilical hernia. Patient admits to worsening pain starting at midnight on 9/17, along with several episodes of nonbloody emesis. Patient states she has had no bowel movements and has not been able to keep any food down. Pt admits to hernia increasing in size, denies chest pain, SOB, dizziness weakness, changes in mental status.    Interval Events:  - MELD 21, 1u PRBC, Lasix 40 iv x2                      Potential Discharge date: TBD  Education:  Medications  Plan of care:  See Below     MEDICATIONS  (STANDING):  albumin human 25% IVPB 100 milliLiter(s) IV Intermittent every 8 hours  chlorhexidine 2% Cloths 1 Application(s) Topical daily  influenza   Vaccine 0.5 milliLiter(s) IntraMuscular once  pantoprazole    Tablet 40 milliGRAM(s) Oral before breakfast  senna 2 Tablet(s) Oral at bedtime    MEDICATIONS  (PRN):  ondansetron Injectable 4 milliGRAM(s) IV Push every 6 hours PRN Nausea  traMADol 50 milliGRAM(s) Oral every 4 hours PRN Moderate Pain (4 - 6)    PAST MEDICAL & SURGICAL HISTORY:  Breast cancer, left  Mild alcohol use disorder  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  GERD (gastroesophageal reflux disease)  Skin cancer, basal cell  H/O lumpectomy  History of removal of skin mole  H/O ganglion cyst    Vital Signs Last 24 Hrs  T(C): 36.8 (19 Sep 2024 05:00), Max: 36.8 (18 Sep 2024 08:59)  T(F): 98.2 (19 Sep 2024 05:00), Max: 98.3 (18 Sep 2024 08:59)  HR: 70 (19 Sep 2024 05:00) (70 - 92)  BP: 106/58 (19 Sep 2024 05:00) (99/58 - 114/61)  BP(mean): 77 (19 Sep 2024 00:33) (77 - 77)  RR: 18 (19 Sep 2024 05:00) (18 - 18)  SpO2: 96% (19 Sep 2024 05:00) (96% - 99%)  Parameters below as of 19 Sep 2024 05:00  Patient On (Oxygen Delivery Method): room air    I&O's Summary    17 Sep 2024 07:01  -  18 Sep 2024 07:00  --------------------------------------------------------  IN: 20 mL / OUT: 1570 mL / NET: -1550 mL    18 Sep 2024 07:01  -  19 Sep 2024 06:32  --------------------------------------------------------  IN: 1120 mL / OUT: 2053 mL / NET: -933 mL                        7.1    4.58  )-----------( 47       ( 18 Sep 2024 06:36 )             22.0     09-18    134[L]  |  102  |  30[H]  ----------------------------<  116[H]  4.5   |  19[L]  |  1.28    Ca    8.6      18 Sep 2024 06:36  Phos  4.6     09-18  Mg     2.3     09-18  TPro  7.0  /  Alb  3.6  /  TBili  2.2[H]  /  DBili  x   /  AST  32  /  ALT  16  /  AlkPhos  77  09-18      Review of systems  All other systems were reviewed and are negative, except as noted.    PHYSICAL EXAM:  Constitutional: Well developed / well nourished  Eyes: PERRLA  ENMT: nc/at, no thrush  Neck: supple,   Respiratory: CTA B/L  Cardiovascular: RRR  Gastrointestinal: Soft abdomen, ND, appropriate incisional TTP. incision c/d/i, SQ UBALDO with serosanguinous drainage, intraabdominal UBALDO with ascites/serous fluid  Genitourinary: Urinary catheter in place  Extremities: SCD's in place and working bilaterally  Vascular: Palpable dp pulses bilaterally.   Neurological: A&O x4  Skin: no rashes, ulcerations, lesions  Musculoskeletal: Moving all extremities  Psychiatric: Responsive

## 2024-09-20 LAB
ALBUMIN SERPL ELPH-MCNC: 4.1 G/DL — SIGNIFICANT CHANGE UP (ref 3.3–5)
ALP SERPL-CCNC: 71 U/L — SIGNIFICANT CHANGE UP (ref 40–120)
ALT FLD-CCNC: 16 U/L — SIGNIFICANT CHANGE UP (ref 10–45)
ANION GAP SERPL CALC-SCNC: 14 MMOL/L — SIGNIFICANT CHANGE UP (ref 5–17)
APTT BLD: 33.8 SEC — SIGNIFICANT CHANGE UP (ref 24.5–35.6)
AST SERPL-CCNC: 33 U/L — SIGNIFICANT CHANGE UP (ref 10–40)
BASOPHILS # BLD AUTO: 0.01 K/UL — SIGNIFICANT CHANGE UP (ref 0–0.2)
BASOPHILS NFR BLD AUTO: 0.2 % — SIGNIFICANT CHANGE UP (ref 0–2)
BILIRUB SERPL-MCNC: 2.3 MG/DL — HIGH (ref 0.2–1.2)
BUN SERPL-MCNC: 38 MG/DL — HIGH (ref 7–23)
CALCIUM SERPL-MCNC: 8.9 MG/DL — SIGNIFICANT CHANGE UP (ref 8.4–10.5)
CHLORIDE SERPL-SCNC: 99 MMOL/L — SIGNIFICANT CHANGE UP (ref 96–108)
CO2 SERPL-SCNC: 21 MMOL/L — LOW (ref 22–31)
CREAT SERPL-MCNC: 1.4 MG/DL — HIGH (ref 0.5–1.3)
EGFR: 43 ML/MIN/1.73M2 — LOW
EOSINOPHIL # BLD AUTO: 0.31 K/UL — SIGNIFICANT CHANGE UP (ref 0–0.5)
EOSINOPHIL NFR BLD AUTO: 6.8 % — HIGH (ref 0–6)
GLUCOSE BLDC GLUCOMTR-MCNC: 113 MG/DL — HIGH (ref 70–99)
GLUCOSE SERPL-MCNC: 98 MG/DL — SIGNIFICANT CHANGE UP (ref 70–99)
HCT VFR BLD CALC: 24.1 % — LOW (ref 34.5–45)
HGB BLD-MCNC: 7.8 G/DL — LOW (ref 11.5–15.5)
IMM GRANULOCYTES NFR BLD AUTO: 0.4 % — SIGNIFICANT CHANGE UP (ref 0–0.9)
INR BLD: 1.55 RATIO — HIGH (ref 0.85–1.18)
LYMPHOCYTES # BLD AUTO: 0.72 K/UL — LOW (ref 1–3.3)
LYMPHOCYTES # BLD AUTO: 15.9 % — SIGNIFICANT CHANGE UP (ref 13–44)
MAGNESIUM SERPL-MCNC: 2.4 MG/DL — SIGNIFICANT CHANGE UP (ref 1.6–2.6)
MCHC RBC-ENTMCNC: 32.4 GM/DL — SIGNIFICANT CHANGE UP (ref 32–36)
MCHC RBC-ENTMCNC: 32.5 PG — SIGNIFICANT CHANGE UP (ref 27–34)
MCV RBC AUTO: 100.4 FL — HIGH (ref 80–100)
MONOCYTES # BLD AUTO: 0.45 K/UL — SIGNIFICANT CHANGE UP (ref 0–0.9)
MONOCYTES NFR BLD AUTO: 9.9 % — SIGNIFICANT CHANGE UP (ref 2–14)
NEUTROPHILS # BLD AUTO: 3.02 K/UL — SIGNIFICANT CHANGE UP (ref 1.8–7.4)
NEUTROPHILS NFR BLD AUTO: 66.8 % — SIGNIFICANT CHANGE UP (ref 43–77)
NRBC # BLD: 0 /100 WBCS — SIGNIFICANT CHANGE UP (ref 0–0)
PHOSPHATE SERPL-MCNC: 2.3 MG/DL — LOW (ref 2.5–4.5)
PLATELET # BLD AUTO: 53 K/UL — LOW (ref 150–400)
POTASSIUM SERPL-MCNC: 4.1 MMOL/L — SIGNIFICANT CHANGE UP (ref 3.5–5.3)
POTASSIUM SERPL-SCNC: 4.1 MMOL/L — SIGNIFICANT CHANGE UP (ref 3.5–5.3)
PROT SERPL-MCNC: 7 G/DL — SIGNIFICANT CHANGE UP (ref 6–8.3)
PROTHROM AB SERPL-ACNC: 16.8 SEC — HIGH (ref 9.5–13)
RBC # BLD: 2.4 M/UL — LOW (ref 3.8–5.2)
RBC # FLD: 16.6 % — HIGH (ref 10.3–14.5)
SODIUM SERPL-SCNC: 134 MMOL/L — LOW (ref 135–145)
WBC # BLD: 4.53 K/UL — SIGNIFICANT CHANGE UP (ref 3.8–10.5)
WBC # FLD AUTO: 4.53 K/UL — SIGNIFICANT CHANGE UP (ref 3.8–10.5)

## 2024-09-20 RX ORDER — FUROSEMIDE 10 MG/ML
40 INJECTION INTRAVENOUS ONCE
Refills: 0 | Status: DISCONTINUED | OUTPATIENT
Start: 2024-09-20 | End: 2024-09-20

## 2024-09-20 RX ADMIN — TRAMADOL HYDROCHLORIDE 50 MILLIGRAM(S): 50 TABLET, COATED ORAL at 21:10

## 2024-09-20 RX ADMIN — TRAMADOL HYDROCHLORIDE 50 MILLIGRAM(S): 50 TABLET, COATED ORAL at 15:16

## 2024-09-20 RX ADMIN — TRAMADOL HYDROCHLORIDE 50 MILLIGRAM(S): 50 TABLET, COATED ORAL at 01:07

## 2024-09-20 RX ADMIN — CHLORHEXIDINE GLUCONATE ORAL RINSE 1 APPLICATION(S): 1.2 SOLUTION DENTAL at 11:42

## 2024-09-20 RX ADMIN — FUROSEMIDE 40 MILLIGRAM(S): 10 INJECTION INTRAVENOUS at 05:39

## 2024-09-20 RX ADMIN — TRAMADOL HYDROCHLORIDE 50 MILLIGRAM(S): 50 TABLET, COATED ORAL at 22:00

## 2024-09-20 RX ADMIN — TRAMADOL HYDROCHLORIDE 50 MILLIGRAM(S): 50 TABLET, COATED ORAL at 07:41

## 2024-09-20 RX ADMIN — TRAMADOL HYDROCHLORIDE 50 MILLIGRAM(S): 50 TABLET, COATED ORAL at 08:10

## 2024-09-20 RX ADMIN — Medication 2 TABLET(S): at 21:10

## 2024-09-20 RX ADMIN — PANTOPRAZOLE SODIUM 40 MILLIGRAM(S): 40 TABLET, DELAYED RELEASE ORAL at 05:39

## 2024-09-20 RX ADMIN — TRAMADOL HYDROCHLORIDE 50 MILLIGRAM(S): 50 TABLET, COATED ORAL at 02:00

## 2024-09-20 NOTE — PROGRESS NOTE ADULT - SUBJECTIVE AND OBJECTIVE BOX
Transplant Surgery - Multidisciplinary Rounds  --------------------------------------------------------------  s/p umbilical hernia repair w mesh     Date:   9/17/24      POD#2    Present:   Patient seen and examined with multidisciplinary Transplant team including Surgeon Dr. Hull, Hepatologist Dr. Hand, Surgical/Hepatology fellow, FELIPE Pryor,  Pharmacist,   and bedside RN during AM rounds.   Disciplines not in attendance will be notified of the plan.     HPI: 64F with PMHx of breast ca s/p lumpectomy, HTN and decompensated ETOH cirrhosis presented to the ED on 9/17 for worsening umbilical hernia pain, nausea, and vomiting from incarcerated umbilical hernia. Patient admits to worsening pain starting at midnight on 9/17, along with several episodes of nonbloody emesis. Patient states she has had no bowel movements and has not been able to keep any food down. Pt admits to hernia increasing in size, denies chest pain, SOB, dizziness weakness, changes in mental status.    Interval Events:  - SAURAV    MEDICATIONS  (STANDING):  chlorhexidine 2% Cloths 1 Application(s) Topical daily  furosemide   Injectable 40 milliGRAM(s) IV Push two times a day  influenza   Vaccine 0.5 milliLiter(s) IntraMuscular once  pantoprazole    Tablet 40 milliGRAM(s) Oral before breakfast  senna 2 Tablet(s) Oral at bedtime    MEDICATIONS  (PRN):  ondansetron Injectable 4 milliGRAM(s) IV Push every 6 hours PRN Nausea  traMADol 50 milliGRAM(s) Oral every 4 hours PRN Moderate Pain (4 - 6)      PAST MEDICAL & SURGICAL HISTORY:  Breast cancer, left      Mild alcohol use disorder      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      GERD (gastroesophageal reflux disease)      Skin cancer, basal cell      H/O lumpectomy      History of removal of skin mole      H/O ganglion cyst          Vital Signs Last 24 Hrs  T(C): 36.6 (20 Sep 2024 01:00), Max: 36.9 (19 Sep 2024 07:40)  T(F): 97.8 (20 Sep 2024 01:00), Max: 98.5 (19 Sep 2024 07:40)  HR: 88 (20 Sep 2024 01:00) (70 - 96)  BP: 108/59 (20 Sep 2024 01:00) (103/55 - 112/57)  BP(mean): 77 (19 Sep 2024 17:03) (77 - 79)  RR: 18 (20 Sep 2024 01:00) (18 - 18)  SpO2: 95% (20 Sep 2024 01:00) (95% - 98%)    Parameters below as of 20 Sep 2024 01:00  Patient On (Oxygen Delivery Method): room air        I&O's Summary    18 Sep 2024 07:01  -  19 Sep 2024 07:00  --------------------------------------------------------  IN: 1120 mL / OUT: 2053 mL / NET: -933 mL    19 Sep 2024 07:01  -  20 Sep 2024 03:17  --------------------------------------------------------  IN: 700 mL / OUT: 1940 mL / NET: -1240 mL    Review of systems  All other systems were reviewed and are negative, except as noted.    PHYSICAL EXAM:  Constitutional: Well developed / well nourished  Eyes: PERRLA  ENMT: nc/at, no thrush  Neck: supple,   Respiratory: CTA B/L  Cardiovascular: RRR  Gastrointestinal: Soft abdomen, ND, appropriate incisional TTP. incision c/d/i, SQ UBALDO with serosanguinous drainage, intraabdominal UBALDO with ascites/serous fluid  Genitourinary: Urinary catheter in place  Extremities: SCD's in place and working bilaterally  Vascular: Palpable dp pulses bilaterally.   Neurological: A&O x4  Skin: no rashes, ulcerations, lesions  Musculoskeletal: Moving all extremities  Psychiatric: Responsive      Transplant Surgery - Multidisciplinary Rounds  --------------------------------------------------------------  s/p umbilical hernia repair w mesh     Date:   9/17/24      POD#3    Present:   Patient seen and examined with multidisciplinary Transplant team including Surgeon Dr. Hull, Hepatologist Dr. Hand, Surgical/Hepatology fellow, FELIPE Pryor,  Pharmacist,   and bedside RN during AM rounds.   Disciplines not in attendance will be notified of the plan.     HPI: 64F with PMHx of breast ca s/p lumpectomy, HTN and decompensated ETOH cirrhosis presented to the ED on 9/17 for worsening umbilical hernia pain, nausea, and vomiting from incarcerated umbilical hernia. Patient admits to worsening pain starting at midnight on 9/17, along with several episodes of nonbloody emesis. Patient states she has had no bowel movements and has not been able to keep any food down. Pt admits to hernia increasing in size, denies chest pain, SOB, dizziness weakness, changes in mental status.    Interval Events:  - S/P lasix 40mg iv x2  - Low H&H responded to 1u PRBC yesterday 7.8/24.1     MEDICATIONS  (STANDING):  chlorhexidine 2% Cloths 1 Application(s) Topical daily  furosemide   Injectable 40 milliGRAM(s) IV Push once  influenza   Vaccine 0.5 milliLiter(s) IntraMuscular once  pantoprazole    Tablet 40 milliGRAM(s) Oral before breakfast  senna 2 Tablet(s) Oral at bedtime    MEDICATIONS  (PRN):  ondansetron Injectable 4 milliGRAM(s) IV Push every 6 hours PRN Nausea  traMADol 50 milliGRAM(s) Oral every 4 hours PRN Moderate Pain (4 - 6)      PAST MEDICAL & SURGICAL HISTORY:  Breast cancer, left      Mild alcohol use disorder      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      GERD (gastroesophageal reflux disease)      Skin cancer, basal cell      H/O lumpectomy      History of removal of skin mole      H/O ganglion cyst          Vital Signs Last 24 Hrs  T(C): 36.7 (20 Sep 2024 13:00), Max: 36.9 (20 Sep 2024 05:27)  T(F): 98 (20 Sep 2024 13:00), Max: 98.5 (20 Sep 2024 09:00)  HR: 71 (20 Sep 2024 15:10) (71 - 88)  BP: 104/57 (20 Sep 2024 15:10) (100/61 - 112/56)  BP(mean): 80 (20 Sep 2024 06:30) (77 - 80)  RR: 18 (20 Sep 2024 13:00) (16 - 18)  SpO2: 95% (20 Sep 2024 13:00) (95% - 96%)    Parameters below as of 20 Sep 2024 13:00  Patient On (Oxygen Delivery Method): room air        I&O's Summary    19 Sep 2024 07:01  -  20 Sep 2024 07:00  --------------------------------------------------------  IN: 1180 mL / OUT: 2850 mL / NET: -1670 mL    20 Sep 2024 07:01  -  20 Sep 2024 15:39  --------------------------------------------------------  IN: 0 mL / OUT: 850 mL / NET: -850 mL                              7.8    4.53  )-----------( 53       ( 20 Sep 2024 05:48 )             24.1     09-20    134[L]  |  99  |  38[H]  ----------------------------<  98  4.1   |  21[L]  |  1.40[H]    Ca    8.9      20 Sep 2024 05:48  Phos  2.3     09-20  Mg     2.4     09-20    TPro  7.0  /  Alb  4.1  /  TBili  2.3[H]  /  DBili  x   /  AST  33  /  ALT  16  /  AlkPhos  71  09-20          Review of systems  All other systems were reviewed and are negative, except as noted.    PHYSICAL EXAM:  Constitutional: Well developed / well nourished  Eyes: PERRLA  ENMT: nc/at, no thrush  Neck: supple,   Respiratory: CTA B/L  Cardiovascular: RRR  Gastrointestinal: Soft abdomen, ND, appropriate incisional TTP. incision c/d/i, SQ UBALDO with serosanguinous drainage, intraabdominal UBALDO with ascites/serous fluid  Genitourinary: Urinary catheter in place  Extremities: SCD's in place and working bilaterally  Vascular: Palpable dp pulses bilaterally.   Neurological: A&O x4  Skin: no rashes, ulcerations, lesions  Musculoskeletal: Moving all extremities  Psychiatric: Responsive

## 2024-09-20 NOTE — PROGRESS NOTE ADULT - ASSESSMENT
64F with PMHx of breast ca s/p lumpectomy, HTN and decompensated ETOH cirrhosis presented to the ED on 9/17 for worsening umbilical hernia pain, nausea, and vomiting from incarcerated umbilical hernia now s/p UHR w mesh on 9/17.    [ ] Umbilical Hernia Repair with Mesh (POD#2)  -drain UBALDO q6H, Albumin ATC for now  -strict I&Os  -daily labs  -SCDs/spirometry/PT  -bowel regimen  - dc corrales  - adat   - restart home diuretics when BP permits   64F with PMHx of breast ca s/p lumpectomy, HTN and decompensated ETOH cirrhosis presented to the ED on 9/17 for worsening umbilical hernia pain, nausea, and vomiting from incarcerated umbilical hernia now s/p UHR w mesh on 9/17.    [ ] Umbilical Hernia Repair with Mesh (POD#2)  -drain UBALDO q6H, Albumin ATC for now  -strict I&Os  -daily labs  -SCDs/spirometry/PT  -bowel regimen  - Lasix 40mg iv x1  - restart home diuretics when BP permits

## 2024-09-21 LAB
ALBUMIN SERPL ELPH-MCNC: 3.7 G/DL — SIGNIFICANT CHANGE UP (ref 3.3–5)
ALP SERPL-CCNC: 79 U/L — SIGNIFICANT CHANGE UP (ref 40–120)
ALT FLD-CCNC: 14 U/L — SIGNIFICANT CHANGE UP (ref 10–45)
ANION GAP SERPL CALC-SCNC: 10 MMOL/L — SIGNIFICANT CHANGE UP (ref 5–17)
APTT BLD: 32.4 SEC — SIGNIFICANT CHANGE UP (ref 24.5–35.6)
AST SERPL-CCNC: 30 U/L — SIGNIFICANT CHANGE UP (ref 10–40)
BASOPHILS # BLD AUTO: 0.02 K/UL — SIGNIFICANT CHANGE UP (ref 0–0.2)
BASOPHILS NFR BLD AUTO: 0.5 % — SIGNIFICANT CHANGE UP (ref 0–2)
BILIRUB SERPL-MCNC: 2.2 MG/DL — HIGH (ref 0.2–1.2)
BLD GP AB SCN SERPL QL: NEGATIVE — SIGNIFICANT CHANGE UP
BUN SERPL-MCNC: 40 MG/DL — HIGH (ref 7–23)
CALCIUM SERPL-MCNC: 8.5 MG/DL — SIGNIFICANT CHANGE UP (ref 8.4–10.5)
CHLORIDE SERPL-SCNC: 101 MMOL/L — SIGNIFICANT CHANGE UP (ref 96–108)
CO2 SERPL-SCNC: 21 MMOL/L — LOW (ref 22–31)
CREAT ?TM UR-MCNC: 64 MG/DL — SIGNIFICANT CHANGE UP
CREAT SERPL-MCNC: 1.33 MG/DL — HIGH (ref 0.5–1.3)
EGFR: 45 ML/MIN/1.73M2 — LOW
EOSINOPHIL # BLD AUTO: 0.32 K/UL — SIGNIFICANT CHANGE UP (ref 0–0.5)
EOSINOPHIL NFR BLD AUTO: 8.1 % — HIGH (ref 0–6)
GLUCOSE SERPL-MCNC: 100 MG/DL — HIGH (ref 70–99)
HCT VFR BLD CALC: 23.7 % — LOW (ref 34.5–45)
HGB BLD-MCNC: 7.6 G/DL — LOW (ref 11.5–15.5)
IMM GRANULOCYTES NFR BLD AUTO: 0.5 % — SIGNIFICANT CHANGE UP (ref 0–0.9)
INR BLD: 1.7 RATIO — HIGH (ref 0.85–1.18)
LYMPHOCYTES # BLD AUTO: 0.73 K/UL — LOW (ref 1–3.3)
LYMPHOCYTES # BLD AUTO: 18.4 % — SIGNIFICANT CHANGE UP (ref 13–44)
MAGNESIUM SERPL-MCNC: 2.3 MG/DL — SIGNIFICANT CHANGE UP (ref 1.6–2.6)
MCHC RBC-ENTMCNC: 31.8 PG — SIGNIFICANT CHANGE UP (ref 27–34)
MCHC RBC-ENTMCNC: 32.1 GM/DL — SIGNIFICANT CHANGE UP (ref 32–36)
MCV RBC AUTO: 99.2 FL — SIGNIFICANT CHANGE UP (ref 80–100)
MONOCYTES # BLD AUTO: 0.42 K/UL — SIGNIFICANT CHANGE UP (ref 0–0.9)
MONOCYTES NFR BLD AUTO: 10.6 % — SIGNIFICANT CHANGE UP (ref 2–14)
NEUTROPHILS # BLD AUTO: 2.46 K/UL — SIGNIFICANT CHANGE UP (ref 1.8–7.4)
NEUTROPHILS NFR BLD AUTO: 61.9 % — SIGNIFICANT CHANGE UP (ref 43–77)
NRBC # BLD: 0 /100 WBCS — SIGNIFICANT CHANGE UP (ref 0–0)
OSMOLALITY UR: 385 MOS/KG — SIGNIFICANT CHANGE UP (ref 300–900)
PHOSPHATE SERPL-MCNC: 2.9 MG/DL — SIGNIFICANT CHANGE UP (ref 2.5–4.5)
PLATELET # BLD AUTO: 54 K/UL — LOW (ref 150–400)
POTASSIUM SERPL-MCNC: 4 MMOL/L — SIGNIFICANT CHANGE UP (ref 3.5–5.3)
POTASSIUM SERPL-SCNC: 4 MMOL/L — SIGNIFICANT CHANGE UP (ref 3.5–5.3)
POTASSIUM UR-SCNC: 38 MMOL/L — SIGNIFICANT CHANGE UP
PROT ?TM UR-MCNC: <7 MG/DL — SIGNIFICANT CHANGE UP (ref 0–12)
PROT SERPL-MCNC: 6.5 G/DL — SIGNIFICANT CHANGE UP (ref 6–8.3)
PROT/CREAT UR-RTO: SIGNIFICANT CHANGE UP (ref 0–0.2)
PROTHROM AB SERPL-ACNC: 17.6 SEC — HIGH (ref 9.5–13)
RBC # BLD: 2.39 M/UL — LOW (ref 3.8–5.2)
RBC # FLD: 16.4 % — HIGH (ref 10.3–14.5)
RH IG SCN BLD-IMP: POSITIVE — SIGNIFICANT CHANGE UP
SODIUM SERPL-SCNC: 132 MMOL/L — LOW (ref 135–145)
SODIUM UR-SCNC: 21 MMOL/L — SIGNIFICANT CHANGE UP
WBC # BLD: 3.97 K/UL — SIGNIFICANT CHANGE UP (ref 3.8–10.5)
WBC # FLD AUTO: 3.97 K/UL — SIGNIFICANT CHANGE UP (ref 3.8–10.5)

## 2024-09-21 RX ORDER — BISACODYL 5 MG/1
10 TABLET, COATED ORAL ONCE
Refills: 0 | Status: COMPLETED | OUTPATIENT
Start: 2024-09-21 | End: 2024-09-21

## 2024-09-21 RX ORDER — FUROSEMIDE 10 MG/ML
40 INJECTION INTRAVENOUS DAILY
Refills: 0 | Status: DISCONTINUED | OUTPATIENT
Start: 2024-09-21 | End: 2024-09-23

## 2024-09-21 RX ORDER — SPIRONOLACTONE 100 MG/1
100 TABLET, FILM COATED ORAL DAILY
Refills: 0 | Status: DISCONTINUED | OUTPATIENT
Start: 2024-09-21 | End: 2024-09-24

## 2024-09-21 RX ADMIN — Medication 5000 UNIT(S): at 18:12

## 2024-09-21 RX ADMIN — TRAMADOL HYDROCHLORIDE 50 MILLIGRAM(S): 50 TABLET, COATED ORAL at 03:16

## 2024-09-21 RX ADMIN — BISACODYL 10 MILLIGRAM(S): 5 TABLET, COATED ORAL at 09:21

## 2024-09-21 RX ADMIN — TRAMADOL HYDROCHLORIDE 50 MILLIGRAM(S): 50 TABLET, COATED ORAL at 15:05

## 2024-09-21 RX ADMIN — TRAMADOL HYDROCHLORIDE 50 MILLIGRAM(S): 50 TABLET, COATED ORAL at 10:30

## 2024-09-21 RX ADMIN — TRAMADOL HYDROCHLORIDE 50 MILLIGRAM(S): 50 TABLET, COATED ORAL at 09:21

## 2024-09-21 RX ADMIN — Medication 17 GRAM(S): at 01:11

## 2024-09-21 RX ADMIN — SPIRONOLACTONE 100 MILLIGRAM(S): 100 TABLET, FILM COATED ORAL at 13:18

## 2024-09-21 RX ADMIN — TRAMADOL HYDROCHLORIDE 50 MILLIGRAM(S): 50 TABLET, COATED ORAL at 22:30

## 2024-09-21 RX ADMIN — TRAMADOL HYDROCHLORIDE 50 MILLIGRAM(S): 50 TABLET, COATED ORAL at 21:30

## 2024-09-21 RX ADMIN — FUROSEMIDE 40 MILLIGRAM(S): 10 INJECTION INTRAVENOUS at 13:18

## 2024-09-21 RX ADMIN — PANTOPRAZOLE SODIUM 40 MILLIGRAM(S): 40 TABLET, DELAYED RELEASE ORAL at 05:49

## 2024-09-21 RX ADMIN — Medication 2 TABLET(S): at 21:31

## 2024-09-21 RX ADMIN — TRAMADOL HYDROCHLORIDE 50 MILLIGRAM(S): 50 TABLET, COATED ORAL at 16:05

## 2024-09-21 RX ADMIN — TRAMADOL HYDROCHLORIDE 50 MILLIGRAM(S): 50 TABLET, COATED ORAL at 04:13

## 2024-09-21 NOTE — PROGRESS NOTE ADULT - SUBJECTIVE AND OBJECTIVE BOX
Transplant Surgery - Multidisciplinary Rounds  --------------------------------------------------------------  s/p umbilical hernia repair w mesh     Date:   9/17/24      POD#4    Present:   Patient seen and examined with multidisciplinary Transplant team including Surgeon Hepatologist ACP  Pharmacist,   and bedside RN during AM rounds.   Disciplines not in attendance will be notified of the plan.     HPI: 64F with PMHx of breast ca s/p lumpectomy, HTN and decompensated ETOH cirrhosis presented to the ED on 9/17 for worsening umbilical hernia pain, nausea, and vomiting from incarcerated umbilical hernia. Patient admits to worsening pain starting at midnight on 9/17, along with several episodes of nonbloody emesis. Patient states she has had no bowel movements and has not been able to keep any food down. Pt admits to hernia increasing in size, denies chest pain, SOB, dizziness weakness, changes in mental status.    Interval Events:  - S/P lasix 40mg iv   - Afebrile, VSS    Review of systems  All other systems were reviewed and are negative, except as noted.      MEDICATIONS  (STANDING):  chlorhexidine 2% Cloths 1 Application(s) Topical daily  influenza   Vaccine 0.5 milliLiter(s) IntraMuscular once  pantoprazole    Tablet 40 milliGRAM(s) Oral before breakfast  polyethylene glycol 3350 17 Gram(s) Oral daily  senna 2 Tablet(s) Oral at bedtime    MEDICATIONS  (PRN):  ondansetron Injectable 4 milliGRAM(s) IV Push every 6 hours PRN Nausea  traMADol 50 milliGRAM(s) Oral every 4 hours PRN Moderate Pain (4 - 6)      PAST MEDICAL & SURGICAL HISTORY:  Breast cancer, left      Mild alcohol use disorder      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      GERD (gastroesophageal reflux disease)      Skin cancer, basal cell      H/O lumpectomy      History of removal of skin mole      H/O ganglion cyst          Vital Signs Last 24 Hrs  T(C): 37 (21 Sep 2024 00:40), Max: 37 (21 Sep 2024 00:40)  T(F): 98.6 (21 Sep 2024 00:40), Max: 98.6 (21 Sep 2024 00:40)  HR: 80 (21 Sep 2024 00:40) (71 - 87)  BP: 107/55 (21 Sep 2024 00:40) (100/61 - 115/58)  BP(mean): 74 (21 Sep 2024 00:40) (74 - 80)  RR: 16 (21 Sep 2024 00:40) (16 - 18)  SpO2: 97% (21 Sep 2024 00:40) (95% - 98%)    Parameters below as of 21 Sep 2024 00:40  Patient On (Oxygen Delivery Method): room air        I&O's Summary    19 Sep 2024 07:01  -  20 Sep 2024 07:00  --------------------------------------------------------  IN: 1180 mL / OUT: 2850 mL / NET: -1670 mL    20 Sep 2024 07:01  -  21 Sep 2024 02:18  --------------------------------------------------------  IN: 240 mL / OUT: 1600 mL / NET: -1360 mL                              7.8    4.53  )-----------( 53       ( 20 Sep 2024 05:48 )             24.1     09-20    134[L]  |  99  |  38[H]  ----------------------------<  98  4.1   |  21[L]  |  1.40[H]    Ca    8.9      20 Sep 2024 05:48  Phos  2.3     09-20  Mg     2.4     09-20    TPro  7.0  /  Alb  4.1  /  TBili  2.3[H]  /  DBili  x   /  AST  33  /  ALT  16  /  AlkPhos  71  09-20          PHYSICAL EXAM:  Constitutional: Well developed / well nourished  Eyes: PERRLA  ENMT: nc/at, no thrush  Neck: supple,   Respiratory: CTA B/L  Cardiovascular: RRR  Gastrointestinal: Soft abdomen, ND, appropriate incisional TTP. incision c/d/i, SQ UBALDO with serosanguinous drainage, intraabdominal UBALDO with ascites/serous fluid  Genitourinary: Voiding spontaneously   Extremities: SCD's in place and working bilaterally  Vascular: Palpable dp pulses bilaterally.   Neurological: A&O x4  Skin: no rashes, ulcerations, lesions  Musculoskeletal: Moving all extremities  Psychiatric: Responsive      Transplant Surgery - Multidisciplinary Rounds  --------------------------------------------------------------  s/p umbilical hernia repair w mesh     Date:   9/17/24      POD#4    Present:   Patient seen and examined with multidisciplinary Transplant team including Surgeon: Dr. Hull Hepatologist Dr. Lane ACP: EDNA Weller  Pharmacist,   and bedside RN during AM rounds.   Disciplines not in attendance will be notified of the plan.     HPI: 64F with PMHx of breast ca s/p lumpectomy, HTN and decompensated ETOH cirrhosis presented to the ED on 9/17 for worsening umbilical hernia pain, nausea, and vomiting from incarcerated umbilical hernia. Patient admits to worsening pain starting at midnight on 9/17, along with several episodes of nonbloody emesis. Patient states she has had no bowel movements and has not been able to keep any food down. Pt admits to hernia increasing in size, denies chest pain, SOB, dizziness weakness, changes in mental status.    Interval Events:  - S/P lasix 40mg iv x2, UOP 1.5 L  - UBALDO #2 deep with high output  - Afebrile, VSS    Review of systems  All other systems were reviewed and are negative, except as noted.        MEDICATIONS  (STANDING):  chlorhexidine 2% Cloths 1 Application(s) Topical daily  furosemide    Tablet 40 milliGRAM(s) Oral daily  heparin   Injectable 5000 Unit(s) SubCutaneous every 12 hours  influenza   Vaccine 0.5 milliLiter(s) IntraMuscular once  pantoprazole    Tablet 40 milliGRAM(s) Oral before breakfast  polyethylene glycol 3350 17 Gram(s) Oral daily  senna 2 Tablet(s) Oral at bedtime  spironolactone 100 milliGRAM(s) Oral daily    MEDICATIONS  (PRN):  ondansetron Injectable 4 milliGRAM(s) IV Push every 6 hours PRN Nausea  traMADol 50 milliGRAM(s) Oral every 4 hours PRN Moderate Pain (4 - 6)      PAST MEDICAL & SURGICAL HISTORY:  Breast cancer, left      Mild alcohol use disorder      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      GERD (gastroesophageal reflux disease)      Skin cancer, basal cell      H/O lumpectomy      History of removal of skin mole      H/O ganglion cyst          Vital Signs Last 24 Hrs  T(C): 36.4 (21 Sep 2024 17:00), Max: 37 (21 Sep 2024 00:40)  T(F): 97.6 (21 Sep 2024 17:00), Max: 98.6 (21 Sep 2024 00:40)  HR: 79 (21 Sep 2024 17:00) (76 - 80)  BP: 107/63 (21 Sep 2024 17:00) (101/61 - 108/69)  BP(mean): 74 (21 Sep 2024 00:40) (74 - 74)  RR: 18 (21 Sep 2024 17:00) (16 - 18)  SpO2: 99% (21 Sep 2024 17:00) (97% - 99%)    Parameters below as of 21 Sep 2024 17:00  Patient On (Oxygen Delivery Method): room air        I&O's Summary    20 Sep 2024 07:01  -  21 Sep 2024 07:00  --------------------------------------------------------  IN: 720 mL / OUT: 2010 mL / NET: -1290 mL    21 Sep 2024 07:01  -  21 Sep 2024 18:19  --------------------------------------------------------  IN: 0 mL / OUT: 500 mL / NET: -500 mL                              7.6    3.97  )-----------( 54       ( 21 Sep 2024 06:27 )             23.7     09-21    132[L]  |  101  |  40[H]  ----------------------------<  100[H]  4.0   |  21[L]  |  1.33[H]    Ca    8.5      21 Sep 2024 06:34  Phos  2.9     09-21  Mg     2.3     09-21    TPro  6.5  /  Alb  3.7  /  TBili  2.2[H]  /  DBili  x   /  AST  30  /  ALT  14  /  AlkPhos  79  09-21          PHYSICAL EXAM:  Constitutional: Well developed / well nourished  Eyes: PERRLA  ENMT: nc/at, no thrush  Neck: supple,   Respiratory: CTA B/L  Cardiovascular: RRR  Gastrointestinal: Soft abdomen, ND, appropriate incisional TTP. incision c/d/i, SQ UBLADO with serosanguinous drainage, intraabdominal UBALDO with ascites/serous fluid  Genitourinary: Voiding spontaneously   Extremities: SCD's in place and working bilaterally  Vascular: Palpable dp pulses bilaterally.   Neurological: A&O x4  Skin: no rashes, ulcerations, lesions  Musculoskeletal: Moving all extremities  Psychiatric: Responsive

## 2024-09-21 NOTE — PROGRESS NOTE ADULT - ASSESSMENT
64F with PMHx of breast ca s/p lumpectomy, HTN and decompensated ETOH cirrhosis presented to the ED on 9/17 for worsening umbilical hernia pain, nausea, and vomiting from incarcerated umbilical hernia now s/p UHR w mesh on 9/17.    [ ] Umbilical Hernia Repair with Mesh (POD#3)  -drain UBALDO q6H - s/p Albumin repletion   - Regular diet   - strict I&Os  - daily labs  - SCDs/spirometry/PT  - bowel regimen  - Lasix 40mg iv x1  - restart home diuretics when BP permits   64F with PMHx of breast ca s/p lumpectomy, HTN and decompensated ETOH cirrhosis presented to the ED on 9/17 for worsening umbilical hernia pain, nausea, and vomiting from incarcerated umbilical hernia now s/p UHR w mesh on 9/17.    [ ] Umbilical Hernia Repair with Mesh (POD#4)  -drain UBALDO q6H - s/p Albumin repletion   - Regular diet   - strict I&Os  - daily labs  - SCDs/spirometry/PT/SQH  - bowel regimen  - Lasix 40mg iv x1  - restart home diuretics   - UBALDO #2 deep clamped

## 2024-09-22 ENCOUNTER — NON-APPOINTMENT (OUTPATIENT)
Age: 64
End: 2024-09-22

## 2024-09-22 LAB
ALBUMIN SERPL ELPH-MCNC: 3.6 G/DL — SIGNIFICANT CHANGE UP (ref 3.3–5)
ALP SERPL-CCNC: 128 U/L — HIGH (ref 40–120)
ALT FLD-CCNC: 16 U/L — SIGNIFICANT CHANGE UP (ref 10–45)
ANION GAP SERPL CALC-SCNC: 12 MMOL/L — SIGNIFICANT CHANGE UP (ref 5–17)
APTT BLD: 33.6 SEC — SIGNIFICANT CHANGE UP (ref 24.5–35.6)
AST SERPL-CCNC: 33 U/L — SIGNIFICANT CHANGE UP (ref 10–40)
BASOPHILS # BLD AUTO: 0.01 K/UL — SIGNIFICANT CHANGE UP (ref 0–0.2)
BASOPHILS NFR BLD AUTO: 0.3 % — SIGNIFICANT CHANGE UP (ref 0–2)
BILIRUB SERPL-MCNC: 1.9 MG/DL — HIGH (ref 0.2–1.2)
BUN SERPL-MCNC: 37 MG/DL — HIGH (ref 7–23)
CALCIUM SERPL-MCNC: 8.6 MG/DL — SIGNIFICANT CHANGE UP (ref 8.4–10.5)
CHLORIDE SERPL-SCNC: 101 MMOL/L — SIGNIFICANT CHANGE UP (ref 96–108)
CO2 SERPL-SCNC: 21 MMOL/L — LOW (ref 22–31)
CREAT SERPL-MCNC: 1.23 MG/DL — SIGNIFICANT CHANGE UP (ref 0.5–1.3)
EGFR: 50 ML/MIN/1.73M2 — LOW
EOSINOPHIL # BLD AUTO: 0.39 K/UL — SIGNIFICANT CHANGE UP (ref 0–0.5)
EOSINOPHIL NFR BLD AUTO: 9.8 % — HIGH (ref 0–6)
GLUCOSE SERPL-MCNC: 109 MG/DL — HIGH (ref 70–99)
HCT VFR BLD CALC: 25.4 % — LOW (ref 34.5–45)
HGB BLD-MCNC: 8.2 G/DL — LOW (ref 11.5–15.5)
IMM GRANULOCYTES NFR BLD AUTO: 0.3 % — SIGNIFICANT CHANGE UP (ref 0–0.9)
INR BLD: 1.4 RATIO — HIGH (ref 0.85–1.18)
LYMPHOCYTES # BLD AUTO: 0.75 K/UL — LOW (ref 1–3.3)
LYMPHOCYTES # BLD AUTO: 18.8 % — SIGNIFICANT CHANGE UP (ref 13–44)
MAGNESIUM SERPL-MCNC: 2.2 MG/DL — SIGNIFICANT CHANGE UP (ref 1.6–2.6)
MCHC RBC-ENTMCNC: 32.3 GM/DL — SIGNIFICANT CHANGE UP (ref 32–36)
MCHC RBC-ENTMCNC: 32.3 PG — SIGNIFICANT CHANGE UP (ref 27–34)
MCV RBC AUTO: 100 FL — SIGNIFICANT CHANGE UP (ref 80–100)
MONOCYTES # BLD AUTO: 0.39 K/UL — SIGNIFICANT CHANGE UP (ref 0–0.9)
MONOCYTES NFR BLD AUTO: 9.8 % — SIGNIFICANT CHANGE UP (ref 2–14)
NEUTROPHILS # BLD AUTO: 2.43 K/UL — SIGNIFICANT CHANGE UP (ref 1.8–7.4)
NEUTROPHILS NFR BLD AUTO: 61 % — SIGNIFICANT CHANGE UP (ref 43–77)
NRBC # BLD: 0 /100 WBCS — SIGNIFICANT CHANGE UP (ref 0–0)
PHOSPHATE SERPL-MCNC: 3 MG/DL — SIGNIFICANT CHANGE UP (ref 2.5–4.5)
PLATELET # BLD AUTO: 60 K/UL — LOW (ref 150–400)
POTASSIUM SERPL-MCNC: 4.1 MMOL/L — SIGNIFICANT CHANGE UP (ref 3.5–5.3)
POTASSIUM SERPL-SCNC: 4.1 MMOL/L — SIGNIFICANT CHANGE UP (ref 3.5–5.3)
PROT SERPL-MCNC: 6.6 G/DL — SIGNIFICANT CHANGE UP (ref 6–8.3)
PROTHROM AB SERPL-ACNC: 15.3 SEC — HIGH (ref 9.5–13)
RBC # BLD: 2.54 M/UL — LOW (ref 3.8–5.2)
RBC # FLD: 16.1 % — HIGH (ref 10.3–14.5)
SODIUM SERPL-SCNC: 134 MMOL/L — LOW (ref 135–145)
UUN UR-MCNC: 613 MG/DL — SIGNIFICANT CHANGE UP
WBC # BLD: 3.98 K/UL — SIGNIFICANT CHANGE UP (ref 3.8–10.5)
WBC # FLD AUTO: 3.98 K/UL — SIGNIFICANT CHANGE UP (ref 3.8–10.5)

## 2024-09-22 RX ADMIN — TRAMADOL HYDROCHLORIDE 50 MILLIGRAM(S): 50 TABLET, COATED ORAL at 23:56

## 2024-09-22 RX ADMIN — TRAMADOL HYDROCHLORIDE 50 MILLIGRAM(S): 50 TABLET, COATED ORAL at 15:09

## 2024-09-22 RX ADMIN — FUROSEMIDE 40 MILLIGRAM(S): 10 INJECTION INTRAVENOUS at 05:08

## 2024-09-22 RX ADMIN — Medication 5000 UNIT(S): at 17:53

## 2024-09-22 RX ADMIN — Medication 17 GRAM(S): at 12:09

## 2024-09-22 RX ADMIN — TRAMADOL HYDROCHLORIDE 50 MILLIGRAM(S): 50 TABLET, COATED ORAL at 06:07

## 2024-09-22 RX ADMIN — CHLORHEXIDINE GLUCONATE ORAL RINSE 1 APPLICATION(S): 1.2 SOLUTION DENTAL at 12:09

## 2024-09-22 RX ADMIN — TRAMADOL HYDROCHLORIDE 50 MILLIGRAM(S): 50 TABLET, COATED ORAL at 04:28

## 2024-09-22 RX ADMIN — TRAMADOL HYDROCHLORIDE 50 MILLIGRAM(S): 50 TABLET, COATED ORAL at 18:51

## 2024-09-22 RX ADMIN — SPIRONOLACTONE 100 MILLIGRAM(S): 100 TABLET, FILM COATED ORAL at 05:07

## 2024-09-22 RX ADMIN — Medication 5000 UNIT(S): at 05:06

## 2024-09-22 RX ADMIN — PANTOPRAZOLE SODIUM 40 MILLIGRAM(S): 40 TABLET, DELAYED RELEASE ORAL at 05:07

## 2024-09-22 RX ADMIN — TRAMADOL HYDROCHLORIDE 50 MILLIGRAM(S): 50 TABLET, COATED ORAL at 22:56

## 2024-09-22 RX ADMIN — TRAMADOL HYDROCHLORIDE 50 MILLIGRAM(S): 50 TABLET, COATED ORAL at 14:09

## 2024-09-22 RX ADMIN — Medication 2 TABLET(S): at 21:08

## 2024-09-22 RX ADMIN — TRAMADOL HYDROCHLORIDE 50 MILLIGRAM(S): 50 TABLET, COATED ORAL at 05:07

## 2024-09-22 NOTE — DISCHARGE NOTE PROVIDER - CARE PROVIDER_API CALL
Aashish Hull  Surgery  [address] [place] [id number]  Phone: [phone]  Fax: [phone]  Follow Up Time:

## 2024-09-22 NOTE — DISCHARGE NOTE PROVIDER - NSDCMRMEDTOKEN_GEN_ALL_CORE_FT
furosemide 40 mg oral tablet: 1 tab(s) orally once a day  pantoprazole 40 mg oral delayed release tablet: 1 tab(s) orally once a day (before a meal)  senna leaf extract oral tablet: 2 tab(s) orally once a day (at bedtime)  spironolactone 25 mg oral tablet: 4 tab(s) orally once a day   Aldactone 100 mg oral tablet: 1 tab(s) orally once a day  furosemide 40 mg oral tablet: 1 tab(s) orally 2 times a day  pantoprazole 40 mg oral delayed release tablet: 1 tab(s) orally once a day (before a meal)  polyethylene glycol 3350 oral powder for reconstitution: 17 gram(s) orally 2 times a day  senna leaf extract oral tablet: 2 tab(s) orally once a day (at bedtime)

## 2024-09-22 NOTE — DISCHARGE NOTE PROVIDER - NSDCCPCAREPLAN_GEN_ALL_CORE_FT
PRINCIPAL DISCHARGE DIAGNOSIS  Diagnosis: Umbilical hernia  Assessment and Plan of Treatment: You underwent umbilical hernia repair on 9/17/24.   - Avoid heavy lifting anything over 5lbs for six weeks following your surgery date.  Avoid straining, use stool softeners as needed. Stop stool softeners if diarrhea develops.   - Call transplant clinic ([phone]) if you develop fever (over 100.4F), increased abdominal pain, redness/swelling or bleeding around your incision site  - Call transplant clinic if you have difficulty urinating, notice a decrease in urine output or blood in urine.   - Bathing: Shower is allowed, you can let soap and water flow over your incision; do NOT rub the area. Bath is NOT allowed until your incision is healed and you have been cleared by your transplant surgeon.         SECONDARY DISCHARGE DIAGNOSES  Diagnosis: Liver cirrhosis  Assessment and Plan of Treatment: Contact our Transplant clinic at [phone], return to our Emergency Department or NOTIFY YOUR HEPATOLOGIST IF:  You have any bleeding that does not stop, any fever (over 100.4 F) or chills, persistent nausea/vomiting with inability to tolerate food or liquids, persistent diarrhea, and/or if you develop severe abdominal pain, new and/or worsening jaundice, confusion, any signs of bleeding, any urinary changes.

## 2024-09-22 NOTE — PROGRESS NOTE ADULT - SUBJECTIVE AND OBJECTIVE BOX
Transplant Surgery - Multidisciplinary Rounds  --------------------------------------------------------------  s/p umbilical hernia repair w mesh     Date:   9/17/24      POD#5     Present:   Patient seen and examined with multidisciplinary Transplant team including Surgeon, Hepatologist,  ACP and bedside RN during AM rounds.   Disciplines not in attendance will be notified of the plan.     HPI: 64F with PMHx of breast ca s/p lumpectomy, HTN and decompensated ETOH cirrhosis presented to the ED on 9/17 for worsening umbilical hernia pain, nausea, and vomiting from incarcerated umbilical hernia. Patient admits to worsening pain starting at midnight on 9/17, along with several episodes of nonbloody emesis. Patient states she has had no bowel movements and has not been able to keep any food down. Pt admits to hernia increasing in size, denies chest pain, SOB, dizziness weakness, changes in mental status.    Interval Events:  - Afebrile, VSS  - UBALDO#2 clamped  - resumed home diuretics     Review of systems  All other systems were reviewed and are negative, except as noted.      MEDICATIONS  (STANDING):  chlorhexidine 2% Cloths 1 Application(s) Topical daily  furosemide    Tablet 40 milliGRAM(s) Oral daily  heparin   Injectable 5000 Unit(s) SubCutaneous every 12 hours  influenza   Vaccine 0.5 milliLiter(s) IntraMuscular once  pantoprazole    Tablet 40 milliGRAM(s) Oral before breakfast  polyethylene glycol 3350 17 Gram(s) Oral daily  senna 2 Tablet(s) Oral at bedtime  spironolactone 100 milliGRAM(s) Oral daily    MEDICATIONS  (PRN):  ondansetron Injectable 4 milliGRAM(s) IV Push every 6 hours PRN Nausea  traMADol 50 milliGRAM(s) Oral every 4 hours PRN Moderate Pain (4 - 6)      PAST MEDICAL & SURGICAL HISTORY:  Breast cancer, left      Mild alcohol use disorder      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      GERD (gastroesophageal reflux disease)      Skin cancer, basal cell      H/O lumpectomy      History of removal of skin mole      H/O ganglion cyst          Vital Signs Last 24 Hrs  T(C): 36.8 (22 Sep 2024 04:59), Max: 36.8 (21 Sep 2024 21:18)  T(F): 98.3 (22 Sep 2024 04:59), Max: 98.3 (21 Sep 2024 21:18)  HR: 73 (22 Sep 2024 04:59) (73 - 80)  BP: 100/60 (22 Sep 2024 04:59) (100/60 - 108/69)  BP(mean): --  RR: 18 (22 Sep 2024 04:59) (18 - 18)  SpO2: 97% (22 Sep 2024 04:59) (97% - 99%)    Parameters below as of 22 Sep 2024 04:59  Patient On (Oxygen Delivery Method): room air        I&O's Summary    20 Sep 2024 07:01  -  21 Sep 2024 07:00  --------------------------------------------------------  IN: 720 mL / OUT: 2010 mL / NET: -1290 mL    21 Sep 2024 07:01  -  22 Sep 2024 06:05  --------------------------------------------------------  IN: 180 mL / OUT: 825 mL / NET: -645 mL                              7.6    3.97  )-----------( 54       ( 21 Sep 2024 06:27 )             23.7     09-21    132[L]  |  101  |  40[H]  ----------------------------<  100[H]  4.0   |  21[L]  |  1.33[H]    Ca    8.5      21 Sep 2024 06:34  Phos  2.9     09-21  Mg     2.3     09-21    TPro  6.5  /  Alb  3.7  /  TBili  2.2[H]  /  DBili  x   /  AST  30  /  ALT  14  /  AlkPhos  79  09-21        PHYSICAL EXAM:  Constitutional: Well developed / well nourished  Eyes: PERRLA  ENMT: nc/at, no thrush  Neck: supple,   Respiratory: CTA B/L  Cardiovascular: RRR  Gastrointestinal: Soft abdomen, ND, appropriate incisional TTP. incision c/d/i, SQ UBALDO with serosanguinous drainage, intraabdominal UBALDO - clamped   Genitourinary: Voiding spontaneously   Extremities: SCD's in place and working bilaterally  Vascular: Palpable dp pulses bilaterally.   Neurological: A&O x4  Skin: no rashes, ulcerations, lesions  Musculoskeletal: Moving all extremities  Psychiatric: Responsive      Transplant Surgery - Multidisciplinary Rounds  --------------------------------------------------------------  s/p umbilical hernia repair w mesh     Date:   9/17/24      POD#5     Present:   Patient seen and examined with multidisciplinary Transplant team including Surgeon, Hepatologist,  CARA Emerson and bedside RN during AM rounds.   Disciplines not in attendance will be notified of the plan.     HPI: 64F with PMHx of breast ca s/p lumpectomy, HTN and decompensated ETOH cirrhosis presented to the ED on 9/17 for worsening umbilical hernia pain, nausea, and vomiting from incarcerated umbilical hernia. Patient admits to worsening pain starting at midnight on 9/17, along with several episodes of nonbloody emesis. Patient states she has had no bowel movements and has not been able to keep any food down. Pt admits to hernia increasing in size, denies chest pain, SOB, dizziness weakness, changes in mental status.    Interval Events:  - Afebrile, VSS  - UBALDO#2 clamped  - resumed home diuretics     Review of systems  All other systems were reviewed and are negative, except as noted.       MEDICATIONS  (STANDING):  chlorhexidine 2% Cloths 1 Application(s) Topical daily  furosemide    Tablet 40 milliGRAM(s) Oral daily  heparin   Injectable 5000 Unit(s) SubCutaneous every 12 hours  influenza   Vaccine 0.5 milliLiter(s) IntraMuscular once  pantoprazole    Tablet 40 milliGRAM(s) Oral before breakfast  polyethylene glycol 3350 17 Gram(s) Oral daily  senna 2 Tablet(s) Oral at bedtime  spironolactone 100 milliGRAM(s) Oral daily    MEDICATIONS  (PRN):  ondansetron Injectable 4 milliGRAM(s) IV Push every 6 hours PRN Nausea  traMADol 50 milliGRAM(s) Oral every 4 hours PRN Moderate Pain (4 - 6)      PAST MEDICAL & SURGICAL HISTORY:  Breast cancer, left      Mild alcohol use disorder      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      GERD (gastroesophageal reflux disease)      Skin cancer, basal cell      H/O lumpectomy      History of removal of skin mole      H/O ganglion cyst          Vital Signs Last 24 Hrs  T(C): 36.9 (22 Sep 2024 17:00), Max: 36.9 (22 Sep 2024 13:00)  T(F): 98.4 (22 Sep 2024 17:00), Max: 98.5 (22 Sep 2024 13:00)  HR: 71 (22 Sep 2024 17:00) (71 - 95)  BP: 112/57 (22 Sep 2024 17:00) (100/60 - 112/57)  BP(mean): --  RR: 18 (22 Sep 2024 17:00) (18 - 18)  SpO2: 98% (22 Sep 2024 17:00) (96% - 99%)    Parameters below as of 22 Sep 2024 17:00  Patient On (Oxygen Delivery Method): room air        I&O's Summary    21 Sep 2024 07:01  -  22 Sep 2024 07:00  --------------------------------------------------------  IN: 300 mL / OUT: 830 mL / NET: -530 mL                              8.2    3.98  )-----------( 60       ( 22 Sep 2024 06:21 )             25.4     09-22    134[L]  |  101  |  37[H]  ----------------------------<  109[H]  4.1   |  21[L]  |  1.23    Ca    8.6      22 Sep 2024 06:21  Phos  3.0     09-22  Mg     2.2     09-22    TPro  6.6  /  Alb  3.6  /  TBili  1.9[H]  /  DBili  x   /  AST  33  /  ALT  16  /  AlkPhos  128[H]  09-22                    PHYSICAL EXAM:  Constitutional: Well developed / well nourished  Eyes: PERRLA  ENMT: nc/at, no thrush  Neck: supple,   Respiratory: CTA B/L  Cardiovascular: RRR  Gastrointestinal: Soft abdomen, ND, appropriate incisional TTP. incision c/d/i, SQ UBALDO with serosanguinous drainage, intraabdominal UBALDO - clamped   Genitourinary: Voiding spontaneously   Extremities: SCD's in place and working bilaterally  Vascular: Palpable dp pulses bilaterally.   Neurological: A&O x4  Skin: no rashes, ulcerations, lesions  Musculoskeletal: Moving all extremities  Psychiatric: Responsive

## 2024-09-22 NOTE — DISCHARGE NOTE PROVIDER - HOSPITAL COURSE
64F with PMHx of breast ca s/p lumpectomy, HTN and decompensated ETOH cirrhosis (listed for [facility] outpatient) presented to the ED on 9/17 for worsening umbilical hernia pain, nausea, and vomiting from incarcerated umbilical hernia now s/p Umbilical Hernia Repair with Mesh on 9/17/24. Patient progressed well from surgical perspective. Tolerated regular diet, corrales discontinued, and passed trial of void. Ambulated with PT and had good bowel function. She was followed by transplant team and deemed stable for discharge with the following plan:     [ ] Umbilical Hernia Repair with Mesh   - 2 UBALDO drains ----  - s/p Albumin repletion   - Regular diet   - strict I&Os  - bowel regimen  - c/w lasix 40/ spironolactone 100       [] Dispo  - PT - Home PT      64F with PMHx of breast ca s/p lumpectomy, HTN and decompensated ETOH cirrhosis (listed for [facility] outpatient) presented to the ED on 9/17 for worsening umbilical hernia pain, nausea, and vomiting from incarcerated umbilical hernia now s/p Umbilical Hernia Repair with Mesh on 9/17/24. Patient progressed well from surgical perspective. Tolerated regular diet, corrales discontinued, and passed trial of void. Ambulated with PT and had good bowel function. She was followed by transplant team and deemed stable for discharge with the following plan:     [ ] Umbilical Hernia Repair with Mesh   - Regular diet   - bowel regimen  - c/w lasix 40BID/ spironolactone 100       [] Dispo  - PT - Home PT   - f/u in clinic on 10/2

## 2024-09-22 NOTE — DISCHARGE NOTE PROVIDER - NSDCFUSCHEDAPPT_GEN_ALL_CORE_FT
Rene Hand  [facility]  HEPATOLOGY [address]   Scheduled Appointment: 10/02/2024    [facility]  ECHOCARD [address]  Scheduled Appointment: 10/04/2024    Aashish Hull  [facility]  TRANSPLANT [address]   Scheduled Appointment: 11/07/2024

## 2024-09-22 NOTE — PROGRESS NOTE ADULT - ASSESSMENT
64F with PMHx of breast ca s/p lumpectomy, HTN and decompensated ETOH cirrhosis presented to the ED on 9/17 for worsening umbilical hernia pain, nausea, and vomiting from incarcerated umbilical hernia now s/p UHR w mesh on 9/17.    [ ] Umbilical Hernia Repair with Mesh (POD#5)  -drain UBALDO q6H - s/p Albumin repletion   - Regular diet   - strict I&Os  - daily labs  - SCDs/spirometry/PT/SQH  - bowel regimen  - c/w lasix 40/ spironolactone 100   - UBALDO #2 deep clamped

## 2024-09-23 ENCOUNTER — TRANSCRIPTION ENCOUNTER (OUTPATIENT)
Age: 64
End: 2024-09-23

## 2024-09-23 LAB
ALBUMIN SERPL ELPH-MCNC: 3.6 G/DL — SIGNIFICANT CHANGE UP (ref 3.3–5)
ALP SERPL-CCNC: 104 U/L — SIGNIFICANT CHANGE UP (ref 40–120)
ALT FLD-CCNC: 18 U/L — SIGNIFICANT CHANGE UP (ref 10–45)
ANION GAP SERPL CALC-SCNC: 14 MMOL/L — SIGNIFICANT CHANGE UP (ref 5–17)
APTT BLD: 31.7 SEC — SIGNIFICANT CHANGE UP (ref 24.5–35.6)
AST SERPL-CCNC: 33 U/L — SIGNIFICANT CHANGE UP (ref 10–40)
BASOPHILS # BLD AUTO: 0.02 K/UL — SIGNIFICANT CHANGE UP (ref 0–0.2)
BASOPHILS NFR BLD AUTO: 0.5 % — SIGNIFICANT CHANGE UP (ref 0–2)
BILIRUB SERPL-MCNC: 2.2 MG/DL — HIGH (ref 0.2–1.2)
BUN SERPL-MCNC: 36 MG/DL — HIGH (ref 7–23)
CALCIUM SERPL-MCNC: 8.7 MG/DL — SIGNIFICANT CHANGE UP (ref 8.4–10.5)
CHLORIDE SERPL-SCNC: 102 MMOL/L — SIGNIFICANT CHANGE UP (ref 96–108)
CO2 SERPL-SCNC: 20 MMOL/L — LOW (ref 22–31)
CREAT SERPL-MCNC: 1.2 MG/DL — SIGNIFICANT CHANGE UP (ref 0.5–1.3)
EGFR: 51 ML/MIN/1.73M2 — LOW
EOSINOPHIL # BLD AUTO: 0.31 K/UL — SIGNIFICANT CHANGE UP (ref 0–0.5)
EOSINOPHIL NFR BLD AUTO: 8.4 % — HIGH (ref 0–6)
GLUCOSE SERPL-MCNC: 92 MG/DL — SIGNIFICANT CHANGE UP (ref 70–99)
HCT VFR BLD CALC: 24.8 % — LOW (ref 34.5–45)
HGB BLD-MCNC: 7.9 G/DL — LOW (ref 11.5–15.5)
IMM GRANULOCYTES NFR BLD AUTO: 0.5 % — SIGNIFICANT CHANGE UP (ref 0–0.9)
INR BLD: 1.4 RATIO — HIGH (ref 0.85–1.18)
LYMPHOCYTES # BLD AUTO: 0.73 K/UL — LOW (ref 1–3.3)
LYMPHOCYTES # BLD AUTO: 19.9 % — SIGNIFICANT CHANGE UP (ref 13–44)
MAGNESIUM SERPL-MCNC: 2.2 MG/DL — SIGNIFICANT CHANGE UP (ref 1.6–2.6)
MCHC RBC-ENTMCNC: 31.6 PG — SIGNIFICANT CHANGE UP (ref 27–34)
MCHC RBC-ENTMCNC: 31.9 GM/DL — LOW (ref 32–36)
MCV RBC AUTO: 99.2 FL — SIGNIFICANT CHANGE UP (ref 80–100)
MONOCYTES # BLD AUTO: 0.41 K/UL — SIGNIFICANT CHANGE UP (ref 0–0.9)
MONOCYTES NFR BLD AUTO: 11.2 % — SIGNIFICANT CHANGE UP (ref 2–14)
NEUTROPHILS # BLD AUTO: 2.18 K/UL — SIGNIFICANT CHANGE UP (ref 1.8–7.4)
NEUTROPHILS NFR BLD AUTO: 59.5 % — SIGNIFICANT CHANGE UP (ref 43–77)
NRBC # BLD: 0 /100 WBCS — SIGNIFICANT CHANGE UP (ref 0–0)
PHOSPHATE SERPL-MCNC: 3.2 MG/DL — SIGNIFICANT CHANGE UP (ref 2.5–4.5)
PLATELET # BLD AUTO: 58 K/UL — LOW (ref 150–400)
POTASSIUM SERPL-MCNC: 4.6 MMOL/L — SIGNIFICANT CHANGE UP (ref 3.5–5.3)
POTASSIUM SERPL-SCNC: 4.6 MMOL/L — SIGNIFICANT CHANGE UP (ref 3.5–5.3)
PROT SERPL-MCNC: 6.7 G/DL — SIGNIFICANT CHANGE UP (ref 6–8.3)
PROTHROM AB SERPL-ACNC: 14.6 SEC — HIGH (ref 9.5–13)
RBC # BLD: 2.5 M/UL — LOW (ref 3.8–5.2)
RBC # FLD: 15.9 % — HIGH (ref 10.3–14.5)
SODIUM SERPL-SCNC: 136 MMOL/L — SIGNIFICANT CHANGE UP (ref 135–145)
SURGICAL PATHOLOGY STUDY: SIGNIFICANT CHANGE UP
WBC # BLD: 3.67 K/UL — LOW (ref 3.8–10.5)
WBC # FLD AUTO: 3.67 K/UL — LOW (ref 3.8–10.5)

## 2024-09-23 RX ORDER — BISACODYL 5 MG/1
10 TABLET, COATED ORAL ONCE
Refills: 0 | Status: COMPLETED | OUTPATIENT
Start: 2024-09-23 | End: 2024-09-23

## 2024-09-23 RX ORDER — FUROSEMIDE 10 MG/ML
40 INJECTION INTRAVENOUS
Refills: 0 | Status: DISCONTINUED | OUTPATIENT
Start: 2024-09-23 | End: 2024-09-24

## 2024-09-23 RX ADMIN — TRAMADOL HYDROCHLORIDE 50 MILLIGRAM(S): 50 TABLET, COATED ORAL at 14:30

## 2024-09-23 RX ADMIN — TRAMADOL HYDROCHLORIDE 50 MILLIGRAM(S): 50 TABLET, COATED ORAL at 02:54

## 2024-09-23 RX ADMIN — TRAMADOL HYDROCHLORIDE 50 MILLIGRAM(S): 50 TABLET, COATED ORAL at 17:32

## 2024-09-23 RX ADMIN — FUROSEMIDE 40 MILLIGRAM(S): 10 INJECTION INTRAVENOUS at 05:50

## 2024-09-23 RX ADMIN — TRAMADOL HYDROCHLORIDE 50 MILLIGRAM(S): 50 TABLET, COATED ORAL at 08:40

## 2024-09-23 RX ADMIN — Medication 5000 UNIT(S): at 05:50

## 2024-09-23 RX ADMIN — FUROSEMIDE 40 MILLIGRAM(S): 10 INJECTION INTRAVENOUS at 13:30

## 2024-09-23 RX ADMIN — TRAMADOL HYDROCHLORIDE 50 MILLIGRAM(S): 50 TABLET, COATED ORAL at 18:32

## 2024-09-23 RX ADMIN — TRAMADOL HYDROCHLORIDE 50 MILLIGRAM(S): 50 TABLET, COATED ORAL at 07:40

## 2024-09-23 RX ADMIN — Medication 5000 UNIT(S): at 17:33

## 2024-09-23 RX ADMIN — TRAMADOL HYDROCHLORIDE 50 MILLIGRAM(S): 50 TABLET, COATED ORAL at 13:30

## 2024-09-23 RX ADMIN — BISACODYL 10 MILLIGRAM(S): 5 TABLET, COATED ORAL at 13:37

## 2024-09-23 RX ADMIN — CHLORHEXIDINE GLUCONATE ORAL RINSE 1 APPLICATION(S): 1.2 SOLUTION DENTAL at 13:36

## 2024-09-23 RX ADMIN — Medication 2 TABLET(S): at 21:05

## 2024-09-23 RX ADMIN — PANTOPRAZOLE SODIUM 40 MILLIGRAM(S): 40 TABLET, DELAYED RELEASE ORAL at 05:49

## 2024-09-23 RX ADMIN — TRAMADOL HYDROCHLORIDE 50 MILLIGRAM(S): 50 TABLET, COATED ORAL at 23:09

## 2024-09-23 RX ADMIN — TRAMADOL HYDROCHLORIDE 50 MILLIGRAM(S): 50 TABLET, COATED ORAL at 22:09

## 2024-09-23 RX ADMIN — TRAMADOL HYDROCHLORIDE 50 MILLIGRAM(S): 50 TABLET, COATED ORAL at 03:54

## 2024-09-23 RX ADMIN — SPIRONOLACTONE 100 MILLIGRAM(S): 100 TABLET, FILM COATED ORAL at 05:50

## 2024-09-23 NOTE — PROGRESS NOTE ADULT - ASSESSMENT
64F with PMHx of breast ca s/p lumpectomy, HTN and decompensated ETOH cirrhosis presented to the ED on 9/17 for worsening umbilical hernia pain, nausea, and vomiting from incarcerated umbilical hernia now s/p UHR w mesh on 9/17.    [ ] Umbilical Hernia Repair with Mesh (POD#6)  - s/p Albumin repletion   - Regular diet   - strict I&Os  - daily labs  - SCDs/spirometry/PT/SQH  - bowel regimen  - c/w lasix 40/ spironolactone 100   - UBALDO #2 deep clamped    [] Dispo  - PT - Home PT  64F with PMHx of breast ca s/p lumpectomy, HTN and decompensated ETOH cirrhosis presented to the ED on 9/17 for worsening umbilical hernia pain, nausea, and vomiting from incarcerated umbilical hernia now s/p UHR w mesh on 9/17.    [ ] Umbilical Hernia Repair with Mesh (POD#6)  - s/p Albumin repletion   - Regular diet   - strict I&Os  - daily labs  - SCDs/spirometry/PT/SQH  - bowel regimen  - c/w  spironolactone 100, Lasix increased to 40 BID   - dc UBALDO#2   - miralax increased to BID, dulcolax 10 suppository x1    [] Dispo  - PT - Home PT   -planning for tmrw

## 2024-09-23 NOTE — PROGRESS NOTE ADULT - SUBJECTIVE AND OBJECTIVE BOX
Transplant Surgery - Multidisciplinary Rounds  --------------------------------------------------------------  s/p umbilical hernia repair w mesh     Date:   9/17/24      POD#6    Present:   Patient seen and examined with multidisciplinary Transplant team including Surgeon, Hepatologist,  ACP, Pharmacist  and bedside RN during AM rounds.   Disciplines not in attendance will be notified of the plan.     HPI: 64F with PMHx of breast ca s/p lumpectomy, HTN and decompensated ETOH cirrhosis presented to the ED on 9/17 for worsening umbilical hernia pain, nausea, and vomiting from incarcerated umbilical hernia. Patient admits to worsening pain starting at midnight on 9/17, along with several episodes of nonbloody emesis. Patient states she has had no bowel movements and has not been able to keep any food down. Pt admits to hernia increasing in size, denies chest pain, SOB, dizziness weakness, changes in mental status.    Interval Events:  - Afebrile, VSS  - UBALDO#2 clamped    Review of systems  All other systems were reviewed and are negative, except as noted.           TX DATA HERE         PHYSICAL EXAM:  Constitutional: Well developed / well nourished  Eyes: PERRLA  ENMT: nc/at, no thrush  Neck: supple,   Respiratory: CTA B/L  Cardiovascular: RRR  Gastrointestinal: Soft abdomen, ND, appropriate incisional TTP. incision c/d/i, SQ JP1 with serosanguinous drainage, intraabdominal JP2 - clamped   Genitourinary: Voiding spontaneously   Extremities: SCD's in place and working bilaterally  Vascular: Palpable dp pulses bilaterally.   Neurological: A&O x4  Skin: no rashes, ulcerations, lesions  Musculoskeletal: Moving all extremities  Psychiatric: Responsive      Transplant Surgery - Multidisciplinary Rounds  --------------------------------------------------------------  s/p umbilical hernia repair w mesh     Date:   9/17/24      POD#6    Present:   Patient seen and examined with multidisciplinary Transplant team including Surgeon, Hepatologist,  ACP, Pharmacist  and bedside RN during AM rounds.   Disciplines not in attendance will be notified of the plan.     HPI: 64F with PMHx of breast ca s/p lumpectomy, HTN and decompensated ETOH cirrhosis presented to the ED on 9/17 for worsening umbilical hernia pain, nausea, and vomiting from incarcerated umbilical hernia. Patient admits to worsening pain starting at midnight on 9/17, along with several episodes of nonbloody emesis. Patient states she has had no bowel movements and has not been able to keep any food down. Pt admits to hernia increasing in size, denies chest pain, SOB, dizziness weakness, changes in mental status.    Interval Events:  - Afebrile, VSS  - no acute overnight events      MEDICATIONS  (STANDING):  chlorhexidine 2% Cloths 1 Application(s) Topical daily  furosemide    Tablet 40 milliGRAM(s) Oral two times a day  heparin   Injectable 5000 Unit(s) SubCutaneous every 12 hours  pantoprazole    Tablet 40 milliGRAM(s) Oral before breakfast  polyethylene glycol 3350 17 Gram(s) Oral two times a day  senna 2 Tablet(s) Oral at bedtime  spironolactone 100 milliGRAM(s) Oral daily    MEDICATIONS  (PRN):  ondansetron Injectable 4 milliGRAM(s) IV Push every 6 hours PRN Nausea  traMADol 50 milliGRAM(s) Oral every 4 hours PRN Moderate Pain (4 - 6)      PAST MEDICAL & SURGICAL HISTORY:  Breast cancer, left      Mild alcohol use disorder      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      GERD (gastroesophageal reflux disease)      Skin cancer, basal cell      H/O lumpectomy      History of removal of skin mole      H/O ganglion cyst          Vital Signs Last 24 Hrs  T(C): 36.6 (23 Sep 2024 13:00), Max: 36.9 (22 Sep 2024 17:00)  T(F): 97.8 (23 Sep 2024 13:00), Max: 98.5 (23 Sep 2024 00:48)  HR: 73 (23 Sep 2024 13:00) (71 - 85)  BP: 110/65 (23 Sep 2024 13:00) (101/57 - 114/61)  BP(mean): --  RR: 18 (23 Sep 2024 13:00) (18 - 18)  SpO2: 100% (23 Sep 2024 13:00) (95% - 100%)    Parameters below as of 23 Sep 2024 09:00  Patient On (Oxygen Delivery Method): room air        I&O's Summary    22 Sep 2024 07:01  -  23 Sep 2024 07:00  --------------------------------------------------------  IN: 720 mL / OUT: 930 mL / NET: -210 mL    23 Sep 2024 07:01  -  23 Sep 2024 13:44  --------------------------------------------------------  IN: 480 mL / OUT: 405 mL / NET: 75 mL                              7.9    3.67  )-----------( 58       ( 23 Sep 2024 06:37 )             24.8     09-23    136  |  102  |  36[H]  ----------------------------<  92  4.6   |  20[L]  |  1.20    Ca    8.7      23 Sep 2024 06:42  Phos  3.2     09-23  Mg     2.2     09-23    TPro  6.7  /  Alb  3.6  /  TBili  2.2[H]  /  DBili  x   /  AST  33  /  ALT  18  /  AlkPhos  104  09-23      Review of systems  All other systems were reviewed and are negative, except as noted.      PHYSICAL EXAM:  Constitutional: Well developed / well nourished  Eyes: PERRLA  ENMT: nc/at, no thrush  Neck: supple,   Respiratory: CTA B/L  Cardiovascular: RRR  Gastrointestinal: Soft abdomen, ND, appropriate incisional TTP. incision c/d/i, SQ JP1 with serosanguinous drainage, intraabdominal JP2 - clamped, JP1 ss    Genitourinary: Voiding spontaneously   Extremities: SCD's in place and working bilaterally  Vascular: Palpable dp pulses bilaterally.   Neurological: A&O x4  Skin: no rashes, ulcerations, lesions  Musculoskeletal: Moving all extremities  Psychiatric: Responsive  Transplant Surgery - Multidisciplinary Rounds  --------------------------------------------------------------  s/p umbilical hernia repair w mesh     Date:   9/17/24      POD#6    Present:   Patient seen and examined with multidisciplinary Transplant team including Surgeon Dr. Hagen, Hepatologist Dr. Lane,  FELIPE Plascencia/Des, Pharmacist  and bedside RN during AM rounds.   Disciplines not in attendance will be notified of the plan.     HPI: 64F with PMHx of breast ca s/p lumpectomy, HTN and decompensated ETOH cirrhosis presented to the ED on 9/17 for worsening umbilical hernia pain, nausea, and vomiting from incarcerated umbilical hernia. Patient admits to worsening pain starting at midnight on 9/17, along with several episodes of nonbloody emesis. Patient states she has had no bowel movements and has not been able to keep any food down. Pt admits to hernia increasing in size, denies chest pain, SOB, dizziness weakness, changes in mental status.    Interval Events:  - Afebrile, VSS  - no acute overnight events      MEDICATIONS  (STANDING):  chlorhexidine 2% Cloths 1 Application(s) Topical daily  furosemide    Tablet 40 milliGRAM(s) Oral two times a day  heparin   Injectable 5000 Unit(s) SubCutaneous every 12 hours  pantoprazole    Tablet 40 milliGRAM(s) Oral before breakfast  polyethylene glycol 3350 17 Gram(s) Oral two times a day  senna 2 Tablet(s) Oral at bedtime  spironolactone 100 milliGRAM(s) Oral daily    MEDICATIONS  (PRN):  ondansetron Injectable 4 milliGRAM(s) IV Push every 6 hours PRN Nausea  traMADol 50 milliGRAM(s) Oral every 4 hours PRN Moderate Pain (4 - 6)      PAST MEDICAL & SURGICAL HISTORY:  Breast cancer, left      Mild alcohol use disorder      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      GERD (gastroesophageal reflux disease)      Skin cancer, basal cell      H/O lumpectomy      History of removal of skin mole      H/O ganglion cyst          Vital Signs Last 24 Hrs  T(C): 36.6 (23 Sep 2024 13:00), Max: 36.9 (22 Sep 2024 17:00)  T(F): 97.8 (23 Sep 2024 13:00), Max: 98.5 (23 Sep 2024 00:48)  HR: 73 (23 Sep 2024 13:00) (71 - 85)  BP: 110/65 (23 Sep 2024 13:00) (101/57 - 114/61)  BP(mean): --  RR: 18 (23 Sep 2024 13:00) (18 - 18)  SpO2: 100% (23 Sep 2024 13:00) (95% - 100%)    Parameters below as of 23 Sep 2024 09:00  Patient On (Oxygen Delivery Method): room air        I&O's Summary    22 Sep 2024 07:01  -  23 Sep 2024 07:00  --------------------------------------------------------  IN: 720 mL / OUT: 930 mL / NET: -210 mL    23 Sep 2024 07:01  -  23 Sep 2024 13:44  --------------------------------------------------------  IN: 480 mL / OUT: 405 mL / NET: 75 mL                              7.9    3.67  )-----------( 58       ( 23 Sep 2024 06:37 )             24.8     09-23    136  |  102  |  36[H]  ----------------------------<  92  4.6   |  20[L]  |  1.20    Ca    8.7      23 Sep 2024 06:42  Phos  3.2     09-23  Mg     2.2     09-23    TPro  6.7  /  Alb  3.6  /  TBili  2.2[H]  /  DBili  x   /  AST  33  /  ALT  18  /  AlkPhos  104  09-23      Review of systems  All other systems were reviewed and are negative, except as noted.      PHYSICAL EXAM:  Constitutional: Well developed / well nourished  Eyes: PERRLA  ENMT: nc/at, no thrush  Neck: supple,   Respiratory: CTA B/L  Cardiovascular: RRR  Gastrointestinal: Soft abdomen, ND, appropriate incisional TTP. incision c/d/i, SQ JP1 with serosanguinous drainage, intraabdominal JP2 - clamped, JP1 ss    Genitourinary: Voiding spontaneously   Extremities: SCD's in place and working bilaterally  Vascular: Palpable dp pulses bilaterally.   Neurological: A&O x4  Skin: no rashes, ulcerations, lesions  Musculoskeletal: Moving all extremities  Psychiatric: Responsive

## 2024-09-24 ENCOUNTER — TRANSCRIPTION ENCOUNTER (OUTPATIENT)
Age: 64
End: 2024-09-24

## 2024-09-24 VITALS
HEART RATE: 72 BPM | RESPIRATION RATE: 20 BRPM | DIASTOLIC BLOOD PRESSURE: 67 MMHG | OXYGEN SATURATION: 100 % | SYSTOLIC BLOOD PRESSURE: 109 MMHG | TEMPERATURE: 98 F

## 2024-09-24 LAB
ALBUMIN SERPL ELPH-MCNC: 3.5 G/DL — SIGNIFICANT CHANGE UP (ref 3.3–5)
ALP SERPL-CCNC: 94 U/L — SIGNIFICANT CHANGE UP (ref 40–120)
ALT FLD-CCNC: 16 U/L — SIGNIFICANT CHANGE UP (ref 10–45)
ANION GAP SERPL CALC-SCNC: 12 MMOL/L — SIGNIFICANT CHANGE UP (ref 5–17)
APTT BLD: 32.3 SEC — SIGNIFICANT CHANGE UP (ref 24.5–35.6)
AST SERPL-CCNC: 31 U/L — SIGNIFICANT CHANGE UP (ref 10–40)
BASOPHILS # BLD AUTO: 0.01 K/UL — SIGNIFICANT CHANGE UP (ref 0–0.2)
BASOPHILS NFR BLD AUTO: 0.3 % — SIGNIFICANT CHANGE UP (ref 0–2)
BILIRUB SERPL-MCNC: 2.2 MG/DL — HIGH (ref 0.2–1.2)
BILIRUB SERPL-MCNC: 2.3 MG/DL — HIGH (ref 0.2–1.2)
BLD GP AB SCN SERPL QL: NEGATIVE — SIGNIFICANT CHANGE UP
BUN SERPL-MCNC: 35 MG/DL — HIGH (ref 7–23)
CALCIUM SERPL-MCNC: 8.8 MG/DL — SIGNIFICANT CHANGE UP (ref 8.4–10.5)
CHLORIDE SERPL-SCNC: 101 MMOL/L — SIGNIFICANT CHANGE UP (ref 96–108)
CO2 SERPL-SCNC: 21 MMOL/L — LOW (ref 22–31)
CREAT SERPL-MCNC: 1.28 MG/DL — SIGNIFICANT CHANGE UP (ref 0.5–1.3)
CREAT SERPL-MCNC: 1.34 MG/DL — HIGH (ref 0.5–1.3)
EGFR: 45 ML/MIN/1.73M2 — LOW
EGFR: 47 ML/MIN/1.73M2 — LOW
EOSINOPHIL # BLD AUTO: 0.34 K/UL — SIGNIFICANT CHANGE UP (ref 0–0.5)
EOSINOPHIL NFR BLD AUTO: 9.9 % — HIGH (ref 0–6)
GLUCOSE SERPL-MCNC: 86 MG/DL — SIGNIFICANT CHANGE UP (ref 70–99)
HCT VFR BLD CALC: 25.3 % — LOW (ref 34.5–45)
HGB BLD-MCNC: 8.2 G/DL — LOW (ref 11.5–15.5)
IMM GRANULOCYTES NFR BLD AUTO: 0.9 % — SIGNIFICANT CHANGE UP (ref 0–0.9)
INR BLD: 1.35 RATIO — HIGH (ref 0.85–1.18)
INR BLD: 1.38 RATIO — HIGH (ref 0.85–1.18)
LYMPHOCYTES # BLD AUTO: 0.72 K/UL — LOW (ref 1–3.3)
LYMPHOCYTES # BLD AUTO: 20.9 % — SIGNIFICANT CHANGE UP (ref 13–44)
MAGNESIUM SERPL-MCNC: 2.1 MG/DL — SIGNIFICANT CHANGE UP (ref 1.6–2.6)
MCHC RBC-ENTMCNC: 32.4 GM/DL — SIGNIFICANT CHANGE UP (ref 32–36)
MCHC RBC-ENTMCNC: 32.4 PG — SIGNIFICANT CHANGE UP (ref 27–34)
MCV RBC AUTO: 100 FL — SIGNIFICANT CHANGE UP (ref 80–100)
MELD SCORE WITH DIALYSIS: 27 POINTS — SIGNIFICANT CHANGE UP
MELD SCORE WITHOUT DIALYSIS: 17 POINTS — SIGNIFICANT CHANGE UP
MONOCYTES # BLD AUTO: 0.42 K/UL — SIGNIFICANT CHANGE UP (ref 0–0.9)
MONOCYTES NFR BLD AUTO: 12.2 % — SIGNIFICANT CHANGE UP (ref 2–14)
NEUTROPHILS # BLD AUTO: 1.92 K/UL — SIGNIFICANT CHANGE UP (ref 1.8–7.4)
NEUTROPHILS NFR BLD AUTO: 55.8 % — SIGNIFICANT CHANGE UP (ref 43–77)
NRBC # BLD: 0 /100 WBCS — SIGNIFICANT CHANGE UP (ref 0–0)
PHOSPHATE SERPL-MCNC: 3.6 MG/DL — SIGNIFICANT CHANGE UP (ref 2.5–4.5)
PLATELET # BLD AUTO: 61 K/UL — LOW (ref 150–400)
POTASSIUM SERPL-MCNC: 4.3 MMOL/L — SIGNIFICANT CHANGE UP (ref 3.5–5.3)
POTASSIUM SERPL-SCNC: 4.3 MMOL/L — SIGNIFICANT CHANGE UP (ref 3.5–5.3)
PROT SERPL-MCNC: 6.6 G/DL — SIGNIFICANT CHANGE UP (ref 6–8.3)
PROTHROM AB SERPL-ACNC: 14.7 SEC — HIGH (ref 9.5–13)
PROTHROM AB SERPL-ACNC: 15 SEC — HIGH (ref 9.5–13)
RBC # BLD: 2.53 M/UL — LOW (ref 3.8–5.2)
RBC # FLD: 15.9 % — HIGH (ref 10.3–14.5)
RH IG SCN BLD-IMP: POSITIVE — SIGNIFICANT CHANGE UP
SODIUM SERPL-SCNC: 134 MMOL/L — LOW (ref 135–145)
SODIUM SERPL-SCNC: 134 MMOL/L — LOW (ref 135–145)
WBC # BLD: 3.44 K/UL — LOW (ref 3.8–10.5)
WBC # FLD AUTO: 3.44 K/UL — LOW (ref 3.8–10.5)

## 2024-09-24 PROCEDURE — 82435 ASSAY OF BLOOD CHLORIDE: CPT

## 2024-09-24 PROCEDURE — 82565 ASSAY OF CREATININE: CPT

## 2024-09-24 PROCEDURE — 97530 THERAPEUTIC ACTIVITIES: CPT

## 2024-09-24 PROCEDURE — 83605 ASSAY OF LACTIC ACID: CPT

## 2024-09-24 PROCEDURE — 99285 EMERGENCY DEPT VISIT HI MDM: CPT

## 2024-09-24 PROCEDURE — 96374 THER/PROPH/DIAG INJ IV PUSH: CPT

## 2024-09-24 PROCEDURE — 85018 HEMOGLOBIN: CPT

## 2024-09-24 PROCEDURE — 36430 TRANSFUSION BLD/BLD COMPNT: CPT

## 2024-09-24 PROCEDURE — 86900 BLOOD TYPING SEROLOGIC ABO: CPT

## 2024-09-24 PROCEDURE — C9399: CPT

## 2024-09-24 PROCEDURE — 84132 ASSAY OF SERUM POTASSIUM: CPT

## 2024-09-24 PROCEDURE — 85610 PROTHROMBIN TIME: CPT

## 2024-09-24 PROCEDURE — 80053 COMPREHEN METABOLIC PANEL: CPT

## 2024-09-24 PROCEDURE — 82947 ASSAY GLUCOSE BLOOD QUANT: CPT

## 2024-09-24 PROCEDURE — 85730 THROMBOPLASTIN TIME PARTIAL: CPT

## 2024-09-24 PROCEDURE — 84133 ASSAY OF URINE POTASSIUM: CPT

## 2024-09-24 PROCEDURE — 83935 ASSAY OF URINE OSMOLALITY: CPT

## 2024-09-24 PROCEDURE — 82803 BLOOD GASES ANY COMBINATION: CPT

## 2024-09-24 PROCEDURE — 96361 HYDRATE IV INFUSION ADD-ON: CPT

## 2024-09-24 PROCEDURE — 84100 ASSAY OF PHOSPHORUS: CPT

## 2024-09-24 PROCEDURE — 84300 ASSAY OF URINE SODIUM: CPT

## 2024-09-24 PROCEDURE — 86850 RBC ANTIBODY SCREEN: CPT

## 2024-09-24 PROCEDURE — 85014 HEMATOCRIT: CPT

## 2024-09-24 PROCEDURE — 83735 ASSAY OF MAGNESIUM: CPT

## 2024-09-24 PROCEDURE — 84156 ASSAY OF PROTEIN URINE: CPT

## 2024-09-24 PROCEDURE — 88302 TISSUE EXAM BY PATHOLOGIST: CPT

## 2024-09-24 PROCEDURE — 82247 BILIRUBIN TOTAL: CPT

## 2024-09-24 PROCEDURE — 82570 ASSAY OF URINE CREATININE: CPT

## 2024-09-24 PROCEDURE — 84295 ASSAY OF SERUM SODIUM: CPT

## 2024-09-24 PROCEDURE — 97161 PT EVAL LOW COMPLEX 20 MIN: CPT

## 2024-09-24 PROCEDURE — 74177 CT ABD & PELVIS W/CONTRAST: CPT | Mod: MC

## 2024-09-24 PROCEDURE — 96375 TX/PRO/DX INJ NEW DRUG ADDON: CPT

## 2024-09-24 PROCEDURE — P9016: CPT

## 2024-09-24 PROCEDURE — 85027 COMPLETE CBC AUTOMATED: CPT

## 2024-09-24 PROCEDURE — 97116 GAIT TRAINING THERAPY: CPT

## 2024-09-24 PROCEDURE — 86923 COMPATIBILITY TEST ELECTRIC: CPT

## 2024-09-24 PROCEDURE — 82962 GLUCOSE BLOOD TEST: CPT

## 2024-09-24 PROCEDURE — 86901 BLOOD TYPING SEROLOGIC RH(D): CPT

## 2024-09-24 PROCEDURE — 84540 ASSAY OF URINE/UREA-N: CPT

## 2024-09-24 PROCEDURE — P9047: CPT

## 2024-09-24 PROCEDURE — 82330 ASSAY OF CALCIUM: CPT

## 2024-09-24 PROCEDURE — 36415 COLL VENOUS BLD VENIPUNCTURE: CPT

## 2024-09-24 PROCEDURE — 85025 COMPLETE CBC W/AUTO DIFF WBC: CPT

## 2024-09-24 RX ORDER — TRAMADOL HYDROCHLORIDE 50 MG/1
1 TABLET, COATED ORAL
Qty: 3 | Refills: 0
Start: 2024-09-24 | End: 2024-09-26

## 2024-09-24 RX ORDER — PANTOPRAZOLE SODIUM 40 MG/1
1 TABLET, DELAYED RELEASE ORAL
Qty: 0 | Refills: 0 | DISCHARGE
Start: 2024-09-24

## 2024-09-24 RX ORDER — FUROSEMIDE 10 MG/ML
1 INJECTION INTRAVENOUS
Qty: 60 | Refills: 11
Start: 2024-09-24 | End: 2025-09-18

## 2024-09-24 RX ORDER — SENNOSIDES 8.6 MG
2 TABLET ORAL
Qty: 0 | Refills: 0 | DISCHARGE
Start: 2024-09-24

## 2024-09-24 RX ORDER — FUROSEMIDE 10 MG/ML
1 INJECTION INTRAVENOUS
Qty: 0 | Refills: 0 | DISCHARGE
Start: 2024-09-24

## 2024-09-24 RX ORDER — SPIRONOLACTONE 100 MG/1
1 TABLET, FILM COATED ORAL
Qty: 30 | Refills: 11
Start: 2024-09-24 | End: 2025-09-18

## 2024-09-24 RX ADMIN — Medication 5000 UNIT(S): at 05:58

## 2024-09-24 RX ADMIN — TRAMADOL HYDROCHLORIDE 50 MILLIGRAM(S): 50 TABLET, COATED ORAL at 07:14

## 2024-09-24 RX ADMIN — SPIRONOLACTONE 100 MILLIGRAM(S): 100 TABLET, FILM COATED ORAL at 05:58

## 2024-09-24 RX ADMIN — TRAMADOL HYDROCHLORIDE 50 MILLIGRAM(S): 50 TABLET, COATED ORAL at 06:17

## 2024-09-24 RX ADMIN — TRAMADOL HYDROCHLORIDE 50 MILLIGRAM(S): 50 TABLET, COATED ORAL at 03:05

## 2024-09-24 RX ADMIN — PANTOPRAZOLE SODIUM 40 MILLIGRAM(S): 40 TABLET, DELAYED RELEASE ORAL at 05:58

## 2024-09-24 RX ADMIN — Medication 17 GRAM(S): at 06:00

## 2024-09-24 RX ADMIN — FUROSEMIDE 40 MILLIGRAM(S): 10 INJECTION INTRAVENOUS at 05:58

## 2024-09-24 RX ADMIN — TRAMADOL HYDROCHLORIDE 50 MILLIGRAM(S): 50 TABLET, COATED ORAL at 02:05

## 2024-09-24 NOTE — PROGRESS NOTE ADULT - REASON FOR ADMISSION
umbilical hernia repair

## 2024-09-24 NOTE — PROGRESS NOTE ADULT - PROVIDER SPECIALTY LIST ADULT
Transplant Surgery

## 2024-09-24 NOTE — DISCHARGE NOTE NURSING/CASE MANAGEMENT/SOCIAL WORK - PATIENT PORTAL LINK FT
You can access the FollowMyHealth Patient Portal offered by [facility] by registering at the following website: [email or web address]. By joining [facility]’s FollowMyHealth portal, you will also be able to view your health information using other applications (apps) compatible with our system.

## 2024-09-24 NOTE — PROGRESS NOTE ADULT - SUBJECTIVE AND OBJECTIVE BOX
Transplant Surgery - Multidisciplinary Rounds  --------------------------------------------------------------  s/p umbilical hernia repair w mesh     Date:   9/17/24      POD#7    Present:   Patient seen and examined with multidisciplinary Transplant team including Surgeon Dr. Hagen, Hepatologist Dr. Lane,  FELIPE Martinez, Pharmacist  and bedside RN during AM rounds.   Disciplines not in attendance will be notified of the plan.     HPI: 64F with PMHx of breast ca s/p lumpectomy, HTN and decompensated ETOH cirrhosis presented to the ED on 9/17 for worsening umbilical hernia pain, nausea, and vomiting from incarcerated umbilical hernia. Patient admits to worsening pain starting at midnight on 9/17, along with several episodes of nonbloody emesis. Patient states she has had no bowel movements and has not been able to keep any food down. Pt admits to hernia increasing in size, denies chest pain, SOB, dizziness weakness, changes in mental status.    Interval Events:  - Afebrile, VSS  - incr lasix to 40 BID  - dc'ed arturo #2      MEDICATIONS  (STANDING):  chlorhexidine 2% Cloths 1 Application(s) Topical daily  furosemide    Tablet 40 milliGRAM(s) Oral two times a day  heparin   Injectable 5000 Unit(s) SubCutaneous every 12 hours  pantoprazole    Tablet 40 milliGRAM(s) Oral before breakfast  polyethylene glycol 3350 17 Gram(s) Oral two times a day  senna 2 Tablet(s) Oral at bedtime  spironolactone 100 milliGRAM(s) Oral daily    MEDICATIONS  (PRN):  ondansetron Injectable 4 milliGRAM(s) IV Push every 6 hours PRN Nausea  traMADol 50 milliGRAM(s) Oral every 4 hours PRN Moderate Pain (4 - 6)      PAST MEDICAL & SURGICAL HISTORY:  Breast cancer, left      Mild alcohol use disorder      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      GERD (gastroesophageal reflux disease)      Skin cancer, basal cell      H/O lumpectomy      History of removal of skin mole      H/O ganglion cyst          Vital Signs Last 24 Hrs  T(C): 36.8 (24 Sep 2024 01:00), Max: 36.9 (23 Sep 2024 17:00)  T(F): 98.2 (24 Sep 2024 01:00), Max: 98.4 (23 Sep 2024 17:00)  HR: 82 (24 Sep 2024 01:00) (69 - 85)  BP: 119/66 (24 Sep 2024 01:00) (101/57 - 119/66)  BP(mean): --  RR: 18 (24 Sep 2024 01:00) (18 - 18)  SpO2: 98% (24 Sep 2024 01:00) (95% - 100%)    Parameters below as of 24 Sep 2024 01:00  Patient On (Oxygen Delivery Method): room air        I&O's Summary    22 Sep 2024 07:01  -  23 Sep 2024 07:00  --------------------------------------------------------  IN: 720 mL / OUT: 930 mL / NET: -210 mL    23 Sep 2024 07:01  -  24 Sep 2024 02:29  --------------------------------------------------------  IN: 720 mL / OUT: 1462 mL / NET: -742 mL                              7.9    3.67  )-----------( 58       ( 23 Sep 2024 06:37 )             24.8     09-23    136  |  102  |  36[H]  ----------------------------<  92  4.6   |  20[L]  |  1.20    Ca    8.7      23 Sep 2024 06:42  Phos  3.2     09-23  Mg     2.2     09-23    TPro  6.7  /  Alb  3.6  /  TBili  2.2[H]  /  DBili  x   /  AST  33  /  ALT  18  /  AlkPhos  104  09-23      Review of systems  All other systems were reviewed and are negative, except as noted.      PHYSICAL EXAM:  Constitutional: Well developed / well nourished  Eyes: PERRLA  ENMT: nc/at, no thrush  Neck: supple,   Respiratory: CTA B/L  Cardiovascular: RRR  Gastrointestinal: Soft abdomen, ND, appropriate incisional TTP. incision c/d/i, SQ JP1 with serosanguinous drainage, JP1 ss    Genitourinary: Voiding spontaneously   Extremities: SCD's in place and working bilaterally  Vascular: Palpable dp pulses bilaterally.   Neurological: A&O x4  Skin: no rashes, ulcerations, lesions  Musculoskeletal: Moving all extremities  Psychiatric: Responsive  Transplant Surgery - Multidisciplinary Rounds  --------------------------------------------------------------  s/p umbilical hernia repair w mesh     Date:   9/17/24      POD#7    Present:   Patient seen and examined with multidisciplinary Transplant team including Surgeon Dr. Hagen, Hepatologist EDNA Sesay, Pharmacist  and bedside RN during AM rounds.   Disciplines not in attendance will be notified of the plan.     HPI: 64F with PMHx of breast ca s/p lumpectomy, HTN and decompensated ETOH cirrhosis presented to the ED on 9/17 for worsening umbilical hernia pain, nausea, and vomiting from incarcerated umbilical hernia. Patient admits to worsening pain starting at midnight on 9/17, along with several episodes of nonbloody emesis. Patient states she has had no bowel movements and has not been able to keep any food down. Pt admits to hernia increasing in size, denies chest pain, SOB, dizziness weakness, changes in mental status.    Interval Events:  - Afebrile, VSS  - progressing well. having bowel function, diuresing well  - no o/n events      MEDICATIONS  (STANDING):  chlorhexidine 2% Cloths 1 Application(s) Topical daily  furosemide    Tablet 40 milliGRAM(s) Oral two times a day  heparin   Injectable 5000 Unit(s) SubCutaneous every 12 hours  pantoprazole    Tablet 40 milliGRAM(s) Oral before breakfast  polyethylene glycol 3350 17 Gram(s) Oral two times a day  senna 2 Tablet(s) Oral at bedtime  spironolactone 100 milliGRAM(s) Oral daily    MEDICATIONS  (PRN):  ondansetron Injectable 4 milliGRAM(s) IV Push every 6 hours PRN Nausea  traMADol 50 milliGRAM(s) Oral every 4 hours PRN Moderate Pain (4 - 6)      PAST MEDICAL & SURGICAL HISTORY:  Breast cancer, left      Mild alcohol use disorder      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      GERD (gastroesophageal reflux disease)      Skin cancer, basal cell      H/O lumpectomy      History of removal of skin mole      H/O ganglion cyst          Vital Signs Last 24 Hrs  T(C): 36.8 (24 Sep 2024 09:07), Max: 36.9 (23 Sep 2024 17:00)  T(F): 98.2 (24 Sep 2024 09:07), Max: 98.4 (23 Sep 2024 17:00)  HR: 77 (24 Sep 2024 09:07) (69 - 82)  BP: 109/65 (24 Sep 2024 09:07) (107/61 - 119/66)  BP(mean): --  RR: 20 (24 Sep 2024 09:07) (18 - 20)  SpO2: 96% (24 Sep 2024 09:07) (96% - 100%)    Parameters below as of 24 Sep 2024 09:07  Patient On (Oxygen Delivery Method): room air        I&O's Summary    23 Sep 2024 07:01  -  24 Sep 2024 07:00  --------------------------------------------------------  IN: 840 mL / OUT: 1662 mL / NET: -822 mL    24 Sep 2024 07:01  -  24 Sep 2024 11:32  --------------------------------------------------------  IN: 240 mL / OUT: 0 mL / NET: 240 mL                              8.2    3.44  )-----------( 61       ( 24 Sep 2024 06:33 )             25.3     09-24    134[L]  |  101  |  35[H]  ----------------------------<  86  4.3   |  21[L]  |  1.28    Ca    8.8      24 Sep 2024 06:27  Phos  3.6     09-24  Mg     2.1     09-24    TPro  6.6  /  Alb  3.5  /  TBili  2.2[H]  /  DBili  x   /  AST  31  /  ALT  16  /  AlkPhos  94  09-24                              Review of systems  All other systems were reviewed and are negative, except as noted.            PHYSICAL EXAM:  Constitutional: Well developed / well nourished  Eyes: PERRLA  ENMT: nc/at, no thrush  Neck: supple,   Respiratory: CTA B/L  Cardiovascular: RRR  Gastrointestinal: Soft abdomen, ND, appropriate incisional TTP. incision c/d/i, SQ JP1 with serosanguinous drainage, JP1 ss    Genitourinary: Voiding spontaneously   Extremities: SCD's in place and working bilaterally  Vascular: Palpable dp pulses bilaterally.   Neurological: A&O x4  Skin: no rashes, ulcerations, lesions  Musculoskeletal: Moving all extremities  Psychiatric: Responsive  Transplant Surgery - Multidisciplinary Rounds  --------------------------------------------------------------  s/p umbilical hernia repair w mesh     Date:   9/17/24      POD#7    Present:   Patient seen and examined with multidisciplinary Transplant team including Surgeon Dr. Hagen, Hepatologist EDNA Sesay, Pharmacist  and bedside RN during AM rounds.   Disciplines not in attendance will be notified of the plan.     HPI: 64F with PMHx of breast ca s/p lumpectomy, HTN and decompensated ETOH cirrhosis presented to the ED on 9/17 for worsening umbilical hernia pain, nausea, and vomiting from incarcerated umbilical hernia. Patient admits to worsening pain starting at midnight on 9/17, along with several episodes of nonbloody emesis. Patient states she has had no bowel movements and has not been able to keep any food down. Pt admits to hernia increasing in size, denies chest pain, SOB, dizziness weakness, changes in mental status.    Interval Events:  - Afebrile, VSS  - progressing well. having bowel function, diuresing well  - no o/n events        MEDICATIONS  (STANDING):  chlorhexidine 2% Cloths 1 Application(s) Topical daily  furosemide    Tablet 40 milliGRAM(s) Oral two times a day  heparin   Injectable 5000 Unit(s) SubCutaneous every 12 hours  pantoprazole    Tablet 40 milliGRAM(s) Oral before breakfast  polyethylene glycol 3350 17 Gram(s) Oral two times a day  senna 2 Tablet(s) Oral at bedtime  spironolactone 100 milliGRAM(s) Oral daily    MEDICATIONS  (PRN):  ondansetron Injectable 4 milliGRAM(s) IV Push every 6 hours PRN Nausea  traMADol 50 milliGRAM(s) Oral every 4 hours PRN Moderate Pain (4 - 6)      PAST MEDICAL & SURGICAL HISTORY:  Breast cancer, left      Mild alcohol use disorder      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      GERD (gastroesophageal reflux disease)      Skin cancer, basal cell      H/O lumpectomy      History of removal of skin mole      H/O ganglion cyst          Vital Signs Last 24 Hrs  T(C): 36.7 (24 Sep 2024 12:34), Max: 36.9 (23 Sep 2024 17:00)  T(F): 98.1 (24 Sep 2024 12:34), Max: 98.4 (23 Sep 2024 17:00)  HR: 72 (24 Sep 2024 12:34) (69 - 82)  BP: 109/67 (24 Sep 2024 12:34) (107/61 - 119/66)  BP(mean): --  RR: 20 (24 Sep 2024 12:34) (18 - 20)  SpO2: 100% (24 Sep 2024 12:34) (96% - 100%)    Parameters below as of 24 Sep 2024 12:34  Patient On (Oxygen Delivery Method): room air        I&O's Summary    23 Sep 2024 07:01  -  24 Sep 2024 07:00  --------------------------------------------------------  IN: 840 mL / OUT: 1662 mL / NET: -822 mL    24 Sep 2024 07:01  -  24 Sep 2024 15:24  --------------------------------------------------------  IN: 240 mL / OUT: 0 mL / NET: 240 mL                              8.2    3.44  )-----------( 61       ( 24 Sep 2024 06:33 )             25.3     09-24    134[L]  |  101  |  35[H]  ----------------------------<  86  4.3   |  21[L]  |  1.28    Ca    8.8      24 Sep 2024 06:27  Phos  3.6     09-24  Mg     2.1     09-24    TPro  6.6  /  Alb  3.5  /  TBili  2.2[H]  /  DBili  x   /  AST  31  /  ALT  16  /  AlkPhos  94  09-24      Review of systems  All other systems were reviewed and are negative, except as noted.      PHYSICAL EXAM:  Constitutional: Well developed / well nourished  Eyes: PERRLA  ENMT: nc/at, no thrush  Neck: supple,   Respiratory: CTA B/L  Cardiovascular: RRR  Gastrointestinal: Soft abdomen, ND, appropriate incisional TTP. incision c/d/i, SQ  JP1 with minimal ss drainage  Genitourinary: Voiding spontaneously   Extremities: SCD's in place and working bilaterally  Vascular: Palpable dp pulses bilaterally.   Neurological: A&O x4  Skin: no rashes, ulcerations, lesions  Musculoskeletal: Moving all extremities  Psychiatric: Responsive

## 2024-09-24 NOTE — PROGRESS NOTE ADULT - ASSESSMENT
64F with PMHx of breast ca s/p lumpectomy, HTN and decompensated ETOH cirrhosis presented to the ED on 9/17 for worsening umbilical hernia pain, nausea, and vomiting from incarcerated umbilical hernia now s/p UHR w mesh on 9/17.    [ ] Umbilical Hernia Repair with Mesh (POD#7)  - s/p Albumin repletion   - Regular diet   - strict I&Os arturo#1  - daily labs  - SCDs/spirometry/PT/SQH  - bowel regimen  - c/w  spironolactone 100, Lasix 40 BID   - miralax BID    [] Dispo  - PT - Home PT  64F with PMHx of breast ca s/p lumpectomy, HTN and decompensated ETOH cirrhosis presented to the ED on 9/17 for worsening umbilical hernia pain, nausea, and vomiting from incarcerated umbilical hernia now s/p UHR w mesh on 9/17.    [ ] Umbilical Hernia Repair with Mesh (POD#7)  - labs appropriate  - d/c remaining UBALDO  - SCDs/spirometry/PT/SQH  - bowel regimen  - c/w  spironolactone 100, Lasix 40 BID   - bowel regimen    [] Dispo  - PT - Home PT   - d/c home today, f/u in clinic on 10/3 64F with PMHx of breast ca s/p lumpectomy, HTN and decompensated ETOH cirrhosis presented to the ED on 9/17 for worsening umbilical hernia pain, nausea, and vomiting from incarcerated umbilical hernia now s/p UHR w mesh on 9/17.    [ ] Umbilical Hernia Repair with Mesh (POD#7)  - labs appropriate  - d/c remaining UBALDO  - SCDs/spirometry/PT/SQH  - bowel regimen  - c/w  spironolactone 100, Lasix 40 BID   - bowel regimen    [] Dispo  - PT - Home PT   - d/c home today, f/u in clinic on 10/2 with Dr. Hand 64F with PMHx of breast ca s/p lumpectomy, HTN and decompensated ETOH cirrhosis presented to the ED on 9/17 for worsening umbilical hernia pain, nausea, and vomiting from incarcerated umbilical hernia now s/p UHR w mesh on 9/17.    [ ] Umbilical Hernia Repair with Mesh (POD#7)  - labs appropriate  - d/c remaining UBALDO  - SCDs/spirometry/PT/SQH  - bowel regimen  - c/w  spironolactone 100, Lasix 40 BID   - bowel regimen    [] Dispo  - PT - Home PT   - d/c home today, f/u in clinic on 10/3 with Dr. Hand

## 2024-09-24 NOTE — DISCHARGE NOTE NURSING/CASE MANAGEMENT/SOCIAL WORK - NSDCPEFALRISK_GEN_ALL_CORE
For information on Fall & Injury Prevention, visit: [email or web address] OR  [email or web address] OR  https://www.cdc.gov/steadi/patient.html

## 2024-09-24 NOTE — PROGRESS NOTE ADULT - NS ATTEND AMEND GEN_ALL_CORE FT
doing well.    superficial drain w minimal output.  incision c/d/i  tolerating diet.
d/c deep UBALDO drain, SQ drain w 30cc  Lasix 40mg BID, Aldactone 100  eating well  on SQH

## 2024-10-02 ENCOUNTER — APPOINTMENT (OUTPATIENT)
Dept: HEPATOLOGY | Facility: CLINIC | Age: 64
End: 2024-10-02
Payer: COMMERCIAL

## 2024-10-02 VITALS
OXYGEN SATURATION: 100 % | RESPIRATION RATE: 16 BRPM | DIASTOLIC BLOOD PRESSURE: 69 MMHG | BODY MASS INDEX: 23.79 KG/M2 | HEIGHT: 68 IN | WEIGHT: 157 LBS | SYSTOLIC BLOOD PRESSURE: 111 MMHG | HEART RATE: 84 BPM

## 2024-10-02 DIAGNOSIS — Z01.818 ENCOUNTER FOR OTHER PREPROCEDURAL EXAMINATION: ICD-10-CM

## 2024-10-02 PROCEDURE — 99214 OFFICE O/P EST MOD 30 MIN: CPT

## 2024-10-03 ENCOUNTER — TRANSCRIPTION ENCOUNTER (OUTPATIENT)
Age: 64
End: 2024-10-03

## 2024-10-03 LAB
ALBUMIN SERPL ELPH-MCNC: 3.5 G/DL
ALP BLD-CCNC: 120 U/L
ALT SERPL-CCNC: 18 U/L
ANION GAP SERPL CALC-SCNC: 12 MMOL/L
AST SERPL-CCNC: 40 U/L
BILIRUB SERPL-MCNC: 2.1 MG/DL
BUN SERPL-MCNC: 19 MG/DL
CALCIUM SERPL-MCNC: 9 MG/DL
CHLORIDE SERPL-SCNC: 104 MMOL/L
CO2 SERPL-SCNC: 22 MMOL/L
CREAT SERPL-MCNC: 1.16 MG/DL
EGFR: 53 ML/MIN/1.73M2
GLUCOSE SERPL-MCNC: 114 MG/DL
HCT VFR BLD CALC: 27.2 %
HGB BLD-MCNC: 8.4 G/DL
INR PPP: 1.19 RATIO
MCHC RBC-ENTMCNC: 30.9 GM/DL
MCHC RBC-ENTMCNC: 31.7 PG
MCV RBC AUTO: 102.6 FL
PLATELET # BLD AUTO: 68 K/UL
POTASSIUM SERPL-SCNC: 4.5 MMOL/L
PROT SERPL-MCNC: 6.9 G/DL
PT BLD: 14 SEC
RBC # BLD: 2.65 M/UL
RBC # FLD: 16.7 %
SODIUM SERPL-SCNC: 138 MMOL/L
WBC # FLD AUTO: 4.63 K/UL

## 2024-10-03 NOTE — ASSESSMENT
[FreeTextEntry1] : # S/p Umbilical Hernia Repair - History as above.  - Drains removed on 9/30.  - Surgical wound looks dry and clean. Staples removed today 10/2. Small dehisence noted in proximal wound s/p steri-strip.   - Will follow up in 2 weeks with surgery - Counseled not to remove adhesive. Counseled not to travel before complete wound healing and also to not use pools.    # Decompensated ETOH cirrhosis with Refractory Ascites  - Euvolemic on exam. On Lasix 40mg BID and Spironolactone 100mg BID.  - No signs of HE. Not on prophylaxis.  - History of small EV. Not on empiric NSBB.  - Last HCC screening with MRI and AFP (6/2024). Repeat by Dec 2024. - Previously discussed role of TIPS. Given low MELD scores will continue conversations in future visits. Will need updated ECHO and MRI.  - Also discussed possible LDLT. Previously her niece was found to be a poor candidate through screening. Patient might have another candidate. Will diiscussed more in future visit.   # Wait-listed for Liver Transplant  - Listed Since 1/30/2024 - ABO O. MELD 21 (from 9/19 labs  - EXP 10/19)  - No HCV donor acceptance  - Cardiac clearance by Dr. Rodriguez (5/26/23). Updated visit (6/12/24) mentions "Plan to repeat echo and stress echo for transplant purposes".   # History of Breast Cancer  - L. breast atypical Ductal Hyperplasia found on excision of papilloma in 2018. Opted against chemoprevention.  - Repeat Bx in 2019 showed grade 2, 4mm Invasive lobular ca, Lobular carcinoma in situ & atypical Ductal Hyperplasia.  - She is s/p L lumpectomy in 2/2020.  - Path showed residual 1.5mm ILC, Her2 neg, 2 sentinel nodes neg.  - Stage 1a, pT1a pNo grade 2 ILC, ER+ HER2 = started on Anastrozole (2/2020) and switched to Letrozole (6/2021). Was planed till 2/2025 but stopped due to side effects. Completed RT (5/2020).  - Last mammo (6/12/23) No suspicious masses. BI-RADS 2.  - Last saw Oncology (Dr. Multani - 5/24/23) and there is no oncological contraindication to patient being evaluated for liver transplant.   # History of Non-Melanoma Skin Cancer  - History of nodular BCC of R malar cheek.  - S/p Mohs (2021).  - Derm Consulted (Dr. Amor - 8/23/23). This should not preclude the patient from proceeding with a necessary organ transplant.  PLAN - MELD labs  - Paracentesis ordered, as needed  - Continue Lasix/Spironolactone with current dosis until Labs reviewed. Pt had ELIZABETH in last blood draw.  - RTO in 2 weeks  - Discussed role of living donor candidates again

## 2024-10-03 NOTE — HISTORY OF PRESENT ILLNESS
[TextBox_42] : * TRANSPLANT STATUS*  Listed  on 1/30/2024 ABO O. MELD 21 (from 9/19 labs  - EXP 10/19)  HCV donor acceptance on unet: no  64F - AUD (last drink 1/2023) and decompensated ETOH cirrhosis c/w refractory ascites requiring frequent LVP (last on 9/13/24 - 5L) and LE edema. Also history of alcohol related hepatitis (s/p steroid taper), HTN, L breast stage 1 Invasive lobular carcinoma (HR+ HER2 neg) s/p lumpectomy c/w PSH (on 2020. Off Letrazole now), and R cheek BCC s/p Mohs (2021).   PCP: Jai Lazcano ([phone])   Since last visit, patient presented to ED with incarcerated umbilical hernia. She is now s/p umbilical hernia repair with mesh (9/17/24).   Last saw Dr. Pedersen in July 2024 and Dr. Hull in Aug 2024. Comes today for post-op follow up.    Feels overall well. Last abdominal drain removed on 9/30.  Since she has felt slight increase in abdominal girth and weight gain. Currently euvolemic on exam.  Surgical incision has been dry and clean. Staples being removed today. Upon discharge diuretics decreased to Lasix 40mg BID and Spironolactone 100mg BID. Compliant with a low-salt diet.   Patient denies other signs of decompensation. Good appetite and energy levels. Having daily BMs.  Not on HE prophylaxis. Only taking Lasix, Spironolactone, PPIs, Calcium and multivitamin.   Inpatient Labs from Sept 24, 2024 reviewed.  Ordering new labs today.   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 Used to have 2 glasses of vodka daily for 3 years to treat her chronic pain from her breast cancer . Last drink on 1/20/23.  [Images] - MRI (6/28/24) Cirrhosis. No focal lesion. GB Contracted with cholelithiasis, Patent PV, mod ascites.   [Test]  - DSE (4/17/23) neg. EF 57%. 85% MPHR - EGD (7/11/23) Small EV. PHG. Type 1 gastroesophageal varices w/o bleeding.  - Scr Colonoscopy (7/11/23) Transverse colon polyp (Tubular adenoma). Descending colon polyp (Hyperplastic polyp). Repeat in 5 years (2028).

## 2024-10-03 NOTE — HISTORY OF PRESENT ILLNESS
[TextBox_42] : * TRANSPLANT STATUS*  Listed  on 1/30/2024 ABO O. MELD 21 (from 9/19 labs  - EXP 10/19)  HCV donor acceptance on unet: no  64F - AUD (last drink 1/2023) and decompensated ETOH cirrhosis c/w refractory ascites requiring frequent LVP (last on 9/13/24 - 5L) and LE edema. Also history of alcohol related hepatitis (s/p steroid taper), HTN, L breast stage 1 Invasive lobular carcinoma (HR+ HER2 neg) s/p lumpectomy c/w PSH (on 2020. Off Letrazole now), and R cheek BCC s/p Mohs (2021).   PCP: Jai Lazcano ([phone])   Since last visit, patient presented to ED with incarcerated umbilical hernia. She is now s/p umbilical hernia repair with mesh (9/17/24).   Last saw Dr. Pedersen in July 2024 and Dr. Hull in Aug 2024. Comes today for post-op follow up.    Feels overall well. Last abdominal drain removed on 9/30.  Since she has felt slight increase in abdominal girth and weight gain. Currently euvolemic on exam.  Surgical incision has been dry and clean. Staples being removed today. Upon discharge diuretics decreased to Lasix 40mg BID and Spironolactone 100mg BID. Compliant with a low-salt diet.   Patient denies other signs of decompensation. Good appetite and energy levels. Having daily BMs.  Not on HE prophylaxis. Only taking Lasix, Spironolactone, PPIs, Calcium and multivitamin.   Inpatient Labs from Sept 24, 2024 reviewed.  Ordering new labs today.   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 Used to have 2 glasses of vodka daily for 3 years to treat her chronic pain from her breast cancer . Last drink on 1/20/23.  [Images] - MRI (6/28/24) Cirrhosis. No focal lesion. GB Contracted with cholelithiasis, Patent PV, mod ascites.   [Test]  - DSE (4/17/23) neg. EF 57%. 85% MPHR - EGD (7/11/23) Small EV. PHG. Type 1 gastroesophageal varices w/o bleeding.  - Scr Colonoscopy (7/11/23) Transverse colon polyp (Tubular adenoma). Descending colon polyp (Hyperplastic polyp). Repeat in 5 years (2028).

## 2024-10-03 NOTE — PHYSICAL EXAM
[Well Nourished] : well nourished [Healthy Appearing] : healthy appearing [No Acute Distress] : no acute distress [Normal Hearing] : normal hearing [No Respiratory Distress] : no respiratory distress [No Accessory Muscle Use] : no accessory muscle use [Normal Appearance] : normal in appearance [No Masses] : no palpable masses [Normal Gait] : normal gait [Spider Angioma] : spider angioma [Location of Spider Angiomas: ___] : location of spider angiomas: [unfilled] [Ascites Fluid Wave] : ascites fluid wave [Scleral Icterus] : no scleral icterus [Ascites Tense] : no ascites tense [Liver Palpable ___ Finger Breadths Below Costal Margin] : liver non palpable below costal margin [Asterixis] : no asterixis [Depression] : no depression [Suicidal Ideation] : no suicidal ideation [de-identified] : Surgical site intact - sutures removed from drainage sites. Staples remove however superior portion of wound did not approximate well - given steri-strips

## 2024-10-03 NOTE — PHYSICAL EXAM
[Well Nourished] : well nourished [Healthy Appearing] : healthy appearing [No Acute Distress] : no acute distress [Normal Hearing] : normal hearing [No Respiratory Distress] : no respiratory distress [No Accessory Muscle Use] : no accessory muscle use [Normal Appearance] : normal in appearance [No Masses] : no palpable masses [Normal Gait] : normal gait [Spider Angioma] : spider angioma [Location of Spider Angiomas: ___] : location of spider angiomas: [unfilled] [Ascites Fluid Wave] : ascites fluid wave [Scleral Icterus] : no scleral icterus [Ascites Tense] : no ascites tense [Liver Palpable ___ Finger Breadths Below Costal Margin] : liver non palpable below costal margin [Asterixis] : no asterixis [Depression] : no depression [Suicidal Ideation] : no suicidal ideation [de-identified] : Surgical site intact - sutures removed from drainage sites. Staples remove however superior portion of wound did not approximate well - given steri-strips

## 2024-10-04 ENCOUNTER — RESULT REVIEW (OUTPATIENT)
Age: 64
End: 2024-10-04

## 2024-10-04 ENCOUNTER — APPOINTMENT (OUTPATIENT)
Dept: CV DIAGNOSITCS | Facility: HOSPITAL | Age: 64
End: 2024-10-04

## 2024-10-04 ENCOUNTER — OUTPATIENT (OUTPATIENT)
Dept: OUTPATIENT SERVICES | Facility: HOSPITAL | Age: 64
LOS: 1 days | End: 2024-10-04
Payer: COMMERCIAL

## 2024-10-04 DIAGNOSIS — Z98.890 OTHER SPECIFIED POSTPROCEDURAL STATES: Chronic | ICD-10-CM

## 2024-10-04 DIAGNOSIS — Z87.39 PERSONAL HISTORY OF OTHER DISEASES OF THE MUSCULOSKELETAL SYSTEM AND CONNECTIVE TISSUE: Chronic | ICD-10-CM

## 2024-10-04 DIAGNOSIS — Z01.818 ENCOUNTER FOR OTHER PREPROCEDURAL EXAMINATION: ICD-10-CM

## 2024-10-04 PROCEDURE — 93351 STRESS TTE COMPLETE: CPT | Mod: 26

## 2024-10-04 PROCEDURE — 93356 MYOCRD STRAIN IMG SPCKL TRCK: CPT

## 2024-10-04 PROCEDURE — C8930: CPT

## 2024-10-16 ENCOUNTER — RESULT REVIEW (OUTPATIENT)
Age: 64
End: 2024-10-16

## 2024-10-16 ENCOUNTER — APPOINTMENT (OUTPATIENT)
Dept: TRANSPLANT | Facility: CLINIC | Age: 64
End: 2024-10-16
Payer: COMMERCIAL

## 2024-10-16 ENCOUNTER — OUTPATIENT (OUTPATIENT)
Dept: OUTPATIENT SERVICES | Facility: HOSPITAL | Age: 64
LOS: 1 days | End: 2024-10-16
Payer: COMMERCIAL

## 2024-10-16 ENCOUNTER — APPOINTMENT (OUTPATIENT)
Dept: HEPATOLOGY | Facility: CLINIC | Age: 64
End: 2024-10-16

## 2024-10-16 ENCOUNTER — APPOINTMENT (OUTPATIENT)
Dept: ULTRASOUND IMAGING | Facility: IMAGING CENTER | Age: 64
End: 2024-10-16
Payer: COMMERCIAL

## 2024-10-16 VITALS
BODY MASS INDEX: 24.4 KG/M2 | DIASTOLIC BLOOD PRESSURE: 70 MMHG | OXYGEN SATURATION: 100 % | TEMPERATURE: 98.1 F | WEIGHT: 161 LBS | RESPIRATION RATE: 16 BRPM | HEART RATE: 79 BPM | HEIGHT: 68 IN | SYSTOLIC BLOOD PRESSURE: 104 MMHG

## 2024-10-16 DIAGNOSIS — Z87.39 PERSONAL HISTORY OF OTHER DISEASES OF THE MUSCULOSKELETAL SYSTEM AND CONNECTIVE TISSUE: Chronic | ICD-10-CM

## 2024-10-16 DIAGNOSIS — K70.31 ALCOHOLIC CIRRHOSIS OF LIVER WITH ASCITES: ICD-10-CM

## 2024-10-16 PROCEDURE — 49083 ABD PARACENTESIS W/IMAGING: CPT

## 2024-10-16 PROCEDURE — 99024 POSTOP FOLLOW-UP VISIT: CPT

## 2024-10-31 ENCOUNTER — APPOINTMENT (OUTPATIENT)
Dept: ULTRASOUND IMAGING | Facility: IMAGING CENTER | Age: 64
End: 2024-10-31
Payer: COMMERCIAL

## 2024-10-31 ENCOUNTER — RESULT REVIEW (OUTPATIENT)
Age: 64
End: 2024-10-31

## 2024-10-31 ENCOUNTER — OUTPATIENT (OUTPATIENT)
Dept: OUTPATIENT SERVICES | Facility: HOSPITAL | Age: 64
LOS: 1 days | End: 2024-10-31
Payer: COMMERCIAL

## 2024-10-31 DIAGNOSIS — K70.31 ALCOHOLIC CIRRHOSIS OF LIVER WITH ASCITES: ICD-10-CM

## 2024-10-31 DIAGNOSIS — Z98.890 OTHER SPECIFIED POSTPROCEDURAL STATES: Chronic | ICD-10-CM

## 2024-10-31 DIAGNOSIS — Z87.39 PERSONAL HISTORY OF OTHER DISEASES OF THE MUSCULOSKELETAL SYSTEM AND CONNECTIVE TISSUE: Chronic | ICD-10-CM

## 2024-10-31 PROCEDURE — 49083 ABD PARACENTESIS W/IMAGING: CPT

## 2024-11-06 ENCOUNTER — TRANSCRIPTION ENCOUNTER (OUTPATIENT)
Age: 64
End: 2024-11-06

## 2024-11-07 ENCOUNTER — APPOINTMENT (OUTPATIENT)
Dept: TRANSPLANT | Facility: CLINIC | Age: 64
End: 2024-11-07

## 2024-11-11 ENCOUNTER — TRANSCRIPTION ENCOUNTER (OUTPATIENT)
Age: 64
End: 2024-11-11

## 2024-11-13 ENCOUNTER — NON-APPOINTMENT (OUTPATIENT)
Age: 64
End: 2024-11-13

## 2024-11-13 LAB
ALBUMIN SERPL ELPH-MCNC: 3.3 G/DL
ALP BLD-CCNC: 118 U/L
ALT SERPL-CCNC: 18 U/L
ANION GAP SERPL CALC-SCNC: 8 MMOL/L
AST SERPL-CCNC: 41 U/L
BILIRUB SERPL-MCNC: 1.9 MG/DL
BUN SERPL-MCNC: 31 MG/DL
CALCIUM SERPL-MCNC: 9 MG/DL
CHLORIDE SERPL-SCNC: 104 MMOL/L
CO2 SERPL-SCNC: 21 MMOL/L
CREAT SERPL-MCNC: 1.31 MG/DL
EGFR: 46 ML/MIN/1.73M2
GLUCOSE SERPL-MCNC: 134 MG/DL
INR PPP: 1.1 RATIO
MAGNESIUM SERPL-MCNC: 1.9 MG/DL
POTASSIUM SERPL-SCNC: 4.2 MMOL/L
PROT SERPL-MCNC: 6.8 G/DL
PT BLD: 13.1 SEC
SODIUM SERPL-SCNC: 133 MMOL/L

## 2024-11-13 RX ORDER — LACTULOSE 10 G/15ML
10 SOLUTION ORAL TWICE DAILY
Qty: 1800 | Refills: 2 | Status: ACTIVE | COMMUNITY
Start: 2024-11-13 | End: 1900-01-01

## 2024-11-14 ENCOUNTER — APPOINTMENT (OUTPATIENT)
Dept: HEPATOLOGY | Facility: CLINIC | Age: 64
End: 2024-11-14
Payer: COMMERCIAL

## 2024-11-14 VITALS
BODY MASS INDEX: 25.46 KG/M2 | TEMPERATURE: 97.3 F | HEART RATE: 77 BPM | OXYGEN SATURATION: 98 % | RESPIRATION RATE: 16 BRPM | WEIGHT: 168 LBS | SYSTOLIC BLOOD PRESSURE: 109 MMHG | HEIGHT: 68 IN | DIASTOLIC BLOOD PRESSURE: 63 MMHG

## 2024-11-14 DIAGNOSIS — K70.31 ALCOHOLIC CIRRHOSIS OF LIVER WITH ASCITES: ICD-10-CM

## 2024-11-14 LAB
BASOPHILS # BLD AUTO: 0.01 K/UL
BASOPHILS NFR BLD AUTO: 0.3 %
EOSINOPHIL # BLD AUTO: 0.23 K/UL
EOSINOPHIL NFR BLD AUTO: 6.1 %
HCT VFR BLD CALC: 27.5 %
HGB BLD-MCNC: 8.6 G/DL
IMM GRANULOCYTES NFR BLD AUTO: 0.3 %
LYMPHOCYTES # BLD AUTO: 0.65 K/UL
LYMPHOCYTES NFR BLD AUTO: 17.1 %
MAN DIFF?: NORMAL
MCHC RBC-ENTMCNC: 31.3 G/DL
MCHC RBC-ENTMCNC: 32.8 PG
MCV RBC AUTO: 105 FL
MONOCYTES # BLD AUTO: 0.36 K/UL
MONOCYTES NFR BLD AUTO: 9.5 %
NEUTROPHILS # BLD AUTO: 2.54 K/UL
NEUTROPHILS NFR BLD AUTO: 66.7 %
PLATELET # BLD AUTO: 65 K/UL
RBC # BLD: 2.62 M/UL
RBC # FLD: 17.2 %
WBC # FLD AUTO: 3.8 K/UL

## 2024-11-14 PROCEDURE — 99215 OFFICE O/P EST HI 40 MIN: CPT

## 2024-11-15 DIAGNOSIS — K72.91 HEPATIC FAILURE, UNSPECIFIED WITH COMA: ICD-10-CM

## 2024-11-18 ENCOUNTER — TRANSCRIPTION ENCOUNTER (OUTPATIENT)
Age: 64
End: 2024-11-18

## 2024-11-18 LAB
PETH 16:0/18:1: NEGATIVE NG/ML
PETH 16:0/18:2: NEGATIVE NG/ML
PETH COMMENTS: NORMAL

## 2024-11-19 ENCOUNTER — TRANSCRIPTION ENCOUNTER (OUTPATIENT)
Age: 64
End: 2024-11-19

## 2024-11-20 ENCOUNTER — APPOINTMENT (OUTPATIENT)
Dept: ULTRASOUND IMAGING | Facility: IMAGING CENTER | Age: 64
End: 2024-11-20
Payer: COMMERCIAL

## 2024-11-20 ENCOUNTER — OUTPATIENT (OUTPATIENT)
Dept: OUTPATIENT SERVICES | Facility: HOSPITAL | Age: 64
LOS: 1 days | End: 2024-11-20
Payer: COMMERCIAL

## 2024-11-20 ENCOUNTER — APPOINTMENT (OUTPATIENT)
Dept: HEPATOLOGY | Facility: CLINIC | Age: 64
End: 2024-11-20

## 2024-11-20 DIAGNOSIS — Z87.39 PERSONAL HISTORY OF OTHER DISEASES OF THE MUSCULOSKELETAL SYSTEM AND CONNECTIVE TISSUE: Chronic | ICD-10-CM

## 2024-11-20 DIAGNOSIS — Z98.890 OTHER SPECIFIED POSTPROCEDURAL STATES: Chronic | ICD-10-CM

## 2024-11-20 DIAGNOSIS — K70.31 ALCOHOLIC CIRRHOSIS OF LIVER WITH ASCITES: ICD-10-CM

## 2024-11-20 PROCEDURE — 49083 ABD PARACENTESIS W/IMAGING: CPT

## 2024-11-20 PROCEDURE — P9047: CPT

## 2024-11-21 ENCOUNTER — TRANSCRIPTION ENCOUNTER (OUTPATIENT)
Age: 64
End: 2024-11-21

## 2024-11-22 LAB
B PERT IGG+IGM PNL SER: CLEAR
BACTERIA FLD CULT: NORMAL
COLOR FLD: YELLOW
EOSINOPHIL # FLD MANUAL: 0 %
FLUID INTAKE SUBSTANCE CLASS: NORMAL
LYMPHOCYTES # FLD MANUAL: 80 %
MESOTHL CELL NFR FLD: 0 %
MONOS+MACROS NFR FLD MANUAL: 18 %
NEUTS SEG # FLD MANUAL: 2 %
NRBC # FLD: 0 %
RBC # FLD MANUAL: < 2000 CELLS/UL
TOTAL CELLS COUNTED FLD: 70 CELLS/UL
TUBE TYPE: NORMAL
UNIDENT CELLS NFR FLD MANUAL: 0 %
VARIANT LYMPHS # FLD MANUAL: 0 %

## 2024-11-23 NOTE — ASU PREOP CHECKLIST - SELECT TESTS ORDERED
Results in MD note

## 2024-11-26 ENCOUNTER — LABORATORY RESULT (OUTPATIENT)
Age: 64
End: 2024-11-26

## 2024-11-26 ENCOUNTER — APPOINTMENT (OUTPATIENT)
Dept: HEPATOLOGY | Facility: CLINIC | Age: 64
End: 2024-11-26
Payer: COMMERCIAL

## 2024-11-26 VITALS
HEIGHT: 68 IN | WEIGHT: 158 LBS | OXYGEN SATURATION: 98 % | SYSTOLIC BLOOD PRESSURE: 93 MMHG | BODY MASS INDEX: 23.95 KG/M2 | DIASTOLIC BLOOD PRESSURE: 59 MMHG | TEMPERATURE: 97.3 F | RESPIRATION RATE: 16 BRPM | HEART RATE: 78 BPM

## 2024-11-26 DIAGNOSIS — K70.31 ALCOHOLIC CIRRHOSIS OF LIVER WITH ASCITES: ICD-10-CM

## 2024-11-26 DIAGNOSIS — K70.10 ALCOHOLIC HEPATITIS W/OUT ASCITES: ICD-10-CM

## 2024-11-26 PROCEDURE — 99215 OFFICE O/P EST HI 40 MIN: CPT

## 2024-11-26 RX ORDER — LORATADINE 5 MG
17 TABLET,CHEWABLE ORAL
Qty: 12 | Refills: 0 | Status: ACTIVE | COMMUNITY
Start: 2024-11-26 | End: 1900-01-01

## 2024-11-27 LAB
25(OH)D3 SERPL-MCNC: 40.9 NG/ML
MAGNESIUM SERPL-MCNC: 2.1 MG/DL
PHOSPHATE SERPL-MCNC: 3.7 MG/DL

## 2024-12-02 LAB
BASOPHILS # BLD AUTO: 0.02 K/UL
BASOPHILS NFR BLD AUTO: 0.5 %
EOSINOPHIL # BLD AUTO: 0.22 K/UL
EOSINOPHIL NFR BLD AUTO: 5.3 %
HCT VFR BLD CALC: 31.1 %
HGB BLD-MCNC: 9.2 G/DL
IMM GRANULOCYTES NFR BLD AUTO: 0.5 %
LYMPHOCYTES # BLD AUTO: 0.68 K/UL
LYMPHOCYTES NFR BLD AUTO: 16.3 %
MAN DIFF?: NORMAL
MCHC RBC-ENTMCNC: 29.6 G/DL
MCHC RBC-ENTMCNC: 32.4 PG
MCV RBC AUTO: 109.5 FL
MONOCYTES # BLD AUTO: 0.34 K/UL
MONOCYTES NFR BLD AUTO: 8.2 %
NEUTROPHILS # BLD AUTO: 2.89 K/UL
NEUTROPHILS NFR BLD AUTO: 69.2 %
PLATELET # BLD AUTO: 72 K/UL
RBC # BLD: 2.84 M/UL
RBC # FLD: 17.4 %
WBC # FLD AUTO: 4.17 K/UL

## 2024-12-03 ENCOUNTER — TRANSCRIPTION ENCOUNTER (OUTPATIENT)
Age: 64
End: 2024-12-03

## 2024-12-09 ENCOUNTER — NON-APPOINTMENT (OUTPATIENT)
Age: 64
End: 2024-12-09

## 2024-12-11 ENCOUNTER — OUTPATIENT (OUTPATIENT)
Dept: OUTPATIENT SERVICES | Facility: HOSPITAL | Age: 64
LOS: 1 days | End: 2024-12-11
Payer: COMMERCIAL

## 2024-12-11 ENCOUNTER — APPOINTMENT (OUTPATIENT)
Dept: ULTRASOUND IMAGING | Facility: IMAGING CENTER | Age: 64
End: 2024-12-11
Payer: COMMERCIAL

## 2024-12-11 ENCOUNTER — RESULT REVIEW (OUTPATIENT)
Age: 64
End: 2024-12-11

## 2024-12-11 DIAGNOSIS — Z00.8 ENCOUNTER FOR OTHER GENERAL EXAMINATION: ICD-10-CM

## 2024-12-11 DIAGNOSIS — Z98.890 OTHER SPECIFIED POSTPROCEDURAL STATES: Chronic | ICD-10-CM

## 2024-12-11 DIAGNOSIS — K70.31 ALCOHOLIC CIRRHOSIS OF LIVER WITH ASCITES: ICD-10-CM

## 2024-12-11 DIAGNOSIS — Z87.39 PERSONAL HISTORY OF OTHER DISEASES OF THE MUSCULOSKELETAL SYSTEM AND CONNECTIVE TISSUE: Chronic | ICD-10-CM

## 2024-12-11 PROCEDURE — 49083 ABD PARACENTESIS W/IMAGING: CPT

## 2024-12-11 PROCEDURE — P9047: CPT

## 2024-12-23 ENCOUNTER — TRANSCRIPTION ENCOUNTER (OUTPATIENT)
Age: 64
End: 2024-12-23

## 2024-12-23 DIAGNOSIS — K70.10 ALCOHOLIC HEPATITIS W/OUT ASCITES: ICD-10-CM

## 2024-12-23 DIAGNOSIS — K70.31 ALCOHOLIC CIRRHOSIS OF LIVER WITH ASCITES: ICD-10-CM

## 2024-12-27 ENCOUNTER — APPOINTMENT (OUTPATIENT)
Dept: MRI IMAGING | Facility: IMAGING CENTER | Age: 64
End: 2024-12-27

## 2024-12-27 ENCOUNTER — LABORATORY RESULT (OUTPATIENT)
Age: 64
End: 2024-12-27

## 2024-12-27 ENCOUNTER — OUTPATIENT (OUTPATIENT)
Dept: OUTPATIENT SERVICES | Facility: HOSPITAL | Age: 64
LOS: 1 days | End: 2024-12-27

## 2024-12-27 DIAGNOSIS — Z87.39 PERSONAL HISTORY OF OTHER DISEASES OF THE MUSCULOSKELETAL SYSTEM AND CONNECTIVE TISSUE: Chronic | ICD-10-CM

## 2024-12-27 DIAGNOSIS — K70.31 ALCOHOLIC CIRRHOSIS OF LIVER WITH ASCITES: ICD-10-CM

## 2024-12-27 DIAGNOSIS — Z98.890 OTHER SPECIFIED POSTPROCEDURAL STATES: Chronic | ICD-10-CM

## 2024-12-27 DIAGNOSIS — Z00.8 ENCOUNTER FOR OTHER GENERAL EXAMINATION: ICD-10-CM

## 2024-12-31 ENCOUNTER — RESULT REVIEW (OUTPATIENT)
Age: 64
End: 2024-12-31

## 2024-12-31 ENCOUNTER — APPOINTMENT (OUTPATIENT)
Dept: ULTRASOUND IMAGING | Facility: IMAGING CENTER | Age: 64
End: 2024-12-31
Payer: COMMERCIAL

## 2024-12-31 ENCOUNTER — OUTPATIENT (OUTPATIENT)
Dept: OUTPATIENT SERVICES | Facility: HOSPITAL | Age: 64
LOS: 1 days | End: 2024-12-31
Payer: COMMERCIAL

## 2024-12-31 DIAGNOSIS — K70.31 ALCOHOLIC CIRRHOSIS OF LIVER WITH ASCITES: ICD-10-CM

## 2024-12-31 DIAGNOSIS — Z98.890 OTHER SPECIFIED POSTPROCEDURAL STATES: Chronic | ICD-10-CM

## 2024-12-31 DIAGNOSIS — Z87.39 PERSONAL HISTORY OF OTHER DISEASES OF THE MUSCULOSKELETAL SYSTEM AND CONNECTIVE TISSUE: Chronic | ICD-10-CM

## 2024-12-31 DIAGNOSIS — Z00.8 ENCOUNTER FOR OTHER GENERAL EXAMINATION: ICD-10-CM

## 2024-12-31 PROCEDURE — 49083 ABD PARACENTESIS W/IMAGING: CPT

## 2025-01-02 LAB
B PERT IGG+IGM PNL SER: CLEAR
COLOR FLD: YELLOW
EOSINOPHIL # FLD MANUAL: 0 %
FLUID INTAKE SUBSTANCE CLASS: NORMAL
LYMPHOCYTES # FLD MANUAL: 78 %
MESOTHL CELL NFR FLD: 0 %
MONOS+MACROS NFR FLD MANUAL: 21 %
NEUTS SEG # FLD MANUAL: 1 %
NRBC # FLD: 0 %
RBC # FLD MANUAL: 1000 CELLS/UL
TOTAL CELLS COUNTED FLD: 59 CELLS/UL
TUBE TYPE: NORMAL
UNIDENT CELLS NFR FLD MANUAL: 0 %
VARIANT LYMPHS # FLD MANUAL: 0 %
WBC COUNT: 57 CELLS/UL

## 2025-01-03 ENCOUNTER — INPATIENT (INPATIENT)
Facility: HOSPITAL | Age: 65
LOS: 12 days | Discharge: HOME HEALTH | DRG: 700 | End: 2025-01-16
Attending: TRANSPLANT SURGERY | Admitting: TRANSPLANT SURGERY
Payer: COMMERCIAL

## 2025-01-03 VITALS
OXYGEN SATURATION: 100 % | DIASTOLIC BLOOD PRESSURE: 70 MMHG | HEIGHT: 69 IN | HEART RATE: 84 BPM | TEMPERATURE: 98 F | SYSTOLIC BLOOD PRESSURE: 117 MMHG | WEIGHT: 161.38 LBS | RESPIRATION RATE: 18 BRPM

## 2025-01-03 DIAGNOSIS — Z87.39 PERSONAL HISTORY OF OTHER DISEASES OF THE MUSCULOSKELETAL SYSTEM AND CONNECTIVE TISSUE: Chronic | ICD-10-CM

## 2025-01-03 DIAGNOSIS — Z98.890 OTHER SPECIFIED POSTPROCEDURAL STATES: Chronic | ICD-10-CM

## 2025-01-03 DIAGNOSIS — Z94.0 KIDNEY TRANSPLANT STATUS: ICD-10-CM

## 2025-01-03 PROCEDURE — 93010 ELECTROCARDIOGRAM REPORT: CPT

## 2025-01-03 RX ORDER — INFLUENZA A VIRUS A/WISCONSIN/588/2019 (H1N1) RECOMBINANT HEMAGGLUTININ ANTIGEN, INFLUENZA A VIRUS A/DARWIN/6/2021 (H3N2) RECOMBINANT HEMAGGLUTININ ANTIGEN, INFLUENZA B VIRUS B/AUSTRIA/1359417/2021 RECOMBINANT HEMAGGLUTININ ANTIGEN, AND INFLUENZA B VIRUS B/PHUKET/3073/2013 RECOMBINANT HEMAGGLUTININ ANTIGEN 45; 45; 45; 45 UG/.5ML; UG/.5ML; UG/.5ML; UG/.5ML
0.5 INJECTION INTRAMUSCULAR ONCE
Refills: 0 | Status: DISCONTINUED | OUTPATIENT
Start: 2025-01-03 | End: 2025-01-16

## 2025-01-03 RX ORDER — METHYLPREDNISOLONE 4 MG/1
1000 TABLET ORAL ONCE
Refills: 0 | Status: DISCONTINUED | OUTPATIENT
Start: 2025-01-03 | End: 2025-01-03

## 2025-01-03 RX ORDER — AMPICILLIN SODIUM AND SULBACTAM SODIUM 100; 50 MG/ML; MG/ML
3 INJECTION, POWDER, FOR SOLUTION INTRAVENOUS ONCE
Refills: 0 | Status: DISCONTINUED | OUTPATIENT
Start: 2025-01-03 | End: 2025-01-03

## 2025-01-03 RX ORDER — BASILIXIMAB 10 MG/2.5ML
20 INJECTION, POWDER, FOR SOLUTION INTRAVENOUS ONCE
Refills: 0 | Status: DISCONTINUED | OUTPATIENT
Start: 2025-01-03 | End: 2025-01-03

## 2025-01-03 RX ORDER — AMPICILLIN SODIUM AND SULBACTAM SODIUM 100; 50 MG/ML; MG/ML
3 INJECTION, POWDER, FOR SOLUTION INTRAVENOUS ONCE
Refills: 0 | Status: DISCONTINUED | OUTPATIENT
Start: 2025-01-03 | End: 2025-01-04

## 2025-01-04 ENCOUNTER — RESULT REVIEW (OUTPATIENT)
Age: 65
End: 2025-01-04

## 2025-01-04 ENCOUNTER — TRANSCRIPTION ENCOUNTER (OUTPATIENT)
Age: 65
End: 2025-01-04

## 2025-01-04 ENCOUNTER — APPOINTMENT (OUTPATIENT)
Dept: TRANSPLANT | Facility: HOSPITAL | Age: 65
End: 2025-01-04

## 2025-01-04 LAB
ALBUMIN SERPL ELPH-MCNC: 2.7 G/DL — LOW (ref 3.3–5)
ALBUMIN SERPL ELPH-MCNC: 2.8 G/DL — LOW (ref 3.3–5)
ALBUMIN SERPL ELPH-MCNC: 2.9 G/DL — LOW (ref 3.3–5)
ALBUMIN SERPL ELPH-MCNC: 3 G/DL — LOW (ref 3.3–5)
ALP SERPL-CCNC: 150 U/L — HIGH (ref 40–120)
ALP SERPL-CCNC: 52 U/L — SIGNIFICANT CHANGE UP (ref 40–120)
ALP SERPL-CCNC: 56 U/L — SIGNIFICANT CHANGE UP (ref 40–120)
ALP SERPL-CCNC: 73 U/L — SIGNIFICANT CHANGE UP (ref 40–120)
ALT FLD-CCNC: 14 U/L — SIGNIFICANT CHANGE UP (ref 10–45)
ALT FLD-CCNC: 433 U/L — HIGH (ref 10–45)
ALT FLD-CCNC: 474 U/L — HIGH (ref 10–45)
ALT FLD-CCNC: 586 U/L — HIGH (ref 10–45)
AMMONIA BLD-MCNC: 109 UMOL/L — HIGH (ref 11–55)
ANION GAP SERPL CALC-SCNC: 12 MMOL/L — SIGNIFICANT CHANGE UP (ref 5–17)
ANION GAP SERPL CALC-SCNC: 13 MMOL/L — SIGNIFICANT CHANGE UP (ref 5–17)
ANION GAP SERPL CALC-SCNC: 14 MMOL/L — SIGNIFICANT CHANGE UP (ref 5–17)
ANION GAP SERPL CALC-SCNC: 19 MMOL/L — HIGH (ref 5–17)
APPEARANCE UR: CLEAR — SIGNIFICANT CHANGE UP
APTT BLD: 32.1 SEC — SIGNIFICANT CHANGE UP (ref 24.5–35.6)
APTT BLD: 32.7 SEC — SIGNIFICANT CHANGE UP (ref 24.5–35.6)
APTT BLD: 33.4 SEC — SIGNIFICANT CHANGE UP (ref 24.5–35.6)
AST SERPL-CCNC: 1061 U/L — HIGH (ref 10–40)
AST SERPL-CCNC: 36 U/L — SIGNIFICANT CHANGE UP (ref 10–40)
AST SERPL-CCNC: 709 U/L — HIGH (ref 10–40)
AST SERPL-CCNC: 827 U/L — HIGH (ref 10–40)
BASOPHILS # BLD AUTO: 0 K/UL — SIGNIFICANT CHANGE UP (ref 0–0.2)
BASOPHILS NFR BLD AUTO: 0 % — SIGNIFICANT CHANGE UP (ref 0–2)
BILIRUB SERPL-MCNC: 1.6 MG/DL — HIGH (ref 0.2–1.2)
BILIRUB SERPL-MCNC: 1.6 MG/DL — HIGH (ref 0.2–1.2)
BILIRUB SERPL-MCNC: 2.4 MG/DL — HIGH (ref 0.2–1.2)
BILIRUB SERPL-MCNC: 2.9 MG/DL — HIGH (ref 0.2–1.2)
BILIRUB UR-MCNC: NEGATIVE — SIGNIFICANT CHANGE UP
BUN SERPL-MCNC: 20 MG/DL — SIGNIFICANT CHANGE UP (ref 7–23)
BUN SERPL-MCNC: 23 MG/DL — SIGNIFICANT CHANGE UP (ref 7–23)
CALCIUM SERPL-MCNC: 8.4 MG/DL — SIGNIFICANT CHANGE UP (ref 8.4–10.5)
CALCIUM SERPL-MCNC: 8.5 MG/DL — SIGNIFICANT CHANGE UP (ref 8.4–10.5)
CALCIUM SERPL-MCNC: 8.7 MG/DL — SIGNIFICANT CHANGE UP (ref 8.4–10.5)
CALCIUM SERPL-MCNC: 9.3 MG/DL — SIGNIFICANT CHANGE UP (ref 8.4–10.5)
CHLORIDE SERPL-SCNC: 105 MMOL/L — SIGNIFICANT CHANGE UP (ref 96–108)
CHLORIDE SERPL-SCNC: 106 MMOL/L — SIGNIFICANT CHANGE UP (ref 96–108)
CHLORIDE SERPL-SCNC: 106 MMOL/L — SIGNIFICANT CHANGE UP (ref 96–108)
CHLORIDE SERPL-SCNC: 107 MMOL/L — SIGNIFICANT CHANGE UP (ref 96–108)
CMV IGG FLD QL: >10 U/ML — HIGH
CMV IGG SERPL-IMP: POSITIVE
CO2 SERPL-SCNC: 17 MMOL/L — LOW (ref 22–31)
CO2 SERPL-SCNC: 19 MMOL/L — LOW (ref 22–31)
CO2 SERPL-SCNC: 20 MMOL/L — LOW (ref 22–31)
CO2 SERPL-SCNC: 22 MMOL/L — SIGNIFICANT CHANGE UP (ref 22–31)
COLOR SPEC: YELLOW — SIGNIFICANT CHANGE UP
CREAT SERPL-MCNC: 0.86 MG/DL — SIGNIFICANT CHANGE UP (ref 0.5–1.3)
CREAT SERPL-MCNC: 0.89 MG/DL — SIGNIFICANT CHANGE UP (ref 0.5–1.3)
CREAT SERPL-MCNC: 0.92 MG/DL — SIGNIFICANT CHANGE UP (ref 0.5–1.3)
CREAT SERPL-MCNC: 1.13 MG/DL — SIGNIFICANT CHANGE UP (ref 0.5–1.3)
DAT POLY-SP REAG RBC QL: NEGATIVE — SIGNIFICANT CHANGE UP
DIFF PNL FLD: NEGATIVE — SIGNIFICANT CHANGE UP
EBV EA AB SER IA-ACNC: 13.1 U/ML — HIGH
EBV EA AB TITR SER IF: POSITIVE
EBV EA IGG SER-ACNC: POSITIVE
EBV NA IGG SER IA-ACNC: >600 U/ML — HIGH
EBV PATRN SPEC IB-IMP: SIGNIFICANT CHANGE UP
EBV VCA IGG AVIDITY SER QL IA: POSITIVE
EBV VCA IGM SER IA-ACNC: 24.9 U/ML — SIGNIFICANT CHANGE UP
EBV VCA IGM SER IA-ACNC: 484 U/ML — HIGH
EBV VCA IGM TITR FLD: NEGATIVE — SIGNIFICANT CHANGE UP
EGFR: 55 ML/MIN/1.73M2 — LOW
EGFR: 70 ML/MIN/1.73M2 — SIGNIFICANT CHANGE UP
EGFR: 73 ML/MIN/1.73M2 — SIGNIFICANT CHANGE UP
EGFR: 76 ML/MIN/1.73M2 — SIGNIFICANT CHANGE UP
EOSINOPHIL # BLD AUTO: 0.06 K/UL — SIGNIFICANT CHANGE UP (ref 0–0.5)
EOSINOPHIL NFR BLD AUTO: 1.7 % — SIGNIFICANT CHANGE UP (ref 0–6)
FIBRINOGEN PPP-MCNC: 301 MG/DL — SIGNIFICANT CHANGE UP (ref 200–445)
GAS PNL BLDA: SIGNIFICANT CHANGE UP
GGT SERPL-CCNC: 63 U/L — HIGH (ref 8–40)
GIANT PLATELETS BLD QL SMEAR: PRESENT — SIGNIFICANT CHANGE UP
GLUCOSE BLDC GLUCOMTR-MCNC: 185 MG/DL — HIGH (ref 70–99)
GLUCOSE SERPL-MCNC: 122 MG/DL — HIGH (ref 70–99)
GLUCOSE SERPL-MCNC: 202 MG/DL — HIGH (ref 70–99)
GLUCOSE SERPL-MCNC: 230 MG/DL — HIGH (ref 70–99)
GLUCOSE SERPL-MCNC: 235 MG/DL — HIGH (ref 70–99)
GLUCOSE UR QL: NEGATIVE MG/DL — SIGNIFICANT CHANGE UP
HBV CORE AB SER-ACNC: SIGNIFICANT CHANGE UP
HBV SURFACE AB SER-ACNC: <3 MIU/ML — LOW
HBV SURFACE AG SER-ACNC: SIGNIFICANT CHANGE UP
HCG SERPL-ACNC: <2 MIU/ML — SIGNIFICANT CHANGE UP
HCT VFR BLD CALC: 22.4 % — LOW (ref 34.5–45)
HCT VFR BLD CALC: 22.8 % — LOW (ref 34.5–45)
HCT VFR BLD CALC: 27.6 % — LOW (ref 34.5–45)
HCT VFR BLD CALC: 27.9 % — LOW (ref 34.5–45)
HCV AB S/CO SERPL IA: 0.06 S/CO — SIGNIFICANT CHANGE UP
HCV AB SERPL-IMP: SIGNIFICANT CHANGE UP
HEPARINASE TEG R TIME: 5.4 MIN — SIGNIFICANT CHANGE UP (ref 4.3–8.3)
HEPARINASE TEG R TIME: 5.8 MIN — SIGNIFICANT CHANGE UP (ref 4.3–8.3)
HEPARINASE TEG R TIME: 5.8 MIN — SIGNIFICANT CHANGE UP (ref 4.3–8.3)
HEPARINASE TEG R TIME: 6.4 MIN — SIGNIFICANT CHANGE UP (ref 4.3–8.3)
HEPARINASE TEG R TIME: 6.5 MIN — SIGNIFICANT CHANGE UP (ref 4.3–8.3)
HEPARINASE TEG R TIME: 6.7 MIN — SIGNIFICANT CHANGE UP (ref 4.3–8.3)
HGB BLD-MCNC: 7.9 G/DL — LOW (ref 11.5–15.5)
HGB BLD-MCNC: 8 G/DL — LOW (ref 11.5–15.5)
HGB BLD-MCNC: 8.9 G/DL — LOW (ref 11.5–15.5)
HGB BLD-MCNC: 9.4 G/DL — LOW (ref 11.5–15.5)
HIV 1+2 AB+HIV1 P24 AG SERPL QL IA: SIGNIFICANT CHANGE UP
INR BLD: 1.26 RATIO — HIGH (ref 0.85–1.16)
INR BLD: 1.47 RATIO — HIGH (ref 0.85–1.16)
INR BLD: 1.77 RATIO — HIGH (ref 0.85–1.16)
KETONES UR-MCNC: NEGATIVE MG/DL — SIGNIFICANT CHANGE UP
LEUKOCYTE ESTERASE UR-ACNC: NEGATIVE — SIGNIFICANT CHANGE UP
LYMPHOCYTES # BLD AUTO: 0.79 K/UL — LOW (ref 1–3.3)
LYMPHOCYTES # BLD AUTO: 23.7 % — SIGNIFICANT CHANGE UP (ref 13–44)
MAGNESIUM SERPL-MCNC: 1.8 MG/DL — SIGNIFICANT CHANGE UP (ref 1.6–2.6)
MAGNESIUM SERPL-MCNC: 2 MG/DL — SIGNIFICANT CHANGE UP (ref 1.6–2.6)
MAGNESIUM SERPL-MCNC: 2 MG/DL — SIGNIFICANT CHANGE UP (ref 1.6–2.6)
MAGNESIUM SERPL-MCNC: 2.1 MG/DL — SIGNIFICANT CHANGE UP (ref 1.6–2.6)
MANUAL SMEAR VERIFICATION: SIGNIFICANT CHANGE UP
MCHC RBC-ENTMCNC: 29.1 PG — SIGNIFICANT CHANGE UP (ref 27–34)
MCHC RBC-ENTMCNC: 29.6 PG — SIGNIFICANT CHANGE UP (ref 27–34)
MCHC RBC-ENTMCNC: 29.8 PG — SIGNIFICANT CHANGE UP (ref 27–34)
MCHC RBC-ENTMCNC: 31.6 PG — SIGNIFICANT CHANGE UP (ref 27–34)
MCHC RBC-ENTMCNC: 31.9 G/DL — LOW (ref 32–36)
MCHC RBC-ENTMCNC: 34.1 G/DL — SIGNIFICANT CHANGE UP (ref 32–36)
MCHC RBC-ENTMCNC: 35.1 G/DL — SIGNIFICANT CHANGE UP (ref 32–36)
MCHC RBC-ENTMCNC: 35.3 G/DL — SIGNIFICANT CHANGE UP (ref 32–36)
MCV RBC AUTO: 84.4 FL — SIGNIFICANT CHANGE UP (ref 80–100)
MCV RBC AUTO: 84.5 FL — SIGNIFICANT CHANGE UP (ref 80–100)
MCV RBC AUTO: 85.4 FL — SIGNIFICANT CHANGE UP (ref 80–100)
MCV RBC AUTO: 98.9 FL — SIGNIFICANT CHANGE UP (ref 80–100)
MONOCYTES # BLD AUTO: 0.18 K/UL — SIGNIFICANT CHANGE UP (ref 0–0.9)
MONOCYTES NFR BLD AUTO: 5.3 % — SIGNIFICANT CHANGE UP (ref 2–14)
NEUTROPHILS # BLD AUTO: 2.32 K/UL — SIGNIFICANT CHANGE UP (ref 1.8–7.4)
NEUTROPHILS NFR BLD AUTO: 69.3 % — SIGNIFICANT CHANGE UP (ref 43–77)
NITRITE UR-MCNC: NEGATIVE — SIGNIFICANT CHANGE UP
NRBC # BLD: 0 /100 WBCS — SIGNIFICANT CHANGE UP (ref 0–0)
PH UR: 6 — SIGNIFICANT CHANGE UP (ref 5–8)
PHOSPHATE SERPL-MCNC: 2.7 MG/DL — SIGNIFICANT CHANGE UP (ref 2.5–4.5)
PHOSPHATE SERPL-MCNC: 3.3 MG/DL — SIGNIFICANT CHANGE UP (ref 2.5–4.5)
PHOSPHATE SERPL-MCNC: 3.6 MG/DL — SIGNIFICANT CHANGE UP (ref 2.5–4.5)
PHOSPHATE SERPL-MCNC: 4.1 MG/DL — SIGNIFICANT CHANGE UP (ref 2.5–4.5)
PLAT MORPH BLD: NORMAL — SIGNIFICANT CHANGE UP
PLATELET # BLD AUTO: 136 K/UL — LOW (ref 150–400)
PLATELET # BLD AUTO: 56 K/UL — LOW (ref 150–400)
PLATELET # BLD AUTO: 81 K/UL — LOW (ref 150–400)
PLATELET # BLD AUTO: 93 K/UL — LOW (ref 150–400)
POTASSIUM SERPL-MCNC: 2.9 MMOL/L — CRITICAL LOW (ref 3.5–5.3)
POTASSIUM SERPL-MCNC: 3.2 MMOL/L — LOW (ref 3.5–5.3)
POTASSIUM SERPL-MCNC: 4.2 MMOL/L — SIGNIFICANT CHANGE UP (ref 3.5–5.3)
POTASSIUM SERPL-MCNC: 4.3 MMOL/L — SIGNIFICANT CHANGE UP (ref 3.5–5.3)
POTASSIUM SERPL-SCNC: 2.9 MMOL/L — CRITICAL LOW (ref 3.5–5.3)
POTASSIUM SERPL-SCNC: 3.2 MMOL/L — LOW (ref 3.5–5.3)
POTASSIUM SERPL-SCNC: 4.2 MMOL/L — SIGNIFICANT CHANGE UP (ref 3.5–5.3)
POTASSIUM SERPL-SCNC: 4.3 MMOL/L — SIGNIFICANT CHANGE UP (ref 3.5–5.3)
PROT SERPL-MCNC: 4.6 G/DL — LOW (ref 6–8.3)
PROT SERPL-MCNC: 4.8 G/DL — LOW (ref 6–8.3)
PROT SERPL-MCNC: 4.9 G/DL — LOW (ref 6–8.3)
PROT SERPL-MCNC: 7 G/DL — SIGNIFICANT CHANGE UP (ref 6–8.3)
PROT UR-MCNC: NEGATIVE MG/DL — SIGNIFICANT CHANGE UP
PROTHROM AB SERPL-ACNC: 14.5 SEC — HIGH (ref 9.9–13.4)
PROTHROM AB SERPL-ACNC: 16.7 SEC — HIGH (ref 9.9–13.4)
PROTHROM AB SERPL-ACNC: 20.1 SEC — HIGH (ref 9.9–13.4)
RAPIDTEG MAXIMUM AMPLITUDE: 54.3 MM — SIGNIFICANT CHANGE UP (ref 52–70)
RAPIDTEG MAXIMUM AMPLITUDE: 58.9 MM — SIGNIFICANT CHANGE UP (ref 52–70)
RAPIDTEG MAXIMUM AMPLITUDE: 59.4 MM — SIGNIFICANT CHANGE UP (ref 52–70)
RAPIDTEG MAXIMUM AMPLITUDE: 61.4 MM — SIGNIFICANT CHANGE UP (ref 52–70)
RAPIDTEG MAXIMUM AMPLITUDE: 62.2 MM — SIGNIFICANT CHANGE UP (ref 52–70)
RAPIDTEG MAXIMUM AMPLITUDE: 63.3 MM — SIGNIFICANT CHANGE UP (ref 52–70)
RBC # BLD: 2.65 M/UL — LOW (ref 3.8–5.2)
RBC # BLD: 2.7 M/UL — LOW (ref 3.8–5.2)
RBC # BLD: 2.82 M/UL — LOW (ref 3.8–5.2)
RBC # BLD: 3.23 M/UL — LOW (ref 3.8–5.2)
RBC # FLD: 15.2 % — HIGH (ref 10.3–14.5)
RBC # FLD: 15.4 % — HIGH (ref 10.3–14.5)
RBC # FLD: 15.8 % — HIGH (ref 10.3–14.5)
RBC # FLD: 16.1 % — HIGH (ref 10.3–14.5)
RBC BLD AUTO: SIGNIFICANT CHANGE UP
SARS-COV-2 RNA SPEC QL NAA+PROBE: SIGNIFICANT CHANGE UP
SODIUM SERPL-SCNC: 134 MMOL/L — LOW (ref 135–145)
SODIUM SERPL-SCNC: 141 MMOL/L — SIGNIFICANT CHANGE UP (ref 135–145)
SODIUM SERPL-SCNC: 141 MMOL/L — SIGNIFICANT CHANGE UP (ref 135–145)
SODIUM SERPL-SCNC: 144 MMOL/L — SIGNIFICANT CHANGE UP (ref 135–145)
SP GR SPEC: 1.01 — SIGNIFICANT CHANGE UP (ref 1–1.03)
TEG FUNCTIONAL FIBRINOGEN: 19.2 MM — SIGNIFICANT CHANGE UP (ref 15–32)
TEG FUNCTIONAL FIBRINOGEN: 19.2 MM — SIGNIFICANT CHANGE UP (ref 15–32)
TEG FUNCTIONAL FIBRINOGEN: 19.5 MM — SIGNIFICANT CHANGE UP (ref 15–32)
TEG FUNCTIONAL FIBRINOGEN: 20.5 MM — SIGNIFICANT CHANGE UP (ref 15–32)
TEG FUNCTIONAL FIBRINOGEN: 20.6 MM — SIGNIFICANT CHANGE UP (ref 15–32)
TEG FUNCTIONAL FIBRINOGEN: 22.7 MM — SIGNIFICANT CHANGE UP (ref 15–32)
TEG MAXIMUM AMPLITUDE: 52 MM — SIGNIFICANT CHANGE UP (ref 52–69)
TEG MAXIMUM AMPLITUDE: 58.8 MM — SIGNIFICANT CHANGE UP (ref 52–69)
TEG MAXIMUM AMPLITUDE: 59.9 MM — SIGNIFICANT CHANGE UP (ref 52–69)
TEG MAXIMUM AMPLITUDE: 61.9 MM — SIGNIFICANT CHANGE UP (ref 52–69)
TEG MAXIMUM AMPLITUDE: 63.5 MM — SIGNIFICANT CHANGE UP (ref 52–69)
TEG MAXIMUM AMPLITUDE: 64 MM — SIGNIFICANT CHANGE UP (ref 52–69)
TEG REACTION TIME: 5.9 MIN — SIGNIFICANT CHANGE UP (ref 4.6–9.1)
TEG REACTION TIME: 6.1 MIN — SIGNIFICANT CHANGE UP (ref 4.6–9.1)
TEG REACTION TIME: 6.1 MIN — SIGNIFICANT CHANGE UP (ref 4.6–9.1)
TEG REACTION TIME: 6.7 MIN — SIGNIFICANT CHANGE UP (ref 4.6–9.1)
TEG REACTION TIME: 7.3 MIN — SIGNIFICANT CHANGE UP (ref 4.6–9.1)
TEG REACTION TIME: 7.7 MIN — SIGNIFICANT CHANGE UP (ref 4.6–9.1)
UROBILINOGEN FLD QL: 1 MG/DL — SIGNIFICANT CHANGE UP (ref 0.2–1)
VZV IGG FLD QL IA: 27.9 S/CO — SIGNIFICANT CHANGE UP
VZV IGG FLD QL IA: POSITIVE — SIGNIFICANT CHANGE UP
WBC # BLD: 3.35 K/UL — LOW (ref 3.8–10.5)
WBC # BLD: 4.78 K/UL — SIGNIFICANT CHANGE UP (ref 3.8–10.5)
WBC # BLD: 5.28 K/UL — SIGNIFICANT CHANGE UP (ref 3.8–10.5)
WBC # BLD: 5.73 K/UL — SIGNIFICANT CHANGE UP (ref 3.8–10.5)
WBC # FLD AUTO: 3.35 K/UL — LOW (ref 3.8–10.5)
WBC # FLD AUTO: 4.78 K/UL — SIGNIFICANT CHANGE UP (ref 3.8–10.5)
WBC # FLD AUTO: 5.28 K/UL — SIGNIFICANT CHANGE UP (ref 3.8–10.5)
WBC # FLD AUTO: 5.73 K/UL — SIGNIFICANT CHANGE UP (ref 3.8–10.5)

## 2025-01-04 PROCEDURE — 71045 X-RAY EXAM CHEST 1 VIEW: CPT | Mod: 26

## 2025-01-04 PROCEDURE — 88309 TISSUE EXAM BY PATHOLOGIST: CPT | Mod: 26

## 2025-01-04 PROCEDURE — 88304 TISSUE EXAM BY PATHOLOGIST: CPT | Mod: 26

## 2025-01-04 PROCEDURE — 76705 ECHO EXAM OF ABDOMEN: CPT | Mod: 26,59

## 2025-01-04 PROCEDURE — 71045 X-RAY EXAM CHEST 1 VIEW: CPT | Mod: 26,77

## 2025-01-04 PROCEDURE — 47135 TRANSPLANTATION OF LIVER: CPT | Mod: GC

## 2025-01-04 PROCEDURE — 88307 TISSUE EXAM BY PATHOLOGIST: CPT | Mod: 26

## 2025-01-04 PROCEDURE — 93975 VASCULAR STUDY: CPT | Mod: 26

## 2025-01-04 PROCEDURE — 88313 SPECIAL STAINS GROUP 2: CPT | Mod: 26

## 2025-01-04 PROCEDURE — 88342 IMHCHEM/IMCYTCHM 1ST ANTB: CPT | Mod: 26

## 2025-01-04 DEVICE — KIT A-LINE 1LUM 20G X 12CM SAFE KIT: Type: IMPLANTABLE DEVICE | Status: FUNCTIONAL

## 2025-01-04 DEVICE — STAPLER ETHICON GST ECHELON 45MM WHITE RELOAD: Type: IMPLANTABLE DEVICE | Status: FUNCTIONAL

## 2025-01-04 DEVICE — KIT CVC 2LUM MAC 9FR CHG: Type: IMPLANTABLE DEVICE | Status: FUNCTIONAL

## 2025-01-04 RX ORDER — SODIUM CHLORIDE 9 MG/ML
1000 INJECTION, SOLUTION INTRAVENOUS
Refills: 0 | Status: DISCONTINUED | OUTPATIENT
Start: 2025-01-04 | End: 2025-01-05

## 2025-01-04 RX ORDER — ASPIRIN 81 MG
81 TABLET, DELAYED RELEASE (ENTERIC COATED) ORAL DAILY
Refills: 0 | Status: DISCONTINUED | OUTPATIENT
Start: 2025-01-05 | End: 2025-01-06

## 2025-01-04 RX ORDER — POTASSIUM CHLORIDE 600 MG/1
20 TABLET, FILM COATED, EXTENDED RELEASE ORAL
Refills: 0 | Status: COMPLETED | OUTPATIENT
Start: 2025-01-04 | End: 2025-01-04

## 2025-01-04 RX ORDER — METHYLPREDNISOLONE 4 MG/1
125 TABLET ORAL EVERY 24 HOURS
Refills: 0 | Status: COMPLETED | OUTPATIENT
Start: 2025-01-07 | End: 2025-01-07

## 2025-01-04 RX ORDER — HYDROMORPHONE HCL 4 MG
0.25 TABLET ORAL EVERY 4 HOURS
Refills: 0 | Status: DISCONTINUED | OUTPATIENT
Start: 2025-01-04 | End: 2025-01-07

## 2025-01-04 RX ORDER — MYCOPHENOLATE MOFETIL 250 MG/1
1000 CAPSULE ORAL
Refills: 0 | Status: DISCONTINUED | OUTPATIENT
Start: 2025-01-04 | End: 2025-01-06

## 2025-01-04 RX ORDER — SULFAMETHOXAZOLE/TRIMETHOPRIM 800-160 MG
10 TABLET ORAL DAILY
Refills: 0 | Status: DISCONTINUED | OUTPATIENT
Start: 2025-01-05 | End: 2025-01-09

## 2025-01-04 RX ORDER — ASPIRIN 81 MG
81 TABLET, DELAYED RELEASE (ENTERIC COATED) ORAL ONCE
Refills: 0 | Status: COMPLETED | OUTPATIENT
Start: 2025-01-04 | End: 2025-01-04

## 2025-01-04 RX ORDER — VALGANCICLOVIR 450 MG/1
450 TABLET, FILM COATED ORAL DAILY
Refills: 0 | Status: DISCONTINUED | OUTPATIENT
Start: 2025-01-05 | End: 2025-01-06

## 2025-01-04 RX ORDER — OXYCODONE HCL 15 MG
2.5 TABLET ORAL EVERY 4 HOURS
Refills: 0 | Status: DISCONTINUED | OUTPATIENT
Start: 2025-01-04 | End: 2025-01-05

## 2025-01-04 RX ORDER — CHLORHEXIDINE GLUCONATE 1.2 MG/ML
1 RINSE ORAL
Refills: 0 | Status: DISCONTINUED | OUTPATIENT
Start: 2025-01-04 | End: 2025-01-16

## 2025-01-04 RX ORDER — ONDANSETRON 4 MG/1
4 TABLET ORAL ONCE
Refills: 0 | Status: DISCONTINUED | OUTPATIENT
Start: 2025-01-04 | End: 2025-01-04

## 2025-01-04 RX ORDER — PANTOPRAZOLE 40 MG/1
40 TABLET, DELAYED RELEASE ORAL DAILY
Refills: 0 | Status: DISCONTINUED | OUTPATIENT
Start: 2025-01-04 | End: 2025-01-12

## 2025-01-04 RX ORDER — LINEZOLID 600 MG/1
600 TABLET ORAL EVERY 12 HOURS
Refills: 0 | Status: DISCONTINUED | OUTPATIENT
Start: 2025-01-04 | End: 2025-01-06

## 2025-01-04 RX ORDER — FLUCONAZOLE 200 MG/1
400 TABLET ORAL DAILY
Refills: 0 | Status: DISCONTINUED | OUTPATIENT
Start: 2025-01-04 | End: 2025-01-04

## 2025-01-04 RX ORDER — HYDROMORPHONE HCL 4 MG
0.25 TABLET ORAL EVERY 4 HOURS
Refills: 0 | Status: DISCONTINUED | OUTPATIENT
Start: 2025-01-04 | End: 2025-01-04

## 2025-01-04 RX ORDER — POTASSIUM CHLORIDE 600 MG/1
40 TABLET, FILM COATED, EXTENDED RELEASE ORAL ONCE
Refills: 0 | Status: COMPLETED | OUTPATIENT
Start: 2025-01-04 | End: 2025-01-04

## 2025-01-04 RX ORDER — INSULIN LISPRO 100/ML
VIAL (ML) SUBCUTANEOUS EVERY 6 HOURS
Refills: 0 | Status: DISCONTINUED | OUTPATIENT
Start: 2025-01-04 | End: 2025-01-04

## 2025-01-04 RX ORDER — FLUCONAZOLE 200 MG/1
400 TABLET ORAL DAILY
Refills: 0 | Status: DISCONTINUED | OUTPATIENT
Start: 2025-01-05 | End: 2025-01-06

## 2025-01-04 RX ORDER — CALCIUM CARBONATE/VITAMIN D3 500MG-5MCG
1 TABLET ORAL
Refills: 0 | Status: DISCONTINUED | OUTPATIENT
Start: 2025-01-06 | End: 2025-01-16

## 2025-01-04 RX ORDER — POTASSIUM CHLORIDE 600 MG/1
10 TABLET, FILM COATED, EXTENDED RELEASE ORAL
Refills: 0 | Status: COMPLETED | OUTPATIENT
Start: 2025-01-04 | End: 2025-01-04

## 2025-01-04 RX ORDER — AMPICILLIN SODIUM AND SULBACTAM SODIUM 100; 50 MG/ML; MG/ML
3 INJECTION, POWDER, FOR SOLUTION INTRAVENOUS EVERY 6 HOURS
Refills: 0 | Status: COMPLETED | OUTPATIENT
Start: 2025-01-04 | End: 2025-01-06

## 2025-01-04 RX ORDER — SENNOSIDES 8.6 MG/1
2 TABLET, FILM COATED ORAL
Refills: 0 | Status: DISCONTINUED | OUTPATIENT
Start: 2025-01-04 | End: 2025-01-04

## 2025-01-04 RX ORDER — METHYLPREDNISOLONE 4 MG/1
TABLET ORAL
Refills: 0 | Status: COMPLETED | OUTPATIENT
Start: 2025-01-07 | End: 2025-01-10

## 2025-01-04 RX ORDER — OXYCODONE HCL 15 MG
5 TABLET ORAL EVERY 4 HOURS
Refills: 0 | Status: DISCONTINUED | OUTPATIENT
Start: 2025-01-04 | End: 2025-01-05

## 2025-01-04 RX ORDER — MYCOPHENOLATE MOFETIL 250 MG/1
1000 CAPSULE ORAL
Refills: 0 | Status: DISCONTINUED | OUTPATIENT
Start: 2025-01-04 | End: 2025-01-04

## 2025-01-04 RX ORDER — INSULIN HUMAN 100 [IU]/ML
INJECTION, SOLUTION SUBCUTANEOUS
Refills: 0 | Status: DISCONTINUED | OUTPATIENT
Start: 2025-01-04 | End: 2025-01-04

## 2025-01-04 RX ORDER — METHYLPREDNISOLONE 4 MG/1
TABLET ORAL
Refills: 0 | Status: COMPLETED | OUTPATIENT
Start: 2025-01-05 | End: 2025-01-06

## 2025-01-04 RX ORDER — HYDROMORPHONE HCL 4 MG
0.5 TABLET ORAL EVERY 4 HOURS
Refills: 0 | Status: DISCONTINUED | OUTPATIENT
Start: 2025-01-04 | End: 2025-01-04

## 2025-01-04 RX ORDER — METHYLPREDNISOLONE 4 MG/1
500 TABLET ORAL EVERY 24 HOURS
Refills: 0 | Status: COMPLETED | OUTPATIENT
Start: 2025-01-05 | End: 2025-01-05

## 2025-01-04 RX ORDER — INSULIN LISPRO 100/ML
VIAL (ML) SUBCUTANEOUS EVERY 4 HOURS
Refills: 0 | Status: DISCONTINUED | OUTPATIENT
Start: 2025-01-04 | End: 2025-01-05

## 2025-01-04 RX ORDER — BASILIXIMAB 10 MG/2.5ML
20 INJECTION, POWDER, FOR SOLUTION INTRAVENOUS ONCE
Refills: 0 | Status: COMPLETED | OUTPATIENT
Start: 2025-01-08 | End: 2025-01-08

## 2025-01-04 RX ORDER — INSULIN HUMAN 100 [IU]/ML
2 INJECTION, SOLUTION SUBCUTANEOUS
Qty: 100 | Refills: 0 | Status: DISCONTINUED | OUTPATIENT
Start: 2025-01-04 | End: 2025-01-04

## 2025-01-04 RX ORDER — PREDNISONE 5 MG
20 TABLET ORAL DAILY
Refills: 0 | Status: DISCONTINUED | OUTPATIENT
Start: 2025-01-11 | End: 2025-01-16

## 2025-01-04 RX ADMIN — Medication 0.25 MILLIGRAM(S): at 22:44

## 2025-01-04 RX ADMIN — Medication 0.25 MILLIGRAM(S): at 23:00

## 2025-01-04 RX ADMIN — AMPICILLIN SODIUM AND SULBACTAM SODIUM 200 GRAM(S): 100; 50 INJECTION, POWDER, FOR SOLUTION INTRAVENOUS at 17:07

## 2025-01-04 RX ADMIN — Medication 0.5 MILLIGRAM(S): at 17:48

## 2025-01-04 RX ADMIN — LINEZOLID 300 MILLIGRAM(S): 600 TABLET ORAL at 18:04

## 2025-01-04 RX ADMIN — POTASSIUM CHLORIDE 40 MILLIEQUIVALENT(S): 600 TABLET, FILM COATED, EXTENDED RELEASE ORAL at 03:02

## 2025-01-04 RX ADMIN — POTASSIUM CHLORIDE 100 MILLIEQUIVALENT(S): 600 TABLET, FILM COATED, EXTENDED RELEASE ORAL at 17:50

## 2025-01-04 RX ADMIN — POTASSIUM CHLORIDE 100 MILLIEQUIVALENT(S): 600 TABLET, FILM COATED, EXTENDED RELEASE ORAL at 04:09

## 2025-01-04 RX ADMIN — Medication 5 MILLIGRAM(S): at 20:45

## 2025-01-04 RX ADMIN — Medication 500 MILLILITER(S): at 20:12

## 2025-01-04 RX ADMIN — Medication 5 MILLIGRAM(S): at 20:15

## 2025-01-04 RX ADMIN — Medication 0.5 MILLIGRAM(S): at 17:33

## 2025-01-04 RX ADMIN — Medication 81 MILLIGRAM(S): at 18:44

## 2025-01-04 RX ADMIN — Medication 500 MILLILITER(S): at 17:27

## 2025-01-04 RX ADMIN — Medication 2: at 22:47

## 2025-01-04 RX ADMIN — Medication 500 MILLILITER(S): at 17:28

## 2025-01-04 RX ADMIN — AMPICILLIN SODIUM AND SULBACTAM SODIUM 200 GRAM(S): 100; 50 INJECTION, POWDER, FOR SOLUTION INTRAVENOUS at 23:25

## 2025-01-04 RX ADMIN — SODIUM CHLORIDE 125 MILLILITER(S): 9 INJECTION, SOLUTION INTRAVENOUS at 20:13

## 2025-01-04 RX ADMIN — POTASSIUM CHLORIDE 100 MILLIEQUIVALENT(S): 600 TABLET, FILM COATED, EXTENDED RELEASE ORAL at 03:02

## 2025-01-04 RX ADMIN — POTASSIUM CHLORIDE 100 MILLIEQUIVALENT(S): 600 TABLET, FILM COATED, EXTENDED RELEASE ORAL at 17:05

## 2025-01-04 RX ADMIN — SODIUM CHLORIDE 125 MILLILITER(S): 9 INJECTION, SOLUTION INTRAVENOUS at 15:50

## 2025-01-04 RX ADMIN — POTASSIUM CHLORIDE 100 MILLIEQUIVALENT(S): 600 TABLET, FILM COATED, EXTENDED RELEASE ORAL at 18:04

## 2025-01-04 RX ADMIN — SENNOSIDES 2 TABLET(S): 8.6 TABLET, FILM COATED ORAL at 17:06

## 2025-01-04 RX ADMIN — MYCOPHENOLATE MOFETIL 1000 MILLIGRAM(S): 250 CAPSULE ORAL at 20:13

## 2025-01-04 RX ADMIN — Medication 500 MILLILITER(S): at 23:24

## 2025-01-04 RX ADMIN — POTASSIUM CHLORIDE 100 MILLIEQUIVALENT(S): 600 TABLET, FILM COATED, EXTENDED RELEASE ORAL at 05:11

## 2025-01-04 NOTE — PROGRESS NOTE ADULT - ASSESSMENT
64F with a PMHx of breast cancer s/p lumpectomy, HTN, and ETOH cirrhosis, Umbilical Hernia Repair 9/2024, now s/p OLT under Simulect induction on 1/4/25    s/p OLT (POD#0)  -opening LFTs appropriate, continue to trend all labs q4h o/n  -graft required HA reconstruction, will follow dopplers closely  -hold ASA/SQH for now  -, Albumin bolus prn  -Unasyn x48H  -bowel regimen  -PT/OT/RD/Spirometry/SCDs  -strict I&Os: JPs x3, corrales, keep NGT LIS o/n  -NPO    Immunosuppression  -FK TBD, MMF 1/1, SST  -SIMULECT POD#4  -PPx: Valcyte/Bactrim/Fluc/PPI/OsCal 64F with a PMHx of breast cancer s/p lumpectomy, HTN, and ETOH cirrhosis, Umbilical Hernia Repair 9/2024, now s/p OLT under Simulect induction on 1/4/25    s/p OLT (POD#0)  -opening LFTs appropriate, continue to trend all labs q4h o/n  -donor graft with replaced R TANG, will follow dopplers closely  -hold ASA/SQH for now  -, Albumin bolus prn  -Unasyn x48H  -bowel regimen  -PT/OT/RD/Spirometry/SCDs  -strict I&Os: JPs x3, corrales, keep NGT LIS o/n  -NPO    Immunosuppression  -FK TBD, MMF 1/1, SST  -SIMULECT POD#4  -PPx: Valcyte/Bactrim/Fluc/PPI/OsCal 64F with a PMHx of breast cancer s/p lumpectomy, HTN, and ETOH cirrhosis, Umbilical Hernia Repair 9/2024, now s/p OLT under Simulect induction on 1/4/25    s/p OLT (POD#0)  -opening LFTs appropriate, continue to trend all labs q4h o/n  -donor graft with replaced R TANG, will follow dopplers closely  -hold ASA/SQH for now  -, Albumin bolus prn  -Unasyn x48H  -bowel regimen  -PT/OT/RD/Spirometry/SCDs  -strict I&Os: JPs x3, corrales, keep NGT LIS o/n  -NPO      Immunosuppression  -FK TBD, MMF 1/1, SST  -SIMULECT POD#4  -PPx: Valcyte/Bactrim/Fluc/PPI/OsCal

## 2025-01-04 NOTE — PROGRESS NOTE ADULT - SUBJECTIVE AND OBJECTIVE BOX
Transplant Surgery - POC  --------------------------------------------------------------  OLTx   1/4/2025   POD#0    64F with a PMHx of breast cancer s/p lumpectomy, HTN, and ETOH cirrhosis, Umbilical Hernia Repair 9/2024, now s/p OLT under Simulect induction on 1/4/25      Interval Events:  Afebrile, VSS. not on pressors  Extubated in OR  AAOx3, waking up slowly  opening enzymes appropriate, lactate clearing  drains appropriate sanguinous     Immunosuppression:  -FK TBD, MMF 1/1, SST  -SIMULECT POD#4  -Ongoing monitoring for signs of rejection      Potential Discharge date: TBD    Education:  Medications    Plan of care:  See Below    MEDICATIONS  (STANDING):  ampicillin/sulbactam  IVPB 3 Gram(s) IV Intermittent every 6 hours  chlorhexidine 2% Cloths 1 Application(s) Topical <User Schedule>  influenza   Vaccine 0.5 milliLiter(s) IntraMuscular once  insulin lispro (ADMELOG) corrective regimen sliding scale   SubCutaneous every 4 hours  linezolid  IVPB 600 milliGRAM(s) IV Intermittent every 12 hours  multiple electrolytes Injection Type 1 1000 milliLiter(s) (125 mL/Hr) IV Continuous <Continuous>  mycophenolate mofetil Suspension 1000 milliGRAM(s) Oral <User Schedule>  pantoprazole  Injectable 40 milliGRAM(s) IV Push daily    MEDICATIONS  (PRN):  oxyCODONE    Solution 2.5 milliGRAM(s) Enteral Tube every 4 hours PRN Moderate Pain (4 - 6)  oxyCODONE    Solution 5 milliGRAM(s) Enteral Tube every 4 hours PRN Severe Pain (7 - 10)      PAST MEDICAL & SURGICAL HISTORY:  Breast cancer, left      Mild alcohol use disorder      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      GERD (gastroesophageal reflux disease)      Skin cancer, basal cell      H/O lumpectomy      History of removal of skin mole      H/O ganglion cyst          Vital Signs Last 24 Hrs  T(C): 36.7 (04 Jan 2025 19:00), Max: 36.8 (04 Jan 2025 14:57)  T(F): 98.1 (04 Jan 2025 19:00), Max: 98.2 (04 Jan 2025 14:57)  HR: 95 (04 Jan 2025 21:30) (76 - 104)  BP: 97/57 (04 Jan 2025 19:30) (97/57 - 117/70)  BP(mean): 71 (04 Jan 2025 19:30) (71 - 76)  RR: 20 (04 Jan 2025 21:30) (12 - 20)  SpO2: 99% (04 Jan 2025 21:30) (97% - 100%)    Parameters below as of 04 Jan 2025 19:00  Patient On (Oxygen Delivery Method): room air        I&O's Summary    04 Jan 2025 07:01  -  04 Jan 2025 22:00  --------------------------------------------------------  IN: 1855 mL / OUT: 1155 mL / NET: 700 mL                              8.0    5.28  )-----------( 93       ( 04 Jan 2025 18:34 )             22.8     01-04    141  |  106  |  20  ----------------------------<  230[H]  4.3   |  22  |  0.89    Ca    8.7      04 Jan 2025 18:34  Phos  3.6     01-04  Mg     2.0     01-04    TPro  4.6[L]  /  Alb  2.8[L]  /  TBili  2.4[H]  /  DBili  x   /  AST  827[H]  /  ALT  474[H]  /  AlkPhos  56  01-04        Review of systems  Gen: No weight changes, fatigue, fevers/chills, weakness  Skin: No rashes  Head/Eyes/Ears/Mouth: No headache; Normal hearing; Normal vision w/o blurriness; No sinus pain/discomfort, sore throat  Respiratory: No dyspnea, cough, wheezing, hemoptysis  CV: No chest pain, PND, orthopnea  GI: C/O mild abdominal pain at surgical site. no diarrhea, constipation, nausea, vomiting, melena, hematochezia  : No increased frequency, dysuria, hematuria, nocturia  MSK: No joint pain/swelling; no back pain; no edema  Neuro: No dizziness/lightheadedness, weakness, seizures, numbness, tingling  Heme: No easy bruising or bleeding  Endo: No heat/cold intolerance  Psych: No significant nervousness, anxiety, stress, depression  All other systems were reviewed and are negative, except as noted.      PHYSICAL EXAM:  Constitutional: Well developed / well nourished  Eyes: PERRLA  ENMT: nc/at, no thrush, NGT minimal bile  Neck: supple, central line c/d/i  Respiratory: CTA B/L  Cardiovascular: RRR  Gastrointestinal: Soft abdomen, ND, appropriate incisional TTP. dressing c/d/i, JPs x3 appropriately sanguinous  Genitourinary: Urinary catheter in place, 50-70/h  Extremities: SCD's in place and working bilaterally  Vascular: Palpable dp pulses bilaterally.   Neurological: A&O x3, waking up  Skin: no rashes, ulcerations, lesions  Musculoskeletal: Moving all extremities  Psychiatric: Responsive     Transplant Surgery - POC  --------------------------------------------------------------  OLTx   1/4/2025   POD#0    64F with a PMHx of breast cancer s/p lumpectomy, HTN, and ETOH cirrhosis, Umbilical Hernia Repair 9/2024, now s/p OLT under Simulect induction on 1/4/25    EBL: 1L           10 PRBC/6 FFP/4 PLT/ 4 Cryo        Interval Events:  Afebrile, VSS. not on pressors  Extubated in OR  AAOx3, waking up slowly  opening enzymes appropriate, lactate clearing  drains appropriate sanguinous     Immunosuppression:  -FK TBD, MMF 1/1, SST  -SIMULECT POD#4  -Ongoing monitoring for signs of rejection      Potential Discharge date: TBD    Education:  Medications    Plan of care:  See Below    MEDICATIONS  (STANDING):  ampicillin/sulbactam  IVPB 3 Gram(s) IV Intermittent every 6 hours  chlorhexidine 2% Cloths 1 Application(s) Topical <User Schedule>  influenza   Vaccine 0.5 milliLiter(s) IntraMuscular once  insulin lispro (ADMELOG) corrective regimen sliding scale   SubCutaneous every 4 hours  linezolid  IVPB 600 milliGRAM(s) IV Intermittent every 12 hours  multiple electrolytes Injection Type 1 1000 milliLiter(s) (125 mL/Hr) IV Continuous <Continuous>  mycophenolate mofetil Suspension 1000 milliGRAM(s) Oral <User Schedule>  pantoprazole  Injectable 40 milliGRAM(s) IV Push daily    MEDICATIONS  (PRN):  oxyCODONE    Solution 2.5 milliGRAM(s) Enteral Tube every 4 hours PRN Moderate Pain (4 - 6)  oxyCODONE    Solution 5 milliGRAM(s) Enteral Tube every 4 hours PRN Severe Pain (7 - 10)      PAST MEDICAL & SURGICAL HISTORY:  Breast cancer, left      Mild alcohol use disorder      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      GERD (gastroesophageal reflux disease)      Skin cancer, basal cell      H/O lumpectomy      History of removal of skin mole      H/O ganglion cyst          Vital Signs Last 24 Hrs  T(C): 36.7 (04 Jan 2025 19:00), Max: 36.8 (04 Jan 2025 14:57)  T(F): 98.1 (04 Jan 2025 19:00), Max: 98.2 (04 Jan 2025 14:57)  HR: 95 (04 Jan 2025 21:30) (76 - 104)  BP: 97/57 (04 Jan 2025 19:30) (97/57 - 117/70)  BP(mean): 71 (04 Jan 2025 19:30) (71 - 76)  RR: 20 (04 Jan 2025 21:30) (12 - 20)  SpO2: 99% (04 Jan 2025 21:30) (97% - 100%)    Parameters below as of 04 Jan 2025 19:00  Patient On (Oxygen Delivery Method): room air        I&O's Summary    04 Jan 2025 07:01  -  04 Jan 2025 22:00  --------------------------------------------------------  IN: 1855 mL / OUT: 1155 mL / NET: 700 mL                              8.0    5.28  )-----------( 93       ( 04 Jan 2025 18:34 )             22.8     01-04    141  |  106  |  20  ----------------------------<  230[H]  4.3   |  22  |  0.89    Ca    8.7      04 Jan 2025 18:34  Phos  3.6     01-04  Mg     2.0     01-04    TPro  4.6[L]  /  Alb  2.8[L]  /  TBili  2.4[H]  /  DBili  x   /  AST  827[H]  /  ALT  474[H]  /  AlkPhos  56  01-04        Review of systems  Gen: No weight changes, fatigue, fevers/chills, weakness  Skin: No rashes  Head/Eyes/Ears/Mouth: No headache; Normal hearing; Normal vision w/o blurriness; No sinus pain/discomfort, sore throat  Respiratory: No dyspnea, cough, wheezing, hemoptysis  CV: No chest pain, PND, orthopnea  GI: C/O mild abdominal pain at surgical site. no diarrhea, constipation, nausea, vomiting, melena, hematochezia  : No increased frequency, dysuria, hematuria, nocturia  MSK: No joint pain/swelling; no back pain; no edema  Neuro: No dizziness/lightheadedness, weakness, seizures, numbness, tingling  Heme: No easy bruising or bleeding  Endo: No heat/cold intolerance  Psych: No significant nervousness, anxiety, stress, depression  All other systems were reviewed and are negative, except as noted.      PHYSICAL EXAM:  Constitutional: Well developed / well nourished  Eyes: PERRLA  ENMT: nc/at, no thrush, NGT minimal bile  Neck: supple, central line c/d/i  Respiratory: CTA B/L  Cardiovascular: RRR  Gastrointestinal: Soft abdomen, ND, appropriate incisional TTP. dressing c/d/i, JPs x3 appropriately sanguinous  Genitourinary: Urinary catheter in place, 50-70/h  Extremities: SCD's in place and working bilaterally  Vascular: Palpable dp pulses bilaterally.   Neurological: A&O x3, waking up  Skin: no rashes, ulcerations, lesions  Musculoskeletal: Moving all extremities  Psychiatric: Responsive

## 2025-01-04 NOTE — BRIEF OPERATIVE NOTE - OPERATION/FINDINGS
Orthotopic Liver transplant with standard anastomoses (Side-side Cavocavostomy, End-end portoportal reconstruction, End-end Single arterial reconstruction (Donor Right, Segment IV and Left Hepatic Arteries arising separately from celiac trunk, Redo hepatic artery anastomosis ) , D-D biliary reconstruction without stenting) and 19Fr Victoriano drain x3 (# 1 Under the right lobe, # 2 At the [place], # 3 Under the left lobe).   CIT for liver 20 hours , Liver on Transmedic liver Pump

## 2025-01-04 NOTE — H&P ADULT - HISTORY OF PRESENT ILLNESS
64 year old female with a PMH of breast CA s/p lumpectomy, ETOH abuse, and HTN. She was admitted to [facility] from 2/17-2/25 for abdominal distention and jaundice. She originally saw PCP in Jan 2023 when she noted she was turning yellow, PCP did bloodwork and referred her her GI: Jai Lazcano. She states she used to have 2 glasses of vodka daily for 3 years to treat her chronic pain from her breast cancer . She reports she was on Letrozole for her breast caner and was taking Tylenol daily at recommended doses to treat her pain, . Her breast  cancer diagnosis was several years ago . Has received intermittent paracentesis but has spaced out > 2 months. She has an umbilical hernia repair on 9/17/24 . Pt presented for OLT. on 1/4/25.

## 2025-01-04 NOTE — CONSULT NOTE ADULT - SUBJECTIVE AND OBJECTIVE BOX
HISTORY OF PRESENT ILLNESS:  ABBY MALAGON is a 64y Female with a PMHx of breast cancer s/p lumpectomy, HTN, and ETOH cirrhosis. Patient was admitted to [facility] in February of 2024 for management of ascites and jaundice. She has since been living at home and receiving intermittent paracentesis to manage ascites but procedures have spaced out to >2 months. Patient was listed for OLT with MELD of 18 is now s/p OLT on 1/4/2025 (POD#0).     SURGICAL DETAILS:  EBL: 1L       Normosol: 3.5L        Albumin: 500cc         UOP: 1.5L         10 PRBC/4 PLT/6 FFP/ 4 Cryo          PAST MEDICAL HISTORY:   Breast cancer, left  Mild alcohol use disorder  H/O hepatitis  GERD (gastroesophageal reflux disease)  Skin cancer, basal cell    PAST SURGICAL HISTORY:   H/O umbilical hernia repair, 09/17/2024  H/O lumpectomy  History of removal of skin mole  H/O ganglion cyst      FAMILY HISTORY: Unknown    SOCIAL HISTORY: 2 glasses of vodka daily x 3 years    CODE STATUS: Full Code    HOME MEDICATIONS: (as per med rec 9/24/2024)   Aldactone 100mg, QD  Furosemide 40mg, BID  Pantoprazole 40mg, QD  Miralax 17g, BID  Senna 2 tablets, QHS    ALLERGIES: No Known Allergies      VITAL SIGNS:  ICU Vital Signs Last 24 Hrs  T(C): 36.7 (04 Jan 2025 05:56), Max: 36.7 (03 Jan 2025 22:53)  T(F): 98.1 (03 Jan 2025 22:53), Max: 98.1 (03 Jan 2025 22:53)  HR: 103 (04 Jan 2025 14:57) (84 - 103)  BP: 117/70 (04 Jan 2025 05:56) (117/70 - 117/70)  BP(mean): --  ABP: 121/56 (04 Jan 2025 14:57) (121/56 - 121/56)  ABP(mean): 78 (04 Jan 2025 14:57) (78 - 78)  RR: 14 (04 Jan 2025 14:57) (14 - 18)  SpO2: 100% (04 Jan 2025 14:57) (100% - 100%)        NEURO  Exam: Waking up from sedation, AOx4 at baseline      RESPIRATORY: O2 @ 6L NC  ABG - ( 04 Jan 2025 13:25 )  pH: 7.37  /  pCO2: 43    /  pO2: 245   / HCO3: 25    / Base Excess: -0.5  /  SaO2: 99.5    Lactate: x      Exam: CTA b/l, normal breath sounds      CARDIOVASCULAR  Exam: RRR, S1/S2  Cardiac Rhythm: Regular      GI/NUTRITION  Exam: soft, appropriately tender, non-distended  Diet: NPO       GENITOURINARY/RENAL  multiple electrolytes Injection Type 1 1000 milliLiter(s) IV Continuous <Continuous>      Weight (kg): 73.2 (01-04 @ 05:56)  01-04    134[L]  |  105  |  23  ----------------------------<  122[H]  2.9[LL]   |  17[L]  |  1.13    Ca    8.4      04 Jan 2025 01:33  Phos  2.7     01-04  Mg     1.8     01-04    TPro  7.0  /  Alb  3.0[L]  /  TBili  1.6[H]  /  DBili  x   /  AST  36  /  ALT  14  /  AlkPhos  150[H]  01-04    [ ] Rodriguez catheter, indication: urine output monitoring in critically ill patient    HEMATOLOGIC  [ ] VTE Prophylaxis:                          8.9    3.35  )-----------( 56       ( 04 Jan 2025 01:33 )             27.9     PT/INR - ( 04 Jan 2025 01:33 )   PT: 14.5 sec;   INR: 1.26 ratio         PTT - ( 04 Jan 2025 01:33 )  PTT:32.7 sec  Transfusion: [ ] PRBC	[ ] Platelets	[ ] FFP	[ ] Cryoprecipitate      INFECTIOUS DISEASES  influenza   Vaccine 0.5 milliLiter(s) IntraMuscular once    RECENT CULTURES:    ENDOCRINE    CAPILLARY BLOOD GLUCOSE          PATIENT CARE ACCESS DEVICES:  [ ] Peripheral IV  [ ] Central Venous Line	[ ] R	[ ] L	[ ] IJ	[ ] Fem	[ ] SC	Placed:   [ ] Arterial Line		[ ] R	[ ] L	[ ] Fem	[ ] Rad	[ ] Ax	Placed:   [ ] PICC:					[ ] Mediport  [ ] Urinary Catheter, Date Placed:   [x] Necessity of urinary, arterial, and venous catheters discussed    OTHER MEDICATIONS:     IMAGING STUDIES: HISTORY OF PRESENT ILLNESS:  ABBY MALAGON is a 64y Female with a PMHx of breast cancer s/p lumpectomy, HTN, and ETOH cirrhosis. Patient was admitted to [facility] in February of 2024 for management of ascites and jaundice. She has since been living at home and receiving intermittent paracentesis to manage ascites but procedures have spaced out to >2 months. Patient was listed outpatient for OLT with MELD of 18 is now s/p OLT on 1/4/2025 (POD#0).     SURGICAL DETAILS:  EBL: 1L       Normosol: 3.5L        Albumin: 500cc         UOP: 1.5L         10 PRBC/4 PLT/6 FFP/ 4 Cryo          PAST MEDICAL HISTORY:   Breast cancer, left  Mild alcohol use disorder  H/O hepatitis  GERD (gastroesophageal reflux disease)  Skin cancer, basal cell    PAST SURGICAL HISTORY:   H/O umbilical hernia repair, 09/17/2024  H/O lumpectomy  History of removal of skin mole  H/O ganglion cyst      FAMILY HISTORY: Unknown    SOCIAL HISTORY: 2 glasses of vodka daily x 3 years    CODE STATUS: Full Code    HOME MEDICATIONS: (as per med rec 9/24/2024)   Aldactone 100mg, QD  Furosemide 40mg, BID  Pantoprazole 40mg, QD  Miralax 17g, BID  Senna 2 tablets, QHS    ALLERGIES: No Known Allergies      VITAL SIGNS:  ICU Vital Signs Last 24 Hrs  T(C): 36.7 (04 Jan 2025 05:56), Max: 36.7 (03 Jan 2025 22:53)  T(F): 98.1 (03 Jan 2025 22:53), Max: 98.1 (03 Jan 2025 22:53)  HR: 103 (04 Jan 2025 14:57) (84 - 103)  BP: 117/70 (04 Jan 2025 05:56) (117/70 - 117/70)  BP(mean): --  ABP: 121/56 (04 Jan 2025 14:57) (121/56 - 121/56)  ABP(mean): 78 (04 Jan 2025 14:57) (78 - 78)  RR: 14 (04 Jan 2025 14:57) (14 - 18)  SpO2: 100% (04 Jan 2025 14:57) (100% - 100%)        NEURO  Exam: Waking up from sedation, AOx4 at baseline      RESPIRATORY: O2 @ 6L NC  ABG - ( 04 Jan 2025 13:25 )  pH: 7.37  /  pCO2: 43    /  pO2: 245   / HCO3: 25    / Base Excess: -0.5  /  SaO2: 99.5    Lactate: x      Exam: CTA b/l, normal breath sounds      CARDIOVASCULAR  Exam: RRR, S1/S2  Cardiac Rhythm: Regular      GI/NUTRITION  Exam: soft, appropriately tender, non-distended  Diet: NPO       GENITOURINARY/RENAL  multiple electrolytes Injection Type 1 1000 milliLiter(s) IV Continuous <Continuous>      Weight (kg): 73.2 (01-04 @ 05:56)  01-04    134[L]  |  105  |  23  ----------------------------<  122[H]  2.9[LL]   |  17[L]  |  1.13    Ca    8.4      04 Jan 2025 01:33  Phos  2.7     01-04  Mg     1.8     01-04    TPro  7.0  /  Alb  3.0[L]  /  TBili  1.6[H]  /  DBili  x   /  AST  36  /  ALT  14  /  AlkPhos  150[H]  01-04    [ ] Rodriguez catheter, indication: urine output monitoring in critically ill patient    HEMATOLOGIC  [ ] VTE Prophylaxis:                          8.9    3.35  )-----------( 56       ( 04 Jan 2025 01:33 )             27.9     PT/INR - ( 04 Jan 2025 01:33 )   PT: 14.5 sec;   INR: 1.26 ratio         PTT - ( 04 Jan 2025 01:33 )  PTT:32.7 sec  Transfusion: [ ] PRBC	[ ] Platelets	[ ] FFP	[ ] Cryoprecipitate      INFECTIOUS DISEASES  influenza   Vaccine 0.5 milliLiter(s) IntraMuscular once    RECENT CULTURES:    ENDOCRINE    CAPILLARY BLOOD GLUCOSE          PATIENT CARE ACCESS DEVICES:  [ ] Peripheral IV  [ ] Central Venous Line	[ ] R	[ ] L	[ ] IJ	[ ] Fem	[ ] SC	Placed:   [ ] Arterial Line		[ ] R	[ ] L	[ ] Fem	[ ] Rad	[ ] Ax	Placed:   [ ] PICC:					[ ] Mediport  [ ] Urinary Catheter, Date Placed:   [x] Necessity of urinary, arterial, and venous catheters discussed    OTHER MEDICATIONS:     IMAGING STUDIES: HISTORY OF PRESENT ILLNESS:  ABBY MALAGON is a 64y Female with a PMHx of breast cancer s/p lumpectomy, HTN, and ETOH cirrhosis. Patient was admitted to [facility] in February of 2024 for management of ascites and jaundice. She had since been living at home and receiving intermittent paracentesis to manage ascites but procedures have spaced out to >2 months. Patient arrived to [facility] from home for scheduled transplant with MELD of 18 and is now s/p OLT on 1/4/2025 (POD#0).     SURGICAL DETAILS:  EBL: 1L       Normosol: 3.5L        Albumin: 500cc         UOP: 1.5L         10 PRBC/4 PLT/6 FFP/ 4 Cryo      Side-side anastomosis: cavocavostomy  End-end anastomosis: portoportal, c. hepatic artery, common bile  UBALDO drain x 3 (#1- R. hepatic lobe, #2-Hepatic hilum, #3-L hepatic lobe)    PAST MEDICAL HISTORY:   Breast cancer, left  Mild alcohol use disorder  H/O hepatitis  GERD (gastroesophageal reflux disease)  Skin cancer, basal cell    PAST SURGICAL HISTORY:   H/O umbilical hernia repair, 09/17/2024  H/O lumpectomy  History of removal of skin mole  H/O ganglion cyst      FAMILY HISTORY: Unknown    SOCIAL HISTORY: 2 glasses of vodka daily x 3 years    CODE STATUS: Full Code    HOME MEDICATIONS: (as per med rec 9/24/2024)   Aldactone 100mg, QD  Furosemide 40mg, BID  Pantoprazole 40mg, QD  Miralax 17g, BID  Senna 2 tablets, QHS    ALLERGIES: No Known Allergies      VITAL SIGNS:  ICU Vital Signs Last 24 Hrs  T(C): 36.7 (04 Jan 2025 05:56), Max: 36.7 (03 Jan 2025 22:53)  T(F): 98.1 (03 Jan 2025 22:53), Max: 98.1 (03 Jan 2025 22:53)  HR: 103 (04 Jan 2025 14:57) (84 - 103)  BP: 117/70 (04 Jan 2025 05:56) (117/70 - 117/70)  BP(mean): --  ABP: 121/56 (04 Jan 2025 14:57) (121/56 - 121/56)  ABP(mean): 78 (04 Jan 2025 14:57) (78 - 78)  RR: 14 (04 Jan 2025 14:57) (14 - 18)  SpO2: 100% (04 Jan 2025 14:57) (100% - 100%)        NEURO  Exam: Waking up from sedation, AOx4 at baseline      RESPIRATORY: O2 @ 6L NC  ABG - ( 04 Jan 2025 13:25 )  pH: 7.37  /  pCO2: 43    /  pO2: 245   / HCO3: 25    / Base Excess: -0.5  /  SaO2: 99.5    Lactate: x      Exam: CTA b/l, normal breath sounds      CARDIOVASCULAR  Exam: RRR, S1/S2  Cardiac Rhythm: Regular      GI/NUTRITION  Exam: soft, appropriately tender, non-distended  Diet: NPO       GENITOURINARY/RENAL  multiple electrolytes Injection Type 1 1000 milliLiter(s) IV Continuous <Continuous>      Weight (kg): 73.2 (01-04 @ 05:56)  01-04    134[L]  |  105  |  23  ----------------------------<  122[H]  2.9[LL]   |  17[L]  |  1.13    Ca    8.4      04 Jan 2025 01:33  Phos  2.7     01-04  Mg     1.8     01-04    TPro  7.0  /  Alb  3.0[L]  /  TBili  1.6[H]  /  DBili  x   /  AST  36  /  ALT  14  /  AlkPhos  150[H]  01-04    [ ] Rodriguez catheter, indication: urine output monitoring in critically ill patient    HEMATOLOGIC  [ ] VTE Prophylaxis:                          8.9    3.35  )-----------( 56       ( 04 Jan 2025 01:33 )             27.9     PT/INR - ( 04 Jan 2025 01:33 )   PT: 14.5 sec;   INR: 1.26 ratio         PTT - ( 04 Jan 2025 01:33 )  PTT:32.7 sec  Transfusion: [ ] PRBC	[ ] Platelets	[ ] FFP	[ ] Cryoprecipitate      INFECTIOUS DISEASES  influenza   Vaccine 0.5 milliLiter(s) IntraMuscular once    RECENT CULTURES:    ENDOCRINE    CAPILLARY BLOOD GLUCOSE          PATIENT CARE ACCESS DEVICES:  [ ] Peripheral IV  [ ] Central Venous Line	[ ] R	[ ] L	[ ] IJ	[ ] Fem	[ ] SC	Placed:   [ ] Arterial Line		[ ] R	[ ] L	[ ] Fem	[ ] Rad	[ ] Ax	Placed:   [ ] PICC:					[ ] Mediport  [ ] Urinary Catheter, Date Placed:   [x] Necessity of urinary, arterial, and venous catheters discussed    OTHER MEDICATIONS:     IMAGING STUDIES: HISTORY OF PRESENT ILLNESS:  ABBY MALAGON is a 64y Female with a PMHx of breast cancer s/p lumpectomy, HTN, and ETOH cirrhosis. Patient was admitted to [facility] in February of 2024 for management of ascites and jaundice. She had since been living at home and receiving intermittent paracentesis to manage ascites but procedures have spaced out to >2 months. Patient arrived to [facility] from home for scheduled transplant with MELD of 18 and is now s/p OLT on 1/4/2025 (POD#0).     SURGICAL DETAILS:  EBL: 1L       Normosol: 3.5L        Albumin: 500cc         UOP: 1.5L         10 PRBC/4 PLT/6 FFP/ 4 Cryo      Side-side anastomosis: cavocavostomy  End-end anastomosis: portoportal, c. hepatic artery, common bile  UBALDO drain x 3 (#1- R. hepatic lobe, #2-Hepatic hilum, #3-L hepatic lobe)    PAST MEDICAL HISTORY:   Breast cancer, left  Mild alcohol use disorder  H/O hepatitis  GERD (gastroesophageal reflux disease)  Skin cancer, basal cell    PAST SURGICAL HISTORY:   H/O umbilical hernia repair, 09/17/2024  H/O lumpectomy  History of removal of skin mole  H/O ganglion cyst      FAMILY HISTORY: Unknown    SOCIAL HISTORY: 2 glasses of vodka daily x 3 years    CODE STATUS: Full Code    HOME MEDICATIONS: (as per med rec 9/24/2024)   Aldactone 100mg, QD  Furosemide 40mg, BID  Pantoprazole 40mg, QD  Miralax 17g, BID  Senna 2 tablets, QHS    ALLERGIES: No Known Allergies      VITAL SIGNS:  ICU Vital Signs Last 24 Hrs  T(C): 36.7 (04 Jan 2025 05:56), Max: 36.7 (03 Jan 2025 22:53)  T(F): 98.1 (03 Jan 2025 22:53), Max: 98.1 (03 Jan 2025 22:53)  HR: 103 (04 Jan 2025 14:57) (84 - 103)  BP: 117/70 (04 Jan 2025 05:56) (117/70 - 117/70)  BP(mean): --  ABP: 121/56 (04 Jan 2025 14:57) (121/56 - 121/56)  ABP(mean): 78 (04 Jan 2025 14:57) (78 - 78)  RR: 14 (04 Jan 2025 14:57) (14 - 18)  SpO2: 100% (04 Jan 2025 14:57) (100% - 100%)        NEURO  Exam: Waking up from sedation, AOx4 at baseline      RESPIRATORY: O2 @ 6L NC  ABG - ( 04 Jan 2025 13:25 )  pH: 7.37  /  pCO2: 43    /  pO2: 245   / HCO3: 25    / Base Excess: -0.5  /  SaO2: 99.5    Lactate: x      Exam: CTA b/l, normal breath sounds      CARDIOVASCULAR  Exam: RRR, S1/S2  Cardiac Rhythm: Regular      GI/NUTRITION  Exam: soft, appropriately tender, non-distended  Diet: NPO       GENITOURINARY/RENAL  multiple electrolytes Injection Type 1 1000 milliLiter(s) IV Continuous <Continuous>      Weight (kg): 73.2 (01-04 @ 05:56)  01-04    134[L]  |  105  |  23  ----------------------------<  122[H]  2.9[LL]   |  17[L]  |  1.13    Ca    8.4      04 Jan 2025 01:33  Phos  2.7     01-04  Mg     1.8     01-04    TPro  7.0  /  Alb  3.0[L]  /  TBili  1.6[H]  /  DBili  x   /  AST  36  /  ALT  14  /  AlkPhos  150[H]  01-04    [ X ] Rodriguez catheter, indication: urine output monitoring in critically ill patient    HEMATOLOGIC  [ X ] VTE Prophylaxis: Mechanical w/ SCDs                          8.9    3.35  )-----------( 56       ( 04 Jan 2025 01:33 )             27.9     PT/INR - ( 04 Jan 2025 01:33 )   PT: 14.5 sec;   INR: 1.26 ratio         PTT - ( 04 Jan 2025 01:33 )  PTT:32.7 sec  Transfusion: [ ] PRBC	[ ] Platelets	[ ] FFP	[ ] Cryoprecipitate      INFECTIOUS DISEASES  influenza   Vaccine 0.5 milliLiter(s) IntraMuscular once    RECENT CULTURES:    ENDOCRINE  -ISS    CAPILLARY BLOOD GLUCOSE          PATIENT CARE ACCESS DEVICES:  [X] Rapid infusion catheter [ ]  [X] R        Placed: 01/04/2024  [X] Central Venous Line	[X] R	[ ] L	[X] IJ	[ ] Fem	[ ] SC	Placed: 01/04/2024  [X] Arterial Line		[X] R	[X] L	[ ] Fem	[X] Rad	[ ] Ax	Placed: 01/04/2024  [ ] PICC:					[ ] Mediport  [X] Urinary Catheter, Date Placed: 01/04/2024  [X] Necessity of urinary, arterial, and venous catheters discussed    OTHER MEDICATIONS:     IMAGING STUDIES:

## 2025-01-04 NOTE — H&P ADULT - NSHPPHYSICALEXAM_GEN_ALL_CORE
Physical Exam: Constitutional: Well developed / well nourished  Eyes: Anicteric, PERRLA  ENMT: nc/at  Neck: no JVD  Respiratory: CTA B/L  Cardiovascular: RRR  Gastrointestinal: Soft abdomen, Non tender, Non distended, well healed surgical scar.   Genitourinary: Voiding   Extremities: Warm to touch  Vascular: Palpable dp pulses bilaterally No  LE edema  Neurological: A&O x3  Skin: Wound dressing intact, no erythema and evidence of infection noted  Musculoskeletal: Moving all extremities  Psychiatric: Responsive

## 2025-01-04 NOTE — PRE-ANESTHESIA EVALUATION ADULT - NSANTHADDINFOFT_GEN_ALL_CORE
r/b/a discussed, all questions answered prior to proceeding with anesthesia for liver transplant, pt wishes to proceed.  AIDE, invasive monitors, reperfusion, post-op ventilation and additional aspects of anesthetic care discussed

## 2025-01-04 NOTE — H&P ADULT - ASSESSMENT
64 year old female with a PMH of breast CA s/p lumpectomy, ETOH abuse, and HTN. She was admitted to [facility] from 2/17-2/25 for abdominal distention and jaundice. She originally saw PCP in Jan 2023 when she noted she was turning yellow, PCP did bloodwork and referred her her GI: Jai Lazcano. She states she used to have 2 glasses of vodka daily for 3 years to treat her chronic pain from her breast cancer . She reports she was on Letrozole for her breast caner and was taking Tylenol daily at recommended doses to treat her pain, . Her breast  cancer diagnosis was several years ago . Has received intermittent paracentesis but has spaced out > 2 months. She has an umbilical hernia repair on 9/17/24 . Pt presented for OLT on 1/4/25.     [ ] OLT 1/4/2025  - NPO  - PHS labs  - Consent in chart.   - 2x type and screen  - COVID 19 PCR.   - CXR, EKG  - Simulect on call to OR.   - SoluMedrol 1000mg  - Unasyn  IV.   - OR 1/4/2025 @ 0730am.

## 2025-01-04 NOTE — PRE-ANESTHESIA EVALUATION ADULT - NSRADCARDRESULTSFT_GEN_ALL_CORE
1. Normal dobutamine stress echocardiogram with normal augmentation of left ventricular systolic function.   2. No echocardiographic evidence of stress induced ischemia.   3. No electrocardiographic evidence of ischemia at ornear maximal predicted heart rate.   4. Resting ECG revealed sinus rhythm with no significant ST changes.   5. At peak stress, the electrocardiogram revealed sinus tachycardia with no significant ischemic ST segment changes.   6. Chest pain prior to test: No chest pain. Chest pain during test: no chest pain during exercise.   7. Arrythmias: Rare APD's occured during stress.   8. Compared to the stress echocardiogram performed on 4/17/2023, there have been no significant interval changes.      Diagnosis Line NORMAL SINUS RHYTHM  NONSPECIFIC T WAVE ABNORMALITY  ABNORMAL ECG  WHEN COMPARED WITH ECG OF 05-JUL-2007 13:56,  SIGNIFICANT CHANGES HAVE OCCURRED  Confirmed by Erika Higginbotham ([id number]) on 2/18/2023 3:19:39 PM    Findings:       Small (< 5 mm) varices were found in the lower third of the esophagus. They were small in        size.       Moderate portal hypertensive gastropathy was found in the cardia and in the gastric fundus.       Type 1 gastroesophageal varices (GOV1, esophageal varices which extend along the lesser        curvature) with no bleeding were found at the gastroesophageal junction.                                                                                                        Impression:          - Small (< 5 mm) esophageal varices.                       - Portal hypertensive gastropathy.                       - Type 1 gastroesophageal varices (GOV1, esophageal varices which extend                        along the lesser curvature), without bleeding.                       - No specimens collected.  Recommendation:      - Discharge patient to home.                       - Resume previous diet.                       - Give a beta blocker with dosage titrated by the heart rate.

## 2025-01-04 NOTE — H&P ADULT - NS ATTEND AMEND GEN_ALL_CORE FT
ABBY MALAGON was seen and evaluated today.  As documented, ABBY MALAGON is a 64y Female on our OLTx waiting list.  A  donor organ has been made available to ABBY MALAGON , and ABBY MALAGON has agreed to proceed with liver transplantation. ABBY MALAGON has had no major illnesses or hospitalizations since the last clinic visit.    We discussed the risks and benefits of liver transplantation in depth.  Surgical risks included but were not limited to bleeding, infection, vascular thrombosis, graft non-function, bile leak, biliary stricture and potential need for re operation postoperatively.  We discussed the risk of dying as an immediate consequence of surgery.  We also discussed the risks of postoperative immunosuppression including but not limited to increased risk of infection, cancer, weight gain, new onset or worsening of diabetes or hypertension on a temporary or permanent basis, water retention, back pain, constipation, diarrhea, dizziness, headache, joint pain, loss of appetite, nausea, stomach pain or upset, trouble sleeping, vomiting, and/or mental or mood changes were fully disclosed. ABBY MALAGON understands these risks and is willing to proceed with liver transplantation.

## 2025-01-04 NOTE — H&P ADULT - NSHPREVIEWOFSYSTEMS_GEN_ALL_CORE
Gen: No weight changes, fatigue, fevers/chills, weakness  Skin: No rashes  Head/Eyes/Ears/Mouth: No headache; Normal hearing; Normal vision w/o blurriness; No sinus pain/discomfort, sore throat  Respiratory: No dyspnea, cough, wheezing, hemoptysis  CV: No chest pain, PND, orthopnea  GI: No diarrhea, constipation, nausea, vomiting, melena, hematochezia  : No increased frequency, dysuria, hematuria, nocturia  MSK: No joint pain/swelling; no back pain; no edema  Neuro: No dizziness/lightheadedness, weakness, seizures, numbness, tingling  Heme: No easy bruising or bleeding  Endo: No heat/cold intolerance  Psych: No significant nervousness, anxiety, stress, depression

## 2025-01-04 NOTE — CONSULT NOTE ADULT - ASSESSMENT
Interval:  -OLT (1/4/24)  -10/4/6/4 intra-op, EBL 1L,     Neuro:     Respiratory:     Cardiovascular: Interval:  -OLT (1/4/24)  -10/4/6/4 intra-op    Neuro:   -AOx4 at baseline  -off sedation, waking up  -pain control with dilaudid PRN    Respiratory:   -extubated immediately after procedure  -6L NC, wean as tolerated  -out of bed to chair, IS    Cardiovascular:  -briefly on ike during case, arrived on no pressors  -post-op lactate 2.3, trend  -maintain MAP>65    Gastrointestinal:   -NPO  -protonix   -pending liver doppler   -trend LFTs and bili     Renal/:  -plasmalyte @125  -monitor I&Os  -corrales in place    Heme:   -monitor H/H  -holding DVT PPx    ID:  -Unasyn x 48 hours  -linezolid for pos donor cultures  -immunosuppression w/ cellcept  -bactrim, valcyte, fluc    Endocrine:   -steroid taper  -insulin gtt during case, now ISS    Lines:  -L radial a-line (1/4)  -R radial a-line (1/4)  -R IJ introducer sheath (1/4)  -L rapid infusion catheter (1/4) Interval:  -OLT (1/4/24)  -10/4/6/4 intra-op    Neuro:   -AOx4 at baseline  -off sedation, waking up  -pain control with dilaudid PRN    Respiratory:   -extubated immediately after procedure  -6L NC, wean as tolerated  -out of bed to chair, IS    Cardiovascular:  -briefly on ike during case, arrived on no pressors  -post-op lactate 2.3, trend  -maintain MAP>65    Gastrointestinal:   -NPO  -protonix   -pending liver doppler   -trend LFTs and bili     Renal/:  -plasmalyte @125  -monitor I&Os  -corrales in place    Heme:   -monitor H/H  -holding DVT PPx    ID:  -Unasyn x 48 hours  -linezolid for pos donor cultures  -immunosuppression w/ cellcept  -bactrim, valcyte, fluc    Endocrine:   -steroid taper  -insulin gtt during case, now ISS  -monitor glucose    Lines:  -L radial a-line (1/4)  -R radial a-line (1/4)  -R IJ introducer sheath (1/4)  -L rapid infusion catheter (1/4) Interval:  -OLT (1/4/24)  -10/4/6/4 intra-op    Neuro:   -AOx4 at baseline  -off sedation, waking up  -pain control with dilaudid PRN    Respiratory:   -extubated immediately after procedure  -2L NC, wean as tolerated  -out of bed to chair, IS    Cardiovascular:  -briefly on ike during case, arrived on no pressors  -post-op lactate 2.3, trend  -maintain MAP>65    Gastrointestinal:   -NPO  -protonix   -pending liver doppler   -trend LFTs and bili     Renal/:  -plasmalyte @125  -monitor I&Os  -corrales in place    Heme:   -monitor H/H  -holding DVT PPx    ID:  -Unasyn x 48 hours  -linezolid for pos donor cultures  -immunosuppression w/ cellcept  -bactrim, valcyte, fluc    Endocrine:   -steroid taper  -insulin gtt during case, now ISS  -monitor glucose    Lines:  -L radial a-line (1/4)  -R radial a-line (1/4)  -R IJ introducer sheath (1/4)  -L rapid infusion catheter (1/4) Interval:  -OLT (1/4/24)  -10/4/6/4 intra-op    Neuro:   -AOx4 at baseline  -off sedation, waking up  -pain control with dilaudid PRN    Respiratory:   -extubated immediately after procedure  -2L NC, wean as tolerated  -out of bed to chair, IS    Cardiovascular:  -briefly on ike during case, arrived on no pressors  -post-op lactate 2.3, trend  -maintain systolic >120, bolus with albumin if needed    Gastrointestinal:   -NPO  -protonix   -pending liver doppler   -trend LFTs and bili     Renal/:  -plasmalyte @125  -monitor I&Os  -corrales in place    Heme:   -monitor H/H  -holding DVT PPx    ID:  -Unasyn x 48 hours  -linezolid for pos donor cultures  -immunosuppression w/ cellcept  -bactrim, valcyte, fluc    Endocrine:   -steroid taper  -insulin gtt during case, now ISS  -monitor glucose    Lines:  -L radial a-line (1/4)  -R radial a-line (1/4)  -R IJ introducer sheath (1/4)  -L rapid infusion catheter (1/4)

## 2025-01-04 NOTE — PRE-ANESTHESIA EVALUATION ADULT - NSATTENDATTESTRD_GEN_ALL_CORE
08-Jan-2022 21:57 The patient has been re-examined and I agree with the above assessment or I updated with my findings.

## 2025-01-05 LAB
ALBUMIN SERPL ELPH-MCNC: 3 G/DL — LOW (ref 3.3–5)
ALP SERPL-CCNC: 46 U/L — SIGNIFICANT CHANGE UP (ref 40–120)
ALP SERPL-CCNC: 46 U/L — SIGNIFICANT CHANGE UP (ref 40–120)
ALP SERPL-CCNC: 47 U/L — SIGNIFICANT CHANGE UP (ref 40–120)
ALT FLD-CCNC: 315 U/L — HIGH (ref 10–45)
ALT FLD-CCNC: 338 U/L — HIGH (ref 10–45)
ALT FLD-CCNC: 377 U/L — HIGH (ref 10–45)
ANION GAP SERPL CALC-SCNC: 14 MMOL/L — SIGNIFICANT CHANGE UP (ref 5–17)
APTT BLD: 31.3 SEC — SIGNIFICANT CHANGE UP (ref 24.5–35.6)
APTT BLD: 32.6 SEC — SIGNIFICANT CHANGE UP (ref 24.5–35.6)
APTT BLD: 33.3 SEC — SIGNIFICANT CHANGE UP (ref 24.5–35.6)
AST SERPL-CCNC: 301 U/L — HIGH (ref 10–40)
AST SERPL-CCNC: 397 U/L — HIGH (ref 10–40)
AST SERPL-CCNC: 547 U/L — HIGH (ref 10–40)
BACTERIA FLD CULT: NORMAL
BILIRUB SERPL-MCNC: 1.3 MG/DL — HIGH (ref 0.2–1.2)
BILIRUB SERPL-MCNC: 1.3 MG/DL — HIGH (ref 0.2–1.2)
BILIRUB SERPL-MCNC: 1.5 MG/DL — HIGH (ref 0.2–1.2)
BUN SERPL-MCNC: 22 MG/DL — SIGNIFICANT CHANGE UP (ref 7–23)
BUN SERPL-MCNC: 23 MG/DL — SIGNIFICANT CHANGE UP (ref 7–23)
BUN SERPL-MCNC: 24 MG/DL — HIGH (ref 7–23)
CALCIUM SERPL-MCNC: 7.7 MG/DL — LOW (ref 8.4–10.5)
CALCIUM SERPL-MCNC: 7.8 MG/DL — LOW (ref 8.4–10.5)
CALCIUM SERPL-MCNC: 8 MG/DL — LOW (ref 8.4–10.5)
CHLORIDE SERPL-SCNC: 107 MMOL/L — SIGNIFICANT CHANGE UP (ref 96–108)
CO2 SERPL-SCNC: 21 MMOL/L — LOW (ref 22–31)
CREAT SERPL-MCNC: 0.92 MG/DL — SIGNIFICANT CHANGE UP (ref 0.5–1.3)
CREAT SERPL-MCNC: 0.93 MG/DL — SIGNIFICANT CHANGE UP (ref 0.5–1.3)
CREAT SERPL-MCNC: 0.94 MG/DL — SIGNIFICANT CHANGE UP (ref 0.5–1.3)
CULTURE RESULTS: SIGNIFICANT CHANGE UP
EGFR: 69 ML/MIN/1.73M2 — SIGNIFICANT CHANGE UP
EGFR: 69 ML/MIN/1.73M2 — SIGNIFICANT CHANGE UP
EGFR: 70 ML/MIN/1.73M2 — SIGNIFICANT CHANGE UP
GAS PNL BLDA: SIGNIFICANT CHANGE UP
GLUCOSE BLDC GLUCOMTR-MCNC: 136 MG/DL — HIGH (ref 70–99)
GLUCOSE BLDC GLUCOMTR-MCNC: 146 MG/DL — HIGH (ref 70–99)
GLUCOSE BLDC GLUCOMTR-MCNC: 165 MG/DL — HIGH (ref 70–99)
GLUCOSE BLDC GLUCOMTR-MCNC: 167 MG/DL — HIGH (ref 70–99)
GLUCOSE BLDC GLUCOMTR-MCNC: 180 MG/DL — HIGH (ref 70–99)
GLUCOSE BLDC GLUCOMTR-MCNC: 196 MG/DL — HIGH (ref 70–99)
GLUCOSE SERPL-MCNC: 158 MG/DL — HIGH (ref 70–99)
GLUCOSE SERPL-MCNC: 162 MG/DL — HIGH (ref 70–99)
GLUCOSE SERPL-MCNC: 169 MG/DL — HIGH (ref 70–99)
GRAM STN FLD: SIGNIFICANT CHANGE UP
HCT VFR BLD CALC: 20.9 % — CRITICAL LOW (ref 34.5–45)
HCT VFR BLD CALC: 21.2 % — LOW (ref 34.5–45)
HCT VFR BLD CALC: 24.2 % — LOW (ref 34.5–45)
HCT VFR BLD CALC: 24.7 % — LOW (ref 34.5–45)
HEPARINASE TEG R TIME: 6.8 MIN — SIGNIFICANT CHANGE UP (ref 4.3–8.3)
HEPARINASE TEG R TIME: 7 MIN — SIGNIFICANT CHANGE UP (ref 4.3–8.3)
HEPARINASE TEG R TIME: 8.1 MIN — SIGNIFICANT CHANGE UP (ref 4.3–8.3)
HGB BLD-MCNC: 7.3 G/DL — LOW (ref 11.5–15.5)
HGB BLD-MCNC: 7.6 G/DL — LOW (ref 11.5–15.5)
HGB BLD-MCNC: 8.4 G/DL — LOW (ref 11.5–15.5)
HGB BLD-MCNC: 8.6 G/DL — LOW (ref 11.5–15.5)
INR BLD: 1.73 RATIO — HIGH (ref 0.85–1.16)
INR BLD: 1.79 RATIO — HIGH (ref 0.85–1.16)
INR BLD: 1.79 RATIO — HIGH (ref 0.85–1.16)
MAGNESIUM SERPL-MCNC: 2 MG/DL — SIGNIFICANT CHANGE UP (ref 1.6–2.6)
MAGNESIUM SERPL-MCNC: 2 MG/DL — SIGNIFICANT CHANGE UP (ref 1.6–2.6)
MAGNESIUM SERPL-MCNC: 2.1 MG/DL — SIGNIFICANT CHANGE UP (ref 1.6–2.6)
MCHC RBC-ENTMCNC: 29.5 PG — SIGNIFICANT CHANGE UP (ref 27–34)
MCHC RBC-ENTMCNC: 29.5 PG — SIGNIFICANT CHANGE UP (ref 27–34)
MCHC RBC-ENTMCNC: 30 PG — SIGNIFICANT CHANGE UP (ref 27–34)
MCHC RBC-ENTMCNC: 30.8 PG — SIGNIFICANT CHANGE UP (ref 27–34)
MCHC RBC-ENTMCNC: 34.7 G/DL — SIGNIFICANT CHANGE UP (ref 32–36)
MCHC RBC-ENTMCNC: 34.8 G/DL — SIGNIFICANT CHANGE UP (ref 32–36)
MCHC RBC-ENTMCNC: 34.9 G/DL — SIGNIFICANT CHANGE UP (ref 32–36)
MCHC RBC-ENTMCNC: 35.8 G/DL — SIGNIFICANT CHANGE UP (ref 32–36)
MCV RBC AUTO: 84.6 FL — SIGNIFICANT CHANGE UP (ref 80–100)
MCV RBC AUTO: 84.9 FL — SIGNIFICANT CHANGE UP (ref 80–100)
MCV RBC AUTO: 85.8 FL — SIGNIFICANT CHANGE UP (ref 80–100)
MCV RBC AUTO: 86 FL — SIGNIFICANT CHANGE UP (ref 80–100)
NRBC # BLD: 0 /100 WBCS — SIGNIFICANT CHANGE UP (ref 0–0)
PHOSPHATE SERPL-MCNC: 3.9 MG/DL — SIGNIFICANT CHANGE UP (ref 2.5–4.5)
PHOSPHATE SERPL-MCNC: 4.2 MG/DL — SIGNIFICANT CHANGE UP (ref 2.5–4.5)
PHOSPHATE SERPL-MCNC: 4.2 MG/DL — SIGNIFICANT CHANGE UP (ref 2.5–4.5)
PLATELET # BLD AUTO: 67 K/UL — LOW (ref 150–400)
PLATELET # BLD AUTO: 71 K/UL — LOW (ref 150–400)
PLATELET # BLD AUTO: 73 K/UL — LOW (ref 150–400)
PLATELET # BLD AUTO: 76 K/UL — LOW (ref 150–400)
POTASSIUM SERPL-MCNC: 3.9 MMOL/L — SIGNIFICANT CHANGE UP (ref 3.5–5.3)
POTASSIUM SERPL-MCNC: 4 MMOL/L — SIGNIFICANT CHANGE UP (ref 3.5–5.3)
POTASSIUM SERPL-MCNC: 4 MMOL/L — SIGNIFICANT CHANGE UP (ref 3.5–5.3)
POTASSIUM SERPL-SCNC: 3.9 MMOL/L — SIGNIFICANT CHANGE UP (ref 3.5–5.3)
POTASSIUM SERPL-SCNC: 4 MMOL/L — SIGNIFICANT CHANGE UP (ref 3.5–5.3)
POTASSIUM SERPL-SCNC: 4 MMOL/L — SIGNIFICANT CHANGE UP (ref 3.5–5.3)
PROT SERPL-MCNC: 4.8 G/DL — LOW (ref 6–8.3)
PROT SERPL-MCNC: 4.8 G/DL — LOW (ref 6–8.3)
PROT SERPL-MCNC: 4.9 G/DL — LOW (ref 6–8.3)
PROTHROM AB SERPL-ACNC: 19.8 SEC — HIGH (ref 9.9–13.4)
PROTHROM AB SERPL-ACNC: 20.3 SEC — HIGH (ref 9.9–13.4)
PROTHROM AB SERPL-ACNC: 20.3 SEC — HIGH (ref 9.9–13.4)
RAPIDTEG MAXIMUM AMPLITUDE: 51.8 MM — LOW (ref 52–70)
RAPIDTEG MAXIMUM AMPLITUDE: 53.1 MM — SIGNIFICANT CHANGE UP (ref 52–70)
RAPIDTEG MAXIMUM AMPLITUDE: 53.9 MM — SIGNIFICANT CHANGE UP (ref 52–70)
RBC # BLD: 2.43 M/UL — LOW (ref 3.8–5.2)
RBC # BLD: 2.47 M/UL — LOW (ref 3.8–5.2)
RBC # BLD: 2.85 M/UL — LOW (ref 3.8–5.2)
RBC # BLD: 2.92 M/UL — LOW (ref 3.8–5.2)
RBC # FLD: 16.5 % — HIGH (ref 10.3–14.5)
RBC # FLD: 16.7 % — HIGH (ref 10.3–14.5)
RBC # FLD: 16.8 % — HIGH (ref 10.3–14.5)
RBC # FLD: 17 % — HIGH (ref 10.3–14.5)
SODIUM SERPL-SCNC: 142 MMOL/L — SIGNIFICANT CHANGE UP (ref 135–145)
SPECIMEN SOURCE: SIGNIFICANT CHANGE UP
SPECIMEN SOURCE: SIGNIFICANT CHANGE UP
TEG FUNCTIONAL FIBRINOGEN: 16.1 MM — SIGNIFICANT CHANGE UP (ref 15–32)
TEG FUNCTIONAL FIBRINOGEN: 16.4 MM — SIGNIFICANT CHANGE UP (ref 15–32)
TEG FUNCTIONAL FIBRINOGEN: 17.6 MM — SIGNIFICANT CHANGE UP (ref 15–32)
TEG MAXIMUM AMPLITUDE: 53.3 MM — SIGNIFICANT CHANGE UP (ref 52–69)
TEG MAXIMUM AMPLITUDE: 54.1 MM — SIGNIFICANT CHANGE UP (ref 52–69)
TEG MAXIMUM AMPLITUDE: 55.7 MM — SIGNIFICANT CHANGE UP (ref 52–69)
TEG REACTION TIME: 6.8 MIN — SIGNIFICANT CHANGE UP (ref 4.6–9.1)
TEG REACTION TIME: 6.9 MIN — SIGNIFICANT CHANGE UP (ref 4.6–9.1)
TEG REACTION TIME: 7.6 MIN — SIGNIFICANT CHANGE UP (ref 4.6–9.1)
WBC # BLD: 5.5 K/UL — SIGNIFICANT CHANGE UP (ref 3.8–10.5)
WBC # BLD: 6.54 K/UL — SIGNIFICANT CHANGE UP (ref 3.8–10.5)
WBC # BLD: 8.61 K/UL — SIGNIFICANT CHANGE UP (ref 3.8–10.5)
WBC # BLD: 9.89 K/UL — SIGNIFICANT CHANGE UP (ref 3.8–10.5)
WBC # FLD AUTO: 5.5 K/UL — SIGNIFICANT CHANGE UP (ref 3.8–10.5)
WBC # FLD AUTO: 6.54 K/UL — SIGNIFICANT CHANGE UP (ref 3.8–10.5)
WBC # FLD AUTO: 8.61 K/UL — SIGNIFICANT CHANGE UP (ref 3.8–10.5)
WBC # FLD AUTO: 9.89 K/UL — SIGNIFICANT CHANGE UP (ref 3.8–10.5)

## 2025-01-05 PROCEDURE — 71045 X-RAY EXAM CHEST 1 VIEW: CPT | Mod: 26

## 2025-01-05 PROCEDURE — 76705 ECHO EXAM OF ABDOMEN: CPT | Mod: 26,59

## 2025-01-05 PROCEDURE — 99232 SBSQ HOSP IP/OBS MODERATE 35: CPT | Mod: GC,24

## 2025-01-05 PROCEDURE — 93975 VASCULAR STUDY: CPT | Mod: 26

## 2025-01-05 RX ORDER — OXYCODONE HCL 15 MG
5 TABLET ORAL EVERY 4 HOURS
Refills: 0 | Status: DISCONTINUED | OUTPATIENT
Start: 2025-01-05 | End: 2025-01-05

## 2025-01-05 RX ORDER — OXYCODONE HCL 15 MG
10 TABLET ORAL EVERY 4 HOURS
Refills: 0 | Status: DISCONTINUED | OUTPATIENT
Start: 2025-01-05 | End: 2025-01-05

## 2025-01-05 RX ORDER — TACROLIMUS 0.5 MG/1
0.5 CAPSULE ORAL
Refills: 0 | Status: DISCONTINUED | OUTPATIENT
Start: 2025-01-05 | End: 2025-01-07

## 2025-01-05 RX ORDER — METHYLPREDNISOLONE 4 MG/1
250 TABLET ORAL EVERY 24 HOURS
Refills: 0 | Status: COMPLETED | OUTPATIENT
Start: 2025-01-06 | End: 2025-01-05

## 2025-01-05 RX ORDER — HYDROMORPHONE HCL 4 MG
2 TABLET ORAL EVERY 4 HOURS
Refills: 0 | Status: DISCONTINUED | OUTPATIENT
Start: 2025-01-05 | End: 2025-01-07

## 2025-01-05 RX ORDER — TACROLIMUS 0.5 MG/1
0.5 CAPSULE ORAL ONCE
Refills: 0 | Status: COMPLETED | OUTPATIENT
Start: 2025-01-05 | End: 2025-01-05

## 2025-01-05 RX ORDER — HYDROMORPHONE HCL 4 MG
4 TABLET ORAL EVERY 4 HOURS
Refills: 0 | Status: DISCONTINUED | OUTPATIENT
Start: 2025-01-05 | End: 2025-01-07

## 2025-01-05 RX ORDER — INSULIN LISPRO 100/ML
VIAL (ML) SUBCUTANEOUS EVERY 6 HOURS
Refills: 0 | Status: DISCONTINUED | OUTPATIENT
Start: 2025-01-05 | End: 2025-01-06

## 2025-01-05 RX ADMIN — METHYLPREDNISOLONE 100 MILLIGRAM(S): 4 TABLET ORAL at 23:29

## 2025-01-05 RX ADMIN — Medication 81 MILLIGRAM(S): at 11:39

## 2025-01-05 RX ADMIN — Medication 2: at 10:36

## 2025-01-05 RX ADMIN — Medication 500 MILLILITER(S): at 03:00

## 2025-01-05 RX ADMIN — Medication 2: at 05:39

## 2025-01-05 RX ADMIN — AMPICILLIN SODIUM AND SULBACTAM SODIUM 200 GRAM(S): 100; 50 INJECTION, POWDER, FOR SOLUTION INTRAVENOUS at 11:38

## 2025-01-05 RX ADMIN — Medication 500 MILLILITER(S): at 13:50

## 2025-01-05 RX ADMIN — CHLORHEXIDINE GLUCONATE 1 APPLICATION(S): 1.2 RINSE ORAL at 05:39

## 2025-01-05 RX ADMIN — AMPICILLIN SODIUM AND SULBACTAM SODIUM 200 GRAM(S): 100; 50 INJECTION, POWDER, FOR SOLUTION INTRAVENOUS at 23:29

## 2025-01-05 RX ADMIN — Medication 5 MILLIGRAM(S): at 10:00

## 2025-01-05 RX ADMIN — Medication 4 MILLIGRAM(S): at 14:55

## 2025-01-05 RX ADMIN — Medication 4 MILLIGRAM(S): at 23:30

## 2025-01-05 RX ADMIN — SODIUM CHLORIDE 125 MILLILITER(S): 9 INJECTION, SOLUTION INTRAVENOUS at 08:18

## 2025-01-05 RX ADMIN — MYCOPHENOLATE MOFETIL 1000 MILLIGRAM(S): 250 CAPSULE ORAL at 08:18

## 2025-01-05 RX ADMIN — Medication 0.25 MILLIGRAM(S): at 05:40

## 2025-01-05 RX ADMIN — METHYLPREDNISOLONE 100 MILLIGRAM(S): 4 TABLET ORAL at 00:08

## 2025-01-05 RX ADMIN — TACROLIMUS 0.5 MILLIGRAM(S): 0.5 CAPSULE ORAL at 20:45

## 2025-01-05 RX ADMIN — Medication 10 MILLIGRAM(S): at 02:25

## 2025-01-05 RX ADMIN — VALGANCICLOVIR 450 MILLIGRAM(S): 450 TABLET, FILM COATED ORAL at 11:39

## 2025-01-05 RX ADMIN — LINEZOLID 300 MILLIGRAM(S): 600 TABLET ORAL at 05:38

## 2025-01-05 RX ADMIN — Medication 4 MILLIGRAM(S): at 14:16

## 2025-01-05 RX ADMIN — MYCOPHENOLATE MOFETIL 1000 MILLIGRAM(S): 250 CAPSULE ORAL at 20:46

## 2025-01-05 RX ADMIN — Medication 5 MILLIGRAM(S): at 09:29

## 2025-01-05 RX ADMIN — FLUCONAZOLE 100 MILLIGRAM(S): 200 TABLET ORAL at 11:38

## 2025-01-05 RX ADMIN — TACROLIMUS 0.5 MILLIGRAM(S): 0.5 CAPSULE ORAL at 10:23

## 2025-01-05 RX ADMIN — Medication 2: at 02:26

## 2025-01-05 RX ADMIN — Medication 10 MILLILITER(S): at 11:39

## 2025-01-05 RX ADMIN — AMPICILLIN SODIUM AND SULBACTAM SODIUM 200 GRAM(S): 100; 50 INJECTION, POWDER, FOR SOLUTION INTRAVENOUS at 17:21

## 2025-01-05 RX ADMIN — AMPICILLIN SODIUM AND SULBACTAM SODIUM 200 GRAM(S): 100; 50 INJECTION, POWDER, FOR SOLUTION INTRAVENOUS at 05:39

## 2025-01-05 RX ADMIN — LINEZOLID 300 MILLIGRAM(S): 600 TABLET ORAL at 17:22

## 2025-01-05 RX ADMIN — PANTOPRAZOLE 40 MILLIGRAM(S): 40 TABLET, DELAYED RELEASE ORAL at 11:39

## 2025-01-05 RX ADMIN — Medication 2: at 13:47

## 2025-01-05 RX ADMIN — Medication 10 MILLIGRAM(S): at 03:00

## 2025-01-05 RX ADMIN — Medication 0.25 MILLIGRAM(S): at 05:55

## 2025-01-05 NOTE — OCCUPATIONAL THERAPY INITIAL EVALUATION ADULT - BALANCE TRAINING, PT EVAL
GOAL: pt will increase sitting/standing balance by one grade within 4 weeks in order to increase ability to participate in ADLs and functional tasks. Statement Selected

## 2025-01-05 NOTE — OCCUPATIONAL THERAPY INITIAL EVALUATION ADULT - ADDITIONAL COMMENTS
pt lives in a private house with . pt has a few steps to enter and a flight to get to bed/bathroom. prior to admission, pt was independent in all ADLs and functional tasks. pt does not drive

## 2025-01-05 NOTE — OCCUPATIONAL THERAPY INITIAL EVALUATION ADULT - PERTINENT HX OF CURRENT PROBLEM, REHAB EVAL
64 year old female with a PMH of breast CA s/p lumpectomy, ETOH abuse, and HTN. She was admitted to [facility] from 2/17-2/25 for abdominal distention and jaundice. She originally saw PCP in Jan 2023 when she noted she was turning yellow, PCP did bloodwork and referred her her GI: Jai Lazcano. She states she used to have 2 glasses of vodka daily for 3 years to treat her chronic pain from her breast cancer . She reports she was on Letrozole for her breast caner and was taking Tylenol daily at recommended doses to treat her pain, . Her breast cancer diagnosis was several years ago . Has received intermittent paracentesis but has spaced out > 2 months. She has an umbilical hernia repair on 9/17/24 . Pt now s/p OLTx 1/4/2025.

## 2025-01-05 NOTE — PROGRESS NOTE ADULT - ASSESSMENT
ABBY MALAGON is a 64y Female with a PMHx of breast cancer s/p lumpectomy, HTN, and ETOH cirrhosis. Patient was admitted to [facility] in February of 2024 for management of ascites and jaundice. She has since been living at home and receiving intermittent paracentesis to manage ascites but procedures have spaced out to >2 months. Patient was listed outpatient for OLT with MELD of 18 is now s/p OLT on 1/4/2025 and extubated post-op. SICU consulted for hemodynamic monitoring.     PLAN  Neuro:   -AOx4 at baseline  -pain control with oxy PRN, dilaudid for breakthrough    Respiratory:   -extubated immediately after procedure  -room air  -out of bed to chair, IS    Cardiovascular:  -MAP>65, SBP goal >120  -off pressors, no pressors fluid resuscitation only per transplant  -trend lactate    Gastrointestinal:   -NPO, NGT  -protonix   -Liver duplex post op: patent vasculature but unable to fully assess  -trend LFTs and bili     Renal/:  -plasmalyte @125  -corrales in place  -monitor I&Os    Heme:   -monitor H/H  -holding DVT PPx  -ASA     ID:  -Unasyn x 48 hours  -linezolid for pos donor cultures  -immunosuppression w/ cellcept  -bactrim, valcyte, fluc    Endocrine:   -steroid taper  -insulin gtt during case, now ISS  -monitor glucose    Lines:  -L radial a-line (1/4)  -R radial a-line (1/4)  -R IJ introducer sheath (1/4)  -L rapid infusion catheter (1/4)     16

## 2025-01-05 NOTE — PHYSICAL THERAPY INITIAL EVALUATION ADULT - GENERAL OBSERVATIONS, REHAB EVAL
pt received semi-supine in bed, pre-medicated by ASHER Smith, Nadiya, antoni, YORDAN, antoni, agreeable to PT session

## 2025-01-05 NOTE — PHYSICAL THERAPY INITIAL EVALUATION ADULT - ADDITIONAL COMMENTS
Pt lives in private home with spouse, 3 steps to enter, second floor bedroom; independent prior to admission with no AD.

## 2025-01-05 NOTE — PROGRESS NOTE ADULT - SUBJECTIVE AND OBJECTIVE BOX
24 HOUR EVENTS:  INTERVAL EVENTS:  - 250 albumin x2  - DUS liver @6AM        NEURO  Exam: awake, alert, oriented  Meds: HYDROmorphone  Injectable 0.25 milliGRAM(s) IV Push every 4 hours PRN breakthrough pain  oxyCODONE    Solution 2.5 milliGRAM(s) Enteral Tube every 4 hours PRN Moderate Pain (4 - 6)  oxyCODONE    Solution 5 milliGRAM(s) Enteral Tube every 4 hours PRN Severe Pain (7 - 10)      RESPIRATORY  RR: 18 (01-04-25 @ 23:15) (11 - 20)  SpO2: 99% (01-04-25 @ 23:15) (97% - 100%)  Wt(kg): --  Exam: unlabored, clear to auscultation bilaterally  Mechanical Ventilation:   ABG - ( 04 Jan 2025 22:16 )  pH: 7.46  /  pCO2: 33    /  pO2: 97    / HCO3: 24    / Base Excess: -0.1  /  SaO2: 99.6    Lactate: x                [N/A] Extubation Readiness Assessed  Meds:       CARDIOVASCULAR  HR: 82 (01-04-25 @ 23:15) (66 - 104)  BP: 97/57 (01-04-25 @ 19:30) (97/57 - 117/70)  BP(mean): 71 (01-04-25 @ 19:30) (71 - 76)  ABP: 123/60 (01-04-25 @ 23:15) (91/56 - 131/57)  ABP(mean): 84 (01-04-25 @ 23:15) (64 - 88)  Wt(kg): --  CVP(cm H2O): --      Exam: regular rate and rhythm  Cardiac Rhythm: sinus  Perfusion     [x]Adequate   [ ]Inadequate  Extremities  [x]Warm         [ ]Cool  Volume Status [ ]Hypervolemic [x]Euvolemic [ ]Hypovolemic  Meds:     GI/NUTRITION  Exam: soft, nontender, nondistended  Diet: NPO  Meds: pantoprazole  Injectable 40 milliGRAM(s) IV Push daily      GENITOURINARY  I&O's Detail    01-04 @ 07:01  -  01-05 @ 00:55  --------------------------------------------------------  IN:    Albumin 5%  - 250 mL: 500 mL    Enteral Tube Flush: 155 mL    IV PiggyBack: 300 mL    IV PiggyBack: 500 mL    multiple electrolytes Injection Type 1.: 1000 mL  Total IN: 2455 mL    OUT:    Bulb (mL): 45 mL    Bulb (mL): 95 mL    Bulb (mL): 310 mL    Indwelling Catheter - Urethral (mL): 895 mL    Insulin: 0 mL    Nasogastric/Oral tube (mL): 30 mL  Total OUT: 1375 mL    Total NET: 1080 mL        Weight (kg): 73.2 (01-04 @ 05:56)  01-04    141  |  107  |  20  ----------------------------<  202[H]  4.2   |  20[L]  |  0.92    Ca    8.5      04 Jan 2025 22:21  Phos  3.3     01-04  Mg     2.0     01-04    TPro  4.8[L]  /  Alb  2.9[L]  /  TBili  1.6[H]  /  DBili  x   /  AST  709[H]  /  ALT  433[H]  /  AlkPhos  52  01-04    Meds: multiple electrolytes Injection Type 1 1000 milliLiter(s) IV Continuous <Continuous>        HEMATOLOGIC  Meds: aspirin  chewable 81 milliGRAM(s) Enteral Tube daily    [x] VTE Prophylaxis                        7.9    5.73  )-----------( 81       ( 04 Jan 2025 22:21 )             22.4     PT/INR - ( 04 Jan 2025 22:21 )   PT: 20.1 sec;   INR: 1.77 ratio         PTT - ( 04 Jan 2025 22:21 )  PTT:32.1 sec      INFECTIOUS DISEASES  WBC Count: 5.73 K/uL (01-04 @ 22:21)  WBC Count: 5.28 K/uL (01-04 @ 18:34)  WBC Count: 4.78 K/uL (01-04 @ 15:24)  WBC Count: 3.35 K/uL (01-04 @ 01:33)    RECENT CULTURES:    Meds: ampicillin/sulbactam  IVPB 3 Gram(s) IV Intermittent every 6 hours  fluconAZOLE IVPB 400 milliGRAM(s) IV Intermittent daily  influenza   Vaccine 0.5 milliLiter(s) IntraMuscular once  linezolid  IVPB 600 milliGRAM(s) IV Intermittent every 12 hours  mycophenolate mofetil Suspension 1000 milliGRAM(s) Oral <User Schedule>  trimethoprim  40 mG/sulfamethoxazole 200 mG Suspension 10 milliLiter(s) Oral daily  valGANciclovir 50 mG/mL Oral Solution 450 milliGRAM(s) Oral daily        ENDOCRINE  CAPILLARY BLOOD GLUCOSE      POCT Blood Glucose.: 185 mg/dL (04 Jan 2025 22:18)    Meds: insulin lispro (ADMELOG) corrective regimen sliding scale   SubCutaneous every 4 hours  methylPREDNISolone sodium succinate IVPB   IV Intermittent          24 HOUR EVENTS:  INTERVAL EVENTS:  - 250 albumin x3 post op to clear lactate  - DUS liver @6AM      NEURO  Exam: awake, alert, oriented  Meds: HYDROmorphone  Injectable 0.25 milliGRAM(s) IV Push every 4 hours PRN breakthrough pain  oxyCODONE    Solution 2.5 milliGRAM(s) Enteral Tube every 4 hours PRN Moderate Pain (4 - 6)  oxyCODONE    Solution 5 milliGRAM(s) Enteral Tube every 4 hours PRN Severe Pain (7 - 10)      RESPIRATORY  RR: 18 (01-04-25 @ 23:15) (11 - 20)  SpO2: 99% (01-04-25 @ 23:15) (97% - 100%)  Wt(kg): --  Exam: unlabored, clear to auscultation bilaterally  Mechanical Ventilation:   ABG - ( 04 Jan 2025 22:16 )  pH: 7.46  /  pCO2: 33    /  pO2: 97    / HCO3: 24    / Base Excess: -0.1  /  SaO2: 99.6    Lactate: x                [N/A] Extubation Readiness Assessed  Meds:       CARDIOVASCULAR  HR: 82 (01-04-25 @ 23:15) (66 - 104)  BP: 97/57 (01-04-25 @ 19:30) (97/57 - 117/70)  BP(mean): 71 (01-04-25 @ 19:30) (71 - 76)  ABP: 123/60 (01-04-25 @ 23:15) (91/56 - 131/57)  ABP(mean): 84 (01-04-25 @ 23:15) (64 - 88)  Wt(kg): --  CVP(cm H2O): --      Exam: regular rate and rhythm  Cardiac Rhythm: sinus  Perfusion     [x]Adequate   [ ]Inadequate  Extremities  [x]Warm         [ ]Cool  Volume Status [ ]Hypervolemic [x]Euvolemic [ ]Hypovolemic  Meds:     GI/NUTRITION  Exam: soft, operative incisions with dressing mild strikethrough, appropriately tender at incisions, UBALDO x 3 w sang output  Diet: NPO  Meds: pantoprazole  Injectable 40 milliGRAM(s) IV Push daily      GENITOURINARY  I&O's Detail    01-04 @ 07:01  -  01-05 @ 00:55  --------------------------------------------------------  IN:    Albumin 5%  - 250 mL: 500 mL    Enteral Tube Flush: 155 mL    IV PiggyBack: 300 mL    IV PiggyBack: 500 mL    multiple electrolytes Injection Type 1.: 1000 mL  Total IN: 2455 mL    OUT:    Bulb (mL): 45 mL    Bulb (mL): 95 mL    Bulb (mL): 310 mL    Indwelling Catheter - Urethral (mL): 895 mL    Insulin: 0 mL    Nasogastric/Oral tube (mL): 30 mL  Total OUT: 1375 mL    Total NET: 1080 mL        Weight (kg): 73.2 (01-04 @ 05:56)  01-04    141  |  107  |  20  ----------------------------<  202[H]  4.2   |  20[L]  |  0.92    Ca    8.5      04 Jan 2025 22:21  Phos  3.3     01-04  Mg     2.0     01-04    TPro  4.8[L]  /  Alb  2.9[L]  /  TBili  1.6[H]  /  DBili  x   /  AST  709[H]  /  ALT  433[H]  /  AlkPhos  52  01-04    Meds: multiple electrolytes Injection Type 1 1000 milliLiter(s) IV Continuous <Continuous>        HEMATOLOGIC  Meds: aspirin  chewable 81 milliGRAM(s) Enteral Tube daily    [x] VTE Prophylaxis                        7.9    5.73  )-----------( 81       ( 04 Jan 2025 22:21 )             22.4     PT/INR - ( 04 Jan 2025 22:21 )   PT: 20.1 sec;   INR: 1.77 ratio         PTT - ( 04 Jan 2025 22:21 )  PTT:32.1 sec      INFECTIOUS DISEASES  WBC Count: 5.73 K/uL (01-04 @ 22:21)  WBC Count: 5.28 K/uL (01-04 @ 18:34)  WBC Count: 4.78 K/uL (01-04 @ 15:24)  WBC Count: 3.35 K/uL (01-04 @ 01:33)    RECENT CULTURES:    Meds: ampicillin/sulbactam  IVPB 3 Gram(s) IV Intermittent every 6 hours  fluconAZOLE IVPB 400 milliGRAM(s) IV Intermittent daily  influenza   Vaccine 0.5 milliLiter(s) IntraMuscular once  linezolid  IVPB 600 milliGRAM(s) IV Intermittent every 12 hours  mycophenolate mofetil Suspension 1000 milliGRAM(s) Oral <User Schedule>  trimethoprim  40 mG/sulfamethoxazole 200 mG Suspension 10 milliLiter(s) Oral daily  valGANciclovir 50 mG/mL Oral Solution 450 milliGRAM(s) Oral daily        ENDOCRINE  CAPILLARY BLOOD GLUCOSE      POCT Blood Glucose.: 185 mg/dL (04 Jan 2025 22:18)    Meds: insulin lispro (ADMELOG) corrective regimen sliding scale   SubCutaneous every 4 hours  methylPREDNISolone sodium succinate IVPB   IV Intermittent

## 2025-01-05 NOTE — PROGRESS NOTE ADULT - ASSESSMENT
64F with a PMHx of breast cancer s/p lumpectomy, HTN, and ETOH cirrhosis, Umbilical Hernia Repair 9/2024, now s/p OLT under Simulect induction on 1/4/25    [ ] s/p OLT (POD#1)  -opening LFTs appropriate, continue to trend all labs q4h o/n  -donor graft with replaced R TANG, will follow dopplers closely  -hold ASA/SQH for now  -, Albumin bolus prn  -Unasyn x48H  -bowel regimen  -PT/OT/RD/Spirometry/SCDs  -strict I&Os: JPs x3, corrales, keep NGT LIS o/n  -NPO     [ ] Immunosuppression  -FK TBD, MMF 1/1, SST  -SIMULECT POD#4  -PPx: Valcyte/Bactrim/Fluc/PPI/OsCal 64F with a PMHx of breast cancer s/p lumpectomy, HTN, and ETOH cirrhosis, Umbilical Hernia Repair 9/2024, now s/p OLT under Simulect induction on 1/4/25    [ ] POD 1 s/p OLT   -donor graft with replaced R TANG, will follow dopplers closely  -LFTs trending down  - f/u repeat liver doppler this am  - 1u PRBC this am  - ASA 81mg daily  - DC NGT  - Diet: adv to clears, dc IVF  - Pain management: Oxy prb  - Bowel regimen: senna  - Unasyn x48H  - PT/OT/RD/Spirometry/SCDs  - strict I&Os: Farzaneh x3, antoni,   - discuss SQH    [ ] Immunosuppression  - Start FK, MMF 1/1, SST  - SIMULECT POD#4  - PPx: Valcyte/Bactrim/Fluc/PPI/OsCal 64F with a PMHx of breast cancer s/p lumpectomy, HTN, and ETOH cirrhosis, Umbilical Hernia Repair 9/2024, now s/p OLT under Simulect induction on 1/4/25    [ ] POD 1 s/p OLT   -donor graft with replaced R TANG, will follow dopplers closely  -LFTs trending down  -Daily liver doppler  -1u PRBC this am  -ASA 81mg daily  -DC NGT  -Diet: adv to clears, dc IVF  -Pain management: Oxy prb  -Bowel regimen: senna  -Unasyn x48H  -PT/OT/RD/Spirometry/SCDs  -strict I&Os: JPs x3, corrales,   -dc RICC/a line/corrales    [ ] Immunosuppression  - Start FK 0.5bid MMF 1/1, SST  - SIMULECT POD#4  - PPx: Valcyte/Bactrim/Fluc/PPI/OsCal 64F with a PMHx of breast cancer s/p lumpectomy, HTN, and ETOH cirrhosis, Umbilical Hernia Repair 9/2024, now s/p OLT under Simulect induction on 1/4/25    [ ] POD 1 s/p OLT   -donor graft with replaced R TANG, will follow dopplers closely  - Donor grew staph epi in bld cxs x1; Recipient bld cxs sent 1/4. Per ID: linezolid or vanco x 5 days   -LFTs trending down  -Daily liver doppler  -1u PRBC this am  -ASA 81mg daily  -DC NGT  -Diet: adv to clears, dc IVF  -Pain management: Oxy prb  -Bowel regimen: senna  -Unasyn x48H  -PT/OT/RD/Spirometry/SCDs  -strict I&Os: JPs x3, corrales,   -dc RICC/a line/corrales    [ ] Immunosuppression  - Start FK 0.5bid MMF 1/1, SST  - SIMULECT POD#4  - PPx: Valcyte/Bactrim/Fluc/PPI/OsCal

## 2025-01-05 NOTE — PHYSICAL THERAPY INITIAL EVALUATION ADULT - STANDING BALANCE: STATIC
"Pt reports SOB for \"awhile\" pt wear oxygen at home sometimes.  [facility]  Pt stated stated his oxygen was 97% with oxygen, pt doesn't remember what it was prior to the o2   Pt stated he's been wearing his oxygen all day today and all night last night. [facility]. Pt reports on and off coughing \"not a lot\" denies fever,denies chest. Pt stated he feels more SOB after moving around while at rest feels fine. Pt is alert and ornt no distress noted  "
fair plus

## 2025-01-05 NOTE — PHYSICAL THERAPY INITIAL EVALUATION ADULT - PERTINENT HX OF CURRENT PROBLEM, REHAB EVAL
65y/o F with a PMH of breast CA s/p lumpectomy, ETOH abuse, and HTN. She was admitted to [facility] from 2/17-2/25 for abdominal distention and jaundice. She originally saw PCP in Jan 2023 when she noted she was turning yellow, PCP did bloodwork and referred her her GI: Jai Lazcano. She states she used to have 2 glasses of vodka daily for 3 years to treat her chronic pain from her breast cancer . She reports she was on Letrozole for her breast caner and was taking Tylenol daily at recommended doses to treat her pain, . Her breast cancer diagnosis was several years ago . Has received intermittent paracentesis but has spaced out > 2 months. She has an umbilical hernia repair on 9/17/24 . Pt presented for OLT. on 1/4/25.

## 2025-01-05 NOTE — PROGRESS NOTE ADULT - SUBJECTIVE AND OBJECTIVE BOX
Transplant Surgery - Multidisciplinary Rounds  --------------------------------------------------------------  OLTx   1/4/2025   POD#1    Present:   Patient seen and examined with multidisciplinary Transplant team including Surgeon: Dr. Hagen, Hepatologist Dr. Hand, EDNA Pelletier and bedside RN in AM rounds.   Disciplines not in attendance will be notified of the plan.     64F with a PMHx of breast cancer s/p lumpectomy, HTN, and ETOH cirrhosis, Umbilical Hernia Repair 9/2024, now s/p OLT under Simulect induction on 1/4/25    EBL: 1L           10 PRBC/6 FFP/4 PLT/ 4 Cryo        Interval Events:                Immunosuppression:  -FK TBD, MMF 1/1, SST  -SIMULECT POD#4  -Ongoing monitoring for signs of rejection    Potential Discharge date: TBD  Education:  Medications  Plan of care:  See Below                Review of systems  Gen: No weight changes, fatigue, fevers/chills, weakness  Skin: No rashes  Head/Eyes/Ears/Mouth: No headache; Normal hearing; Normal vision w/o blurriness; No sinus pain/discomfort, sore throat  Respiratory: No dyspnea, cough, wheezing, hemoptysis  CV: No chest pain, PND, orthopnea  GI: C/O abdominal pain at surgical site. no diarrhea, constipation, nausea, vomiting, melena, hematochezia  : No increased frequency, dysuria, hematuria, nocturia  MSK: No joint pain/swelling; no back pain; no edema  Neuro: No dizziness/lightheadedness, weakness, seizures, numbness, tingling  Heme: No easy bruising or bleeding  Endo: No heat/cold intolerance  Psych: No significant nervousness, anxiety, stress, depression  All other systems were reviewed and are negative, except as noted.      PHYSICAL EXAM:  Constitutional: Well developed / well nourished  Eyes: PERRLA  ENMT: nc/at, no thrush, NGT minimal bile  Neck: supple, central line c/d/i  Respiratory: CTA B/L  Cardiovascular: RRR  Gastrointestinal: Soft abdomen, ND, appropriate incisional TTP. dressing c/d/i, JPs x3 appropriately sanguinous  Genitourinary: Urinary catheter in place, 50-70/h  Extremities: SCD's in place and working bilaterally  Vascular: Palpable dp pulses bilaterally.   Neurological: A&O x3, waking up  Skin: no rashes, ulcerations, lesions  Musculoskeletal: Moving all extremities  Psychiatric: Responsive Transplant Surgery - Multidisciplinary Rounds  --------------------------------------------------------------  OLTx   1/4/2025   POD#1  CMV +/+    Present:   Patient seen and examined with multidisciplinary Transplant team including Surgeon: Dr. Hagen, Hepatologist Dr. Hand, EDNA Plascencia/Queenie and bedside RN in AM rounds.   Disciplines not in attendance will be notified of the plan.     HPI: 64F with a PMHx of breast cancer s/p lumpectomy, HTN, and ETOH cirrhosis, Umbilical Hernia Repair 9/2024, now s/p OLT under Simulect induction on 1/4/25    Interval Events:  -s/p OLT: LFTs trending down  -extubated  -transfused 1u PRBC post op  -Liver doppler unable to visualize main HA and post branch of R HA  -pending repeat liver doppler this am     Immunosuppression:  -FK TBD, MMF 1/1, SST  -SIMULECT POD#4  -Ongoing monitoring for signs of rejection    Potential Discharge date: TBD  Education:  Medications  Plan of care:  See Below    MEDICATIONS  (STANDING):  ampicillin/sulbactam  IVPB 3 Gram(s) IV Intermittent every 6 hours  aspirin  chewable 81 milliGRAM(s) Enteral Tube daily  chlorhexidine 2% Cloths 1 Application(s) Topical <User Schedule>  fluconAZOLE IVPB 400 milliGRAM(s) IV Intermittent daily  influenza   Vaccine 0.5 milliLiter(s) IntraMuscular once  insulin lispro (ADMELOG) corrective regimen sliding scale   SubCutaneous every 4 hours  linezolid  IVPB 600 milliGRAM(s) IV Intermittent every 12 hours  methylPREDNISolone sodium succinate IVPB   IV Intermittent   multiple electrolytes Injection Type 1 1000 milliLiter(s) (125 mL/Hr) IV Continuous <Continuous>  mycophenolate mofetil Suspension 1000 milliGRAM(s) Oral <User Schedule>  pantoprazole  Injectable 40 milliGRAM(s) IV Push daily  trimethoprim  40 mG/sulfamethoxazole 200 mG Suspension 10 milliLiter(s) Oral daily  valGANciclovir 50 mG/mL Oral Solution 450 milliGRAM(s) Oral daily    MEDICATIONS  (PRN):  HYDROmorphone  Injectable 0.25 milliGRAM(s) IV Push every 4 hours PRN breakthrough pain  oxyCODONE    Solution 5 milliGRAM(s) Enteral Tube every 4 hours PRN Moderate Pain (4 - 6)  oxyCODONE    Solution 10 milliGRAM(s) Enteral Tube every 4 hours PRN Severe Pain (7 - 10)    PAST MEDICAL & SURGICAL HISTORY:  Breast cancer, left  Mild alcohol use disorder  H/O hepatitis  GERD (gastroesophageal reflux disease)  Skin cancer, basal cell  H/O lumpectomy  History of removal of skin mole  H/O ganglion cyst    Vital Signs Last 24 Hrs  T(C): 36.8 (05 Jan 2025 03:00), Max: 36.9 (04 Jan 2025 23:00)  T(F): 98.2 (05 Jan 2025 03:00), Max: 98.4 (04 Jan 2025 23:00)  HR: 62 (05 Jan 2025 07:00) (62 - 104)  BP: 97/57 (04 Jan 2025 19:30) (97/57 - 105/58)  BP(mean): 71 (04 Jan 2025 19:30) (71 - 76)  RR: 11 (05 Jan 2025 07:00) (10 - 20)  SpO2: 95% (05 Jan 2025 07:00) (95% - 100%)    Parameters below as of 05 Jan 2025 07:00  Patient On (Oxygen Delivery Method): room air    I&O's Summary    04 Jan 2025 07:01  -  05 Jan 2025 07:00  --------------------------------------------------------  IN: 4155 mL / OUT: 2565 mL / NET: 1590 mL    05 Jan 2025 07:01  -  05 Jan 2025 08:31  --------------------------------------------------------  IN: 125 mL / OUT: 175 mL / NET: -50 mL                       7.3    6.54  )-----------( 71       ( 05 Jan 2025 05:46 )             20.9     01-05    142  |  107  |  23  ----------------------------<  158[H]  4.0   |  21[L]  |  0.94    Ca    7.8[L]      05 Jan 2025 05:45  Phos  4.2     01-05  Mg     2.0     01-05    TPro  4.9[L]  /  Alb  3.0[L]  /  TBili  1.3[H]  /  DBili  x   /  AST  397[H]  /  ALT  338[H]  /  AlkPhos  46  01-05    Culture - Urine (collected 01-04-25 @ 01:31)  Source: Clean Catch Clean Catch (Midstream)  Final Report (01-05-25 @ 05:47):    <10,000 CFU/mL Normal Urogenital Kianna    Review of systems  Gen: No weight changes, fatigue, fevers/chills, weakness  Skin: No rashes  Head/Eyes/Ears/Mouth: No headache; Normal hearing; Normal vision w/o blurriness; No sinus pain/discomfort, sore throat  Respiratory: No dyspnea, cough, wheezing, hemoptysis  CV: No chest pain, PND, orthopnea  GI: C/O abdominal pain at surgical site. no diarrhea, constipation, nausea, vomiting, melena, hematochezia  : No increased frequency, dysuria, hematuria, nocturia  MSK: No joint pain/swelling; no back pain; no edema  Neuro: No dizziness/lightheadedness, weakness, seizures, numbness, tingling  Heme: No easy bruising or bleeding  Endo: No heat/cold intolerance  Psych: No significant nervousness, anxiety, stress, depression  All other systems were reviewed and are negative, except as noted.      PHYSICAL EXAM:  Constitutional: Well developed / well nourished  Eyes: PERRLA  ENMT: nc/at, no thrush, NGT minimal bile  Neck: supple, central line c/d/i  Respiratory: CTA B/L  Cardiovascular: RRR  Gastrointestinal: Soft abdomen, ND, appropriate incisional TTP. dressing c/d/i, JPs x3 appropriately sanguinous  Genitourinary: Urinary catheter in place, 50-70/h  Extremities: SCD's in place and working bilaterally  Vascular: Palpable dp pulses bilaterally.   Neurological: A&O x3, waking up  Skin: no rashes, ulcerations, lesions  Musculoskeletal: Moving all extremities  Psychiatric: Responsive

## 2025-01-06 LAB
ALBUMIN SERPL ELPH-MCNC: 3 G/DL — LOW (ref 3.3–5)
ALP SERPL-CCNC: 56 U/L — SIGNIFICANT CHANGE UP (ref 40–120)
ALT FLD-CCNC: 259 U/L — HIGH (ref 10–45)
ANION GAP SERPL CALC-SCNC: 12 MMOL/L — SIGNIFICANT CHANGE UP (ref 5–17)
APTT BLD: 19.4 SEC — LOW (ref 24.5–35.6)
AST SERPL-CCNC: 144 U/L — HIGH (ref 10–40)
BILIRUB SERPL-MCNC: 1.3 MG/DL — HIGH (ref 0.2–1.2)
BUN SERPL-MCNC: 28 MG/DL — HIGH (ref 7–23)
CALCIUM SERPL-MCNC: 7.7 MG/DL — LOW (ref 8.4–10.5)
CHLORIDE SERPL-SCNC: 101 MMOL/L — SIGNIFICANT CHANGE UP (ref 96–108)
CO2 SERPL-SCNC: 21 MMOL/L — LOW (ref 22–31)
CREAT SERPL-MCNC: 0.96 MG/DL — SIGNIFICANT CHANGE UP (ref 0.5–1.3)
EGFR: 67 ML/MIN/1.73M2 — SIGNIFICANT CHANGE UP
GLUCOSE BLDC GLUCOMTR-MCNC: 136 MG/DL — HIGH (ref 70–99)
GLUCOSE BLDC GLUCOMTR-MCNC: 146 MG/DL — HIGH (ref 70–99)
GLUCOSE BLDC GLUCOMTR-MCNC: 157 MG/DL — HIGH (ref 70–99)
GLUCOSE BLDC GLUCOMTR-MCNC: 167 MG/DL — HIGH (ref 70–99)
GLUCOSE SERPL-MCNC: 135 MG/DL — HIGH (ref 70–99)
HCT VFR BLD CALC: 23.4 % — LOW (ref 34.5–45)
HGB BLD-MCNC: 8.2 G/DL — LOW (ref 11.5–15.5)
INR BLD: 1.32 RATIO — HIGH (ref 0.85–1.16)
MAGNESIUM SERPL-MCNC: 2.2 MG/DL — SIGNIFICANT CHANGE UP (ref 1.6–2.6)
MCHC RBC-ENTMCNC: 29.7 PG — SIGNIFICANT CHANGE UP (ref 27–34)
MCHC RBC-ENTMCNC: 35 G/DL — SIGNIFICANT CHANGE UP (ref 32–36)
MCV RBC AUTO: 84.8 FL — SIGNIFICANT CHANGE UP (ref 80–100)
NRBC # BLD: 0 /100 WBCS — SIGNIFICANT CHANGE UP (ref 0–0)
PHOSPHATE SERPL-MCNC: 3.6 MG/DL — SIGNIFICANT CHANGE UP (ref 2.5–4.5)
PLATELET # BLD AUTO: 70 K/UL — LOW (ref 150–400)
POTASSIUM SERPL-MCNC: 3.9 MMOL/L — SIGNIFICANT CHANGE UP (ref 3.5–5.3)
POTASSIUM SERPL-SCNC: 3.9 MMOL/L — SIGNIFICANT CHANGE UP (ref 3.5–5.3)
PROT SERPL-MCNC: 5 G/DL — LOW (ref 6–8.3)
PROTHROM AB SERPL-ACNC: 15.1 SEC — HIGH (ref 9.9–13.4)
RBC # BLD: 2.76 M/UL — LOW (ref 3.8–5.2)
RBC # FLD: 17.7 % — HIGH (ref 10.3–14.5)
SODIUM SERPL-SCNC: 134 MMOL/L — LOW (ref 135–145)
TACROLIMUS SERPL-MCNC: 1.4 NG/ML — SIGNIFICANT CHANGE UP
WBC # BLD: 10.22 K/UL — SIGNIFICANT CHANGE UP (ref 3.8–10.5)
WBC # FLD AUTO: 10.22 K/UL — SIGNIFICANT CHANGE UP (ref 3.8–10.5)

## 2025-01-06 PROCEDURE — 71045 X-RAY EXAM CHEST 1 VIEW: CPT | Mod: 26

## 2025-01-06 PROCEDURE — 93975 VASCULAR STUDY: CPT | Mod: 26

## 2025-01-06 PROCEDURE — 76705 ECHO EXAM OF ABDOMEN: CPT | Mod: 26,59

## 2025-01-06 PROCEDURE — 99233 SBSQ HOSP IP/OBS HIGH 50: CPT | Mod: GC

## 2025-01-06 PROCEDURE — 99223 1ST HOSP IP/OBS HIGH 75: CPT

## 2025-01-06 PROCEDURE — G0545: CPT

## 2025-01-06 RX ORDER — HEPARIN SODIUM 1000 [USP'U]/ML
5000 INJECTION, SOLUTION INTRAVENOUS; SUBCUTANEOUS EVERY 12 HOURS
Refills: 0 | Status: DISCONTINUED | OUTPATIENT
Start: 2025-01-06 | End: 2025-01-08

## 2025-01-06 RX ORDER — INSULIN LISPRO 100/ML
VIAL (ML) SUBCUTANEOUS
Refills: 0 | Status: DISCONTINUED | OUTPATIENT
Start: 2025-01-06 | End: 2025-01-16

## 2025-01-06 RX ORDER — INSULIN LISPRO 100/ML
VIAL (ML) SUBCUTANEOUS AT BEDTIME
Refills: 0 | Status: DISCONTINUED | OUTPATIENT
Start: 2025-01-06 | End: 2025-01-16

## 2025-01-06 RX ORDER — POLYETHYLENE GLYCOL 3350 17 G/DOSE
17 POWDER (GRAM) ORAL EVERY 24 HOURS
Refills: 0 | Status: DISCONTINUED | OUTPATIENT
Start: 2025-01-06 | End: 2025-01-11

## 2025-01-06 RX ORDER — CEFAZOLIN SODIUM 1 G
VIAL (EA) INJECTION
Refills: 0 | Status: COMPLETED | OUTPATIENT
Start: 2025-01-06 | End: 2025-01-08

## 2025-01-06 RX ORDER — GINKGO BILOBA 40 MG
5 CAPSULE ORAL AT BEDTIME
Refills: 0 | Status: DISCONTINUED | OUTPATIENT
Start: 2025-01-06 | End: 2025-01-16

## 2025-01-06 RX ORDER — ASPIRIN 81 MG
81 TABLET, DELAYED RELEASE (ENTERIC COATED) ORAL EVERY 24 HOURS
Refills: 0 | Status: DISCONTINUED | OUTPATIENT
Start: 2025-01-06 | End: 2025-01-14

## 2025-01-06 RX ORDER — SENNOSIDES 8.6 MG/1
2 TABLET, FILM COATED ORAL AT BEDTIME
Refills: 0 | Status: DISCONTINUED | OUTPATIENT
Start: 2025-01-06 | End: 2025-01-16

## 2025-01-06 RX ORDER — CEFAZOLIN SODIUM 1 G
2000 VIAL (EA) INJECTION ONCE
Refills: 0 | Status: COMPLETED | OUTPATIENT
Start: 2025-01-06 | End: 2025-01-06

## 2025-01-06 RX ORDER — MYCOPHENOLATE MOFETIL 250 MG/1
1000 CAPSULE ORAL
Refills: 0 | Status: DISCONTINUED | OUTPATIENT
Start: 2025-01-06 | End: 2025-01-16

## 2025-01-06 RX ORDER — FLUCONAZOLE 200 MG/1
200 TABLET ORAL EVERY 24 HOURS
Refills: 0 | Status: DISCONTINUED | OUTPATIENT
Start: 2025-01-06 | End: 2025-01-16

## 2025-01-06 RX ORDER — CEFAZOLIN SODIUM 1 G
2000 VIAL (EA) INJECTION EVERY 8 HOURS
Refills: 0 | Status: COMPLETED | OUTPATIENT
Start: 2025-01-06 | End: 2025-01-08

## 2025-01-06 RX ORDER — VALGANCICLOVIR 450 MG/1
450 TABLET, FILM COATED ORAL EVERY 24 HOURS
Refills: 0 | Status: DISCONTINUED | OUTPATIENT
Start: 2025-01-06 | End: 2025-01-16

## 2025-01-06 RX ADMIN — Medication 81 MILLIGRAM(S): at 11:53

## 2025-01-06 RX ADMIN — Medication 1000 MILLILITER(S): at 09:18

## 2025-01-06 RX ADMIN — Medication 1000 MILLILITER(S): at 13:14

## 2025-01-06 RX ADMIN — Medication 2: at 05:25

## 2025-01-06 RX ADMIN — Medication 1 TABLET(S): at 17:02

## 2025-01-06 RX ADMIN — Medication 17 GRAM(S): at 11:52

## 2025-01-06 RX ADMIN — TACROLIMUS 0.5 MILLIGRAM(S): 0.5 CAPSULE ORAL at 07:56

## 2025-01-06 RX ADMIN — Medication 100 MILLIGRAM(S): at 17:05

## 2025-01-06 RX ADMIN — Medication 4 MILLIGRAM(S): at 05:02

## 2025-01-06 RX ADMIN — VALGANCICLOVIR 450 MILLIGRAM(S): 450 TABLET, FILM COATED ORAL at 11:53

## 2025-01-06 RX ADMIN — Medication 4 MILLIGRAM(S): at 17:08

## 2025-01-06 RX ADMIN — Medication 1000 MILLILITER(S): at 09:17

## 2025-01-06 RX ADMIN — Medication 0.25 MILLIGRAM(S): at 01:40

## 2025-01-06 RX ADMIN — PANTOPRAZOLE 40 MILLIGRAM(S): 40 TABLET, DELAYED RELEASE ORAL at 11:50

## 2025-01-06 RX ADMIN — AMPICILLIN SODIUM AND SULBACTAM SODIUM 200 GRAM(S): 100; 50 INJECTION, POWDER, FOR SOLUTION INTRAVENOUS at 11:50

## 2025-01-06 RX ADMIN — Medication 2: at 12:09

## 2025-01-06 RX ADMIN — Medication 4 MILLIGRAM(S): at 17:30

## 2025-01-06 RX ADMIN — Medication 4 MILLIGRAM(S): at 21:40

## 2025-01-06 RX ADMIN — CHLORHEXIDINE GLUCONATE 1 APPLICATION(S): 1.2 RINSE ORAL at 05:25

## 2025-01-06 RX ADMIN — AMPICILLIN SODIUM AND SULBACTAM SODIUM 200 GRAM(S): 100; 50 INJECTION, POWDER, FOR SOLUTION INTRAVENOUS at 05:25

## 2025-01-06 RX ADMIN — Medication 100 MILLIGRAM(S): at 23:43

## 2025-01-06 RX ADMIN — HEPARIN SODIUM 5000 UNIT(S): 1000 INJECTION, SOLUTION INTRAVENOUS; SUBCUTANEOUS at 17:05

## 2025-01-06 RX ADMIN — MYCOPHENOLATE MOFETIL 1000 MILLIGRAM(S): 250 CAPSULE ORAL at 21:01

## 2025-01-06 RX ADMIN — SENNOSIDES 2 TABLET(S): 8.6 TABLET, FILM COATED ORAL at 21:01

## 2025-01-06 RX ADMIN — Medication 1 TABLET(S): at 05:25

## 2025-01-06 RX ADMIN — LINEZOLID 300 MILLIGRAM(S): 600 TABLET ORAL at 05:25

## 2025-01-06 RX ADMIN — Medication 4 MILLIGRAM(S): at 21:10

## 2025-01-06 RX ADMIN — FLUCONAZOLE 200 MILLIGRAM(S): 200 TABLET ORAL at 11:53

## 2025-01-06 RX ADMIN — Medication 0.25 MILLIGRAM(S): at 01:25

## 2025-01-06 RX ADMIN — TACROLIMUS 0.5 MILLIGRAM(S): 0.5 CAPSULE ORAL at 21:01

## 2025-01-06 RX ADMIN — Medication 5 MILLIGRAM(S): at 21:01

## 2025-01-06 RX ADMIN — Medication 4 MILLIGRAM(S): at 00:00

## 2025-01-06 RX ADMIN — MYCOPHENOLATE MOFETIL 1000 MILLIGRAM(S): 250 CAPSULE ORAL at 07:54

## 2025-01-06 RX ADMIN — Medication 10 MILLILITER(S): at 11:50

## 2025-01-06 RX ADMIN — Medication 4 MILLIGRAM(S): at 05:32

## 2025-01-06 NOTE — DIETITIAN INITIAL EVALUATION ADULT - PERTINENT LABORATORY DATA
01-06    134[L]  |  101  |  28[H]  ----------------------------<  135[H]  3.9   |  21[L]  |  0.96    Ca    7.7[L]      06 Jan 2025 02:06  Phos  3.6     01-06  Mg     2.2     01-06    TPro  5.0[L]  /  Alb  3.0[L]  /  TBili  1.3[H]  /  DBili  x   /  AST  144[H]  /  ALT  259[H]  /  AlkPhos  56  01-06  POCT Blood Glucose.: 157 mg/dL (01-06-25 @ 05:24)

## 2025-01-06 NOTE — DIETITIAN INITIAL EVALUATION ADULT - ORAL INTAKE PTA/DIET HISTORY
PTA per pt  -Intake: endorses good PO intake of home-cooked meals; followed low sodium diet   -Chewing/Swallowing: denies difficulty   -Allergies/Intolerances: NKFA

## 2025-01-06 NOTE — CONSULT NOTE ADULT - ASSESSMENT
64y Female with a PMHx of breast cancer s/p lumpectomy, HTN, and ETOH cirrhosis s.p OLT on 1/4/25  CMV D+/R+, EBV D+/R+ and Toxo D-/R- , with standard anastomoses (Side-side Cavocavostomy, End-end portoportal reconstruction, End-end Single arterial reconstruction (Donor Right, Segment IV and Left Hepatic Arteries arising separately from celiac trunk, Redo hepatic artery anastomosis ) , D-D biliary reconstruction without stenting) and 19Fr Victoriano drain x3 (# 1 Under the right lobe, # 2 At the Hilum, # 3 Under the left lobe). ; requiring 10 PRBC  6 FFP  4 Plt  4 Cryo      ID consulted for donor with S epidermidis in 2 of 2 bottles of 1 set    1. s/p  OLT on 1/4/25  CMV D+/R+, EBV D+/R+ and Toxo D-/R- , with standard anastomoses  ppx needed  completed GNR ppx  on valcyte, bactrim and fluconazole per protocol- continue  2. donor with S epidermidis in 1 set of 2- unclear if contamination of true bacteremia, but will prophylax with 5 days of antibiotics  BC 1/4/25 NGTD and body fluid cx NGTD  currently on linezolid- can complete course with vancomycin iv or linezolid on 1/8/25  but would then recommend using po linezolid  please4 call ID should any culture turn positive        Thank you for involving us in the care of this patient  Transplant ID will sign off  Please call or page with additional questions  Pager; #[phone]  Teams: from 8 am to 5 pm  Sandra Quinones MD       64y Female with a PMHx of breast cancer s/p lumpectomy, HTN, and ETOH cirrhosis s.p OLT on 1/4/25  CMV D+/R+, EBV D+/R+ and Toxo D-/R- , with standard anastomoses (Side-side Cavocavostomy, End-end portoportal reconstruction, End-end Single arterial reconstruction (Donor Right, Segment IV and Left Hepatic Arteries arising separately from celiac trunk, Redo hepatic artery anastomosis ) , D-D biliary reconstruction without stenting) and 19Fr Victoriano drain x3 (# 1 Under the right lobe, # 2 At the [place]um, # 3 Under the left lobe). ; requiring 10 PRBC  6 FFP  4 Plt  4 Cryo      ID consulted for donor with S epidermidis in 2 of 2 bottles of 1 set    1. s/p  OLT on 1/4/25  CMV D+/R+, EBV D+/R+ and Toxo D-/R- , with standard anastomoses  ppx needed  completed GNR ppx  on valcyte, bactrim and fluconazole per protocol- continue  2. donor with S epidermidis in 1 set of 2- unclear if contamination of true bacteremia, but will prophylax with 5 days of antibiotics  BC 1/4/25 NGTD and body fluid cx NGTD  currently on linezolid-isolate is susceptible to oxacillin so can stop linezolid and complete a 5 day course of prophylaxis with cefazolin 2 g q8h on 1/8/25  please4 call ID should any culture turn positive        Thank you for involving us in the care of this patient  Transplant ID will sign off  Please call or page with additional questions  Pager; #[phone]  Teams: from 8 am to 5 pm  Sandra Quinones MD

## 2025-01-06 NOTE — DIETITIAN INITIAL EVALUATION ADULT - REASON FOR ADMISSION
"64y Female with a PMHx of breast cancer s/p lumpectomy, HTN, and ETOH cirrhosis. Patient was admitted to [facility] in February of 2024 for management of ascites and jaundice. She has since been living at home and receiving intermittent paracentesis to manage ascites but procedures have spaced out to >2 months. Patient was listed outpatient for OLT with MELD of 18 is now s/p OLT on 1/4/2025 and extubated post-op. SICU consulted for hemodynamic monitoring."

## 2025-01-06 NOTE — PROGRESS NOTE ADULT - SUBJECTIVE AND OBJECTIVE BOX
ABBY MALAGON is a 64y Female s/p liver transplant on 1/4/25. PMH is signifcant for HTN, ETOH cirrhosis    NKDA  CMV +/+    Transplant Medications  Induction  -Basiliximab 20 mg POD 0 (given in OR) and POD 4  -Methyprednisolone taper (switch to PO prednisone on POD 6)            POD 0: 1000 mg IV in OR            POD 1: 500 mg IV x 1            POD 2: 250 mg IV x 1            POD 3: 125 mg IV x 1            POD 4: 60 mg IV x 1            POD 5: 40 mg IV x 1        Maintenance Immunosuppression  -Tacrolimus 0.05 mg/kg/dose Q12H at 8AM and 8PM (Adjust for goal trough: 8-10) to be started on POD 1 per liver transplant team  -Mycophenolate 1,000 mg PO Q12H  -Prednisone             POD 6: 20 mg PO daily    Anti-infection   -Bactrim SS tablet (frequency based on renal function)  -Valganciclovir (dose based on CMV serostatus and frequency based on renal function)  -Fluconazole 400 mg daily    Surgical prophylaxis pre- and intra-operative dosing  -Ampicillin/sulbactam    Prophylaxis  -HAT ppx: aspirin 81 mg daily to start on POD3 per liver transplant team  -GI ppx: pantoprazole 40 mg IV daily (switch to PO when patient tolerates)  -Bowel ppx: senna  -DVT: sequential compression device  -Pain:            Mild: Acetaminophen 650 mg every 6 hours PRN           Moderate: Tramadol 25 mg every 4 hours PRN (adjust for renal function)           Severe: Tramadol 50 mg every 4 hours PRN (adjust for renal function)    Home Medications:  pantoprazole 40 mg oral delayed release tablet: 1 tab(s) orally once a day (before a meal) (24 Sep 2024 11:38)  senna leaf extract oral tablet: 2 tab(s) orally once a day (at bedtime) (24 Sep 2024 11:38)      Outpatient medication reconciliation reviewed and will be re-started appropriately.  Plan discussed with multidisciplinary team.

## 2025-01-06 NOTE — DIETITIAN INITIAL EVALUATION ADULT - ADD RECOMMEND
1) Consistent carbohydrate diet upon advancement   2) Ensure Max x1/day   3) [id number], multivitamin   4) Monitor PO intake, diet tolerance, weight trends, labs, GI function, and skin integrity    Kristie Mcmahan MS, RDN, CDN (Teams)

## 2025-01-06 NOTE — DIETITIAN INITIAL EVALUATION ADULT - OTHER INFO
GI/Intake:   -Tolerating clear liquids thus far  -Last BM documented ; bowel regimen ordered (Miralax, Senna)   -Hx of ETOH cirrhosis; S/p OLT ; Simulect, Cellcept and Prograf ordered     Endo:   -Post-transplant steroid regimen (Prednisone, Solu-medrol)   -Sliding scale insulin for coverage     Renal:  -Albumin   -Hyponatremic     Weight Hx:   -Current dosin pounds   -Reports fluid shifts 2/2 paracenteses PTA

## 2025-01-06 NOTE — PROGRESS NOTE ADULT - ASSESSMENT
ABBY MALAGON is a 64y Female with a PMHx of breast cancer s/p lumpectomy, HTN, and ETOH cirrhosis. Patient was admitted to [facility] in February of 2024 for management of ascites and jaundice. She has since been living at home and receiving intermittent paracentesis to manage ascites but procedures have spaced out to >2 months. Patient was listed outpatient for OLT with MELD of 18 is now s/p OLT on 1/4/2025 and extubated post-op. SICU consulted for hemodynamic monitoring.     PLAN  Neuro:   -AOx4 at baseline  -pain control with oxy PRN, dilaudid for breakthrough    Respiratory:   -extubated immediately after procedure  -room air  -out of bed to chair, IS    Cardiovascular:  -MAP>65, SBP goal >120  -off pressors, no pressors fluid resuscitation only per transplant  -trend lactate    Gastrointestinal:   -CLD  -protonix   -Liver duplex post op: patent vasculature but unable to fully assess  -trend LFTs and bili     Renal/:  -plasmalyte @125  -corrales in place  -monitor I&Os  -IVF @ 75cc    Heme:   -monitor H/H  -holding DVT PPx  -ASA     ID:  -Unasyn x 48 hours  -linezolid for pos donor cultures  -immunosuppression w/ cellcept & tacro  -bactrim, valcyte, fluc    Endocrine:   -steroid taper  -insulin gtt during case, now ISS  -monitor glucose    Lines:  -L radial a-line (1/4)  -R radial a-line (1/4)  -R IJ introducer sheath (1/4)  -L rapid infusion catheter (1/4)

## 2025-01-06 NOTE — DIETITIAN INITIAL EVALUATION ADULT - NSFNSGIIOFT_GEN_A_CORE
01-05-25 @ 07:01  -  01-06-25 @ 07:00  --------------------------------------------------------  OUT:    Nasogastric/Oral tube (mL): 50 mL  Total OUT: 50 mL    Total NET: -50 mL

## 2025-01-06 NOTE — CONSULT NOTE ADULT - SUBJECTIVE AND OBJECTIVE BOX
64y Female with a PMHx of breast cancer s/p lumpectomy, HTN, and ETOH cirrhosis s.p OLT on 1/4/25  CMV D+/R+, EBV D+/R+ and Toxo D-/R- , with standard anastomoses (Side-side Cavocavostomy, End-end portoportal reconstruction, End-end Single arterial reconstruction (Donor Right, Segment IV and Left Hepatic Arteries arising separately from celiac trunk, Redo hepatic artery anastomosis ) , D-D biliary reconstruction without stenting) and 19Fr Victoriano drain x3 (# 1 Under the right lobe, # 2 At the [place], # 3 Under the left lobe). ; requiring 10 PRBC  6 FFP  4 Plt  4 Cryo      ID consulted for donor with S epidermidis in 2 of 2 bottles of 1 set      otherwise doing well, good recovery Jps in place, LFTs trending down         ____________________________________________________  ROS  GENERAL: denies chills, , night sweats, weight loss.   PSYCH: denies depression, anxiety, suicidal ideation, hallucination, and delusions  SKIN: no rash or lesions; no color changes, no abnormal nevi,no  dryness, and nojaundice    EYES: denies visual changes, floaters, pain, inflammation, blurred vision, and discharge  ENT: denies tinnitus, vertigo, epistaxis, oral lesion, and decreased acuity  PULM: denies, hemoptysis, pleurisy  CVS: denies angina, palpitations,+ orthopnea, no syncope, or heart murmur  GI: denies constipation, diarrhea, melena, abdominal pain, nausea.   : denies dysuria, frequency, discharge, incontinence, stones or macroscopic hematuria  MS: no arthralgias, no erythema or swelling, no myalgias, noedema, or lower back pain.   CNS: denies numbness, dizziness, seizure, or tremor  ENDO: denies heat/cold intolerance, polyuria, polydipsia, malaise.    HEME: denies bruising, bleeding, lymphadenopathy, anemia, and calf pain    Allergies  No Known Allergies    __________________________________________________  MEDS:  MEDICATIONS  (STANDING):  aspirin enteric coated 81 every 24 hours  heparin   Injectable 5000 every 12 hours  HYDROmorphone   Tablet 2 every 4 hours PRN  HYDROmorphone   Tablet 4 every 4 hours PRN  HYDROmorphone  Injectable 0.25 every 4 hours PRN  influenza   Vaccine 0.5 once  insulin lispro (ADMELOG) corrective regimen sliding scale  three times a day before meals  insulin lispro (ADMELOG) corrective regimen sliding scale  at bedtime  mycophenolate mofetil 1000 <User Schedule>  pantoprazole  Injectable 40 daily  polyethylene glycol 3350 17 every 24 hours  senna 2 at bedtime  tacrolimus 0.5 <User Schedule>    _________________________________________________  ANTIMICOBIALS  fluconAZOLE   Tablet 200 every 24 hours  influenza   Vaccine 0.5 once  linezolid  IVPB 600 every 12 hours  mycophenolate mofetil 1000 <User Schedule>  tacrolimus 0.5 <User Schedule>  trimethoprim  40 mG/sulfamethoxazole 200 mG Suspension 10 daily  valGANciclovir 450 every 24 hours      GENERAL LABS              8.2                  134  | 21   | 28           10.22 >-----------< 70      ------------------------< 135                   23.4                 3.9  | 101  | 0.96                                         Ca 7.7   Mg 2.2   Ph 3.6        Urinalysis Basic - ( 06 Jan 2025 02:06 )    Color: x / Appearance: x / SG: x / pH: x  Gluc: 135 mg/dL / Ketone: x  / Bili: x / Urobili: x   Blood: x / Protein: x / Nitrite: x   Leuk Esterase: x / RBC: x / WBC x   Sq Epi: x / Non Sq Epi: x / Bacteria: x        _________________________________________________  MICROBIOLOGY  -----------    Culture - Body Fluid with Gram Stain (collected 05 Jan 2025 12:33)  Source: Body Fluid  Gram Stain (05 Jan 2025 21:44):    No polymorphonuclear cells seen    No organisms seen    by cytocentrifuge    Culture - Blood (collected 04 Jan 2025 19:30)  Source: .Blood BLOOD  Preliminary Report (06 Jan 2025 01:01):    No growth at 24 hours    Culture - Blood (collected 04 Jan 2025 19:25)  Source: .Blood BLOOD  Preliminary Report (06 Jan 2025 01:01):    No growth at 24 hours    Culture - Urine (collected 04 Jan 2025 01:31)  Source: Clean Catch Clean Catch (Midstream)  Final Report (05 Jan 2025 05:47):    <10,000 CFU/mL Normal Urogenital Kianna      T(C): 36.7 (01-06-25 @ 07:00), Max: 36.9 (01-05-25 @ 19:00)  HR: 69 (01-06-25 @ 11:00) (60 - 85)  BP: 117/64 (01-06-25 @ 11:00) (101/55 - 136/65)  RR: 18 (01-06-25 @ 11:00) (8 - 22)  SpO2: 93% (01-06-25 @ 11:00) (92% - 97%)      24 hours:    01-05 @ 07:01  -  01-06 @ 07:00  --------------------------------------------------------  OUT:    Bulb (mL): 0 mL    Bulb (mL): 40 mL    Bulb (mL): 285 mL    Indwelling Catheter - Urethral (mL): 275 mL    Nasogastric/Oral tube (mL): 50 mL    Voided (mL): 500 mL  Total OUT: 1150 mL        CONSTITUTIONAL: Well groomed, no apparent distress  EYES: PERRLA and symmetric, EOMI, No conjunctival or scleral injection, non-icteric  ENMT: Oral mucosa with moist membranes. Normal dentition; no pharyngeal injection or exudates             NECK: Supple, symmetric and without tracheal deviation   RESP: No respiratory distress, no use of accessory muscles; CTA b/l, no WRR  CV: RRR, +S1S2, no MRG; no JVD; no peripheral edema  GI: Soft,Vicente drains with sanguineous fluid  LYMPH: No cervical LAD or tenderness; no axillary LAD or tenderness; no inguinal LAD or tenderness  MSK: Normal gait; No digital clubbing or cyanosis; examination of the (head/neck/spine/ribs/pelvis, RUE, LUE, RLE, LLE) without misalignment,            Normal ROM without pain, no spinal tenderness, normal muscle strength/tone  SKIN: No rashes or ulcers , ecchomosis                  Fungitell:   _______________________________________________  PERTINENT IMAGING

## 2025-01-06 NOTE — PROGRESS NOTE ADULT - ASSESSMENT
64F with a PMHx of breast cancer s/p lumpectomy, HTN, and ETOH cirrhosis, Umbilical Hernia Repair 9/2024, now s/p OLT under Simulect induction on 1/4/25    [ ] POD 2 s/p OLT   -donor graft with replaced R TANG, will follow dopplers closely  - Donor grew staph epi in bld cxs x1; Recipient bld cxs sent 1/4. Per ID: linezolid or vanco x 5 days   -LFTs trending down  -Daily liver doppler  -ASA 81mg daily, SHQ then eliquis this week   -Pain management: Oxy prn  -Bowel regimen: senna  -Unasyn x48H  -PT/OT/RD/Spirometry/SCDs  -strict I&Os: UBALDO corralesx2  - antoni dc'ed, passed TOV   - adv diet     [ ] Immunosuppression  - FK 0.5bid, MMF 1/1, SST  - SIMULECT POD#4  - PPx: Valcyte/Bactrim/Fluc/PPI/OsCal

## 2025-01-06 NOTE — PROGRESS NOTE ADULT - SUBJECTIVE AND OBJECTIVE BOX
24 HOUR EVENTS:  - Doppler: Patent hepatic arterial system; patent portal system  - 1u pRBC for hgb 7.3  - Dilaudid PO   - Dc'd CLARY, MIQUEL Rodriguez-line  - Started Tacro  - IVL @ 75cc    NEURO  Exam: awake, alert, oriented  Meds: HYDROmorphone   Tablet 2 milliGRAM(s) Oral every 4 hours PRN Moderate Pain (4 - 6)  HYDROmorphone   Tablet 4 milliGRAM(s) Oral every 4 hours PRN Severe Pain (7 - 10)  HYDROmorphone  Injectable 0.25 milliGRAM(s) IV Push every 4 hours PRN breakthrough pain      RESPIRATORY  RR: 11 (01-06-25 @ 02:00) (8 - 24)  SpO2: 92% (01-06-25 @ 02:00) (92% - 98%)  Wt(kg): --  Exam: Lungs CTA b/l  Mechanical Ventilation: n/a  ABG - ( 05 Jan 2025 10:00 )  pH: 7.43  /  pCO2: 35    /  pO2: 93    / HCO3: 23    / Base Excess: -0.9  /  SaO2: 99.5    Lactate: x                Meds:     CARDIOVASCULAR  HR: 64 (01-06-25 @ 02:00) (60 - 82)  BP: 120/75 (01-06-25 @ 02:00) (117/66 - 136/65)  BP(mean): 91 (01-06-25 @ 02:00) (80 - 94)  ABP: 140/58 (01-05-25 @ 12:00) (110/50 - 141/65)  ABP(mean): 84 (01-05-25 @ 12:00) (74 - 95)  Wt(kg): --  CVP(cm H2O): --      Exam: Normal S1/S2 w/o murmurs or rubs  Cardiac Rhythm:   Perfusion     [x ]Adequate   [ ]Inadequate  Extremities  [x ]Warm         [ ]Cool  Volume Status [ ]Hypervolemic [x ]Euvolemic [ ]Hypovolemic  Meds:     GI/NUTRITION  Exam: UBALDO x 3 w/ sang output. Operative incisions with dressing mild strikethought  Diet:  clear liquid diet  Last Bowel Movement: 04-Jan-2025 (01-05-25 @ 07:00)    Meds: pantoprazole  Injectable 40 milliGRAM(s) IV Push daily      GENITOURINARY  I&O's Detail    01-04 @ 07:01  -  01-05 @ 07:00  --------------------------------------------------------  IN:    Albumin 5%  - 250 mL: 750 mL    Enteral Tube Flush: 155 mL    IV PiggyBack: 600 mL    IV PiggyBack: 650 mL    multiple electrolytes Injection Type 1.: 2000 mL  Total IN: 4155 mL    OUT:    Bulb (mL): 570 mL    Bulb (mL): 60 mL    Bulb (mL): 115 mL    Indwelling Catheter - Urethral (mL): 1490 mL    Insulin: 0 mL    Nasogastric/Oral tube (mL): 330 mL  Total OUT: 2565 mL    Total NET: 1590 mL      01-05 @ 07:01 - 01-06 @ 02:07  --------------------------------------------------------  IN:    Albumin 5%  - 250 mL: 250 mL    Enteral Tube Flush: 200 mL    IV PiggyBack: 400 mL    IV PiggyBack: 200 mL    multiple electrolytes Injection Type 1.: 750 mL    Oral Fluid: 920 mL    PRBCs (Packed Red Blood Cells): 300 mL  Total IN: 3020 mL    OUT:    Bulb (mL): 0 mL    Bulb (mL): 35 mL    Bulb (mL): 280 mL    Indwelling Catheter - Urethral (mL): 275 mL    Nasogastric/Oral tube (mL): 50 mL    Voided (mL): 300 mL  Total OUT: 940 mL    Total NET: 2080 mL          01-05    142  |  107  |  24[H]  ----------------------------<  162[H]  4.0   |  21[L]  |  0.93    Ca    7.7[L]      05 Jan 2025 10:13  Phos  4.2     01-05  Mg     2.1     01-05    TPro  4.8[L]  /  Alb  3.0[L]  /  TBili  1.3[H]  /  DBili  x   /  AST  301[H]  /  ALT  315[H]  /  AlkPhos  46  01-05    Meds: calcium carbonate 1250 mG  + Vitamin D (OsCal 500 + D) 1 Tablet(s) Oral two times a day      HEMATOLOGIC  Meds: aspirin  chewable 81 milliGRAM(s) Enteral Tube daily  [x] VTE Prophylaxis                      8.6    9.89  )-----------( 76       ( 05 Jan 2025 13:29 )             24.7     PT/INR - ( 05 Jan 2025 10:13 )   PT: 19.8 sec;   INR: 1.73 ratio         PTT - ( 05 Jan 2025 10:13 )  PTT:31.3 sec    INFECTIOUS DISEASES  T(C): 36.7 (01-05-25 @ 23:00), Max: 36.9 (01-05-25 @ 19:00)  Wt(kg): --  WBC Count: 9.89 K/uL (01-05 @ 13:29)  WBC Count: 8.61 K/uL (01-05 @ 10:13)  WBC Count: 6.54 K/uL (01-05 @ 05:46)  WBC Count: 5.50 K/uL (01-05 @ 02:27)    Recent Cultures:  Specimen Source: Body Fluid, 01-05 @ 12:33; Results --; Gram Stain:   No polymorphonuclear cells seen  No organisms seen  by cytocentrifuge; Organism: --  Specimen Source: .Blood BLOOD, 01-04 @ 19:30; Results   No growth at 24 hours; Gram Stain: --; Organism: --  Specimen Source: .Blood BLOOD, 01-04 @ 19:25; Results   No growth at 24 hours; Gram Stain: --; Organism: --  Specimen Source: Clean Catch Clean Catch (Midstream), 01-04 @ 01:31; Results   <10,000 CFU/mL Normal Urogenital Kianna; Gram Stain: --; Organism: --    Meds: ampicillin/sulbactam  IVPB 3 Gram(s) IV Intermittent every 6 hours  fluconAZOLE IVPB 400 milliGRAM(s) IV Intermittent daily  influenza   Vaccine 0.5 milliLiter(s) IntraMuscular once  linezolid  IVPB 600 milliGRAM(s) IV Intermittent every 12 hours  mycophenolate mofetil Suspension 1000 milliGRAM(s) Oral <User Schedule>  tacrolimus 0.5 milliGRAM(s) Oral <User Schedule>  trimethoprim  40 mG/sulfamethoxazole 200 mG Suspension 10 milliLiter(s) Oral daily  valGANciclovir 50 mG/mL Oral Solution 450 milliGRAM(s) Oral daily      ENDOCRINE  Capillary Blood Glucose    Meds: insulin lispro (ADMELOG) corrective regimen sliding scale   SubCutaneous every 6 hours        OTHER MEDICATIONS:  chlorhexidine 2% Cloths 1 Application(s) Topical <User Schedule>      IMAGING:

## 2025-01-06 NOTE — PROGRESS NOTE ADULT - SUBJECTIVE AND OBJECTIVE BOX
Transplant Surgery - Multidisciplinary Rounds  --------------------------------------------------------------  OLTx   1/4/2025   POD#2  CMV +/+    Patient seen and examined with multidisciplinary Transplant team including Surgeon Dr. Dagher, EDNA Nunes/Nithin Hepatologist Dr. Hand, Hepatology  Fellow  SICU team during AM rounds.   Disciplines not in attendance will be notified of the plan.     HPI: 64F with a PMHx of breast cancer s/p lumpectomy, HTN, and ETOH cirrhosis, Umbilical Hernia Repair 9/2024, now s/p OLT under Simulect induction on 1/4/25    Interval Events:  - LFTs downtrending   - no ON events   - afebrile, VSS, on RA     Immunosuppression:  -FK per level, MMF 1/1, SST  -SIMULECT POD#4  -Ongoing monitoring for signs of rejection    Potential Discharge date: TBD  Education:  Medications  Plan of care:  See Below      MEDICATIONS  (STANDING):  aspirin enteric coated 81 milliGRAM(s) Oral every 24 hours  calcium carbonate 1250 mG  + Vitamin D (OsCal 500 + D) 1 Tablet(s) Oral two times a day  ceFAZolin   IVPB 2000 milliGRAM(s) IV Intermittent every 8 hours  ceFAZolin   IVPB      chlorhexidine 2% Cloths 1 Application(s) Topical <User Schedule>  fluconAZOLE   Tablet 200 milliGRAM(s) Oral every 24 hours  heparin   Injectable 5000 Unit(s) SubCutaneous every 12 hours  influenza   Vaccine 0.5 milliLiter(s) IntraMuscular once  insulin lispro (ADMELOG) corrective regimen sliding scale   SubCutaneous three times a day before meals  insulin lispro (ADMELOG) corrective regimen sliding scale   SubCutaneous at bedtime  mycophenolate mofetil 1000 milliGRAM(s) Oral <User Schedule>  pantoprazole  Injectable 40 milliGRAM(s) IV Push daily  polyethylene glycol 3350 17 Gram(s) Oral every 24 hours  senna 2 Tablet(s) Oral at bedtime  tacrolimus 0.5 milliGRAM(s) Oral <User Schedule>  trimethoprim  40 mG/sulfamethoxazole 200 mG Suspension 10 milliLiter(s) Oral daily  valGANciclovir 450 milliGRAM(s) Oral every 24 hours    MEDICATIONS  (PRN):  HYDROmorphone   Tablet 2 milliGRAM(s) Oral every 4 hours PRN Moderate Pain (4 - 6)  HYDROmorphone   Tablet 4 milliGRAM(s) Oral every 4 hours PRN Severe Pain (7 - 10)  HYDROmorphone  Injectable 0.25 milliGRAM(s) IV Push every 4 hours PRN breakthrough pain  melatonin 5 milliGRAM(s) Oral at bedtime PRN Insomnia      PAST MEDICAL & SURGICAL HISTORY:  Breast cancer, left      Mild alcohol use disorder      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      GERD (gastroesophageal reflux disease)      Skin cancer, basal cell      H/O lumpectomy      History of removal of skin mole      H/O ganglion cyst          Vital Signs Last 24 Hrs  T(C): 36.7 (06 Jan 2025 15:00), Max: 36.9 (05 Jan 2025 19:00)  T(F): 98 (06 Jan 2025 15:00), Max: 98.4 (05 Jan 2025 19:00)  HR: 72 (06 Jan 2025 16:00) (60 - 85)  BP: 122/62 (06 Jan 2025 16:00) (101/55 - 136/65)  BP(mean): 86 (06 Jan 2025 16:00) (72 - 94)  RR: 28 (06 Jan 2025 16:00) (8 - 28)  SpO2: 98% (06 Jan 2025 16:00) (92% - 98%)    Parameters below as of 06 Jan 2025 15:00  Patient On (Oxygen Delivery Method): room air        I&O's Summary    05 Jan 2025 07:01  -  06 Jan 2025 07:00  --------------------------------------------------------  IN: 3070 mL / OUT: 1150 mL / NET: 1920 mL    06 Jan 2025 07:01  -  06 Jan 2025 18:37  --------------------------------------------------------  IN: 1468 mL / OUT: 485 mL / NET: 983 mL                              8.2    10.22 )-----------( 70       ( 06 Jan 2025 02:06 )             23.4     01-06    134[L]  |  101  |  28[H]  ----------------------------<  135[H]  3.9   |  21[L]  |  0.96    Ca    7.7[L]      06 Jan 2025 02:06  Phos  3.6     01-06  Mg     2.2     01-06    TPro  5.0[L]  /  Alb  3.0[L]  /  TBili  1.3[H]  /  DBili  x   /  AST  144[H]  /  ALT  259[H]  /  AlkPhos  56  01-06    Tacrolimus (), Serum: 1.4 ng/mL (01-06 @ 06:00)        Culture - Body Fluid with Gram Stain (collected 01-05-25 @ 12:33)  Source: Body Fluid  Gram Stain (01-05-25 @ 21:44):    No polymorphonuclear cells seen    No organisms seen    by cytocentrifuge  Preliminary Report (01-06-25 @ 16:00):    No growth    Culture - Blood (collected 01-04-25 @ 19:30)  Source: .Blood BLOOD  Preliminary Report (01-06-25 @ 01:01):    No growth at 24 hours    Culture - Blood (collected 01-04-25 @ 19:25)  Source: .Blood BLOOD  Preliminary Report (01-06-25 @ 01:01):    No growth at 24 hours    Culture - Urine (collected 01-04-25 @ 01:31)  Source: Clean Catch Clean Catch (Midstream)  Final Report (01-05-25 @ 05:47):    <10,000 CFU/mL Normal Urogenital Kianna      Review of systems  Gen: No weight changes, fatigue, fevers/chills, weakness  Skin: No rashes  Head/Eyes/Ears/Mouth: No headache; Normal hearing; Normal vision w/o blurriness; No sinus pain/discomfort, sore throat  Respiratory: No dyspnea, cough, wheezing, hemoptysis  CV: No chest pain, PND, orthopnea  GI: C/O abdominal pain at surgical site. no diarrhea, constipation, nausea, vomiting, melena, hematochezia  : No increased frequency, dysuria, hematuria, nocturia  MSK: No joint pain/swelling; no back pain; no edema  Neuro: No dizziness/lightheadedness, weakness, seizures, numbness, tingling  Heme: No easy bruising or bleeding  Endo: No heat/cold intolerance  Psych: No significant nervousness, anxiety, stress, depression  All other systems were reviewed and are negative, except as noted.      PHYSICAL EXAM:  Constitutional: Well developed / well nourished  Eyes: PERRLA  ENMT: nc/at, no thrush, NGT minimal bile  Neck: supple, central line c/d/i  Respiratory: CTA B/L  Cardiovascular: RRR  Gastrointestinal: Soft abdomen, ND, appropriate incisional TTP. dressing c/d/i, JPs x3 appropriately sanguinous  Genitourinary: Urinary catheter in place, 50-70/h  Extremities: SCD's in place and working bilaterally  Vascular: Palpable dp pulses bilaterally.   Neurological: A&O x3, waking up  Skin: no rashes, ulcerations, lesions  Musculoskeletal: Moving all extremities  Psychiatric: Responsive

## 2025-01-06 NOTE — DIETITIAN INITIAL EVALUATION ADULT - PERTINENT MEDS FT
MEDICATIONS  (STANDING):  ampicillin/sulbactam  IVPB 3 Gram(s) IV Intermittent every 6 hours  aspirin enteric coated 81 milliGRAM(s) Oral every 24 hours  calcium carbonate 1250 mG  + Vitamin D (OsCal 500 + D) 1 Tablet(s) Oral two times a day  chlorhexidine 2% Cloths 1 Application(s) Topical <User Schedule>  fluconAZOLE   Tablet 200 milliGRAM(s) Oral every 24 hours  heparin   Injectable 5000 Unit(s) SubCutaneous every 12 hours  influenza   Vaccine 0.5 milliLiter(s) IntraMuscular once  insulin lispro (ADMELOG) corrective regimen sliding scale   SubCutaneous three times a day before meals  insulin lispro (ADMELOG) corrective regimen sliding scale   SubCutaneous at bedtime  linezolid  IVPB 600 milliGRAM(s) IV Intermittent every 12 hours  mycophenolate mofetil 1000 milliGRAM(s) Oral <User Schedule>  pantoprazole  Injectable 40 milliGRAM(s) IV Push daily  polyethylene glycol 3350 17 Gram(s) Oral every 24 hours  senna 2 Tablet(s) Oral at bedtime  tacrolimus 0.5 milliGRAM(s) Oral <User Schedule>  trimethoprim  40 mG/sulfamethoxazole 200 mG Suspension 10 milliLiter(s) Oral daily  valGANciclovir 450 milliGRAM(s) Oral every 24 hours    MEDICATIONS  (PRN):  HYDROmorphone   Tablet 2 milliGRAM(s) Oral every 4 hours PRN Moderate Pain (4 - 6)  HYDROmorphone   Tablet 4 milliGRAM(s) Oral every 4 hours PRN Severe Pain (7 - 10)  HYDROmorphone  Injectable 0.25 milliGRAM(s) IV Push every 4 hours PRN breakthrough pain

## 2025-01-07 LAB
ALBUMIN SERPL ELPH-MCNC: 3 G/DL — LOW (ref 3.3–5)
ALP SERPL-CCNC: 111 U/L — SIGNIFICANT CHANGE UP (ref 40–120)
ALT FLD-CCNC: 171 U/L — HIGH (ref 10–45)
ANION GAP SERPL CALC-SCNC: 11 MMOL/L — SIGNIFICANT CHANGE UP (ref 5–17)
APTT BLD: 25.5 SEC — SIGNIFICANT CHANGE UP (ref 24.5–35.6)
AST SERPL-CCNC: 71 U/L — HIGH (ref 10–40)
BILIRUB SERPL-MCNC: 0.7 MG/DL — SIGNIFICANT CHANGE UP (ref 0.2–1.2)
BUN SERPL-MCNC: 32 MG/DL — HIGH (ref 7–23)
CALCIUM SERPL-MCNC: 7.7 MG/DL — LOW (ref 8.4–10.5)
CHLORIDE SERPL-SCNC: 102 MMOL/L — SIGNIFICANT CHANGE UP (ref 96–108)
CO2 SERPL-SCNC: 21 MMOL/L — LOW (ref 22–31)
CREAT SERPL-MCNC: 1.07 MG/DL — SIGNIFICANT CHANGE UP (ref 0.5–1.3)
EGFR: 59 ML/MIN/1.73M2 — LOW
GLUCOSE BLDC GLUCOMTR-MCNC: 132 MG/DL — HIGH (ref 70–99)
GLUCOSE BLDC GLUCOMTR-MCNC: 142 MG/DL — HIGH (ref 70–99)
GLUCOSE BLDC GLUCOMTR-MCNC: 148 MG/DL — HIGH (ref 70–99)
GLUCOSE BLDC GLUCOMTR-MCNC: 168 MG/DL — HIGH (ref 70–99)
GLUCOSE SERPL-MCNC: 126 MG/DL — HIGH (ref 70–99)
HCT VFR BLD CALC: 24.1 % — LOW (ref 34.5–45)
HCV RNA SPEC NAA+PROBE-LOG IU: SIGNIFICANT CHANGE UP
HCV RNA SPEC NAA+PROBE-LOG IU: SIGNIFICANT CHANGE UP LOGIU/ML
HGB BLD-MCNC: 8 G/DL — LOW (ref 11.5–15.5)
INR BLD: 1.29 RATIO — HIGH (ref 0.85–1.16)
MAGNESIUM SERPL-MCNC: 2.5 MG/DL — SIGNIFICANT CHANGE UP (ref 1.6–2.6)
MCHC RBC-ENTMCNC: 29 PG — SIGNIFICANT CHANGE UP (ref 27–34)
MCHC RBC-ENTMCNC: 33.2 G/DL — SIGNIFICANT CHANGE UP (ref 32–36)
MCV RBC AUTO: 87.3 FL — SIGNIFICANT CHANGE UP (ref 80–100)
NRBC # BLD: 0 /100 WBCS — SIGNIFICANT CHANGE UP (ref 0–0)
PHOSPHATE SERPL-MCNC: 3 MG/DL — SIGNIFICANT CHANGE UP (ref 2.5–4.5)
PLATELET # BLD AUTO: 54 K/UL — LOW (ref 150–400)
POTASSIUM SERPL-MCNC: 4.2 MMOL/L — SIGNIFICANT CHANGE UP (ref 3.5–5.3)
POTASSIUM SERPL-SCNC: 4.2 MMOL/L — SIGNIFICANT CHANGE UP (ref 3.5–5.3)
PROT SERPL-MCNC: 5.2 G/DL — LOW (ref 6–8.3)
PROTHROM AB SERPL-ACNC: 14.7 SEC — HIGH (ref 9.9–13.4)
RBC # BLD: 2.76 M/UL — LOW (ref 3.8–5.2)
RBC # FLD: 17.9 % — HIGH (ref 10.3–14.5)
SODIUM SERPL-SCNC: 134 MMOL/L — LOW (ref 135–145)
TACROLIMUS SERPL-MCNC: 1.2 NG/ML — SIGNIFICANT CHANGE UP
WBC # BLD: 7.45 K/UL — SIGNIFICANT CHANGE UP (ref 3.8–10.5)
WBC # FLD AUTO: 7.45 K/UL — SIGNIFICANT CHANGE UP (ref 3.8–10.5)

## 2025-01-07 PROCEDURE — 76705 ECHO EXAM OF ABDOMEN: CPT | Mod: 26,59

## 2025-01-07 PROCEDURE — 71045 X-RAY EXAM CHEST 1 VIEW: CPT | Mod: 26

## 2025-01-07 PROCEDURE — 93975 VASCULAR STUDY: CPT | Mod: 26

## 2025-01-07 PROCEDURE — 99233 SBSQ HOSP IP/OBS HIGH 50: CPT | Mod: GC

## 2025-01-07 RX ORDER — METHYLPREDNISOLONE 4 MG/1
60 TABLET ORAL EVERY 24 HOURS
Refills: 0 | Status: COMPLETED | OUTPATIENT
Start: 2025-01-08 | End: 2025-01-08

## 2025-01-07 RX ORDER — OXYCODONE HCL 15 MG
10 TABLET ORAL EVERY 4 HOURS
Refills: 0 | Status: DISCONTINUED | OUTPATIENT
Start: 2025-01-07 | End: 2025-01-11

## 2025-01-07 RX ORDER — METHYLPREDNISOLONE 4 MG/1
20 TABLET ORAL EVERY 24 HOURS
Refills: 0 | Status: COMPLETED | OUTPATIENT
Start: 2025-01-10 | End: 2025-01-10

## 2025-01-07 RX ORDER — TACROLIMUS 0.5 MG/1
1 CAPSULE ORAL
Refills: 0 | Status: DISCONTINUED | OUTPATIENT
Start: 2025-01-07 | End: 2025-01-08

## 2025-01-07 RX ORDER — METHYLPREDNISOLONE 4 MG/1
40 TABLET ORAL EVERY 24 HOURS
Refills: 0 | Status: COMPLETED | OUTPATIENT
Start: 2025-01-09 | End: 2025-01-09

## 2025-01-07 RX ORDER — OXYCODONE HCL 15 MG
5 TABLET ORAL EVERY 4 HOURS
Refills: 0 | Status: DISCONTINUED | OUTPATIENT
Start: 2025-01-07 | End: 2025-01-11

## 2025-01-07 RX ORDER — TACROLIMUS 0.5 MG/1
0.5 CAPSULE ORAL ONCE
Refills: 0 | Status: COMPLETED | OUTPATIENT
Start: 2025-01-07 | End: 2025-01-07

## 2025-01-07 RX ADMIN — Medication 10 MILLIGRAM(S): at 23:30

## 2025-01-07 RX ADMIN — PANTOPRAZOLE 40 MILLIGRAM(S): 40 TABLET, DELAYED RELEASE ORAL at 11:35

## 2025-01-07 RX ADMIN — TACROLIMUS 1 MILLIGRAM(S): 0.5 CAPSULE ORAL at 20:34

## 2025-01-07 RX ADMIN — HEPARIN SODIUM 5000 UNIT(S): 1000 INJECTION, SOLUTION INTRAVENOUS; SUBCUTANEOUS at 05:20

## 2025-01-07 RX ADMIN — Medication 81 MILLIGRAM(S): at 11:35

## 2025-01-07 RX ADMIN — MYCOPHENOLATE MOFETIL 1000 MILLIGRAM(S): 250 CAPSULE ORAL at 20:34

## 2025-01-07 RX ADMIN — Medication 2: at 16:54

## 2025-01-07 RX ADMIN — VALGANCICLOVIR 450 MILLIGRAM(S): 450 TABLET, FILM COATED ORAL at 11:35

## 2025-01-07 RX ADMIN — METHYLPREDNISOLONE 125 MILLIGRAM(S): 4 TABLET ORAL at 05:20

## 2025-01-07 RX ADMIN — FLUCONAZOLE 200 MILLIGRAM(S): 200 TABLET ORAL at 11:35

## 2025-01-07 RX ADMIN — Medication 100 MILLIGRAM(S): at 22:06

## 2025-01-07 RX ADMIN — Medication 10 MILLIGRAM(S): at 22:45

## 2025-01-07 RX ADMIN — Medication 10 MILLILITER(S): at 11:34

## 2025-01-07 RX ADMIN — Medication 100 MILLIGRAM(S): at 05:21

## 2025-01-07 RX ADMIN — Medication 5 MILLIGRAM(S): at 12:42

## 2025-01-07 RX ADMIN — HEPARIN SODIUM 5000 UNIT(S): 1000 INJECTION, SOLUTION INTRAVENOUS; SUBCUTANEOUS at 18:28

## 2025-01-07 RX ADMIN — Medication 4 MILLIGRAM(S): at 05:20

## 2025-01-07 RX ADMIN — Medication 17 GRAM(S): at 11:34

## 2025-01-07 RX ADMIN — SENNOSIDES 2 TABLET(S): 8.6 TABLET, FILM COATED ORAL at 22:06

## 2025-01-07 RX ADMIN — Medication 5 MILLIGRAM(S): at 13:42

## 2025-01-07 RX ADMIN — CHLORHEXIDINE GLUCONATE 1 APPLICATION(S): 1.2 RINSE ORAL at 05:21

## 2025-01-07 RX ADMIN — Medication 1 TABLET(S): at 05:20

## 2025-01-07 RX ADMIN — Medication 100 MILLIGRAM(S): at 14:00

## 2025-01-07 RX ADMIN — TACROLIMUS 0.5 MILLIGRAM(S): 0.5 CAPSULE ORAL at 08:14

## 2025-01-07 RX ADMIN — MYCOPHENOLATE MOFETIL 1000 MILLIGRAM(S): 250 CAPSULE ORAL at 08:13

## 2025-01-07 RX ADMIN — TACROLIMUS 0.5 MILLIGRAM(S): 0.5 CAPSULE ORAL at 12:42

## 2025-01-07 RX ADMIN — Medication 4 MILLIGRAM(S): at 05:50

## 2025-01-07 RX ADMIN — Medication 1 TABLET(S): at 18:28

## 2025-01-07 NOTE — PROGRESS NOTE ADULT - ATTENDING COMMENTS
s/p liver tx    ON: no major events    aaox4, pleasant  on RA  off vasopressors, clear lact goal , to avoid pressors per transplant   advance to reg diet  daily duplex reassuring and patent  LFTs all downtrending  no BM yet, on bowel regimen   corrales in place, 800cc urine output, balance 2 lit positive  no chem VTE PPX, SCDs on  stable Hb, thrombocytopenia stable  cell cept tacro per transplant  fluc, bactrim unasyn to complete, linezolid for donor pos Bcx  ID following  afebrile  steroid taper, good glycemic control      dc CVC, PIVs
s/p liver tx    ON: no major events    aaox4, pleasant, transition to oxy for pain  on RA  AM CXR clear  off vasopressors, clear lact goal , to avoid pressors per transplant, maintaining BPs   reg diet   daily duplex reassuring and patent  LFTs all downtrending  no BM yet, on bowel regimen   voiding, 1200cc urine output, balance 0.5 lit positive  no chem VTE PPX, SCDs on  stable Hb, thrombocytopenia stable  cell cept tacro per transplant  fluc, bactrim unasyn to complete, linezolid for donor pos Bcx, stopped 1/6 and transition to cefazolin  ID following  afebrile  steroid taper, good glycemic control  working with PT OT       PIVs

## 2025-01-07 NOTE — PROGRESS NOTE ADULT - ASSESSMENT
ABBY MALAGON is a 64y Female with a PMHx of breast cancer s/p lumpectomy, HTN, and ETOH cirrhosis. Patient was admitted to [facility] in February of 2024 for management of ascites and jaundice. She has since been living at home and receiving intermittent paracentesis to manage ascites but procedures have spaced out to >2 months. Patient was listed outpatient for OLT with MELD of 18 is now s/p OLT on 1/4/2025 and extubated post-op. SICU consulted for hemodynamic monitoring.     PLAN  Neuro:   -AOx4 at baseline  -pain control with oxy PRN, dilaudid for breakthrough    Respiratory:   -extubated immediately after procedure  -room air  -out of bed to chair, IS    Cardiovascular:  -MAP>65, SBP goal >110  -off pressors, no pressors fluid resuscitation only per transplant  -trend lactate    Gastrointestinal:   -CLD  -protonix   -Liver duplex post op: patent vasculature but unable to fully assess  -trend LFTs and bili     Renal/:  -plasmalyte @125  -corrales in place  -monitor I&Os  -IVF @ 75cc    Heme:   -monitor H/H  -holding DVT PPx  -ASA     ID:  -Unasyn x 48 hours  -d/c linezo  -immunosuppression w/ cellcept & tacro  -bactrim, valcyte, fluc    Endocrine:   -steroid taper  -insulin gtt during case, now ISS  -monitor glucose    Lines:  -L radial a-line (1/4)  -R radial a-line (1/4)  -R IJ introducer sheath (1/4)  -L rapid infusion catheter (1/4)   ABBY MALAGON is a 64y Female with a PMHx of breast cancer s/p lumpectomy, HTN, and ETOH cirrhosis. Patient was admitted to [facility] in February of 2024 for management of ascites and jaundice. She has since been living at home and receiving intermittent paracentesis to manage ascites but procedures have spaced out to >2 months. Patient was listed outpatient for OLT with MELD of 18 is now s/p OLT on 1/4/2025 and extubated post-op. SICU consulted for hemodynamic monitoring.     PLAN  Neuro:   -AOx4 at baseline  -pain control with oxy PRN, dilaudid for breakthrough    Respiratory:   -extubated immediately after procedure  -room air  -out of bed to chair, IS    Cardiovascular:  -MAP>65, SBP goal >110  -off pressors, no pressors fluid resuscitation only per transplant  -trend lactate    Gastrointestinal:   -CLD  -protonix   -Liver duplex post op: patent vasculature but unable to fully assess  -trend LFTs and bili     Renal/:  -plasmalyte @125  -corrales in place  -monitor I&Os  -IVF @ 75cc    Heme:   -monitor H/H  -holding DVT PPx  -ASA     ID:  -Unasyn x 48 hours  -d/c linezo  -Started ancef  -immunosuppression w/ cellcept & tacro  -bactrim, valcyte, fluc    Endocrine:   -steroid taper  -insulin gtt during case, now ISS  -monitor glucose    Lines:  -L radial a-line (1/4)  -R radial a-line (1/4)  -R IJ introducer sheath (1/4)  -L rapid infusion catheter (1/4)

## 2025-01-07 NOTE — PROGRESS NOTE ADULT - SUBJECTIVE AND OBJECTIVE BOX
24 HOUR EVENTS:  - regular diet  - listed  - labs switched to qd  - d/c linezo. finished ampicillin   - started ancef  - Map goal decrease to > 110    NEURO  Exam: alert, awake and oriented  Meds: HYDROmorphone   Tablet 2 milliGRAM(s) Oral every 4 hours PRN Moderate Pain (4 - 6)  HYDROmorphone   Tablet 4 milliGRAM(s) Oral every 4 hours PRN Severe Pain (7 - 10)  HYDROmorphone  Injectable 0.25 milliGRAM(s) IV Push every 4 hours PRN breakthrough pain  melatonin 5 milliGRAM(s) Oral at bedtime PRN Insomnia      RESPIRATORY  RR: 13 (01-07-25 @ 01:00) (8 - 28)  SpO2: 95% (01-07-25 @ 01:00) (92% - 99%)  Wt(kg): --  Exam: Lungs CTA b/l  Mechanical Ventilation: n/a  ABG - ( 05 Jan 2025 10:00 )  pH: 7.43  /  pCO2: 35    /  pO2: 93    / HCO3: 23    / Base Excess: -0.9  /  SaO2: 99.5    Lactate: x                Meds:     CARDIOVASCULAR  HR: 66 (01-07-25 @ 01:00) (64 - 85)  BP: 109/61 (01-07-25 @ 01:00) (101/55 - 130/60)  BP(mean): 81 (01-07-25 @ 01:00) (72 - 92)  ABP: --  ABP(mean): --  Wt(kg): --  CVP(cm H2O): --      Exam: Normal S1/S2   Perfusion     [x ]Adequate   [ ]Inadequate  Mentation   [x]Normal       [ ]Reduced  Extremities  [x ]Warm         [ ]Cool  Volume Status [ ]Hypervolemic [ ]Euvolemic [ ]Hypovolemic  Meds:     GI/NUTRITION  Exam:  UBALDO x 3 w/ sang output. Operative incisions with dressing mild strikethought  Diet: clear liquid diet  Last Bowel Movement: 04-Jan-2025 (01-05-25 @ 07:00)    Meds: pantoprazole  Injectable 40 milliGRAM(s) IV Push daily  polyethylene glycol 3350 17 Gram(s) Oral every 24 hours  senna 2 Tablet(s) Oral at bedtime      GENITOURINARY  I&O's Detail    01-05 @ 07:01  -  01-06 @ 07:00  --------------------------------------------------------  IN:    Albumin 5%  - 250 mL: 250 mL    Enteral Tube Flush: 200 mL    IV PiggyBack: 400 mL    IV PiggyBack: 200 mL    multiple electrolytes Injection Type 1.: 750 mL    Oral Fluid: 970 mL    PRBCs (Packed Red Blood Cells): 300 mL  Total IN: 3070 mL    OUT:    Bulb (mL): 0 mL    Bulb (mL): 40 mL    Bulb (mL): 285 mL    Indwelling Catheter - Urethral (mL): 275 mL    Nasogastric/Oral tube (mL): 50 mL    Voided (mL): 500 mL  Total OUT: 1150 mL    Total NET: 1920 mL      01-06 @ 07:01 - 01-07 @ 01:59  --------------------------------------------------------  IN:    Albumin 5%  - 250 mL: 750 mL    IV PiggyBack: 200 mL    Oral Fluid: 1038 mL  Total IN: 1988 mL    OUT:    Bulb (mL): 85 mL    Bulb (mL): 100 mL    Intermittent Catheterization - Urethral (mL): 350 mL    Voided (mL): 800 mL  Total OUT: 1335 mL    Total NET: 653 mL          01-06    134[L]  |  101  |  28[H]  ----------------------------<  135[H]  3.9   |  21[L]  |  0.96    Ca    7.7[L]      06 Jan 2025 02:06  Phos  3.6     01-06  Mg     2.2     01-06    TPro  5.0[L]  /  Alb  3.0[L]  /  TBili  1.3[H]  /  DBili  x   /  AST  144[H]  /  ALT  259[H]  /  AlkPhos  56  01-06    Meds: calcium carbonate 1250 mG  + Vitamin D (OsCal 500 + D) 1 Tablet(s) Oral two times a day      HEMATOLOGIC  Meds: aspirin enteric coated 81 milliGRAM(s) Oral every 24 hours  heparin   Injectable 5000 Unit(s) SubCutaneous every 12 hours                          8.2    10.22 )-----------( 70       ( 06 Jan 2025 02:06 )             23.4     PT/INR - ( 06 Jan 2025 02:06 )   PT: 15.1 sec;   INR: 1.32 ratio         PTT - ( 06 Jan 2025 02:06 )  PTT:19.4 sec    INFECTIOUS DISEASES  T(C): 36.7 (01-06-25 @ 23:00), Max: 36.8 (01-06-25 @ 03:00)  Wt(kg): --  WBC Count: 10.22 K/uL (01-06 @ 02:06)    Recent Cultures:  Specimen Source: Body Fluid, 01-05 @ 12:33; Results   No growth; Gram Stain:   No polymorphonuclear cells seen  No organisms seen  by cytocentrifuge; Organism: --  Specimen Source: .Blood BLOOD, 01-04 @ 19:30; Results   No growth at 48 Hours; Gram Stain: --; Organism: --  Specimen Source: .Blood BLOOD, 01-04 @ 19:25; Results   No growth at 48 Hours; Gram Stain: --; Organism: --  Specimen Source: Clean Catch Clean Catch (Midstream), 01-04 @ 01:31; Results   <10,000 CFU/mL Normal Urogenital Kianna; Gram Stain: --; Organism: --    Meds: ceFAZolin   IVPB 2000 milliGRAM(s) IV Intermittent every 8 hours  ceFAZolin   IVPB      fluconAZOLE   Tablet 200 milliGRAM(s) Oral every 24 hours  influenza   Vaccine 0.5 milliLiter(s) IntraMuscular once  mycophenolate mofetil 1000 milliGRAM(s) Oral <User Schedule>  tacrolimus 0.5 milliGRAM(s) Oral <User Schedule>  trimethoprim  40 mG/sulfamethoxazole 200 mG Suspension 10 milliLiter(s) Oral daily  valGANciclovir 450 milliGRAM(s) Oral every 24 hours      ENDOCRINE  Capillary Blood Glucose    Meds: insulin lispro (ADMELOG) corrective regimen sliding scale   SubCutaneous three times a day before meals  insulin lispro (ADMELOG) corrective regimen sliding scale   SubCutaneous at bedtime  methylPREDNISolone sodium succinate Injectable   IV Push   methylPREDNISolone sodium succinate Injectable 125 milliGRAM(s) IV Push every 24 hours          OTHER MEDICATIONS:  chlorhexidine 2% Cloths 1 Application(s) Topical <User Schedule>      IMAGING: 24 HOUR EVENTS:  - regular diet  - listed  - labs switched to qd  - d/c linezo. finished ampicillin   - started ancef  - SBP decrease to > 110    NEURO  Exam: alert, awake and oriented  Meds: HYDROmorphone   Tablet 2 milliGRAM(s) Oral every 4 hours PRN Moderate Pain (4 - 6)  HYDROmorphone   Tablet 4 milliGRAM(s) Oral every 4 hours PRN Severe Pain (7 - 10)  HYDROmorphone  Injectable 0.25 milliGRAM(s) IV Push every 4 hours PRN breakthrough pain  melatonin 5 milliGRAM(s) Oral at bedtime PRN Insomnia      RESPIRATORY  RR: 13 (01-07-25 @ 01:00) (8 - 28)  SpO2: 95% (01-07-25 @ 01:00) (92% - 99%)  Wt(kg): --  Exam: Lungs CTA b/l  Mechanical Ventilation: n/a  ABG - ( 05 Jan 2025 10:00 )  pH: 7.43  /  pCO2: 35    /  pO2: 93    / HCO3: 23    / Base Excess: -0.9  /  SaO2: 99.5    Lactate: x                Meds:     CARDIOVASCULAR  HR: 66 (01-07-25 @ 01:00) (64 - 85)  BP: 109/61 (01-07-25 @ 01:00) (101/55 - 130/60)  BP(mean): 81 (01-07-25 @ 01:00) (72 - 92)  ABP: --  ABP(mean): --  Wt(kg): --  CVP(cm H2O): --      Exam: Normal S1/S2   Perfusion     [x ]Adequate   [ ]Inadequate  Mentation   [x]Normal       [ ]Reduced  Extremities  [x ]Warm         [ ]Cool  Volume Status [ ]Hypervolemic [ ]Euvolemic [ ]Hypovolemic  Meds:     GI/NUTRITION  Exam:  UBALDO x 3 w/ sang output. Operative incisions with dressing mild strikethought  Diet: clear liquid diet  Last Bowel Movement: 04-Jan-2025 (01-05-25 @ 07:00)    Meds: pantoprazole  Injectable 40 milliGRAM(s) IV Push daily  polyethylene glycol 3350 17 Gram(s) Oral every 24 hours  senna 2 Tablet(s) Oral at bedtime      GENITOURINARY  I&O's Detail    01-05 @ 07:01  -  01-06 @ 07:00  --------------------------------------------------------  IN:    Albumin 5%  - 250 mL: 250 mL    Enteral Tube Flush: 200 mL    IV PiggyBack: 400 mL    IV PiggyBack: 200 mL    multiple electrolytes Injection Type 1.: 750 mL    Oral Fluid: 970 mL    PRBCs (Packed Red Blood Cells): 300 mL  Total IN: 3070 mL    OUT:    Bulb (mL): 0 mL    Bulb (mL): 40 mL    Bulb (mL): 285 mL    Indwelling Catheter - Urethral (mL): 275 mL    Nasogastric/Oral tube (mL): 50 mL    Voided (mL): 500 mL  Total OUT: 1150 mL    Total NET: 1920 mL      01-06 @ 07:01  -  01-07 @ 01:59  --------------------------------------------------------  IN:    Albumin 5%  - 250 mL: 750 mL    IV PiggyBack: 200 mL    Oral Fluid: 1038 mL  Total IN: 1988 mL    OUT:    Bulb (mL): 85 mL    Bulb (mL): 100 mL    Intermittent Catheterization - Urethral (mL): 350 mL    Voided (mL): 800 mL  Total OUT: 1335 mL    Total NET: 653 mL          01-06    134[L]  |  101  |  28[H]  ----------------------------<  135[H]  3.9   |  21[L]  |  0.96    Ca    7.7[L]      06 Jan 2025 02:06  Phos  3.6     01-06  Mg     2.2     01-06    TPro  5.0[L]  /  Alb  3.0[L]  /  TBili  1.3[H]  /  DBili  x   /  AST  144[H]  /  ALT  259[H]  /  AlkPhos  56  01-06    Meds: calcium carbonate 1250 mG  + Vitamin D (OsCal 500 + D) 1 Tablet(s) Oral two times a day      HEMATOLOGIC  Meds: aspirin enteric coated 81 milliGRAM(s) Oral every 24 hours  heparin   Injectable 5000 Unit(s) SubCutaneous every 12 hours                          8.2    10.22 )-----------( 70       ( 06 Jan 2025 02:06 )             23.4     PT/INR - ( 06 Jan 2025 02:06 )   PT: 15.1 sec;   INR: 1.32 ratio         PTT - ( 06 Jan 2025 02:06 )  PTT:19.4 sec    INFECTIOUS DISEASES  T(C): 36.7 (01-06-25 @ 23:00), Max: 36.8 (01-06-25 @ 03:00)  Wt(kg): --  WBC Count: 10.22 K/uL (01-06 @ 02:06)    Recent Cultures:  Specimen Source: Body Fluid, 01-05 @ 12:33; Results   No growth; Gram Stain:   No polymorphonuclear cells seen  No organisms seen  by cytocentrifuge; Organism: --  Specimen Source: .Blood BLOOD, 01-04 @ 19:30; Results   No growth at 48 Hours; Gram Stain: --; Organism: --  Specimen Source: .Blood BLOOD, 01-04 @ 19:25; Results   No growth at 48 Hours; Gram Stain: --; Organism: --  Specimen Source: Clean Catch Clean Catch (Midstream), 01-04 @ 01:31; Results   <10,000 CFU/mL Normal Urogenital Kianna; Gram Stain: --; Organism: --    Meds: ceFAZolin   IVPB 2000 milliGRAM(s) IV Intermittent every 8 hours  ceFAZolin   IVPB      fluconAZOLE   Tablet 200 milliGRAM(s) Oral every 24 hours  influenza   Vaccine 0.5 milliLiter(s) IntraMuscular once  mycophenolate mofetil 1000 milliGRAM(s) Oral <User Schedule>  tacrolimus 0.5 milliGRAM(s) Oral <User Schedule>  trimethoprim  40 mG/sulfamethoxazole 200 mG Suspension 10 milliLiter(s) Oral daily  valGANciclovir 450 milliGRAM(s) Oral every 24 hours      ENDOCRINE  Capillary Blood Glucose    Meds: insulin lispro (ADMELOG) corrective regimen sliding scale   SubCutaneous three times a day before meals  insulin lispro (ADMELOG) corrective regimen sliding scale   SubCutaneous at bedtime  methylPREDNISolone sodium succinate Injectable   IV Push   methylPREDNISolone sodium succinate Injectable 125 milliGRAM(s) IV Push every 24 hours          OTHER MEDICATIONS:  chlorhexidine 2% Cloths 1 Application(s) Topical <User Schedule>      IMAGING:

## 2025-01-07 NOTE — PROGRESS NOTE ADULT - ASSESSMENT
64F with a PMHx of breast cancer s/p lumpectomy, HTN, and ETOH cirrhosis, Umbilical Hernia Repair 9/2024, now s/p OLT under Simulect induction on 1/4/25    [ ] POD 3 s/p OLT   - donor graft with replaced R TANG, will follow dopplers daily   - Donor grew staph epi in bld cxs x1; Recipient bld cxs sent 1/4. Per ID: linezolid or vanco x 5 days, ancef until 1/8   -LFTs trending down  -ASA 81mg daily, SHQ then eliquis this week   -Pain management: Oxy prn  -Bowel regimen: senna  -PT/OT/RD/Spirometry/SCDs  -strict I&Os: UBALDO corralesx2, dc UBALDO#3    [ ] Immunosuppression  - FK per level, MMF 1/1, SST  - SIMULECT POD#4  - PPx: Valcyte/Bactrim/Fluc/PPI/OsCal

## 2025-01-07 NOTE — PROGRESS NOTE ADULT - SUBJECTIVE AND OBJECTIVE BOX
Transplant Surgery - Multidisciplinary Rounds  --------------------------------------------------------------  OLTx   1/4/2025   POD#3  CMV +/+    Patient seen and examined with multidisciplinary Transplant team including Surgeon Dr. Dagher, EDNA Nunes/Nithin Hepatologist Dr. Hand, Hepatology  Fellow  SICU team during AM rounds.   Disciplines not in attendance will be notified of the plan.     HPI: 64F with a PMHx of breast cancer s/p lumpectomy, HTN, and ETOH cirrhosis, Umbilical Hernia Repair 9/2024, now s/p OLT under Simulect induction on 1/4/25    Interval Events:  - LFTs downtrending   - no ON events, afebrile, VSS    Immunosuppression:  -FK per level, MMF 1/1, SST  -SIMULECT POD#4  -Ongoing monitoring for signs of rejection    Potential Discharge date: TBD  Education:  Medications  Plan of care:  See Below      MEDICATIONS  (STANDING):  aspirin enteric coated 81 milliGRAM(s) Oral every 24 hours  calcium carbonate 1250 mG  + Vitamin D (OsCal 500 + D) 1 Tablet(s) Oral two times a day  ceFAZolin   IVPB 2000 milliGRAM(s) IV Intermittent every 8 hours  ceFAZolin   IVPB      chlorhexidine 2% Cloths 1 Application(s) Topical <User Schedule>  fluconAZOLE   Tablet 200 milliGRAM(s) Oral every 24 hours  heparin   Injectable 5000 Unit(s) SubCutaneous every 12 hours  influenza   Vaccine 0.5 milliLiter(s) IntraMuscular once  insulin lispro (ADMELOG) corrective regimen sliding scale   SubCutaneous three times a day before meals  insulin lispro (ADMELOG) corrective regimen sliding scale   SubCutaneous at bedtime  methylPREDNISolone sodium succinate Injectable   IV Push   mycophenolate mofetil 1000 milliGRAM(s) Oral <User Schedule>  pantoprazole  Injectable 40 milliGRAM(s) IV Push daily  polyethylene glycol 3350 17 Gram(s) Oral every 24 hours  senna 2 Tablet(s) Oral at bedtime  tacrolimus 1 milliGRAM(s) Oral <User Schedule>  trimethoprim  40 mG/sulfamethoxazole 200 mG Suspension 10 milliLiter(s) Oral daily  valGANciclovir 450 milliGRAM(s) Oral every 24 hours    MEDICATIONS  (PRN):  melatonin 5 milliGRAM(s) Oral at bedtime PRN Insomnia  oxyCODONE    IR 5 milliGRAM(s) Oral every 4 hours PRN Moderate Pain (4 - 6)  oxyCODONE    IR 10 milliGRAM(s) Oral every 4 hours PRN Severe Pain (7 - 10)      PAST MEDICAL & SURGICAL HISTORY:  Breast cancer, left      Mild alcohol use disorder      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      GERD (gastroesophageal reflux disease)      Skin cancer, basal cell      H/O lumpectomy      History of removal of skin mole      H/O ganglion cyst          Vital Signs Last 24 Hrs  T(C): 36.4 (07 Jan 2025 11:00), Max: 36.8 (06 Jan 2025 19:00)  T(F): 97.6 (07 Jan 2025 11:00), Max: 98.2 (06 Jan 2025 19:00)  HR: 66 (07 Jan 2025 15:00) (59 - 79)  BP: 112/58 (07 Jan 2025 15:00) (106/56 - 134/65)  BP(mean): 79 (07 Jan 2025 15:00) (75 - 95)  RR: 8 (07 Jan 2025 15:00) (8 - 24)  SpO2: 95% (07 Jan 2025 15:00) (93% - 99%)    Parameters below as of 07 Jan 2025 07:00  Patient On (Oxygen Delivery Method): room air        I&O's Summary    06 Jan 2025 07:01  -  07 Jan 2025 07:00  --------------------------------------------------------  IN: 2088 mL / OUT: 1835 mL / NET: 253 mL    07 Jan 2025 07:01  -  07 Jan 2025 16:45  --------------------------------------------------------  IN: 480 mL / OUT: 820 mL / NET: -340 mL                              8.0    7.45  )-----------( 54       ( 07 Jan 2025 06:08 )             24.1     01-07    134[L]  |  102  |  32[H]  ----------------------------<  126[H]  4.2   |  21[L]  |  1.07    Ca    7.7[L]      07 Jan 2025 06:08  Phos  3.0     01-07  Mg     2.5     01-07    TPro  5.2[L]  /  Alb  3.0[L]  /  TBili  0.7  /  DBili  x   /  AST  71[H]  /  ALT  171[H]  /  AlkPhos  111  01-07    Tacrolimus (), Serum: 1.2 ng/mL (01-07 @ 06:08)        Culture - Body Fluid with Gram Stain (collected 01-05-25 @ 12:33)  Source: Body Fluid  Gram Stain (01-05-25 @ 21:44):    No polymorphonuclear cells seen    No organisms seen    by cytocentrifuge  Preliminary Report (01-06-25 @ 16:00):    No growth    Culture - Blood (collected 01-04-25 @ 19:30)  Source: .Blood BLOOD  Preliminary Report (01-07-25 @ 01:12):    No growth at 48 Hours    Culture - Blood (collected 01-04-25 @ 19:25)  Source: .Blood BLOOD  Preliminary Report (01-07-25 @ 01:12):    No growth at 48 Hours    Culture - Urine (collected 01-04-25 @ 01:31)  Source: Clean Catch Clean Catch (Midstream)  Final Report (01-05-25 @ 05:47):    <10,000 CFU/mL Normal Urogenital Kianna            Review of systems  Gen: No weight changes, fatigue, fevers/chills, weakness  Skin: No rashes  Head/Eyes/Ears/Mouth: No headache; Normal hearing; Normal vision w/o blurriness; No sinus pain/discomfort, sore throat  Respiratory: No dyspnea, cough, wheezing, hemoptysis  CV: No chest pain, PND, orthopnea  GI: C/O abdominal pain at surgical site. no diarrhea, constipation, nausea, vomiting, melena, hematochezia  : No increased frequency, dysuria, hematuria, nocturia  MSK: No joint pain/swelling; no back pain; no edema  Neuro: No dizziness/lightheadedness, weakness, seizures, numbness, tingling  Heme: No easy bruising or bleeding  Endo: No heat/cold intolerance  Psych: No significant nervousness, anxiety, stress, depression  All other systems were reviewed and are negative, except as noted.      PHYSICAL EXAM:  Constitutional: Well developed / well nourished  Eyes: PERRLA  ENMT: nc/at,   Neck: supple  Respiratory: CTA B/L  Cardiovascular: RRR  Gastrointestinal: Soft abdomen, ND, appropriate incisional TTP. dressing c/d/i, JPs x2 appropriately sanguinous  Genitourinary: voiding spontaneously   Extremities: SCD's in place and working bilaterally  Vascular: Palpable dp pulses bilaterally.   Neurological: A&O x3, waking up  Skin: no rashes, ulcerations, lesions  Musculoskeletal: Moving all extremities  Psychiatric: Responsive

## 2025-01-08 ENCOUNTER — TRANSCRIPTION ENCOUNTER (OUTPATIENT)
Age: 65
End: 2025-01-08

## 2025-01-08 DIAGNOSIS — Z01.818 ENCOUNTER FOR OTHER PREPROCEDURAL EXAMINATION: ICD-10-CM

## 2025-01-08 DIAGNOSIS — Z79.60 LONG TERM (CURRENT) USE OF UNSPECIFIED IMMUNOMODULATORS AND IMMUNOSUPPRESSANTS: ICD-10-CM

## 2025-01-08 LAB
ALBUMIN SERPL ELPH-MCNC: 2.9 G/DL — LOW (ref 3.3–5)
ALP SERPL-CCNC: 109 U/L — SIGNIFICANT CHANGE UP (ref 40–120)
ALT FLD-CCNC: 90 U/L — HIGH (ref 10–45)
ANION GAP SERPL CALC-SCNC: 10 MMOL/L — SIGNIFICANT CHANGE UP (ref 5–17)
APTT BLD: 25.2 SEC — SIGNIFICANT CHANGE UP (ref 24.5–35.6)
AST SERPL-CCNC: 30 U/L — SIGNIFICANT CHANGE UP (ref 10–40)
BILIRUB SERPL-MCNC: 0.5 MG/DL — SIGNIFICANT CHANGE UP (ref 0.2–1.2)
BLD GP AB SCN SERPL QL: NEGATIVE — SIGNIFICANT CHANGE UP
BUN SERPL-MCNC: 33 MG/DL — HIGH (ref 7–23)
CALCIUM SERPL-MCNC: 7.7 MG/DL — LOW (ref 8.4–10.5)
CHLORIDE SERPL-SCNC: 104 MMOL/L — SIGNIFICANT CHANGE UP (ref 96–108)
CO2 SERPL-SCNC: 20 MMOL/L — LOW (ref 22–31)
CREAT SERPL-MCNC: 1.05 MG/DL — SIGNIFICANT CHANGE UP (ref 0.5–1.3)
EGFR: 60 ML/MIN/1.73M2 — SIGNIFICANT CHANGE UP
GLUCOSE BLDC GLUCOMTR-MCNC: 136 MG/DL — HIGH (ref 70–99)
GLUCOSE BLDC GLUCOMTR-MCNC: 156 MG/DL — HIGH (ref 70–99)
GLUCOSE BLDC GLUCOMTR-MCNC: 176 MG/DL — HIGH (ref 70–99)
GLUCOSE BLDC GLUCOMTR-MCNC: 179 MG/DL — HIGH (ref 70–99)
GLUCOSE BLDC GLUCOMTR-MCNC: 184 MG/DL — HIGH (ref 70–99)
GLUCOSE SERPL-MCNC: 126 MG/DL — HIGH (ref 70–99)
HCT VFR BLD CALC: 22.7 % — LOW (ref 34.5–45)
HGB BLD-MCNC: 7.5 G/DL — LOW (ref 11.5–15.5)
INR BLD: 1.21 RATIO — HIGH (ref 0.85–1.16)
MAGNESIUM SERPL-MCNC: 2.6 MG/DL — SIGNIFICANT CHANGE UP (ref 1.6–2.6)
MCHC RBC-ENTMCNC: 29.6 PG — SIGNIFICANT CHANGE UP (ref 27–34)
MCHC RBC-ENTMCNC: 33 G/DL — SIGNIFICANT CHANGE UP (ref 32–36)
MCV RBC AUTO: 89.7 FL — SIGNIFICANT CHANGE UP (ref 80–100)
NRBC # BLD: 0 /100 WBCS — SIGNIFICANT CHANGE UP (ref 0–0)
PHOSPHATE SERPL-MCNC: 2.1 MG/DL — LOW (ref 2.5–4.5)
PLATELET # BLD AUTO: 52 K/UL — LOW (ref 150–400)
POTASSIUM SERPL-MCNC: 4 MMOL/L — SIGNIFICANT CHANGE UP (ref 3.5–5.3)
POTASSIUM SERPL-SCNC: 4 MMOL/L — SIGNIFICANT CHANGE UP (ref 3.5–5.3)
PROT SERPL-MCNC: 4.6 G/DL — LOW (ref 6–8.3)
PROTHROM AB SERPL-ACNC: 13.9 SEC — HIGH (ref 9.9–13.4)
RBC # BLD: 2.53 M/UL — LOW (ref 3.8–5.2)
RBC # FLD: 17.3 % — HIGH (ref 10.3–14.5)
RH IG SCN BLD-IMP: POSITIVE — SIGNIFICANT CHANGE UP
SODIUM SERPL-SCNC: 134 MMOL/L — LOW (ref 135–145)
TACROLIMUS SERPL-MCNC: 1.9 NG/ML — SIGNIFICANT CHANGE UP
WBC # BLD: 5.05 K/UL — SIGNIFICANT CHANGE UP (ref 3.8–10.5)
WBC # FLD AUTO: 5.05 K/UL — SIGNIFICANT CHANGE UP (ref 3.8–10.5)

## 2025-01-08 RX ORDER — SODIUM PHOSPHATE, MONOBASIC, MONOHYDRATE AND SODIUM PHOSPHATE, DIBASIC ANHYDROUS 142; 276 MG/ML; MG/ML
30 INJECTION, SOLUTION INTRAVENOUS ONCE
Refills: 0 | Status: COMPLETED | OUTPATIENT
Start: 2025-01-08 | End: 2025-01-08

## 2025-01-08 RX ORDER — TACROLIMUS 0.5 MG/1
2 CAPSULE ORAL
Refills: 0 | Status: DISCONTINUED | OUTPATIENT
Start: 2025-01-09 | End: 2025-01-09

## 2025-01-08 RX ORDER — SULFAMETHOXAZOLE AND TRIMETHOPRIM 400; 80 MG/1; MG/1
400-80 TABLET ORAL
Qty: 30 | Refills: 5 | Status: ACTIVE | COMMUNITY
Start: 2025-01-08 | End: 1900-01-01

## 2025-01-08 RX ORDER — PREDNISONE 10 MG/1
10 TABLET ORAL DAILY
Qty: 60 | Refills: 3 | Status: ACTIVE | COMMUNITY
Start: 2025-01-08 | End: 1900-01-01

## 2025-01-08 RX ORDER — VALGANCICLOVIR HYDROCHLORIDE 450 MG/1
450 TABLET ORAL
Qty: 30 | Refills: 2 | Status: ACTIVE | COMMUNITY
Start: 2025-01-08 | End: 1900-01-01

## 2025-01-08 RX ORDER — PANTOPRAZOLE 40 MG/1
40 TABLET, DELAYED RELEASE ORAL DAILY
Qty: 30 | Refills: 3 | Status: ACTIVE | COMMUNITY
Start: 2025-01-08 | End: 1900-01-01

## 2025-01-08 RX ORDER — TACROLIMUS 0.5 MG/1
1 CAPSULE ORAL ONCE
Refills: 0 | Status: COMPLETED | OUTPATIENT
Start: 2025-01-08 | End: 2025-01-08

## 2025-01-08 RX ORDER — BISACODYL 5 MG
10 TABLET, DELAYED RELEASE (ENTERIC COATED) ORAL ONCE
Refills: 0 | Status: COMPLETED | OUTPATIENT
Start: 2025-01-08 | End: 2025-01-08

## 2025-01-08 RX ORDER — SENNOSIDES 8.6 MG TABLETS 8.6 MG/1
8.6 TABLET ORAL DAILY
Qty: 30 | Refills: 3 | Status: ACTIVE | COMMUNITY
Start: 2025-01-08 | End: 1900-01-01

## 2025-01-08 RX ORDER — OMEGA-3/DHA/EPA/FISH OIL 300-1000MG
400 CAPSULE ORAL
Qty: 60 | Refills: 3 | Status: ACTIVE | COMMUNITY
Start: 2025-01-08 | End: 1900-01-01

## 2025-01-08 RX ORDER — TACROLIMUS 0.5 MG/1
1 CAPSULE ORAL
Refills: 0 | Status: DISCONTINUED | OUTPATIENT
Start: 2025-01-08 | End: 2025-01-09

## 2025-01-08 RX ORDER — ACETAMINOPHEN 325 MG/1
325 TABLET ORAL
Qty: 240 | Refills: 3 | Status: ACTIVE | COMMUNITY
Start: 2025-01-08 | End: 1900-01-01

## 2025-01-08 RX ORDER — FLUCONAZOLE 200 MG/1
200 TABLET ORAL DAILY
Qty: 10 | Refills: 0 | Status: ACTIVE | COMMUNITY
Start: 2025-01-08 | End: 1900-01-01

## 2025-01-08 RX ORDER — TACROLIMUS 1 MG/1
1 CAPSULE ORAL TWICE DAILY
Qty: 240 | Refills: 5 | Status: ACTIVE | COMMUNITY
Start: 2025-01-08 | End: 1900-01-01

## 2025-01-08 RX ORDER — MYCOPHENOLATE MOFETIL 500 MG/1
500 TABLET ORAL TWICE DAILY
Qty: 120 | Refills: 5 | Status: ACTIVE | COMMUNITY
Start: 2025-01-08 | End: 1900-01-01

## 2025-01-08 RX ORDER — B-COMPLEX WITH VITAMIN C
500-5 TABLET ORAL
Qty: 60 | Refills: 3 | Status: ACTIVE | COMMUNITY
Start: 2025-01-08 | End: 1900-01-01

## 2025-01-08 RX ORDER — APIXABAN 2.5 MG/1
2.5 TABLET, FILM COATED ORAL TWICE DAILY
Qty: 60 | Refills: 3 | Status: ACTIVE | COMMUNITY
Start: 2025-01-08 | End: 1900-01-01

## 2025-01-08 RX ORDER — TACROLIMUS 0.5 MG/1
0.5 CAPSULE ORAL
Qty: 60 | Refills: 5 | Status: ACTIVE | COMMUNITY
Start: 2025-01-08 | End: 1900-01-01

## 2025-01-08 RX ORDER — TACROLIMUS 5 MG/1
5 CAPSULE ORAL
Qty: 60 | Refills: 5 | Status: ACTIVE | COMMUNITY
Start: 2025-01-08 | End: 1900-01-01

## 2025-01-08 RX ORDER — CEFAZOLIN SODIUM 1 G
2000 VIAL (EA) INJECTION EVERY 8 HOURS
Refills: 0 | Status: DISCONTINUED | OUTPATIENT
Start: 2025-01-09 | End: 2025-01-09

## 2025-01-08 RX ORDER — APIXABAN 5 MG/1
2.5 TABLET, FILM COATED ORAL
Refills: 0 | Status: DISCONTINUED | OUTPATIENT
Start: 2025-01-08 | End: 2025-01-14

## 2025-01-08 RX ADMIN — Medication 2: at 13:17

## 2025-01-08 RX ADMIN — MYCOPHENOLATE MOFETIL 1000 MILLIGRAM(S): 250 CAPSULE ORAL at 09:14

## 2025-01-08 RX ADMIN — TACROLIMUS 1 MILLIGRAM(S): 0.5 CAPSULE ORAL at 12:35

## 2025-01-08 RX ADMIN — Medication 1 TABLET(S): at 18:15

## 2025-01-08 RX ADMIN — Medication 17 GRAM(S): at 12:35

## 2025-01-08 RX ADMIN — Medication 10 MILLIGRAM(S): at 22:50

## 2025-01-08 RX ADMIN — SODIUM PHOSPHATE, MONOBASIC, MONOHYDRATE AND SODIUM PHOSPHATE, DIBASIC ANHYDROUS 85 MILLIMOLE(S): 142; 276 INJECTION, SOLUTION INTRAVENOUS at 13:37

## 2025-01-08 RX ADMIN — PANTOPRAZOLE 40 MILLIGRAM(S): 40 TABLET, DELAYED RELEASE ORAL at 12:35

## 2025-01-08 RX ADMIN — SENNOSIDES 2 TABLET(S): 8.6 TABLET, FILM COATED ORAL at 21:59

## 2025-01-08 RX ADMIN — Medication 1 TABLET(S): at 05:52

## 2025-01-08 RX ADMIN — BASILIXIMAB 100 MILLIGRAM(S): 10 INJECTION, POWDER, FOR SOLUTION INTRAVENOUS at 10:43

## 2025-01-08 RX ADMIN — Medication 100 MILLIGRAM(S): at 13:37

## 2025-01-08 RX ADMIN — TACROLIMUS 1 MILLIGRAM(S): 0.5 CAPSULE ORAL at 09:13

## 2025-01-08 RX ADMIN — HEPARIN SODIUM 5000 UNIT(S): 1000 INJECTION, SOLUTION INTRAVENOUS; SUBCUTANEOUS at 05:51

## 2025-01-08 RX ADMIN — METHYLPREDNISOLONE 60 MILLIGRAM(S): 4 TABLET ORAL at 05:51

## 2025-01-08 RX ADMIN — Medication 10 MILLIGRAM(S): at 13:17

## 2025-01-08 RX ADMIN — APIXABAN 2.5 MILLIGRAM(S): 5 TABLET, FILM COATED ORAL at 10:40

## 2025-01-08 RX ADMIN — Medication 10 MILLIGRAM(S): at 21:59

## 2025-01-08 RX ADMIN — Medication 10 MILLIGRAM(S): at 12:34

## 2025-01-08 RX ADMIN — Medication 10 MILLILITER(S): at 12:35

## 2025-01-08 RX ADMIN — VALGANCICLOVIR 450 MILLIGRAM(S): 450 TABLET, FILM COATED ORAL at 12:34

## 2025-01-08 RX ADMIN — CHLORHEXIDINE GLUCONATE 1 APPLICATION(S): 1.2 RINSE ORAL at 09:16

## 2025-01-08 RX ADMIN — Medication 81 MILLIGRAM(S): at 12:34

## 2025-01-08 RX ADMIN — Medication 2: at 18:15

## 2025-01-08 RX ADMIN — Medication 100 MILLIGRAM(S): at 05:51

## 2025-01-08 RX ADMIN — TACROLIMUS 1 MILLIGRAM(S): 0.5 CAPSULE ORAL at 19:36

## 2025-01-08 RX ADMIN — APIXABAN 2.5 MILLIGRAM(S): 5 TABLET, FILM COATED ORAL at 18:16

## 2025-01-08 RX ADMIN — MYCOPHENOLATE MOFETIL 1000 MILLIGRAM(S): 250 CAPSULE ORAL at 19:36

## 2025-01-08 RX ADMIN — FLUCONAZOLE 200 MILLIGRAM(S): 200 TABLET ORAL at 12:34

## 2025-01-08 NOTE — DISCHARGE NOTE PROVIDER - CARE PROVIDER_API CALL
Rahat Hagen Rodger  Surgery  [address] [place] [id number]  Phone: [phone]  Fax: [phone]  Follow Up Time:

## 2025-01-08 NOTE — DISCHARGE NOTE PROVIDER - NSDCCPCAREPLAN_GEN_ALL_CORE_FT
PRINCIPAL DISCHARGE DIAGNOSIS  Diagnosis: S/P liver transplant  Assessment and Plan of Treatment: Pain: You will have some pain after surgery. Take pain medication as prescribed. Avoid Aspirin, Motrin and Ibuprofen, unless intstructed by the team. You should NOT drive while on narcotic medications.   -Incision: You will have incision from the transplant surgery that will most often be held closed by staples until they heal, and will be removed in the transplant clinic.   The incision should be left open to air unless it's draining fluid; if it's draining, it shoud be covered by gauze and changed when gauze becomes wet. It is ok to shower with staples. Gently clense the area around your insciion with only soap and water and pat dry. Do NOT take bath until your incision is healed and you are cleared by your surgeon.    -Activity: avoid strenouous activity/excercise and heavy lifting for the first 6-8 weeks after surgery. You should not lift anything over 10 lbs. You should not drive until cleared by your transplant team. Avoid straining. Use stool softners as needed. Stop stool softners if diarrhea develops.   -Diet: Follow FOOD SAFETY instruction provided to you in your patient education guide bookelet   -Women:  using adequate contraception is highly recommended. In [facility], women who receive a transplant should not become pregnant for at least 18 months after transplant.   -Skin care: Transplant medication can increase your risk for skin cancer. Avoid extended exposure to the sun and wear SPH 30 sunscreen or higher. Visit a dermologist at least once a year.   -You should have an annual eye exam.   -You should never smoke.   -You should avoid ALL types of alcohol  Call  the Translant team if  you experience any of the following:   [] Fever of 100.3F (38C) or above  [] Surgical incision is bleeding, red or warm to touch or there's discharge from the incision  [] Worsening abdominal pain, nausea or vomiting  [] Shortness of breath  [] Diffuculty with urinating such as burning, frequency or urgency, blood in urine         SECONDARY DISCHARGE DIAGNOSES  Diagnosis: Immunosuppression  Assessment and Plan of Treatment:   - Transplant recipients are at risk for developing infection because immune system is lowered due to transplant medications.   - Avoid contact with sick people; practice good hand hygiene;   - Take medications as directed by your translant team. Never stop taking medications unless instructed by your transplant physician.  - Do NOT double up medication dose if you missed your dose; call the transplant office for instructions at [phone]

## 2025-01-08 NOTE — DISCHARGE NOTE NURSING/CASE MANAGEMENT/SOCIAL WORK - FINANCIAL ASSISTANCE
[facility] provides services at a reduced cost to those who are determined to be eligible through [facility]’s financial assistance program. Information regarding [facility]’s financial assistance program can be found by going to [email or web address] or by calling [phone].

## 2025-01-08 NOTE — DISCHARGE NOTE NURSING/CASE MANAGEMENT/SOCIAL WORK - NSDCPEFALRISK_GEN_ALL_CORE
For information on Fall & Injury Prevention, visit: [email or web address] OR  [email or web address] OR  https://www.cdc.gov/steadi/patient.html

## 2025-01-08 NOTE — PROGRESS NOTE ADULT - SUBJECTIVE AND OBJECTIVE BOX
Transplant Surgery - Multidisciplinary Rounds  --------------------------------------------------------------  OLTx   1/4/2025   POD#4   CMV +/+    Patient seen and examined with multidisciplinary Transplant team including Surgeon Dr. Dagher, EDNA Fitch, Hepatologist Dr. Hand, Hepatology  Fellow  SICU team during AM rounds.   Disciplines not in attendance will be notified of the plan.     HPI: 64F with a PMHx of breast cancer s/p lumpectomy, HTN, and ETOH cirrhosis, Umbilical Hernia Repair 9/2024, now s/p OLT under Simulect induction on 1/4/25    Interval Events:  - afebrile, VSS  - Liver doppler patent vasculature  - downgrade to floors   - tolerating diet     Immunosuppression:  -FK per level, MMF 1/1, SST  -SIMULECT POD#4  -Ongoing monitoring for signs of rejection    Potential Discharge date: TBD  Education:  Medications  Plan of care:  See Below      TX data here       Review of systems  Gen: No weight changes, fatigue, fevers/chills, weakness  Skin: No rashes  Head/Eyes/Ears/Mouth: No headache; Normal hearing; Normal vision w/o blurriness; No sinus pain/discomfort, sore throat  Respiratory: No dyspnea, cough, wheezing, hemoptysis  CV: No chest pain, PND, orthopnea  GI: C/O abdominal pain at surgical site. no diarrhea, constipation, nausea, vomiting, melena, hematochezia  : No increased frequency, dysuria, hematuria, nocturia  MSK: No joint pain/swelling; no back pain; no edema  Neuro: No dizziness/lightheadedness, weakness, seizures, numbness, tingling  Heme: No easy bruising or bleeding  Endo: No heat/cold intolerance  Psych: No significant nervousness, anxiety, stress, depression  All other systems were reviewed and are negative, except as noted.      PHYSICAL EXAM:  Constitutional: Well developed / well nourished  Eyes: PERRLA  ENMT: nc/at,   Neck: supple  Respiratory: CTA B/L  Cardiovascular: RRR  Gastrointestinal: Soft abdomen, ND, appropriate incisional TTP. dressing c/d/i, JPs x2 SS   Genitourinary: voiding spontaneously   Extremities: SCD's in place and working bilaterally  Vascular: Palpable dp pulses bilaterally.   Neurological: A&O x3, waking up  Skin: no rashes, ulcerations, lesions  Musculoskeletal: Moving all extremities  Psychiatric: Responsive Transplant Surgery - Multidisciplinary Rounds  --------------------------------------------------------------  OLTx   1/4/2025   POD#4   CMV +/+    Patient seen and examined with multidisciplinary Transplant team including Surgeon Dr. Dagher, EDNA Fitch, Hepatologist Dr. Hand, Hepatology  Fellow  SICU team during AM rounds.   Disciplines not in attendance will be notified of the plan.     HPI: 64F with a PMHx of breast cancer s/p lumpectomy, HTN, and ETOH cirrhosis, Umbilical Hernia Repair 9/2024, now s/p OLT under Simulect induction on 1/4/25    Interval Events:  - afebrile, VSS  - Liver doppler patent vasculature  - downgrade to floors   - tolerating diet     Immunosuppression:  -FK per level, MMF 1/1, SST  -SIMULECT POD#4  -Ongoing monitoring for signs of rejection    Potential Discharge date: TBD  Education:  Medications  Plan of care:  See Below        MEDICATIONS  (STANDING):  apixaban 2.5 milliGRAM(s) Oral two times a day  aspirin enteric coated 81 milliGRAM(s) Oral every 24 hours  calcium carbonate 1250 mG  + Vitamin D (OsCal 500 + D) 1 Tablet(s) Oral two times a day  chlorhexidine 2% Cloths 1 Application(s) Topical <User Schedule>  fluconAZOLE   Tablet 200 milliGRAM(s) Oral every 24 hours  influenza   Vaccine 0.5 milliLiter(s) IntraMuscular once  insulin lispro (ADMELOG) corrective regimen sliding scale   SubCutaneous three times a day before meals  insulin lispro (ADMELOG) corrective regimen sliding scale   SubCutaneous at bedtime  methylPREDNISolone sodium succinate Injectable   IV Push   mycophenolate mofetil 1000 milliGRAM(s) Oral <User Schedule>  pantoprazole  Injectable 40 milliGRAM(s) IV Push daily  polyethylene glycol 3350 17 Gram(s) Oral every 24 hours  senna 2 Tablet(s) Oral at bedtime  tacrolimus 1 milliGRAM(s) Oral <User Schedule>  trimethoprim  40 mG/sulfamethoxazole 200 mG Suspension 10 milliLiter(s) Oral daily  valGANciclovir 450 milliGRAM(s) Oral every 24 hours    MEDICATIONS  (PRN):  melatonin 5 milliGRAM(s) Oral at bedtime PRN Insomnia  oxyCODONE    IR 5 milliGRAM(s) Oral every 4 hours PRN Moderate Pain (4 - 6)  oxyCODONE    IR 10 milliGRAM(s) Oral every 4 hours PRN Severe Pain (7 - 10)      PAST MEDICAL & SURGICAL HISTORY:  Breast cancer, left      Mild alcohol use disorder      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      GERD (gastroesophageal reflux disease)      Skin cancer, basal cell      H/O lumpectomy      History of removal of skin mole      H/O ganglion cyst          Vital Signs Last 24 Hrs  T(C): 36.7 (08 Jan 2025 13:00), Max: 36.7 (07 Jan 2025 17:35)  T(F): 98 (08 Jan 2025 13:00), Max: 98.1 (07 Jan 2025 17:35)  HR: 76 (08 Jan 2025 13:00) (66 - 94)  BP: 142/74 (08 Jan 2025 13:00) (112/58 - 142/74)  BP(mean): 104 (07 Jan 2025 17:00) (79 - 104)  RR: 18 (08 Jan 2025 13:00) (12 - 18)  SpO2: 100% (08 Jan 2025 13:00) (95% - 100%)    Parameters below as of 08 Jan 2025 13:00  Patient On (Oxygen Delivery Method): room air        I&O's Summary    07 Jan 2025 07:01  -  08 Jan 2025 07:00  --------------------------------------------------------  IN: 1199 mL / OUT: 1935 mL / NET: -736 mL    08 Jan 2025 07:01  -  08 Jan 2025 14:19  --------------------------------------------------------  IN: 280 mL / OUT: 710 mL / NET: -430 mL                              7.5    5.05  )-----------( 52       ( 08 Jan 2025 07:02 )             22.7     01-08    134[L]  |  104  |  33[H]  ----------------------------<  126[H]  4.0   |  20[L]  |  1.05    Ca    7.7[L]      08 Jan 2025 07:00  Phos  2.1     01-08  Mg     2.6     01-08    TPro  4.6[L]  /  Alb  2.9[L]  /  TBili  0.5  /  DBili  x   /  AST  30  /  ALT  90[H]  /  AlkPhos  109  01-08    Tacrolimus (), Serum: 1.9 ng/mL (01-08 @ 07:01)        Culture - Body Fluid with Gram Stain (collected 01-05-25 @ 12:33)  Source: Body Fluid  Gram Stain (01-05-25 @ 21:44):    No polymorphonuclear cells seen    No organisms seen    by cytocentrifuge  Preliminary Report (01-06-25 @ 16:00):    No growth    Culture - Blood (collected 01-04-25 @ 19:30)  Source: .Blood BLOOD  Preliminary Report (01-08-25 @ 01:02):    No growth at 72 Hours    Culture - Blood (collected 01-04-25 @ 19:25)  Source: .Blood BLOOD  Preliminary Report (01-08-25 @ 01:02):    No growth at 72 Hours    Culture - Urine (collected 01-04-25 @ 01:31)  Source: Clean Catch Clean Catch (Midstream)  Final Report (01-05-25 @ 05:47):    <10,000 CFU/mL Normal Urogenital Kianna                  Review of systems  Gen: No weight changes, fatigue, fevers/chills, weakness  Skin: No rashes  Head/Eyes/Ears/Mouth: No headache; Normal hearing; Normal vision w/o blurriness; No sinus pain/discomfort, sore throat  Respiratory: No dyspnea, cough, wheezing, hemoptysis  CV: No chest pain, PND, orthopnea  GI: C/O abdominal pain at surgical site. no diarrhea, constipation, nausea, vomiting, melena, hematochezia  : No increased frequency, dysuria, hematuria, nocturia  MSK: No joint pain/swelling; no back pain; no edema  Neuro: No dizziness/lightheadedness, weakness, seizures, numbness, tingling  Heme: No easy bruising or bleeding  Endo: No heat/cold intolerance  Psych: No significant nervousness, anxiety, stress, depression  All other systems were reviewed and are negative, except as noted.      PHYSICAL EXAM:  Constitutional: Well developed / well nourished  Eyes: PERRLA  ENMT: nc/at,   Neck: supple  Respiratory: CTA B/L  Cardiovascular: RRR  Gastrointestinal: Soft abdomen, ND, appropriate incisional TTP. dressing c/d/i, JPs x2 SS   Genitourinary: voiding spontaneously   Extremities: SCD's in place and working bilaterally  Vascular: Palpable dp pulses bilaterally.   Neurological: A&O x3, waking up  Skin: no rashes, ulcerations, lesions  Musculoskeletal: Moving all extremities  Psychiatric: Responsive

## 2025-01-08 NOTE — PROGRESS NOTE ADULT - ASSESSMENT
64F with a PMHx of breast cancer s/p lumpectomy, HTN, and ETOH cirrhosis, Umbilical Hernia Repair 9/2024, now s/p OLT under Simulect induction on 1/4/25    [ ] POD 4 s/p OLT   - donor graft with replaced R TANG, will follow dopplers daily   - Donor grew staph epi in bld cxs x1; Recipient bld cxs sent 1/4. Per ID: linezolid or vanco x 5 days, ancef until 1/8   -LFTs trending down  -ASA 81mg daily, SHQ then eliquis this week   -Pain management: Oxy prn  -Bowel regimen: senna  -PT/OT/RD/Spirometry/SCDs  -strict I&Os: Nadiya corrales     [ ] Immunosuppression  - FK per level, MMF 1/1, SST  - SIMULECT POD#4  - PPx: Valcyte/Bactrim/Fluc/PPI/OsCal 64F with a PMHx of breast cancer s/p lumpectomy, HTN, and ETOH cirrhosis, Umbilical Hernia Repair 9/2024, now s/p OLT under Simulect induction on 1/4/25    [ ] POD 4 s/p OLT   - donor graft with replaced R TANG, will follow dopplers daily   - Donor grew staph epi in bld cxs x1; Recipient bld cxs sent 1/4. Per ID: 5 day course of cefazolin staring 1/8/25  -LFTs trending down  -ASA 81mg daily, SQH stopped, eliquis 2.5 BID started  -Pain management: Oxy prn  -Bowel regimen: senna  -PT/OT/RD/Spirometry/SCDs  -strict I&Os: Nadiya corrales     [ ] Immunosuppression  - FK per level, MMF 1/1, SST  - SIMULECT completed   - PPx: Valcyte/Bactrim/Fluc/PPI/OsCal

## 2025-01-08 NOTE — DISCHARGE NOTE PROVIDER - NSDCFUSCHEDAPPT_GEN_ALL_CORE_FT
[facility]  HEPATOLOGY [address] [place] [address]   Scheduled Appointment: 01/23/2025

## 2025-01-08 NOTE — DISCHARGE NOTE NURSING/CASE MANAGEMENT/SOCIAL WORK - PATIENT PORTAL LINK FT
You can access the FollowMyHealth Patient Portal offered by [facility] by registering at the following website: [email or web address]. By joining [employer]’s FollowMyHealth portal, you will also be able to view your health information using other applications (apps) compatible with our system.

## 2025-01-08 NOTE — DISCHARGE NOTE NURSING/CASE MANAGEMENT/SOCIAL WORK - NSSCNAMETXT_GEN_ALL_CORE
[facility]  [facility] - [facility]

## 2025-01-08 NOTE — DISCHARGE NOTE NURSING/CASE MANAGEMENT/SOCIAL WORK - NSSCCONTNUM_GEN_ALL_CORE
[phone] / [phone]: Start of care 1/17/25: Please follow up with agency on start of care date.

## 2025-01-08 NOTE — DISCHARGE NOTE PROVIDER - NSDCMRMEDTOKEN_GEN_ALL_CORE_FT
Aldactone 100 mg oral tablet: 1 tab(s) orally once a day  furosemide 40 mg oral tablet: 1 tab(s) orally 2 times a day  pantoprazole 40 mg oral delayed release tablet: 1 tab(s) orally once a day (before a meal)  polyethylene glycol 3350 oral powder for reconstitution: 17 gram(s) orally 2 times a day  senna leaf extract oral tablet: 2 tab(s) orally once a day (at bedtime)  traMADol 50 mg oral tablet: 1 tab(s) orally once a day as needed for  severe pain MDD: 1   apixaban 2.5 mg oral tablet: 1 tab(s) orally 2 times a day  aspirin 81 mg oral delayed release tablet: 1 tab(s) orally every 24 hours  atovaquone 750 mg/5 mL oral suspension: 10 milliliter(s) orally once a day  CellCept 500 mg oral tablet: 2 tab(s) orally 2 times a day  fluconazole 200 mg oral tablet: 1 tab(s) orally every 24 hours  Lasix 40 mg oral tablet: 1 tab(s) orally once a day  pantoprazole 40 mg oral delayed release tablet: 1 tab(s) orally once a day (before a meal)  polyethylene glycol 3350 oral powder for reconstitution: 17 gram(s) orally 2 times a day  predniSONE 20 mg oral tablet: 1 tab(s) orally once a day  senna leaf extract oral tablet: 1 tab(s) orally once a day (at bedtime)  tacrolimus 1 mg oral capsule: 2 cap(s) orally once a day Take at 8:00 AM  tacrolimus 1 mg oral capsule: 1 cap(s) orally once a day Take at 8:00PM  ursodiol 300 mg oral capsule: 1 cap(s) orally 2 times a day  valGANciclovir 450 mg oral tablet: 1 tab(s) orally every 24 hours  vitamin D-calcium (as carbonate) 5 mcg-500 mg oral tablet: 1 tab(s) orally 2 times a day   apixaban 2.5 mg oral tablet: 1 tab(s) orally 2 times a day  aspirin 81 mg oral delayed release tablet: 1 tab(s) orally every 24 hours  atovaquone 750 mg/5 mL oral suspension: 10 milliliter(s) orally once a day  CellCept 500 mg oral tablet: 2 tab(s) orally 2 times a day  fluconazole 200 mg oral tablet: 1 tab(s) orally every 24 hours  Lasix 40 mg oral tablet: 1 tab(s) orally once a day  magnesium oxide: 400 milligram(s) orally 2 times a day  pantoprazole 40 mg oral delayed release tablet: 1 tab(s) orally once a day (before a meal)  predniSONE 20 mg oral tablet: 1 tab(s) orally once a day  senna leaf extract oral tablet: 1 tab(s) orally once a day (at bedtime)  tacrolimus 1 mg oral capsule: 2 cap(s) orally once a day Take at 8:00 AM  tacrolimus 1 mg oral capsule: 1 cap(s) orally once a day Take at 8:00PM  ursodiol 300 mg oral capsule: 1 cap(s) orally 2 times a day  valGANciclovir 450 mg oral tablet: 1 tab(s) orally every 24 hours  vitamin D-calcium (as carbonate) 5 mcg-500 mg oral tablet: 1 tab(s) orally 2 times a day

## 2025-01-08 NOTE — DISCHARGE NOTE PROVIDER - HOSPITAL COURSE
64 year old female with PMHx of Breast cancer s/p lumpectomy, HTN, Umbilical Hernia Repair 2024 and decompensated ETOH Cirrhosis underwent  donor orthotopic liver transplant on 2025  with end-end single arterial reconstruction (Donor Right, Segment IV and Left Hepatic Arteries arising separately from celiac trunk, Redo hepatic artery anastomosis). Donor with staph epi bacteremia (blood culture x 1), treated with Cefazolin (end date ). Pt maintained good graft function. Liver enzymes trended down appropriately.  ASA and Eliquis 2.5bid was started for hepatic artery prophylaxis. Pt was downgraded from ICU care and progressed well from a surgical perspective. Tolerated regular diet, had bowel function, had good pain control with minimal narcotics, and ambulated well with PT. Medication/diet education was provided regularly by Transplant Pharmacy and Nutrition teams.    She was followed closely by our multidisciplinary transplant team:  Surgeons, Hepatologists, ID, Nephrologists, Pharmacists, [place], ACPs, RNs, SW, Coordinators, Dieticians, PT/OT and was deemed safe for discharge home with Home PT.            64 year old female with PMHx of Breast cancer s/p lumpectomy, HTN, Umbilical Hernia Repair 2024 and decompensated ETOH Cirrhosis underwent  donor orthotopic liver transplant on 2025 with end-end single arterial reconstruction (Donor Right, Segment IV and Left Hepatic Arteries arising separately from celiac trunk, Redo hepatic artery anastomosis). Donor with staph epi bacteremia (blood culture x 1), treated with Cefazolin (end date ). Pt maintained good graft function. Liver enzymes trended down appropriately.  ASA and Eliquis 2.5bid was started for hepatic artery prophylaxis. Pt was downgraded from ICU care and progressed well from a surgical perspective. Course complicated by constipation and ileus which has now been resolved. Patient also received a total of 3uPRBCs for decreased H/H, which has now been stable. Tolerated regular diet, had bowel function, had good pain control with minimal narcotics, and ambulated well with PT. Medication/diet education was provided regularly by Transplant Pharmacy and Nutrition teams.    She was followed closely by our multidisciplinary transplant team:  Surgeons, Hepatologists, ID, Nephrologists, Pharmacists, [place], ACPs, RNs, SW, Coordinators, Dieticians, PT/OT and was deemed safe for discharge home with Home PT.

## 2025-01-09 LAB
ALBUMIN SERPL ELPH-MCNC: 2.8 G/DL — LOW (ref 3.3–5)
ALP SERPL-CCNC: 129 U/L — HIGH (ref 40–120)
ALT FLD-CCNC: 57 U/L — HIGH (ref 10–45)
ANION GAP SERPL CALC-SCNC: 9 MMOL/L — SIGNIFICANT CHANGE UP (ref 5–17)
APTT BLD: 24.2 SEC — LOW (ref 24.5–35.6)
AST SERPL-CCNC: 35 U/L — SIGNIFICANT CHANGE UP (ref 10–40)
BILIRUB SERPL-MCNC: 0.5 MG/DL — SIGNIFICANT CHANGE UP (ref 0.2–1.2)
BUN SERPL-MCNC: 33 MG/DL — HIGH (ref 7–23)
CALCIUM SERPL-MCNC: 7.7 MG/DL — LOW (ref 8.4–10.5)
CHLORIDE SERPL-SCNC: 105 MMOL/L — SIGNIFICANT CHANGE UP (ref 96–108)
CO2 SERPL-SCNC: 20 MMOL/L — LOW (ref 22–31)
CREAT SERPL-MCNC: 0.96 MG/DL — SIGNIFICANT CHANGE UP (ref 0.5–1.3)
EGFR: 67 ML/MIN/1.73M2 — SIGNIFICANT CHANGE UP
GLUCOSE BLDC GLUCOMTR-MCNC: 124 MG/DL — HIGH (ref 70–99)
GLUCOSE BLDC GLUCOMTR-MCNC: 148 MG/DL — HIGH (ref 70–99)
GLUCOSE BLDC GLUCOMTR-MCNC: 148 MG/DL — HIGH (ref 70–99)
GLUCOSE BLDC GLUCOMTR-MCNC: 165 MG/DL — HIGH (ref 70–99)
GLUCOSE SERPL-MCNC: 109 MG/DL — HIGH (ref 70–99)
HCT VFR BLD CALC: 23.4 % — LOW (ref 34.5–45)
HGB BLD-MCNC: 7.8 G/DL — LOW (ref 11.5–15.5)
INR BLD: 1.27 RATIO — HIGH (ref 0.85–1.16)
MAGNESIUM SERPL-MCNC: 2.3 MG/DL — SIGNIFICANT CHANGE UP (ref 1.6–2.6)
MCHC RBC-ENTMCNC: 29.8 PG — SIGNIFICANT CHANGE UP (ref 27–34)
MCHC RBC-ENTMCNC: 33.3 G/DL — SIGNIFICANT CHANGE UP (ref 32–36)
MCV RBC AUTO: 89.3 FL — SIGNIFICANT CHANGE UP (ref 80–100)
NRBC # BLD: 0 /100 WBCS — SIGNIFICANT CHANGE UP (ref 0–0)
PHOSPHATE SERPL-MCNC: 2.8 MG/DL — SIGNIFICANT CHANGE UP (ref 2.5–4.5)
PLATELET # BLD AUTO: 56 K/UL — LOW (ref 150–400)
POTASSIUM SERPL-MCNC: 4.2 MMOL/L — SIGNIFICANT CHANGE UP (ref 3.5–5.3)
POTASSIUM SERPL-SCNC: 4.2 MMOL/L — SIGNIFICANT CHANGE UP (ref 3.5–5.3)
PROT SERPL-MCNC: 4.7 G/DL — LOW (ref 6–8.3)
PROTHROM AB SERPL-ACNC: 14.6 SEC — HIGH (ref 9.9–13.4)
RBC # BLD: 2.62 M/UL — LOW (ref 3.8–5.2)
RBC # FLD: 17 % — HIGH (ref 10.3–14.5)
SODIUM SERPL-SCNC: 134 MMOL/L — LOW (ref 135–145)
TACROLIMUS SERPL-MCNC: 2.4 NG/ML — SIGNIFICANT CHANGE UP
WBC # BLD: 5.02 K/UL — SIGNIFICANT CHANGE UP (ref 3.8–10.5)
WBC # FLD AUTO: 5.02 K/UL — SIGNIFICANT CHANGE UP (ref 3.8–10.5)

## 2025-01-09 RX ORDER — URSODIOL 250 MG/1
300 TABLET, FILM COATED ORAL
Refills: 0 | Status: DISCONTINUED | OUTPATIENT
Start: 2025-01-09 | End: 2025-01-16

## 2025-01-09 RX ORDER — SULFAMETHOXAZOLE/TRIMETHOPRIM 800-160 MG
1 TABLET ORAL DAILY
Refills: 0 | Status: DISCONTINUED | OUTPATIENT
Start: 2025-01-09 | End: 2025-01-12

## 2025-01-09 RX ORDER — SULFAMETHOXAZOLE/TRIMETHOPRIM 800-160 MG
1 TABLET ORAL DAILY
Refills: 0 | Status: DISCONTINUED | OUTPATIENT
Start: 2025-01-09 | End: 2025-01-09

## 2025-01-09 RX ORDER — TACROLIMUS 0.5 MG/1
2 CAPSULE ORAL
Refills: 0 | Status: DISCONTINUED | OUTPATIENT
Start: 2025-01-09 | End: 2025-01-10

## 2025-01-09 RX ORDER — URSODIOL 300 MG/1
300 CAPSULE ORAL
Qty: 60 | Refills: 3 | Status: ACTIVE | COMMUNITY
Start: 2025-01-09 | End: 1900-01-01

## 2025-01-09 RX ORDER — LIDOCAINE HYDROCHLORIDE 10 MG/ML
10 INJECTION INFILTRATION; PERINEURAL ONCE
Refills: 0 | Status: DISCONTINUED | OUTPATIENT
Start: 2025-01-09 | End: 2025-01-12

## 2025-01-09 RX ORDER — POLYETHYLENE GLYCOL 3350 17 G/DOSE
17 POWDER (GRAM) ORAL ONCE
Refills: 0 | Status: COMPLETED | OUTPATIENT
Start: 2025-01-09 | End: 2025-01-09

## 2025-01-09 RX ADMIN — TACROLIMUS 2 MILLIGRAM(S): 0.5 CAPSULE ORAL at 20:27

## 2025-01-09 RX ADMIN — Medication 81 MILLIGRAM(S): at 11:25

## 2025-01-09 RX ADMIN — MYCOPHENOLATE MOFETIL 1000 MILLIGRAM(S): 250 CAPSULE ORAL at 20:27

## 2025-01-09 RX ADMIN — Medication 5 MILLIGRAM(S): at 00:35

## 2025-01-09 RX ADMIN — Medication 2: at 17:32

## 2025-01-09 RX ADMIN — Medication 10 MILLIGRAM(S): at 23:28

## 2025-01-09 RX ADMIN — Medication 10 MILLIGRAM(S): at 05:04

## 2025-01-09 RX ADMIN — PANTOPRAZOLE 40 MILLIGRAM(S): 40 TABLET, DELAYED RELEASE ORAL at 11:25

## 2025-01-09 RX ADMIN — Medication 17 GRAM(S): at 11:25

## 2025-01-09 RX ADMIN — Medication 1 TABLET(S): at 11:40

## 2025-01-09 RX ADMIN — Medication 1 TABLET(S): at 17:07

## 2025-01-09 RX ADMIN — URSODIOL 300 MILLIGRAM(S): 250 TABLET, FILM COATED ORAL at 17:07

## 2025-01-09 RX ADMIN — CHLORHEXIDINE GLUCONATE 1 APPLICATION(S): 1.2 RINSE ORAL at 11:30

## 2025-01-09 RX ADMIN — Medication 17 GRAM(S): at 22:01

## 2025-01-09 RX ADMIN — VALGANCICLOVIR 450 MILLIGRAM(S): 450 TABLET, FILM COATED ORAL at 11:25

## 2025-01-09 RX ADMIN — SENNOSIDES 2 TABLET(S): 8.6 TABLET, FILM COATED ORAL at 22:01

## 2025-01-09 RX ADMIN — APIXABAN 2.5 MILLIGRAM(S): 5 TABLET, FILM COATED ORAL at 17:07

## 2025-01-09 RX ADMIN — Medication 5 MILLIGRAM(S): at 06:15

## 2025-01-09 RX ADMIN — Medication 5 MILLIGRAM(S): at 00:36

## 2025-01-09 RX ADMIN — TACROLIMUS 2 MILLIGRAM(S): 0.5 CAPSULE ORAL at 08:23

## 2025-01-09 RX ADMIN — Medication 5 MILLIGRAM(S): at 01:30

## 2025-01-09 RX ADMIN — METHYLPREDNISOLONE 40 MILLIGRAM(S): 4 TABLET ORAL at 05:02

## 2025-01-09 RX ADMIN — MYCOPHENOLATE MOFETIL 1000 MILLIGRAM(S): 250 CAPSULE ORAL at 08:23

## 2025-01-09 RX ADMIN — APIXABAN 2.5 MILLIGRAM(S): 5 TABLET, FILM COATED ORAL at 05:02

## 2025-01-09 RX ADMIN — FLUCONAZOLE 200 MILLIGRAM(S): 200 TABLET ORAL at 11:25

## 2025-01-09 RX ADMIN — Medication 5 MILLIGRAM(S): at 05:20

## 2025-01-09 RX ADMIN — Medication 1 TABLET(S): at 05:02

## 2025-01-09 NOTE — PHARMACY EDUCATION NOTE - EDUCATION SUMMARY
Discharge immunosuppressant medications and prophylatic anti-infective agents reviewed with the patient. Outpatient medication schedule was discussed in detail including: medication name, indication, dose, administration times, treatment duration, side effects, drug interactions, and special instructions. Mycophenolate REMS program was discussed and information brochure was provided. Patient questions and concerns were answered and addressed. Patient demonstrated understanding.

## 2025-01-09 NOTE — PROGRESS NOTE ADULT - SUBJECTIVE AND OBJECTIVE BOX
Transplant Surgery - Multidisciplinary Rounds  --------------------------------------------------------------  OLTx   1/4/2025   POD#5   CMV +/+    Patient seen and examined with multidisciplinary Transplant team including Surgeon Dr. Dagher, EDNA Fitch, Hepatologist Dr. Hand, Hepatology  Fellow  SICU team during AM rounds.   Disciplines not in attendance will be notified of the plan.     HPI: 64F with a PMHx of breast cancer s/p lumpectomy, HTN, and ETOH cirrhosis, Umbilical Hernia Repair 9/2024, now s/p OLT under Simulect induction on 1/4/25    Interval Events:  - afebrile, VSS  - started Eliquis 2.5 BID  - completed simulect    Immunosuppression:  -FK per level, MMF 1/1, SST  -SIMULECT completed  -Ongoing monitoring for signs of rejection    Potential Discharge date: TBD  Education:  Medications  Plan of care:  See Below    TX DATA HERE    Review of systems  Gen: No weight changes, fatigue, fevers/chills, weakness  Skin: No rashes  Head/Eyes/Ears/Mouth: No headache; Normal hearing; Normal vision w/o blurriness; No sinus pain/discomfort, sore throat  Respiratory: No dyspnea, cough, wheezing, hemoptysis  CV: No chest pain, PND, orthopnea  GI: C/O abdominal pain at surgical site. no diarrhea, constipation, nausea, vomiting, melena, hematochezia  : No increased frequency, dysuria, hematuria, nocturia  MSK: No joint pain/swelling; no back pain; no edema  Neuro: No dizziness/lightheadedness, weakness, seizures, numbness, tingling  Heme: No easy bruising or bleeding  Endo: No heat/cold intolerance  Psych: No significant nervousness, anxiety, stress, depression  All other systems were reviewed and are negative, except as noted.      PHYSICAL EXAM:  Constitutional: Well developed / well nourished  Eyes: PERRLA  ENMT: nc/at,   Neck: supple  Respiratory: CTA B/L  Cardiovascular: RRR  Gastrointestinal: Soft abdomen, ND, appropriate incisional TTP. dressing c/d/i, JPs#2 SS   Genitourinary: voiding spontaneously   Extremities: SCD's in place and working bilaterally  Vascular: Palpable dp pulses bilaterally.   Neurological: A&O x3, waking up  Skin: no rashes, ulcerations, lesions  Musculoskeletal: Moving all extremities  Psychiatric: Responsive Transplant Surgery - Multidisciplinary Rounds  --------------------------------------------------------------  OLTx   1/4/2025   POD#5   CMV +/+    Patient seen and examined with multidisciplinary Transplant team including Surgeon Dr. Dagher, EDNA Fitch, Hepatologist Dr. Hand, Hepatology  Fellow  SICU team during AM rounds.   Disciplines not in attendance will be notified of the plan.     HPI: 64F with a PMHx of breast cancer s/p lumpectomy, HTN, and ETOH cirrhosis, Umbilical Hernia Repair 9/2024, now s/p OLT under Simulect induction on 1/4/25    Interval Events:  - afebrile, VSS  - started Eliquis 2.5 BID  - completed simulect    Immunosuppression:  -FK per level, MMF 1/1, SST  -SIMULECT completed  -Ongoing monitoring for signs of rejection    Potential Discharge date: TBD  Education:  Medications  Plan of care:  See Below        MEDICATIONS  (STANDING):  apixaban 2.5 milliGRAM(s) Oral two times a day  aspirin enteric coated 81 milliGRAM(s) Oral every 24 hours  calcium carbonate 1250 mG  + Vitamin D (OsCal 500 + D) 1 Tablet(s) Oral two times a day  chlorhexidine 2% Cloths 1 Application(s) Topical <User Schedule>  fluconAZOLE   Tablet 200 milliGRAM(s) Oral every 24 hours  influenza   Vaccine 0.5 milliLiter(s) IntraMuscular once  insulin lispro (ADMELOG) corrective regimen sliding scale   SubCutaneous three times a day before meals  insulin lispro (ADMELOG) corrective regimen sliding scale   SubCutaneous at bedtime  lidocaine 1% (Preservative-free) Injectable 10 milliLiter(s) Local Injection once  methylPREDNISolone sodium succinate Injectable   IV Push   mycophenolate mofetil 1000 milliGRAM(s) Oral <User Schedule>  pantoprazole  Injectable 40 milliGRAM(s) IV Push daily  polyethylene glycol 3350 17 Gram(s) Oral every 24 hours  senna 2 Tablet(s) Oral at bedtime  tacrolimus 2 milliGRAM(s) Oral <User Schedule>  trimethoprim   80 mG/sulfamethoxazole 400 mG 1 Tablet(s) Oral daily  ursodiol Capsule 300 milliGRAM(s) Oral two times a day  valGANciclovir 450 milliGRAM(s) Oral every 24 hours    MEDICATIONS  (PRN):  melatonin 5 milliGRAM(s) Oral at bedtime PRN Insomnia  oxyCODONE    IR 5 milliGRAM(s) Oral every 4 hours PRN Moderate Pain (4 - 6)  oxyCODONE    IR 10 milliGRAM(s) Oral every 4 hours PRN Severe Pain (7 - 10)      PAST MEDICAL & SURGICAL HISTORY:  Breast cancer, left      Mild alcohol use disorder      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      GERD (gastroesophageal reflux disease)      Skin cancer, basal cell      H/O lumpectomy      History of removal of skin mole      H/O ganglion cyst          Vital Signs Last 24 Hrs  T(C): 36.6 (09 Jan 2025 13:00), Max: 36.8 (09 Jan 2025 01:00)  T(F): 97.9 (09 Jan 2025 13:00), Max: 98.2 (09 Jan 2025 01:00)  HR: 74 (09 Jan 2025 13:00) (70 - 79)  BP: 127/71 (09 Jan 2025 13:00) (123/67 - 145/76)  BP(mean): --  RR: 18 (09 Jan 2025 13:00) (18 - 18)  SpO2: 98% (09 Jan 2025 13:00) (97% - 99%)    Parameters below as of 09 Jan 2025 13:00  Patient On (Oxygen Delivery Method): room air        I&O's Summary    08 Jan 2025 07:01  -  09 Jan 2025 07:00  --------------------------------------------------------  IN: 1080 mL / OUT: 1630 mL / NET: -550 mL    09 Jan 2025 07:01  -  09 Jan 2025 15:27  --------------------------------------------------------  IN: 480 mL / OUT: 455 mL / NET: 25 mL                              7.8    5.02  )-----------( 56       ( 09 Jan 2025 06:59 )             23.4     01-09    134[L]  |  105  |  33[H]  ----------------------------<  109[H]  4.2   |  20[L]  |  0.96    Ca    7.7[L]      09 Jan 2025 07:05  Phos  2.8     01-09  Mg     2.3     01-09    TPro  4.7[L]  /  Alb  2.8[L]  /  TBili  0.5  /  DBili  x   /  AST  35  /  ALT  57[H]  /  AlkPhos  129[H]  01-09    Tacrolimus (), Serum: 2.4 ng/mL (01-09 @ 07:07)        Culture - Body Fluid with Gram Stain (collected 01-05-25 @ 12:33)  Source: Body Fluid  Gram Stain (01-05-25 @ 21:44):    No polymorphonuclear cells seen    No organisms seen    by cytocentrifuge  Preliminary Report (01-06-25 @ 16:00):    No growth    Culture - Blood (collected 01-04-25 @ 19:30)  Source: .Blood BLOOD  Preliminary Report (01-09-25 @ 01:01):    No growth at 4 days    Culture - Blood (collected 01-04-25 @ 19:25)  Source: .Blood BLOOD  Preliminary Report (01-09-25 @ 01:01):    No growth at 4 days    Culture - Urine (collected 01-04-25 @ 01:31)  Source: Clean Catch Clean Catch (Midstream)  Final Report (01-05-25 @ 05:47):    <10,000 CFU/mL Normal Urogenital Kianna                Review of systems  Gen: No weight changes, fatigue, fevers/chills, weakness  Skin: No rashes  Head/Eyes/Ears/Mouth: No headache; Normal hearing; Normal vision w/o blurriness; No sinus pain/discomfort, sore throat  Respiratory: No dyspnea, cough, wheezing, hemoptysis  CV: No chest pain, PND, orthopnea  GI: C/O abdominal pain at surgical site. no diarrhea, constipation, nausea, vomiting, melena, hematochezia  : No increased frequency, dysuria, hematuria, nocturia  MSK: No joint pain/swelling; no back pain; no edema  Neuro: No dizziness/lightheadedness, weakness, seizures, numbness, tingling  Heme: No easy bruising or bleeding  Endo: No heat/cold intolerance  Psych: No significant nervousness, anxiety, stress, depression  All other systems were reviewed and are negative, except as noted.      PHYSICAL EXAM:  Constitutional: Well developed / well nourished  Eyes: PERRLA  ENMT: nc/at,   Neck: supple  Respiratory: CTA B/L  Cardiovascular: RRR  Gastrointestinal: Soft abdomen, ND, appropriate incisional TTP. dressing c/d/i, JPs#2 SS   Genitourinary: voiding spontaneously   Extremities: SCD's in place and working bilaterally  Vascular: Palpable dp pulses bilaterally.   Neurological: A&O x3, waking up  Skin: no rashes, ulcerations, lesions  Musculoskeletal: Moving all extremities  Psychiatric: Responsive

## 2025-01-09 NOTE — PROGRESS NOTE ADULT - ASSESSMENT
64F with a PMHx of breast cancer s/p lumpectomy, HTN, and ETOH cirrhosis, Umbilical Hernia Repair 9/2024, now s/p OLT under Simulect induction on 1/4/25    [ ] POD 5 s/p OLT   - donor graft with replaced R TANG, will follow dopplers daily   - Donor grew staph epi in bld cxs x1; Recipient bld cxs sent 1/4. Per ID: 5 day course of abx completed 1/8/25  -LFTs trending down  -ASA 81mg daily, eliquis 2.5 BID  -Pain management: Oxy prn  -Bowel regimen: senna  -PT/OT/RD/Spirometry/SCDs  -strict I&Os: UBALDO corralesx2     [ ] Immunosuppression  - FK per level, MMF 1/1, SST  - SIMULECT completed   - PPx: Valcyte/Bactrim/Fluc/PPI/OsCal 64F with a PMHx of breast cancer s/p lumpectomy, HTN, and ETOH cirrhosis, Umbilical Hernia Repair 9/2024, now s/p OLT under Simulect induction on 1/4/25    [ ] POD 5 s/p OLT   - donor graft with replaced R TANG, will follow dopplers daily   - Donor grew staph epi in bld cxs x1; Recipient bld cxs sent 1/4. Per ID: 5 day course of abx completed 1/8/25  -LFTs trending down  -ASA 81mg daily, eliquis 2.5 BID  -Pain management: Oxy prn  -Bowel regimen: senna  -PT/OT/RD/Spirometry/SCDs  -strict I&Os: antoni d/joe last UBALDO     [ ] Immunosuppression  - FK per level, MMF 1/1, SST  - SIMULECT completed   - PPx: Valcyte/Bactrim/Fluc/PPI/OsCal

## 2025-01-10 DIAGNOSIS — Z94.4 LIVER TRANSPLANT STATUS: ICD-10-CM

## 2025-01-10 LAB
ALBUMIN SERPL ELPH-MCNC: 2.8 G/DL — LOW (ref 3.3–5)
ALP SERPL-CCNC: 118 U/L — SIGNIFICANT CHANGE UP (ref 40–120)
ALT FLD-CCNC: 49 U/L — HIGH (ref 10–45)
ANION GAP SERPL CALC-SCNC: 9 MMOL/L — SIGNIFICANT CHANGE UP (ref 5–17)
APTT BLD: 23.2 SEC — LOW (ref 24.5–35.6)
AST SERPL-CCNC: 33 U/L — SIGNIFICANT CHANGE UP (ref 10–40)
BILIRUB SERPL-MCNC: 0.6 MG/DL — SIGNIFICANT CHANGE UP (ref 0.2–1.2)
BUN SERPL-MCNC: 29 MG/DL — HIGH (ref 7–23)
CALCIUM SERPL-MCNC: 8.1 MG/DL — LOW (ref 8.4–10.5)
CHLORIDE SERPL-SCNC: 106 MMOL/L — SIGNIFICANT CHANGE UP (ref 96–108)
CO2 SERPL-SCNC: 19 MMOL/L — LOW (ref 22–31)
CREAT SERPL-MCNC: 0.93 MG/DL — SIGNIFICANT CHANGE UP (ref 0.5–1.3)
CULTURE RESULTS: NO GROWTH — SIGNIFICANT CHANGE UP
CULTURE RESULTS: SIGNIFICANT CHANGE UP
CULTURE RESULTS: SIGNIFICANT CHANGE UP
EGFR: 69 ML/MIN/1.73M2 — SIGNIFICANT CHANGE UP
GLUCOSE BLDC GLUCOMTR-MCNC: 114 MG/DL — HIGH (ref 70–99)
GLUCOSE BLDC GLUCOMTR-MCNC: 132 MG/DL — HIGH (ref 70–99)
GLUCOSE BLDC GLUCOMTR-MCNC: 162 MG/DL — HIGH (ref 70–99)
GLUCOSE BLDC GLUCOMTR-MCNC: 181 MG/DL — HIGH (ref 70–99)
GLUCOSE SERPL-MCNC: 95 MG/DL — SIGNIFICANT CHANGE UP (ref 70–99)
HCT VFR BLD CALC: 25.1 % — LOW (ref 34.5–45)
HGB BLD-MCNC: 8.3 G/DL — LOW (ref 11.5–15.5)
INR BLD: 1.29 RATIO — HIGH (ref 0.85–1.16)
MAGNESIUM SERPL-MCNC: 2.1 MG/DL — SIGNIFICANT CHANGE UP (ref 1.6–2.6)
MCHC RBC-ENTMCNC: 30 PG — SIGNIFICANT CHANGE UP (ref 27–34)
MCHC RBC-ENTMCNC: 33.1 G/DL — SIGNIFICANT CHANGE UP (ref 32–36)
MCV RBC AUTO: 90.6 FL — SIGNIFICANT CHANGE UP (ref 80–100)
NRBC # BLD: 0 /100 WBCS — SIGNIFICANT CHANGE UP (ref 0–0)
PHOSPHATE SERPL-MCNC: 2.1 MG/DL — LOW (ref 2.5–4.5)
PLATELET # BLD AUTO: 59 K/UL — LOW (ref 150–400)
POTASSIUM SERPL-MCNC: 4.7 MMOL/L — SIGNIFICANT CHANGE UP (ref 3.5–5.3)
POTASSIUM SERPL-SCNC: 4.7 MMOL/L — SIGNIFICANT CHANGE UP (ref 3.5–5.3)
PROT SERPL-MCNC: 4.7 G/DL — LOW (ref 6–8.3)
PROTHROM AB SERPL-ACNC: 14.8 SEC — HIGH (ref 9.9–13.4)
RBC # BLD: 2.77 M/UL — LOW (ref 3.8–5.2)
RBC # FLD: 16.8 % — HIGH (ref 10.3–14.5)
SODIUM SERPL-SCNC: 134 MMOL/L — LOW (ref 135–145)
SPECIMEN SOURCE: SIGNIFICANT CHANGE UP
SURGICAL PATHOLOGY STUDY: SIGNIFICANT CHANGE UP
TACROLIMUS SERPL-MCNC: 3.2 NG/ML — SIGNIFICANT CHANGE UP
WBC # BLD: 6.63 K/UL — SIGNIFICANT CHANGE UP (ref 3.8–10.5)
WBC # FLD AUTO: 6.63 K/UL — SIGNIFICANT CHANGE UP (ref 3.8–10.5)

## 2025-01-10 RX ORDER — SODIUM PHOSPHATE, MONOBASIC, MONOHYDRATE AND SODIUM PHOSPHATE, DIBASIC ANHYDROUS 142; 276 MG/ML; MG/ML
30 INJECTION, SOLUTION INTRAVENOUS ONCE
Refills: 0 | Status: COMPLETED | OUTPATIENT
Start: 2025-01-10 | End: 2025-01-10

## 2025-01-10 RX ORDER — TACROLIMUS 0.5 MG/1
3 CAPSULE ORAL
Refills: 0 | Status: DISCONTINUED | OUTPATIENT
Start: 2025-01-10 | End: 2025-01-13

## 2025-01-10 RX ORDER — FUROSEMIDE 20 MG
40 TABLET ORAL DAILY
Refills: 0 | Status: DISCONTINUED | OUTPATIENT
Start: 2025-01-10 | End: 2025-01-16

## 2025-01-10 RX ORDER — TACROLIMUS 0.5 MG/1
1 CAPSULE ORAL ONCE
Refills: 0 | Status: COMPLETED | OUTPATIENT
Start: 2025-01-10 | End: 2025-01-10

## 2025-01-10 RX ORDER — FUROSEMIDE 20 MG
1 TABLET ORAL
Qty: 5 | Refills: 0
Start: 2025-01-10 | End: 2025-01-14

## 2025-01-10 RX ADMIN — PANTOPRAZOLE 40 MILLIGRAM(S): 40 TABLET, DELAYED RELEASE ORAL at 11:29

## 2025-01-10 RX ADMIN — Medication 10 MILLIGRAM(S): at 12:11

## 2025-01-10 RX ADMIN — MYCOPHENOLATE MOFETIL 1000 MILLIGRAM(S): 250 CAPSULE ORAL at 19:50

## 2025-01-10 RX ADMIN — VALGANCICLOVIR 450 MILLIGRAM(S): 450 TABLET, FILM COATED ORAL at 11:29

## 2025-01-10 RX ADMIN — MYCOPHENOLATE MOFETIL 1000 MILLIGRAM(S): 250 CAPSULE ORAL at 09:14

## 2025-01-10 RX ADMIN — Medication 2: at 17:22

## 2025-01-10 RX ADMIN — TACROLIMUS 3 MILLIGRAM(S): 0.5 CAPSULE ORAL at 19:50

## 2025-01-10 RX ADMIN — TACROLIMUS 1 MILLIGRAM(S): 0.5 CAPSULE ORAL at 11:29

## 2025-01-10 RX ADMIN — Medication 40 MILLIGRAM(S): at 11:29

## 2025-01-10 RX ADMIN — URSODIOL 300 MILLIGRAM(S): 250 TABLET, FILM COATED ORAL at 17:24

## 2025-01-10 RX ADMIN — Medication 5 MILLIGRAM(S): at 19:50

## 2025-01-10 RX ADMIN — Medication 2: at 12:12

## 2025-01-10 RX ADMIN — Medication 10 MILLIGRAM(S): at 23:00

## 2025-01-10 RX ADMIN — Medication 10 MILLIGRAM(S): at 21:18

## 2025-01-10 RX ADMIN — SENNOSIDES 2 TABLET(S): 8.6 TABLET, FILM COATED ORAL at 21:18

## 2025-01-10 RX ADMIN — Medication 1 TABLET(S): at 11:29

## 2025-01-10 RX ADMIN — APIXABAN 2.5 MILLIGRAM(S): 5 TABLET, FILM COATED ORAL at 06:01

## 2025-01-10 RX ADMIN — TACROLIMUS 2 MILLIGRAM(S): 0.5 CAPSULE ORAL at 09:14

## 2025-01-10 RX ADMIN — Medication 1 TABLET(S): at 17:23

## 2025-01-10 RX ADMIN — METHYLPREDNISOLONE 20 MILLIGRAM(S): 4 TABLET ORAL at 06:01

## 2025-01-10 RX ADMIN — CHLORHEXIDINE GLUCONATE 1 APPLICATION(S): 1.2 RINSE ORAL at 09:15

## 2025-01-10 RX ADMIN — FLUCONAZOLE 200 MILLIGRAM(S): 200 TABLET ORAL at 11:29

## 2025-01-10 RX ADMIN — Medication 10 MILLIGRAM(S): at 11:29

## 2025-01-10 RX ADMIN — Medication 81 MILLIGRAM(S): at 11:29

## 2025-01-10 RX ADMIN — Medication 5 MILLIGRAM(S): at 21:18

## 2025-01-10 RX ADMIN — APIXABAN 2.5 MILLIGRAM(S): 5 TABLET, FILM COATED ORAL at 17:22

## 2025-01-10 RX ADMIN — Medication 17 GRAM(S): at 11:31

## 2025-01-10 RX ADMIN — URSODIOL 300 MILLIGRAM(S): 250 TABLET, FILM COATED ORAL at 06:01

## 2025-01-10 RX ADMIN — Medication 1 TABLET(S): at 06:01

## 2025-01-10 RX ADMIN — SODIUM PHOSPHATE, MONOBASIC, MONOHYDRATE AND SODIUM PHOSPHATE, DIBASIC ANHYDROUS 85 MILLIMOLE(S): 142; 276 INJECTION, SOLUTION INTRAVENOUS at 11:30

## 2025-01-10 RX ADMIN — Medication 10 MILLIGRAM(S): at 00:30

## 2025-01-10 NOTE — PROGRESS NOTE ADULT - SUBJECTIVE AND OBJECTIVE BOX
Transplant Surgery - Multidisciplinary Rounds  --------------------------------------------------------------  OLTx   1/4/2025   POD#6  CMV +/+    Patient seen and examined with multidisciplinary Transplant team including Surgeon Dr. Dagher, EDNA Fitch, Hepatologist Dr. Hand, Hepatology  Fellow  SICU team during AM rounds.   Disciplines not in attendance will be notified of the plan.     HPI: 64F with a PMHx of breast cancer s/p lumpectomy, HTN, and ETOH cirrhosis, Umbilical Hernia Repair 9/2024, now s/p OLT under Simulect induction on 1/4/25    Interval Events:  - afebrile, VSS  - UBALDO d/c'd, no acute events o/n    Immunosuppression:  -FK per level, MMF 1/1, SST  -SIMULECT completed  -Ongoing monitoring for signs of rejection    Potential Discharge date: TBD  Education:  Medications  Plan of care:  See Below                                                                                    Review of systems  Gen: No weight changes, fatigue, fevers/chills, weakness  Skin: No rashes  Head/Eyes/Ears/Mouth: No headache; Normal hearing; Normal vision w/o blurriness; No sinus pain/discomfort, sore throat  Respiratory: No dyspnea, cough, wheezing, hemoptysis  CV: No chest pain, PND, orthopnea  GI: C/O abdominal pain at surgical site. no diarrhea, constipation, nausea, vomiting, melena, hematochezia  : No increased frequency, dysuria, hematuria, nocturia  MSK: No joint pain/swelling; no back pain; no edema  Neuro: No dizziness/lightheadedness, weakness, seizures, numbness, tingling  Heme: No easy bruising or bleeding  Endo: No heat/cold intolerance  Psych: No significant nervousness, anxiety, stress, depression  All other systems were reviewed and are negative, except as noted.      PHYSICAL EXAM:  Constitutional: Well developed / well nourished  Eyes: PERRLA  ENMT: nc/at,   Neck: supple  Respiratory: CTA B/L  Cardiovascular: RRR  Gastrointestinal: Soft abdomen, ND, appropriate incisional TTP. dressing c/d/i, JPs#2 SS   Genitourinary: voiding spontaneously   Extremities: SCD's in place and working bilaterally  Vascular: Palpable dp pulses bilaterally.   Neurological: A&O x3, waking up  Skin: no rashes, ulcerations, lesions  Musculoskeletal: Moving all extremities  Psychiatric: Responsive Transplant Surgery - Multidisciplinary Rounds  --------------------------------------------------------------  OLTx   1/4/2025   POD#6  CMV +/+    Patient seen and examined with multidisciplinary Transplant team including Surgeon Dr. Dagher, EDNA Fitch, Hepatologist Dr. Hand, Hepatology  Fellow  SICU team during AM rounds.   Disciplines not in attendance will be notified of the plan.     HPI: 64F with a PMHx of breast cancer s/p lumpectomy, HTN, and ETOH cirrhosis, Umbilical Hernia Repair 9/2024, now s/p OLT under Simulect induction on 1/4/25    Interval Events:  - afebrile, VSS  - UBALDO d/c'd, no acute events o/n    Immunosuppression:  -FK per level, MMF 1/1, SST  -SIMULECT completed  -Ongoing monitoring for signs of rejection    Potential Discharge date: TBD  Education:  Medications  Plan of care:  See Below        MEDICATIONS  (STANDING):  apixaban 2.5 milliGRAM(s) Oral two times a day  aspirin enteric coated 81 milliGRAM(s) Oral every 24 hours  calcium carbonate 1250 mG  + Vitamin D (OsCal 500 + D) 1 Tablet(s) Oral two times a day  chlorhexidine 2% Cloths 1 Application(s) Topical <User Schedule>  fluconAZOLE   Tablet 200 milliGRAM(s) Oral every 24 hours  furosemide    Tablet 40 milliGRAM(s) Oral daily  influenza   Vaccine 0.5 milliLiter(s) IntraMuscular once  insulin lispro (ADMELOG) corrective regimen sliding scale   SubCutaneous three times a day before meals  insulin lispro (ADMELOG) corrective regimen sliding scale   SubCutaneous at bedtime  lidocaine 1% (Preservative-free) Injectable 10 milliLiter(s) Local Injection once  mycophenolate mofetil 1000 milliGRAM(s) Oral <User Schedule>  pantoprazole  Injectable 40 milliGRAM(s) IV Push daily  polyethylene glycol 3350 17 Gram(s) Oral every 24 hours  senna 2 Tablet(s) Oral at bedtime  tacrolimus 3 milliGRAM(s) Oral <User Schedule>  trimethoprim   80 mG/sulfamethoxazole 400 mG 1 Tablet(s) Oral daily  ursodiol Capsule 300 milliGRAM(s) Oral two times a day  valGANciclovir 450 milliGRAM(s) Oral every 24 hours    MEDICATIONS  (PRN):  melatonin 5 milliGRAM(s) Oral at bedtime PRN Insomnia  oxyCODONE    IR 5 milliGRAM(s) Oral every 4 hours PRN Moderate Pain (4 - 6)  oxyCODONE    IR 10 milliGRAM(s) Oral every 4 hours PRN Severe Pain (7 - 10)      PAST MEDICAL & SURGICAL HISTORY:  Breast cancer, left      Mild alcohol use disorder      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      GERD (gastroesophageal reflux disease)      Skin cancer, basal cell      H/O lumpectomy      History of removal of skin mole      H/O ganglion cyst          Vital Signs Last 24 Hrs  T(C): 36.7 (10 Keith 2025 09:00), Max: 36.8 (09 Jan 2025 16:35)  T(F): 98 (10 Keith 2025 09:00), Max: 98.2 (09 Jan 2025 16:35)  HR: 72 (10 Keith 2025 09:00) (70 - 74)  BP: 137/82 (10 Keith 2025 09:00) (126/71 - 145/83)  BP(mean): --  RR: 18 (10 Keith 2025 09:00) (18 - 18)  SpO2: 98% (10 Keith 2025 09:00) (97% - 100%)    Parameters below as of 10 Keith 2025 09:00  Patient On (Oxygen Delivery Method): room air        I&O's Summary    09 Jan 2025 07:01  -  10 Keith 2025 07:00  --------------------------------------------------------  IN: 990 mL / OUT: 1355 mL / NET: -365 mL                              8.3    6.63  )-----------( 59       ( 10 Keith 2025 06:50 )             25.1     01-10    134[L]  |  106  |  29[H]  ----------------------------<  95  4.7   |  19[L]  |  0.93    Ca    8.1[L]      10 Keith 2025 06:50  Phos  2.1     01-10  Mg     2.1     01-10    TPro  4.7[L]  /  Alb  2.8[L]  /  TBili  0.6  /  DBili  x   /  AST  33  /  ALT  49[H]  /  AlkPhos  118  01-10    Tacrolimus (), Serum: 3.2 ng/mL (01-10 @ 06:50)        Culture - Body Fluid with Gram Stain (collected 01-05-25 @ 12:33)  Source: Body Fluid  Gram Stain (01-05-25 @ 21:44):    No polymorphonuclear cells seen    No organisms seen    by cytocentrifuge  Preliminary Report (01-06-25 @ 16:00):    No growth    Culture - Blood (collected 01-04-25 @ 19:30)  Source: .Blood BLOOD  Final Report (01-10-25 @ 01:00):    No growth at 5 days    Culture - Blood (collected 01-04-25 @ 19:25)  Source: .Blood BLOOD  Final Report (01-10-25 @ 01:00):    No growth at 5 days    Culture - Urine (collected 01-04-25 @ 01:31)  Source: Clean Catch Clean Catch (Midstream)  Final Report (01-05-25 @ 05:47):    <10,000 CFU/mL Normal Urogenital Kianna                  Review of systems  Gen: No weight changes, fatigue, fevers/chills, weakness  Skin: No rashes  Head/Eyes/Ears/Mouth: No headache; Normal hearing; Normal vision w/o blurriness; No sinus pain/discomfort, sore throat  Respiratory: No dyspnea, cough, wheezing, hemoptysis  CV: No chest pain, PND, orthopnea  GI: C/O abdominal pain at surgical site. no diarrhea, constipation, nausea, vomiting, melena, hematochezia  : No increased frequency, dysuria, hematuria, nocturia  MSK: No joint pain/swelling; no back pain; no edema  Neuro: No dizziness/lightheadedness, weakness, seizures, numbness, tingling  Heme: No easy bruising or bleeding  Endo: No heat/cold intolerance  Psych: No significant nervousness, anxiety, stress, depression  All other systems were reviewed and are negative, except as noted.      PHYSICAL EXAM:  Constitutional: Well developed / well nourished  Eyes: PERRLA  ENMT: nc/at,   Neck: supple  Respiratory: CTA B/L  Cardiovascular: RRR  Gastrointestinal: Soft abdomen, ND, appropriate incisional TTP. dressing c/d/i, JPs#2 SS   Genitourinary: voiding spontaneously   Extremities: SCD's in place and working bilaterally  Vascular: Palpable dp pulses bilaterally.   Neurological: A&O x3, waking up  Skin: no rashes, ulcerations, lesions  Musculoskeletal: Moving all extremities  Psychiatric: Responsive

## 2025-01-10 NOTE — PROGRESS NOTE ADULT - ASSESSMENT
64F with a PMHx of breast cancer s/p lumpectomy, HTN, and ETOH cirrhosis, Umbilical Hernia Repair 9/2024, now s/p OLT under Simulect induction on 1/4/25    [ ] POD 6 s/p OLT   - donor graft with replaced R TANG, will follow dopplers daily   - Donor grew staph epi in bld cxs x1; Recipient bld cxs sent 1/4. Per ID: 5 day course of abx completed 1/8/25  -LFTs trending down  -ASA 81mg daily, eliquis 2.5 BID  -Pain management: Oxy prn  -Bowel regimen: senna  -PT/OT/RD/Spirometry/SCDs  -strict I&Os: antoni d/joe last UBALDO     [ ] Immunosuppression  - FK per level, MMF 1/1, SST  - SIMULECT completed   - PPx: Valcyte/Bactrim/Fluc/PPI/OsCal 64F with a PMHx of breast cancer s/p lumpectomy, HTN, and ETOH cirrhosis, Umbilical Hernia Repair 9/2024, now s/p OLT under Simulect induction on 1/4/25    [ ] POD 6 s/p OLT   - donor graft with replaced R TANG, will follow dopplers daily   - Donor grew staph epi in bld cxs x1; Recipient bld cxs sent 1/4. Per ID: 5 day course of abx completed 1/8/25  -LFTs trending down  -ASA 81mg daily, eliquis 2.5 BID  -Pain management: Oxy prn  -Bowel regimen: senna  -PT/OT/RD/Spirometry/SCDs  -strict I&Os: antoni,    [ ] Immunosuppression  - FK per level, MMF 1/1, SST  - SIMULECT completed   - PPx: Valcyte/Bactrim/Fluc/PPI/OsCal

## 2025-01-11 LAB
ALBUMIN SERPL ELPH-MCNC: 3 G/DL — LOW (ref 3.3–5)
ALP SERPL-CCNC: 122 U/L — HIGH (ref 40–120)
ALT FLD-CCNC: 54 U/L — HIGH (ref 10–45)
ANION GAP SERPL CALC-SCNC: 11 MMOL/L — SIGNIFICANT CHANGE UP (ref 5–17)
APTT BLD: 23.8 SEC — LOW (ref 24.5–35.6)
AST SERPL-CCNC: 27 U/L — SIGNIFICANT CHANGE UP (ref 10–40)
BILIRUB SERPL-MCNC: 0.8 MG/DL — SIGNIFICANT CHANGE UP (ref 0.2–1.2)
BLD GP AB SCN SERPL QL: NEGATIVE — SIGNIFICANT CHANGE UP
BUN SERPL-MCNC: 31 MG/DL — HIGH (ref 7–23)
CALCIUM SERPL-MCNC: 8.1 MG/DL — LOW (ref 8.4–10.5)
CHLORIDE SERPL-SCNC: 100 MMOL/L — SIGNIFICANT CHANGE UP (ref 96–108)
CO2 SERPL-SCNC: 22 MMOL/L — SIGNIFICANT CHANGE UP (ref 22–31)
CREAT SERPL-MCNC: 0.99 MG/DL — SIGNIFICANT CHANGE UP (ref 0.5–1.3)
EGFR: 64 ML/MIN/1.73M2 — SIGNIFICANT CHANGE UP
GLUCOSE BLDC GLUCOMTR-MCNC: 125 MG/DL — HIGH (ref 70–99)
GLUCOSE BLDC GLUCOMTR-MCNC: 130 MG/DL — HIGH (ref 70–99)
GLUCOSE BLDC GLUCOMTR-MCNC: 140 MG/DL — HIGH (ref 70–99)
GLUCOSE BLDC GLUCOMTR-MCNC: 148 MG/DL — HIGH (ref 70–99)
GLUCOSE SERPL-MCNC: 107 MG/DL — HIGH (ref 70–99)
HCT VFR BLD CALC: 27.4 % — LOW (ref 34.5–45)
HGB BLD-MCNC: 9 G/DL — LOW (ref 11.5–15.5)
INR BLD: 1.27 RATIO — HIGH (ref 0.85–1.16)
MAGNESIUM SERPL-MCNC: 1.8 MG/DL — SIGNIFICANT CHANGE UP (ref 1.6–2.6)
MCHC RBC-ENTMCNC: 30.3 PG — SIGNIFICANT CHANGE UP (ref 27–34)
MCHC RBC-ENTMCNC: 32.8 G/DL — SIGNIFICANT CHANGE UP (ref 32–36)
MCV RBC AUTO: 92.3 FL — SIGNIFICANT CHANGE UP (ref 80–100)
NRBC # BLD: 0 /100 WBCS — SIGNIFICANT CHANGE UP (ref 0–0)
PHOSPHATE SERPL-MCNC: 3.3 MG/DL — SIGNIFICANT CHANGE UP (ref 2.5–4.5)
PLATELET # BLD AUTO: 102 K/UL — LOW (ref 150–400)
POTASSIUM SERPL-MCNC: 4.8 MMOL/L — SIGNIFICANT CHANGE UP (ref 3.5–5.3)
POTASSIUM SERPL-SCNC: 4.8 MMOL/L — SIGNIFICANT CHANGE UP (ref 3.5–5.3)
PROT SERPL-MCNC: 5.4 G/DL — LOW (ref 6–8.3)
PROTHROM AB SERPL-ACNC: 14.5 SEC — HIGH (ref 9.9–13.4)
RBC # BLD: 2.97 M/UL — LOW (ref 3.8–5.2)
RBC # FLD: 16.8 % — HIGH (ref 10.3–14.5)
RH IG SCN BLD-IMP: POSITIVE — SIGNIFICANT CHANGE UP
SODIUM SERPL-SCNC: 133 MMOL/L — LOW (ref 135–145)
TACROLIMUS SERPL-MCNC: 3.4 NG/ML — SIGNIFICANT CHANGE UP
WBC # BLD: 11.21 K/UL — HIGH (ref 3.8–10.5)
WBC # FLD AUTO: 11.21 K/UL — HIGH (ref 3.8–10.5)

## 2025-01-11 PROCEDURE — 74018 RADEX ABDOMEN 1 VIEW: CPT | Mod: 26

## 2025-01-11 RX ORDER — DIPHENHYDRAMINE HCL 25 MG
25 TABLET ORAL ONCE
Refills: 0 | Status: COMPLETED | OUTPATIENT
Start: 2025-01-11 | End: 2025-01-11

## 2025-01-11 RX ORDER — BISACODYL 5 MG
10 TABLET, DELAYED RELEASE (ENTERIC COATED) ORAL DAILY
Refills: 0 | Status: DISCONTINUED | OUTPATIENT
Start: 2025-01-11 | End: 2025-01-16

## 2025-01-11 RX ORDER — ACETAMINOPHEN 80 MG/.8ML
650 SOLUTION/ DROPS ORAL EVERY 6 HOURS
Refills: 0 | Status: DISCONTINUED | OUTPATIENT
Start: 2025-01-11 | End: 2025-01-16

## 2025-01-11 RX ORDER — TRAMADOL HYDROCHLORIDE 50 MG/1
50 TABLET ORAL EVERY 6 HOURS
Refills: 0 | Status: DISCONTINUED | OUTPATIENT
Start: 2025-01-11 | End: 2025-01-16

## 2025-01-11 RX ORDER — POLYETHYLENE GLYCOL 3350 17 G/DOSE
17 POWDER (GRAM) ORAL
Refills: 0 | Status: DISCONTINUED | OUTPATIENT
Start: 2025-01-11 | End: 2025-01-16

## 2025-01-11 RX ORDER — TRAMADOL HYDROCHLORIDE 50 MG/1
25 TABLET ORAL EVERY 6 HOURS
Refills: 0 | Status: DISCONTINUED | OUTPATIENT
Start: 2025-01-11 | End: 2025-01-16

## 2025-01-11 RX ORDER — ONDANSETRON 4 MG/1
4 TABLET ORAL ONCE
Refills: 0 | Status: COMPLETED | OUTPATIENT
Start: 2025-01-11 | End: 2025-01-11

## 2025-01-11 RX ORDER — SIMETHICONE 125 MG/1
80 CAPSULE, LIQUID FILLED ORAL ONCE
Refills: 0 | Status: COMPLETED | OUTPATIENT
Start: 2025-01-11 | End: 2025-01-11

## 2025-01-11 RX ADMIN — Medication 1 TABLET(S): at 17:29

## 2025-01-11 RX ADMIN — TRAMADOL HYDROCHLORIDE 50 MILLIGRAM(S): 50 TABLET ORAL at 12:06

## 2025-01-11 RX ADMIN — Medication 17 GRAM(S): at 12:06

## 2025-01-11 RX ADMIN — TACROLIMUS 3 MILLIGRAM(S): 0.5 CAPSULE ORAL at 19:43

## 2025-01-11 RX ADMIN — Medication 25 MILLIGRAM(S): at 23:10

## 2025-01-11 RX ADMIN — APIXABAN 2.5 MILLIGRAM(S): 5 TABLET, FILM COATED ORAL at 05:08

## 2025-01-11 RX ADMIN — TRAMADOL HYDROCHLORIDE 50 MILLIGRAM(S): 50 TABLET ORAL at 13:05

## 2025-01-11 RX ADMIN — URSODIOL 300 MILLIGRAM(S): 250 TABLET, FILM COATED ORAL at 17:28

## 2025-01-11 RX ADMIN — SENNOSIDES 2 TABLET(S): 8.6 TABLET, FILM COATED ORAL at 21:23

## 2025-01-11 RX ADMIN — MYCOPHENOLATE MOFETIL 1000 MILLIGRAM(S): 250 CAPSULE ORAL at 07:31

## 2025-01-11 RX ADMIN — Medication 10 MILLIGRAM(S): at 07:31

## 2025-01-11 RX ADMIN — APIXABAN 2.5 MILLIGRAM(S): 5 TABLET, FILM COATED ORAL at 17:29

## 2025-01-11 RX ADMIN — VALGANCICLOVIR 450 MILLIGRAM(S): 450 TABLET, FILM COATED ORAL at 12:06

## 2025-01-11 RX ADMIN — TACROLIMUS 3 MILLIGRAM(S): 0.5 CAPSULE ORAL at 07:31

## 2025-01-11 RX ADMIN — Medication 5 MILLIGRAM(S): at 01:25

## 2025-01-11 RX ADMIN — SIMETHICONE 80 MILLIGRAM(S): 125 CAPSULE, LIQUID FILLED ORAL at 02:14

## 2025-01-11 RX ADMIN — Medication 10 MILLIGRAM(S): at 02:12

## 2025-01-11 RX ADMIN — Medication 17 GRAM(S): at 17:29

## 2025-01-11 RX ADMIN — Medication 81 MILLIGRAM(S): at 12:06

## 2025-01-11 RX ADMIN — Medication 5 MILLIGRAM(S): at 00:35

## 2025-01-11 RX ADMIN — TRAMADOL HYDROCHLORIDE 50 MILLIGRAM(S): 50 TABLET ORAL at 23:10

## 2025-01-11 RX ADMIN — FLUCONAZOLE 200 MILLIGRAM(S): 200 TABLET ORAL at 12:06

## 2025-01-11 RX ADMIN — Medication 1 TABLET(S): at 05:08

## 2025-01-11 RX ADMIN — Medication 40 MILLIGRAM(S): at 05:08

## 2025-01-11 RX ADMIN — Medication 10 MILLIGRAM(S): at 01:27

## 2025-01-11 RX ADMIN — TRAMADOL HYDROCHLORIDE 25 MILLIGRAM(S): 50 TABLET ORAL at 16:01

## 2025-01-11 RX ADMIN — MYCOPHENOLATE MOFETIL 1000 MILLIGRAM(S): 250 CAPSULE ORAL at 19:44

## 2025-01-11 RX ADMIN — URSODIOL 300 MILLIGRAM(S): 250 TABLET, FILM COATED ORAL at 05:08

## 2025-01-11 RX ADMIN — ONDANSETRON 4 MILLIGRAM(S): 4 TABLET ORAL at 09:41

## 2025-01-11 RX ADMIN — TRAMADOL HYDROCHLORIDE 25 MILLIGRAM(S): 50 TABLET ORAL at 17:00

## 2025-01-11 RX ADMIN — Medication 10 MILLIGRAM(S): at 05:08

## 2025-01-11 RX ADMIN — Medication 1 TABLET(S): at 12:06

## 2025-01-11 RX ADMIN — Medication 10 MILLIGRAM(S): at 08:30

## 2025-01-11 RX ADMIN — PANTOPRAZOLE 40 MILLIGRAM(S): 40 TABLET, DELAYED RELEASE ORAL at 12:06

## 2025-01-11 RX ADMIN — CHLORHEXIDINE GLUCONATE 1 APPLICATION(S): 1.2 RINSE ORAL at 07:31

## 2025-01-11 RX ADMIN — Medication 20 MILLIGRAM(S): at 05:07

## 2025-01-11 NOTE — PROGRESS NOTE ADULT - SUBJECTIVE AND OBJECTIVE BOX
Transplant Surgery - Multidisciplinary Rounds  --------------------------------------------------------------  OLTx   1/4/2025   POD#7  CMV +/+    Patient seen and examined with multidisciplinary Transplant team including Surgeon Dr. Dagher, EDNA Plascencia/Queenie, Hepatologist Dr. Agudelo, Hepatology  Fellow  SICU team during AM rounds.   Disciplines not in attendance will be notified of the plan.     HPI: 64F with a PMHx of breast cancer s/p lumpectomy, HTN, and ETOH cirrhosis, Umbilical Hernia Repair 9/2024, now s/p OLT under Simulect induction on 1/4/25    Interval Events:  - afebrile, VSS  - LFT's steady  - cont. abd pain from constipation d/c opiates and ordered enema    Immunosuppression:  -FK per level, MMF 1/1, SST  -SIMULECT completed  -Ongoing monitoring for signs of rejection    Potential Discharge date: TBD  Education:  Medications  Plan of care:  See Below        MEDICATIONS  (STANDING):  apixaban 2.5 milliGRAM(s) Oral two times a day  aspirin enteric coated 81 milliGRAM(s) Oral every 24 hours  calcium carbonate 1250 mG  + Vitamin D (OsCal 500 + D) 1 Tablet(s) Oral two times a day  chlorhexidine 2% Cloths 1 Application(s) Topical <User Schedule>  fluconAZOLE   Tablet 200 milliGRAM(s) Oral every 24 hours  furosemide    Tablet 40 milliGRAM(s) Oral daily  influenza   Vaccine 0.5 milliLiter(s) IntraMuscular once  insulin lispro (ADMELOG) corrective regimen sliding scale   SubCutaneous three times a day before meals  insulin lispro (ADMELOG) corrective regimen sliding scale   SubCutaneous at bedtime  lidocaine 1% (Preservative-free) Injectable 10 milliLiter(s) Local Injection once  mycophenolate mofetil 1000 milliGRAM(s) Oral <User Schedule>  pantoprazole  Injectable 40 milliGRAM(s) IV Push daily  polyethylene glycol 3350 17 Gram(s) Oral every 24 hours  predniSONE   Tablet 20 milliGRAM(s) Oral daily  senna 2 Tablet(s) Oral at bedtime  tacrolimus 3 milliGRAM(s) Oral <User Schedule>  trimethoprim   80 mG/sulfamethoxazole 400 mG 1 Tablet(s) Oral daily  ursodiol Capsule 300 milliGRAM(s) Oral two times a day  valGANciclovir 450 milliGRAM(s) Oral every 24 hours    MEDICATIONS  (PRN):  acetaminophen     Tablet .. 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      GERD (gastroesophageal reflux disease)      Skin cancer, basal cell      H/O lumpectomy      History of removal of skin mole      H/O ganglion cyst          Vital Signs Last 24 Hrs  T(C): 37 (11 Jan 2025 09:00), Max: 37 (11 Jan 2025 09:00)  T(F): 98.6 (11 Jan 2025 09:00), Max: 98.6 (11 Jan 2025 09:00)  HR: 90 (11 Jan 2025 09:00) (67 - 90)  BP: 145/77 (11 Jan 2025 09:00) (136/77 - 149/81)  BP(mean): 106 (11 Jan 2025 05:04) (106 - 106)  RR: 18 (11 Jan 2025 09:00) (16 - 18)  SpO2: 98% (11 Jan 2025 09:00) (98% - 100%)    Parameters below as of 11 Jan 2025 09:00  Patient On (Oxygen Delivery Method): room air        I&O's Summary    10 Keith 2025 07:01  -  11 Jan 2025 07:00  --------------------------------------------------------  IN: 1700 mL / OUT: 1500 mL / NET: 200 mL    11 Jan 2025 07:01  -  11 Jan 2025 13:08  --------------------------------------------------------  IN: 250 mL / OUT: 400 mL / NET: -150 mL                              9.0    11.21 )-----------( 102      ( 11 Jan 2025 07:16 )             27.4     01-11    133[L]  |  100  |  31[H]  ----------------------------<  107[H]  4.8   |  22  |  0.99    Ca    8.1[L]      11 Jan 2025 07:17  Phos  3.3     01-11  Mg     1.8     01-11    TPro  5.4[L]  /  Alb  3.0[L]  /  TBili  0.8  /  DBili  x   /  AST  27  /  ALT  54[H]  /  AlkPhos  122[H]  01-11    Tacrolimus (), Serum: 3.4 ng/mL (01-11 @ 07:16)        Culture - Body Fluid with Gram Stain (collected 01-05-25 @ 12:33)  Source: Body Fluid  Gram Stain (01-05-25 @ 21:44):    No polymorphonuclear cells seen    No organisms seen    by cytocentrifuge  Final Report (01-10-25 @ 20:27):    No growth    Culture - Blood (collected 01-04-25 @ 19:30)  Source: .Blood BLOOD  Final Report (01-10-25 @ 01:00):    No growth at 5 days    Culture - Blood (collected 01-04-25 @ 19:25)  Source: .Blood BLOOD  Final Report (01-10-25 @ 01:00):    No growth at 5 days            Review of systems  Gen: No weight changes, fatigue, fevers/chills, weakness  Skin: No rashes  Head/Eyes/Ears/Mouth: No headache; Normal hearing; Normal vision w/o blurriness; No sinus pain/discomfort, sore throat  Respiratory: No dyspnea, cough, wheezing, hemoptysis  CV: No chest pain, PND, orthopnea  GI: C/O generalized abdominal pain and constipation, reports mild naseua. no diarrhea, vomiting, melena, hematochezia  : No increased frequency, dysuria, hematuria, nocturia  MSK: No joint pain/swelling; no back pain; no edema  Neuro: No dizziness/lightheadedness, weakness, seizures, numbness, tingling  Heme: No easy bruising or bleeding  Endo: No heat/cold intolerance  Psych: No significant nervousness, anxiety, stress, depression  All other systems were reviewed and are negative, except as noted.      PHYSICAL EXAM:  Constitutional: Well developed / well nourished  Eyes: PERRLA  ENMT: nc/at,   Neck: supple  Respiratory: CTA B/L  Cardiovascular: RRR  Gastrointestinal: Soft abdomen, Distended, generalized pain with palpation. dressing c/d/i,   Genitourinary: voiding spontaneously   Extremities: SCD's in place and working bilaterally  Vascular: Palpable dp pulses bilaterally.   Neurological: A&O x3, waking up  Skin: no rashes, ulcerations, lesions  Musculoskeletal: Moving all extremities  Psychiatric: Responsive Transplant Surgery - Multidisciplinary Rounds  --------------------------------------------------------------  OLTx   1/4/2025   POD#7  CMV +/+    Patient seen and examined with multidisciplinary Transplant team including Surgeon Dr. Dagher, EDNA Plascencia/Queenie, Hepatologist Dr. Lima, Hepatology  Fellow  SICU team during AM rounds.   Disciplines not in attendance will be notified of the plan.     HPI: 64F with a PMHx of breast cancer s/p lumpectomy, HTN, and ETOH cirrhosis, Umbilical Hernia Repair 9/2024, now s/p OLT under Simulect induction on 1/4/25    Interval Events:  - afebrile, VSS  - LFT's steady  - cont. abd pain from constipation d/c opiates and ordered enema    Immunosuppression:  -FK per level, MMF 1/1, SST  -SIMULECT completed  -Ongoing monitoring for signs of rejection    Potential Discharge date: TBD  Education:  Medications  Plan of care:  See Below        MEDICATIONS  (STANDING):  apixaban 2.5 milliGRAM(s) Oral two times a day  aspirin enteric coated 81 milliGRAM(s) Oral every 24 hours  calcium carbonate 1250 mG  + Vitamin D (OsCal 500 + D) 1 Tablet(s) Oral two times a day  chlorhexidine 2% Cloths 1 Application(s) Topical <User Schedule>  fluconAZOLE   Tablet 200 milliGRAM(s) Oral every 24 hours  furosemide    Tablet 40 milliGRAM(s) Oral daily  influenza   Vaccine 0.5 milliLiter(s) IntraMuscular once  insulin lispro (ADMELOG) corrective regimen sliding scale   SubCutaneous three times a day before meals  insulin lispro (ADMELOG) corrective regimen sliding scale   SubCutaneous at bedtime  lidocaine 1% (Preservative-free) Injectable 10 milliLiter(s) Local Injection once  mycophenolate mofetil 1000 milliGRAM(s) Oral <User Schedule>  pantoprazole  Injectable 40 milliGRAM(s) IV Push daily  polyethylene glycol 3350 17 Gram(s) Oral every 24 hours  predniSONE   Tablet 20 milliGRAM(s) Oral daily  senna 2 Tablet(s) Oral at bedtime  tacrolimus 3 milliGRAM(s) Oral <User Schedule>  trimethoprim   80 mG/sulfamethoxazole 400 mG 1 Tablet(s) Oral daily  ursodiol Capsule 300 milliGRAM(s) Oral two times a day  valGANciclovir 450 milliGRAM(s) Oral every 24 hours    MEDICATIONS  (PRN):  acetaminophen     Tablet .. 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      GERD (gastroesophageal reflux disease)      Skin cancer, basal cell      H/O lumpectomy      History of removal of skin mole      H/O ganglion cyst          Vital Signs Last 24 Hrs  T(C): 37 (11 Jan 2025 09:00), Max: 37 (11 Jan 2025 09:00)  T(F): 98.6 (11 Jan 2025 09:00), Max: 98.6 (11 Jan 2025 09:00)  HR: 90 (11 Jan 2025 09:00) (67 - 90)  BP: 145/77 (11 Jan 2025 09:00) (136/77 - 149/81)  BP(mean): 106 (11 Jan 2025 05:04) (106 - 106)  RR: 18 (11 Jan 2025 09:00) (16 - 18)  SpO2: 98% (11 Jan 2025 09:00) (98% - 100%)    Parameters below as of 11 Jan 2025 09:00  Patient On (Oxygen Delivery Method): room air        I&O's Summary    10 Keith 2025 07:01  -  11 Jan 2025 07:00  --------------------------------------------------------  IN: 1700 mL / OUT: 1500 mL / NET: 200 mL    11 Jan 2025 07:01  -  11 Jan 2025 13:08  --------------------------------------------------------  IN: 250 mL / OUT: 400 mL / NET: -150 mL                              9.0    11.21 )-----------( 102      ( 11 Jan 2025 07:16 )             27.4     01-11    133[L]  |  100  |  31[H]  ----------------------------<  107[H]  4.8   |  22  |  0.99    Ca    8.1[L]      11 Jan 2025 07:17  Phos  3.3     01-11  Mg     1.8     01-11    TPro  5.4[L]  /  Alb  3.0[L]  /  TBili  0.8  /  DBili  x   /  AST  27  /  ALT  54[H]  /  AlkPhos  122[H]  01-11    Tacrolimus (), Serum: 3.4 ng/mL (01-11 @ 07:16)        Culture - Body Fluid with Gram Stain (collected 01-05-25 @ 12:33)  Source: Body Fluid  Gram Stain (01-05-25 @ 21:44):    No polymorphonuclear cells seen    No organisms seen    by cytocentrifuge  Final Report (01-10-25 @ 20:27):    No growth    Culture - Blood (collected 01-04-25 @ 19:30)  Source: .Blood BLOOD  Final Report (01-10-25 @ 01:00):    No growth at 5 days    Culture - Blood (collected 01-04-25 @ 19:25)  Source: .Blood BLOOD  Final Report (01-10-25 @ 01:00):    No growth at 5 days            Review of systems  Gen: No weight changes, fatigue, fevers/chills, weakness  Skin: No rashes  Head/Eyes/Ears/Mouth: No headache; Normal hearing; Normal vision w/o blurriness; No sinus pain/discomfort, sore throat  Respiratory: No dyspnea, cough, wheezing, hemoptysis  CV: No chest pain, PND, orthopnea  GI: C/O generalized abdominal pain and constipation, reports mild naseua. no diarrhea, vomiting, melena, hematochezia  : No increased frequency, dysuria, hematuria, nocturia  MSK: No joint pain/swelling; no back pain; no edema  Neuro: No dizziness/lightheadedness, weakness, seizures, numbness, tingling  Heme: No easy bruising or bleeding  Endo: No heat/cold intolerance  Psych: No significant nervousness, anxiety, stress, depression  All other systems were reviewed and are negative, except as noted.      PHYSICAL EXAM:  Constitutional: Well developed / well nourished  Eyes: PERRLA  ENMT: nc/at,   Neck: supple  Respiratory: CTA B/L  Cardiovascular: RRR  Gastrointestinal: Soft abdomen, Distended, generalized pain with palpation. dressing c/d/i,   Genitourinary: voiding spontaneously   Extremities: SCD's in place and working bilaterally  Vascular: Palpable dp pulses bilaterally.   Neurological: A&O x3, waking up  Skin: no rashes, ulcerations, lesions  Musculoskeletal: Moving all extremities  Psychiatric: Responsive

## 2025-01-11 NOTE — PROGRESS NOTE ADULT - ASSESSMENT
64F with a PMHx of breast cancer s/p lumpectomy, HTN, and ETOH cirrhosis, Umbilical Hernia Repair 9/2024, now s/p OLT under Simulect induction on 1/4/25    [ ] POD 7 s/p OLT   - donor graft with replaced R TANG, will follow dopplers daily   - Donor grew staph epi in bld cxs x1; Recipient bld cxs sent 1/4. Per ID: 5 day course of abx completed 1/8/25  -LFTs remain stable  -ASA 81mg daily, eliquis 2.5 BID  -Pain management: Tylenol and Tramadol, no more opiates in setting of constipation  -Bowel regimen: senna, Miralax, added enema  -PT/OT/RD/Spirometry/SCDs  -strict I&Os    [ ] Immunosuppression  - FK per level, MMF 1/1, SST  - SIMULECT completed   - PPx: Valcyte/Bactrim/Fluc/PPI/OsCal

## 2025-01-12 LAB
ALBUMIN SERPL ELPH-MCNC: 2.9 G/DL — LOW (ref 3.3–5)
ALP SERPL-CCNC: 141 U/L — HIGH (ref 40–120)
ALT FLD-CCNC: 56 U/L — HIGH (ref 10–45)
ANION GAP SERPL CALC-SCNC: 10 MMOL/L — SIGNIFICANT CHANGE UP (ref 5–17)
ANION GAP SERPL CALC-SCNC: 11 MMOL/L — SIGNIFICANT CHANGE UP (ref 5–17)
APTT BLD: 27.9 SEC — SIGNIFICANT CHANGE UP (ref 24.5–35.6)
AST SERPL-CCNC: 26 U/L — SIGNIFICANT CHANGE UP (ref 10–40)
BILIRUB SERPL-MCNC: 1.2 MG/DL — SIGNIFICANT CHANGE UP (ref 0.2–1.2)
BUN SERPL-MCNC: 25 MG/DL — HIGH (ref 7–23)
BUN SERPL-MCNC: 26 MG/DL — HIGH (ref 7–23)
CALCIUM SERPL-MCNC: 9.1 MG/DL — SIGNIFICANT CHANGE UP (ref 8.4–10.5)
CALCIUM SERPL-MCNC: 9.3 MG/DL — SIGNIFICANT CHANGE UP (ref 8.4–10.5)
CHLORIDE SERPL-SCNC: 95 MMOL/L — LOW (ref 96–108)
CHLORIDE SERPL-SCNC: 98 MMOL/L — SIGNIFICANT CHANGE UP (ref 96–108)
CO2 SERPL-SCNC: 22 MMOL/L — SIGNIFICANT CHANGE UP (ref 22–31)
CO2 SERPL-SCNC: 25 MMOL/L — SIGNIFICANT CHANGE UP (ref 22–31)
CREAT SERPL-MCNC: 1.02 MG/DL — SIGNIFICANT CHANGE UP (ref 0.5–1.3)
CREAT SERPL-MCNC: 1.03 MG/DL — SIGNIFICANT CHANGE UP (ref 0.5–1.3)
EGFR: 61 ML/MIN/1.73M2 — SIGNIFICANT CHANGE UP
EGFR: 62 ML/MIN/1.73M2 — SIGNIFICANT CHANGE UP
GLUCOSE BLDC GLUCOMTR-MCNC: 113 MG/DL — HIGH (ref 70–99)
GLUCOSE BLDC GLUCOMTR-MCNC: 121 MG/DL — HIGH (ref 70–99)
GLUCOSE BLDC GLUCOMTR-MCNC: 128 MG/DL — HIGH (ref 70–99)
GLUCOSE BLDC GLUCOMTR-MCNC: 134 MG/DL — HIGH (ref 70–99)
GLUCOSE BLDC GLUCOMTR-MCNC: 146 MG/DL — HIGH (ref 70–99)
GLUCOSE SERPL-MCNC: 111 MG/DL — HIGH (ref 70–99)
GLUCOSE SERPL-MCNC: 139 MG/DL — HIGH (ref 70–99)
HCT VFR BLD CALC: 27.4 % — LOW (ref 34.5–45)
HGB BLD-MCNC: 9.1 G/DL — LOW (ref 11.5–15.5)
INR BLD: 1.5 RATIO — HIGH (ref 0.85–1.16)
MAGNESIUM SERPL-MCNC: 1.8 MG/DL — SIGNIFICANT CHANGE UP (ref 1.6–2.6)
MCHC RBC-ENTMCNC: 30.5 PG — SIGNIFICANT CHANGE UP (ref 27–34)
MCHC RBC-ENTMCNC: 33.2 G/DL — SIGNIFICANT CHANGE UP (ref 32–36)
MCV RBC AUTO: 91.9 FL — SIGNIFICANT CHANGE UP (ref 80–100)
NRBC # BLD: 0 /100 WBCS — SIGNIFICANT CHANGE UP (ref 0–0)
PHOSPHATE SERPL-MCNC: 4 MG/DL — SIGNIFICANT CHANGE UP (ref 2.5–4.5)
PLATELET # BLD AUTO: 134 K/UL — LOW (ref 150–400)
POTASSIUM SERPL-MCNC: 5.6 MMOL/L — HIGH (ref 3.5–5.3)
POTASSIUM SERPL-MCNC: 6.2 MMOL/L — CRITICAL HIGH (ref 3.5–5.3)
POTASSIUM SERPL-SCNC: 5.6 MMOL/L — HIGH (ref 3.5–5.3)
POTASSIUM SERPL-SCNC: 6.2 MMOL/L — CRITICAL HIGH (ref 3.5–5.3)
PROT SERPL-MCNC: 5.5 G/DL — LOW (ref 6–8.3)
PROTHROM AB SERPL-ACNC: 17.2 SEC — HIGH (ref 9.9–13.4)
RBC # BLD: 2.98 M/UL — LOW (ref 3.8–5.2)
RBC # FLD: 17.2 % — HIGH (ref 10.3–14.5)
SODIUM SERPL-SCNC: 130 MMOL/L — LOW (ref 135–145)
SODIUM SERPL-SCNC: 131 MMOL/L — LOW (ref 135–145)
TACROLIMUS SERPL-MCNC: 4.5 NG/ML — SIGNIFICANT CHANGE UP
WBC # BLD: 11.97 K/UL — HIGH (ref 3.8–10.5)
WBC # FLD AUTO: 11.97 K/UL — HIGH (ref 3.8–10.5)

## 2025-01-12 PROCEDURE — 93970 EXTREMITY STUDY: CPT | Mod: 26

## 2025-01-12 PROCEDURE — 74018 RADEX ABDOMEN 1 VIEW: CPT | Mod: 26

## 2025-01-12 RX ORDER — INSULIN HUMAN 100 [IU]/ML
5 INJECTION, SOLUTION SUBCUTANEOUS ONCE
Refills: 0 | Status: COMPLETED | OUTPATIENT
Start: 2025-01-12 | End: 2025-01-12

## 2025-01-12 RX ORDER — ONDANSETRON 4 MG/1
4 TABLET ORAL ONCE
Refills: 0 | Status: COMPLETED | OUTPATIENT
Start: 2025-01-12 | End: 2025-01-12

## 2025-01-12 RX ORDER — DEXTROSE MONOHYDRATE 25 G/50ML
50 INJECTION, SOLUTION INTRAVENOUS ONCE
Refills: 0 | Status: COMPLETED | OUTPATIENT
Start: 2025-01-12 | End: 2025-01-12

## 2025-01-12 RX ORDER — MAGNESIUM HYDROXIDE 400 MG/5ML
30 SUSPENSION, ORAL (FINAL DOSE FORM) ORAL ONCE
Refills: 0 | Status: COMPLETED | OUTPATIENT
Start: 2025-01-12 | End: 2025-01-12

## 2025-01-12 RX ORDER — SODIUM ZIRCONIUM CYCLOSILICATE 10 G/10G
10 POWDER, FOR SUSPENSION ORAL ONCE
Refills: 0 | Status: COMPLETED | OUTPATIENT
Start: 2025-01-12 | End: 2025-01-12

## 2025-01-12 RX ORDER — ATOVAQUONE 750 MG/5ML
1500 SUSPENSION ORAL DAILY
Refills: 0 | Status: DISCONTINUED | OUTPATIENT
Start: 2025-01-13 | End: 2025-01-16

## 2025-01-12 RX ORDER — SODIUM ZIRCONIUM CYCLOSILICATE 10 G/10G
10 POWDER, FOR SUSPENSION ORAL ONCE
Refills: 0 | Status: DISCONTINUED | OUTPATIENT
Start: 2025-01-12 | End: 2025-01-12

## 2025-01-12 RX ORDER — POLYETHYLENE GLYCOL 3350, SODIUM SULFATE ANHYDROUS, SODIUM BICARBONATE, SODIUM CHLORIDE, POTASSIUM CHLORIDE 236; 22.74; 6.74; 5.86; 2.97 G/4L; G/4L; G/4L; G/4L; G/4L
2000 POWDER, FOR SOLUTION ORAL ONCE
Refills: 0 | Status: COMPLETED | OUTPATIENT
Start: 2025-01-12 | End: 2025-01-12

## 2025-01-12 RX ORDER — PANTOPRAZOLE 40 MG/1
40 TABLET, DELAYED RELEASE ORAL
Refills: 0 | Status: DISCONTINUED | OUTPATIENT
Start: 2025-01-13 | End: 2025-01-16

## 2025-01-12 RX ORDER — SODIUM CHLORIDE 9 MG/ML
1000 INJECTION, SOLUTION INTRAVENOUS
Refills: 0 | Status: DISCONTINUED | OUTPATIENT
Start: 2025-01-12 | End: 2025-01-14

## 2025-01-12 RX ADMIN — VALGANCICLOVIR 450 MILLIGRAM(S): 450 TABLET, FILM COATED ORAL at 11:30

## 2025-01-12 RX ADMIN — TRAMADOL HYDROCHLORIDE 50 MILLIGRAM(S): 50 TABLET ORAL at 20:47

## 2025-01-12 RX ADMIN — URSODIOL 300 MILLIGRAM(S): 250 TABLET, FILM COATED ORAL at 18:33

## 2025-01-12 RX ADMIN — TRAMADOL HYDROCHLORIDE 50 MILLIGRAM(S): 50 TABLET ORAL at 00:00

## 2025-01-12 RX ADMIN — CHLORHEXIDINE GLUCONATE 1 APPLICATION(S): 1.2 RINSE ORAL at 09:21

## 2025-01-12 RX ADMIN — SENNOSIDES 2 TABLET(S): 8.6 TABLET, FILM COATED ORAL at 19:49

## 2025-01-12 RX ADMIN — MYCOPHENOLATE MOFETIL 1000 MILLIGRAM(S): 250 CAPSULE ORAL at 09:21

## 2025-01-12 RX ADMIN — SODIUM ZIRCONIUM CYCLOSILICATE 10 GRAM(S): 10 POWDER, FOR SUSPENSION ORAL at 11:17

## 2025-01-12 RX ADMIN — FLUCONAZOLE 200 MILLIGRAM(S): 200 TABLET ORAL at 11:31

## 2025-01-12 RX ADMIN — TACROLIMUS 3 MILLIGRAM(S): 0.5 CAPSULE ORAL at 09:21

## 2025-01-12 RX ADMIN — Medication 25 MILLIGRAM(S): at 18:33

## 2025-01-12 RX ADMIN — SODIUM ZIRCONIUM CYCLOSILICATE 10 GRAM(S): 10 POWDER, FOR SUSPENSION ORAL at 15:51

## 2025-01-12 RX ADMIN — Medication 1 TABLET(S): at 18:33

## 2025-01-12 RX ADMIN — MYCOPHENOLATE MOFETIL 1000 MILLIGRAM(S): 250 CAPSULE ORAL at 19:47

## 2025-01-12 RX ADMIN — POLYETHYLENE GLYCOL 3350, SODIUM SULFATE ANHYDROUS, SODIUM BICARBONATE, SODIUM CHLORIDE, POTASSIUM CHLORIDE 2000 MILLILITER(S): 236; 22.74; 6.74; 5.86; 2.97 POWDER, FOR SOLUTION ORAL at 18:32

## 2025-01-12 RX ADMIN — ONDANSETRON 4 MILLIGRAM(S): 4 TABLET ORAL at 22:42

## 2025-01-12 RX ADMIN — APIXABAN 2.5 MILLIGRAM(S): 5 TABLET, FILM COATED ORAL at 05:12

## 2025-01-12 RX ADMIN — TRAMADOL HYDROCHLORIDE 50 MILLIGRAM(S): 50 TABLET ORAL at 19:47

## 2025-01-12 RX ADMIN — Medication 30 MILLILITER(S): at 11:31

## 2025-01-12 RX ADMIN — DEXTROSE MONOHYDRATE 50 MILLILITER(S): 25 INJECTION, SOLUTION INTRAVENOUS at 11:14

## 2025-01-12 RX ADMIN — SODIUM CHLORIDE 75 MILLILITER(S): 9 INJECTION, SOLUTION INTRAVENOUS at 23:10

## 2025-01-12 RX ADMIN — APIXABAN 2.5 MILLIGRAM(S): 5 TABLET, FILM COATED ORAL at 18:33

## 2025-01-12 RX ADMIN — Medication 17 GRAM(S): at 05:12

## 2025-01-12 RX ADMIN — Medication 1 TABLET(S): at 05:12

## 2025-01-12 RX ADMIN — Medication 81 MILLIGRAM(S): at 11:31

## 2025-01-12 RX ADMIN — TACROLIMUS 3 MILLIGRAM(S): 0.5 CAPSULE ORAL at 19:47

## 2025-01-12 RX ADMIN — Medication 40 MILLIGRAM(S): at 05:12

## 2025-01-12 RX ADMIN — Medication 17 GRAM(S): at 18:33

## 2025-01-12 RX ADMIN — INSULIN HUMAN 5 UNIT(S): 100 INJECTION, SOLUTION SUBCUTANEOUS at 11:16

## 2025-01-12 RX ADMIN — URSODIOL 300 MILLIGRAM(S): 250 TABLET, FILM COATED ORAL at 05:12

## 2025-01-12 RX ADMIN — Medication 20 MILLIGRAM(S): at 05:12

## 2025-01-12 NOTE — PROGRESS NOTE ADULT - ASSESSMENT
64F with a PMHx of breast cancer s/p lumpectomy, HTN, and ETOH cirrhosis, Umbilical Hernia Repair 9/2024, now s/p OLT under Simulect induction on 1/4/25    [ ] POD 8 s/p OLT   - donor graft with replaced R TANG, will follow dopplers daily   - Donor grew staph epi in bld cxs x1; Recipient bld cxs sent 1/4. Per ID: 5 day course of abx completed 1/8/25  -LFTs remain stable  -ASA 81mg daily, eliquis 2.5 BID  -Pain management: Tylenol and Tramadol, no more opiates in setting of constipation  -PT/OT/RD/Spirometry/SCDs  -strict I&Os  - insulin, D5 and loklema iso of hyperkalemia; d/c'd Bactrim, started on mepron  - f/u b/l DVT US doppler     [ ] Constipation  - Bowel regimen: senna, Miralax, enema   - milk of mag x1  - inc Miralax to BID  - d/c'd oxy     [ ] Immunosuppression  - FK per level, MMF 1/1, SST  - SIMULECT completed   - PPx: Valcyte/Bactrim/Fluc/PPI/OsCal 64F with a PMHx of breast cancer s/p lumpectomy, HTN, and ETOH cirrhosis, Umbilical Hernia Repair 9/2024, now s/p OLT under Simulect induction on 1/4/25    [ ] POD 8 s/p OLT   - donor graft with replaced R TANG, will follow dopplers daily   - Donor grew staph epi in bld cxs x1; Recipient bld cxs sent 1/4. Per ID: 5 day course of abx completed 1/8/25  -LFTs remain stable  -ASA 81mg daily, eliquis 2.5 BID  -Pain management: Tylenol and Tramadol, no more opiates in setting of constipation  -PT/OT/RD/Spirometry/SCDs  -strict I&Os  - insulin, D5 and loklema iso of hyperkalemia; d/c'd Bactrim, started on mepron  - f/u b/l DVT US doppler for new LE edema     [ ] Constipation  - Bowel regimen: senna, Miralax, enema   - milk of mag x1  - inc Miralax to BID  - d/c'd oxy     [ ] Immunosuppression  - FK per level, MMF 1/1, SST  - SIMULECT completed   - PPx: Valcyte/Bactrim/Fluc/PPI/OsCal 64F with a PMHx of breast cancer s/p lumpectomy, HTN, and ETOH cirrhosis, Umbilical Hernia Repair 9/2024, now s/p OLT under Simulect induction on 1/4/25    [ ] POD 8 s/p OLT   - donor graft with replaced R TANG, will follow dopplers daily   - Donor grew staph epi in bld cxs x1; Recipient bld cxs sent 1/4. Per ID: 5 day course of abx completed 1/8/25  -LFTs remain stable  -ASA 81mg daily, eliquis 2.5 BID  -Pain management: Tylenol and Tramadol, no more opiates in setting of constipation  -PT/OT/RD/Spirometry/SCDs  -strict I&Os  - insulin, D5 and lokelma iso of hyperkalemia; d/c'd Bactrim, started on mepron  - f/u b/l DVT US doppler for LE edema R>L    [ ] Constipation  - Bowel regimen: senna, Miralax BID, enema   - milk of mag x1  - d/c'd oxy   - SMOG enema given    [ ] Immunosuppression  - FK per level, MMF 1/1, SST  - SIMULECT completed   - PPx: Valcyte/Bactrim/Fluc/PPI/OsCal

## 2025-01-12 NOTE — PROGRESS NOTE ADULT - SUBJECTIVE AND OBJECTIVE BOX
Transplant Surgery - Multidisciplinary Rounds  --------------------------------------------------------------  OLTx   1/4/2025   POD#8  CMV +/+    Patient seen and examined with multidisciplinary Transplant team including Surgeon Dr. Dagher, EDNA Plascencia/Queenie, Hepatologist Dr. Lima, Hepatology  Fellow  SICU team during AM rounds.   Disciplines not in attendance will be notified of the plan.     HPI: 64F with a PMHx of breast cancer s/p lumpectomy, HTN, and ETOH cirrhosis, Umbilical Hernia Repair 9/2024, now s/p OLT under Simulect induction on 1/4/25    Interval Events:  - afebrile, VSS  - LFT's steady  - enema for constipation; d/c oxy     Immunosuppression:  -FK per level, MMF 1/1, SST  -SIMULECT completed  -Ongoing monitoring for signs of rejection    Potential Discharge date: TBD  Education:  Medications  Plan of care:  See Below        MEDICATIONS  (STANDING):  apixaban 2.5 milliGRAM(s) Oral two times a day  aspirin enteric coated 81 milliGRAM(s) Oral every 24 hours  calcium carbonate 1250 mG  + Vitamin D (OsCal 500 + D) 1 Tablet(s) Oral two times a day  chlorhexidine 2% Cloths 1 Application(s) Topical <User Schedule>  fluconAZOLE   Tablet 200 milliGRAM(s) Oral every 24 hours  furosemide    Tablet 40 milliGRAM(s) Oral daily  influenza   Vaccine 0.5 milliLiter(s) IntraMuscular once  insulin lispro (ADMELOG) corrective regimen sliding scale   SubCutaneous three times a day before meals  insulin lispro (ADMELOG) corrective regimen sliding scale   SubCutaneous at bedtime  mycophenolate mofetil 1000 milliGRAM(s) Oral <User Schedule>  polyethylene glycol 3350 17 Gram(s) Oral two times a day  predniSONE   Tablet 20 milliGRAM(s) Oral daily  senna 2 Tablet(s) Oral at bedtime  tacrolimus 3 milliGRAM(s) Oral <User Schedule>  ursodiol Capsule 300 milliGRAM(s) Oral two times a day  valGANciclovir 450 milliGRAM(s) Oral every 24 hours    MEDICATIONS  (PRN):  acetaminophen     Tablet .. 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      GERD (gastroesophageal reflux disease)      Skin cancer, basal cell      H/O lumpectomy      History of removal of skin mole      H/O ganglion cyst          Vital Signs Last 24 Hrs  T(C): 36.7 (12 Jan 2025 09:00), Max: 36.9 (11 Jan 2025 17:19)  T(F): 98.1 (12 Jan 2025 09:00), Max: 98.5 (12 Jan 2025 05:04)  HR: 84 (12 Jan 2025 09:00) (79 - 93)  BP: 133/79 (12 Jan 2025 09:00) (133/79 - 161/88)  BP(mean): --  RR: 18 (12 Jan 2025 09:00) (16 - 18)  SpO2: 99% (12 Jan 2025 09:00) (96% - 99%)    Parameters below as of 12 Jan 2025 09:00  Patient On (Oxygen Delivery Method): room air        I&O's Summary    11 Jan 2025 07:01  -  12 Jan 2025 07:00  --------------------------------------------------------  IN: 1045 mL / OUT: 1725 mL / NET: -680 mL                              9.1    11.97 )-----------( 134      ( 12 Jan 2025 06:52 )             27.4     01-12    130[L]  |  98  |  25[H]  ----------------------------<  111[H]  6.2[HH]   |  22  |  1.02    Ca    9.1      12 Jan 2025 06:57  Phos  4.0     01-12  Mg     1.8     01-12    TPro  5.5[L]  /  Alb  2.9[L]  /  TBili  1.2  /  DBili  x   /  AST  26  /  ALT  56[H]  /  AlkPhos  141[H]  01-12    Tacrolimus (), Serum: 4.5 ng/mL (01-12 @ 06:53)      Review of systems  Gen: No weight changes, fatigue, fevers/chills, weakness  Skin: No rashes  Head/Eyes/Ears/Mouth: No headache; Normal hearing; Normal vision w/o blurriness; No sinus pain/discomfort, sore throat  Respiratory: No dyspnea, cough, wheezing, hemoptysis  CV: No chest pain, PND, orthopnea  GI: C/O generalized abdominal pain and constipation, reports mild naseua. no diarrhea, vomiting, melena, hematochezia  : No increased frequency, dysuria, hematuria, nocturia  MSK: No joint pain/swelling; no back pain; no edema  Neuro: No dizziness/lightheadedness, weakness, seizures, numbness, tingling  Heme: No easy bruising or bleeding  Endo: No heat/cold intolerance  Psych: No significant nervousness, anxiety, stress, depression  All other systems were reviewed and are negative, except as noted.      PHYSICAL EXAM:  Constitutional: Well developed / well nourished  Eyes: PERRLA  ENMT: nc/at,   Neck: supple  Respiratory: CTA B/L  Cardiovascular: RRR  Gastrointestinal: Soft abdomen, Distended, generalized pain with palpation. dressing c/d/i,   Genitourinary: voiding spontaneously   Extremities: SCD's in place and working bilaterally, edema in R LE   Vascular: Palpable dp pulses bilaterally.   Neurological: A&O x3, waking up  Skin: no rashes, ulcerations, lesions  Musculoskeletal: Moving all extremities  Psychiatric: Responsive Transplant Surgery - Multidisciplinary Rounds  --------------------------------------------------------------  OLTx   1/4/2025   POD#8  CMV +/+    Patient seen and examined with multidisciplinary Transplant team including Surgeon Dr. Dagher, FELIPE Plascencia/Queenie/[place], Hepatologist Dr. Lima, Hepatology  Fellow  SICU team during AM rounds.   Disciplines not in attendance will be notified of the plan.     HPI: 64F with a PMHx of breast cancer s/p lumpectomy, HTN, and ETOH cirrhosis, Umbilical Hernia Repair 9/2024, now s/p OLT under Simulect induction on 1/4/25    Interval Events:  - afebrile, VSS  - LFT's steady  - enema for constipation; d/c oxy     Immunosuppression:  -FK per level, MMF 1/1, SST  -SIMULECT completed  -Ongoing monitoring for signs of rejection    Potential Discharge date: TBD  Education:  Medications  Plan of care:  See Below        MEDICATIONS  (STANDING):  apixaban 2.5 milliGRAM(s) Oral two times a day  aspirin enteric coated 81 milliGRAM(s) Oral every 24 hours  calcium carbonate 1250 mG  + Vitamin D (OsCal 500 + D) 1 Tablet(s) Oral two times a day  chlorhexidine 2% Cloths 1 Application(s) Topical <User Schedule>  fluconAZOLE   Tablet 200 milliGRAM(s) Oral every 24 hours  furosemide    Tablet 40 milliGRAM(s) Oral daily  influenza   Vaccine 0.5 milliLiter(s) IntraMuscular once  insulin lispro (ADMELOG) corrective regimen sliding scale   SubCutaneous three times a day before meals  insulin lispro (ADMELOG) corrective regimen sliding scale   SubCutaneous at bedtime  mycophenolate mofetil 1000 milliGRAM(s) Oral <User Schedule>  polyethylene glycol 3350 17 Gram(s) Oral two times a day  predniSONE   Tablet 20 milliGRAM(s) Oral daily  senna 2 Tablet(s) Oral at bedtime  tacrolimus 3 milliGRAM(s) Oral <User Schedule>  ursodiol Capsule 300 milliGRAM(s) Oral two times a day  valGANciclovir 450 milliGRAM(s) Oral every 24 hours    MEDICATIONS  (PRN):  acetaminophen     Tablet .. 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      GERD (gastroesophageal reflux disease)      Skin cancer, basal cell      H/O lumpectomy      History of removal of skin mole      H/O ganglion cyst          Vital Signs Last 24 Hrs  T(C): 36.7 (12 Jan 2025 09:00), Max: 36.9 (11 Jan 2025 17:19)  T(F): 98.1 (12 Jan 2025 09:00), Max: 98.5 (12 Jan 2025 05:04)  HR: 84 (12 Jan 2025 09:00) (79 - 93)  BP: 133/79 (12 Jan 2025 09:00) (133/79 - 161/88)  BP(mean): --  RR: 18 (12 Jan 2025 09:00) (16 - 18)  SpO2: 99% (12 Jan 2025 09:00) (96% - 99%)    Parameters below as of 12 Jan 2025 09:00  Patient On (Oxygen Delivery Method): room air        I&O's Summary    11 Jan 2025 07:01  -  12 Jan 2025 07:00  --------------------------------------------------------  IN: 1045 mL / OUT: 1725 mL / NET: -680 mL                              9.1    11.97 )-----------( 134      ( 12 Jan 2025 06:52 )             27.4     01-12    130[L]  |  98  |  25[H]  ----------------------------<  111[H]  6.2[HH]   |  22  |  1.02    Ca    9.1      12 Jan 2025 06:57  Phos  4.0     01-12  Mg     1.8     01-12    TPro  5.5[L]  /  Alb  2.9[L]  /  TBili  1.2  /  DBili  x   /  AST  26  /  ALT  56[H]  /  AlkPhos  141[H]  01-12    Tacrolimus (), Serum: 4.5 ng/mL (01-12 @ 06:53)      Review of systems  Gen: No weight changes, fatigue, fevers/chills, weakness  Skin: No rashes  Head/Eyes/Ears/Mouth: No headache; Normal hearing; Normal vision w/o blurriness; No sinus pain/discomfort, sore throat  Respiratory: No dyspnea, cough, wheezing, hemoptysis  CV: No chest pain, PND, orthopnea  GI: C/O generalized abdominal pain and constipation, reports mild naseua. no diarrhea, vomiting, melena, hematochezia  : No increased frequency, dysuria, hematuria, nocturia  MSK: No joint pain/swelling; no back pain; no edema  Neuro: No dizziness/lightheadedness, weakness, seizures, numbness, tingling  Heme: No easy bruising or bleeding  Endo: No heat/cold intolerance  Psych: No significant nervousness, anxiety, stress, depression  All other systems were reviewed and are negative, except as noted.      PHYSICAL EXAM:  Constitutional: Well developed / well nourished  Eyes: PERRLA  ENMT: nc/at,   Neck: supple  Respiratory: CTA B/L  Cardiovascular: RRR  Gastrointestinal: Soft abdomen, Distended, generalized pain with palpation. dressing c/d/i,   Genitourinary: voiding spontaneously   Extremities: SCD's in place and working bilaterally, edema in R LE   Vascular: Palpable dp pulses bilaterally.   Neurological: A&O x3, waking up  Skin: no rashes, ulcerations, lesions  Musculoskeletal: Moving all extremities  Psychiatric: Responsive

## 2025-01-13 LAB
ALBUMIN SERPL ELPH-MCNC: 2.8 G/DL — LOW (ref 3.3–5)
ALP SERPL-CCNC: 141 U/L — HIGH (ref 40–120)
ALT FLD-CCNC: 39 U/L — SIGNIFICANT CHANGE UP (ref 10–45)
ANION GAP SERPL CALC-SCNC: 14 MMOL/L — SIGNIFICANT CHANGE UP (ref 5–17)
APTT BLD: 29.6 SEC — SIGNIFICANT CHANGE UP (ref 24.5–35.6)
AST SERPL-CCNC: 13 U/L — SIGNIFICANT CHANGE UP (ref 10–40)
BILIRUB SERPL-MCNC: 1.2 MG/DL — SIGNIFICANT CHANGE UP (ref 0.2–1.2)
BUN SERPL-MCNC: 26 MG/DL — HIGH (ref 7–23)
CALCIUM SERPL-MCNC: 8.3 MG/DL — LOW (ref 8.4–10.5)
CHLORIDE SERPL-SCNC: 93 MMOL/L — LOW (ref 96–108)
CO2 SERPL-SCNC: 24 MMOL/L — SIGNIFICANT CHANGE UP (ref 22–31)
CREAT SERPL-MCNC: 0.97 MG/DL — SIGNIFICANT CHANGE UP (ref 0.5–1.3)
EGFR: 66 ML/MIN/1.73M2 — SIGNIFICANT CHANGE UP
GLUCOSE BLDC GLUCOMTR-MCNC: 112 MG/DL — HIGH (ref 70–99)
GLUCOSE BLDC GLUCOMTR-MCNC: 113 MG/DL — HIGH (ref 70–99)
GLUCOSE BLDC GLUCOMTR-MCNC: 121 MG/DL — HIGH (ref 70–99)
GLUCOSE BLDC GLUCOMTR-MCNC: 138 MG/DL — HIGH (ref 70–99)
GLUCOSE SERPL-MCNC: 143 MG/DL — HIGH (ref 70–99)
HCT VFR BLD CALC: 23 % — LOW (ref 34.5–45)
HGB BLD-MCNC: 7.9 G/DL — LOW (ref 11.5–15.5)
INR BLD: 1.86 RATIO — HIGH (ref 0.85–1.16)
MAGNESIUM SERPL-MCNC: 1.8 MG/DL — SIGNIFICANT CHANGE UP (ref 1.6–2.6)
MCHC RBC-ENTMCNC: 30.7 PG — SIGNIFICANT CHANGE UP (ref 27–34)
MCHC RBC-ENTMCNC: 34.3 G/DL — SIGNIFICANT CHANGE UP (ref 32–36)
MCV RBC AUTO: 89.5 FL — SIGNIFICANT CHANGE UP (ref 80–100)
NRBC # BLD: 0 /100 WBCS — SIGNIFICANT CHANGE UP (ref 0–0)
PHOSPHATE SERPL-MCNC: 3.2 MG/DL — SIGNIFICANT CHANGE UP (ref 2.5–4.5)
PLATELET # BLD AUTO: 141 K/UL — LOW (ref 150–400)
POTASSIUM SERPL-MCNC: 4.8 MMOL/L — SIGNIFICANT CHANGE UP (ref 3.5–5.3)
POTASSIUM SERPL-SCNC: 4.8 MMOL/L — SIGNIFICANT CHANGE UP (ref 3.5–5.3)
PROT SERPL-MCNC: 5.6 G/DL — LOW (ref 6–8.3)
PROTHROM AB SERPL-ACNC: 21.1 SEC — HIGH (ref 9.9–13.4)
RBC # BLD: 2.57 M/UL — LOW (ref 3.8–5.2)
RBC # FLD: 17.5 % — HIGH (ref 10.3–14.5)
SODIUM SERPL-SCNC: 131 MMOL/L — LOW (ref 135–145)
TACROLIMUS SERPL-MCNC: 9.2 NG/ML — SIGNIFICANT CHANGE UP
WBC # BLD: 10.87 K/UL — HIGH (ref 3.8–10.5)
WBC # FLD AUTO: 10.87 K/UL — HIGH (ref 3.8–10.5)

## 2025-01-13 PROCEDURE — 74176 CT ABD & PELVIS W/O CONTRAST: CPT | Mod: 26

## 2025-01-13 RX ORDER — POLYETHYLENE GLYCOL 3350, SODIUM SULFATE ANHYDROUS, SODIUM BICARBONATE, SODIUM CHLORIDE, POTASSIUM CHLORIDE 236; 22.74; 6.74; 5.86; 2.97 G/4L; G/4L; G/4L; G/4L; G/4L
2000 POWDER, FOR SOLUTION ORAL ONCE
Refills: 0 | Status: COMPLETED | OUTPATIENT
Start: 2025-01-13 | End: 2025-01-13

## 2025-01-13 RX ORDER — TACROLIMUS 0.5 MG/1
2 CAPSULE ORAL
Refills: 0 | Status: DISCONTINUED | OUTPATIENT
Start: 2025-01-13 | End: 2025-01-14

## 2025-01-13 RX ORDER — TACROLIMUS 0.5 MG/1
3 CAPSULE ORAL
Refills: 0 | Status: DISCONTINUED | OUTPATIENT
Start: 2025-01-14 | End: 2025-01-14

## 2025-01-13 RX ADMIN — TACROLIMUS 3 MILLIGRAM(S): 0.5 CAPSULE ORAL at 08:29

## 2025-01-13 RX ADMIN — Medication 17 GRAM(S): at 06:32

## 2025-01-13 RX ADMIN — Medication 40 MILLIGRAM(S): at 06:29

## 2025-01-13 RX ADMIN — Medication 20 MILLIGRAM(S): at 06:30

## 2025-01-13 RX ADMIN — Medication 1 TABLET(S): at 06:29

## 2025-01-13 RX ADMIN — ACETAMINOPHEN 650 MILLIGRAM(S): 80 SOLUTION/ DROPS ORAL at 02:56

## 2025-01-13 RX ADMIN — Medication 1 TABLET(S): at 18:04

## 2025-01-13 RX ADMIN — TRAMADOL HYDROCHLORIDE 25 MILLIGRAM(S): 50 TABLET ORAL at 21:15

## 2025-01-13 RX ADMIN — CHLORHEXIDINE GLUCONATE 1 APPLICATION(S): 1.2 RINSE ORAL at 06:31

## 2025-01-13 RX ADMIN — Medication 17 GRAM(S): at 18:04

## 2025-01-13 RX ADMIN — Medication 81 MILLIGRAM(S): at 12:03

## 2025-01-13 RX ADMIN — PANTOPRAZOLE 40 MILLIGRAM(S): 40 TABLET, DELAYED RELEASE ORAL at 06:30

## 2025-01-13 RX ADMIN — SODIUM CHLORIDE 75 MILLILITER(S): 9 INJECTION, SOLUTION INTRAVENOUS at 08:29

## 2025-01-13 RX ADMIN — ATOVAQUONE 1500 MILLIGRAM(S): 750 SUSPENSION ORAL at 12:03

## 2025-01-13 RX ADMIN — URSODIOL 300 MILLIGRAM(S): 250 TABLET, FILM COATED ORAL at 06:29

## 2025-01-13 RX ADMIN — TRAMADOL HYDROCHLORIDE 25 MILLIGRAM(S): 50 TABLET ORAL at 22:15

## 2025-01-13 RX ADMIN — VALGANCICLOVIR 450 MILLIGRAM(S): 450 TABLET, FILM COATED ORAL at 12:03

## 2025-01-13 RX ADMIN — FLUCONAZOLE 200 MILLIGRAM(S): 200 TABLET ORAL at 12:04

## 2025-01-13 RX ADMIN — APIXABAN 2.5 MILLIGRAM(S): 5 TABLET, FILM COATED ORAL at 06:30

## 2025-01-13 RX ADMIN — URSODIOL 300 MILLIGRAM(S): 250 TABLET, FILM COATED ORAL at 18:07

## 2025-01-13 RX ADMIN — MYCOPHENOLATE MOFETIL 1000 MILLIGRAM(S): 250 CAPSULE ORAL at 08:30

## 2025-01-13 RX ADMIN — TACROLIMUS 2 MILLIGRAM(S): 0.5 CAPSULE ORAL at 21:15

## 2025-01-13 RX ADMIN — POLYETHYLENE GLYCOL 3350, SODIUM SULFATE ANHYDROUS, SODIUM BICARBONATE, SODIUM CHLORIDE, POTASSIUM CHLORIDE 2000 MILLILITER(S): 236; 22.74; 6.74; 5.86; 2.97 POWDER, FOR SOLUTION ORAL at 18:04

## 2025-01-13 RX ADMIN — ACETAMINOPHEN 650 MILLIGRAM(S): 80 SOLUTION/ DROPS ORAL at 03:56

## 2025-01-13 RX ADMIN — APIXABAN 2.5 MILLIGRAM(S): 5 TABLET, FILM COATED ORAL at 18:03

## 2025-01-13 RX ADMIN — MYCOPHENOLATE MOFETIL 1000 MILLIGRAM(S): 250 CAPSULE ORAL at 21:15

## 2025-01-13 NOTE — PROGRESS NOTE ADULT - SUBJECTIVE AND OBJECTIVE BOX
Transplant Surgery - Multidisciplinary Rounds  --------------------------------------------------------------  OLTx   1/4/2025   POD#9  CMV +/+    Patient seen and examined with multidisciplinary Transplant team including Surgeon Dr. Fernandez, fellow Dr. Presley, FELIPE Plascencia/Queenie, Hepatologist Dr. Spicer, Hepatology  Fellow, pharmacist and bedside RN during AM rounds.   Disciplines not in attendance will be notified of the plan.     HPI: 64F with a PMHx of breast cancer s/p lumpectomy, HTN, and ETOH cirrhosis, Umbilical Hernia Repair 9/2024, now s/p OLT under Simulect induction on 1/4/25    Interval Events:  - afebrile, VSS  - RLE neg DVT  - K6.2, dc'ed bactrim, started mepron  - enema/smog/golytly for constipation; d/c'ed oxy  - NPO, on IVF    Immunosuppression:  -FK per level, MMF 1/1, SST  -SIMULECT completed  -Ongoing monitoring for signs of rejection    Potential Discharge date: TBD  Education:  Medications  Plan of care:  See Below    TX DATA    Review of systems  Gen: No weight changes, fatigue, fevers/chills, weakness  Skin: No rashes  Head/Eyes/Ears/Mouth: No headache; Normal hearing; Normal vision w/o blurriness; No sinus pain/discomfort, sore throat  Respiratory: No dyspnea, cough, wheezing, hemoptysis  CV: No chest pain, PND, orthopnea  GI: C/O generalized abdominal pain and constipation, reports mild naseua. no diarrhea, vomiting, melena, hematochezia  : No increased frequency, dysuria, hematuria, nocturia  MSK: No joint pain/swelling; no back pain; no edema  Neuro: No dizziness/lightheadedness, weakness, seizures, numbness, tingling  Heme: No easy bruising or bleeding  Endo: No heat/cold intolerance  Psych: No significant nervousness, anxiety, stress, depression  All other systems were reviewed and are negative, except as noted.      PHYSICAL EXAM:  Constitutional: Well developed / well nourished  Eyes: PERRLA  ENMT: nc/at,   Neck: supple  Respiratory: CTA B/L  Cardiovascular: RRR  Gastrointestinal: Soft abdomen, Distended, generalized pain with palpation. dressing c/d/i,   Genitourinary: voiding spontaneously   Extremities: SCD's in place and working bilaterally, edema in R LE   Vascular: Palpable dp pulses bilaterally.   Neurological: A&O x3, waking up  Skin: no rashes, ulcerations, lesions  Musculoskeletal: Moving all extremities  Psychiatric: Responsive Transplant Surgery - Multidisciplinary Rounds  --------------------------------------------------------------  OLTx   1/4/2025   POD#9  CMV +/+    Patient seen and examined with multidisciplinary Transplant team including Surgeon Dr. Fernandez, fellow Dr. Presley, FELIPE Plascencia/Queenie, Hepatologist Dr. Spicer, Pharmacist and bedside RN during AM rounds.   Disciplines not in attendance will be notified of the plan.     HPI: 64F with a PMHx of breast cancer s/p lumpectomy, HTN, and ETOH cirrhosis, Umbilical Hernia Repair 9/2024, now s/p OLT under Simulect induction on 1/4/25    Interval Events:  - afebrile, VSS  - RLE neg DVT  - K6.2, dc'ed bactrim, started mepron  - enema/smog/golytly for constipation; d/c'ed oxy  - NPO, on IVF    Immunosuppression:  -FK per level, MMF 1/1, SST  -SIMULECT completed  -Ongoing monitoring for signs of rejection    Potential Discharge date: TBD  Education:  Medications  Plan of care:  See Below      MEDICATIONS  (STANDING):  apixaban 2.5 milliGRAM(s) Oral two times a day  aspirin enteric coated 81 milliGRAM(s) Oral every 24 hours  atovaquone  Suspension 1500 milliGRAM(s) Oral daily  calcium carbonate 1250 mG  + Vitamin D (OsCal 500 + D) 1 Tablet(s) Oral two times a day  chlorhexidine 2% Cloths 1 Application(s) Topical <User Schedule>  fluconAZOLE   Tablet 200 milliGRAM(s) Oral every 24 hours  furosemide    Tablet 40 milliGRAM(s) Oral daily  influenza   Vaccine 0.5 milliLiter(s) IntraMuscular once  insulin lispro (ADMELOG) corrective regimen sliding scale   SubCutaneous three times a day before meals  insulin lispro (ADMELOG) corrective regimen sliding scale   SubCutaneous at bedtime  multiple electrolytes Injection Type 1 1000 milliLiter(s) (75 mL/Hr) IV Continuous <Continuous>  mycophenolate mofetil 1000 milliGRAM(s) Oral <User Schedule>  pantoprazole    Tablet 40 milliGRAM(s) Oral before breakfast  polyethylene glycol 3350 17 Gram(s) Oral two times a day  polyethylene glycol/electrolyte Solution. 2000 milliLiter(s) Oral once  predniSONE   Tablet 20 milliGRAM(s) Oral daily  senna 2 Tablet(s) Oral at bedtime  tacrolimus 2 milliGRAM(s) Oral <User Schedule>  ursodiol Capsule 300 milliGRAM(s) Oral two times a day  valGANciclovir 450 milliGRAM(s) Oral every 24 hours    MEDICATIONS  (PRN):  acetaminophen     Tablet .. 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      GERD (gastroesophageal reflux disease)      Skin cancer, basal cell      H/O lumpectomy      History of removal of skin mole      H/O ganglion cyst          Vital Signs Last 24 Hrs  T(C): 36.8 (13 Jan 2025 16:52), Max: 37 (13 Jan 2025 09:00)  T(F): 98.2 (13 Jan 2025 16:52), Max: 98.6 (13 Jan 2025 09:00)  HR: 82 (13 Jan 2025 16:52) (82 - 91)  BP: 136/77 (13 Jan 2025 16:52) (120/70 - 139/81)  BP(mean): --  RR: 18 (13 Jan 2025 16:52) (18 - 20)  SpO2: 98% (13 Jan 2025 16:52) (98% - 100%)    Parameters below as of 13 Jan 2025 16:52  Patient On (Oxygen Delivery Method): room air        I&O's Summary    12 Jan 2025 07:01  -  13 Jan 2025 07:00  --------------------------------------------------------  IN: 2220 mL / OUT: 2450 mL / NET: -230 mL    13 Jan 2025 07:01  -  13 Jan 2025 17:39  --------------------------------------------------------  IN: 645 mL / OUT: 700 mL / NET: -55 mL                              7.9    10.87 )-----------( 141      ( 13 Jan 2025 04:39 )             23.0     01-13    131[L]  |  93[L]  |  26[H]  ----------------------------<  143[H]  4.8   |  24  |  0.97    Ca    8.3[L]      13 Jan 2025 04:39  Phos  3.2     01-13  Mg     1.8     01-13    TPro  5.6[L]  /  Alb  2.8[L]  /  TBili  1.2  /  DBili  x   /  AST  13  /  ALT  39  /  AlkPhos  141[H]  01-13    Tacrolimus (), Serum: 9.2 ng/mL (01-13 @ 04:39)          Review of systems  Abdominal pain, nausea one episode of emesis, constipation.  All other systems were reviewed and are negative, except as noted.       PHYSICAL EXAM:  Constitutional: Well developed / well nourished  Eyes: PERRLA  ENMT: nc/at,   Neck: supple  Respiratory: CTA B/L  Cardiovascular: RRR  Gastrointestinal: Soft abdomen, Distended, generalized pain with palpation. dressing c/d/i,   Genitourinary: voiding spontaneously   Extremities: SCD's in place and working bilaterally, edema in R LE   Vascular: Palpable dp pulses bilaterally.   Neurological: A&O x3, waking up  Skin: no rashes, ulcerations, lesions  Musculoskeletal: Moving all extremities  Psychiatric: Responsive

## 2025-01-13 NOTE — PROGRESS NOTE ADULT - ASSESSMENT
64F with a PMHx of breast cancer s/p lumpectomy, HTN, and ETOH cirrhosis, Umbilical Hernia Repair 9/2024, now s/p OLT under Simulect induction on 1/4/25    [ ] POD 9 s/p OLT   - donor graft with replaced R TANG, will follow dopplers daily   - Donor grew staph epi in bld cxs x1; Recipient bld cxs sent 1/4. Per ID: 5 day course of abx completed 1/8/25  -LFTs remain stable  -ASA 81mg daily, eliquis 2.5 BID  -Pain management: Tylenol and Tramadol, no more opiates in setting of constipation  -PT/OT/RD/Spirometry/SCDs  -strict I&Os  - Watch K  - BL lower extremity doppler neg for DVT    [ ] Constipation  - Bowel regimen: senna, Miralax BID, enema   - milk of mag, smog, golytly  - Abd Xray no obstruction  - FU CT  - d/c'd oxy     [ ] Immunosuppression  - FK per level, MMF 1/1, pred 20  - SIMULECT completed   - PPx: Valcyte/Mepron/Fluc/PPI/OsCal 64F with a PMHx of breast cancer s/p lumpectomy, HTN, and ETOH cirrhosis, Umbilical Hernia Repair 9/2024, now s/p OLT under Simulect induction on 1/4/25    [ ] POD 9 s/p OLT   - donor graft with replaced R TANG, will follow dopplers daily   - Donor grew staph epi in bld cxs x1; Recipient bld cxs sent 1/4. Per ID: 5 day course of abx completed 1/8/25  -LFTs remain stable  -ASA 81mg daily, eliquis 2.5 BID  -Pain management: Tylenol and Tramadol, no more opiates in setting of constipation  -PT/OT/RD/Spirometry/SCDs  -strict I&Os  - Watch K    [ ] Constipation  - Bowel regimen: senna, Miralax BID, enema   - milk of mag, smog, golytly  - Abd Xray no obstruction  - CT abd. w/ oral contrast - findings Multiple loops of mildly dilated small bowel without transition point. Findings are consistent with ileus  - d/c'd oxy   - NPO, allow patient to walk, watch for nausea     [ ] Immunosuppression  - FK per level, MMF 1/1, pred 20  - SIMULECT completed   - PPx: Valcyte/Mepron/Fluc/PPI/OsCal

## 2025-01-14 LAB
ALBUMIN SERPL ELPH-MCNC: 2.5 G/DL — LOW (ref 3.3–5)
ALBUMIN SERPL ELPH-MCNC: 2.8 G/DL — LOW (ref 3.3–5)
ALP SERPL-CCNC: 179 U/L — HIGH (ref 40–120)
ALP SERPL-CCNC: 180 U/L — HIGH (ref 40–120)
ALT FLD-CCNC: 23 U/L — SIGNIFICANT CHANGE UP (ref 10–45)
ALT FLD-CCNC: 23 U/L — SIGNIFICANT CHANGE UP (ref 10–45)
ANION GAP SERPL CALC-SCNC: 15 MMOL/L — SIGNIFICANT CHANGE UP (ref 5–17)
ANION GAP SERPL CALC-SCNC: 15 MMOL/L — SIGNIFICANT CHANGE UP (ref 5–17)
APTT BLD: 28.3 SEC — SIGNIFICANT CHANGE UP (ref 24.5–35.6)
APTT BLD: 29.8 SEC — SIGNIFICANT CHANGE UP (ref 24.5–35.6)
AST SERPL-CCNC: 12 U/L — SIGNIFICANT CHANGE UP (ref 10–40)
AST SERPL-CCNC: 13 U/L — SIGNIFICANT CHANGE UP (ref 10–40)
BILIRUB SERPL-MCNC: 1 MG/DL — SIGNIFICANT CHANGE UP (ref 0.2–1.2)
BILIRUB SERPL-MCNC: 1.3 MG/DL — HIGH (ref 0.2–1.2)
BLD GP AB SCN SERPL QL: NEGATIVE — SIGNIFICANT CHANGE UP
BUN SERPL-MCNC: 26 MG/DL — HIGH (ref 7–23)
BUN SERPL-MCNC: 28 MG/DL — HIGH (ref 7–23)
CALCIUM SERPL-MCNC: 8.1 MG/DL — LOW (ref 8.4–10.5)
CALCIUM SERPL-MCNC: 8.2 MG/DL — LOW (ref 8.4–10.5)
CHLORIDE SERPL-SCNC: 95 MMOL/L — LOW (ref 96–108)
CHLORIDE SERPL-SCNC: 97 MMOL/L — SIGNIFICANT CHANGE UP (ref 96–108)
CK MB BLD-MCNC: 7.3 % — HIGH (ref 0–3.5)
CK MB CFR SERPL CALC: 1.1 NG/ML — SIGNIFICANT CHANGE UP (ref 0–3.8)
CK SERPL-CCNC: 15 U/L — LOW (ref 25–170)
CO2 SERPL-SCNC: 20 MMOL/L — LOW (ref 22–31)
CO2 SERPL-SCNC: 23 MMOL/L — SIGNIFICANT CHANGE UP (ref 22–31)
CREAT SERPL-MCNC: 1.06 MG/DL — SIGNIFICANT CHANGE UP (ref 0.5–1.3)
CREAT SERPL-MCNC: 1.19 MG/DL — SIGNIFICANT CHANGE UP (ref 0.5–1.3)
EGFR: 52 ML/MIN/1.73M2 — LOW
EGFR: 59 ML/MIN/1.73M2 — LOW
GAS PNL BLDV: SIGNIFICANT CHANGE UP
GLUCOSE BLDC GLUCOMTR-MCNC: 102 MG/DL — HIGH (ref 70–99)
GLUCOSE BLDC GLUCOMTR-MCNC: 108 MG/DL — HIGH (ref 70–99)
GLUCOSE BLDC GLUCOMTR-MCNC: 156 MG/DL — HIGH (ref 70–99)
GLUCOSE BLDC GLUCOMTR-MCNC: 163 MG/DL — HIGH (ref 70–99)
GLUCOSE BLDC GLUCOMTR-MCNC: 233 MG/DL — HIGH (ref 70–99)
GLUCOSE BLDC GLUCOMTR-MCNC: 237 MG/DL — HIGH (ref 70–99)
GLUCOSE SERPL-MCNC: 212 MG/DL — HIGH (ref 70–99)
GLUCOSE SERPL-MCNC: 91 MG/DL — SIGNIFICANT CHANGE UP (ref 70–99)
HCT VFR BLD CALC: 21.1 % — LOW (ref 34.5–45)
HCT VFR BLD CALC: 23.1 % — LOW (ref 34.5–45)
HGB BLD-MCNC: 6.9 G/DL — CRITICAL LOW (ref 11.5–15.5)
HGB BLD-MCNC: 7.5 G/DL — LOW (ref 11.5–15.5)
INR BLD: 1.67 RATIO — HIGH (ref 0.85–1.16)
INR BLD: 1.85 RATIO — HIGH (ref 0.85–1.16)
MAGNESIUM SERPL-MCNC: 1.9 MG/DL — SIGNIFICANT CHANGE UP (ref 1.6–2.6)
MAGNESIUM SERPL-MCNC: 2 MG/DL — SIGNIFICANT CHANGE UP (ref 1.6–2.6)
MCHC RBC-ENTMCNC: 29.6 PG — SIGNIFICANT CHANGE UP (ref 27–34)
MCHC RBC-ENTMCNC: 30.4 PG — SIGNIFICANT CHANGE UP (ref 27–34)
MCHC RBC-ENTMCNC: 32.5 G/DL — SIGNIFICANT CHANGE UP (ref 32–36)
MCHC RBC-ENTMCNC: 32.7 G/DL — SIGNIFICANT CHANGE UP (ref 32–36)
MCV RBC AUTO: 91.3 FL — SIGNIFICANT CHANGE UP (ref 80–100)
MCV RBC AUTO: 93 FL — SIGNIFICANT CHANGE UP (ref 80–100)
NRBC # BLD: 0 /100 WBCS — SIGNIFICANT CHANGE UP (ref 0–0)
NRBC # BLD: 0 /100 WBCS — SIGNIFICANT CHANGE UP (ref 0–0)
NT-PROBNP SERPL-SCNC: 347 PG/ML — HIGH (ref 0–300)
PHOSPHATE SERPL-MCNC: 2.7 MG/DL — SIGNIFICANT CHANGE UP (ref 2.5–4.5)
PHOSPHATE SERPL-MCNC: 3 MG/DL — SIGNIFICANT CHANGE UP (ref 2.5–4.5)
PLATELET # BLD AUTO: 148 K/UL — LOW (ref 150–400)
PLATELET # BLD AUTO: 157 K/UL — SIGNIFICANT CHANGE UP (ref 150–400)
POTASSIUM SERPL-MCNC: 4.2 MMOL/L — SIGNIFICANT CHANGE UP (ref 3.5–5.3)
POTASSIUM SERPL-MCNC: 4.4 MMOL/L — SIGNIFICANT CHANGE UP (ref 3.5–5.3)
POTASSIUM SERPL-SCNC: 4.2 MMOL/L — SIGNIFICANT CHANGE UP (ref 3.5–5.3)
POTASSIUM SERPL-SCNC: 4.4 MMOL/L — SIGNIFICANT CHANGE UP (ref 3.5–5.3)
PROT SERPL-MCNC: 5.2 G/DL — LOW (ref 6–8.3)
PROT SERPL-MCNC: 5.5 G/DL — LOW (ref 6–8.3)
PROTHROM AB SERPL-ACNC: 19.1 SEC — HIGH (ref 9.9–13.4)
PROTHROM AB SERPL-ACNC: 21 SEC — HIGH (ref 9.9–13.4)
RBC # BLD: 2.27 M/UL — LOW (ref 3.8–5.2)
RBC # BLD: 2.53 M/UL — LOW (ref 3.8–5.2)
RBC # FLD: 17.5 % — HIGH (ref 10.3–14.5)
RBC # FLD: 17.9 % — HIGH (ref 10.3–14.5)
RH IG SCN BLD-IMP: POSITIVE — SIGNIFICANT CHANGE UP
SODIUM SERPL-SCNC: 132 MMOL/L — LOW (ref 135–145)
SODIUM SERPL-SCNC: 133 MMOL/L — LOW (ref 135–145)
TACROLIMUS SERPL-MCNC: 11.8 NG/ML — SIGNIFICANT CHANGE UP
TROPONIN T, HIGH SENSITIVITY RESULT: 18 NG/L — SIGNIFICANT CHANGE UP (ref 0–51)
WBC # BLD: 8.68 K/UL — SIGNIFICANT CHANGE UP (ref 3.8–10.5)
WBC # BLD: 8.99 K/UL — SIGNIFICANT CHANGE UP (ref 3.8–10.5)
WBC # FLD AUTO: 8.68 K/UL — SIGNIFICANT CHANGE UP (ref 3.8–10.5)
WBC # FLD AUTO: 8.99 K/UL — SIGNIFICANT CHANGE UP (ref 3.8–10.5)

## 2025-01-14 PROCEDURE — 74018 RADEX ABDOMEN 1 VIEW: CPT | Mod: 26

## 2025-01-14 PROCEDURE — 93010 ELECTROCARDIOGRAM REPORT: CPT

## 2025-01-14 RX ORDER — SENNOSIDES 8.6 MG/1
1 TABLET, FILM COATED ORAL
Qty: 0 | Refills: 0 | DISCHARGE
Start: 2025-01-14

## 2025-01-14 RX ORDER — TACROLIMUS 0.5 MG/1
2 CAPSULE ORAL
Refills: 0 | Status: DISCONTINUED | OUTPATIENT
Start: 2025-01-14 | End: 2025-01-16

## 2025-01-14 RX ORDER — ACETAMINOPHEN 80 MG/.8ML
1000 SOLUTION/ DROPS ORAL ONCE
Refills: 0 | Status: COMPLETED | OUTPATIENT
Start: 2025-01-14 | End: 2025-01-14

## 2025-01-14 RX ORDER — SIMETHICONE 125 MG/1
80 CAPSULE, LIQUID FILLED ORAL ONCE
Refills: 0 | Status: COMPLETED | OUTPATIENT
Start: 2025-01-14 | End: 2025-01-14

## 2025-01-14 RX ORDER — ONDANSETRON 4 MG/1
4 TABLET ORAL ONCE
Refills: 0 | Status: COMPLETED | OUTPATIENT
Start: 2025-01-14 | End: 2025-01-14

## 2025-01-14 RX ORDER — ASPIRIN 81 MG
1 TABLET, DELAYED RELEASE (ENTERIC COATED) ORAL
Qty: 0 | Refills: 0 | DISCHARGE
Start: 2025-01-14

## 2025-01-14 RX ORDER — TACROLIMUS 0.5 MG/1
1 CAPSULE ORAL
Refills: 0 | Status: DISCONTINUED | OUTPATIENT
Start: 2025-01-14 | End: 2025-01-16

## 2025-01-14 RX ORDER — FLUCONAZOLE 200 MG/1
1 TABLET ORAL
Qty: 0 | Refills: 0 | DISCHARGE
Start: 2025-01-14

## 2025-01-14 RX ORDER — FUROSEMIDE 20 MG
1 TABLET ORAL
Qty: 14 | Refills: 11
Start: 2025-01-14 | End: 2025-06-30

## 2025-01-14 RX ORDER — POLYETHYLENE GLYCOL 3350 17 G/DOSE
17 POWDER (GRAM) ORAL
Qty: 0 | Refills: 0 | DISCHARGE
Start: 2025-01-14

## 2025-01-14 RX ORDER — MYCOPHENOLATE MOFETIL 250 MG/1
2 CAPSULE ORAL
Qty: 0 | Refills: 0 | DISCHARGE
Start: 2025-01-14

## 2025-01-14 RX ORDER — EPOETIN ALFA 2000 [IU]/ML
10000 SOLUTION INTRAVENOUS; SUBCUTANEOUS ONCE
Refills: 0 | Status: COMPLETED | OUTPATIENT
Start: 2025-01-14 | End: 2025-01-14

## 2025-01-14 RX ORDER — MAG HYDROX/ALUMINUM HYD/SIMETH 200-200-20
30 SUSPENSION, ORAL (FINAL DOSE FORM) ORAL ONCE
Refills: 0 | Status: COMPLETED | OUTPATIENT
Start: 2025-01-14 | End: 2025-01-14

## 2025-01-14 RX ORDER — CALCIUM CARBONATE/VITAMIN D3 500MG-5MCG
1 TABLET ORAL
Qty: 0 | Refills: 0 | DISCHARGE
Start: 2025-01-14

## 2025-01-14 RX ORDER — URSODIOL 250 MG/1
1 TABLET, FILM COATED ORAL
Qty: 0 | Refills: 0 | DISCHARGE
Start: 2025-01-14

## 2025-01-14 RX ORDER — ATOVAQUONE 750 MG/5ML
10 SUSPENSION ORAL
Qty: 0 | Refills: 0 | DISCHARGE
Start: 2025-01-14

## 2025-01-14 RX ORDER — APIXABAN 5 MG/1
1 TABLET, FILM COATED ORAL
Qty: 0 | Refills: 0 | DISCHARGE
Start: 2025-01-14

## 2025-01-14 RX ORDER — VALGANCICLOVIR 450 MG/1
1 TABLET, FILM COATED ORAL
Qty: 0 | Refills: 0 | DISCHARGE
Start: 2025-01-14

## 2025-01-14 RX ORDER — PREDNISONE 5 MG
1 TABLET ORAL
Qty: 0 | Refills: 0 | DISCHARGE
Start: 2025-01-14

## 2025-01-14 RX ADMIN — Medication 2: at 12:06

## 2025-01-14 RX ADMIN — CHLORHEXIDINE GLUCONATE 1 APPLICATION(S): 1.2 RINSE ORAL at 06:04

## 2025-01-14 RX ADMIN — ACETAMINOPHEN 400 MILLIGRAM(S): 80 SOLUTION/ DROPS ORAL at 15:07

## 2025-01-14 RX ADMIN — SODIUM CHLORIDE 75 MILLILITER(S): 9 INJECTION, SOLUTION INTRAVENOUS at 08:49

## 2025-01-14 RX ADMIN — Medication 81 MILLIGRAM(S): at 12:01

## 2025-01-14 RX ADMIN — PANTOPRAZOLE 40 MILLIGRAM(S): 40 TABLET, DELAYED RELEASE ORAL at 05:47

## 2025-01-14 RX ADMIN — MYCOPHENOLATE MOFETIL 1000 MILLIGRAM(S): 250 CAPSULE ORAL at 19:51

## 2025-01-14 RX ADMIN — ATOVAQUONE 1500 MILLIGRAM(S): 750 SUSPENSION ORAL at 12:02

## 2025-01-14 RX ADMIN — Medication 4: at 17:17

## 2025-01-14 RX ADMIN — Medication 1 TABLET(S): at 17:17

## 2025-01-14 RX ADMIN — TRAMADOL HYDROCHLORIDE 50 MILLIGRAM(S): 50 TABLET ORAL at 21:20

## 2025-01-14 RX ADMIN — FLUCONAZOLE 200 MILLIGRAM(S): 200 TABLET ORAL at 12:01

## 2025-01-14 RX ADMIN — MYCOPHENOLATE MOFETIL 1000 MILLIGRAM(S): 250 CAPSULE ORAL at 08:48

## 2025-01-14 RX ADMIN — URSODIOL 300 MILLIGRAM(S): 250 TABLET, FILM COATED ORAL at 05:47

## 2025-01-14 RX ADMIN — Medication 50 MILLILITER(S): at 17:44

## 2025-01-14 RX ADMIN — Medication 20 MILLIGRAM(S): at 05:47

## 2025-01-14 RX ADMIN — Medication 30 MILLILITER(S): at 15:24

## 2025-01-14 RX ADMIN — APIXABAN 2.5 MILLIGRAM(S): 5 TABLET, FILM COATED ORAL at 17:17

## 2025-01-14 RX ADMIN — TACROLIMUS 1 MILLIGRAM(S): 0.5 CAPSULE ORAL at 19:51

## 2025-01-14 RX ADMIN — TACROLIMUS 3 MILLIGRAM(S): 0.5 CAPSULE ORAL at 08:48

## 2025-01-14 RX ADMIN — URSODIOL 300 MILLIGRAM(S): 250 TABLET, FILM COATED ORAL at 17:17

## 2025-01-14 RX ADMIN — EPOETIN ALFA 10000 UNIT(S): 2000 SOLUTION INTRAVENOUS; SUBCUTANEOUS at 17:52

## 2025-01-14 RX ADMIN — SIMETHICONE 80 MILLIGRAM(S): 125 CAPSULE, LIQUID FILLED ORAL at 15:25

## 2025-01-14 RX ADMIN — TRAMADOL HYDROCHLORIDE 50 MILLIGRAM(S): 50 TABLET ORAL at 22:00

## 2025-01-14 RX ADMIN — VALGANCICLOVIR 450 MILLIGRAM(S): 450 TABLET, FILM COATED ORAL at 12:01

## 2025-01-14 RX ADMIN — Medication 40 MILLIGRAM(S): at 05:47

## 2025-01-14 RX ADMIN — ACETAMINOPHEN 1000 MILLIGRAM(S): 80 SOLUTION/ DROPS ORAL at 16:58

## 2025-01-14 RX ADMIN — APIXABAN 2.5 MILLIGRAM(S): 5 TABLET, FILM COATED ORAL at 05:47

## 2025-01-14 RX ADMIN — Medication 1 TABLET(S): at 05:47

## 2025-01-14 NOTE — RAPID RESPONSE TEAM SUMMARY - NSSITUATIONBACKGROUNDRRT_GEN_ALL_CORE
64 year old female with a PMH of breast CA s/p lumpectomy, ETOH abuse, and HTN. She was admitted to [facility] from 2/17-2/25 for abdominal distention and jaundice. She originally saw PCP in Jan 2023 when she noted she was turning yellow, PCP did bloodwork and referred her her GI: Jai Lazcano. She states she used to have 2 glasses of vodka daily for 3 years to treat her chronic pain from her breast cancer . She reports she was on Letrozole for her breast caner and was taking Tylenol daily at recommended doses to treat her pain, . Her breast  cancer diagnosis was several years ago . Has received intermittent paracentesis but has spaced out > 2 months. She has an umbilical hernia repair on 9/17/24 . Pt presented for OLT. on 1/4/25. RRT called for chest pain.

## 2025-01-14 NOTE — PROGRESS NOTE ADULT - ASSESSMENT
64F with a PMHx of breast cancer s/p lumpectomy, HTN, and ETOH cirrhosis, Umbilical Hernia Repair 9/2024, now s/p OLT under Simulect induction on 1/4/25    [ ] POD 10 s/p OLT   - donor graft with replaced R TANG, will follow dopplers daily   - Donor grew staph epi in bld cxs x1; Recipient bld cxs sent 1/4. Per ID: 5 day course of abx completed 1/8/25  -LFTs remain stable  -ASA 81mg daily, eliquis 2.5 BID  -Pain management: Tylenol and Tramadol, no more opiates in setting of constipation  -PT/OT/RD/Spirometry/SCDs  -strict I&Os  - Watch K    [ ] Constipation  - Bowel regimen, Golytely prep 2L  - Abd Xray no obstruction  - CT abd. w/ oral contrast - findings Multiple loops of mildly dilated small bowel without transition point. Repeat CT with similar findings.   - NPO, allow patient to walk, watch for nausea     [ ] Immunosuppression  - FK per level, MMF 1/1, pred 20  - SIMULECT completed   - PPx: Valcyte/Mepron/Fluc/PPI/OsCal 64F with a PMHx of breast cancer s/p lumpectomy, HTN, and ETOH cirrhosis, Umbilical Hernia Repair 9/2024, now s/p OLT under Simulect induction on 1/4/25    [ ] POD 10 s/p OLT   - donor graft with replaced R TANG, will follow dopplers daily   - Donor grew staph epi in bld cxs x1; Recipient bld cxs sent 1/4. Per ID: 5 day course of abx completed 1/8/25  -LFTs remain stable  -ASA 81mg daily, eliquis 2.5 BID  -Pain management: Tylenol and Tramadol, no more opiates in setting of constipation  -PT/OT/RD/Spirometry/SCDs  -strict I&Os  - Watch K    [ ] Constipation  - Bowel regimen, Golytely prep 2L  - Abd Xray no obstruction  - CT abd. w/ oral contrast - findings Multiple loops of mildly dilated small bowel without transition point. Repeat CT with similar findings.   - Allow patient to walk, watch for nausea   - diet advanced from clear liquid (tolerating) to carbohydrate renal diet     [ ] Immunosuppression  - FK per level, MMF 1/1, pred 20  - SIMULECT completed   - PPx: Valcyte/Mepron/Fluc/PPI/OsCal 64F with a PMHx of breast cancer s/p lumpectomy, HTN, and ETOH cirrhosis, Umbilical Hernia Repair 9/2024, now s/p OLT under Simulect induction on 1/4/25    [ ] POD 10 s/p OLT   - donor graft with replaced R TANG, will follow dopplers daily   - Donor grew staph epi in bld cxs x1; Recipient bld cxs sent 1/4. Per ID: 5 day course of abx completed 1/8/25  -LFTs remain stable  -ASA 81mg daily, eliquis 2.5 BID  -Pain management: Tylenol and Tramadol, no more opiates in setting of constipation  -PT/OT/RD/Spirometry/SCDs  -strict I&Os  - Watch K  - Hgb 6.9: anemia 2/2 chronic disease; pt received 1u PRBCs     [ ] Constipation  - Bowel regimen, Golytely prep 2L  - Abd Xray no obstruction  - CT abd. w/ oral contrast - findings Multiple loops of mildly dilated small bowel without transition point. Repeat CT with similar findings.   - Allow patient to walk, watch for nausea   - diet advanced from clear liquid (tolerating) to carbohydrate renal diet     [ ] Immunosuppression  - FK per level, MMF 1/1, pred 20  - SIMULECT completed   - PPx: Valcyte/Mepron/Fluc/PPI/OsCal 64F with a PMHx of breast cancer s/p lumpectomy, HTN, and ETOH cirrhosis, Umbilical Hernia Repair 9/2024, now s/p OLT under Simulect induction on 1/4/25    [ ] POD 10 s/p OLT   - donor graft with replaced R TANG, will follow dopplers daily   - Donor grew staph epi in bld cxs x1; Recipient bld cxs sent 1/4. Per ID: 5 day course of abx completed 1/8/25  -LFTs remain stable  -ASA 81mg daily, eliquis 2.5 BID  -Pain management: Tylenol and Tramadol, no more opiates in setting of constipation  -PT/OT/RD/Spirometry/SCDs  -strict I&Os  - Watch K  - Hgb 6.9: anemia 2/2 chronic disease; pt received 1u PRBCs   - Episode of CP during transfusion. Rapid response called: VS wnl, no-EKG changes, IV tylenol and simethicone given. f/u on tropes, lytes, and blood gas    [ ] Constipation  - Bowel regimen, Golytely prep 2L  - Abd Xray no obstruction  - CT abd. w/ oral contrast - findings Multiple loops of mildly dilated small bowel without transition point. Repeat CT with similar findings.   - Allow patient to walk, watch for nausea   - diet advanced from clear liquid (tolerating) to carbohydrate renal diet     [ ] Immunosuppression  - FK per level, MMF 1/1, pred 20  - SIMULECT completed   - PPx: Valcyte/Mepron/Fluc/PPI/OsCal

## 2025-01-14 NOTE — PROGRESS NOTE ADULT - SUBJECTIVE AND OBJECTIVE BOX
Transplant Surgery - Multidisciplinary Rounds  --------------------------------------------------------------  OLTx   1/4/2025   POD#10  CMV +/+    Patient seen and examined with multidisciplinary Transplant team including Surgeon Dr. Fernandez, fellow Dr. Presley, [facility], Hepatologist Dr. Spicer, Pharmacist and bedside RN during AM rounds.   Disciplines not in attendance will be notified of the plan.     HPI: 64F with a PMHx of breast cancer s/p lumpectomy, HTN, and ETOH cirrhosis, Umbilical Hernia Repair 9/2024, now s/p OLT under Simulect induction on 1/4/25    Interval Events:  - afebrile, VSS  - CT: ileus, no obstruction, large stool burden  - golytly 2L, having BMs     Immunosuppression:  -FK per level, MMF 1/1, SST  -SIMULECT completed  -Ongoing monitoring for signs of rejection    Potential Discharge date: TBD  Education:  Medications  Plan of care:  See Below    TX DATA     Review of systems  Abdominal pain, nausea one episode of emesis, constipation.  All other systems were reviewed and are negative, except as noted.       PHYSICAL EXAM:  Constitutional: Well developed / well nourished  Eyes: PERRLA  ENMT: nc/at,   Neck: supple  Respiratory: CTA B/L  Cardiovascular: RRR  Gastrointestinal: Soft abdomen, Distended, generalized pain with palpation. dressing c/d/i,   Genitourinary: voiding spontaneously   Extremities: SCD's in place and working bilaterally, edema in R LE   Vascular: Palpable dp pulses bilaterally.   Neurological: A&O x3, waking up  Skin: no rashes, ulcerations, lesions  Musculoskeletal: Moving all extremities  Psychiatric: Responsive Transplant Surgery - Multidisciplinary Rounds  --------------------------------------------------------------  OLTx   1/4/2025   POD#10  CMV +/+    Patient seen and examined with multidisciplinary Transplant team including Surgeon Dr. Fernandez, fellow Dr. Presley, [facility], Hepatologist Dr. Spicer, Pharmacist and bedside RN during AM rounds.   Disciplines not in attendance will be notified of the plan.     HPI: 64F with a PMHx of breast cancer s/p lumpectomy, HTN, and ETOH cirrhosis, Umbilical Hernia Repair 9/2024, now s/p OLT under Simulect induction on 1/4/25    Interval Events:  - afebrile, VSS  - CT: ileus, no obstruction, large stool burden  - golytly 2L, having BMs   - tolerating clear liquid diet     Immunosuppression:  -FK per level, MMF 1/1, SST  -SIMULECT completed  -Ongoing monitoring for signs of rejection    Potential Discharge date: TBD  Education:  Medications  Plan of care:  See Below      MEDICATIONS  (STANDING):  apixaban 2.5 milliGRAM(s) Oral two times a day  aspirin enteric coated 81 milliGRAM(s) Oral every 24 hours  atovaquone  Suspension 1500 milliGRAM(s) Oral daily  calcium carbonate 1250 mG  + Vitamin D (OsCal 500 + D) 1 Tablet(s) Oral two times a day  chlorhexidine 2% Cloths 1 Application(s) Topical <User Schedule>  fluconAZOLE   Tablet 200 milliGRAM(s) Oral every 24 hours  furosemide    Tablet 40 milliGRAM(s) Oral daily  influenza   Vaccine 0.5 milliLiter(s) IntraMuscular once  insulin lispro (ADMELOG) corrective regimen sliding scale   SubCutaneous three times a day before meals  insulin lispro (ADMELOG) corrective regimen sliding scale   SubCutaneous at bedtime  mycophenolate mofetil 1000 milliGRAM(s) Oral <User Schedule>  pantoprazole    Tablet 40 milliGRAM(s) Oral before breakfast  polyethylene glycol 3350 17 Gram(s) Oral two times a day  predniSONE   Tablet 20 milliGRAM(s) Oral daily  senna 2 Tablet(s) Oral at bedtime  tacrolimus 3 milliGRAM(s) Oral <User Schedule>  tacrolimus 2 milliGRAM(s) Oral <User Schedule>  ursodiol Capsule 300 milliGRAM(s) Oral two times a day  valGANciclovir 450 milliGRAM(s) Oral every 24 hours    MEDICATIONS  (PRN):  acetaminophen     Tablet .. 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      GERD (gastroesophageal reflux disease)      Skin cancer, basal cell      H/O lumpectomy      History of removal of skin mole      H/O ganglion cyst          Vital Signs Last 24 Hrs  T(C): 36.7 (14 Jan 2025 12:10), Max: 36.8 (13 Jan 2025 16:52)  T(F): 98.1 (14 Jan 2025 12:10), Max: 98.2 (13 Jan 2025 16:52)  HR: 78 (14 Jan 2025 12:10) (75 - 89)  BP: 113/57 (14 Jan 2025 12:10) (107/58 - 136/77)  BP(mean): 80 (14 Jan 2025 04:31) (80 - 80)  RR: 18 (14 Jan 2025 12:10) (18 - 18)  SpO2: 99% (14 Jan 2025 12:10) (97% - 100%)    Parameters below as of 14 Jan 2025 12:10  Patient On (Oxygen Delivery Method): room air        I&O's Summary    13 Jan 2025 07:01  -  14 Jan 2025 07:00  --------------------------------------------------------  IN: 2200 mL / OUT: 1950 mL / NET: 250 mL                              6.9    8.99  )-----------( 157      ( 14 Jan 2025 06:11 )             21.1     01-14    133[L]  |  95[L]  |  26[H]  ----------------------------<  91  4.2   |  23  |  1.06    Ca    8.2[L]      14 Jan 2025 06:11  Phos  3.0     01-14  Mg     2.0     01-14    TPro  5.5[L]  /  Alb  2.8[L]  /  TBili  1.3[H]  /  DBili  x   /  AST  12  /  ALT  23  /  AlkPhos  180[H]  01-14    Tacrolimus (), Serum: 11.8 ng/mL (01-14 @ 06:11)        Review of systems  Abdominal pain, nausea one episode of emesis, constipation. Patient had BMs.   All other systems were reviewed and are negative, except as noted.       PHYSICAL EXAM:  Constitutional: Well developed / well nourished  Eyes: PERRLA  ENMT: nc/at,   Neck: supple  Respiratory: CTA B/L  Cardiovascular: RRR  Gastrointestinal: Soft abdomen, Distended, generalized pain with palpation. dressing c/d/i.  Genitourinary: voiding spontaneously   Extremities: SCD's in place and working bilaterally, edema in R LE   Vascular: Palpable dp pulses bilaterally.   Neurological: A&O x3, waking up  Skin: no rashes, ulcerations, lesions  Musculoskeletal: Moving all extremities  Psychiatric: Responsive Transplant Surgery - Multidisciplinary Rounds  --------------------------------------------------------------  OLTx   1/4/2025   POD#10  CMV +/+    Patient seen and examined with multidisciplinary Transplant team including Surgeon Dr. Fernandez, fellow Dr. Presley, [facility], Hepatologist Dr. Spicer, Pharmacist and bedside RN during AM rounds.   Disciplines not in attendance will be notified of the plan.     HPI: 64F with a PMHx of breast cancer s/p lumpectomy, HTN, and ETOH cirrhosis, Umbilical Hernia Repair 9/2024, now s/p OLT under Simulect induction on 1/4/25    Interval Events:  - afebrile, VSS  - CT: ileus, no obstruction, large stool burden  - golytly 2L, having BMs   - tolerating clear liquid diet     Immunosuppression:  -FK per level, MMF 1/1, SST  -SIMULECT completed  -Ongoing monitoring for signs of rejection    Potential Discharge date: 1/15/25  Education:  Medications  Plan of care:  See Below      MEDICATIONS  (STANDING):  apixaban 2.5 milliGRAM(s) Oral two times a day  aspirin enteric coated 81 milliGRAM(s) Oral every 24 hours  atovaquone  Suspension 1500 milliGRAM(s) Oral daily  calcium carbonate 1250 mG  + Vitamin D (OsCal 500 + D) 1 Tablet(s) Oral two times a day  chlorhexidine 2% Cloths 1 Application(s) Topical <User Schedule>  fluconAZOLE   Tablet 200 milliGRAM(s) Oral every 24 hours  furosemide    Tablet 40 milliGRAM(s) Oral daily  influenza   Vaccine 0.5 milliLiter(s) IntraMuscular once  insulin lispro (ADMELOG) corrective regimen sliding scale   SubCutaneous three times a day before meals  insulin lispro (ADMELOG) corrective regimen sliding scale   SubCutaneous at bedtime  mycophenolate mofetil 1000 milliGRAM(s) Oral <User Schedule>  pantoprazole    Tablet 40 milliGRAM(s) Oral before breakfast  polyethylene glycol 3350 17 Gram(s) Oral two times a day  predniSONE   Tablet 20 milliGRAM(s) Oral daily  senna 2 Tablet(s) Oral at bedtime  tacrolimus 3 milliGRAM(s) Oral <User Schedule>  tacrolimus 2 milliGRAM(s) Oral <User Schedule>  ursodiol Capsule 300 milliGRAM(s) Oral two times a day  valGANciclovir 450 milliGRAM(s) Oral every 24 hours    MEDICATIONS  (PRN):  acetaminophen     Tablet .. 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      GERD (gastroesophageal reflux disease)      Skin cancer, basal cell      H/O lumpectomy      History of removal of skin mole      H/O ganglion cyst          Vital Signs Last 24 Hrs  T(C): 36.7 (14 Jan 2025 12:10), Max: 36.8 (13 Jan 2025 16:52)  T(F): 98.1 (14 Jan 2025 12:10), Max: 98.2 (13 Jan 2025 16:52)  HR: 78 (14 Jan 2025 12:10) (75 - 89)  BP: 113/57 (14 Jan 2025 12:10) (107/58 - 136/77)  BP(mean): 80 (14 Jan 2025 04:31) (80 - 80)  RR: 18 (14 Jan 2025 12:10) (18 - 18)  SpO2: 99% (14 Jan 2025 12:10) (97% - 100%)    Parameters below as of 14 Jan 2025 12:10  Patient On (Oxygen Delivery Method): room air        I&O's Summary    13 Jan 2025 07:01  -  14 Jan 2025 07:00  --------------------------------------------------------  IN: 2200 mL / OUT: 1950 mL / NET: 250 mL                              6.9    8.99  )-----------( 157      ( 14 Jan 2025 06:11 )             21.1     01-14    133[L]  |  95[L]  |  26[H]  ----------------------------<  91  4.2   |  23  |  1.06    Ca    8.2[L]      14 Jan 2025 06:11  Phos  3.0     01-14  Mg     2.0     01-14    TPro  5.5[L]  /  Alb  2.8[L]  /  TBili  1.3[H]  /  DBili  x   /  AST  12  /  ALT  23  /  AlkPhos  180[H]  01-14    Tacrolimus (), Serum: 11.8 ng/mL (01-14 @ 06:11)        Review of systems  Abdominal pain, nausea one episode of emesis, constipation. Patient had BMs.   All other systems were reviewed and are negative, except as noted.       PHYSICAL EXAM:  Constitutional: Well developed / well nourished  Eyes: PERRLA  ENMT: nc/at,   Neck: supple  Respiratory: CTA B/L  Cardiovascular: RRR  Gastrointestinal: Soft abdomen, Distended, generalized pain with palpation. dressing c/d/i.  Genitourinary: voiding spontaneously   Extremities: SCD's in place and working bilaterally, edema in R LE   Vascular: Palpable dp pulses bilaterally.   Neurological: A&O x3, waking up  Skin: no rashes, ulcerations, lesions  Musculoskeletal: Moving all extremities  Psychiatric: Responsive Transplant Surgery - Multidisciplinary Rounds  --------------------------------------------------------------  OLTx   1/4/2025   POD#10  CMV +/+    Patient seen and examined with multidisciplinary Transplant team including Surgeon Dr. Fernandez, fellow Dr. Presley, [facility], Hepatologist Dr. Spicer, Pharmacist and bedside RN during AM rounds.   Disciplines not in attendance will be notified of the plan.     HPI: 64F with a PMHx of breast cancer s/p lumpectomy, HTN, and ETOH cirrhosis, Umbilical Hernia Repair 9/2024, now s/p OLT under Simulect induction on 1/4/25    Interval Events:  - afebrile, VSS  - CT: ileus, no obstruction, large stool burden  - golytly 2L, having BMs   - tolerating clear liquid diet     Immunosuppression:  -FK per level, MMF 1/1, SST  -SIMULECT completed  -Ongoing monitoring for signs of rejection    Potential Discharge date: 1/15/25  Education:  Medications  Plan of care:  See Below        MEDICATIONS  (STANDING):  apixaban 2.5 milliGRAM(s) Oral two times a day  aspirin enteric coated 81 milliGRAM(s) Oral every 24 hours  atovaquone  Suspension 1500 milliGRAM(s) Oral daily  calcium carbonate 1250 mG  + Vitamin D (OsCal 500 + D) 1 Tablet(s) Oral two times a day  chlorhexidine 2% Cloths 1 Application(s) Topical <User Schedule>  fluconAZOLE   Tablet 200 milliGRAM(s) Oral every 24 hours  furosemide    Tablet 40 milliGRAM(s) Oral daily  influenza   Vaccine 0.5 milliLiter(s) IntraMuscular once  insulin lispro (ADMELOG) corrective regimen sliding scale   SubCutaneous three times a day before meals  insulin lispro (ADMELOG) corrective regimen sliding scale   SubCutaneous at bedtime  mycophenolate mofetil 1000 milliGRAM(s) Oral <User Schedule>  pantoprazole    Tablet 40 milliGRAM(s) Oral before breakfast  polyethylene glycol 3350 17 Gram(s) Oral two times a day  predniSONE   Tablet 20 milliGRAM(s) Oral daily  senna 2 Tablet(s) Oral at bedtime  tacrolimus 2 milliGRAM(s) Oral <User Schedule>  tacrolimus 1 milliGRAM(s) Oral <User Schedule>  ursodiol Capsule 300 milliGRAM(s) Oral two times a day  valGANciclovir 450 milliGRAM(s) Oral every 24 hours    MEDICATIONS  (PRN):  acetaminophen     Tablet .. 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      GERD (gastroesophageal reflux disease)      Skin cancer, basal cell      H/O lumpectomy      History of removal of skin mole      H/O ganglion cyst          Vital Signs Last 24 Hrs  T(C): 36.7 (14 Jan 2025 15:10), Max: 36.8 (13 Jan 2025 16:52)  T(F): 98 (14 Jan 2025 15:10), Max: 98.2 (13 Jan 2025 16:52)  HR: 77 (14 Jan 2025 15:10) (75 - 89)  BP: 128/74 (14 Jan 2025 15:10) (107/58 - 136/77)  BP(mean): 80 (14 Jan 2025 04:31) (80 - 80)  RR: 18 (14 Jan 2025 15:10) (18 - 18)  SpO2: 99% (14 Jan 2025 15:10) (97% - 100%)    Parameters below as of 14 Jan 2025 15:10  Patient On (Oxygen Delivery Method): room air        I&O's Summary    13 Jan 2025 07:01  -  14 Jan 2025 07:00  --------------------------------------------------------  IN: 2200 mL / OUT: 1950 mL / NET: 250 mL                              7.5    8.68  )-----------( 148      ( 14 Jan 2025 15:46 )             23.1     01-14    133[L]  |  95[L]  |  26[H]  ----------------------------<  91  4.2   |  23  |  1.06    Ca    8.2[L]      14 Jan 2025 06:11  Phos  3.0     01-14  Mg     2.0     01-14    TPro  5.5[L]  /  Alb  2.8[L]  /  TBili  1.3[H]  /  DBili  x   /  AST  12  /  ALT  23  /  AlkPhos  180[H]  01-14    Tacrolimus (), Serum: 11.8 ng/mL (01-14 @ 06:11)          Review of systems  Patient had BMs.   All other systems were reviewed and are negative, except as noted.       PHYSICAL EXAM:  Constitutional: Well developed / well nourished  Eyes: PERRLA  ENMT: nc/at,   Neck: supple  Respiratory: CTA B/L  Cardiovascular: RRR  Gastrointestinal: Soft abdomen, Distended, generalized pain with palpation. dressing c/d/i.  Genitourinary: voiding spontaneously   Extremities: SCD's in place and working bilaterally, edema in R LE   Vascular: Palpable dp pulses bilaterally.   Neurological: A&O x3, waking up  Skin: no rashes, ulcerations, lesions  Musculoskeletal: Moving all extremities  Psychiatric: Responsive

## 2025-01-14 NOTE — PROVIDER CONTACT NOTE (CRITICAL VALUE NOTIFICATION) - BACKGROUND
MARLYN edwards, now s/p OLT on 1/4/2025 and extubated post-op. SICU consulted for hemodynamic monitoring.

## 2025-01-14 NOTE — RAPID RESPONSE TEAM SUMMARY - NSADDTLFINDINGSRRT_GEN_ALL_CORE
On arrival, VS: 89F, /61, HR 72, RR 17, O2 sat 100%. On PE, pt AOx3 and reports focal L chest pain under L breast iso pt receiving 1U pRBC transfusion. Pt denies any shortness of breath, any radiation down arm, back pain, and abdominal pain. EKG taken during RRT w/o any acute ST changes and unchanged from prior EKG. Reviewed Tele which showed no events. Pre-op TTE reviewed. Per primary team, pt was given aggressive bowel regimen yesterday and pt's  reports pt has been having numerous bowel movements overnight. Less concern for ACS iso baseline EKG without ST changes. Suspect chest pain iso gas pain vs poss GERD. Advised primary team to f/u labs, give PPI and maalox PRN

## 2025-01-15 ENCOUNTER — APPOINTMENT (OUTPATIENT)
Dept: TRANSPLANT | Facility: CLINIC | Age: 65
End: 2025-01-15

## 2025-01-15 LAB
ADD ON TEST-SPECIMEN IN LAB: SIGNIFICANT CHANGE UP
ALBUMIN SERPL ELPH-MCNC: 2.5 G/DL — LOW (ref 3.3–5)
ALP SERPL-CCNC: 150 U/L — HIGH (ref 40–120)
ALT FLD-CCNC: 16 U/L — SIGNIFICANT CHANGE UP (ref 10–45)
ANION GAP SERPL CALC-SCNC: 9 MMOL/L — SIGNIFICANT CHANGE UP (ref 5–17)
APTT BLD: 28.7 SEC — SIGNIFICANT CHANGE UP (ref 24.5–35.6)
AST SERPL-CCNC: 8 U/L — LOW (ref 10–40)
BILIRUB SERPL-MCNC: 0.8 MG/DL — SIGNIFICANT CHANGE UP (ref 0.2–1.2)
BUN SERPL-MCNC: 26 MG/DL — HIGH (ref 7–23)
CALCIUM SERPL-MCNC: 7.8 MG/DL — LOW (ref 8.4–10.5)
CHLORIDE SERPL-SCNC: 101 MMOL/L — SIGNIFICANT CHANGE UP (ref 96–108)
CO2 SERPL-SCNC: 21 MMOL/L — LOW (ref 22–31)
CREAT SERPL-MCNC: 1.16 MG/DL — SIGNIFICANT CHANGE UP (ref 0.5–1.3)
EGFR: 53 ML/MIN/1.73M2 — LOW
GAS PNL BLDV: SIGNIFICANT CHANGE UP
GLUCOSE BLDC GLUCOMTR-MCNC: 110 MG/DL — HIGH (ref 70–99)
GLUCOSE BLDC GLUCOMTR-MCNC: 165 MG/DL — HIGH (ref 70–99)
GLUCOSE BLDC GLUCOMTR-MCNC: 178 MG/DL — HIGH (ref 70–99)
GLUCOSE BLDC GLUCOMTR-MCNC: 216 MG/DL — HIGH (ref 70–99)
GLUCOSE SERPL-MCNC: 120 MG/DL — HIGH (ref 70–99)
HCT VFR BLD CALC: 21.3 % — LOW (ref 34.5–45)
HGB BLD-MCNC: 7 G/DL — CRITICAL LOW (ref 11.5–15.5)
INR BLD: 1.7 RATIO — HIGH (ref 0.85–1.16)
LDH SERPL L TO P-CCNC: 134 U/L — SIGNIFICANT CHANGE UP (ref 50–242)
MAGNESIUM SERPL-MCNC: 1.8 MG/DL — SIGNIFICANT CHANGE UP (ref 1.6–2.6)
MCHC RBC-ENTMCNC: 29.9 PG — SIGNIFICANT CHANGE UP (ref 27–34)
MCHC RBC-ENTMCNC: 32.9 G/DL — SIGNIFICANT CHANGE UP (ref 32–36)
MCV RBC AUTO: 91 FL — SIGNIFICANT CHANGE UP (ref 80–100)
NRBC # BLD: 0 /100 WBCS — SIGNIFICANT CHANGE UP (ref 0–0)
PHOSPHATE SERPL-MCNC: 2.6 MG/DL — SIGNIFICANT CHANGE UP (ref 2.5–4.5)
PLATELET # BLD AUTO: 150 K/UL — SIGNIFICANT CHANGE UP (ref 150–400)
POTASSIUM SERPL-MCNC: 4.1 MMOL/L — SIGNIFICANT CHANGE UP (ref 3.5–5.3)
POTASSIUM SERPL-SCNC: 4.1 MMOL/L — SIGNIFICANT CHANGE UP (ref 3.5–5.3)
PROT SERPL-MCNC: 5.1 G/DL — LOW (ref 6–8.3)
PROTHROM AB SERPL-ACNC: 19.3 SEC — HIGH (ref 9.9–13.4)
RBC # BLD: 2.34 M/UL — LOW (ref 3.8–5.2)
RBC # FLD: 18.2 % — HIGH (ref 10.3–14.5)
RETICS #: 133.9 K/UL — HIGH (ref 25–125)
RETICS/RBC NFR: 5.7 % — HIGH (ref 0.5–2.5)
SODIUM SERPL-SCNC: 131 MMOL/L — LOW (ref 135–145)
TACROLIMUS SERPL-MCNC: 9.3 NG/ML — SIGNIFICANT CHANGE UP
TROPONIN T, HIGH SENSITIVITY RESULT: 21 NG/L — SIGNIFICANT CHANGE UP (ref 0–51)
WBC # BLD: 6.69 K/UL — SIGNIFICANT CHANGE UP (ref 3.8–10.5)
WBC # FLD AUTO: 6.69 K/UL — SIGNIFICANT CHANGE UP (ref 3.8–10.5)

## 2025-01-15 PROCEDURE — 76705 ECHO EXAM OF ABDOMEN: CPT | Mod: 26,59

## 2025-01-15 PROCEDURE — 93975 VASCULAR STUDY: CPT | Mod: 26

## 2025-01-15 RX ORDER — SODIUM PHOSPHATE, MONOBASIC, MONOHYDRATE AND SODIUM PHOSPHATE, DIBASIC ANHYDROUS 142; 276 MG/ML; MG/ML
15 INJECTION, SOLUTION INTRAVENOUS ONCE
Refills: 0 | Status: COMPLETED | OUTPATIENT
Start: 2025-01-15 | End: 2025-01-15

## 2025-01-15 RX ORDER — MAG HYDROX/ALUMINUM HYD/SIMETH 200-200-20
30 SUSPENSION, ORAL (FINAL DOSE FORM) ORAL ONCE
Refills: 0 | Status: COMPLETED | OUTPATIENT
Start: 2025-01-15 | End: 2025-01-15

## 2025-01-15 RX ORDER — OXYCODONE HCL 15 MG
5 TABLET ORAL ONCE
Refills: 0 | Status: DISCONTINUED | OUTPATIENT
Start: 2025-01-15 | End: 2025-01-15

## 2025-01-15 RX ORDER — MAGNESIUM SULFATE 500 MG/ML
2 INJECTION, SOLUTION INTRAMUSCULAR; INTRAVENOUS ONCE
Refills: 0 | Status: COMPLETED | OUTPATIENT
Start: 2025-01-15 | End: 2025-01-15

## 2025-01-15 RX ADMIN — Medication 2: at 17:20

## 2025-01-15 RX ADMIN — Medication 30 MILLILITER(S): at 23:26

## 2025-01-15 RX ADMIN — TACROLIMUS 2 MILLIGRAM(S): 0.5 CAPSULE ORAL at 08:14

## 2025-01-15 RX ADMIN — URSODIOL 300 MILLIGRAM(S): 250 TABLET, FILM COATED ORAL at 06:53

## 2025-01-15 RX ADMIN — MYCOPHENOLATE MOFETIL 1000 MILLIGRAM(S): 250 CAPSULE ORAL at 08:14

## 2025-01-15 RX ADMIN — Medication 1 TABLET(S): at 17:19

## 2025-01-15 RX ADMIN — SENNOSIDES 2 TABLET(S): 8.6 TABLET, FILM COATED ORAL at 21:26

## 2025-01-15 RX ADMIN — TRAMADOL HYDROCHLORIDE 25 MILLIGRAM(S): 50 TABLET ORAL at 02:30

## 2025-01-15 RX ADMIN — Medication 17 GRAM(S): at 17:19

## 2025-01-15 RX ADMIN — Medication 1 TABLET(S): at 06:52

## 2025-01-15 RX ADMIN — VALGANCICLOVIR 450 MILLIGRAM(S): 450 TABLET, FILM COATED ORAL at 11:40

## 2025-01-15 RX ADMIN — Medication 40 MILLIGRAM(S): at 06:52

## 2025-01-15 RX ADMIN — URSODIOL 300 MILLIGRAM(S): 250 TABLET, FILM COATED ORAL at 17:19

## 2025-01-15 RX ADMIN — TACROLIMUS 1 MILLIGRAM(S): 0.5 CAPSULE ORAL at 20:52

## 2025-01-15 RX ADMIN — FLUCONAZOLE 200 MILLIGRAM(S): 200 TABLET ORAL at 11:40

## 2025-01-15 RX ADMIN — PANTOPRAZOLE 40 MILLIGRAM(S): 40 TABLET, DELAYED RELEASE ORAL at 06:53

## 2025-01-15 RX ADMIN — CHLORHEXIDINE GLUCONATE 1 APPLICATION(S): 1.2 RINSE ORAL at 07:07

## 2025-01-15 RX ADMIN — TRAMADOL HYDROCHLORIDE 50 MILLIGRAM(S): 50 TABLET ORAL at 23:12

## 2025-01-15 RX ADMIN — Medication 20 MILLIGRAM(S): at 06:53

## 2025-01-15 RX ADMIN — SODIUM PHOSPHATE, MONOBASIC, MONOHYDRATE AND SODIUM PHOSPHATE, DIBASIC ANHYDROUS 63.75 MILLIMOLE(S): 142; 276 INJECTION, SOLUTION INTRAVENOUS at 16:23

## 2025-01-15 RX ADMIN — ATOVAQUONE 1500 MILLIGRAM(S): 750 SUSPENSION ORAL at 11:40

## 2025-01-15 RX ADMIN — TRAMADOL HYDROCHLORIDE 25 MILLIGRAM(S): 50 TABLET ORAL at 01:31

## 2025-01-15 RX ADMIN — Medication 2: at 13:07

## 2025-01-15 RX ADMIN — MYCOPHENOLATE MOFETIL 1000 MILLIGRAM(S): 250 CAPSULE ORAL at 20:52

## 2025-01-15 RX ADMIN — MAGNESIUM SULFATE 25 GRAM(S): 500 INJECTION, SOLUTION INTRAMUSCULAR; INTRAVENOUS at 11:41

## 2025-01-15 NOTE — PROVIDER CONTACT NOTE (CRITICAL VALUE NOTIFICATION) - ACTION/TREATMENT ORDERED:
Nursing care to be continued. EDNA Schmid made aware
Transplant team made aware, day RN to be made aware
continue to monitor and treat as per EMAR
Supplement pt
EDNA Fitch aware. Orders pending

## 2025-01-15 NOTE — PROGRESS NOTE ADULT - ASSESSMENT
64F with a PMHx of breast cancer s/p lumpectomy, HTN, and ETOH cirrhosis, Umbilical Hernia Repair 9/2024, now s/p OLT under Simulect induction on 1/4/25    [ ] POD 11 s/p OLT   - donor graft with replaced R TANG, will follow dopplers daily   - Donor grew staph epi in bld cxs x1; Recipient bld cxs sent 1/4. Per ID: 5 day course of abx completed 1/8/25  -LFTs remain stable  -ASA 81mg daily, eliquis 2.5 BID  -Pain management: Tylenol and Tramadol, no more opiates in setting of constipation  -PT/OT/RD/Spirometry/SCDs  -strict I&Os    [] Anemia   - Hgb 6.9 pt received 1u PRBCs 1/14   - Episode of CP during transfusion. Rapid response called: VS wnl, no-EKG changes, troponin normal.  IV tylenol and simethicone given  - asa/eliquis held - resume as able   - monitor CBC     [ ] Constipation  - Bowel regimen, Golytely prep 2L  - Abd Xray no obstruction  - CT abd. w/ oral contrast - findings Multiple loops of mildly dilated small bowel without transition point. Repeat CT with similar findings.   - Allow patient to walk, watch for nausea   - diet advanced from clear liquid (tolerating) to carbohydrate renal diet     [ ] Immunosuppression  - FK per level, MMF 1/1, pred 20  - SIMULECT completed   - PPx: Valcyte/Mepron/Fluc/PPI/OsCal 64F with a PMHx of breast cancer s/p lumpectomy, HTN, and ETOH cirrhosis, Umbilical Hernia Repair 9/2024, now s/p OLT under Simulect induction on 1/4/25    [ ] POD 11 s/p OLT   - good graft function, good flow seen on doppler  - Donor grew staph epi in bld cxs x1; Recipient bld cxs sent 1/4. Per ID: 5 day course of abx completed 1/8/25  -Pain management: Tylenol and Tramadol, no more opiates in setting of constipation  -PT/OT/RD/Spirometry/SCDs  -strict I&Os    [ ] Immunosuppression  - FK per level, MMF 1/1, pred 20  - SIMULECT completed   - PPx: Valcyte/Mepron/Fluc/PPI/OsCal    [] Anemia   - s/p 1u PRBCs 1/14, 1U today  - asa/eliquis held (for artery), resume as able   - no signs of acute bleeding, but will watch closely  - low threshold for CT  - liver doppler reviewed, no new collections    [ ] Constipation/Ileus  - residual pain now improving.   - continue frequent PT/ambulation  - bowel regimen as ordered  - education provided     64F with a PMHx of breast cancer s/p lumpectomy, HTN, and ETOH cirrhosis, Umbilical Hernia Repair 9/2024, now s/p OLT under Simulect induction on 1/4/25    [ ] POD 11 s/p OLT   - good graft function, good flow seen on doppler  - Donor grew staph epi in bld cxs x1; Recipient bld cxs sent 1/4. Per ID: 5 day course of abx completed 1/8/25  -Pain management: Tylenol and Tramadol, no more opiates in setting of constipation  -PT/OT/RD/Spirometry/SCDs  -strict I&Os  -Lasix 40QD for LE edema, will assess daily    [ ] Immunosuppression  - FK per level, MMF 1/1, pred 20  - SIMULECT completed   - PPx: Valcyte/Mepron/Fluc/PPI/OsCal    [] Anemia   - s/p 1u PRBCs 1/14, 1U today  - asa/eliquis held (for artery), resume as able   - no signs of acute bleeding, but will watch closely  - low threshold for CT  - liver doppler reviewed, no new collections    [ ] Constipation/Ileus  - residual pain now improving.   - continue frequent PT/ambulation  - bowel regimen as ordered  - education provided

## 2025-01-15 NOTE — PROGRESS NOTE ADULT - SUBJECTIVE AND OBJECTIVE BOX
Transplant Surgery - Multidisciplinary Rounds  --------------------------------------------------------------  OLTx   1/4/2025   POD#10  CMV +/+    Patient seen and examined with multidisciplinary Transplant team including Surgeon Dr. Fernandez, fellow Dr. rPesley, FELIPE Martinez/Queenie, Hepatologist Dr. Spicer, Pharmacist and bedside RN during AM rounds.   Disciplines not in attendance will be notified of the plan.     HPI: 64F with a PMHx of breast cancer s/p lumpectomy, HTN, and ETOH cirrhosis, Umbilical Hernia Repair 9/2024, now s/p OLT under Simulect induction on 1/4/25    Interval Events:  - afebrile, VSS  - S/p golytely  - having bowel movements  - Hgb 6.9 - 1U PBRC transfused, RRT called for c/o chest pain  - EKG wnl, Trops normal, VSS  - Lactate 3.1 - s/p albumin     Immunosuppression:  -FK per level, MMF 1/1, SST  -SIMULECT completed  -Ongoing monitoring for signs of rejection    Potential Discharge date: 1/15/25  Education:  Medications  Plan of care:  See Below      TX data here     Review of systems  Patient had BMs.   All other systems were reviewed and are negative, except as noted.       PHYSICAL EXAM:  Constitutional: Well developed / well nourished  Eyes: PERRLA  ENMT: nc/at,   Neck: supple  Respiratory: CTA B/L  Cardiovascular: RRR  Gastrointestinal: Soft abdomen, Distended, generalized pain with palpation. dressing c/d/i.  Genitourinary: voiding spontaneously   Extremities: SCD's in place and working bilaterally, edema in R LE   Vascular: Palpable dp pulses bilaterally.   Neurological: A&O x3, waking up  Skin: no rashes, ulcerations, lesions  Musculoskeletal: Moving all extremities  Psychiatric: Responsive Transplant Surgery - Multidisciplinary Progress Note  --------------------------------------------------------------  OLTx   1/4/2025   POD#11  CMV +/+    Patient seen and examined with multidisciplinary Transplant team including Surgeon Dr. Fernandez, PA Juan/NP Palmira, Hepatologist Dr. Spicer, Pharmacist Ar and bedside RN during AM rounds.   Disciplines not in attendance will be notified of the plan.     HPI: 64F with a PMHx of breast cancer s/p lumpectomy, HTN, and ETOH cirrhosis, Umbilical Hernia Repair 9/2024, now s/p OLT under Simulect induction on 1/4/25    Interval Events:  - afebrile, VSS  - S/p golytely  - having bowel movements  - Hgb 6.9 - 1U PBRC transfused, RRT called for c/o chest pain, no ACS concerns. no HD instability.  Resolved with bowel regimen and ambulation  - good graft function  - some R sided TTP    Immunosuppression:  -FK per level, MMF 1/1, SST  -SIMULECT completed  -Ongoing monitoring for signs of rejection    Potential Discharge date: pending  Education:  Medications  Plan of care:  See Below      MEDICATIONS  (STANDING):  atovaquone  Suspension 1500 milliGRAM(s) Oral daily  calcium carbonate 1250 mG  + Vitamin D (OsCal 500 + D) 1 Tablet(s) Oral two times a day  chlorhexidine 2% Cloths 1 Application(s) Topical <User Schedule>  fluconAZOLE   Tablet 200 milliGRAM(s) Oral every 24 hours  furosemide    Tablet 40 milliGRAM(s) Oral daily  influenza   Vaccine 0.5 milliLiter(s) IntraMuscular once  insulin lispro (ADMELOG) corrective regimen sliding scale   SubCutaneous three times a day before meals  insulin lispro (ADMELOG) corrective regimen sliding scale   SubCutaneous at bedtime  mycophenolate mofetil 1000 milliGRAM(s) Oral <User Schedule>  pantoprazole    Tablet 40 milliGRAM(s) Oral before breakfast  polyethylene glycol 3350 17 Gram(s) Oral two times a day  predniSONE   Tablet 20 milliGRAM(s) Oral daily  senna 2 Tablet(s) Oral at bedtime  sodium phosphate 15 milliMole(s)/250 mL IVPB 15 milliMole(s) IV Intermittent once  tacrolimus 2 milliGRAM(s) Oral <User Schedule>  tacrolimus 1 milliGRAM(s) Oral <User Schedule>  ursodiol Capsule 300 milliGRAM(s) Oral two times a day  valGANciclovir 450 milliGRAM(s) Oral every 24 hours    MEDICATIONS  (PRN):  acetaminophen     Tablet .. 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      GERD (gastroesophageal reflux disease)      Skin cancer, basal cell      H/O lumpectomy      History of removal of skin mole      H/O ganglion cyst          Vital Signs Last 24 Hrs  T(C): 36.8 (15 Keith 2025 13:15), Max: 36.8 (15 Keith 2025 13:15)  T(F): 98.2 (15 Keith 2025 13:15), Max: 98.2 (15 Keith 2025 13:15)  HR: 64 (15 Keith 2025 16:07) (64 - 88)  BP: 120/66 (15 Keith 2025 16:07) (109/55 - 120/69)  BP(mean): 89 (15 Keith 2025 16:07) (81 - 89)  RR: 18 (15 Keith 2025 16:07) (18 - 18)  SpO2: 99% (15 Keith 2025 16:07) (97% - 100%)    Parameters below as of 15 Keith 2025 16:07  Patient On (Oxygen Delivery Method): room air        I&O's Summary    14 Jan 2025 07:01  -  15 Keith 2025 07:00  --------------------------------------------------------  IN: 1080 mL / OUT: 1600 mL / NET: -520 mL    15 Keith 2025 07:01  -  15 Jan 2025 16:12  --------------------------------------------------------  IN: 750 mL / OUT: 100 mL / NET: 650 mL                              7.0    6.69  )-----------( 150      ( 15 Keith 2025 04:24 )             21.3     01-15    131[L]  |  101  |  26[H]  ----------------------------<  120[H]  4.1   |  21[L]  |  1.16    Ca    7.8[L]      15 Keith 2025 04:24  Phos  2.6     01-15  Mg     1.8     01-15    TPro  5.1[L]  /  Alb  2.5[L]  /  TBili  0.8  /  DBili  x   /  AST  8[L]  /  ALT  16  /  AlkPhos  150[H]  01-15    Tacrolimus (), Serum: 9.3 ng/mL (01-15 @ 04:24)                  Review of systems  Patient had BMs.   All other systems were reviewed and are negative, except as noted.       PHYSICAL EXAM:  Constitutional: Well developed / well nourished  Eyes: PERRLA  ENMT: nc/at,   Neck: supple  Respiratory: CTA B/L  Cardiovascular: RRR  Gastrointestinal: Soft abdomen, less distended.  mild R sided TTP, no ecchymosis incision c/d/i. drains all removed  Genitourinary: voiding spontaneously   Extremities: SCD's in place and working bilaterally, edema improving on lasix  Vascular: Palpable dp pulses bilaterally.   Neurological: A&O x3, waking up  Skin: no rashes, ulcerations, lesions  Musculoskeletal: Moving all extremities  Psychiatric: Responsive

## 2025-01-15 NOTE — PROVIDER CONTACT NOTE (CRITICAL VALUE NOTIFICATION) - ASSESSMENT
Vs a charted, no s/s of distress.
AO4, pt denies CP or SOB, EKG NSR, vitals as charted
Pt A&Ox4. Pt resting comfortably in bed, offering no complaints at this time
VS as charted. No s/s of distress.
Pt stable at this time

## 2025-01-16 VITALS
DIASTOLIC BLOOD PRESSURE: 68 MMHG | HEART RATE: 66 BPM | RESPIRATION RATE: 18 BRPM | TEMPERATURE: 98 F | OXYGEN SATURATION: 98 % | SYSTOLIC BLOOD PRESSURE: 121 MMHG

## 2025-01-16 LAB
ALBUMIN SERPL ELPH-MCNC: 3 G/DL — LOW (ref 3.3–5)
ALP SERPL-CCNC: 163 U/L — HIGH (ref 40–120)
ALT FLD-CCNC: 14 U/L — SIGNIFICANT CHANGE UP (ref 10–45)
ANION GAP SERPL CALC-SCNC: 10 MMOL/L — SIGNIFICANT CHANGE UP (ref 5–17)
APTT BLD: 25 SEC — SIGNIFICANT CHANGE UP (ref 24.5–35.6)
AST SERPL-CCNC: 8 U/L — LOW (ref 10–40)
BILIRUB SERPL-MCNC: 0.8 MG/DL — SIGNIFICANT CHANGE UP (ref 0.2–1.2)
BUN SERPL-MCNC: 24 MG/DL — HIGH (ref 7–23)
CALCIUM SERPL-MCNC: 8 MG/DL — LOW (ref 8.4–10.5)
CHLORIDE SERPL-SCNC: 102 MMOL/L — SIGNIFICANT CHANGE UP (ref 96–108)
CO2 SERPL-SCNC: 22 MMOL/L — SIGNIFICANT CHANGE UP (ref 22–31)
CREAT SERPL-MCNC: 0.99 MG/DL — SIGNIFICANT CHANGE UP (ref 0.5–1.3)
EGFR: 64 ML/MIN/1.73M2 — SIGNIFICANT CHANGE UP
GLUCOSE BLDC GLUCOMTR-MCNC: 114 MG/DL — HIGH (ref 70–99)
GLUCOSE BLDC GLUCOMTR-MCNC: 137 MG/DL — HIGH (ref 70–99)
GLUCOSE SERPL-MCNC: 115 MG/DL — HIGH (ref 70–99)
HAPTOGLOB SERPL-MCNC: 296 MG/DL — HIGH (ref 34–200)
HCT VFR BLD CALC: 26 % — LOW (ref 34.5–45)
HGB BLD-MCNC: 8.5 G/DL — LOW (ref 11.5–15.5)
INR BLD: 1.25 RATIO — HIGH (ref 0.85–1.16)
MAGNESIUM SERPL-MCNC: 2.3 MG/DL — SIGNIFICANT CHANGE UP (ref 1.6–2.6)
MCHC RBC-ENTMCNC: 29.6 PG — SIGNIFICANT CHANGE UP (ref 27–34)
MCHC RBC-ENTMCNC: 32.7 G/DL — SIGNIFICANT CHANGE UP (ref 32–36)
MCV RBC AUTO: 90.6 FL — SIGNIFICANT CHANGE UP (ref 80–100)
NRBC # BLD: 0 /100 WBCS — SIGNIFICANT CHANGE UP (ref 0–0)
PHOSPHATE SERPL-MCNC: 2.9 MG/DL — SIGNIFICANT CHANGE UP (ref 2.5–4.5)
PLATELET # BLD AUTO: 173 K/UL — SIGNIFICANT CHANGE UP (ref 150–400)
POTASSIUM SERPL-MCNC: 4.1 MMOL/L — SIGNIFICANT CHANGE UP (ref 3.5–5.3)
POTASSIUM SERPL-SCNC: 4.1 MMOL/L — SIGNIFICANT CHANGE UP (ref 3.5–5.3)
PROT SERPL-MCNC: 5.4 G/DL — LOW (ref 6–8.3)
PROTHROM AB SERPL-ACNC: 14.4 SEC — HIGH (ref 9.9–13.4)
RBC # BLD: 2.87 M/UL — LOW (ref 3.8–5.2)
RBC # FLD: 18 % — HIGH (ref 10.3–14.5)
SODIUM SERPL-SCNC: 134 MMOL/L — LOW (ref 135–145)
TACROLIMUS SERPL-MCNC: 8.7 NG/ML — SIGNIFICANT CHANGE UP
WBC # BLD: 6.82 K/UL — SIGNIFICANT CHANGE UP (ref 3.8–10.5)
WBC # FLD AUTO: 6.82 K/UL — SIGNIFICANT CHANGE UP (ref 3.8–10.5)

## 2025-01-16 PROCEDURE — 84295 ASSAY OF SERUM SODIUM: CPT

## 2025-01-16 PROCEDURE — 86891 AUTOLOGOUS BLOOD OP SALVAGE: CPT

## 2025-01-16 PROCEDURE — 99232 SBSQ HOSP IP/OBS MODERATE 35: CPT | Mod: GC,24

## 2025-01-16 PROCEDURE — P9040: CPT

## 2025-01-16 PROCEDURE — 86706 HEP B SURFACE ANTIBODY: CPT

## 2025-01-16 PROCEDURE — 84484 ASSAY OF TROPONIN QUANT: CPT

## 2025-01-16 PROCEDURE — 86905 BLOOD TYPING RBC ANTIGENS: CPT

## 2025-01-16 PROCEDURE — 87015 SPECIMEN INFECT AGNT CONCNTJ: CPT

## 2025-01-16 PROCEDURE — 85027 COMPLETE CBC AUTOMATED: CPT

## 2025-01-16 PROCEDURE — 93005 ELECTROCARDIOGRAM TRACING: CPT

## 2025-01-16 PROCEDURE — 88304 TISSUE EXAM BY PATHOLOGIST: CPT

## 2025-01-16 PROCEDURE — P9047: CPT

## 2025-01-16 PROCEDURE — P9012: CPT

## 2025-01-16 PROCEDURE — 88307 TISSUE EXAM BY PATHOLOGIST: CPT

## 2025-01-16 PROCEDURE — 76705 ECHO EXAM OF ABDOMEN: CPT

## 2025-01-16 PROCEDURE — 84702 CHORIONIC GONADOTROPIN TEST: CPT

## 2025-01-16 PROCEDURE — 71045 X-RAY EXAM CHEST 1 VIEW: CPT

## 2025-01-16 PROCEDURE — 97116 GAIT TRAINING THERAPY: CPT

## 2025-01-16 PROCEDURE — 82947 ASSAY GLUCOSE BLOOD QUANT: CPT

## 2025-01-16 PROCEDURE — C9399: CPT

## 2025-01-16 PROCEDURE — 82553 CREATINE MB FRACTION: CPT

## 2025-01-16 PROCEDURE — 93975 VASCULAR STUDY: CPT

## 2025-01-16 PROCEDURE — 86900 BLOOD TYPING SEROLOGIC ABO: CPT

## 2025-01-16 PROCEDURE — 93970 EXTREMITY STUDY: CPT

## 2025-01-16 PROCEDURE — 85396 CLOTTING ASSAY WHOLE BLOOD: CPT

## 2025-01-16 PROCEDURE — 86923 COMPATIBILITY TEST ELECTRIC: CPT

## 2025-01-16 PROCEDURE — 82140 ASSAY OF AMMONIA: CPT

## 2025-01-16 PROCEDURE — 85045 AUTOMATED RETICULOCYTE COUNT: CPT

## 2025-01-16 PROCEDURE — 87086 URINE CULTURE/COLONY COUNT: CPT

## 2025-01-16 PROCEDURE — 86663 EPSTEIN-BARR ANTIBODY: CPT

## 2025-01-16 PROCEDURE — 97530 THERAPEUTIC ACTIVITIES: CPT

## 2025-01-16 PROCEDURE — 84132 ASSAY OF SERUM POTASSIUM: CPT

## 2025-01-16 PROCEDURE — 36415 COLL VENOUS BLD VENIPUNCTURE: CPT

## 2025-01-16 PROCEDURE — 87340 HEPATITIS B SURFACE AG IA: CPT

## 2025-01-16 PROCEDURE — 74018 RADEX ABDOMEN 1 VIEW: CPT

## 2025-01-16 PROCEDURE — 82803 BLOOD GASES ANY COMBINATION: CPT

## 2025-01-16 PROCEDURE — 86665 EPSTEIN-BARR CAPSID VCA: CPT

## 2025-01-16 PROCEDURE — C1889: CPT

## 2025-01-16 PROCEDURE — 82435 ASSAY OF BLOOD CHLORIDE: CPT

## 2025-01-16 PROCEDURE — 87205 SMEAR GRAM STAIN: CPT

## 2025-01-16 PROCEDURE — 86644 CMV ANTIBODY: CPT

## 2025-01-16 PROCEDURE — 87070 CULTURE OTHR SPECIMN AEROBIC: CPT

## 2025-01-16 PROCEDURE — 85025 COMPLETE CBC W/AUTO DIFF WBC: CPT

## 2025-01-16 PROCEDURE — 87075 CULTR BACTERIA EXCEPT BLOOD: CPT

## 2025-01-16 PROCEDURE — 82330 ASSAY OF CALCIUM: CPT

## 2025-01-16 PROCEDURE — 87040 BLOOD CULTURE FOR BACTERIA: CPT

## 2025-01-16 PROCEDURE — 87635 SARS-COV-2 COVID-19 AMP PRB: CPT

## 2025-01-16 PROCEDURE — 86965 POOLING BLOOD PLATELETS: CPT

## 2025-01-16 PROCEDURE — 86803 HEPATITIS C AB TEST: CPT

## 2025-01-16 PROCEDURE — 83615 LACTATE (LD) (LDH) ENZYME: CPT

## 2025-01-16 PROCEDURE — 87389 HIV-1 AG W/HIV-1&-2 AB AG IA: CPT

## 2025-01-16 PROCEDURE — 83010 ASSAY OF HAPTOGLOBIN QUANT: CPT

## 2025-01-16 PROCEDURE — 74176 CT ABD & PELVIS W/O CONTRAST: CPT | Mod: MC

## 2025-01-16 PROCEDURE — 88342 IMHCHEM/IMCYTCHM 1ST ANTB: CPT

## 2025-01-16 PROCEDURE — 82977 ASSAY OF GGT: CPT

## 2025-01-16 PROCEDURE — 88313 SPECIAL STAINS GROUP 2: CPT

## 2025-01-16 PROCEDURE — 85018 HEMOGLOBIN: CPT

## 2025-01-16 PROCEDURE — 86787 VARICELLA-ZOSTER ANTIBODY: CPT

## 2025-01-16 PROCEDURE — 86704 HEP B CORE ANTIBODY TOTAL: CPT

## 2025-01-16 PROCEDURE — 97162 PT EVAL MOD COMPLEX 30 MIN: CPT

## 2025-01-16 PROCEDURE — 86880 COOMBS TEST DIRECT: CPT

## 2025-01-16 PROCEDURE — 80053 COMPREHEN METABOLIC PANEL: CPT

## 2025-01-16 PROCEDURE — P9037: CPT

## 2025-01-16 PROCEDURE — 85730 THROMBOPLASTIN TIME PARTIAL: CPT

## 2025-01-16 PROCEDURE — 85610 PROTHROMBIN TIME: CPT

## 2025-01-16 PROCEDURE — 97166 OT EVAL MOD COMPLEX 45 MIN: CPT

## 2025-01-16 PROCEDURE — 83605 ASSAY OF LACTIC ACID: CPT

## 2025-01-16 PROCEDURE — 86664 EPSTEIN-BARR NUCLEAR ANTIGEN: CPT

## 2025-01-16 PROCEDURE — 84100 ASSAY OF PHOSPHORUS: CPT

## 2025-01-16 PROCEDURE — P9045: CPT

## 2025-01-16 PROCEDURE — 82550 ASSAY OF CK (CPK): CPT

## 2025-01-16 PROCEDURE — 85384 FIBRINOGEN ACTIVITY: CPT

## 2025-01-16 PROCEDURE — C1751: CPT

## 2025-01-16 PROCEDURE — 82962 GLUCOSE BLOOD TEST: CPT

## 2025-01-16 PROCEDURE — 83880 ASSAY OF NATRIURETIC PEPTIDE: CPT

## 2025-01-16 PROCEDURE — 86850 RBC ANTIBODY SCREEN: CPT

## 2025-01-16 PROCEDURE — 83735 ASSAY OF MAGNESIUM: CPT

## 2025-01-16 PROCEDURE — 97110 THERAPEUTIC EXERCISES: CPT

## 2025-01-16 PROCEDURE — 87522 HEPATITIS C REVRS TRNSCRPJ: CPT

## 2025-01-16 PROCEDURE — 86901 BLOOD TYPING SEROLOGIC RH(D): CPT

## 2025-01-16 PROCEDURE — 36430 TRANSFUSION BLD/BLD COMPNT: CPT

## 2025-01-16 PROCEDURE — C1769: CPT

## 2025-01-16 PROCEDURE — 81003 URINALYSIS AUTO W/O SCOPE: CPT

## 2025-01-16 PROCEDURE — 88309 TISSUE EXAM BY PATHOLOGIST: CPT

## 2025-01-16 PROCEDURE — 85014 HEMATOCRIT: CPT

## 2025-01-16 PROCEDURE — P9059: CPT

## 2025-01-16 PROCEDURE — 80197 ASSAY OF TACROLIMUS: CPT

## 2025-01-16 PROCEDURE — P9100: CPT

## 2025-01-16 RX ORDER — ATOVAQUONE 750 MG/5ML
10 SUSPENSION ORAL
Qty: 300 | Refills: 0
Start: 2025-01-16 | End: 2025-02-14

## 2025-01-16 RX ORDER — TACROLIMUS 0.5 MG/1
2 CAPSULE ORAL
Qty: 0 | Refills: 0 | DISCHARGE
Start: 2025-01-16

## 2025-01-16 RX ORDER — TACROLIMUS 0.5 MG/1
1 CAPSULE ORAL
Qty: 0 | Refills: 0 | DISCHARGE
Start: 2025-01-16

## 2025-01-16 RX ORDER — APIXABAN 5 MG/1
2.5 TABLET, FILM COATED ORAL EVERY 12 HOURS
Refills: 0 | Status: DISCONTINUED | OUTPATIENT
Start: 2025-01-16 | End: 2025-01-16

## 2025-01-16 RX ORDER — ASPIRIN 81 MG
81 TABLET, DELAYED RELEASE (ENTERIC COATED) ORAL DAILY
Refills: 0 | Status: DISCONTINUED | OUTPATIENT
Start: 2025-01-16 | End: 2025-01-16

## 2025-01-16 RX ADMIN — Medication 40 MILLIGRAM(S): at 05:40

## 2025-01-16 RX ADMIN — FLUCONAZOLE 200 MILLIGRAM(S): 200 TABLET ORAL at 11:31

## 2025-01-16 RX ADMIN — Medication 20 MILLIGRAM(S): at 05:40

## 2025-01-16 RX ADMIN — MYCOPHENOLATE MOFETIL 1000 MILLIGRAM(S): 250 CAPSULE ORAL at 08:08

## 2025-01-16 RX ADMIN — TACROLIMUS 2 MILLIGRAM(S): 0.5 CAPSULE ORAL at 08:07

## 2025-01-16 RX ADMIN — TRAMADOL HYDROCHLORIDE 25 MILLIGRAM(S): 50 TABLET ORAL at 05:54

## 2025-01-16 RX ADMIN — ATOVAQUONE 1500 MILLIGRAM(S): 750 SUSPENSION ORAL at 11:31

## 2025-01-16 RX ADMIN — PANTOPRAZOLE 40 MILLIGRAM(S): 40 TABLET, DELAYED RELEASE ORAL at 05:40

## 2025-01-16 RX ADMIN — TRAMADOL HYDROCHLORIDE 50 MILLIGRAM(S): 50 TABLET ORAL at 00:15

## 2025-01-16 RX ADMIN — TRAMADOL HYDROCHLORIDE 25 MILLIGRAM(S): 50 TABLET ORAL at 07:00

## 2025-01-16 RX ADMIN — URSODIOL 300 MILLIGRAM(S): 250 TABLET, FILM COATED ORAL at 05:44

## 2025-01-16 RX ADMIN — TRAMADOL HYDROCHLORIDE 50 MILLIGRAM(S): 50 TABLET ORAL at 11:31

## 2025-01-16 RX ADMIN — Medication 1 TABLET(S): at 05:40

## 2025-01-16 RX ADMIN — VALGANCICLOVIR 450 MILLIGRAM(S): 450 TABLET, FILM COATED ORAL at 11:31

## 2025-01-16 RX ADMIN — TRAMADOL HYDROCHLORIDE 50 MILLIGRAM(S): 50 TABLET ORAL at 12:30

## 2025-01-16 RX ADMIN — Medication 17 GRAM(S): at 05:41

## 2025-01-16 NOTE — PROGRESS NOTE ADULT - PROVIDER SPECIALTY LIST ADULT
Pharmacy
Transplant Surgery
SICU
Transplant Surgery

## 2025-01-16 NOTE — PROGRESS NOTE ADULT - NS ATTEND AMEND GEN_ALL_CORE FT
As above...    POD#9 s/p OLT with good liver function  +severe constipation. Cont bowel regimen. given enema and lactulose. Now passing flatus  Immunosuppression - Decrease to FK 3/2; Cont pred 20; MMF 1gm bid  Prophylaxis with fluconazole, mepron (due to hyperK), valcyte  PT  medication teaching
U/S this morning reviewed.   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  ID to consult  BID labs  Immuno: tac 0.5 BID to start, MMF 1gm BID, steroid taper.  D/C Kizzy ERICKSON foley, NGT  D/C JP1
constipation resolved.  Immuno: tac 9.3 --red to 2/1, cellcept 1gm BID, pred 20  Eliquis ppx dosing.  ASA 81
As above...    POD#10 s/p OLT with good liver function  +severe constipation improving after multiple BM. Cont bowel regimen.  Immunosuppression - Cont FK 3/2; pred 20; MMF 1gm bid  Prophylaxis with fluconazole, mepron (due to hyperK), valcyte  PT  medication teaching

## 2025-01-16 NOTE — PROGRESS NOTE ADULT - ASSESSMENT
64F with a PMHx of breast cancer s/p lumpectomy, HTN, and ETOH cirrhosis, Umbilical Hernia Repair 9/2024, now s/p OLT under Simulect induction on 1/4/25    [ ] POD 12 s/p OLT   - good graft function, good flow seen on doppler  - Donor grew staph epi in bld cxs x1; Recipient bld cxs sent 1/4. Per ID: 5 day course of abx completed 1/8/25  -Pain management: Tylenol and Tramadol, no more opiates in setting of constipation  -PT/OT/RD/Spirometry/SCDs  -strict I&Os  -Lasix 40QD for LE edema    [ ] Immunosuppression  - FK per level, MMF 1/1, pred 20  - SIMULECT completed   - PPx: Valcyte/Mepron/Fluc/PPI/OsCal    [] Anemia   - s/p 1u PRBCs 1/14, 1U 1/15   - asa/eliquis held (for artery), resume as able   - no signs of acute bleeding, but will watch closely  - low threshold for CT  - liver doppler reviewed, no new collections    [ ] Constipation/Ileus  - residual pain now improving.   - continue frequent PT/ambulation  - bowel regimen as ordered  - education provided     64F with a PMHx of breast cancer s/p lumpectomy, HTN, and ETOH cirrhosis, Umbilical Hernia Repair 9/2024, now s/p OLT under Simulect induction on 1/4/25    [ ] POD 12 s/p OLT   - good graft function, good flow seen on repeat doppler  - lactate cleared   - Donor grew staph epi in bld cxs x1; Recipient bld cxs sent 1/4. Per ID: 5 day course of abx completed 1/8/25  -Pain management: Tylenol and Tramadol, no more opiates in setting of constipation  -PT/OT/RD/Spirometry/SCDs  -strict I&Os  -Lasix 40QD for LE edema    [ ] Immunosuppression  - FK per level, MMF 1/1, pred 20  - SIMULECT completed   - PPx: Valcyte/Mepron/Fluc/PPI/OsCal    [] Anemia   - s/p 1u PRBCs 1/14, 1U 1/15   - asa/eliquis resumed   - no signs of acute bleeding, but will watch closely  - low threshold for CT  - liver doppler reviewed, no new collections    [ ] Constipation/Ileus  - residual pain now improving.   - continue frequent PT/ambulation  - bowel regimen as ordered  - education provided    [ ] Dispo  - discharged today 1/16 to home PT

## 2025-01-16 NOTE — PROGRESS NOTE ADULT - NS_MD_PANP_GEN_ALL_CORE
Attending and PA/NP shared services statement (NON-critical care):
73 year old male with afib (on eliquis), HTN,  complete heart block s/p PPM, HLD, HFrEF (30% in 09/2022), and DLBCL (tx with R-CHOP in 2003, with relapse in 2009 treated with BR) now with recently diagnosed grade 3 follicular lymphoma who was transferred from [facility] for acute on chronic encephalopathy likely 2/2 rhino/entero viral pneumonia i/s/o follicular lymphoma and ongoing chemotherapy treatment. Possible etiologies include drug induced 2/2 chemo vs autoimmune vs paraneoplastic. Low likelihood of seizures given no previous history but does have a potential nidus in the left parietal infarct as seen by limited MRI per Neuro.

## 2025-01-16 NOTE — PROGRESS NOTE ADULT - SUBJECTIVE AND OBJECTIVE BOX
Transplant Surgery - Multidisciplinary Progress Note  --------------------------------------------------------------  OLTx   1/4/2025   POD#12   CMV +/+    Patient seen and examined with multidisciplinary Transplant team including Surgeon Dr. Hagen ACP: Thais, Hepatologist Dr. Spicer, Pharmacist Ar and bedside RN during AM rounds.   Disciplines not in attendance will be notified of the plan.     HPI: 64F with a PMHx of breast cancer s/p lumpectomy, HTN, and ETOH cirrhosis, Umbilical Hernia Repair 9/2024, now s/p OLT under Simulect induction on 1/4/25    Interval Events:  - afebrile, VSS  - Hgb 7 - 1U PBRC transfused  - Repeat liver doppler - patent, no hematoma     Immunosuppression:  -FK per level, MMF 1/1, SST  -SIMULECT completed  -Ongoing monitoring for signs of rejection    Potential Discharge date: pending  Education:  Medications  Plan of care:  See Below    TX data here     Review of systems  Patient had BMs.   All other systems were reviewed and are negative, except as noted.       PHYSICAL EXAM:  Constitutional: Well developed / well nourished  Eyes: PERRLA  ENMT: nc/at,   Neck: supple  Respiratory: CTA B/L  Cardiovascular: RRR  Gastrointestinal: Soft abdomen, less distended.  mild R sided TTP, no ecchymosis incision c/d/i. drains all removed  Genitourinary: voiding spontaneously   Extremities: SCD's in place and working bilaterally, edema improving on lasix  Vascular: Palpable dp pulses bilaterally.   Neurological: A&O x3, waking up  Skin: no rashes, ulcerations, lesions  Musculoskeletal: Moving all extremities  Psychiatric: Responsive Transplant Surgery - Multidisciplinary Progress Note  --------------------------------------------------------------  OLTx   1/4/2025   POD#12   CMV +/+    Patient seen and examined with multidisciplinary Transplant team including Surgeon Dr. Hagen ACP: Thais, Hepatologist Dr. Spicer, Pharmacist Ar and bedside RN during AM rounds.   Disciplines not in attendance will be notified of the plan.     HPI: 64F with a PMHx of breast cancer s/p lumpectomy, HTN, and ETOH cirrhosis, Umbilical Hernia Repair 9/2024, now s/p OLT under Simulect induction on 1/4/25    Interval Events:  - afebrile, VSS  - Hgb 7 - 1U PBRC transfused  - Repeat liver doppler - patent, no hematoma     Immunosuppression:  -FK per level, MMF 1/1, SST  -SIMULECT completed  -Ongoing monitoring for signs of rejection    Potential Discharge date: pending  Education:  Medications  Plan of care:  See Below      MEDICATIONS  (STANDING):  apixaban 2.5 milliGRAM(s) Oral every 12 hours  aspirin enteric coated 81 milliGRAM(s) Oral daily  atovaquone  Suspension 1500 milliGRAM(s) Oral daily  calcium carbonate 1250 mG  + Vitamin D (OsCal 500 + D) 1 Tablet(s) Oral two times a day  chlorhexidine 2% Cloths 1 Application(s) Topical <User Schedule>  fluconAZOLE   Tablet 200 milliGRAM(s) Oral every 24 hours  furosemide    Tablet 40 milliGRAM(s) Oral daily  influenza   Vaccine 0.5 milliLiter(s) IntraMuscular once  insulin lispro (ADMELOG) corrective regimen sliding scale   SubCutaneous three times a day before meals  insulin lispro (ADMELOG) corrective regimen sliding scale   SubCutaneous at bedtime  mycophenolate mofetil 1000 milliGRAM(s) Oral <User Schedule>  pantoprazole    Tablet 40 milliGRAM(s) Oral before breakfast  polyethylene glycol 3350 17 Gram(s) Oral two times a day  predniSONE   Tablet 20 milliGRAM(s) Oral daily  senna 2 Tablet(s) Oral at bedtime  tacrolimus 2 milliGRAM(s) Oral <User Schedule>  tacrolimus 1 milliGRAM(s) Oral <User Schedule>  ursodiol Capsule 300 milliGRAM(s) Oral two times a day  valGANciclovir 450 milliGRAM(s) Oral every 24 hours    MEDICATIONS  (PRN):  acetaminophen     Tablet .. 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      GERD (gastroesophageal reflux disease)      Skin cancer, basal cell      H/O lumpectomy      History of removal of skin mole      H/O ganglion cyst          Vital Signs Last 24 Hrs  T(C): 36.8 (16 Jan 2025 13:00), Max: 36.9 (15 Keith 2025 20:44)  T(F): 98.2 (16 Jan 2025 13:00), Max: 98.4 (15 Keith 2025 20:44)  HR: 66 (16 Jan 2025 13:00) (64 - 76)  BP: 121/68 (16 Jan 2025 13:00) (112/66 - 135/65)  BP(mean): 93 (16 Jan 2025 05:38) (93 - 93)  RR: 18 (16 Jan 2025 13:00) (18 - 18)  SpO2: 98% (16 Jan 2025 13:00) (98% - 100%)    Parameters below as of 16 Jan 2025 13:00  Patient On (Oxygen Delivery Method): room air        I&O's Summary    15 Keith 2025 07:01  -  16 Jan 2025 07:00  --------------------------------------------------------  IN: 1000 mL / OUT: 750 mL / NET: 250 mL    16 Jan 2025 07:01  -  16 Jan 2025 18:17  --------------------------------------------------------  IN: 750 mL / OUT: 650 mL / NET: 100 mL                              8.5    6.82  )-----------( 173      ( 16 Jan 2025 05:48 )             26.0     01-16    134[L]  |  102  |  24[H]  ----------------------------<  115[H]  4.1   |  22  |  0.99    Ca    8.0[L]      16 Jan 2025 05:48  Phos  2.9     01-16  Mg     2.3     01-16    TPro  5.4[L]  /  Alb  3.0[L]  /  TBili  0.8  /  DBili  x   /  AST  8[L]  /  ALT  14  /  AlkPhos  163[H]  01-16    Tacrolimus (), Serum: 8.7 ng/mL (01-16 @ 05:48)                  Review of systems  Patient had BMs.   All other systems were reviewed and are negative, except as noted.       PHYSICAL EXAM:  Constitutional: Well developed / well nourished  Eyes: PERRLA  ENMT: nc/at,   Neck: supple  Respiratory: CTA B/L  Cardiovascular: RRR  Gastrointestinal: Soft abdomen, less distended.  mild R sided TTP, no ecchymosis incision c/d/i. drains all removed  Genitourinary: voiding spontaneously   Extremities: SCD's in place and working bilaterally, edema improving with lasix  Vascular: Palpable dp pulses bilaterally.   Neurological: A&O x3, waking up  Skin: no rashes, ulcerations, lesions  Musculoskeletal: Moving all extremities  Psychiatric: Responsive

## 2025-01-22 ENCOUNTER — APPOINTMENT (OUTPATIENT)
Dept: TRANSPLANT | Facility: CLINIC | Age: 65
End: 2025-01-22

## 2025-01-22 ENCOUNTER — APPOINTMENT (OUTPATIENT)
Dept: TRANSPLANT | Facility: CLINIC | Age: 65
End: 2025-01-22
Payer: COMMERCIAL

## 2025-01-22 VITALS
BODY MASS INDEX: 22.13 KG/M2 | HEIGHT: 68 IN | OXYGEN SATURATION: 98 % | TEMPERATURE: 97.5 F | SYSTOLIC BLOOD PRESSURE: 118 MMHG | WEIGHT: 146 LBS | HEART RATE: 75 BPM | DIASTOLIC BLOOD PRESSURE: 76 MMHG

## 2025-01-22 DIAGNOSIS — K70.31 ALCOHOLIC CIRRHOSIS OF LIVER WITH ASCITES: ICD-10-CM

## 2025-01-22 PROCEDURE — 99214 OFFICE O/P EST MOD 30 MIN: CPT | Mod: 24

## 2025-01-23 ENCOUNTER — APPOINTMENT (OUTPATIENT)
Dept: HEPATOLOGY | Facility: CLINIC | Age: 65
End: 2025-01-23

## 2025-01-27 ENCOUNTER — LABORATORY RESULT (OUTPATIENT)
Age: 65
End: 2025-01-27

## 2025-01-29 ENCOUNTER — APPOINTMENT (OUTPATIENT)
Dept: TRANSPLANT | Facility: CLINIC | Age: 65
End: 2025-01-29

## 2025-01-29 ENCOUNTER — APPOINTMENT (OUTPATIENT)
Dept: TRANSPLANT | Facility: CLINIC | Age: 65
End: 2025-01-29
Payer: COMMERCIAL

## 2025-01-29 VITALS
DIASTOLIC BLOOD PRESSURE: 75 MMHG | OXYGEN SATURATION: 100 % | HEIGHT: 68 IN | WEIGHT: 146 LBS | BODY MASS INDEX: 22.13 KG/M2 | RESPIRATION RATE: 16 BRPM | TEMPERATURE: 97.8 F | HEART RATE: 68 BPM | SYSTOLIC BLOOD PRESSURE: 120 MMHG

## 2025-01-29 DIAGNOSIS — Z79.60 LONG TERM (CURRENT) USE OF UNSPECIFIED IMMUNOMODULATORS AND IMMUNOSUPPRESSANTS: ICD-10-CM

## 2025-01-29 LAB
BASOPHILS # BLD AUTO: 0.03 K/UL
BASOPHILS NFR BLD AUTO: 0.5 %
CMV DNA SPEC QL NAA+PROBE: NOT DETECTED IU/ML
CMVPCR LOG: NOT DETECTED LOG10IU/ML
EOSINOPHIL # BLD AUTO: 0.18 K/UL
EOSINOPHIL NFR BLD AUTO: 2.8 %
HCT VFR BLD CALC: 30.4 %
HGB BLD-MCNC: 9.6 G/DL
IMM GRANULOCYTES NFR BLD AUTO: 0.6 %
LYMPHOCYTES # BLD AUTO: 0.41 K/UL
LYMPHOCYTES NFR BLD AUTO: 6.3 %
MAN DIFF?: NORMAL
MCHC RBC-ENTMCNC: 30.6 PG
MCHC RBC-ENTMCNC: 31.6 G/DL
MCV RBC AUTO: 96.8 FL
MONOCYTES # BLD AUTO: 0.28 K/UL
MONOCYTES NFR BLD AUTO: 4.3 %
NEUTROPHILS # BLD AUTO: 5.58 K/UL
NEUTROPHILS NFR BLD AUTO: 85.5 %
PLATELET # BLD AUTO: 275 K/UL
RBC # BLD: 3.14 M/UL
RBC # FLD: 19.4 %
TACROLIMUS SERPL-MCNC: 2.8 NG/ML
WBC # FLD AUTO: 6.52 K/UL

## 2025-01-29 PROCEDURE — 99214 OFFICE O/P EST MOD 30 MIN: CPT

## 2025-01-30 RX ORDER — ATOVAQUONE 750 MG/5ML
750 SUSPENSION ORAL DAILY
Qty: 300 | Refills: 5 | Status: ACTIVE | COMMUNITY
Start: 2025-01-30 | End: 1900-01-01

## 2025-02-02 LAB
PETH 16:0/18:1: NEGATIVE NG/ML
PETH 16:0/18:2: NEGATIVE NG/ML
PETH COMMENTS: NORMAL

## 2025-02-03 ENCOUNTER — LABORATORY RESULT (OUTPATIENT)
Age: 65
End: 2025-02-03

## 2025-02-04 LAB
ALBUMIN SERPL ELPH-MCNC: 3.8 G/DL
ALP BLD-CCNC: 284 U/L
ALT SERPL-CCNC: 235 U/L
ANION GAP SERPL CALC-SCNC: 13 MMOL/L
AST SERPL-CCNC: 97 U/L
BASOPHILS # BLD AUTO: 0.02 K/UL
BASOPHILS NFR BLD AUTO: 0.5 %
BILIRUB SERPL-MCNC: 0.7 MG/DL
BUN SERPL-MCNC: 16 MG/DL
CALCIUM SERPL-MCNC: 9.1 MG/DL
CHLORIDE SERPL-SCNC: 107 MMOL/L
CMV DNA SPEC QL NAA+PROBE: NOT DETECTED IU/ML
CMVPCR LOG: NOT DETECTED LOG10IU/ML
CO2 SERPL-SCNC: 22 MMOL/L
CREAT SERPL-MCNC: 0.81 MG/DL
EGFR: 82 ML/MIN/1.73M2
EOSINOPHIL # BLD AUTO: 0.13 K/UL
EOSINOPHIL NFR BLD AUTO: 3.2 %
GGT SERPL-CCNC: 436 U/L
GLUCOSE SERPL-MCNC: 118 MG/DL
HBV SURFACE AB SERPL IA-ACNC: 23.9 MIU/ML
HCT VFR BLD CALC: 30.6 %
HEPB DNA PCR INT: NOT DETECTED
HEPB DNA PCR LOG: NOT DETECTED LOGIU/ML
HGB BLD-MCNC: 9.3 G/DL
IMM GRANULOCYTES NFR BLD AUTO: 0.7 %
LYMPHOCYTES # BLD AUTO: 0.4 K/UL
LYMPHOCYTES NFR BLD AUTO: 10 %
MAGNESIUM SERPL-MCNC: 1.8 MG/DL
MAN DIFF?: NORMAL
MCHC RBC-ENTMCNC: 30.3 PG
MCHC RBC-ENTMCNC: 30.4 G/DL
MCV RBC AUTO: 99.7 FL
MONOCYTES # BLD AUTO: 0.22 K/UL
MONOCYTES NFR BLD AUTO: 5.5 %
NEUTROPHILS # BLD AUTO: 3.21 K/UL
NEUTROPHILS NFR BLD AUTO: 80.1 %
PHOSPHATE SERPL-MCNC: 3.2 MG/DL
PLATELET # BLD AUTO: 155 K/UL
POTASSIUM SERPL-SCNC: 4.4 MMOL/L
PROT SERPL-MCNC: 5.9 G/DL
RBC # BLD: 3.07 M/UL
RBC # FLD: 20.3 %
SODIUM SERPL-SCNC: 142 MMOL/L
TACROLIMUS SERPL-MCNC: 2.7 NG/ML
WBC # FLD AUTO: 4.01 K/UL

## 2025-02-04 RX ORDER — ESOMEPRAZOLE MAGNESIUM 40 MG/1
40 CAPSULE, DELAYED RELEASE ORAL DAILY
Qty: 30 | Refills: 5 | Status: ACTIVE | COMMUNITY
Start: 2025-01-29 | End: 1900-01-01

## 2025-02-05 ENCOUNTER — APPOINTMENT (OUTPATIENT)
Dept: TRANSPLANT | Facility: CLINIC | Age: 65
End: 2025-02-05

## 2025-02-05 ENCOUNTER — OUTPATIENT (OUTPATIENT)
Dept: OUTPATIENT SERVICES | Facility: HOSPITAL | Age: 65
LOS: 1 days | End: 2025-02-05
Payer: COMMERCIAL

## 2025-02-05 VITALS
HEIGHT: 68 IN | OXYGEN SATURATION: 100 % | TEMPERATURE: 98 F | RESPIRATION RATE: 16 BRPM | SYSTOLIC BLOOD PRESSURE: 128 MMHG | DIASTOLIC BLOOD PRESSURE: 81 MMHG | WEIGHT: 146 LBS | BODY MASS INDEX: 22.13 KG/M2 | HEART RATE: 77 BPM

## 2025-02-05 DIAGNOSIS — Z94.4 LIVER TRANSPLANT STATUS: ICD-10-CM

## 2025-02-05 DIAGNOSIS — Z87.39 PERSONAL HISTORY OF OTHER DISEASES OF THE MUSCULOSKELETAL SYSTEM AND CONNECTIVE TISSUE: Chronic | ICD-10-CM

## 2025-02-05 DIAGNOSIS — Z98.890 OTHER SPECIFIED POSTPROCEDURAL STATES: Chronic | ICD-10-CM

## 2025-02-05 LAB
ALBUMIN SERPL ELPH-MCNC: 3.8 G/DL
ALP BLD-CCNC: 199 U/L
ALT SERPL-CCNC: 104 U/L
ANION GAP SERPL CALC-SCNC: 10 MMOL/L
AST SERPL-CCNC: 22 U/L
BASOPHILS # BLD AUTO: 0.03 K/UL
BASOPHILS NFR BLD AUTO: 0.8 %
BILIRUB SERPL-MCNC: 0.7 MG/DL
BUN SERPL-MCNC: 18 MG/DL
CALCIUM SERPL-MCNC: 9.4 MG/DL
CHLORIDE SERPL-SCNC: 108 MMOL/L
CO2 SERPL-SCNC: 23 MMOL/L
CREAT SERPL-MCNC: 0.79 MG/DL
EGFR: 85 ML/MIN/1.73M2
EOSINOPHIL # BLD AUTO: 0.09 K/UL
EOSINOPHIL NFR BLD AUTO: 2.3 %
GGT SERPL-CCNC: 313 U/L
GLUCOSE SERPL-MCNC: 122 MG/DL
HCT VFR BLD CALC: 29.9 %
HGB BLD-MCNC: 9.2 G/DL
IMM GRANULOCYTES NFR BLD AUTO: 1 %
LYMPHOCYTES # BLD AUTO: 0.5 K/UL
LYMPHOCYTES NFR BLD AUTO: 12.9 %
MAN DIFF?: NORMAL
MCHC RBC-ENTMCNC: 30 PG
MCHC RBC-ENTMCNC: 30.8 G/DL
MCV RBC AUTO: 97.4 FL
MONOCYTES # BLD AUTO: 0.23 K/UL
MONOCYTES NFR BLD AUTO: 5.9 %
NEUTROPHILS # BLD AUTO: 2.98 K/UL
NEUTROPHILS NFR BLD AUTO: 77.1 %
PHOSPHATE SERPL-MCNC: 3.7 MG/DL
PLATELET # BLD AUTO: 150 K/UL
POTASSIUM SERPL-SCNC: 5.3 MMOL/L
PROT SERPL-MCNC: 6.3 G/DL
RBC # BLD: 3.07 M/UL
RBC # FLD: 20 %
SODIUM SERPL-SCNC: 141 MMOL/L
TACROLIMUS SERPL-MCNC: 9.5 NG/ML
WBC # FLD AUTO: 3.87 K/UL

## 2025-02-05 PROCEDURE — 99214 OFFICE O/P EST MOD 30 MIN: CPT | Mod: 24

## 2025-02-05 PROCEDURE — 93975 VASCULAR STUDY: CPT

## 2025-02-05 PROCEDURE — 76705 ECHO EXAM OF ABDOMEN: CPT

## 2025-02-05 PROCEDURE — 93975 VASCULAR STUDY: CPT | Mod: 26

## 2025-02-05 PROCEDURE — 76705 ECHO EXAM OF ABDOMEN: CPT | Mod: 26,59

## 2025-02-07 LAB
ALBUMIN SERPL ELPH-MCNC: 3.8 G/DL
ALBUMIN SERPL ELPH-MCNC: 4 G/DL
ALP BLD-CCNC: 200 U/L
ALP BLD-CCNC: 238 U/L
ALT SERPL-CCNC: 95 U/L
ALT SERPL-CCNC: 99 U/L
ANION GAP SERPL CALC-SCNC: 14 MMOL/L
ANION GAP SERPL CALC-SCNC: 15 MMOL/L
AST SERPL-CCNC: 22 U/L
AST SERPL-CCNC: 26 U/L
BASOPHILS # BLD AUTO: 0.02 K/UL
BASOPHILS NFR BLD AUTO: 0.5 %
BILIRUB SERPL-MCNC: 0.6 MG/DL
BILIRUB SERPL-MCNC: 0.6 MG/DL
BUN SERPL-MCNC: 13 MG/DL
BUN SERPL-MCNC: 19 MG/DL
CALCIUM SERPL-MCNC: 9.4 MG/DL
CALCIUM SERPL-MCNC: 9.5 MG/DL
CHLORIDE SERPL-SCNC: 107 MMOL/L
CHLORIDE SERPL-SCNC: 107 MMOL/L
CMV DNA SPEC QL NAA+PROBE: NOT DETECTED IU/ML
CMVPCR LOG: NOT DETECTED LOG10IU/ML
CO2 SERPL-SCNC: 20 MMOL/L
CO2 SERPL-SCNC: 21 MMOL/L
CREAT SERPL-MCNC: 0.79 MG/DL
CREAT SERPL-MCNC: 0.84 MG/DL
EGFR: 79 ML/MIN/1.73M2
EGFR: 85 ML/MIN/1.73M2
EOSINOPHIL # BLD AUTO: 0.09 K/UL
EOSINOPHIL NFR BLD AUTO: 2.5 %
GGT SERPL-CCNC: 314 U/L
GGT SERPL-CCNC: 433 U/L
GLUCOSE SERPL-MCNC: 105 MG/DL
GLUCOSE SERPL-MCNC: 121 MG/DL
HCT VFR BLD CALC: 32.2 %
HCV RNA SERPL NAA+PROBE-LOG IU: NOT DETECTED LOGIU/ML
HEPB DNA PCR INT: NOT DETECTED
HEPB DNA PCR LOG: NOT DETECTED LOGIU/ML
HEPC RNA INTERP: NOT DETECTED
HGB BLD-MCNC: 10.2 G/DL
HIV1 RNA # SERPL NAA+PROBE: NORMAL
HIV1 RNA # SERPL NAA+PROBE: NORMAL COPIES/ML
IMM GRANULOCYTES NFR BLD AUTO: 1.1 %
LYMPHOCYTES # BLD AUTO: 0.75 K/UL
LYMPHOCYTES NFR BLD AUTO: 20.5 %
MAGNESIUM SERPL-MCNC: 1.6 MG/DL
MAGNESIUM SERPL-MCNC: 1.7 MG/DL
MAN DIFF?: NORMAL
MCHC RBC-ENTMCNC: 31.6 PG
MCHC RBC-ENTMCNC: 31.7 G/DL
MCV RBC AUTO: 99.7 FL
MONOCYTES # BLD AUTO: 0.22 K/UL
MONOCYTES NFR BLD AUTO: 6 %
NEUTROPHILS # BLD AUTO: 2.53 K/UL
NEUTROPHILS NFR BLD AUTO: 69.4 %
PHOSPHATE SERPL-MCNC: 3.6 MG/DL
PHOSPHATE SERPL-MCNC: 3.8 MG/DL
PLATELET # BLD AUTO: 177 K/UL
POTASSIUM SERPL-SCNC: 4.6 MMOL/L
POTASSIUM SERPL-SCNC: 5.2 MMOL/L
PROT SERPL-MCNC: 6.4 G/DL
PROT SERPL-MCNC: 6.6 G/DL
RBC # BLD: 3.23 M/UL
RBC # FLD: 19.9 %
SODIUM SERPL-SCNC: 142 MMOL/L
SODIUM SERPL-SCNC: 142 MMOL/L
TACROLIMUS SERPL-MCNC: 4.9 NG/ML
VIRAL LOAD INTERP: NORMAL
VIRAL LOAD LOG: NORMAL LG COP/ML
WBC # FLD AUTO: 3.65 K/UL

## 2025-02-10 ENCOUNTER — LABORATORY RESULT (OUTPATIENT)
Age: 65
End: 2025-02-10

## 2025-02-10 LAB
HBV CORE IGG+IGM SER QL: NONREACTIVE
HBV CORE IGG+IGM SER QL: NONREACTIVE
HBV SURFACE AB SER QL: REACTIVE
HBV SURFACE AB SER QL: REACTIVE
HBV SURFACE AB SERPL IA-ACNC: 25.2 MIU/ML
HBV SURFACE AB SERPL IA-ACNC: 25.2 MIU/ML
HBV SURFACE AG SER QL: NONREACTIVE
HBV SURFACE AG SER QL: NONREACTIVE
HCV AB SER QL: NONREACTIVE
HCV AB SER QL: NONREACTIVE
HCV RNA SERPL NAA+PROBE-LOG IU: NOT DETECTED LOGIU/ML
HCV S/CO RATIO: 0.08 S/CO
HCV S/CO RATIO: 0.09 S/CO
HEPB DNA PCR INT: NOT DETECTED
HEPB DNA PCR LOG: NOT DETECTED LOGIU/ML
HEPC RNA INTERP: NOT DETECTED
HIV1 RNA # SERPL NAA+PROBE: NORMAL
HIV1 RNA # SERPL NAA+PROBE: NORMAL COPIES/ML
HIV1+2 AB SPEC QL IA.RAPID: NONREACTIVE
HIV1+2 AB SPEC QL IA.RAPID: NONREACTIVE
VIRAL LOAD INTERP: NORMAL
VIRAL LOAD LOG: NORMAL LG COP/ML

## 2025-02-12 ENCOUNTER — APPOINTMENT (OUTPATIENT)
Dept: TRANSPLANT | Facility: CLINIC | Age: 65
End: 2025-02-12
Payer: COMMERCIAL

## 2025-02-12 VITALS
OXYGEN SATURATION: 100 % | SYSTOLIC BLOOD PRESSURE: 157 MMHG | BODY MASS INDEX: 22.91 KG/M2 | WEIGHT: 151.2 LBS | HEART RATE: 68 BPM | RESPIRATION RATE: 16 BRPM | DIASTOLIC BLOOD PRESSURE: 78 MMHG | TEMPERATURE: 97.3 F | HEIGHT: 68 IN

## 2025-02-12 DIAGNOSIS — Z79.60 LONG TERM (CURRENT) USE OF UNSPECIFIED IMMUNOMODULATORS AND IMMUNOSUPPRESSANTS: ICD-10-CM

## 2025-02-12 LAB
ALBUMIN SERPL ELPH-MCNC: 3.6 G/DL
ALP BLD-CCNC: 131 U/L
ALT SERPL-CCNC: 49 U/L
ANION GAP SERPL CALC-SCNC: 10 MMOL/L
AST SERPL-CCNC: 10 U/L
BILIRUB SERPL-MCNC: 0.4 MG/DL
BUN SERPL-MCNC: 15 MG/DL
CALCIUM SERPL-MCNC: 8.9 MG/DL
CHLORIDE SERPL-SCNC: 111 MMOL/L
CO2 SERPL-SCNC: 22 MMOL/L
CREAT SERPL-MCNC: 0.85 MG/DL
EGFR: 77 ML/MIN/1.73M2
GGT SERPL-CCNC: 335 U/L
GLUCOSE SERPL-MCNC: 109 MG/DL
HCT VFR BLD CALC: 31.9 %
HGB BLD-MCNC: 9.8 G/DL
MAGNESIUM SERPL-MCNC: 1.7 MG/DL
MCHC RBC-ENTMCNC: 30.7 G/DL
MCHC RBC-ENTMCNC: 30.9 PG
MCV RBC AUTO: 100.6 FL
PLATELET # BLD AUTO: 139 K/UL
POTASSIUM SERPL-SCNC: 4.4 MMOL/L
PROT SERPL-MCNC: 5.8 G/DL
RBC # BLD: 3.17 M/UL
RBC # FLD: 19.9 %
SODIUM SERPL-SCNC: 143 MMOL/L
TACROLIMUS SERPL-MCNC: 3.4 NG/ML
WBC # FLD AUTO: 4.69 K/UL

## 2025-02-12 PROCEDURE — 99214 OFFICE O/P EST MOD 30 MIN: CPT | Mod: 24

## 2025-02-13 LAB
PETH 16:0/18:1: NEGATIVE NG/ML
PETH 16:0/18:2: NEGATIVE NG/ML
PETH COMMENTS: NORMAL

## 2025-02-18 ENCOUNTER — NON-APPOINTMENT (OUTPATIENT)
Age: 65
End: 2025-02-18

## 2025-02-19 ENCOUNTER — APPOINTMENT (OUTPATIENT)
Dept: TRANSPLANT | Facility: CLINIC | Age: 65
End: 2025-02-19

## 2025-02-19 ENCOUNTER — NON-APPOINTMENT (OUTPATIENT)
Age: 65
End: 2025-02-19

## 2025-02-19 VITALS
WEIGHT: 150.1 LBS | RESPIRATION RATE: 16 BRPM | HEART RATE: 75 BPM | BODY MASS INDEX: 22.75 KG/M2 | DIASTOLIC BLOOD PRESSURE: 76 MMHG | HEIGHT: 68 IN | TEMPERATURE: 98.3 F | SYSTOLIC BLOOD PRESSURE: 125 MMHG | OXYGEN SATURATION: 100 %

## 2025-02-19 DIAGNOSIS — Z79.60 LONG TERM (CURRENT) USE OF UNSPECIFIED IMMUNOMODULATORS AND IMMUNOSUPPRESSANTS: ICD-10-CM

## 2025-02-19 DIAGNOSIS — Z94.4 LIVER TRANSPLANT STATUS: ICD-10-CM

## 2025-02-19 LAB
ALBUMIN SERPL ELPH-MCNC: 3.6 G/DL
ALBUMIN SERPL ELPH-MCNC: 3.9 G/DL
ALP BLD-CCNC: 118 U/L
ALP BLD-CCNC: 89 U/L
ALT SERPL-CCNC: 20 U/L
ALT SERPL-CCNC: 9 U/L
ANION GAP SERPL CALC-SCNC: 10 MMOL/L
ANION GAP SERPL CALC-SCNC: 10 MMOL/L
AST SERPL-CCNC: 10 U/L
AST SERPL-CCNC: 6 U/L
BASOPHILS # BLD AUTO: 0.01 K/UL
BASOPHILS NFR BLD AUTO: 0.2 %
BILIRUB SERPL-MCNC: 0.5 MG/DL
BILIRUB SERPL-MCNC: 0.6 MG/DL
BUN SERPL-MCNC: 17 MG/DL
BUN SERPL-MCNC: 18 MG/DL
CALCIUM SERPL-MCNC: 9 MG/DL
CALCIUM SERPL-MCNC: 9.3 MG/DL
CHLORIDE SERPL-SCNC: 106 MMOL/L
CHLORIDE SERPL-SCNC: 107 MMOL/L
CO2 SERPL-SCNC: 24 MMOL/L
CO2 SERPL-SCNC: 24 MMOL/L
CREAT SERPL-MCNC: 0.83 MG/DL
CREAT SERPL-MCNC: 0.91 MG/DL
EGFR: 71 ML/MIN/1.73M2
EGFR: 80 ML/MIN/1.73M2
EOSINOPHIL # BLD AUTO: 0.03 K/UL
EOSINOPHIL NFR BLD AUTO: 0.7 %
GGT SERPL-CCNC: 161 U/L
GGT SERPL-CCNC: 217 U/L
GLUCOSE SERPL-MCNC: 102 MG/DL
GLUCOSE SERPL-MCNC: 93 MG/DL
HCT VFR BLD CALC: 31 %
HCT VFR BLD CALC: 33.8 %
HGB BLD-MCNC: 10.5 G/DL
HGB BLD-MCNC: 9.7 G/DL
IMM GRANULOCYTES NFR BLD AUTO: 1.8 %
LYMPHOCYTES # BLD AUTO: 0.83 K/UL
LYMPHOCYTES NFR BLD AUTO: 18.2 %
MAGNESIUM SERPL-MCNC: 1.6 MG/DL
MAGNESIUM SERPL-MCNC: 1.6 MG/DL
MAN DIFF?: NORMAL
MCHC RBC-ENTMCNC: 30.6 PG
MCHC RBC-ENTMCNC: 31.1 G/DL
MCHC RBC-ENTMCNC: 31.3 G/DL
MCHC RBC-ENTMCNC: 31.3 PG
MCV RBC AUTO: 100 FL
MCV RBC AUTO: 98.5 FL
MONOCYTES # BLD AUTO: 0.23 K/UL
MONOCYTES NFR BLD AUTO: 5 %
NEUTROPHILS # BLD AUTO: 3.38 K/UL
NEUTROPHILS NFR BLD AUTO: 74.1 %
PHOSPHATE SERPL-MCNC: 3.2 MG/DL
PHOSPHATE SERPL-MCNC: 3.4 MG/DL
PLATELET # BLD AUTO: 130 K/UL
PLATELET # BLD AUTO: 136 K/UL
POTASSIUM SERPL-SCNC: 4.5 MMOL/L
POTASSIUM SERPL-SCNC: 4.6 MMOL/L
PROT SERPL-MCNC: 5.5 G/DL
PROT SERPL-MCNC: 6.1 G/DL
RBC # BLD: 3.1 M/UL
RBC # BLD: 3.43 M/UL
RBC # FLD: 19.4 %
RBC # FLD: 19.6 %
SODIUM SERPL-SCNC: 140 MMOL/L
SODIUM SERPL-SCNC: 141 MMOL/L
TACROLIMUS SERPL-MCNC: 5.8 NG/ML
TACROLIMUS SERPL-MCNC: 6.6 NG/ML
WBC # FLD AUTO: 4.56 K/UL
WBC # FLD AUTO: 4.8 K/UL

## 2025-02-19 PROCEDURE — 99214 OFFICE O/P EST MOD 30 MIN: CPT | Mod: 24

## 2025-02-20 LAB
CMV DNA SPEC QL NAA+PROBE: NOT DETECTED IU/ML
CMVPCR LOG: NOT DETECTED LOG10IU/ML

## 2025-02-21 LAB
PETH 16:0/18:1: NEGATIVE NG/ML
PETH 16:0/18:2: NEGATIVE NG/ML
PETH COMMENTS: NORMAL

## 2025-02-24 LAB
ALBUMIN SERPL ELPH-MCNC: 3.9 G/DL
ALP BLD-CCNC: 99 U/L
ALT SERPL-CCNC: 56 U/L
ANION GAP SERPL CALC-SCNC: 12 MMOL/L
AST SERPL-CCNC: 15 U/L
BILIRUB SERPL-MCNC: 0.6 MG/DL
BUN SERPL-MCNC: 22 MG/DL
CALCIUM SERPL-MCNC: 8.9 MG/DL
CHLORIDE SERPL-SCNC: 105 MMOL/L
CO2 SERPL-SCNC: 22 MMOL/L
CREAT SERPL-MCNC: 0.99 MG/DL
EGFR: 64 ML/MIN/1.73M2
GGT SERPL-CCNC: 203 U/L
GLUCOSE SERPL-MCNC: 88 MG/DL
HCT VFR BLD CALC: 33.3 %
HGB BLD-MCNC: 10.3 G/DL
MAGNESIUM SERPL-MCNC: 1.5 MG/DL
MCHC RBC-ENTMCNC: 30.8 PG
MCHC RBC-ENTMCNC: 30.9 G/DL
MCV RBC AUTO: 99.7 FL
PHOSPHATE SERPL-MCNC: 3 MG/DL
PLATELET # BLD AUTO: 125 K/UL
POTASSIUM SERPL-SCNC: 4.1 MMOL/L
PROT SERPL-MCNC: 6 G/DL
RBC # BLD: 3.34 M/UL
RBC # FLD: 18.5 %
SODIUM SERPL-SCNC: 138 MMOL/L
TACROLIMUS SERPL-MCNC: 9.1 NG/ML
WBC # FLD AUTO: 2.9 K/UL

## 2025-02-25 LAB
CMV DNA SPEC QL NAA+PROBE: NOT DETECTED IU/ML
CMVPCR LOG: NOT DETECTED LOG10IU/ML

## 2025-03-03 LAB
ALBUMIN SERPL ELPH-MCNC: 4 G/DL
ALP BLD-CCNC: 151 U/L
ALT SERPL-CCNC: 285 U/L
ANION GAP SERPL CALC-SCNC: 9 MMOL/L
AST SERPL-CCNC: 124 U/L
BILIRUB SERPL-MCNC: 1.2 MG/DL
BUN SERPL-MCNC: 20 MG/DL
CALCIUM SERPL-MCNC: 8.9 MG/DL
CHLORIDE SERPL-SCNC: 108 MMOL/L
CO2 SERPL-SCNC: 24 MMOL/L
CREAT SERPL-MCNC: 0.96 MG/DL
EGFR: 67 ML/MIN/1.73M2
GGT SERPL-CCNC: 549 U/L
GLUCOSE SERPL-MCNC: 91 MG/DL
HCT VFR BLD CALC: 34.2 %
HGB BLD-MCNC: 10.6 G/DL
MAGNESIUM SERPL-MCNC: 1.7 MG/DL
MCHC RBC-ENTMCNC: 30.7 PG
MCHC RBC-ENTMCNC: 31 G/DL
MCV RBC AUTO: 99.1 FL
PHOSPHATE SERPL-MCNC: 3.3 MG/DL
PLATELET # BLD AUTO: 112 K/UL
POTASSIUM SERPL-SCNC: 5 MMOL/L
PROT SERPL-MCNC: 5.9 G/DL
RBC # BLD: 3.45 M/UL
RBC # FLD: 17 %
SODIUM SERPL-SCNC: 141 MMOL/L
TACROLIMUS SERPL-MCNC: 11.1 NG/ML
WBC # FLD AUTO: 0.93 K/UL

## 2025-03-04 ENCOUNTER — INPATIENT (INPATIENT)
Facility: HOSPITAL | Age: 65
LOS: 6 days | Discharge: HOME | DRG: 948 | End: 2025-03-11
Attending: TRANSPLANT SURGERY | Admitting: TRANSPLANT SURGERY
Payer: COMMERCIAL

## 2025-03-04 VITALS
TEMPERATURE: 98 F | OXYGEN SATURATION: 98 % | DIASTOLIC BLOOD PRESSURE: 79 MMHG | WEIGHT: 158.73 LBS | HEIGHT: 69.8 IN | HEART RATE: 73 BPM | SYSTOLIC BLOOD PRESSURE: 131 MMHG | RESPIRATION RATE: 18 BRPM

## 2025-03-04 DIAGNOSIS — R74.8 ABNORMAL LEVELS OF OTHER SERUM ENZYMES: ICD-10-CM

## 2025-03-04 DIAGNOSIS — Z87.39 PERSONAL HISTORY OF OTHER DISEASES OF THE MUSCULOSKELETAL SYSTEM AND CONNECTIVE TISSUE: Chronic | ICD-10-CM

## 2025-03-04 DIAGNOSIS — Z98.890 OTHER SPECIFIED POSTPROCEDURAL STATES: Chronic | ICD-10-CM

## 2025-03-04 LAB
ALBUMIN SERPL ELPH-MCNC: 3.7 G/DL — SIGNIFICANT CHANGE UP (ref 3.3–5)
ALP SERPL-CCNC: 197 U/L — HIGH (ref 40–120)
ALT FLD-CCNC: 356 U/L — HIGH (ref 10–45)
ANION GAP SERPL CALC-SCNC: 14 MMOL/L — SIGNIFICANT CHANGE UP (ref 5–17)
AST SERPL-CCNC: 111 U/L — HIGH (ref 10–40)
BILIRUB SERPL-MCNC: 2.2 MG/DL — HIGH (ref 0.2–1.2)
BUN SERPL-MCNC: 20 MG/DL — SIGNIFICANT CHANGE UP (ref 7–23)
CALCIUM SERPL-MCNC: 9.1 MG/DL — SIGNIFICANT CHANGE UP (ref 8.4–10.5)
CHLORIDE SERPL-SCNC: 105 MMOL/L — SIGNIFICANT CHANGE UP (ref 96–108)
CO2 SERPL-SCNC: 22 MMOL/L — SIGNIFICANT CHANGE UP (ref 22–31)
CREAT SERPL-MCNC: 0.99 MG/DL — SIGNIFICANT CHANGE UP (ref 0.5–1.3)
EGFR: 64 ML/MIN/1.73M2 — SIGNIFICANT CHANGE UP
EGFR: 64 ML/MIN/1.73M2 — SIGNIFICANT CHANGE UP
GLUCOSE SERPL-MCNC: 167 MG/DL — HIGH (ref 70–99)
HCT VFR BLD CALC: 31.5 % — LOW (ref 34.5–45)
HGB BLD-MCNC: 10.1 G/DL — LOW (ref 11.5–15.5)
INR BLD: 1.3 RATIO — HIGH (ref 0.85–1.16)
MAGNESIUM SERPL-MCNC: 1.7 MG/DL — SIGNIFICANT CHANGE UP (ref 1.6–2.6)
MCHC RBC-ENTMCNC: 31.2 PG — SIGNIFICANT CHANGE UP (ref 27–34)
MCHC RBC-ENTMCNC: 32.1 G/DL — SIGNIFICANT CHANGE UP (ref 32–36)
MCV RBC AUTO: 97.2 FL — SIGNIFICANT CHANGE UP (ref 80–100)
PHOSPHATE SERPL-MCNC: 3.7 MG/DL — SIGNIFICANT CHANGE UP (ref 2.5–4.5)
PLATELET # BLD AUTO: 116 K/UL — LOW (ref 150–400)
POTASSIUM SERPL-MCNC: 4 MMOL/L — SIGNIFICANT CHANGE UP (ref 3.5–5.3)
POTASSIUM SERPL-SCNC: 4 MMOL/L — SIGNIFICANT CHANGE UP (ref 3.5–5.3)
PROT SERPL-MCNC: 6.2 G/DL — SIGNIFICANT CHANGE UP (ref 6–8.3)
PROTHROM AB SERPL-ACNC: 14.8 SEC — HIGH (ref 9.9–13.4)
RBC # BLD: 3.24 M/UL — LOW (ref 3.8–5.2)
RBC # FLD: 16.6 % — HIGH (ref 10.3–14.5)
SODIUM SERPL-SCNC: 141 MMOL/L — SIGNIFICANT CHANGE UP (ref 135–145)

## 2025-03-04 RX ORDER — PREDNISONE 20 MG/1
15 TABLET ORAL DAILY
Refills: 0 | Status: DISCONTINUED | OUTPATIENT
Start: 2025-03-05 | End: 2025-03-06

## 2025-03-04 RX ORDER — MAGNESIUM SULFATE 500 MG/ML
2 SYRINGE (ML) INJECTION ONCE
Refills: 0 | Status: COMPLETED | OUTPATIENT
Start: 2025-03-04 | End: 2025-03-04

## 2025-03-04 RX ORDER — FILGRASTIM 480 UG/.8ML
480 INJECTION, SOLUTION INTRAVENOUS; SUBCUTANEOUS
Qty: 3 | Refills: 1 | Status: DISCONTINUED | COMMUNITY
Start: 2025-03-03 | End: 2025-03-04

## 2025-03-04 RX ORDER — INFLUENZA A VIRUS A/IDAHO/07/2018 (H1N1) ANTIGEN (MDCK CELL DERIVED, PROPIOLACTONE INACTIVATED, INFLUENZA A VIRUS A/INDIANA/08/2018 (H3N2) ANTIGEN (MDCK CELL DERIVED, PROPIOLACTONE INACTIVATED), INFLUENZA B VIRUS B/SINGAPORE/INFTT-16-0610/2016 ANTIGEN (MDCK CELL DERIVED, PROPIOLACTONE INACTIVATED), INFLUENZA B VIRUS B/IOWA/06/2017 ANTIGEN (MDCK CELL DERIVED, PROPIOLACTONE INACTIVATED) 15; 15; 15; 15 UG/.5ML; UG/.5ML; UG/.5ML; UG/.5ML
0.5 INJECTION, SUSPENSION INTRAMUSCULAR ONCE
Refills: 0 | Status: DISCONTINUED | OUTPATIENT
Start: 2025-03-04 | End: 2025-03-11

## 2025-03-04 RX ORDER — HYDROXYZINE HYDROCHLORIDE 25 MG/1
25 TABLET, FILM COATED ORAL ONCE
Refills: 0 | Status: COMPLETED | OUTPATIENT
Start: 2025-03-04 | End: 2025-03-04

## 2025-03-04 RX ORDER — MYCOPHENOLATE MOFETIL 500 MG/1
1000 TABLET, FILM COATED ORAL
Refills: 0 | Status: DISCONTINUED | OUTPATIENT
Start: 2025-03-04 | End: 2025-03-05

## 2025-03-04 RX ORDER — ATOVAQUONE 750 MG/5ML
1500 SUSPENSION ORAL DAILY
Refills: 0 | Status: DISCONTINUED | OUTPATIENT
Start: 2025-03-04 | End: 2025-03-11

## 2025-03-04 RX ORDER — MAGNESIUM OXIDE 400 MG
400 TABLET ORAL
Refills: 0 | Status: DISCONTINUED | OUTPATIENT
Start: 2025-03-04 | End: 2025-03-06

## 2025-03-04 RX ORDER — CALCIUM CARBONATE/VITAMIN D3 500MG-5MCG
1 TABLET ORAL
Refills: 0 | Status: DISCONTINUED | OUTPATIENT
Start: 2025-03-04 | End: 2025-03-11

## 2025-03-04 RX ORDER — SENNA 187 MG
1 TABLET ORAL AT BEDTIME
Refills: 0 | Status: DISCONTINUED | OUTPATIENT
Start: 2025-03-04 | End: 2025-03-11

## 2025-03-04 RX ORDER — URSODIOL 300 MG/1
300 CAPSULE ORAL
Refills: 0 | Status: DISCONTINUED | OUTPATIENT
Start: 2025-03-05 | End: 2025-03-11

## 2025-03-04 RX ORDER — TACROLIMUS 0.5 MG/1
6 CAPSULE ORAL
Refills: 0 | Status: DISCONTINUED | OUTPATIENT
Start: 2025-03-04 | End: 2025-03-05

## 2025-03-04 NOTE — PATIENT PROFILE ADULT - FALL HARM RISK - UNIVERSAL INTERVENTIONS
Bed in lowest position, wheels locked, appropriate side rails in place/Call bell, personal items and telephone in reach/Instruct patient to call for assistance before getting out of bed or chair/Non-slip footwear when patient is out of bed/[place] to call system/Physically safe environment - no spills, clutter or unnecessary equipment/Purposeful Proactive Rounding/Room/bathroom lighting operational, light cord in reach

## 2025-03-04 NOTE — PATIENT PROFILE ADULT - FALL HARM RISK - HARM RISK INTERVENTIONS

## 2025-03-04 NOTE — PATIENT PROFILE ADULT - FUNCTIONAL ASSESSMENT - BASIC MOBILITY 6.
3-calculated by average/Not able to assess (calculate score using [facility] averaging method)  4-calculated by average/Not able to assess (calculate score using [facility] averaging method)

## 2025-03-05 ENCOUNTER — APPOINTMENT (OUTPATIENT)
Dept: TRANSPLANT | Facility: CLINIC | Age: 65
End: 2025-03-05

## 2025-03-05 DIAGNOSIS — Z94.4 LIVER TRANSPLANT STATUS: Chronic | ICD-10-CM

## 2025-03-05 LAB
ALBUMIN SERPL ELPH-MCNC: 3.7 G/DL — SIGNIFICANT CHANGE UP (ref 3.3–5)
ALP SERPL-CCNC: 199 U/L — HIGH (ref 40–120)
ALT FLD-CCNC: 304 U/L — HIGH (ref 10–45)
ANION GAP SERPL CALC-SCNC: 12 MMOL/L — SIGNIFICANT CHANGE UP (ref 5–17)
APPEARANCE UR: CLEAR — SIGNIFICANT CHANGE UP
AST SERPL-CCNC: 73 U/L — HIGH (ref 10–40)
BACTERIA # UR AUTO: NEGATIVE /HPF — SIGNIFICANT CHANGE UP
BASOPHILS # BLD AUTO: 0 K/UL — SIGNIFICANT CHANGE UP (ref 0–0.2)
BASOPHILS # BLD AUTO: 0.03 K/UL — SIGNIFICANT CHANGE UP (ref 0–0.2)
BASOPHILS NFR BLD AUTO: 0 % — SIGNIFICANT CHANGE UP (ref 0–2)
BASOPHILS NFR BLD AUTO: 3 % — HIGH (ref 0–2)
BILIRUB SERPL-MCNC: 1.2 MG/DL — SIGNIFICANT CHANGE UP (ref 0.2–1.2)
BILIRUB UR-MCNC: ABNORMAL
BLD GP AB SCN SERPL QL: NEGATIVE — SIGNIFICANT CHANGE UP
BUN SERPL-MCNC: 18 MG/DL — SIGNIFICANT CHANGE UP (ref 7–23)
CALCIUM SERPL-MCNC: 9 MG/DL — SIGNIFICANT CHANGE UP (ref 8.4–10.5)
CAST: 3 /LPF — SIGNIFICANT CHANGE UP (ref 0–4)
CHLORIDE SERPL-SCNC: 109 MMOL/L — HIGH (ref 96–108)
CO2 SERPL-SCNC: 18 MMOL/L — LOW (ref 22–31)
COLOR SPEC: SIGNIFICANT CHANGE UP
CREAT SERPL-MCNC: 0.84 MG/DL — SIGNIFICANT CHANGE UP (ref 0.5–1.3)
DIFF PNL FLD: NEGATIVE — SIGNIFICANT CHANGE UP
EGFR: 79 ML/MIN/1.73M2 — SIGNIFICANT CHANGE UP
EGFR: 79 ML/MIN/1.73M2 — SIGNIFICANT CHANGE UP
EOSINOPHIL # BLD AUTO: 0.01 K/UL — SIGNIFICANT CHANGE UP (ref 0–0.5)
EOSINOPHIL # BLD AUTO: 0.03 K/UL — SIGNIFICANT CHANGE UP (ref 0–0.5)
EOSINOPHIL NFR BLD AUTO: 2.2 % — SIGNIFICANT CHANGE UP (ref 0–6)
EOSINOPHIL NFR BLD AUTO: 3 % — SIGNIFICANT CHANGE UP (ref 0–6)
GIANT PLATELETS BLD QL SMEAR: PRESENT — SIGNIFICANT CHANGE UP
GIANT PLATELETS BLD QL SMEAR: PRESENT — SIGNIFICANT CHANGE UP
GLUCOSE SERPL-MCNC: 124 MG/DL — HIGH (ref 70–99)
GLUCOSE UR QL: NEGATIVE MG/DL — SIGNIFICANT CHANGE UP
HCT VFR BLD CALC: 31.7 % — LOW (ref 34.5–45)
HGB BLD-MCNC: 9.8 G/DL — LOW (ref 11.5–15.5)
INR BLD: 1.2 RATIO — HIGH (ref 0.85–1.16)
KETONES UR-MCNC: ABNORMAL MG/DL
LEUKOCYTE ESTERASE UR-ACNC: ABNORMAL
LYMPHOCYTES # BLD AUTO: 0.37 K/UL — LOW (ref 1–3.3)
LYMPHOCYTES # BLD AUTO: 0.39 K/UL — LOW (ref 1–3.3)
LYMPHOCYTES # BLD AUTO: 44.8 % — HIGH (ref 13–44)
LYMPHOCYTES # BLD AUTO: 64.2 % — HIGH (ref 13–44)
MACROCYTES BLD QL: SLIGHT — SIGNIFICANT CHANGE UP
MAGNESIUM SERPL-MCNC: 2.2 MG/DL — SIGNIFICANT CHANGE UP (ref 1.6–2.6)
MANUAL SMEAR VERIFICATION: SIGNIFICANT CHANGE UP
MANUAL SMEAR VERIFICATION: SIGNIFICANT CHANGE UP
MCHC RBC-ENTMCNC: 30.4 PG — SIGNIFICANT CHANGE UP (ref 27–34)
MCHC RBC-ENTMCNC: 30.9 G/DL — LOW (ref 32–36)
MCV RBC AUTO: 98.4 FL — SIGNIFICANT CHANGE UP (ref 80–100)
MONOCYTES # BLD AUTO: 0.03 K/UL — SIGNIFICANT CHANGE UP (ref 0–0.9)
MONOCYTES # BLD AUTO: 0.03 K/UL — SIGNIFICANT CHANGE UP (ref 0–0.9)
MONOCYTES NFR BLD AUTO: 3 % — SIGNIFICANT CHANGE UP (ref 2–14)
MONOCYTES NFR BLD AUTO: 5.9 % — SIGNIFICANT CHANGE UP (ref 2–14)
NEUTROPHILS # BLD AUTO: 0.15 K/UL — LOW (ref 1.8–7.4)
NEUTROPHILS # BLD AUTO: 0.4 K/UL — LOW (ref 1.8–7.4)
NEUTROPHILS NFR BLD AUTO: 24.8 % — LOW (ref 43–77)
NEUTROPHILS NFR BLD AUTO: 44.7 % — SIGNIFICANT CHANGE UP (ref 43–77)
NEUTS BAND # BLD: 1.5 % — SIGNIFICANT CHANGE UP (ref 0–8)
NEUTS BAND # BLD: 2.2 % — SIGNIFICANT CHANGE UP (ref 0–8)
NEUTS BAND NFR BLD: 1.5 % — SIGNIFICANT CHANGE UP (ref 0–8)
NEUTS BAND NFR BLD: 2.2 % — SIGNIFICANT CHANGE UP (ref 0–8)
NITRITE UR-MCNC: NEGATIVE — SIGNIFICANT CHANGE UP
PH UR: 6.5 — SIGNIFICANT CHANGE UP (ref 5–8)
PHOSPHATE SERPL-MCNC: 3.6 MG/DL — SIGNIFICANT CHANGE UP (ref 2.5–4.5)
PLAT MORPH BLD: ABNORMAL
PLAT MORPH BLD: NORMAL — SIGNIFICANT CHANGE UP
PLATELET # BLD AUTO: 121 K/UL — LOW (ref 150–400)
PLATELET COUNT - ESTIMATE: ABNORMAL
POIKILOCYTOSIS BLD QL AUTO: SLIGHT — SIGNIFICANT CHANGE UP
POTASSIUM SERPL-MCNC: 4.3 MMOL/L — SIGNIFICANT CHANGE UP (ref 3.5–5.3)
POTASSIUM SERPL-SCNC: 4.3 MMOL/L — SIGNIFICANT CHANGE UP (ref 3.5–5.3)
PROT SERPL-MCNC: 6 G/DL — SIGNIFICANT CHANGE UP (ref 6–8.3)
PROT UR-MCNC: 100 MG/DL
PROTHROM AB SERPL-ACNC: 13.6 SEC — HIGH (ref 9.9–13.4)
RBC # BLD: 3.22 M/UL — LOW (ref 3.8–5.2)
RBC # FLD: 16.6 % — HIGH (ref 10.3–14.5)
RBC BLD AUTO: SIGNIFICANT CHANGE UP
RBC BLD AUTO: SIGNIFICANT CHANGE UP
RBC CASTS # UR COMP ASSIST: 1 /HPF — SIGNIFICANT CHANGE UP (ref 0–4)
RH IG SCN BLD-IMP: POSITIVE — SIGNIFICANT CHANGE UP
SMUDGE CELLS # BLD: PRESENT — SIGNIFICANT CHANGE UP
SODIUM SERPL-SCNC: 139 MMOL/L — SIGNIFICANT CHANGE UP (ref 135–145)
SP GR SPEC: >1.03 — HIGH (ref 1–1.03)
SQUAMOUS # UR AUTO: 4 /HPF — SIGNIFICANT CHANGE UP (ref 0–5)
TACROLIMUS SERPL-MCNC: 12 NG/ML — SIGNIFICANT CHANGE UP
TACROLIMUS SERPL-MCNC: 17.2 NG/ML — SIGNIFICANT CHANGE UP
UROBILINOGEN FLD QL: 1 MG/DL — SIGNIFICANT CHANGE UP (ref 0.2–1)
VARIANT LYMPHS # BLD: 0.7 % — SIGNIFICANT CHANGE UP (ref 0–6)
VARIANT LYMPHS NFR BLD MANUAL: 0.7 % — SIGNIFICANT CHANGE UP (ref 0–6)
WBC # BLD: 0.57 K/UL — CRITICAL LOW (ref 3.8–10.5)
WBC # BLD: 0.86 K/UL — CRITICAL LOW (ref 3.8–10.5)
WBC # FLD AUTO: 0.57 K/UL — CRITICAL LOW (ref 3.8–10.5)
WBC # FLD AUTO: 0.86 K/UL — CRITICAL LOW (ref 3.8–10.5)
WBC UR QL: 2 /HPF — SIGNIFICANT CHANGE UP (ref 0–5)

## 2025-03-05 PROCEDURE — 74183 MRI ABD W/O CNTR FLWD CNTR: CPT | Mod: 26

## 2025-03-05 PROCEDURE — 76705 ECHO EXAM OF ABDOMEN: CPT | Mod: 26,59

## 2025-03-05 PROCEDURE — 93975 VASCULAR STUDY: CPT | Mod: 26

## 2025-03-05 RX ORDER — HYDROXYZINE HYDROCHLORIDE 25 MG/1
25 TABLET, FILM COATED ORAL EVERY 8 HOURS
Refills: 0 | Status: DISCONTINUED | OUTPATIENT
Start: 2025-03-05 | End: 2025-03-06

## 2025-03-05 RX ORDER — FILGRASTIM 300 UG/.5ML
300 INJECTION, SOLUTION INTRAVENOUS; SUBCUTANEOUS ONCE
Refills: 0 | Status: COMPLETED | OUTPATIENT
Start: 2025-03-05 | End: 2025-03-05

## 2025-03-05 RX ORDER — FILGRASTIM-AAFI 480 UG/.8ML
480 INJECTION, SOLUTION SUBCUTANEOUS
Qty: 3 | Refills: 0 | Status: ACTIVE | COMMUNITY
Start: 2025-03-04 | End: 1900-01-01

## 2025-03-05 RX ORDER — SODIUM BICARBONATE 1 MEQ/ML
650 SYRINGE (ML) INTRAVENOUS
Refills: 0 | Status: DISCONTINUED | OUTPATIENT
Start: 2025-03-05 | End: 2025-03-10

## 2025-03-05 RX ORDER — FILGRASTIM 300 UG/.5ML
300 INJECTION, SOLUTION INTRAVENOUS; SUBCUTANEOUS DAILY
Refills: 0 | Status: DISCONTINUED | OUTPATIENT
Start: 2025-03-05 | End: 2025-03-05

## 2025-03-05 RX ORDER — DIPHENHYDRAMINE HCL 12.5MG/5ML
25 ELIXIR ORAL ONCE
Refills: 0 | Status: COMPLETED | OUTPATIENT
Start: 2025-03-05 | End: 2025-03-05

## 2025-03-05 RX ORDER — DIPHENHYDRAMINE HCL 12.5MG/5ML
50 ELIXIR ORAL ONCE
Refills: 0 | Status: COMPLETED | OUTPATIENT
Start: 2025-03-05 | End: 2025-03-05

## 2025-03-05 RX ADMIN — Medication 1 TABLET(S): at 05:28

## 2025-03-05 RX ADMIN — Medication 25 MILLIGRAM(S): at 18:55

## 2025-03-05 RX ADMIN — Medication 400 MILLIGRAM(S): at 17:37

## 2025-03-05 RX ADMIN — FILGRASTIM 300 MICROGRAM(S): 300 INJECTION, SOLUTION INTRAVENOUS; SUBCUTANEOUS at 11:11

## 2025-03-05 RX ADMIN — TACROLIMUS 6 MILLIGRAM(S): 0.5 CAPSULE ORAL at 08:09

## 2025-03-05 RX ADMIN — HYDROXYZINE HYDROCHLORIDE 25 MILLIGRAM(S): 25 TABLET, FILM COATED ORAL at 01:10

## 2025-03-05 RX ADMIN — URSODIOL 300 MILLIGRAM(S): 300 CAPSULE ORAL at 05:29

## 2025-03-05 RX ADMIN — Medication 650 MILLIGRAM(S): at 17:36

## 2025-03-05 RX ADMIN — Medication 25 GRAM(S): at 01:11

## 2025-03-05 RX ADMIN — Medication 1 TABLET(S): at 21:09

## 2025-03-05 RX ADMIN — Medication 40 MILLIGRAM(S): at 05:28

## 2025-03-05 RX ADMIN — Medication 400 MILLIGRAM(S): at 08:09

## 2025-03-05 RX ADMIN — URSODIOL 300 MILLIGRAM(S): 300 CAPSULE ORAL at 17:37

## 2025-03-05 RX ADMIN — Medication 20 MILLIGRAM(S): at 09:55

## 2025-03-05 RX ADMIN — Medication 1 TABLET(S): at 17:37

## 2025-03-05 RX ADMIN — Medication 50 MILLIGRAM(S): at 07:46

## 2025-03-05 RX ADMIN — ATOVAQUONE 1500 MILLIGRAM(S): 750 SUSPENSION ORAL at 13:27

## 2025-03-05 RX ADMIN — PREDNISONE 15 MILLIGRAM(S): 20 TABLET ORAL at 05:28

## 2025-03-05 NOTE — PHYSICAL THERAPY INITIAL EVALUATION ADULT - GAIT DEVIATIONS NOTED, PT EVAL
decreased jeff/increased time in double stance/decreased step length/decreased stride length/decreased weight-shifting ability

## 2025-03-05 NOTE — PHYSICAL THERAPY INITIAL EVALUATION ADULT - PERTINENT HX OF CURRENT PROBLEM, REHAB EVAL
65 yo F PMHx decompensated ETOH cirrhosis c/w refractory ascites requiring frequent LVP (last on 9/13/24 - 5L) and LE edema. Also history of alcohol related hepatitis (s/p steroid taper), HTN, L breast stage 1 Invasive lobular carcinoma (HR+ HER2 neg) s/p lumpectomy c/w PSH (on 2020. Off Letrazole now), and R cheek BCC s/p Mohs (2021) Umbilical Hernia Repair 9/2024.S/p OLT 1/4/2025 with end-end single arterial reconstruction (Donor Right, Segment IV and Left Hepatic Arteries arising separately from celiac trunk, Redo hepatic artery anastomosis). Donor with staph epi bacteremia (blood culture x 1), treated with Cefazolin (end date 1/8) Started on po pred taper 2/7 for treatment of presumed rejection- US doppler (2/7/25) demonstrating Normal caliber Bile duct, Patent transplant hepatic vasculature. Turbulent flow on color Doppler of a tortuous hepatic artery, however appears patent without tardus pelvis waveform abnormality. LFTs improved pred tapered down to 15, labs from yesterday markedly increase in enzymes, repeat labs today with worsening results. Additionally, pt reports pruritis, face and bilateral arms redness that began last night. Plan to directly once a bed becomes available Will need imaging +/- biopsy.  MRI Abdomen: Status post hepatic transplant. Narrowing of the common bile duct anastomosis with mild fullness of the intrahepatic ducts. Patent portal and hepatic vein.  Small caliber left hepatic and right hepatic artery.

## 2025-03-05 NOTE — H&P ADULT - ASSESSMENT
65 yo F PMHx  ETOH cirrhosis S/p OLT 1/4/2025 on ASA/ Eliquis, HTN, L breast stage 1 Invasive lobular carcinoma (HR+ HER2 neg) s/p lumpectomy and R cheek BCC s/p Mohs (2021) Umbilical Hernia Repair 9/2024. Post op course s/p OLT treated for Donor with staph epi bacteremia (blood culture x 1), with Cefazolin (end date 1/8). started on po pred taper (2/7) for treatment of presumed rejection Pred 80mg -->15mg. Patient presents as direct admit for elevated LFTs, reports c/o pruritis of face/neck and b/l arms     [] Elevated LFTs  - Liver Doppler - unable to visualize R post TANG, otherwise patent   - Check MR w/ IV contrast/ MRCP   - Diet NPO  - Advanced GI for possible ERCP  - ASA/Eliquis held - resume as able   - Ursodiol 300 bid   - atarax prn itching   - Immuno: FK 6/6, MMF 1/1, Pred 15  - PPx: Mepron/ PPI/Oscal/Mag    [] Leukopenia  - Valcyte Held  - CMV PCR sent   - f/u Blood cx/Urine Cx  - start Neupogen in AM

## 2025-03-05 NOTE — H&P ADULT - NSHPPHYSICALEXAM_GEN_ALL_CORE
GENERAL: NAD  HEAD:  Atraumatic, Normocephalic  EYES: EOMI, conjunctiva and sclera clear  NECK: Supple, No JVD  CHEST/LUNG: Clear to auscultation bilaterally; No wheeze  HEART: Regular rate and rhythm; No murmurs, rubs, or gallops  ABDOMEN: Soft, Nontender, Nondistended; Bowel sounds present. Chevron incision well healed no s/s infection   EXTREMITIES:  2+ Peripheral Pulses, No clubbing, cyanosis, or edema  PSYCH: AAOx3  NEUROLOGY: non-focal  SKIN: Erythema on neck w/ excoriations . B/L arms w purpura   SKIN: No rashes or lesions

## 2025-03-05 NOTE — H&P ADULT - NSHPREVIEWOFSYSTEMS_GEN_ALL_CORE

## 2025-03-05 NOTE — PHYSICAL THERAPY INITIAL EVALUATION ADULT - GENERAL OBSERVATIONS, REHAB EVAL
Chart reviewed events to date noted. Blood glucose reviewed. Pt tolerated 45min PT initial evaluation well. Rec' din bed in NAD, agreeable to PT.

## 2025-03-05 NOTE — H&P ADULT - HISTORY OF PRESENT ILLNESS
63 yo F PMHx  ETOH cirrhosis S/p OLT 1/4/2025 with end-end single arterial reconstruction (Donor Right, Segment IV and Left Hepatic Arteries arising separately from celiac trunk, Redo hepatic artery anastomosis) on ASA/ Eliquis, HTN, L breast stage 1 Invasive lobular carcinoma (HR+ HER2 neg) s/p lumpectomy c/w PSH (on 2020. Off Letrazole now), and R cheek BCC s/p Mohs (2021) Umbilical Hernia Repair 9/2024. Post Op s/p OLT . Pt was treated for Donor with staph epi bacteremia (blood culture x 1), with Cefazolin (end date 1/8). At follow up in clinic she had elevated LFTs and was started on po pred taper on (2/7) for treatment of presumed rejection- US doppler (2/7/25) demonstrating Normal caliber Bile duct, Patent transplant hepatic vasculature. Turbulent flow on color Doppler of a tortuous hepatic artery, however appears patent without tardus pelvis waveform abnormality. LFTs improved pred tapered down to 15, labs from yesterday markedly increase in enzymes, repeat labs today with worsening results. Additionally, pt reports pruritis, face and bilateral arms redness that began last night. Plan to directly once a bed becomes available Will need imaging +/- biopsy.     65 yo F PMHx  ETOH cirrhosis S/p OLT 1/4/2025 with end-end single arterial reconstruction (Donor Right, Segment IV and Left Hepatic Arteries arising separately from celiac trunk, Redo hepatic artery anastomosis) on ASA/ Eliquis, HTN, L breast stage 1 Invasive lobular carcinoma (HR+ HER2 neg) s/p lumpectomy c/w PSH (on 2020. Off Letrazole now), and R cheek BCC s/p Mohs (2021) Umbilical Hernia Repair 9/2024. Post op course s/p OLT Pt was treated for Donor with staph epi bacteremia (blood culture x 1), with Cefazolin (end date 1/8). After discharge she had elevated LFTs and was started on po pred taper (2/7) for treatment of presumed rejection Pred 80mg -->15mg. US doppler (2/7/25)  Patent transplant hepatic vasculature. Patient presents as direct admit for elevated LFTs, reports c/o pruritis of face/neck and bilateral arms redness that began last night.    Immuno: FK 6/6, MMF 1/1, Pred 15. Reports compliance with daily meds

## 2025-03-06 LAB
ALBUMIN SERPL ELPH-MCNC: 3.7 G/DL — SIGNIFICANT CHANGE UP (ref 3.3–5)
ALP SERPL-CCNC: 193 U/L — HIGH (ref 40–120)
ALT FLD-CCNC: 232 U/L — HIGH (ref 10–45)
ANION GAP SERPL CALC-SCNC: 12 MMOL/L — SIGNIFICANT CHANGE UP (ref 5–17)
ANISOCYTOSIS BLD QL: SLIGHT — SIGNIFICANT CHANGE UP
AST SERPL-CCNC: 43 U/L — HIGH (ref 10–40)
BASOPHILS # BLD AUTO: 0.01 K/UL — SIGNIFICANT CHANGE UP (ref 0–0.2)
BASOPHILS NFR BLD AUTO: 0.9 % — SIGNIFICANT CHANGE UP (ref 0–2)
BILIRUB SERPL-MCNC: 1.5 MG/DL — HIGH (ref 0.2–1.2)
BUN SERPL-MCNC: 23 MG/DL — SIGNIFICANT CHANGE UP (ref 7–23)
CALCIUM SERPL-MCNC: 9.7 MG/DL — SIGNIFICANT CHANGE UP (ref 8.4–10.5)
CHLORIDE SERPL-SCNC: 107 MMOL/L — SIGNIFICANT CHANGE UP (ref 96–108)
CO2 SERPL-SCNC: 20 MMOL/L — LOW (ref 22–31)
CREAT SERPL-MCNC: 0.97 MG/DL — SIGNIFICANT CHANGE UP (ref 0.5–1.3)
DACRYOCYTES BLD QL SMEAR: SLIGHT — SIGNIFICANT CHANGE UP
EGFR: 66 ML/MIN/1.73M2 — SIGNIFICANT CHANGE UP
EGFR: 66 ML/MIN/1.73M2 — SIGNIFICANT CHANGE UP
EOSINOPHIL # BLD AUTO: 0.01 K/UL — SIGNIFICANT CHANGE UP (ref 0–0.5)
EOSINOPHIL NFR BLD AUTO: 0.9 % — SIGNIFICANT CHANGE UP (ref 0–6)
GIANT PLATELETS BLD QL SMEAR: PRESENT — SIGNIFICANT CHANGE UP
GLUCOSE SERPL-MCNC: 93 MG/DL — SIGNIFICANT CHANGE UP (ref 70–99)
HCT VFR BLD CALC: 33.5 % — LOW (ref 34.5–45)
HGB BLD-MCNC: 10.5 G/DL — LOW (ref 11.5–15.5)
INR BLD: 1.15 RATIO — SIGNIFICANT CHANGE UP (ref 0.85–1.16)
LYMPHOCYTES # BLD AUTO: 0.49 K/UL — LOW (ref 1–3.3)
LYMPHOCYTES # BLD AUTO: 49.1 % — HIGH (ref 13–44)
MAGNESIUM SERPL-MCNC: 1.8 MG/DL — SIGNIFICANT CHANGE UP (ref 1.6–2.6)
MANUAL SMEAR VERIFICATION: SIGNIFICANT CHANGE UP
MCHC RBC-ENTMCNC: 31 PG — SIGNIFICANT CHANGE UP (ref 27–34)
MCHC RBC-ENTMCNC: 31.3 G/DL — LOW (ref 32–36)
MCV RBC AUTO: 98.8 FL — SIGNIFICANT CHANGE UP (ref 80–100)
METAMYELOCYTES # FLD: 1.8 % — HIGH (ref 0–0)
METAMYELOCYTES NFR BLD: 1.8 % — HIGH (ref 0–0)
MICROCYTES BLD QL: SLIGHT — SIGNIFICANT CHANGE UP
MONOCYTES # BLD AUTO: 0.03 K/UL — SIGNIFICANT CHANGE UP (ref 0–0.9)
MONOCYTES NFR BLD AUTO: 2.7 % — SIGNIFICANT CHANGE UP (ref 2–14)
MYELOCYTES NFR BLD: 2.7 % — HIGH (ref 0–0)
NEUTROPHILS # BLD AUTO: 0.29 K/UL — LOW (ref 1.8–7.4)
NEUTROPHILS NFR BLD AUTO: 29.4 % — LOW (ref 43–77)
OVALOCYTES BLD QL SMEAR: SLIGHT — SIGNIFICANT CHANGE UP
PHOSPHATE SERPL-MCNC: 4 MG/DL — SIGNIFICANT CHANGE UP (ref 2.5–4.5)
PLAT MORPH BLD: ABNORMAL
PLATELET # BLD AUTO: 98 K/UL — LOW (ref 150–400)
POIKILOCYTOSIS BLD QL AUTO: SLIGHT — SIGNIFICANT CHANGE UP
POLYCHROMASIA BLD QL SMEAR: SLIGHT — SIGNIFICANT CHANGE UP
POTASSIUM SERPL-MCNC: 4.4 MMOL/L — SIGNIFICANT CHANGE UP (ref 3.5–5.3)
POTASSIUM SERPL-SCNC: 4.4 MMOL/L — SIGNIFICANT CHANGE UP (ref 3.5–5.3)
PROT SERPL-MCNC: 6.1 G/DL — SIGNIFICANT CHANGE UP (ref 6–8.3)
PROTHROM AB SERPL-ACNC: 13.2 SEC — SIGNIFICANT CHANGE UP (ref 9.9–13.4)
RBC # BLD: 3.39 M/UL — LOW (ref 3.8–5.2)
RBC # FLD: 16.5 % — HIGH (ref 10.3–14.5)
RBC BLD AUTO: ABNORMAL
SODIUM SERPL-SCNC: 139 MMOL/L — SIGNIFICANT CHANGE UP (ref 135–145)
TACROLIMUS SERPL-MCNC: 6.7 NG/ML — SIGNIFICANT CHANGE UP
VARIANT LYMPHS # BLD: 12.5 % — HIGH (ref 0–6)
VARIANT LYMPHS NFR BLD MANUAL: 12.5 % — HIGH (ref 0–6)
WBC # BLD: 1 K/UL — CRITICAL LOW (ref 3.8–10.5)
WBC # FLD AUTO: 1 K/UL — CRITICAL LOW (ref 3.8–10.5)

## 2025-03-06 RX ORDER — CALAMINE 8% AND ZINC OXIDE 8% 160 MG/ML
1 LOTION TOPICAL EVERY 6 HOURS
Refills: 0 | Status: DISCONTINUED | OUTPATIENT
Start: 2025-03-06 | End: 2025-03-11

## 2025-03-06 RX ORDER — ACETAMINOPHEN 500 MG/5ML
1000 LIQUID (ML) ORAL ONCE
Refills: 0 | Status: COMPLETED | OUTPATIENT
Start: 2025-03-06 | End: 2025-03-06

## 2025-03-06 RX ORDER — HEPARIN SODIUM 1000 [USP'U]/ML
5000 INJECTION INTRAVENOUS; SUBCUTANEOUS ONCE
Refills: 0 | Status: COMPLETED | OUTPATIENT
Start: 2025-03-06 | End: 2025-03-06

## 2025-03-06 RX ORDER — HYDROXYZINE HYDROCHLORIDE 25 MG/1
50 TABLET, FILM COATED ORAL EVERY 6 HOURS
Refills: 0 | Status: DISCONTINUED | OUTPATIENT
Start: 2025-03-06 | End: 2025-03-11

## 2025-03-06 RX ORDER — TACROLIMUS 0.5 MG/1
2 CAPSULE ORAL
Refills: 0 | Status: DISCONTINUED | OUTPATIENT
Start: 2025-03-07 | End: 2025-03-07

## 2025-03-06 RX ORDER — LIDOCAINE HYDROCHLORIDE 20 MG/ML
1 JELLY TOPICAL ONCE
Refills: 0 | Status: COMPLETED | OUTPATIENT
Start: 2025-03-06 | End: 2025-03-06

## 2025-03-06 RX ORDER — POLYETHYLENE GLYCOL 3350 17 G/17G
17 POWDER, FOR SOLUTION ORAL AT BEDTIME
Refills: 0 | Status: DISCONTINUED | OUTPATIENT
Start: 2025-03-06 | End: 2025-03-09

## 2025-03-06 RX ORDER — TACROLIMUS 0.5 MG/1
2 CAPSULE ORAL ONCE
Refills: 0 | Status: COMPLETED | OUTPATIENT
Start: 2025-03-06 | End: 2025-03-06

## 2025-03-06 RX ORDER — PREDNISONE 20 MG/1
10 TABLET ORAL DAILY
Refills: 0 | Status: DISCONTINUED | OUTPATIENT
Start: 2025-03-07 | End: 2025-03-11

## 2025-03-06 RX ORDER — TACROLIMUS 0.5 MG/1
2 CAPSULE ORAL
Refills: 0 | Status: COMPLETED | OUTPATIENT
Start: 2025-03-06 | End: 2025-03-06

## 2025-03-06 RX ORDER — DIPHENHYDRAMINE HCL 12.5MG/5ML
50 ELIXIR ORAL ONCE
Refills: 0 | Status: DISCONTINUED | OUTPATIENT
Start: 2025-03-06 | End: 2025-03-06

## 2025-03-06 RX ORDER — TRAMADOL HYDROCHLORIDE 50 MG/1
50 TABLET, FILM COATED ORAL ONCE
Refills: 0 | Status: DISCONTINUED | OUTPATIENT
Start: 2025-03-06 | End: 2025-03-06

## 2025-03-06 RX ORDER — MAGNESIUM OXIDE 400 MG
800 TABLET ORAL
Refills: 0 | Status: DISCONTINUED | OUTPATIENT
Start: 2025-03-06 | End: 2025-03-11

## 2025-03-06 RX ORDER — FILGRASTIM 300 UG/.5ML
300 INJECTION, SOLUTION INTRAVENOUS; SUBCUTANEOUS ONCE
Refills: 0 | Status: COMPLETED | OUTPATIENT
Start: 2025-03-06 | End: 2025-03-06

## 2025-03-06 RX ORDER — PREDNISONE 20 MG/1
10 TABLET ORAL ONCE
Refills: 0 | Status: COMPLETED | OUTPATIENT
Start: 2025-03-06 | End: 2025-03-06

## 2025-03-06 RX ORDER — DIPHENHYDRAMINE HCL 12.5MG/5ML
50 ELIXIR ORAL ONCE
Refills: 0 | Status: COMPLETED | OUTPATIENT
Start: 2025-03-06 | End: 2025-03-06

## 2025-03-06 RX ADMIN — TRAMADOL HYDROCHLORIDE 50 MILLIGRAM(S): 50 TABLET, FILM COATED ORAL at 17:06

## 2025-03-06 RX ADMIN — LIDOCAINE HYDROCHLORIDE 1 PATCH: 20 JELLY TOPICAL at 21:34

## 2025-03-06 RX ADMIN — Medication 400 MILLIGRAM(S): at 18:58

## 2025-03-06 RX ADMIN — TRAMADOL HYDROCHLORIDE 50 MILLIGRAM(S): 50 TABLET, FILM COATED ORAL at 16:06

## 2025-03-06 RX ADMIN — TACROLIMUS 2 MILLIGRAM(S): 0.5 CAPSULE ORAL at 21:29

## 2025-03-06 RX ADMIN — FILGRASTIM 300 MICROGRAM(S): 300 INJECTION, SOLUTION INTRAVENOUS; SUBCUTANEOUS at 09:55

## 2025-03-06 RX ADMIN — Medication 650 MILLIGRAM(S): at 18:07

## 2025-03-06 RX ADMIN — ATOVAQUONE 1500 MILLIGRAM(S): 750 SUSPENSION ORAL at 13:59

## 2025-03-06 RX ADMIN — PREDNISONE 10 MILLIGRAM(S): 20 TABLET ORAL at 16:49

## 2025-03-06 RX ADMIN — Medication 1000 MILLIGRAM(S): at 19:40

## 2025-03-06 RX ADMIN — HYDROXYZINE HYDROCHLORIDE 50 MILLIGRAM(S): 25 TABLET, FILM COATED ORAL at 09:39

## 2025-03-06 RX ADMIN — Medication 800 MILLIGRAM(S): at 09:39

## 2025-03-06 RX ADMIN — Medication 1 TABLET(S): at 21:30

## 2025-03-06 RX ADMIN — HEPARIN SODIUM 5000 UNIT(S): 1000 INJECTION INTRAVENOUS; SUBCUTANEOUS at 13:58

## 2025-03-06 RX ADMIN — Medication 1 TABLET(S): at 06:08

## 2025-03-06 RX ADMIN — Medication 800 MILLIGRAM(S): at 16:49

## 2025-03-06 RX ADMIN — Medication 50 MILLIGRAM(S): at 18:39

## 2025-03-06 RX ADMIN — Medication 40 MILLIGRAM(S): at 06:08

## 2025-03-06 RX ADMIN — TACROLIMUS 2 MILLIGRAM(S): 0.5 CAPSULE ORAL at 13:59

## 2025-03-06 RX ADMIN — URSODIOL 300 MILLIGRAM(S): 300 CAPSULE ORAL at 18:07

## 2025-03-06 RX ADMIN — LIDOCAINE HYDROCHLORIDE 1 PATCH: 20 JELLY TOPICAL at 20:22

## 2025-03-06 RX ADMIN — LIDOCAINE HYDROCHLORIDE 1 PATCH: 20 JELLY TOPICAL at 09:39

## 2025-03-06 RX ADMIN — HYDROXYZINE HYDROCHLORIDE 50 MILLIGRAM(S): 25 TABLET, FILM COATED ORAL at 16:06

## 2025-03-06 RX ADMIN — URSODIOL 300 MILLIGRAM(S): 300 CAPSULE ORAL at 06:08

## 2025-03-06 RX ADMIN — Medication 1 TABLET(S): at 18:07

## 2025-03-06 RX ADMIN — Medication 650 MILLIGRAM(S): at 06:08

## 2025-03-06 NOTE — CONSULT NOTE ADULT - ASSESSMENT
63 yo F PMHx  ETOH cirrhosis S/p OLT 1/4/2025 with end-end single arterial reconstruction (Donor Right, Segment IV and Left Hepatic Arteries arising separately from celiac trunk, Redo hepatic artery anastomosis) on ASA/ Eliquis, HTN, L breast stage 1 Invasive lobular carcinoma (HR+ HER2 neg) s/p lumpectomy c/w PSH (on 2020. Off Letrazole now), and R cheek BCC s/p Mohs (2021) Umbilical Hernia Repair 9/2024. S/p OLT, pt was treated for Donor with staph epi bacteremia (blood culture x 1), with Cefazolin (end date 1/8). After discharge she had elevated LFTs and was started on po pred taper (2/7) for treatment of presumed rejection Pred 80mg -->15mg. Now admitted for rising transaminases.    #Cirrhosis 2/2 AUD, now s/p OLT 1/4/2025 with end-end single arterial reconstruction (Donor Right, Segment IV and Left Hepatic Arteries arising separately from celiac trunk, Redo hepatic artery anastomosis) on ASA/ Eliquis    #Biliary anastomotic stricture    Recommendations  - keep npo for ERCP today  - trend cbc, cmp    Kaci Kim PGY7  GI/Hepatology Fellow   63 yo F PMHx  ETOH cirrhosis S/p OLT 1/4/2025 with end-end single arterial reconstruction (Donor Right, Segment IV and Left Hepatic Arteries arising separately from celiac trunk, Redo hepatic artery anastomosis) on ASA/ Eliquis, HTN, L breast stage 1 Invasive lobular carcinoma (HR+ HER2 neg) s/p lumpectomy c/w PSH (on 2020. Off Letrazole now), and R cheek BCC s/p Mohs (2021) Umbilical Hernia Repair 9/2024. S/p OLT, pt was treated for Donor with staph epi bacteremia (blood culture x 1), with Cefazolin (end date 1/8). After discharge she had elevated LFTs and was started on po pred taper (2/7) for treatment of presumed rejection Pred 80mg -->15mg. Now admitted for rising transaminases.    #Cirrhosis 2/2 AUD, now s/p OLT 1/4/2025 with end-end single arterial reconstruction (Donor Right, Segment IV and Left Hepatic Arteries arising separately from celiac trunk, Redo hepatic artery anastomosis) on ASA/ Eliquis  - last dose Eliquis evening of 3/4    #Biliary anastomotic stricture    Recommendations  - trend cbc, cmp  - npo after MN for ERCP  - hold Eliquis     Kaci Kim PGY7  GI/Hepatology Fellow

## 2025-03-06 NOTE — CONSULT NOTE ADULT - SUBJECTIVE AND OBJECTIVE BOX
HPI:  63 yo F PMHx  ETOH cirrhosis S/p OLT 2025 with end-end single arterial reconstruction (Donor Right, Segment IV and Left Hepatic Arteries arising separately from celiac trunk, Redo hepatic artery anastomosis) on ASA/ Eliquis, HTN, L breast stage 1 Invasive lobular carcinoma (HR+ HER2 neg) s/p lumpectomy c/w PSH (on . Off Letrazole now), and R cheek BCC s/p Mohs () Umbilical Hernia Repair 2024. Post op course s/p OLT Pt was treated for Donor with staph epi bacteremia (blood culture x 1), with Cefazolin (end date ). After discharge she had elevated LFTs and was started on po pred taper () for treatment of presumed rejection Pred 80mg -->15mg. US doppler (25)  Patent transplant hepatic vasculature. Patient presents as direct admit for elevated LFTs, reports c/o pruritis of face/neck and bilateral arms redness that began last night.  Immuno: FK , MMF , Pred 15. Reports compliance with daily meds     Advanced GI consulted due to concern for biliary stricture on MR.    Allergies:  No Known Allergies    Home Medications:  apixaban 2.5 mg oral tablet: 1 tab(s) orally 2 times a day (04 Mar 2025 23:36)  aspirin 81 mg oral delayed release tablet: 1 tab(s) orally every 24 hours (04 Mar 2025 23:36)  CellCept 500 mg oral tablet: 2 tab(s) orally 2 times a day (04 Mar 2025 23:36)  magnesium oxide: 400 milligram(s) orally 2 times a day (04 Mar 2025 23:36)  NexIUM 40 mg oral delayed release capsule: 1 cap(s) orally once a day (04 Mar 2025 23:36)  predniSONE 5 mg oral tablet: 3 tab(s) orally once a day (04 Mar 2025 23:33)  senna leaf extract oral tablet: 1 tab(s) orally once a day (at bedtime) (04 Mar 2025 23:36)  tacrolimus 1 mg oral capsule: 6 milligram(s) orally 2 times a day (04 Mar 2025 23:35)  ursodiol 300 mg oral capsule: 1 cap(s) orally 2 times a day (04 Mar 2025 23:36)  valGANciclovir 450 mg oral tablet: 1 tab(s) orally every 24 hours (04 Mar 2025 23:36)  vitamin D-calcium (as carbonate) 5 mcg-500 mg oral tablet: 1 tab(s) orally 2 times a day (04 Mar 2025 23:36)    Hospital Medications:  atovaquone  Suspension 1500 milliGRAM(s) Oral daily  calcium carbonate 1250 mG  + Vitamin D (OsCal 500 + D) 1 Tablet(s) Oral two times a day  filgrastim-sndz (ZARXIO) Injectable 300 MICROGram(s) SubCutaneous once  hydrOXYzine hydrochloride 50 milliGRAM(s) Oral every 6 hours PRN  influenza   Vaccine 0.5 milliLiter(s) IntraMuscular once  lidocaine   4% Patch 1 Patch Transdermal once  magnesium oxide 800 milliGRAM(s) Oral two times a day with meals  pantoprazole    Tablet 40 milliGRAM(s) Oral before breakfast  senna 1 Tablet(s) Oral at bedtime  sodium bicarbonate 650 milliGRAM(s) Oral two times a day  ursodiol Capsule 300 milliGRAM(s) Oral two times a day      PMHX/PSHX:  Breast cancer, left    Mild alcohol use disorder    H/O hepatitis    GERD (gastroesophageal reflux disease)    Skin cancer, basal cell    H/O lumpectomy    History of removal of skin mole    H/O ganglion cyst    Status post liver transplant        Family history:      Denies family history of colon cancer/polyps, stomach cancer/polyps, pancreatic cancer/masses, liver cancer/disease, ovarian cancer and endometrial cancer.    Social History:   Tob: Denies  EtOH: Denies  Illicit Drugs: Denies    ROS:     General:  No wt loss, fevers, chills, night sweats, fatigue  Eyes:  Good vision, no reported pain  ENT:  No sore throat, pain, runny nose, dysphagia  CV:  No pain, palpitations, hypo/hypertension  Pulm:  No dyspnea, cough, tachypnea, wheezing  GI:  see HPI  :  No pain, bleeding, incontinence, nocturia  Muscle:  No pain, weakness  Neuro:  No weakness, tingling, memory problems  Psych:  No fatigue, insomnia, mood problems, depression  Endocrine:  No polyuria, polydipsia, cold/heat intolerance  Heme:  No petechiae, ecchymosis, easy bruisability  Skin:  No rash, tattoos, scars, edema    PHYSICAL EXAM:   GENERAL:  No acute distress  HEENT:  NCAT, no scleral icterus   CHEST:  no respiratory distress  HEART:  Regular rate and rhythm  ABDOMEN:  Soft, non-tender, non-distended  EXTREMITIES: No edema  SKIN:  No rash/erythema/ecchymoses  NEURO:  Alert and oriented x 3     Vital Signs:  Vital Signs Last 24 Hrs  T(C): 36.7 (06 Mar 2025 08:08), Max: 36.7 (05 Mar 2025 12:19)  T(F): 98.1 (06 Mar 2025 08:08), Max: 98.1 (06 Mar 2025 08:08)  HR: 70 (06 Mar 2025 08:08) (59 - 74)  BP: 120/75 (06 Mar 2025 08:08) (120/75 - 151/77)  BP(mean): --  RR: 18 (06 Mar 2025 08:08) (16 - 18)  SpO2: 97% (06 Mar 2025 08:08) (95% - 98%)    Parameters below as of 06 Mar 2025 08:08  Patient On (Oxygen Delivery Method): room air      Daily     Daily Weight in k.6 (06 Mar 2025 06:04)    LABS:                        10.5   1.00  )-----------( 98       ( 06 Mar 2025 06:51 )             33.5     Mean Cell Volume: 98.8 fl (-25 @ 06:51)    03-    139  |  107  |  23  ----------------------------<  93  4.4   |  20[L]  |  0.97    Ca    9.7      06 Mar 2025 06:51  Phos  4.0     03-  Mg     1.8     -    TPro  6.1  /  Alb  3.7  /  TBili  1.5[H]  /  DBili  x   /  AST  43[H]  /  ALT  232[H]  /  AlkPhos  193[H]  03-06    LIVER FUNCTIONS - ( 06 Mar 2025 06:51 )  Alb: 3.7 g/dL / Pro: 6.1 g/dL / ALK PHOS: 193 U/L / ALT: 232 U/L / AST: 43 U/L / GGT: x           PT/INR - ( 06 Mar 2025 06:51 )   PT: 13.2 sec;   INR: 1.15 ratio           Urinalysis Basic - ( 06 Mar 2025 06:51 )    Color: x / Appearance: x / SG: x / pH: x  Gluc: 93 mg/dL / Ketone: x  / Bili: x / Urobili: x   Blood: x / Protein: x / Nitrite: x   Leuk Esterase: x / RBC: x / WBC x   Sq Epi: x / Non Sq Epi: x / Bacteria: x                              10.5   1.00  )-----------( 98       ( 06 Mar 2025 06:51 )             33.5                         9.8    0.86  )-----------( 121      ( 05 Mar 2025 07:02 )             31.7                         10.1   0.57  )-----------( 116      ( 04 Mar 2025 23:17 )             31.5       Imaging:    ACC: [id number] EXAM:  MR ABDOMEN WAW IC   ORDERED BY: YULI LIN     PROCEDURE DATE:  2025          INTERPRETATION:  CLINICAL INFORMATION: elevated LFTs, hx of Crohn's   disease;    COMPARISON: 2025 CT.    CONTRAST/COMPLICATIONS:  IV Contrast: Gadavist  7 cc administered   0.5 cc discarded  Oral Contrast: NONE  .    PROCEDURE:  MRI of the abdomen was performed.      FINDINGS:  LOWER CHEST: Mild cardiomegaly.    LIVER: Status post hepatic transplant.  BILE DUCTS: Prominent common bile duct measuring up to 9 mm. There is a   1.5 cm segment of circumferential luminal narrowing at the anastomosis.   Mild fullness of the intrahepatic biliary ducts.  GALLBLADDER: Cholecystectomy. Dilatation of the cystic duct remnant into.  SPLEEN: Splenomegaly measuring up to 15.1 cm.  PANCREAS: Few scattered subcentimeter cysts measuring up to 3 mm.  ADRENALS: Within normal limits.  KIDNEYS/URETERS: No hydronephrosis. Subcentimeter cysts.    VISUALIZED PORTIONS:  BOWEL: Within normal limits.  PERITONEUM: Trace perihepatic fluid.  VESSELS: Normal caliber abdominal aorta. The main, right, and left portal   veins are patent. The right, middle, and left hepatic veins are patent.   The intrahepatic IVC appears patent. IVC anastomosis appears patent.   Celiac trunk and SMA appears patent. The common hepatic artery appears   prominent in caliber but patent. The proximal hepatic proper artery   appears patent. The small caliber left hepatic artery is noted in appears   patent. The right hepaticartery visualized appears small in caliber but   patent.  RETROPERITONEUM/LYMPH NODES: No lymphadenopathy.  ABDOMINAL WALL: Within normal limits.  BONES: Degenerative changes.    IMPRESSION:  Status post hepatic transplant.  Narrowing of the common bile duct anastomosis with mild fullness of the   intrahepatic ducts.  Patent portal and hepatic vein.  Small caliber left hepatic and right hepatic artery.    --- End of Report ---      ZAID SYKES MD; Attending Radiologist  This document hasbeen electronically signed. Mar  5 2025  2:25PM

## 2025-03-06 NOTE — PROGRESS NOTE ADULT - ASSESSMENT
65 yo F PMHx  ETOH cirrhosis S/p OLT 1/4/2025 on ASA/ Eliquis, HTN, L breast stage 1 Invasive lobular carcinoma (HR+ HER2 neg) s/p lumpectomy and R cheek BCC s/p Mohs (2021) Umbilical Hernia Repair 9/2024. Post op course s/p OLT treated for Donor with staph epi bacteremia (blood culture x 1), with Cefazolin (end date 1/8). started on po pred taper (2/7) for treatment of presumed rejection Pred 80mg -->15mg. Patient presents as direct admit for elevated LFTs, reports c/o pruritis of face/neck and b/l arms     [] Elevated LFTs  - Liver Doppler - unable to visualize R post TANG, otherwise patent   - MRI: Narrowing of CBD, small caliber L/R hepatic arteries  - Advanced aware: ERCP prelim tomorrow, pre-op  - ASA/Eliquis held - resume as able   - Diet NPO  - Ursodiol 300 bid   - atarax prn itching   - Immuno: FK per level, MMF 1/1, Pred 15  - PPx: Mepron/ PPI/Oscal/Mag    [] Leukopenia  - Valcyte/MMF Held  - CMV PCR sent   - f/u Blood cx/Urine Cx  - s/p Neupogen 300 (3/5)    63 yo F PMHx  ETOH cirrhosis S/p OLT 1/4/2025 on ASA/ Eliquis, HTN, L breast stage 1 Invasive lobular carcinoma (HR+ HER2 neg) s/p lumpectomy and R cheek BCC s/p Mohs (2021) Umbilical Hernia Repair 9/2024. Post op course s/p OLT treated for Donor with staph epi bacteremia (blood culture x 1), with Cefazolin (end date 1/8). started on po pred taper (2/7) for treatment of presumed rejection Pred 80mg -->15mg. Patient presents as direct admit for elevated LFTs, reports c/o pruritis of face/neck and b/l arms     [] Elevated LFTs  - Liver Doppler - unable to visualize R post TANG, otherwise patent   - MRI: Narrowing of CBD, small caliber L/R hepatic arteries  - Advanced aware: ERCP tomorrow, NPO after midnight   - ASA/Eliquis held - resume as able   - Ursodiol 300 bid   - atarax prn itching   - Immuno: FK per level, MMF held, decr Pred 10  - PPx: Mepron/ PPI/Oscal/Mag    [] Leukopenia  - Valcyte/MMF Held  - CMV PCR sent   - f/u Blood cx/Urine Cx  - s/p Neupogen 300 (3/5 and 3/6)

## 2025-03-06 NOTE — PROGRESS NOTE ADULT - SUBJECTIVE AND OBJECTIVE BOX
Transplant Surgery - Multidisciplinary Progress Note  --------------------------------------------------------------    Present:   Patient seen and examined with multidisciplinary Transplant team including Surgeon: Dr Solomon Fellow: FELIPE Presley/Queenie  Hepatologist: Dr. Hand  RNs in AM rounds.   Disciplines not in attendance will be notified of the plan.     63 yo F PMHx  ETOH cirrhosis S/p OLT 1/4/2025 with end-end single arterial reconstruction (Donor Right, Segment IV and Left Hepatic Arteries arising separately from celiac trunk, Redo hepatic artery anastomosis) on ASA/ Eliquis, HTN, L breast stage 1 Invasive lobular carcinoma (HR+ HER2 neg) s/p lumpectomy c/w PSH (on 2020. Off Letrazole now), and R cheek BCC s/p Mohs (2021) Umbilical Hernia Repair 9/2024. Post op course s/p OLT Pt was treated for Donor with staph epi bacteremia (blood culture x 1), with Cefazolin (end date 1/8). After discharge she had elevated LFTs and was started on po pred taper (2/7) for treatment of presumed rejection Pred 80mg -->15mg. US doppler (2/7/25)  Patent transplant hepatic vasculature. Patient presents as direct admit for elevated LFTs, reports c/o pruritis of face/neck and bilateral arms redness that began last night.    Interval Events:  - afebrile, VSS  - s/p Neupogen x1   - MRI: Narrowing of CBD, small caliber L/R hepatic arteries  - NPO for ERCP     Immunosuppression:  - FK per level, MMF Held, Pred 15     Potential Discharge date: tbd     Education:  Medications    Plan of care:  See Below      tx data here     Review of systems  All other systems were reviewed and are negative, except as noted.      PHYSICAL EXAM:  GENERAL: NAD  HEAD:  Atraumatic, Normocephalic  EYES: EOMI, conjunctiva and sclera clear  NECK: Supple, No JVD  CHEST/LUNG: Clear to auscultation bilaterally; No wheeze  HEART: Regular rate and rhythm; No murmurs, rubs, or gallops  ABDOMEN: Soft, Nontender, Nondistended; Bowel sounds present. Chevron incision well healed no s/s infection   EXTREMITIES:  2+ Peripheral Pulses, No clubbing, cyanosis, or edema  PSYCH: AAOx3  NEUROLOGY: non-focal  SKIN: Erythema on neck w/ excoriations . B/L arms w purpura   SKIN: No rashes or lesions     Transplant Surgery - Multidisciplinary Progress Note  --------------------------------------------------------------    Present:   Patient seen and examined with multidisciplinary Transplant team including Surgeon: Dr Solomon Fellow: FELIPE Presley/Queenie  Hepatologist: Dr. Hand  RNs in AM rounds.   Disciplines not in attendance will be notified of the plan.     65 yo F PMHx  ETOH cirrhosis S/p OLT 1/4/2025 with end-end single arterial reconstruction (Donor Right, Segment IV and Left Hepatic Arteries arising separately from celiac trunk, Redo hepatic artery anastomosis) on ASA/ Eliquis, HTN, L breast stage 1 Invasive lobular carcinoma (HR+ HER2 neg) s/p lumpectomy c/w PSH (on 2020. Off Letrazole now), and R cheek BCC s/p Mohs (2021) Umbilical Hernia Repair 9/2024. Post op course s/p OLT Pt was treated for Donor with staph epi bacteremia (blood culture x 1), with Cefazolin (end date 1/8). After discharge she had elevated LFTs and was started on po pred taper (2/7) for treatment of presumed rejection Pred 80mg -->15mg. US doppler (2/7/25)  Patent transplant hepatic vasculature. Patient presents as direct admit for elevated LFTs, reports c/o pruritis of face/neck and bilateral arms redness that began last night.    Interval Events:  - afebrile, VSS  - neutropenic   - MRI: Narrowing of CBD, small caliber L/R hepatic arteries    Immunosuppression:  - FK per level, MMF Held, Pred 15     Potential Discharge date: tbd   Education:  Medications  Plan of care:  See Below      MEDICATIONS  (STANDING):  atovaquone  Suspension 1500 milliGRAM(s) Oral daily  calcium carbonate 1250 mG  + Vitamin D (OsCal 500 + D) 1 Tablet(s) Oral two times a day  influenza   Vaccine 0.5 milliLiter(s) IntraMuscular once  magnesium oxide 800 milliGRAM(s) Oral two times a day with meals  pantoprazole    Tablet 40 milliGRAM(s) Oral before breakfast  senna 1 Tablet(s) Oral at bedtime  sodium bicarbonate 650 milliGRAM(s) Oral two times a day  ursodiol Capsule 300 milliGRAM(s) Oral two times a day    MEDICATIONS  (PRN):  hydrOXYzine hydrochloride 50 milliGRAM(s) Oral every 6 hours PRN Itching      PAST MEDICAL & SURGICAL HISTORY:  Breast cancer, left  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  GERD (gastroesophageal reflux disease)  Skin cancer, basal cell  H/O lumpectomy  History of removal of skin mole  H/O ganglion cyst  Status post liver transplant    Vital Signs Last 24 Hrs  T(C): 36.7 (06 Mar 2025 08:08), Max: 36.7 (05 Mar 2025 12:19)  T(F): 98.1 (06 Mar 2025 08:08), Max: 98.1 (06 Mar 2025 08:08)  HR: 70 (06 Mar 2025 08:08) (69 - 74)  BP: 120/75 (06 Mar 2025 08:08) (120/75 - 149/79)  BP(mean): --  RR: 18 (06 Mar 2025 08:08) (16 - 18)  SpO2: 97% (06 Mar 2025 08:08) (95% - 98%)    Parameters below as of 06 Mar 2025 08:08  Patient On (Oxygen Delivery Method): room air    I&O's Summary    05 Mar 2025 07:01  -  06 Mar 2025 07:00  --------------------------------------------------------  IN: 720 mL / OUT: 700 mL / NET: 20 mL    06 Mar 2025 07:01  -  06 Mar 2025 10:01  --------------------------------------------------------  IN: 0 mL / OUT: 400 mL / NET: -400 mL                       10.5   1.00  )-----------( 98       ( 06 Mar 2025 06:51 )             33.5     03-06    139  |  107  |  23  ----------------------------<  93  4.4   |  20[L]  |  0.97    Ca    9.7      06 Mar 2025 06:51  Phos  4.0     03-06  Mg     1.8     03-06    TPro  6.1  /  Alb  3.7  /  TBili  1.5[H]  /  DBili  x   /  AST  43[H]  /  ALT  232[H]  /  AlkPhos  193[H]  03-06    Tacrolimus (), Serum: 17.2 ng/mL (03-05 @ 07:02)    Culture - Blood (collected 03-05-25 @ 07:03)  Source: Blood Blood-Peripheral  Preliminary Report (03-06-25 @ 09:01):    No growth at 24 hours    Culture - Blood (collected 03-04-25 @ 23:16)  Source: Blood Blood-Peripheral  Preliminary Report (03-06-25 @ 03:01):    No growth at 24 hours    Review of systems  All other systems were reviewed and are negative, except as noted.      PHYSICAL EXAM:  GENERAL: NAD  HEAD:  Atraumatic, Normocephalic  EYES: EOMI, conjunctiva and sclera clear  NECK: Supple, No JVD  CHEST/LUNG: Clear to auscultation bilaterally; No wheeze  HEART: Regular rate and rhythm; No murmurs, rubs, or gallops  ABDOMEN: Soft, Nontender, Nondistended; Bowel sounds present. Chevron incision well healed no s/s infection   EXTREMITIES:  2+ Peripheral Pulses, No clubbing, cyanosis, or edema  PSYCH: AAOx3  NEUROLOGY: non-focal  SKIN: Erythema on neck w/ excoriations . B/L arms w purpura   SKIN: No rashes or lesions

## 2025-03-07 LAB
-  AMOXICILLIN/CLAVULANIC ACID: SIGNIFICANT CHANGE UP
-  AMPICILLIN/SULBACTAM: SIGNIFICANT CHANGE UP
-  AMPICILLIN: SIGNIFICANT CHANGE UP
-  AZTREONAM: SIGNIFICANT CHANGE UP
-  CEFAZOLIN: SIGNIFICANT CHANGE UP
-  CEFEPIME: SIGNIFICANT CHANGE UP
-  CEFOXITIN: SIGNIFICANT CHANGE UP
-  CEFTRIAXONE: SIGNIFICANT CHANGE UP
-  CIPROFLOXACIN: SIGNIFICANT CHANGE UP
-  ERTAPENEM: SIGNIFICANT CHANGE UP
-  GENTAMICIN: SIGNIFICANT CHANGE UP
-  IMIPENEM: SIGNIFICANT CHANGE UP
-  LEVOFLOXACIN: SIGNIFICANT CHANGE UP
-  MEROPENEM: SIGNIFICANT CHANGE UP
-  NITROFURANTOIN: SIGNIFICANT CHANGE UP
-  PIPERACILLIN/TAZOBACTAM: SIGNIFICANT CHANGE UP
-  TOBRAMYCIN: SIGNIFICANT CHANGE UP
-  TRIMETHOPRIM/SULFAMETHOXAZOLE: SIGNIFICANT CHANGE UP
ALBUMIN SERPL ELPH-MCNC: 3.7 G/DL — SIGNIFICANT CHANGE UP (ref 3.3–5)
ALBUMIN SERPL ELPH-MCNC: 4.1 G/DL
ALP BLD-CCNC: 207 U/L
ALP SERPL-CCNC: 287 U/L — HIGH (ref 40–120)
ALT FLD-CCNC: 205 U/L — HIGH (ref 10–45)
ALT SERPL-CCNC: 431 U/L
ANION GAP SERPL CALC-SCNC: 11 MMOL/L
ANION GAP SERPL CALC-SCNC: 13 MMOL/L — SIGNIFICANT CHANGE UP (ref 5–17)
AST SERPL-CCNC: 210 U/L
AST SERPL-CCNC: 44 U/L — HIGH (ref 10–40)
B19V DNA FLD QL NAA+PROBE: SIGNIFICANT CHANGE UP IU/ML
BASOPHILS # BLD AUTO: 0.03 K/UL — SIGNIFICANT CHANGE UP (ref 0–0.2)
BASOPHILS NFR BLD AUTO: 3.9 % — HIGH (ref 0–2)
BILIRUB SERPL-MCNC: 2.3 MG/DL — HIGH (ref 0.2–1.2)
BILIRUB SERPL-MCNC: 2.4 MG/DL
BLD GP AB SCN SERPL QL: NEGATIVE — SIGNIFICANT CHANGE UP
BUN SERPL-MCNC: 19 MG/DL
BUN SERPL-MCNC: 22 MG/DL — SIGNIFICANT CHANGE UP (ref 7–23)
CALCIUM SERPL-MCNC: 9.2 MG/DL
CALCIUM SERPL-MCNC: 9.7 MG/DL — SIGNIFICANT CHANGE UP (ref 8.4–10.5)
CHLORIDE SERPL-SCNC: 103 MMOL/L — SIGNIFICANT CHANGE UP (ref 96–108)
CHLORIDE SERPL-SCNC: 107 MMOL/L
CMV DNA CSF QL NAA+PROBE: SIGNIFICANT CHANGE UP IU/ML
CMV DNA SPEC NAA+PROBE-LOG#: SIGNIFICANT CHANGE UP LOG10IU/ML
CMV DNA SPEC QL NAA+PROBE: NOT DETECTED IU/ML
CMV DNA SPEC QL NAA+PROBE: NOT DETECTED IU/ML
CMVPCR LOG: NOT DETECTED LOG10IU/ML
CMVPCR LOG: NOT DETECTED LOG10IU/ML
CO2 SERPL-SCNC: 19 MMOL/L — LOW (ref 22–31)
CO2 SERPL-SCNC: 24 MMOL/L
CREAT SERPL-MCNC: 0.89 MG/DL — SIGNIFICANT CHANGE UP (ref 0.5–1.3)
CREAT SERPL-MCNC: 0.98 MG/DL
CULTURE RESULTS: ABNORMAL
EGFR: 73 ML/MIN/1.73M2 — SIGNIFICANT CHANGE UP
EGFR: 73 ML/MIN/1.73M2 — SIGNIFICANT CHANGE UP
EGFRCR SERPLBLD CKD-EPI 2021: 65 ML/MIN/1.73M2
EOSINOPHIL # BLD AUTO: 0.03 K/UL — SIGNIFICANT CHANGE UP (ref 0–0.5)
EOSINOPHIL NFR BLD AUTO: 3.9 % — SIGNIFICANT CHANGE UP (ref 0–6)
GGT SERPL-CCNC: 810 U/L
GIANT PLATELETS BLD QL SMEAR: PRESENT — SIGNIFICANT CHANGE UP
GLUCOSE SERPL-MCNC: 104 MG/DL
GLUCOSE SERPL-MCNC: 121 MG/DL — HIGH (ref 70–99)
HBV DNA # SERPL NAA+PROBE: SIGNIFICANT CHANGE UP
HBV DNA SERPL NAA+PROBE-LOG#: SIGNIFICANT CHANGE UP LOGIU/ML
HCT VFR BLD CALC: 34.3 %
HCT VFR BLD CALC: 35.2 % — SIGNIFICANT CHANGE UP (ref 34.5–45)
HGB BLD-MCNC: 10.9 G/DL
HGB BLD-MCNC: 11.1 G/DL — LOW (ref 11.5–15.5)
HYPOSEGMENTATION: PRESENT — SIGNIFICANT CHANGE UP
INR BLD: 1.26 RATIO — HIGH (ref 0.85–1.16)
LYMPHOCYTES # BLD AUTO: 0.35 K/UL — LOW (ref 1–3.3)
LYMPHOCYTES # BLD AUTO: 42.1 % — SIGNIFICANT CHANGE UP (ref 13–44)
MAGNESIUM SERPL-MCNC: 1.7 MG/DL
MAGNESIUM SERPL-MCNC: 1.8 MG/DL — SIGNIFICANT CHANGE UP (ref 1.6–2.6)
MANUAL SMEAR VERIFICATION: SIGNIFICANT CHANGE UP
MCHC RBC-ENTMCNC: 30.7 PG
MCHC RBC-ENTMCNC: 30.7 PG — SIGNIFICANT CHANGE UP (ref 27–34)
MCHC RBC-ENTMCNC: 31.5 G/DL — LOW (ref 32–36)
MCHC RBC-ENTMCNC: 31.8 G/DL
MCV RBC AUTO: 96.6 FL
MCV RBC AUTO: 97.5 FL — SIGNIFICANT CHANGE UP (ref 80–100)
METHOD TYPE: SIGNIFICANT CHANGE UP
MONOCYTES # BLD AUTO: 0.04 K/UL — SIGNIFICANT CHANGE UP (ref 0–0.9)
MONOCYTES NFR BLD AUTO: 5.3 % — SIGNIFICANT CHANGE UP (ref 2–14)
MYELOCYTES NFR BLD: 9.2 % — HIGH (ref 0–0)
NEUTROPHILS # BLD AUTO: 0.3 K/UL — LOW (ref 1.8–7.4)
NEUTROPHILS NFR BLD AUTO: 30.3 % — LOW (ref 43–77)
NEUTS BAND # BLD: 5.3 % — SIGNIFICANT CHANGE UP (ref 0–8)
NEUTS BAND NFR BLD: 5.3 % — SIGNIFICANT CHANGE UP (ref 0–8)
ORGANISM # SPEC MICROSCOPIC CNT: ABNORMAL
ORGANISM # SPEC MICROSCOPIC CNT: ABNORMAL
PETH 16:0/18:1: NEGATIVE NG/ML
PETH 16:0/18:2: NEGATIVE NG/ML
PETH COMMENTS: NORMAL
PHOSPHATE SERPL-MCNC: 3.7 MG/DL
PHOSPHATE SERPL-MCNC: 3.9 MG/DL — SIGNIFICANT CHANGE UP (ref 2.5–4.5)
PLAT MORPH BLD: ABNORMAL
PLATELET # BLD AUTO: 103 K/UL — LOW (ref 150–400)
PLATELET # BLD AUTO: 117 K/UL
POTASSIUM SERPL-MCNC: 4.7 MMOL/L — SIGNIFICANT CHANGE UP (ref 3.5–5.3)
POTASSIUM SERPL-SCNC: 4.5 MMOL/L
POTASSIUM SERPL-SCNC: 4.7 MMOL/L — SIGNIFICANT CHANGE UP (ref 3.5–5.3)
PROT SERPL-MCNC: 6.2 G/DL — SIGNIFICANT CHANGE UP (ref 6–8.3)
PROT SERPL-MCNC: 6.3 G/DL
PROTHROM AB SERPL-ACNC: 14.3 SEC — HIGH (ref 9.9–13.4)
RBC # BLD: 3.55 M/UL
RBC # BLD: 3.61 M/UL — LOW (ref 3.8–5.2)
RBC # FLD: 16.4 % — HIGH (ref 10.3–14.5)
RBC # FLD: 16.9 %
RBC BLD AUTO: SIGNIFICANT CHANGE UP
RH IG SCN BLD-IMP: POSITIVE — SIGNIFICANT CHANGE UP
SODIUM SERPL-SCNC: 135 MMOL/L — SIGNIFICANT CHANGE UP (ref 135–145)
SODIUM SERPL-SCNC: 141 MMOL/L
SPECIMEN SOURCE: SIGNIFICANT CHANGE UP
TACROLIMUS SERPL-MCNC: 13.7 NG/ML
TACROLIMUS SERPL-MCNC: 9.7 NG/ML — SIGNIFICANT CHANGE UP
WBC # BLD: 0.83 K/UL — CRITICAL LOW (ref 3.8–10.5)
WBC # FLD AUTO: 0.79 K/UL
WBC # FLD AUTO: 0.83 K/UL — CRITICAL LOW (ref 3.8–10.5)

## 2025-03-07 PROCEDURE — 99232 SBSQ HOSP IP/OBS MODERATE 35: CPT | Mod: GC

## 2025-03-07 RX ORDER — FILGRASTIM 300 UG/.5ML
480 INJECTION, SOLUTION INTRAVENOUS; SUBCUTANEOUS ONCE
Refills: 0 | Status: COMPLETED | OUTPATIENT
Start: 2025-03-07 | End: 2025-03-07

## 2025-03-07 RX ORDER — MAGNESIUM, ALUMINUM HYDROXIDE 200-200 MG
30 TABLET,CHEWABLE ORAL ONCE
Refills: 0 | Status: COMPLETED | OUTPATIENT
Start: 2025-03-07 | End: 2025-03-07

## 2025-03-07 RX ORDER — TACROLIMUS 0.5 MG/1
1 CAPSULE ORAL
Refills: 0 | Status: DISCONTINUED | OUTPATIENT
Start: 2025-03-07 | End: 2025-03-07

## 2025-03-07 RX ORDER — DIPHENHYDRAMINE HCL 12.5MG/5ML
50 ELIXIR ORAL AT BEDTIME
Refills: 0 | Status: DISCONTINUED | OUTPATIENT
Start: 2025-03-07 | End: 2025-03-11

## 2025-03-07 RX ORDER — ACETAMINOPHEN 500 MG/5ML
1000 LIQUID (ML) ORAL ONCE
Refills: 0 | Status: COMPLETED | OUTPATIENT
Start: 2025-03-07 | End: 2025-03-07

## 2025-03-07 RX ORDER — TACROLIMUS 0.5 MG/1
2 CAPSULE ORAL
Refills: 0 | Status: DISCONTINUED | OUTPATIENT
Start: 2025-03-07 | End: 2025-03-07

## 2025-03-07 RX ADMIN — Medication 40 MILLIGRAM(S): at 04:49

## 2025-03-07 RX ADMIN — FILGRASTIM 480 MICROGRAM(S): 300 INJECTION, SOLUTION INTRAVENOUS; SUBCUTANEOUS at 13:30

## 2025-03-07 RX ADMIN — URSODIOL 300 MILLIGRAM(S): 300 CAPSULE ORAL at 04:50

## 2025-03-07 RX ADMIN — HYDROXYZINE HYDROCHLORIDE 50 MILLIGRAM(S): 25 TABLET, FILM COATED ORAL at 00:00

## 2025-03-07 RX ADMIN — Medication 650 MILLIGRAM(S): at 04:49

## 2025-03-07 RX ADMIN — Medication 1 TABLET(S): at 18:54

## 2025-03-07 RX ADMIN — ATOVAQUONE 1500 MILLIGRAM(S): 750 SUSPENSION ORAL at 11:33

## 2025-03-07 RX ADMIN — Medication 800 MILLIGRAM(S): at 11:33

## 2025-03-07 RX ADMIN — Medication 650 MILLIGRAM(S): at 18:54

## 2025-03-07 RX ADMIN — Medication 50 MILLIGRAM(S): at 11:33

## 2025-03-07 RX ADMIN — Medication 1 TABLET(S): at 04:49

## 2025-03-07 RX ADMIN — Medication 1 TABLET(S): at 20:48

## 2025-03-07 RX ADMIN — Medication 30 MILLILITER(S): at 23:42

## 2025-03-07 RX ADMIN — CALAMINE 8% AND ZINC OXIDE 8% 1 APPLICATION(S): 160 LOTION TOPICAL at 04:50

## 2025-03-07 RX ADMIN — Medication 800 MILLIGRAM(S): at 18:54

## 2025-03-07 RX ADMIN — URSODIOL 300 MILLIGRAM(S): 300 CAPSULE ORAL at 18:54

## 2025-03-07 RX ADMIN — Medication 400 MILLIGRAM(S): at 23:42

## 2025-03-07 RX ADMIN — PREDNISONE 10 MILLIGRAM(S): 20 TABLET ORAL at 04:50

## 2025-03-07 NOTE — CHART NOTE - NSCHARTNOTEFT_GEN_A_CORE
Persistent neutropenia noted, will hold off on ERCP today given elevated infection risk. Team to give more neupogen today. Will plan tentatively for ERCP Monday if WBC counts improve.    Kaci Kim PGY7  GI/Hepatology Fellow Persistent neutropenia noted, will hold off on ERCP today given elevated infection risk. Team to give more neupogen today. Will plan tentatively for ERCP Monday if WBC counts improve.    Kaci Kim PGY7  GI/Hepatology Fellow    As above.    GI Advanced endoscopy attending note    Patient seen and examined on 3/7/2025 in the evening.  Discussed with GI fellow earlier in the day.    Patient denies abdominal pain, fevers, nausea, vomiting.    Vital signs stable.  Comfortable  Anicteric sclera  Abdomen nondistended  No jaundice    Laboratory data reviewed  Bilirubin over 2, ALT over 200.    Impression:    #1.  Consulted for anastomotic biliary stricture on MRI/MRCP in the setting of newly abnormal LFTs in a patient status post liver transplantation on immunosuppressive medications.  No signs or symptoms of cholangitis.    #2.  Neutropenia, thought due to immunosuppressant medications.  On hold.  Receiving neutrophil stimulating factors.    Recommendations:    #1.  Follow CBC/LFTs    #2.  N.p.o. past midnight on Sunday possible ERCP on Monday 3/10/2025

## 2025-03-07 NOTE — PROGRESS NOTE ADULT - ASSESSMENT
63 yo F PMHx  ETOH cirrhosis S/p OLT 1/4/2025 on ASA/ Eliquis, HTN, L breast stage 1 Invasive lobular carcinoma (HR+ HER2 neg) s/p lumpectomy and R cheek BCC s/p Mohs (2021) Umbilical Hernia Repair 9/2024. Post op course s/p OLT treated for Donor with staph epi bacteremia (blood culture x 1), with Cefazolin (end date 1/8). started on po pred taper (2/7) for treatment of presumed rejection Pred 80mg -->15mg. Patient presents as direct admit for elevated LFTs, reports c/o pruritis of face/neck and b/l arms     [] Elevated LFTs  - Liver Doppler - unable to visualize R post TANG, otherwise patent   - MRI: Narrowing of CBD, small caliber L/R hepatic arteries  - Advanced GI aware: ERCP today, NPO after midnight   - ASA/Eliquis held - resume as able   - Ursodiol 300 bid   - atarax prn itching   - Immuno: FK per level, MMF held, decr Pred 10  - PPx: Mepron/ PPI/Oscal/Mag    [] Leukopenia  - Valcyte/MMF Held  - f/u CMV PCR  - f/u Blood cx  - s/p Neupogen 300 (3/5 and 3/6)  - UCx growing 10K - 49K CFU Kleb/Aerococcus, patient asymptomatic (no treatment) 63 yo F PMHx  ETOH cirrhosis S/p OLT 1/4/2025 on ASA/ Eliquis, HTN, L breast stage 1 Invasive lobular carcinoma (HR+ HER2 neg) s/p lumpectomy and R cheek BCC s/p Mohs (2021) Umbilical Hernia Repair 9/2024. Post op course s/p OLT treated for Donor with staph epi bacteremia (blood culture x 1), with Cefazolin (end date 1/8). started on po pred taper (2/7) for treatment of presumed rejection Pred 80mg -->15mg. Patient presents as direct admit for elevated LFTs, reports c/o pruritis of face/neck and b/l arms     [] Transaminitis  - Liver Doppler - unable to visualize R post TANG, otherwise patent   - MRI: Narrowing of CBD, small caliber L/R hepatic arteries  - Advanced GI aware: ERCP on hold today due to leukopenia  - ASA/Eliquis held - resume as able   - Ursodiol 300 bid   - atarax/benadryl prn itching   - Immuno: FK per level, MMF held,  Pred 10  - PPx: Mepron/ PPI/Oscal/Mag    [] Leukopenia  - Valcyte/MMF Held  - CMV neg  - BCx neg  - UCx growing 10K - 49K CFU Kleb/Aerococcus, patient asymptomatic (no treatment)  - s/p Neupogen 300 (3/5 and 3/6), higher dose again today (#3)  - will discuss with ID and involve Heme as needed

## 2025-03-07 NOTE — PROGRESS NOTE ADULT - SUBJECTIVE AND OBJECTIVE BOX
Transplant Surgery - Multidisciplinary Rounds  --------------------------------------------------------------  Present:   Patient seen and examined with multidisciplinary Transplant team including Surgeon: Dr Solomon Fellow: FELIPE Presley AlHepatologist: Dr. Hand  RNs in AM rounds.   Disciplines not in attendance will be notified of the plan.     63 yo F PMHx  ETOH cirrhosis S/p OLT 1/4/2025 with end-end single arterial reconstruction (Donor Right, Segment IV and Left Hepatic Arteries arising separately from celiac trunk, Redo hepatic artery anastomosis) on ASA/ Eliquis, HTN, L breast stage 1 Invasive lobular carcinoma (HR+ HER2 neg) s/p lumpectomy c/w PSH (on 2020. Off Letrazole now), and R cheek BCC s/p Mohs (2021) Umbilical Hernia Repair 9/2024. Post op course s/p OLT Pt was treated for Donor with staph epi bacteremia (blood culture x 1), with Cefazolin (end date 1/8). After discharge she had elevated LFTs and was started on po pred taper (2/7) for treatment of presumed rejection Pred 80mg -->15mg. US doppler (2/7/25)  Patent transplant hepatic vasculature. Patient presents as direct admit for elevated LFTs, reports c/o pruritis of face/neck and bilateral arms redness that began last night.    Interval Events:  - afebrile, VSS  - continues to have itchiness/headaches, improved with ofirmev  - ERCP today    Immunosuppression:  - FK per level, MMF Held, Pred 15     Potential Discharge date: tbd   Education:  Medications  Plan of care:  See Below    MEDICATIONS  (STANDING):  atovaquone  Suspension 1500 milliGRAM(s) Oral daily  calcium carbonate 1250 mG  + Vitamin D (OsCal 500 + D) 1 Tablet(s) Oral two times a day  influenza   Vaccine 0.5 milliLiter(s) IntraMuscular once  magnesium oxide 800 milliGRAM(s) Oral two times a day with meals  pantoprazole    Tablet 40 milliGRAM(s) Oral before breakfast  polyethylene glycol 3350 17 Gram(s) Oral at bedtime  predniSONE   Tablet 10 milliGRAM(s) Oral daily  senna 1 Tablet(s) Oral at bedtime  sodium bicarbonate 650 milliGRAM(s) Oral two times a day  tacrolimus 2 milliGRAM(s) Oral <User Schedule>  ursodiol Capsule 300 milliGRAM(s) Oral two times a day    MEDICATIONS  (PRN):  calamine/zinc oxide Lotion 1 Application(s) Topical every 6 hours PRN Rash and/or Itching  hydrOXYzine hydrochloride 50 milliGRAM(s) Oral every 6 hours PRN Itching      PAST MEDICAL & SURGICAL HISTORY:  Breast cancer, left      Mild alcohol use disorder      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      GERD (gastroesophageal reflux disease)      Skin cancer, basal cell      H/O lumpectomy      History of removal of skin mole      H/O ganglion cyst      Status post liver transplant          Vital Signs Last 24 Hrs  T(C): 36.6 (07 Mar 2025 00:45), Max: 37.1 (06 Mar 2025 17:00)  T(F): 97.8 (07 Mar 2025 00:45), Max: 98.7 (06 Mar 2025 17:00)  HR: 68 (07 Mar 2025 00:45) (68 - 93)  BP: 119/70 (07 Mar 2025 00:45) (112/71 - 132/81)  BP(mean): --  RR: 18 (07 Mar 2025 00:45) (16 - 18)  SpO2: 95% (07 Mar 2025 00:45) (93% - 98%)    Parameters below as of 07 Mar 2025 00:45  Patient On (Oxygen Delivery Method): room air        I&O's Summary    05 Mar 2025 07:01  -  06 Mar 2025 07:00  --------------------------------------------------------  IN: 720 mL / OUT: 700 mL / NET: 20 mL    06 Mar 2025 07:01  -  07 Mar 2025 03:52  --------------------------------------------------------  IN: 1440 mL / OUT: 1600 mL / NET: -160 mL                              10.5   1.00  )-----------( 98       ( 06 Mar 2025 06:51 )             33.5     03-06    139  |  107  |  23  ----------------------------<  93  4.4   |  20[L]  |  0.97    Ca    9.7      06 Mar 2025 06:51  Phos  4.0     03-06  Mg     1.8     03-06    TPro  6.1  /  Alb  3.7  /  TBili  1.5[H]  /  DBili  x   /  AST  43[H]  /  ALT  232[H]  /  AlkPhos  193[H]  03-06    Tacrolimus (), Serum: 6.7 ng/mL (03-06 @ 06:51)      Review of systems  All other systems were reviewed and are negative, except as noted.      PHYSICAL EXAM:  GENERAL: NAD  HEAD:  Atraumatic, Normocephalic  EYES: EOMI, conjunctiva and sclera clear  NECK: Supple, No JVD  CHEST/LUNG: Clear to auscultation bilaterally; No wheeze  HEART: Regular rate and rhythm; No murmurs, rubs, or gallops  ABDOMEN: Soft, Nontender, Nondistended; Bowel sounds present. Chevron incision well healed no s/s infection   EXTREMITIES:  2+ Peripheral Pulses, No clubbing, cyanosis, or edema  PSYCH: AAOx3  NEUROLOGY: non-focal  SKIN: Erythema on neck w/ excoriations . B/L arms w petechia and purpura   SKIN: No rashes or lesions Transplant Surgery - Multidisciplinary Progress Note  --------------------------------------------------------------  Present:   Patient seen and examined with multidisciplinary Transplant team including Surgeon: Dr Solomon, Dr. Presley, EDNA Martinez, Pharmacist: Ar, unit RNs in AM rounds.   Disciplines not in attendance will be notified of the plan.     63 yo F PMHx  ETOH cirrhosis S/p OLT 1/4/2025 with end-end single arterial reconstruction (Donor Right, Segment IV and Left Hepatic Arteries arising separately from celiac trunk, Redo hepatic artery anastomosis) on ASA/ Eliquis, HTN, L breast stage 1 Invasive lobular carcinoma (HR+ HER2 neg) s/p lumpectomy c/w PSH (on 2020. Off Letrazole now), and R cheek BCC s/p Mohs (2021) Umbilical Hernia Repair 9/2024. Post op course s/p OLT Pt was treated for Donor with staph epi bacteremia (blood culture x 1), with Cefazolin (end date 1/8). After discharge she had elevated LFTs and was started on po pred taper (2/7) for treatment of presumed rejection Pred 80mg -->15mg. US doppler (2/7/25)  Patent transplant hepatic vasculature. Patient presents as direct admit for elevated LFTs, reports c/o pruritis of face/neck and bilateral arms redness that began last night.    Interval Events:  - afebrile, VSS  - continues to have itchiness/headaches, improved with ofirmev  - ERCP today    Immunosuppression:  - FK per level, MMF Held, Pred 10  - Ongoing monitoring for signs of rejection    Potential Discharge date: tbd   Education:  Medications  Plan of care:  See Below        MEDICATIONS  (STANDING):  atovaquone  Suspension 1500 milliGRAM(s) Oral daily  calcium carbonate 1250 mG  + Vitamin D (OsCal 500 + D) 1 Tablet(s) Oral two times a day  filgrastim-sndz (ZARXIO) Injectable 480 MICROGram(s) SubCutaneous once  influenza   Vaccine 0.5 milliLiter(s) IntraMuscular once  magnesium oxide 800 milliGRAM(s) Oral two times a day with meals  pantoprazole    Tablet 40 milliGRAM(s) Oral before breakfast  polyethylene glycol 3350 17 Gram(s) Oral at bedtime  predniSONE   Tablet 10 milliGRAM(s) Oral daily  senna 1 Tablet(s) Oral at bedtime  sodium bicarbonate 650 milliGRAM(s) Oral two times a day  tacrolimus 2 milliGRAM(s) Oral <User Schedule>  ursodiol Capsule 300 milliGRAM(s) Oral two times a day    MEDICATIONS  (PRN):  calamine/zinc oxide Lotion 1 Application(s) Topical every 6 hours PRN Rash and/or Itching  diphenhydrAMINE 50 milliGRAM(s) Oral at bedtime PRN Rash and/or Itching  hydrOXYzine hydrochloride 50 milliGRAM(s) Oral every 6 hours PRN Itching      PAST MEDICAL & SURGICAL HISTORY:  Breast cancer, left      Mild alcohol use disorder      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      GERD (gastroesophageal reflux disease)      Skin cancer, basal cell      H/O lumpectomy      History of removal of skin mole      H/O ganglion cyst      Status post liver transplant          Vital Signs Last 24 Hrs  T(C): 36.6 (07 Mar 2025 07:59), Max: 37.1 (06 Mar 2025 17:00)  T(F): 97.9 (07 Mar 2025 07:59), Max: 98.7 (06 Mar 2025 17:00)  HR: 62 (07 Mar 2025 07:59) (61 - 93)  BP: 155/86 (07 Mar 2025 07:59) (112/71 - 155/86)  BP(mean): --  RR: 18 (07 Mar 2025 07:59) (18 - 18)  SpO2: 97% (07 Mar 2025 07:59) (93% - 97%)    Parameters below as of 07 Mar 2025 07:59  Patient On (Oxygen Delivery Method): room air        I&O's Summary    06 Mar 2025 07:01  -  07 Mar 2025 07:00  --------------------------------------------------------  IN: 1490 mL / OUT: 2400 mL / NET: -910 mL    07 Mar 2025 07:01  -  07 Mar 2025 10:51  --------------------------------------------------------  IN: 0 mL / OUT: 700 mL / NET: -700 mL                              11.1   0.83  )-----------( 103      ( 07 Mar 2025 06:52 )             35.2     03-07    135  |  103  |  22  ----------------------------<  121[H]  4.7   |  19[L]  |  0.89    Ca    9.7      07 Mar 2025 06:52  Phos  3.9     03-07  Mg     1.8     03-07    TPro  6.2  /  Alb  3.7  /  TBili  2.3[H]  /  DBili  x   /  AST  44[H]  /  ALT  205[H]  /  AlkPhos  287[H]  03-07    Tacrolimus (), Serum: 9.7 ng/mL (03-07 @ 06:52)        Culture - Blood (collected 03-05-25 @ 07:03)  Source: Blood Blood-Peripheral  Preliminary Report (03-07-25 @ 09:00):    No growth at 48 Hours    Culture - Urine (collected 03-05-25 @ 07:02)  Source: Clean Catch Clean Catch (Midstream)  Preliminary Report (03-06-25 @ 21:57):    10,000 - 49,000 CFU/mL Klebsiella aerogenes (Previously Enterobacter)    10,000 - 49,000 CFU/mL Aerococcus urinae    Isolates of Aerococcus spp. are predictably susceptible to penicillin,    ampicillin, and vancomycin. All isolates are resistant to sulfonamides.    Culture - Blood (collected 03-04-25 @ 23:16)  Source: Blood Blood-Peripheral  Preliminary Report (03-07-25 @ 03:01):    No growth at 48 Hours                    Review of systems  All other systems were reviewed and are negative, except as noted.      PHYSICAL EXAM:  GENERAL: NAD  HEAD:  Atraumatic, Normocephalic  EYES: EOMI, conjunctiva and sclera clear  NECK: Supple, No JVD  CHEST/LUNG: Clear to auscultation bilaterally; No wheeze  HEART: Regular rate and rhythm; No murmurs, rubs, or gallops  ABDOMEN: Soft, Nontender, Nondistended; Bowel sounds present. Chevron incision well healed no s/s infection   EXTREMITIES:  2+ Peripheral Pulses, No clubbing, cyanosis, or edema  PSYCH: AAOx3  NEUROLOGY: non-focal  SKIN: Erythema on neck w/ excoriations . B/L arms w petechia and purpura   SKIN: No rashes or lesions

## 2025-03-08 LAB
ALBUMIN SERPL ELPH-MCNC: 3.7 G/DL — SIGNIFICANT CHANGE UP (ref 3.3–5)
ALP SERPL-CCNC: 256 U/L — HIGH (ref 40–120)
ALT FLD-CCNC: 166 U/L — HIGH (ref 10–45)
ANION GAP SERPL CALC-SCNC: 17 MMOL/L — SIGNIFICANT CHANGE UP (ref 5–17)
AST SERPL-CCNC: 39 U/L — SIGNIFICANT CHANGE UP (ref 10–40)
BASOPHILS # BLD AUTO: 0.04 K/UL — SIGNIFICANT CHANGE UP (ref 0–0.2)
BASOPHILS NFR BLD AUTO: 4 % — HIGH (ref 0–2)
BILIRUB SERPL-MCNC: 1.2 MG/DL — SIGNIFICANT CHANGE UP (ref 0.2–1.2)
BUN SERPL-MCNC: 23 MG/DL — SIGNIFICANT CHANGE UP (ref 7–23)
CALCIUM SERPL-MCNC: 9.2 MG/DL — SIGNIFICANT CHANGE UP (ref 8.4–10.5)
CHLORIDE SERPL-SCNC: 105 MMOL/L — SIGNIFICANT CHANGE UP (ref 96–108)
CO2 SERPL-SCNC: 22 MMOL/L — SIGNIFICANT CHANGE UP (ref 22–31)
CREAT SERPL-MCNC: 0.93 MG/DL — SIGNIFICANT CHANGE UP (ref 0.5–1.3)
EGFR: 69 ML/MIN/1.73M2 — SIGNIFICANT CHANGE UP
EGFR: 69 ML/MIN/1.73M2 — SIGNIFICANT CHANGE UP
EOSINOPHIL # BLD AUTO: 0.04 K/UL — SIGNIFICANT CHANGE UP (ref 0–0.5)
EOSINOPHIL NFR BLD AUTO: 4 % — SIGNIFICANT CHANGE UP (ref 0–6)
GLUCOSE SERPL-MCNC: 100 MG/DL — HIGH (ref 70–99)
HCT VFR BLD CALC: 34.5 % — SIGNIFICANT CHANGE UP (ref 34.5–45)
HGB BLD-MCNC: 11.1 G/DL — LOW (ref 11.5–15.5)
INR BLD: 1.18 RATIO — HIGH (ref 0.85–1.16)
LYMPHOCYTES # BLD AUTO: 0.46 K/UL — LOW (ref 1–3.3)
LYMPHOCYTES # BLD AUTO: 43 % — SIGNIFICANT CHANGE UP (ref 13–44)
MAGNESIUM SERPL-MCNC: 1.7 MG/DL — SIGNIFICANT CHANGE UP (ref 1.6–2.6)
MANUAL SMEAR VERIFICATION: SIGNIFICANT CHANGE UP
MCHC RBC-ENTMCNC: 31.4 PG — SIGNIFICANT CHANGE UP (ref 27–34)
MCHC RBC-ENTMCNC: 32.2 G/DL — SIGNIFICANT CHANGE UP (ref 32–36)
MCV RBC AUTO: 97.7 FL — SIGNIFICANT CHANGE UP (ref 80–100)
METAMYELOCYTES # FLD: 1 % — HIGH (ref 0–0)
METAMYELOCYTES NFR BLD: 1 % — HIGH (ref 0–0)
MONOCYTES # BLD AUTO: 0.05 K/UL — SIGNIFICANT CHANGE UP (ref 0–0.9)
MONOCYTES NFR BLD AUTO: 5 % — SIGNIFICANT CHANGE UP (ref 2–14)
MYELOCYTES NFR BLD: 6 % — HIGH (ref 0–0)
NEUTROPHILS # BLD AUTO: 0.39 K/UL — LOW (ref 1.8–7.4)
NEUTROPHILS NFR BLD AUTO: 33 % — LOW (ref 43–77)
NEUTS BAND # BLD: 4 % — SIGNIFICANT CHANGE UP (ref 0–8)
NEUTS BAND NFR BLD: 4 % — SIGNIFICANT CHANGE UP (ref 0–8)
NRBC # BLD: 0 /100 WBCS — SIGNIFICANT CHANGE UP (ref 0–0)
NRBC BLD-RTO: 0 /100 WBCS — SIGNIFICANT CHANGE UP (ref 0–0)
PETH 16:0/18:1: NEGATIVE NG/ML — SIGNIFICANT CHANGE UP
PETH 16:0/18:2: NEGATIVE NG/ML — SIGNIFICANT CHANGE UP
PETH COMMENTS: SIGNIFICANT CHANGE UP
PHOSPHATE SERPL-MCNC: 3.2 MG/DL — SIGNIFICANT CHANGE UP (ref 2.5–4.5)
PLAT MORPH BLD: NORMAL — SIGNIFICANT CHANGE UP
PLATELET # BLD AUTO: 91 K/UL — LOW (ref 150–400)
POTASSIUM SERPL-MCNC: 4.3 MMOL/L — SIGNIFICANT CHANGE UP (ref 3.5–5.3)
POTASSIUM SERPL-SCNC: 4.3 MMOL/L — SIGNIFICANT CHANGE UP (ref 3.5–5.3)
PROT SERPL-MCNC: 6.1 G/DL — SIGNIFICANT CHANGE UP (ref 6–8.3)
PROTHROM AB SERPL-ACNC: 13.5 SEC — HIGH (ref 9.9–13.4)
RBC # BLD: 3.53 M/UL — LOW (ref 3.8–5.2)
RBC # FLD: 16.4 % — HIGH (ref 10.3–14.5)
RBC BLD AUTO: SIGNIFICANT CHANGE UP
SODIUM SERPL-SCNC: 144 MMOL/L — SIGNIFICANT CHANGE UP (ref 135–145)
TACROLIMUS SERPL-MCNC: 2.3 NG/ML — SIGNIFICANT CHANGE UP
WBC # BLD: 1.06 K/UL — LOW (ref 3.8–10.5)
WBC # FLD AUTO: 1.06 K/UL — LOW (ref 3.8–10.5)

## 2025-03-08 RX ORDER — ACETAMINOPHEN 500 MG/5ML
1000 LIQUID (ML) ORAL ONCE
Refills: 0 | Status: COMPLETED | OUTPATIENT
Start: 2025-03-08 | End: 2025-03-08

## 2025-03-08 RX ORDER — MAGNESIUM SULFATE 500 MG/ML
2 SYRINGE (ML) INJECTION ONCE
Refills: 0 | Status: COMPLETED | OUTPATIENT
Start: 2025-03-08 | End: 2025-03-08

## 2025-03-08 RX ORDER — TRAMADOL HYDROCHLORIDE 50 MG/1
50 TABLET, FILM COATED ORAL ONCE
Refills: 0 | Status: DISCONTINUED | OUTPATIENT
Start: 2025-03-08 | End: 2025-03-08

## 2025-03-08 RX ORDER — TACROLIMUS 0.5 MG/1
1 CAPSULE ORAL
Refills: 0 | Status: DISCONTINUED | OUTPATIENT
Start: 2025-03-08 | End: 2025-03-09

## 2025-03-08 RX ORDER — TACROLIMUS 0.5 MG/1
2 CAPSULE ORAL
Refills: 0 | Status: DISCONTINUED | OUTPATIENT
Start: 2025-03-08 | End: 2025-03-08

## 2025-03-08 RX ORDER — TACROLIMUS 0.5 MG/1
1 CAPSULE ORAL ONCE
Refills: 0 | Status: COMPLETED | OUTPATIENT
Start: 2025-03-08 | End: 2025-03-08

## 2025-03-08 RX ORDER — TRAMADOL HYDROCHLORIDE 50 MG/1
25 TABLET, FILM COATED ORAL ONCE
Refills: 0 | Status: DISCONTINUED | OUTPATIENT
Start: 2025-03-08 | End: 2025-03-08

## 2025-03-08 RX ORDER — TACROLIMUS 0.5 MG/1
2 CAPSULE ORAL ONCE
Refills: 0 | Status: DISCONTINUED | OUTPATIENT
Start: 2025-03-08 | End: 2025-03-08

## 2025-03-08 RX ADMIN — Medication 800 MILLIGRAM(S): at 08:14

## 2025-03-08 RX ADMIN — Medication 1000 MILLIGRAM(S): at 00:30

## 2025-03-08 RX ADMIN — TACROLIMUS 1 MILLIGRAM(S): 0.5 CAPSULE ORAL at 13:33

## 2025-03-08 RX ADMIN — Medication 650 MILLIGRAM(S): at 17:39

## 2025-03-08 RX ADMIN — TACROLIMUS 1 MILLIGRAM(S): 0.5 CAPSULE ORAL at 19:58

## 2025-03-08 RX ADMIN — Medication 1 TABLET(S): at 21:13

## 2025-03-08 RX ADMIN — PREDNISONE 10 MILLIGRAM(S): 20 TABLET ORAL at 06:25

## 2025-03-08 RX ADMIN — URSODIOL 300 MILLIGRAM(S): 300 CAPSULE ORAL at 17:38

## 2025-03-08 RX ADMIN — TRAMADOL HYDROCHLORIDE 25 MILLIGRAM(S): 50 TABLET, FILM COATED ORAL at 07:31

## 2025-03-08 RX ADMIN — Medication 25 GRAM(S): at 08:14

## 2025-03-08 RX ADMIN — Medication 50 MILLIGRAM(S): at 21:43

## 2025-03-08 RX ADMIN — Medication 800 MILLIGRAM(S): at 17:39

## 2025-03-08 RX ADMIN — Medication 1 TABLET(S): at 06:25

## 2025-03-08 RX ADMIN — Medication 1000 MILLIGRAM(S): at 08:30

## 2025-03-08 RX ADMIN — Medication 650 MILLIGRAM(S): at 06:26

## 2025-03-08 RX ADMIN — TRAMADOL HYDROCHLORIDE 25 MILLIGRAM(S): 50 TABLET, FILM COATED ORAL at 06:25

## 2025-03-08 RX ADMIN — Medication 1 TABLET(S): at 17:38

## 2025-03-08 RX ADMIN — TRAMADOL HYDROCHLORIDE 50 MILLIGRAM(S): 50 TABLET, FILM COATED ORAL at 15:15

## 2025-03-08 RX ADMIN — Medication 400 MILLIGRAM(S): at 08:14

## 2025-03-08 RX ADMIN — Medication 40 MILLIGRAM(S): at 06:25

## 2025-03-08 RX ADMIN — TRAMADOL HYDROCHLORIDE 50 MILLIGRAM(S): 50 TABLET, FILM COATED ORAL at 16:15

## 2025-03-08 RX ADMIN — URSODIOL 300 MILLIGRAM(S): 300 CAPSULE ORAL at 06:25

## 2025-03-08 RX ADMIN — ATOVAQUONE 1500 MILLIGRAM(S): 750 SUSPENSION ORAL at 13:33

## 2025-03-08 NOTE — PROGRESS NOTE ADULT - ASSESSMENT
65 yo F PMHx  ETOH cirrhosis S/p OLT 1/4/2025 on ASA/ Eliquis, HTN, L breast stage 1 Invasive lobular carcinoma (HR+ HER2 neg) s/p lumpectomy and R cheek BCC s/p Mohs (2021) Umbilical Hernia Repair 9/2024. Post op course s/p OLT treated for Donor with staph epi bacteremia (blood culture x 1), with Cefazolin (end date 1/8). started on po pred taper (2/7) for treatment of presumed rejection Pred 80mg -->15mg. Patient presents as direct admit for elevated LFTs, reports c/o pruritis of face/neck and b/l arms     [] Transaminitis  - Liver Doppler - unable to visualize R post TANG, otherwise patent   - MRI: Narrowing of CBD, small caliber L/R hepatic arteries  - Advanced GI aware: ERCP on hold today due to leukopenia  - ASA/Eliquis held - resume as able   - Ursodiol 300 bid   - atarax/benadryl prn itching   - Immuno: FK per level, MMF held,  Pred 10  - PPx: Mepron/ PPI/Oscal/Mag    [] Leukopenia  - Valcyte/MMF Held  - CMV neg  - BCx neg  - UCx growing 10K - 49K CFU Kleb/Aerococcus, patient asymptomatic (no treatment)  - s/p Neupogen 300 (3/5 and 3/6), higher dose again today (#3)  - will discuss with ID and involve Heme as needed     63 yo F PMHx  ETOH cirrhosis S/p OLT 1/4/2025 on ASA/ Eliquis, HTN, L breast stage 1 Invasive lobular carcinoma (HR+ HER2 neg) s/p lumpectomy and R cheek BCC s/p Mohs (2021) Umbilical Hernia Repair 9/2024. Post op course s/p OLT treated for Donor with staph epi bacteremia (blood culture x 1), with Cefazolin (end date 1/8). started on po pred taper (2/7) for treatment of presumed rejection Pred 80mg -->15mg. Patient presents as direct admit for elevated LFTs, reports c/o pruritis of face/neck and b/l arms     [] Transaminitis s/p OLT  - Liver Doppler - unable to visualize R post TANG, otherwise patent   - MRI: Narrowing of CBD, small caliber L/R hepatic arteries  - LFTs with some improvement  - Advanced GI aware: ERCP TBD on Monday pending daily assessment of overall labs/clinical picture  - ASA/Eliquis held - resume as able   - Ursodiol 300 bid   - atarax/benadryl prn itching     [] Immuno:   - FK per level (will dose slowly and monitor), MMF held,  Pred 10  - PPx: Mepron/ PPI/Oscal/Mag    [] Leukopenia  - Valcyte/MMF Held  - CMV neg  - BCx neg  - Parvo pending  - UCx growing 10K - 49K CFU Kleb/Aerococcus, patient asymptomatic (no treatment per ID)  - s/p Neupogen 300 (3/5 and 3/6), 480 on 3/7, will hold today and monitor response  - will involve Heme as needed

## 2025-03-08 NOTE — PROGRESS NOTE ADULT - SUBJECTIVE AND OBJECTIVE BOX
Transplant Surgery - Multidisciplinary Rounds  --------------------------------------------------------------    Present:   Patient seen and examined with multidisciplinary Transplant team including Surgeon, Hepatologist, Surgical/Hepatology fellow, ACP,   and bedside RN during AM rounds.   Disciplines not in attendance will be notified of the plan.     HPI:   63 yo F PMHx  ETOH cirrhosis S/p OLT 1/4/2025 with end-end single arterial reconstruction (Donor Right, Segment IV and Left Hepatic Arteries arising separately from celiac trunk, Redo hepatic artery anastomosis) on ASA/ Eliquis, HTN, L breast stage 1 Invasive lobular carcinoma (HR+ HER2 neg) s/p lumpectomy c/w PSH (on 2020. Off Letrazole now), and R cheek BCC s/p Mohs (2021) Umbilical Hernia Repair 9/2024. Post op course s/p OLT Pt was treated for Donor with staph epi bacteremia (blood culture x 1), with Cefazolin (end date 1/8). After discharge she had elevated LFTs and was started on po pred taper (2/7) for treatment of presumed rejection Pred 80mg -->15mg. US doppler (2/7/25)  Patent transplant hepatic vasculature. Patient presents as direct admit for elevated LFTs, reports c/o pruritis of face/neck and bilateral arms redness that began last night.    Interval Events:  -FK 9.7 on 2/2, received 2x1 today, held for AM  -ERCP cancelled-->WBC 0.89, Neupogen 480 #3.     Immunosupression:  - FK per level, MMF Held, Pred 10  - Ongoing monitoring for signs of rejection    Potential Discharge date: tbd   Education:  Medications  Plan of care:  See Below      MEDICATIONS  (STANDING):  atovaquone  Suspension 1500 milliGRAM(s) Oral daily  calcium carbonate 1250 mG  + Vitamin D (OsCal 500 + D) 1 Tablet(s) Oral two times a day  influenza   Vaccine 0.5 milliLiter(s) IntraMuscular once  magnesium oxide 800 milliGRAM(s) Oral two times a day with meals  pantoprazole    Tablet 40 milliGRAM(s) Oral before breakfast  polyethylene glycol 3350 17 Gram(s) Oral at bedtime  predniSONE   Tablet 10 milliGRAM(s) Oral daily  senna 1 Tablet(s) Oral at bedtime  sodium bicarbonate 650 milliGRAM(s) Oral two times a day  ursodiol Capsule 300 milliGRAM(s) Oral two times a day    MEDICATIONS  (PRN):  calamine/zinc oxide Lotion 1 Application(s) Topical every 6 hours PRN Rash and/or Itching  diphenhydrAMINE 50 milliGRAM(s) Oral at bedtime PRN Rash and/or Itching  hydrOXYzine hydrochloride 50 milliGRAM(s) Oral every 6 hours PRN Itching      PAST MEDICAL & SURGICAL HISTORY:  Breast cancer, left      Mild alcohol use disorder      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      GERD (gastroesophageal reflux disease)      Skin cancer, basal cell      H/O lumpectomy      History of removal of skin mole      H/O ganglion cyst      Status post liver transplant          Vital Signs Last 24 Hrs  T(C): 36.7 (08 Mar 2025 00:02), Max: 36.8 (07 Mar 2025 17:00)  T(F): 98 (08 Mar 2025 00:02), Max: 98.2 (07 Mar 2025 17:00)  HR: 85 (08 Mar 2025 00:02) (61 - 85)  BP: 120/75 (08 Mar 2025 00:02) (120/75 - 155/86)  BP(mean): --  RR: 18 (08 Mar 2025 00:02) (18 - 18)  SpO2: 95% (08 Mar 2025 00:02) (94% - 97%)    Parameters below as of 08 Mar 2025 00:02  Patient On (Oxygen Delivery Method): room air        I&O's Summary    06 Mar 2025 07:01  -  07 Mar 2025 07:00  --------------------------------------------------------  IN: 1490 mL / OUT: 2400 mL / NET: -910 mL    07 Mar 2025 07:01  -  08 Mar 2025 01:42  --------------------------------------------------------  IN: 240 mL / OUT: 2000 mL / NET: -1760 mL                              11.1   0.83  )-----------( 103      ( 07 Mar 2025 06:52 )             35.2     03-07    135  |  103  |  22  ----------------------------<  121[H]  4.7   |  19[L]  |  0.89    Ca    9.7      07 Mar 2025 06:52  Phos  3.9     03-07  Mg     1.8     03-07    TPro  6.2  /  Alb  3.7  /  TBili  2.3[H]  /  DBili  x   /  AST  44[H]  /  ALT  205[H]  /  AlkPhos  287[H]  03-07    Tacrolimus (), Serum: 9.7 ng/mL (03-07 @ 06:52)      Review of systems  All other systems were reviewed and are negative, except as noted.      PHYSICAL EXAM:  GENERAL: NAD  HEAD:  Atraumatic, Normocephalic  EYES: EOMI, conjunctiva and sclera clear  NECK: Supple, No JVD  CHEST/LUNG: Clear to auscultation bilaterally; No wheeze  HEART: Regular rate and rhythm; No murmurs, rubs, or gallops  ABDOMEN: Soft, Nontender, Nondistended; Bowel sounds present. Chevron incision well healed no s/s infection   EXTREMITIES:  2+ Peripheral Pulses, No clubbing, cyanosis, or edema  PSYCH: AAOx3  NEUROLOGY: non-focal  SKIN: Erythema on neck w/ excoriations . B/L arms w petechia and purpura   SKIN: No rashes or lesions     Transplant Surgery - Multidisciplinary Progress Note  --------------------------------------------------------------  OLT 1/4/2025    Present:   Patient seen and examined with multidisciplinary Transplant team including Surgeon Dr. Solomon, Dr. Presley, Hepatologist: Dr. Spicer/EDNA Reid and bedside RN during AM rounds.   Disciplines not in attendance will be notified of the plan.     HPI:   65 yo F PMHx  ETOH cirrhosis S/p OLT 1/4/2025 with end-end single arterial reconstruction (Donor Right, Segment IV and Left Hepatic Arteries arising separately from celiac trunk, Redo hepatic artery anastomosis) on ASA/ Eliquis, HTN, L breast stage 1 Invasive lobular carcinoma (HR+ HER2 neg) s/p lumpectomy c/w PSH (on 2020. Off Letrazole now), and R cheek BCC s/p Mohs (2021) Umbilical Hernia Repair 9/2024. Post op course s/p OLT Pt was treated for Donor with staph epi bacteremia (blood culture x 1), with Cefazolin (end date 1/8). After discharge she had elevated LFTs and was started on po pred taper (2/7) for treatment of presumed rejection Pred 80mg -->15mg. US doppler (2/7/25)  Patent transplant hepatic vasculature. Patient presents as direct admit for elevated LFTs, reports c/o pruritis of face/neck and bilateral arms redness that began last night.    Interval Events:  -Afebrile, VSS  -symptoms have improved  -no o/n events    Immunosuppression  - FK per level, MMF Held, Pred 10  - Ongoing monitoring for signs of rejection    Potential Discharge date: tbd   Education:  Medications  Plan of care:  See Below      MEDICATIONS  (STANDING):  atovaquone  Suspension 1500 milliGRAM(s) Oral daily  calcium carbonate 1250 mG  + Vitamin D (OsCal 500 + D) 1 Tablet(s) Oral two times a day  influenza   Vaccine 0.5 milliLiter(s) IntraMuscular once  magnesium oxide 800 milliGRAM(s) Oral two times a day with meals  pantoprazole    Tablet 40 milliGRAM(s) Oral before breakfast  polyethylene glycol 3350 17 Gram(s) Oral at bedtime  predniSONE   Tablet 10 milliGRAM(s) Oral daily  senna 1 Tablet(s) Oral at bedtime  sodium bicarbonate 650 milliGRAM(s) Oral two times a day  tacrolimus 1 milliGRAM(s) Oral once  tacrolimus 1 milliGRAM(s) Oral <User Schedule>  ursodiol Capsule 300 milliGRAM(s) Oral two times a day    MEDICATIONS  (PRN):  calamine/zinc oxide Lotion 1 Application(s) Topical every 6 hours PRN Rash and/or Itching  diphenhydrAMINE 50 milliGRAM(s) Oral at bedtime PRN Rash and/or Itching  hydrOXYzine hydrochloride 50 milliGRAM(s) Oral every 6 hours PRN Itching  traMADol 50 milliGRAM(s) Oral once PRN Severe Pain (7 - 10)      PAST MEDICAL & SURGICAL HISTORY:  Breast cancer, left      Mild alcohol use disorder      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      GERD (gastroesophageal reflux disease)      Skin cancer, basal cell      H/O lumpectomy      History of removal of skin mole      H/O ganglion cyst      Status post liver transplant          Vital Signs Last 24 Hrs  T(C): 36.7 (08 Mar 2025 09:00), Max: 36.8 (07 Mar 2025 17:00)  T(F): 98.1 (08 Mar 2025 09:00), Max: 98.2 (07 Mar 2025 17:00)  HR: 81 (08 Mar 2025 09:00) (75 - 85)  BP: 125/77 (08 Mar 2025 09:00) (115/85 - 125/77)  BP(mean): --  RR: 18 (08 Mar 2025 09:00) (18 - 18)  SpO2: 96% (08 Mar 2025 09:00) (95% - 98%)    Parameters below as of 08 Mar 2025 09:00  Patient On (Oxygen Delivery Method): room air        I&O's Summary    07 Mar 2025 07:01  -  08 Mar 2025 07:00  --------------------------------------------------------  IN: 600 mL / OUT: 2300 mL / NET: -1700 mL                              11.1   1.06  )-----------( 91       ( 08 Mar 2025 07:00 )             34.5     03-08    144  |  105  |  23  ----------------------------<  100[H]  4.3   |  22  |  0.93    Ca    9.2      08 Mar 2025 07:00  Phos  3.2     03-08  Mg     1.7     03-08    TPro  6.1  /  Alb  3.7  /  TBili  1.2  /  DBili  x   /  AST  39  /  ALT  166[H]  /  AlkPhos  256[H]  03-08    Tacrolimus (), Serum: 2.3 ng/mL (03-08 @ 07:00)        Culture - Blood (collected 03-05-25 @ 07:03)  Source: Blood Blood-Peripheral  Preliminary Report (03-08-25 @ 09:01):    No growth at 72 Hours    Culture - Urine (collected 03-05-25 @ 07:02)  Source: Clean Catch Clean Catch (Midstream)  Final Report (03-07-25 @ 11:43):    10,000 - 49,000 CFU/mL Klebsiella aerogenes (Previously Enterobacter)    10,000 - 49,000 CFU/mL Aerococcus urinae    Isolates of Aerococcus spp. are predictably susceptible to penicillin,    ampicillin, and vancomycin. All isolates are resistant to sulfonamides.  Organism: Klebsiella aerogenes (Previously Enterobacter) (03-07-25 @ 11:43)  Organism: Klebsiella aerogenes (Previously Enterobacter) (03-07-25 @ 11:43)    Culture - Blood (collected 03-04-25 @ 23:16)  Source: Blood Blood-Peripheral  Preliminary Report (03-08-25 @ 03:01):    No growth at 72 Hours                      Review of systems  All other systems were reviewed and are negative, except as noted.      PHYSICAL EXAM:  GENERAL: NAD  HEAD:  Atraumatic, Normocephalic  EYES: EOMI, conjunctiva and sclera clear  NECK: Supple, No JVD  CHEST/LUNG: Clear to auscultation bilaterally; No wheeze  HEART: Regular rate and rhythm; No murmurs, rubs, or gallops  ABDOMEN: Soft, Nontender, Nondistended; Bowel sounds present. Chevron incision well healed no s/s infection   EXTREMITIES:  2+ Peripheral Pulses, No clubbing, cyanosis, or edema  PSYCH: AAOx3  NEUROLOGY: non-focal  SKIN: Erythema on neck w/ excoriations . B/L arms w petechia and purpura --resolving  SKIN: No rashes or lesions

## 2025-03-09 LAB
ALBUMIN SERPL ELPH-MCNC: 3.5 G/DL — SIGNIFICANT CHANGE UP (ref 3.3–5)
ALP SERPL-CCNC: 235 U/L — HIGH (ref 40–120)
ALT FLD-CCNC: 133 U/L — HIGH (ref 10–45)
ANION GAP SERPL CALC-SCNC: 14 MMOL/L — SIGNIFICANT CHANGE UP (ref 5–17)
AST SERPL-CCNC: 34 U/L — SIGNIFICANT CHANGE UP (ref 10–40)
BASOPHILS # BLD AUTO: 0.01 K/UL — SIGNIFICANT CHANGE UP (ref 0–0.2)
BASOPHILS NFR BLD AUTO: 0.7 % — SIGNIFICANT CHANGE UP (ref 0–2)
BILIRUB DIRECT SERPL-MCNC: 0.5 MG/DL — HIGH (ref 0–0.3)
BILIRUB INDIRECT FLD-MCNC: 0.4 MG/DL — SIGNIFICANT CHANGE UP (ref 0.2–1)
BILIRUB SERPL-MCNC: 0.9 MG/DL — SIGNIFICANT CHANGE UP (ref 0.2–1.2)
BILIRUB SERPL-MCNC: 0.9 MG/DL — SIGNIFICANT CHANGE UP (ref 0.2–1.2)
BUN SERPL-MCNC: 20 MG/DL — SIGNIFICANT CHANGE UP (ref 7–23)
CALCIUM SERPL-MCNC: 9.1 MG/DL — SIGNIFICANT CHANGE UP (ref 8.4–10.5)
CHLORIDE SERPL-SCNC: 100 MMOL/L — SIGNIFICANT CHANGE UP (ref 96–108)
CO2 SERPL-SCNC: 23 MMOL/L — SIGNIFICANT CHANGE UP (ref 22–31)
CREAT SERPL-MCNC: 0.8 MG/DL — SIGNIFICANT CHANGE UP (ref 0.5–1.3)
EGFR: 83 ML/MIN/1.73M2 — SIGNIFICANT CHANGE UP
EGFR: 83 ML/MIN/1.73M2 — SIGNIFICANT CHANGE UP
EOSINOPHIL # BLD AUTO: 0.14 K/UL — SIGNIFICANT CHANGE UP (ref 0–0.5)
EOSINOPHIL NFR BLD AUTO: 6.9 % — HIGH (ref 0–6)
GGT SERPL-CCNC: 726 U/L — HIGH (ref 8–40)
GIANT PLATELETS BLD QL SMEAR: PRESENT — SIGNIFICANT CHANGE UP
GLUCOSE SERPL-MCNC: 101 MG/DL — HIGH (ref 70–99)
HCT VFR BLD CALC: 33.3 % — LOW (ref 34.5–45)
HGB BLD-MCNC: 10.4 G/DL — LOW (ref 11.5–15.5)
HYPOSEGMENTATION: PRESENT — SIGNIFICANT CHANGE UP
INR BLD: 1.18 RATIO — HIGH (ref 0.85–1.16)
LYMPHOCYTES # BLD AUTO: 0.75 K/UL — LOW (ref 1–3.3)
LYMPHOCYTES # BLD AUTO: 38.2 % — SIGNIFICANT CHANGE UP (ref 13–44)
MAGNESIUM SERPL-MCNC: 1.9 MG/DL — SIGNIFICANT CHANGE UP (ref 1.6–2.6)
MANUAL SMEAR VERIFICATION: SIGNIFICANT CHANGE UP
MCHC RBC-ENTMCNC: 30.5 PG — SIGNIFICANT CHANGE UP (ref 27–34)
MCHC RBC-ENTMCNC: 31.2 G/DL — LOW (ref 32–36)
MCV RBC AUTO: 97.7 FL — SIGNIFICANT CHANGE UP (ref 80–100)
MONOCYTES # BLD AUTO: 0.37 K/UL — SIGNIFICANT CHANGE UP (ref 0–0.9)
MONOCYTES NFR BLD AUTO: 19.1 % — HIGH (ref 2–14)
MYELOCYTES NFR BLD: 6.9 % — HIGH (ref 0–0)
NEUTROPHILS # BLD AUTO: 0.52 K/UL — LOW (ref 1.8–7.4)
NEUTROPHILS NFR BLD AUTO: 26.7 % — LOW (ref 43–77)
NRBC # BLD: 2 /100 WBCS — HIGH (ref 0–0)
NRBC BLD-RTO: 2 /100 WBCS — HIGH (ref 0–0)
PHOSPHATE SERPL-MCNC: 3.1 MG/DL — SIGNIFICANT CHANGE UP (ref 2.5–4.5)
PLAT MORPH BLD: ABNORMAL
PLATELET # BLD AUTO: 97 K/UL — LOW (ref 150–400)
POTASSIUM SERPL-MCNC: 4.1 MMOL/L — SIGNIFICANT CHANGE UP (ref 3.5–5.3)
POTASSIUM SERPL-SCNC: 4.1 MMOL/L — SIGNIFICANT CHANGE UP (ref 3.5–5.3)
PROMYELOCYTES # FLD: 1.5 % — HIGH (ref 0–0)
PROMYELOCYTES NFR BLD: 1.5 % — HIGH (ref 0–0)
PROT SERPL-MCNC: 6 G/DL — SIGNIFICANT CHANGE UP (ref 6–8.3)
PROTHROM AB SERPL-ACNC: 13.4 SEC — SIGNIFICANT CHANGE UP (ref 9.9–13.4)
RBC # BLD: 3.41 M/UL — LOW (ref 3.8–5.2)
RBC # FLD: 16.3 % — HIGH (ref 10.3–14.5)
RBC BLD AUTO: SIGNIFICANT CHANGE UP
SODIUM SERPL-SCNC: 137 MMOL/L — SIGNIFICANT CHANGE UP (ref 135–145)
TACROLIMUS SERPL-MCNC: 2.8 NG/ML — SIGNIFICANT CHANGE UP
WBC # BLD: 1.96 K/UL — LOW (ref 3.8–10.5)
WBC # FLD AUTO: 1.96 K/UL — LOW (ref 3.8–10.5)

## 2025-03-09 RX ORDER — POLYETHYLENE GLYCOL 3350 17 G/17G
17 POWDER, FOR SOLUTION ORAL
Refills: 0 | Status: DISCONTINUED | OUTPATIENT
Start: 2025-03-09 | End: 2025-03-11

## 2025-03-09 RX ORDER — TACROLIMUS 0.5 MG/1
2 CAPSULE ORAL
Refills: 0 | Status: DISCONTINUED | OUTPATIENT
Start: 2025-03-09 | End: 2025-03-09

## 2025-03-09 RX ORDER — TACROLIMUS 0.5 MG/1
2 CAPSULE ORAL
Refills: 0 | Status: DISCONTINUED | OUTPATIENT
Start: 2025-03-10 | End: 2025-03-10

## 2025-03-09 RX ORDER — TACROLIMUS 0.5 MG/1
1 CAPSULE ORAL
Refills: 0 | Status: DISCONTINUED | OUTPATIENT
Start: 2025-03-09 | End: 2025-03-10

## 2025-03-09 RX ORDER — TACROLIMUS 0.5 MG/1
1 CAPSULE ORAL ONCE
Refills: 0 | Status: COMPLETED | OUTPATIENT
Start: 2025-03-09 | End: 2025-03-09

## 2025-03-09 RX ADMIN — TACROLIMUS 1 MILLIGRAM(S): 0.5 CAPSULE ORAL at 20:52

## 2025-03-09 RX ADMIN — PREDNISONE 10 MILLIGRAM(S): 20 TABLET ORAL at 05:15

## 2025-03-09 RX ADMIN — Medication 40 MILLIGRAM(S): at 05:14

## 2025-03-09 RX ADMIN — URSODIOL 300 MILLIGRAM(S): 300 CAPSULE ORAL at 05:17

## 2025-03-09 RX ADMIN — TACROLIMUS 1 MILLIGRAM(S): 0.5 CAPSULE ORAL at 09:16

## 2025-03-09 RX ADMIN — Medication 650 MILLIGRAM(S): at 05:15

## 2025-03-09 RX ADMIN — Medication 1 TABLET(S): at 17:28

## 2025-03-09 RX ADMIN — Medication 650 MILLIGRAM(S): at 17:28

## 2025-03-09 RX ADMIN — Medication 50 MILLIGRAM(S): at 22:19

## 2025-03-09 RX ADMIN — POLYETHYLENE GLYCOL 3350 17 GRAM(S): 17 POWDER, FOR SOLUTION ORAL at 17:29

## 2025-03-09 RX ADMIN — Medication 800 MILLIGRAM(S): at 17:29

## 2025-03-09 RX ADMIN — TACROLIMUS 1 MILLIGRAM(S): 0.5 CAPSULE ORAL at 10:30

## 2025-03-09 RX ADMIN — Medication 800 MILLIGRAM(S): at 09:16

## 2025-03-09 RX ADMIN — HYDROXYZINE HYDROCHLORIDE 50 MILLIGRAM(S): 25 TABLET, FILM COATED ORAL at 10:33

## 2025-03-09 RX ADMIN — URSODIOL 300 MILLIGRAM(S): 300 CAPSULE ORAL at 17:28

## 2025-03-09 RX ADMIN — ATOVAQUONE 1500 MILLIGRAM(S): 750 SUSPENSION ORAL at 12:53

## 2025-03-09 RX ADMIN — Medication 1 TABLET(S): at 20:52

## 2025-03-09 RX ADMIN — Medication 1 TABLET(S): at 05:15

## 2025-03-09 RX ADMIN — HYDROXYZINE HYDROCHLORIDE 50 MILLIGRAM(S): 25 TABLET, FILM COATED ORAL at 03:49

## 2025-03-09 NOTE — PROGRESS NOTE ADULT - SUBJECTIVE AND OBJECTIVE BOX
Transplant Surgery - Multidisciplinary Progress Note  --------------------------------------------------------------  OLT 1/4/2025    Present:   Patient seen and examined with multidisciplinary Transplant team including Surgeon Dr. Solomon, Dr. Presley, Hepatologist: Dr. Spicer/Franklin, EDNA Nunes and bedside RN during AM rounds.   Disciplines not in attendance will be notified of the plan.     HPI:   65 yo F PMHx  ETOH cirrhosis S/p OLT 1/4/2025 with end-end single arterial reconstruction (Donor Right, Segment IV and Left Hepatic Arteries arising separately from celiac trunk, Redo hepatic artery anastomosis) on ASA/ Eliquis, HTN, L breast stage 1 Invasive lobular carcinoma (HR+ HER2 neg) s/p lumpectomy c/w PSH (on 2020. Off Letrazole now), and R cheek BCC s/p Mohs (2021) Umbilical Hernia Repair 9/2024. Post op course s/p OLT Pt was treated for Donor with staph epi bacteremia (blood culture x 1), with Cefazolin (end date 1/8). After discharge she had elevated LFTs and was started on po pred taper (2/7) for treatment of presumed rejection Pred 80mg -->15mg. US doppler (2/7/25)  Patent transplant hepatic vasculature. Patient presents as direct admit for elevated LFTs, reports c/o pruritis of face/neck and bilateral arms redness that began last night.    Interval Events:            Immunosuppression  - FK per level, MMF Held, Pred 10  - Ongoing monitoring for signs of rejection    Potential Discharge date: tbd   Education:  Medications  Plan of care:  See Below                                        Review of systems  All other systems were reviewed and are negative, except as noted.      PHYSICAL EXAM:  GENERAL: NAD  HEAD:  Atraumatic, Normocephalic  EYES: EOMI, conjunctiva and sclera clear  NECK: Supple, No JVD  CHEST/LUNG: Clear to auscultation bilaterally; No wheeze  HEART: Regular rate and rhythm; No murmurs, rubs, or gallops  ABDOMEN: Soft, Nontender, Nondistended; Bowel sounds present. Chevron incision well healed no s/s infection   EXTREMITIES:  2+ Peripheral Pulses, No clubbing, cyanosis, or edema  PSYCH: AAOx3  NEUROLOGY: non-focal  SKIN: Erythema on neck w/ excoriations . B/L arms w petechia and purpura --resolving  SKIN: No rashes or lesions     Transplant Surgery - Multidisciplinary Progress Note  --------------------------------------------------------------  OLT 1/4/2025    Present:   Patient seen and examined with multidisciplinary Transplant team including Surgeon Dr. Solomon, Dr. Presley, Hepatologist: Dr. Spicer/Franklin, EDNA Nunes and bedside RN during AM rounds.   Disciplines not in attendance will be notified of the plan.     HPI:   65 yo F PMHx  ETOH cirrhosis S/p OLT 1/4/2025 with end-end single arterial reconstruction (Donor Right, Segment IV and Left Hepatic Arteries arising separately from celiac trunk, Redo hepatic artery anastomosis) on ASA/ Eliquis, HTN, L breast stage 1 Invasive lobular carcinoma (HR+ HER2 neg) s/p lumpectomy c/w PSH (on 2020. Off Letrazole now), and R cheek BCC s/p Mohs (2021) Umbilical Hernia Repair 9/2024. Post op course s/p OLT Pt was treated for Donor with staph epi bacteremia (blood culture x 1), with Cefazolin (end date 1/8). After discharge she had elevated LFTs and was started on po pred taper (2/7) for treatment of presumed rejection Pred 80mg -->15mg. US doppler (2/7/25)  Patent transplant hepatic vasculature. Patient presents as direct admit for elevated LFTs, reports c/o pruritis of face/neck and bilateral arms redness that began last night.    Interval Events:            Immunosuppression  - FK per level, MMF Held, Pred 10  - Ongoing monitoring for signs of rejection    Potential Discharge date: tbd   Education:  Medications  Plan of care:  See Below                                        Review of systems  All other systems were reviewed and are negative, except as noted.      PHYSICAL EXAM:  GENERAL: NAD  HEAD:  Atraumatic, Normocephalic  EYES: EOMI, conjunctiva and sclera clear  NECK: Supple, No JVD  CHEST/LUNG: Clear to auscultation bilaterally; No wheeze  HEART: Regular rate and rhythm; No murmurs, rubs, or gallops  ABDOMEN: Soft, Nontender, Nondistended; Bowel sounds present. Chevron incision well healed no s/s infection   EXTREMITIES:  2+ Peripheral Pulses, No clubbing, cyanosis, or edema  PSYCH: AAOx3  NEUROLOGY: non-focal  SKIN: Erythema on neck w/ excoriations . B/L arms w petechia and purpura --resolving  SKIN: No rashes or lesions  Transplant Surgery - Multidisciplinary Progress Note  --------------------------------------------------------------  OLT 1/4/2025    Present:   Patient seen and examined with multidisciplinary Transplant team including Surgeon Dr. Solomon, Dr. Presley, Hepatologist: Dr. Spicer/Franklin, EDNA Nunes and bedside RN during AM rounds.   Disciplines not in attendance will be notified of the plan.     HPI:   65 yo F PMHx  ETOH cirrhosis S/p OLT 1/4/2025 with end-end single arterial reconstruction (Donor Right, Segment IV and Left Hepatic Arteries arising separately from celiac trunk, Redo hepatic artery anastomosis) on ASA/ Eliquis, HTN, L breast stage 1 Invasive lobular carcinoma (HR+ HER2 neg) s/p lumpectomy c/w PSH (on 2020. Off Letrazole now), and R cheek BCC s/p Mohs (2021) Umbilical Hernia Repair 9/2024. Post op course s/p OLT Pt was treated for Donor with staph epi bacteremia (blood culture x 1), with Cefazolin (end date 1/8). After discharge she had elevated LFTs and was started on po pred taper (2/7) for treatment of presumed rejection Pred 80mg -->15mg. US doppler (2/7/25)  Patent transplant hepatic vasculature. Patient presents as direct admit for elevated LFTs, reports c/o pruritis of face/neck and bilateral arms redness that began last night.    Interval Events:  - afebrile, VSS   - improvement in LFTs      Immunosuppression  - FK per level, MMF Held, Pred 10  - Ongoing monitoring for signs of rejection    Potential Discharge date: tbd   Education:  Medications  Plan of care:  See Below      MEDICATIONS  (STANDING):  atovaquone  Suspension 1500 milliGRAM(s) Oral daily  calcium carbonate 1250 mG  + Vitamin D (OsCal 500 + D) 1 Tablet(s) Oral two times a day  influenza   Vaccine 0.5 milliLiter(s) IntraMuscular once  magnesium oxide 800 milliGRAM(s) Oral two times a day with meals  pantoprazole    Tablet 40 milliGRAM(s) Oral before breakfast  polyethylene glycol 3350 17 Gram(s) Oral two times a day  predniSONE   Tablet 10 milliGRAM(s) Oral daily  senna 1 Tablet(s) Oral at bedtime  sodium bicarbonate 650 milliGRAM(s) Oral two times a day  tacrolimus 1 milliGRAM(s) Oral <User Schedule>  ursodiol Capsule 300 milliGRAM(s) Oral two times a day    MEDICATIONS  (PRN):  calamine/zinc oxide Lotion 1 Application(s) Topical every 6 hours PRN Rash and/or Itching  diphenhydrAMINE 50 milliGRAM(s) Oral at bedtime PRN Rash and/or Itching  hydrOXYzine hydrochloride 50 milliGRAM(s) Oral every 6 hours PRN Itching      PAST MEDICAL & SURGICAL HISTORY:  Breast cancer, left      Mild alcohol use disorder      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      GERD (gastroesophageal reflux disease)      Skin cancer, basal cell      H/O lumpectomy      History of removal of skin mole      H/O ganglion cyst      Status post liver transplant          Vital Signs Last 24 Hrs  T(C): 36.7 (09 Mar 2025 13:00), Max: 36.7 (08 Mar 2025 17:00)  T(F): 98 (09 Mar 2025 13:00), Max: 98 (08 Mar 2025 17:00)  HR: 77 (09 Mar 2025 13:00) (69 - 85)  BP: 125/77 (09 Mar 2025 13:00) (106/61 - 125/77)  BP(mean): --  RR: 18 (09 Mar 2025 13:00) (18 - 18)  SpO2: 97% (09 Mar 2025 13:00) (96% - 97%)    Parameters below as of 09 Mar 2025 13:00  Patient On (Oxygen Delivery Method): room air        I&O's Summary    08 Mar 2025 06:01  -  09 Mar 2025 07:00  --------------------------------------------------------  IN: 300 mL / OUT: 1200 mL / NET: -900 mL                              10.4   1.96  )-----------( 97       ( 09 Mar 2025 06:41 )             33.3     03-09    137  |  100  |  20  ----------------------------<  101[H]  4.1   |  23  |  0.80    Ca    9.1      09 Mar 2025 06:41  Phos  3.1     03-09  Mg     1.9     03-09    TPro  6.0  /  Alb  3.5  /  TBili  0.9  /  DBili  0.5[H]  /  AST  34  /  ALT  133[H]  /  AlkPhos  235[H]  03-09    Tacrolimus (), Serum: 2.8 ng/mL (03-09 @ 06:41)        Culture - Blood (collected 03-05-25 @ 07:03)  Source: Blood Blood-Peripheral  Preliminary Report (03-09-25 @ 09:00):    No growth at 4 days    Culture - Urine (collected 03-05-25 @ 07:02)  Source: Clean Catch Clean Catch (Midstream)  Final Report (03-07-25 @ 11:43):    10,000 - 49,000 CFU/mL Klebsiella aerogenes (Previously Enterobacter)    10,000 - 49,000 CFU/mL Aerococcus urinae    Isolates of Aerococcus spp. are predictably susceptible to penicillin,    ampicillin, and vancomycin. All isolates are resistant to sulfonamides.  Organism: Klebsiella aerogenes (Previously Enterobacter) (03-07-25 @ 11:43)  Organism: Klebsiella aerogenes (Previously Enterobacter) (03-07-25 @ 11:43)    Culture - Blood (collected 03-04-25 @ 23:16)  Source: Blood Blood-Peripheral  Preliminary Report (03-09-25 @ 03:02):    No growth at 4 days                  Review of systems  All other systems were reviewed and are negative, except as noted.      PHYSICAL EXAM:  GENERAL: NAD  HEAD:  Atraumatic, Normocephalic  EYES: EOMI, conjunctiva and sclera clear  NECK: Supple, No JVD  CHEST/LUNG: Clear to auscultation bilaterally; No wheeze  HEART: Regular rate and rhythm; No murmurs, rubs, or gallops  ABDOMEN: Soft, Nontender, Nondistended; Bowel sounds present. Chevron incision well healed no s/s infection   EXTREMITIES:  2+ Peripheral Pulses, No clubbing, cyanosis, or edema  PSYCH: responsible   NEUROLOGY: A&Ox4  SKIN: Erythema on neck w/ excoriations . B/L arms w petechia and purpura --resolving  SKIN: No rashes or lesions

## 2025-03-09 NOTE — CHART NOTE - NSCHARTNOTEFT_GEN_A_CORE
Tentative plan for ERCP tomorrow if neutropenia resolves  - Keep NPO at MN  - Send early am preop labs (cbc, bmp, coags, T&S)    Jackie Steiner, PGY4  Gastroenterology/Hepatology Fellow  Available on [employer] Teams  [phone] (Long Range Pager)  [phone] (Short Range Pager [facility])    After 5 pm, please contact the on-call GI fellow for any urgent issues via the Hospital Call

## 2025-03-09 NOTE — PROGRESS NOTE ADULT - ASSESSMENT
65 yo F PMHx  ETOH cirrhosis S/p OLT 1/4/2025 on ASA/ Eliquis, HTN, L breast stage 1 Invasive lobular carcinoma (HR+ HER2 neg) s/p lumpectomy and R cheek BCC s/p Mohs (2021) Umbilical Hernia Repair 9/2024. Post op course s/p OLT treated for Donor with staph epi bacteremia (blood culture x 1), with Cefazolin (end date 1/8). started on po pred taper (2/7) for treatment of presumed rejection Pred 80mg -->15mg. Patient presents as direct admit for elevated LFTs, reports c/o pruritis of face/neck and b/l arms     [] Transaminitis s/p OLT  - Liver Doppler - unable to visualize R post TANG, otherwise patent   - MRI: Narrowing of CBD, small caliber L/R hepatic arteries  - LFTs with some improvement  - [facility] aware: ERCP TBD on Monday pending daily assessment of overall labs/clinical picture  - ASA/Eliquis held - resume as able   - Ursodiol 300 bid   - atarax/benadryl prn itching     [] Immuno:   - FK per level (will dose slowly and monitor), MMF held,  Pred 10  - PPx: Mepron/ PPI/Oscal/Mag    [] Leukopenia  - Valcyte/MMF Held  - CMV neg  - BCx neg  - Parvo pending  - UCx growing 10K - 49K CFU Kleb/Aerococcus, patient asymptomatic (no treatment per ID)  - s/p Neupogen 300 (3/5 and 3/6), 480 on 3/7, will hold today and monitor response  - will involve Heme as needed     65 yo F PMHx  ETOH cirrhosis S/p OLT 1/4/2025 on ASA/ Eliquis, HTN, L breast stage 1 Invasive lobular carcinoma (HR+ HER2 neg) s/p lumpectomy and R cheek BCC s/p Mohs (2021) Umbilical Hernia Repair 9/2024. Post op course s/p OLT treated for Donor with staph epi bacteremia (blood culture x 1), with Cefazolin (end date 1/8). started on po pred taper (2/7) for treatment of presumed rejection Pred 80mg -->15mg. Patient presents as direct admit for elevated LFTs, reports c/o pruritis of face/neck and b/l arms     [] Transaminitis s/p OLT  - Liver Doppler - unable to visualize R post TANG, otherwise patent   - MRI: Narrowing of CBD, small caliber L/R hepatic arteries  - LFTs with some improvement  - [facility] aware: ERCP TBD on Monday pending daily assessment of overall labs/clinical picture  - ASA/Eliquis held - resume as able   - Ursodiol 300 bid   - atarax/benadryl prn itching     [] Immuno:   - FK per level (will dose slowly and monitor), MMF held,  Pred 10  - PPx: Mepron/ PPI/Oscal/Mag    [] Leukopenia  - Valcyte/MMF Held  - CMV neg  - BCx neg  - Parvo pending  - UCx growing 10K - 49K CFU Kleb/Aerococcus, patient asymptomatic (no treatment per ID)  - s/p Neupogen 300 (3/5 and 3/6), 480 on 3/7, will hold today and monitor response  - will involve Heme as needed 63 yo F PMHx  ETOH cirrhosis S/p OLT 1/4/2025 on ASA/ Eliquis, HTN, L breast stage 1 Invasive lobular carcinoma (HR+ HER2 neg) s/p lumpectomy and R cheek BCC s/p Mohs (2021) Umbilical Hernia Repair 9/2024. Post op course s/p OLT treated for Donor with staph epi bacteremia (blood culture x 1), with Cefazolin (end date 1/8). started on po pred taper (2/7) for treatment of presumed rejection Pred 80mg -->15mg. Patient presents as direct admit for elevated LFTs, reports c/o pruritis of face/neck and b/l arms     [] Transaminitis s/p OLT  - Liver Doppler - unable to visualize R post TANG, otherwise patent   - MRI: Narrowing of CBD, small caliber L/R hepatic arteries  - LFTs showing improvement  - [facility] aware: ERCP TBD on Monday pending daily assessment of overall labs/clinical picture  - ASA/Eliquis held - resume as able   - Ursodiol 300 bid   - atarax/benadryl prn itching     [] Immuno:   - FK per level (will dose slowly and monitor), MMF held,  Pred 10  - PPx: Mepron/ PPI/Oscal/Mag    [] Leukopenia  - Valcyte/MMF Held  - CMV neg  - BCx neg  - Parvo neg  - UCx growing 10K - 49K CFU Kleb/Aerococcus, patient asymptomatic (no treatment per ID)  - s/p Neupogen 300 (3/5 and 3/6), 480 on 3/7, will hold today and monitor response  - will involve Heme as needed

## 2025-03-10 LAB
ALBUMIN SERPL ELPH-MCNC: 3.5 G/DL — SIGNIFICANT CHANGE UP (ref 3.3–5)
ALP SERPL-CCNC: 218 U/L — HIGH (ref 40–120)
ALT FLD-CCNC: 97 U/L — HIGH (ref 10–45)
ANION GAP SERPL CALC-SCNC: 12 MMOL/L — SIGNIFICANT CHANGE UP (ref 5–17)
AST SERPL-CCNC: 22 U/L — SIGNIFICANT CHANGE UP (ref 10–40)
BASOPHILS # BLD AUTO: 0.05 K/UL — SIGNIFICANT CHANGE UP (ref 0–0.2)
BASOPHILS NFR BLD AUTO: 2 % — SIGNIFICANT CHANGE UP (ref 0–2)
BILIRUB SERPL-MCNC: 0.7 MG/DL — SIGNIFICANT CHANGE UP (ref 0.2–1.2)
BLD GP AB SCN SERPL QL: NEGATIVE — SIGNIFICANT CHANGE UP
BUN SERPL-MCNC: 23 MG/DL — SIGNIFICANT CHANGE UP (ref 7–23)
CALCIUM SERPL-MCNC: 9 MG/DL — SIGNIFICANT CHANGE UP (ref 8.4–10.5)
CHLORIDE SERPL-SCNC: 103 MMOL/L — SIGNIFICANT CHANGE UP (ref 96–108)
CO2 SERPL-SCNC: 24 MMOL/L — SIGNIFICANT CHANGE UP (ref 22–31)
CREAT SERPL-MCNC: 0.88 MG/DL — SIGNIFICANT CHANGE UP (ref 0.5–1.3)
CULTURE RESULTS: SIGNIFICANT CHANGE UP
CULTURE RESULTS: SIGNIFICANT CHANGE UP
EGFR: 74 ML/MIN/1.73M2 — SIGNIFICANT CHANGE UP
EGFR: 74 ML/MIN/1.73M2 — SIGNIFICANT CHANGE UP
EOSINOPHIL # BLD AUTO: 0.16 K/UL — SIGNIFICANT CHANGE UP (ref 0–0.5)
EOSINOPHIL NFR BLD AUTO: 6 % — SIGNIFICANT CHANGE UP (ref 0–6)
GLUCOSE SERPL-MCNC: 104 MG/DL — HIGH (ref 70–99)
HCT VFR BLD CALC: 32.1 % — LOW (ref 34.5–45)
HGB BLD-MCNC: 10.2 G/DL — LOW (ref 11.5–15.5)
HYPOSEGMENTATION: PRESENT — SIGNIFICANT CHANGE UP
INR BLD: 1.11 RATIO — SIGNIFICANT CHANGE UP (ref 0.85–1.16)
LYMPHOCYTES # BLD AUTO: 1.3 K/UL — SIGNIFICANT CHANGE UP (ref 1–3.3)
LYMPHOCYTES # BLD AUTO: 49 % — HIGH (ref 13–44)
MAGNESIUM SERPL-MCNC: 1.9 MG/DL — SIGNIFICANT CHANGE UP (ref 1.6–2.6)
MANUAL SMEAR VERIFICATION: SIGNIFICANT CHANGE UP
MCHC RBC-ENTMCNC: 30.6 PG — SIGNIFICANT CHANGE UP (ref 27–34)
MCHC RBC-ENTMCNC: 31.8 G/DL — LOW (ref 32–36)
MCV RBC AUTO: 96.4 FL — SIGNIFICANT CHANGE UP (ref 80–100)
METAMYELOCYTES # FLD: 3 % — HIGH (ref 0–0)
METAMYELOCYTES NFR BLD: 3 % — HIGH (ref 0–0)
MONOCYTES # BLD AUTO: 0.19 K/UL — SIGNIFICANT CHANGE UP (ref 0–0.9)
MONOCYTES NFR BLD AUTO: 7 % — SIGNIFICANT CHANGE UP (ref 2–14)
MYELOCYTES NFR BLD: 6 % — HIGH (ref 0–0)
NEUTROPHILS # BLD AUTO: 0.64 K/UL — LOW (ref 1.8–7.4)
NEUTROPHILS NFR BLD AUTO: 20 % — LOW (ref 43–77)
NEUTS BAND # BLD: 4 % — SIGNIFICANT CHANGE UP (ref 0–8)
NEUTS BAND NFR BLD: 4 % — SIGNIFICANT CHANGE UP (ref 0–8)
NRBC # BLD: 1 /100 WBCS — HIGH (ref 0–0)
NRBC BLD-RTO: 1 /100 WBCS — HIGH (ref 0–0)
PHOSPHATE SERPL-MCNC: 3.7 MG/DL — SIGNIFICANT CHANGE UP (ref 2.5–4.5)
PLAT MORPH BLD: NORMAL — SIGNIFICANT CHANGE UP
PLATELET # BLD AUTO: 100 K/UL — LOW (ref 150–400)
POTASSIUM SERPL-MCNC: 4.2 MMOL/L — SIGNIFICANT CHANGE UP (ref 3.5–5.3)
POTASSIUM SERPL-SCNC: 4.2 MMOL/L — SIGNIFICANT CHANGE UP (ref 3.5–5.3)
PROMYELOCYTES # FLD: 3 % — HIGH (ref 0–0)
PROMYELOCYTES NFR BLD: 3 % — HIGH (ref 0–0)
PROT SERPL-MCNC: 5.9 G/DL — LOW (ref 6–8.3)
PROTHROM AB SERPL-ACNC: 12.8 SEC — SIGNIFICANT CHANGE UP (ref 9.9–13.4)
RBC # BLD: 3.33 M/UL — LOW (ref 3.8–5.2)
RBC # FLD: 16.3 % — HIGH (ref 10.3–14.5)
RBC BLD AUTO: SIGNIFICANT CHANGE UP
RH IG SCN BLD-IMP: POSITIVE — SIGNIFICANT CHANGE UP
SODIUM SERPL-SCNC: 139 MMOL/L — SIGNIFICANT CHANGE UP (ref 135–145)
SPECIMEN SOURCE: SIGNIFICANT CHANGE UP
SPECIMEN SOURCE: SIGNIFICANT CHANGE UP
TACROLIMUS SERPL-MCNC: 3.5 NG/ML — SIGNIFICANT CHANGE UP
WBC # BLD: 2.66 K/UL — LOW (ref 3.8–10.5)
WBC # FLD AUTO: 2.66 K/UL — LOW (ref 3.8–10.5)

## 2025-03-10 RX ORDER — TACROLIMUS 0.5 MG/1
1 CAPSULE ORAL ONCE
Refills: 0 | Status: COMPLETED | OUTPATIENT
Start: 2025-03-10 | End: 2025-03-10

## 2025-03-10 RX ORDER — APIXABAN 2.5 MG/1
2.5 TABLET, FILM COATED ORAL
Refills: 0 | Status: DISCONTINUED | OUTPATIENT
Start: 2025-03-10 | End: 2025-03-11

## 2025-03-10 RX ORDER — ASPIRIN 325 MG
81 TABLET ORAL DAILY
Refills: 0 | Status: DISCONTINUED | OUTPATIENT
Start: 2025-03-10 | End: 2025-03-11

## 2025-03-10 RX ORDER — TACROLIMUS 0.5 MG/1
2 CAPSULE ORAL
Refills: 0 | Status: DISCONTINUED | OUTPATIENT
Start: 2025-03-10 | End: 2025-03-11

## 2025-03-10 RX ORDER — MYCOPHENOLATE MOFETIL 500 MG/1
500 TABLET, FILM COATED ORAL
Refills: 0 | Status: DISCONTINUED | OUTPATIENT
Start: 2025-03-10 | End: 2025-03-11

## 2025-03-10 RX ADMIN — TACROLIMUS 2 MILLIGRAM(S): 0.5 CAPSULE ORAL at 08:07

## 2025-03-10 RX ADMIN — Medication 1 TABLET(S): at 18:36

## 2025-03-10 RX ADMIN — HYDROXYZINE HYDROCHLORIDE 50 MILLIGRAM(S): 25 TABLET, FILM COATED ORAL at 22:22

## 2025-03-10 RX ADMIN — HYDROXYZINE HYDROCHLORIDE 50 MILLIGRAM(S): 25 TABLET, FILM COATED ORAL at 02:24

## 2025-03-10 RX ADMIN — MYCOPHENOLATE MOFETIL 500 MILLIGRAM(S): 500 TABLET, FILM COATED ORAL at 19:56

## 2025-03-10 RX ADMIN — Medication 81 MILLIGRAM(S): at 12:46

## 2025-03-10 RX ADMIN — TACROLIMUS 2 MILLIGRAM(S): 0.5 CAPSULE ORAL at 19:56

## 2025-03-10 RX ADMIN — Medication 800 MILLIGRAM(S): at 18:36

## 2025-03-10 RX ADMIN — URSODIOL 300 MILLIGRAM(S): 300 CAPSULE ORAL at 06:42

## 2025-03-10 RX ADMIN — Medication 50 MILLIGRAM(S): at 14:15

## 2025-03-10 RX ADMIN — Medication 650 MILLIGRAM(S): at 06:42

## 2025-03-10 RX ADMIN — Medication 40 MILLIGRAM(S): at 06:42

## 2025-03-10 RX ADMIN — Medication 1 TABLET(S): at 19:55

## 2025-03-10 RX ADMIN — PREDNISONE 10 MILLIGRAM(S): 20 TABLET ORAL at 06:42

## 2025-03-10 RX ADMIN — ATOVAQUONE 1500 MILLIGRAM(S): 750 SUSPENSION ORAL at 12:46

## 2025-03-10 RX ADMIN — TACROLIMUS 1 MILLIGRAM(S): 0.5 CAPSULE ORAL at 21:15

## 2025-03-10 RX ADMIN — APIXABAN 2.5 MILLIGRAM(S): 2.5 TABLET, FILM COATED ORAL at 18:36

## 2025-03-10 RX ADMIN — URSODIOL 300 MILLIGRAM(S): 300 CAPSULE ORAL at 18:37

## 2025-03-10 RX ADMIN — Medication 1 TABLET(S): at 06:42

## 2025-03-10 NOTE — PROGRESS NOTE ADULT - SUBJECTIVE AND OBJECTIVE BOX
Transplant Surgery - Multidisciplinary Progress Note  --------------------------------------------------------------  OLT 1/4/2025    Present:  Patient seen and examined with multidisciplinary Transplant team including Surgeon Dr. Hull, Hepatologist Dr. Spicer, FELIPE Plascencia/Constantine  and bedside RN during AM rounds.   Disciplines not in attendance will be notified of the plan.    HPI: 65 yo F PMHx  ETOH cirrhosis S/p OLT 1/4/2025 with end-end single arterial reconstruction (Donor Right, Segment IV and Left Hepatic Arteries arising separately from celiac trunk, Redo hepatic artery anastomosis) on ASA/ Eliquis, HTN, L breast stage 1 Invasive lobular carcinoma (HR+ HER2 neg) s/p lumpectomy c/w PSH (on 2020. Off Letrazole now), and R cheek BCC s/p Mohs (2021) Umbilical Hernia Repair 9/2024. Post op course s/p OLT Pt was treated for Donor with staph epi bacteremia (blood culture x 1), with Cefazolin (end date 1/8). After discharge she had elevated LFTs and was started on po pred taper (2/7) for treatment of presumed rejection Pred 80mg -->15mg. US doppler (2/7/25)  Patent transplant hepatic vasculature. Patient presents as direct admit for elevated LFTs, reports c/o pruritis of face/neck and bilateral arms redness that began last night.    Interval Events:            Immunosuppression  - FK per level, MMF Held, Pred 10  - Ongoing monitoring for signs of rejection    Potential Discharge date: tbd   Education:  Medications  Plan of care:  See Below                       Review of systems  All other systems were reviewed and are negative, except as noted.    PHYSICAL EXAM:  GENERAL: NAD  HEAD:  Atraumatic, Normocephalic  EYES: EOMI, conjunctiva and sclera clear  NECK: Supple, No JVD  CHEST/LUNG: Clear to auscultation bilaterally; No wheeze  HEART: Regular rate and rhythm; No murmurs, rubs, or gallops  ABDOMEN: Soft, Nontender, Nondistended; Bowel sounds present. Chevron incision well healed no s/s infection   EXTREMITIES:  2+ Peripheral Pulses, No clubbing, cyanosis, or edema  PSYCH: responsible   NEUROLOGY: A&Ox4  SKIN: Erythema on neck w/ excoriations . B/L arms w petechia and purpura --resolving  SKIN: No rashes or lesions  Transplant Surgery - Multidisciplinary Progress Note  --------------------------------------------------------------  OLT 1/4/2025    Present:  Patient seen and examined with multidisciplinary Transplant team including Surgeon Dr. Hull, Hepatologist Dr. Spicer, FELIPE Plascencia/Constantine  and bedside RN during AM rounds.   Disciplines not in attendance will be notified of the plan.    HPI: 65 yo F PMHx  ETOH cirrhosis S/p OLT 1/4/2025 with end-end single arterial reconstruction (Donor Right, Segment IV and Left Hepatic Arteries arising separately from celiac trunk, Redo hepatic artery anastomosis) on ASA/ Eliquis, HTN, L breast stage 1 Invasive lobular carcinoma (HR+ HER2 neg) s/p lumpectomy c/w PSH (on 2020. Off Letrazole now), and R cheek BCC s/p Mohs (2021) Umbilical Hernia Repair 9/2024. Post op course s/p OLT Pt was treated for Donor with staph epi bacteremia (blood culture x 1), with Cefazolin (end date 1/8). After discharge she had elevated LFTs and was started on po pred taper (2/7) for treatment of presumed rejection Pred 80mg -->15mg. US doppler (2/7/25)  Patent transplant hepatic vasculature. Patient presents as direct admit for elevated LFTs, reports c/o pruritis of face/neck and bilateral arms redness that began last night.    Interval Events:  - afebrile, vss  - NPO for possible ERCP  - LFTs improving         Immunosuppression  - FK per level, MMF Held, Pred 10  - Ongoing monitoring for signs of rejection    Potential Discharge date: tbd   Education:  Medications  Plan of care:  See Below           MEDICATIONS  (STANDING):  atovaquone  Suspension 1500 milliGRAM(s) Oral daily  calcium carbonate 1250 mG  + Vitamin D (OsCal 500 + D) 1 Tablet(s) Oral two times a day  influenza   Vaccine 0.5 milliLiter(s) IntraMuscular once  magnesium oxide 800 milliGRAM(s) Oral two times a day with meals  pantoprazole    Tablet 40 milliGRAM(s) Oral before breakfast  polyethylene glycol 3350 17 Gram(s) Oral two times a day  predniSONE   Tablet 10 milliGRAM(s) Oral daily  senna 1 Tablet(s) Oral at bedtime  sodium bicarbonate 650 milliGRAM(s) Oral two times a day  tacrolimus 1 milliGRAM(s) Oral <User Schedule>  tacrolimus 2 milliGRAM(s) Oral <User Schedule>  ursodiol Capsule 300 milliGRAM(s) Oral two times a day    MEDICATIONS  (PRN):  calamine/zinc oxide Lotion 1 Application(s) Topical every 6 hours PRN Rash and/or Itching  diphenhydrAMINE 50 milliGRAM(s) Oral at bedtime PRN Rash and/or Itching  hydrOXYzine hydrochloride 50 milliGRAM(s) Oral every 6 hours PRN Itching      PAST MEDICAL & SURGICAL HISTORY:  Breast cancer, left      Mild alcohol use disorder      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      GERD (gastroesophageal reflux disease)      Skin cancer, basal cell      H/O lumpectomy      History of removal of skin mole      H/O ganglion cyst      Status post liver transplant          Vital Signs Last 24 Hrs  T(C): 36.6 (10 Mar 2025 07:57), Max: 37.2 (09 Mar 2025 17:00)  T(F): 97.9 (10 Mar 2025 07:57), Max: 98.9 (09 Mar 2025 17:00)  HR: 74 (10 Mar 2025 07:57) (70 - 86)  BP: 113/69 (10 Mar 2025 07:57) (110/71 - 132/72)  BP(mean): --  RR: 18 (10 Mar 2025 07:57) (18 - 18)  SpO2: 99% (10 Mar 2025 07:57) (96% - 99%)    Parameters below as of 10 Mar 2025 07:57  Patient On (Oxygen Delivery Method): room air        I&O's Summary    09 Mar 2025 07:01  -  10 Mar 2025 07:00  --------------------------------------------------------  IN: 530 mL / OUT: 1500 mL / NET: -970 mL    10 Mar 2025 07:01  -  10 Mar 2025 08:52  --------------------------------------------------------  IN: 0 mL / OUT: 225 mL / NET: -225 mL                              10.2   2.66  )-----------( 100      ( 10 Mar 2025 07:18 )             32.1     03-10    139  |  103  |  23  ----------------------------<  104[H]  4.2   |  24  |  0.88    Ca    9.0      10 Mar 2025 07:16  Phos  3.7     03-10  Mg     1.9     03-10    TPro  5.9[L]  /  Alb  3.5  /  TBili  0.7  /  DBili  x   /  AST  22  /  ALT  97[H]  /  AlkPhos  218[H]  03-10    Tacrolimus (), Serum: 2.8 ng/mL (03-09 @ 06:41)        Culture - Blood (collected 03-05-25 @ 07:03)  Source: Blood Blood-Peripheral  Preliminary Report (03-09-25 @ 09:00):    No growth at 4 days    Culture - Urine (collected 03-05-25 @ 07:02)  Source: Clean Catch Clean Catch (Midstream)  Final Report (03-07-25 @ 11:43):    10,000 - 49,000 CFU/mL Klebsiella aerogenes (Previously Enterobacter)    10,000 - 49,000 CFU/mL Aerococcus urinae    Isolates of Aerococcus spp. are predictably susceptible to penicillin,    ampicillin, and vancomycin. All isolates are resistant to sulfonamides.  Organism: Klebsiella aerogenes (Previously Enterobacter) (03-07-25 @ 11:43)  Organism: Klebsiella aerogenes (Previously Enterobacter) (03-07-25 @ 11:43)    Culture - Blood (collected 03-04-25 @ 23:16)  Source: Blood Blood-Peripheral  Final Report (03-10-25 @ 03:00):    No growth at 5 days                  Review of systems  All other systems were reviewed and are negative, except as noted.    PHYSICAL EXAM:  GENERAL: NAD  HEAD:  Atraumatic, Normocephalic  EYES: EOMI, conjunctiva and sclera clear  NECK: Supple, No JVD  CHEST/LUNG: Clear to auscultation bilaterally; No wheeze  HEART: Regular rate and rhythm; No murmurs, rubs, or gallops  ABDOMEN: Soft, Nontender, Nondistended; Bowel sounds present. Chevron incision well healed no s/s infection   EXTREMITIES:  2+ Peripheral Pulses, No clubbing, cyanosis, or edema  PSYCH: responsible   NEUROLOGY: A&Ox4  SKIN: Erythema on neck w/ excoriations . B/L arms w petechia and purpura --resolving  SKIN: No rashes or lesions

## 2025-03-10 NOTE — PROGRESS NOTE ADULT - ASSESSMENT
63 yo F PMHx  ETOH cirrhosis S/p OLT 1/4/2025 on ASA/ Eliquis, HTN, L breast stage 1 Invasive lobular carcinoma (HR+ HER2 neg) s/p lumpectomy and R cheek BCC s/p Mohs (2021) Umbilical Hernia Repair 9/2024. Post op course s/p OLT treated for Donor with staph epi bacteremia (blood culture x 1), with Cefazolin (end date 1/8). started on po pred taper (2/7) for treatment of presumed rejection Pred 80mg -->15mg. Patient presents as direct admit for elevated LFTs, reports c/o pruritis of face/neck and b/l arms     [ ] Transaminitis s/p OLT  - Liver Doppler - unable to visualize R post TANG, otherwise patent   - MRI: Narrowing of CBD, small caliber L/R hepatic arteries  - LFTs showing improvement  - [facility] aware: ERCP TBD on Monday pending daily assessment of overall labs/clinical picture  - ASA/Eliquis held - resume as able   - Ursodiol 300 bid   - atarax/benadryl prn itching     [ ] Immuno:   - FK per level (will dose slowly and monitor), MMF held,  Pred 10  - PPx: Mepron/ PPI/Oscal/Mag    [ ] Leukopenia  - Valcyte/MMF Held  - CMV neg  - BCx neg  - Parvo neg  - UCx growing 10K - 49K CFU Kleb/Aerococcus, patient asymptomatic (no treatment per ID)  - s/p Neupogen 300 (3/5 and 3/6), 480 on 3/7, will hold today and monitor response  - will involve Heme as needed 63 yo F PMHx  ETOH cirrhosis S/p OLT 1/4/2025 on ASA/ Eliquis, HTN, L breast stage 1 Invasive lobular carcinoma (HR+ HER2 neg) s/p lumpectomy and R cheek BCC s/p Mohs (2021) Umbilical Hernia Repair 9/2024. Post op course s/p OLT treated for Donor with staph epi bacteremia (blood culture x 1), with Cefazolin (end date 1/8). started on po pred taper (2/7) for treatment of presumed rejection Pred 80mg -->15mg. Patient presents as direct admit for elevated LFTs, reports c/o pruritis of face/neck and b/l arms     [ ] Transaminitis s/p OLT  - Liver Doppler - unable to visualize R post TANG, otherwise patent   - MRI: Narrowing of CBD, small caliber L/R hepatic arteries  - LFTs showing improvement  - ERCP held 2/2 improving LFTs  - ASA/Eliquis resumed   - Ursodiol 300 bid   - atarax/benadryl prn itching     [ ] Immuno:   - FK per level (will dose slowly and monitor), MMF held,  Pred 10  - PPx: Mepron/ PPI/Oscal/Mag    [ ] Leukopenia  - Valcyte/MMF Held  - CMV neg  - BCx neg  - Parvo neg  - UCx growing 10K - 49K CFU Kleb/Aerococcus, patient asymptomatic (no treatment per ID)  - s/p Neupogen 300 (3/5 and 3/6), 480 on 3/7, will hold today and monitor response  - will involve Heme as needed

## 2025-03-11 ENCOUNTER — TRANSCRIPTION ENCOUNTER (OUTPATIENT)
Age: 65
End: 2025-03-11

## 2025-03-11 VITALS
DIASTOLIC BLOOD PRESSURE: 80 MMHG | RESPIRATION RATE: 18 BRPM | HEART RATE: 79 BPM | SYSTOLIC BLOOD PRESSURE: 129 MMHG | OXYGEN SATURATION: 96 % | TEMPERATURE: 98 F

## 2025-03-11 LAB
ALBUMIN SERPL ELPH-MCNC: 3.5 G/DL — SIGNIFICANT CHANGE UP (ref 3.3–5)
ALP SERPL-CCNC: 252 U/L — HIGH (ref 40–120)
ALT FLD-CCNC: 86 U/L — HIGH (ref 10–45)
ANION GAP SERPL CALC-SCNC: 14 MMOL/L — SIGNIFICANT CHANGE UP (ref 5–17)
ANNOTATION COMMENT IMP: SIGNIFICANT CHANGE UP
AST SERPL-CCNC: 32 U/L — SIGNIFICANT CHANGE UP (ref 10–40)
BASOPHILS # BLD AUTO: 0.06 K/UL — SIGNIFICANT CHANGE UP (ref 0–0.2)
BASOPHILS NFR BLD AUTO: 1.8 % — SIGNIFICANT CHANGE UP (ref 0–2)
BILIRUB SERPL-MCNC: 0.7 MG/DL — SIGNIFICANT CHANGE UP (ref 0.2–1.2)
BUN SERPL-MCNC: 24 MG/DL — HIGH (ref 7–23)
CALCIUM SERPL-MCNC: 9.2 MG/DL — SIGNIFICANT CHANGE UP (ref 8.4–10.5)
CHLORIDE SERPL-SCNC: 103 MMOL/L — SIGNIFICANT CHANGE UP (ref 96–108)
CO2 SERPL-SCNC: 23 MMOL/L — SIGNIFICANT CHANGE UP (ref 22–31)
CREAT SERPL-MCNC: 0.87 MG/DL — SIGNIFICANT CHANGE UP (ref 0.5–1.3)
EGFR: 75 ML/MIN/1.73M2 — SIGNIFICANT CHANGE UP
EGFR: 75 ML/MIN/1.73M2 — SIGNIFICANT CHANGE UP
EOSINOPHIL # BLD AUTO: 0.09 K/UL — SIGNIFICANT CHANGE UP (ref 0–0.5)
EOSINOPHIL NFR BLD AUTO: 2.7 % — SIGNIFICANT CHANGE UP (ref 0–6)
GLUCOSE SERPL-MCNC: 92 MG/DL — SIGNIFICANT CHANGE UP (ref 70–99)
HCT VFR BLD CALC: 32.1 % — LOW (ref 34.5–45)
HEV RNA SERPL NAA+PROBE-ACNC: <1800 IU/ML — SIGNIFICANT CHANGE UP
HEV RNA SERPL NAA+PROBE-LOG IU: <3.3 LOG IU/ML — SIGNIFICANT CHANGE UP
HGB BLD-MCNC: 10.1 G/DL — LOW (ref 11.5–15.5)
IMM GRANULOCYTES NFR BLD AUTO: 27 % — HIGH (ref 0–0.9)
INR BLD: 1.12 RATIO — SIGNIFICANT CHANGE UP (ref 0.85–1.16)
LYMPHOCYTES # BLD AUTO: 0.93 K/UL — LOW (ref 1–3.3)
LYMPHOCYTES # BLD AUTO: 27.6 % — SIGNIFICANT CHANGE UP (ref 13–44)
MAGNESIUM SERPL-MCNC: 1.8 MG/DL — SIGNIFICANT CHANGE UP (ref 1.6–2.6)
MCHC RBC-ENTMCNC: 30.3 PG — SIGNIFICANT CHANGE UP (ref 27–34)
MCHC RBC-ENTMCNC: 31.5 G/DL — LOW (ref 32–36)
MCV RBC AUTO: 96.4 FL — SIGNIFICANT CHANGE UP (ref 80–100)
MONOCYTES # BLD AUTO: 0.5 K/UL — SIGNIFICANT CHANGE UP (ref 0–0.9)
MONOCYTES NFR BLD AUTO: 14.8 % — HIGH (ref 2–14)
NEUTROPHILS # BLD AUTO: 0.88 K/UL — LOW (ref 1.8–7.4)
NEUTROPHILS NFR BLD AUTO: 26.1 % — LOW (ref 43–77)
NRBC BLD AUTO-RTO: 0 /100 WBCS — SIGNIFICANT CHANGE UP (ref 0–0)
PHOSPHATE SERPL-MCNC: 3.9 MG/DL — SIGNIFICANT CHANGE UP (ref 2.5–4.5)
PLATELET # BLD AUTO: 113 K/UL — LOW (ref 150–400)
POTASSIUM SERPL-MCNC: 4 MMOL/L — SIGNIFICANT CHANGE UP (ref 3.5–5.3)
POTASSIUM SERPL-SCNC: 4 MMOL/L — SIGNIFICANT CHANGE UP (ref 3.5–5.3)
PROT SERPL-MCNC: 5.9 G/DL — LOW (ref 6–8.3)
PROTHROM AB SERPL-ACNC: 12.8 SEC — SIGNIFICANT CHANGE UP (ref 9.9–13.4)
RBC # BLD: 3.33 M/UL — LOW (ref 3.8–5.2)
RBC # FLD: 16 % — HIGH (ref 10.3–14.5)
SODIUM SERPL-SCNC: 140 MMOL/L — SIGNIFICANT CHANGE UP (ref 135–145)
SPECIMEN SOURCE: SIGNIFICANT CHANGE UP
TACROLIMUS SERPL-MCNC: 2.7 NG/ML — SIGNIFICANT CHANGE UP
WBC # BLD: 3.37 K/UL — LOW (ref 3.8–10.5)
WBC # FLD AUTO: 3.37 K/UL — LOW (ref 3.8–10.5)

## 2025-03-11 PROCEDURE — 97161 PT EVAL LOW COMPLEX 20 MIN: CPT

## 2025-03-11 PROCEDURE — 81001 URINALYSIS AUTO W/SCOPE: CPT

## 2025-03-11 PROCEDURE — 87186 SC STD MICRODIL/AGAR DIL: CPT

## 2025-03-11 PROCEDURE — 82239 BILE ACIDS TOTAL: CPT

## 2025-03-11 PROCEDURE — 93975 VASCULAR STUDY: CPT

## 2025-03-11 PROCEDURE — 76705 ECHO EXAM OF ABDOMEN: CPT

## 2025-03-11 PROCEDURE — 80053 COMPREHEN METABOLIC PANEL: CPT

## 2025-03-11 PROCEDURE — 86901 BLOOD TYPING SEROLOGIC RH(D): CPT

## 2025-03-11 PROCEDURE — 86900 BLOOD TYPING SEROLOGIC ABO: CPT

## 2025-03-11 PROCEDURE — 83789 MASS SPECTROMETRY QUAL/QUAN: CPT

## 2025-03-11 PROCEDURE — 87799 DETECT AGENT NOS DNA QUANT: CPT

## 2025-03-11 PROCEDURE — 87086 URINE CULTURE/COLONY COUNT: CPT

## 2025-03-11 PROCEDURE — 82977 ASSAY OF GGT: CPT

## 2025-03-11 PROCEDURE — 82248 BILIRUBIN DIRECT: CPT

## 2025-03-11 PROCEDURE — A9585: CPT

## 2025-03-11 PROCEDURE — 87077 CULTURE AEROBIC IDENTIFY: CPT

## 2025-03-11 PROCEDURE — G0480: CPT

## 2025-03-11 PROCEDURE — 84080 ASSAY ALKALINE PHOSPHATASES: CPT

## 2025-03-11 PROCEDURE — 84100 ASSAY OF PHOSPHORUS: CPT

## 2025-03-11 PROCEDURE — 87517 HEPATITIS B DNA QUANT: CPT

## 2025-03-11 PROCEDURE — 85610 PROTHROMBIN TIME: CPT

## 2025-03-11 PROCEDURE — 36415 COLL VENOUS BLD VENIPUNCTURE: CPT

## 2025-03-11 PROCEDURE — 86850 RBC ANTIBODY SCREEN: CPT

## 2025-03-11 PROCEDURE — 83735 ASSAY OF MAGNESIUM: CPT

## 2025-03-11 PROCEDURE — 80197 ASSAY OF TACROLIMUS: CPT

## 2025-03-11 PROCEDURE — 87040 BLOOD CULTURE FOR BACTERIA: CPT

## 2025-03-11 PROCEDURE — 74183 MRI ABD W/O CNTR FLWD CNTR: CPT | Mod: MC

## 2025-03-11 PROCEDURE — 85025 COMPLETE CBC W/AUTO DIFF WBC: CPT

## 2025-03-11 PROCEDURE — 82247 BILIRUBIN TOTAL: CPT

## 2025-03-11 RX ORDER — MAGNESIUM SULFATE 500 MG/ML
2 SYRINGE (ML) INJECTION ONCE
Refills: 0 | Status: COMPLETED | OUTPATIENT
Start: 2025-03-11 | End: 2025-03-11

## 2025-03-11 RX ORDER — URSODIOL 300 MG/1
1 CAPSULE ORAL
Qty: 0 | Refills: 0 | DISCHARGE
Start: 2025-03-11

## 2025-03-11 RX ORDER — CALAMINE 8% AND ZINC OXIDE 8% 160 MG/ML
1 LOTION TOPICAL
Qty: 0 | Refills: 0 | DISCHARGE
Start: 2025-03-11

## 2025-03-11 RX ORDER — PREDNISONE 20 MG/1
3 TABLET ORAL
Qty: 0 | Refills: 0 | DISCHARGE
Start: 2025-03-11

## 2025-03-11 RX ORDER — CALCIUM CARBONATE/VITAMIN D3 500MG-5MCG
1 TABLET ORAL
Qty: 0 | Refills: 0 | DISCHARGE
Start: 2025-03-11

## 2025-03-11 RX ORDER — ATOVAQUONE 750 MG/5ML
10 SUSPENSION ORAL
Qty: 0 | Refills: 0 | DISCHARGE
Start: 2025-03-11

## 2025-03-11 RX ORDER — MYCOPHENOLATE MOFETIL 500 MG/1
500 TABLET, FILM COATED ORAL
Qty: 0 | Refills: 0 | DISCHARGE
Start: 2025-03-11

## 2025-03-11 RX ORDER — PREDNISONE 20 MG/1
3 TABLET ORAL
Refills: 0 | DISCHARGE

## 2025-03-11 RX ORDER — PREDNISONE 20 MG/1
15 TABLET ORAL DAILY
Refills: 0 | Status: DISCONTINUED | OUTPATIENT
Start: 2025-03-12 | End: 2025-03-11

## 2025-03-11 RX ORDER — MAGNESIUM OXIDE 400 MG
1 TABLET ORAL
Refills: 0 | DISCHARGE
Start: 2025-03-11

## 2025-03-11 RX ORDER — APIXABAN 2.5 MG/1
1 TABLET, FILM COATED ORAL
Qty: 0 | Refills: 0 | DISCHARGE
Start: 2025-03-11

## 2025-03-11 RX ORDER — TACROLIMUS 0.5 MG/1
3 CAPSULE ORAL
Refills: 0 | DISCHARGE
Start: 2025-03-11

## 2025-03-11 RX ORDER — ASPIRIN 325 MG
1 TABLET ORAL
Qty: 0 | Refills: 0 | DISCHARGE
Start: 2025-03-11

## 2025-03-11 RX ORDER — ESOMEPRAZOLE MAGNESIUM 40 MG/1
1 CAPSULE, DELAYED RELEASE ORAL
Refills: 0 | DISCHARGE

## 2025-03-11 RX ORDER — SENNA 187 MG
1 TABLET ORAL
Qty: 0 | Refills: 0 | DISCHARGE
Start: 2025-03-11

## 2025-03-11 RX ORDER — PREDNISONE 20 MG/1
5 TABLET ORAL ONCE
Refills: 0 | Status: COMPLETED | OUTPATIENT
Start: 2025-03-11 | End: 2025-03-11

## 2025-03-11 RX ADMIN — Medication 1 TABLET(S): at 04:55

## 2025-03-11 RX ADMIN — APIXABAN 2.5 MILLIGRAM(S): 2.5 TABLET, FILM COATED ORAL at 04:55

## 2025-03-11 RX ADMIN — Medication 800 MILLIGRAM(S): at 08:35

## 2025-03-11 RX ADMIN — URSODIOL 300 MILLIGRAM(S): 300 CAPSULE ORAL at 04:55

## 2025-03-11 RX ADMIN — Medication 50 MILLIGRAM(S): at 00:02

## 2025-03-11 RX ADMIN — PREDNISONE 5 MILLIGRAM(S): 20 TABLET ORAL at 10:09

## 2025-03-11 RX ADMIN — Medication 40 MILLIGRAM(S): at 04:56

## 2025-03-11 RX ADMIN — PREDNISONE 10 MILLIGRAM(S): 20 TABLET ORAL at 04:56

## 2025-03-11 RX ADMIN — TACROLIMUS 2 MILLIGRAM(S): 0.5 CAPSULE ORAL at 08:34

## 2025-03-11 RX ADMIN — MYCOPHENOLATE MOFETIL 500 MILLIGRAM(S): 500 TABLET, FILM COATED ORAL at 08:35

## 2025-03-11 RX ADMIN — Medication 25 GRAM(S): at 08:34

## 2025-03-11 NOTE — DISCHARGE NOTE PROVIDER - HOSPITAL COURSE
63 yo F PMHx  ETOH cirrhosis S/p OLT 1/4/2025 (DCD pump) on ASA/ Eliquis, HTN, L breast stage 1 Invasive lobular carcinoma (HR+ HER2 neg) s/p lumpectomy and R cheek BCC s/p Mohs (2021) Umbilical Hernia Repair 9/2024.     Post op course s/p OLT treated for Donor with staph epi bacteremia (blood culture x 1), with Cefazolin (end date 1/8). started on po pred taper (2/7) for treatment of presumed rejection Pred 80mg -->15mg.     Patient presented as direct admit for elevated LFTs, reports c/o pruritis of face/neck and b/l arms       She was followed closely by our multidisciplinary transplant team:  Surgeons, Hepatologists, ID, Nephrologists, Pharmacists, [place], ACPs, RNs, SW, Coordinators, Dieticians, PT/OT and was deemed safe for discharge home with the following plan:     D/C plan:  [ ] Transaminitis s/p OLT  - Liver Doppler - unable to visualize R post TANG, otherwise patent   - MRI: Narrowing of CBD, small caliber L/R hepatic arteries  - LFTs improved during admission, ERCP held  - Ursodiol 300 bid, atarax & benadryl for pruritis  - ASA/Eliquis resumed    [ ] Immuno:   - FK ____________, MMF held,  Pred 10  - PPx: Mepron/ PPI/Oscal/Mag      [ ] Leukopenia  - Valcyte/MMF Held  - CMV, BCx, & Parvovirus neg  - UCx growing 10K - 49K CFU Kleb/Aerococcus, patient asymptomatic (no treatment per ID)  - s/p Neupogen 300 (3/5 and 3/6), 480 on 3/7        Follow Up:  1. Please follow up in Transplant Clinic in 1 week  2. Follow up with Transplant Infectious Disease     63 yo F PMHx  ETOH cirrhosis S/p OLT 1/4/2025 (DCD pump) on ASA/ Eliquis, HTN, L breast stage 1 Invasive lobular carcinoma (HR+ HER2 neg) s/p lumpectomy and R cheek BCC s/p Mohs (2021) Umbilical Hernia Repair 9/2024.     Post op course s/p OLT treated for Donor with staph epi bacteremia (blood culture x 1), with Cefazolin (end date 1/8). started on po pred taper (2/7) for treatment of presumed rejection Pred 80mg -->15mg.     Patient presented as direct admit for elevated LFTs, reports c/o pruritis of face/neck and b/l arms       She was followed closely by our multidisciplinary transplant team:  Surgeons, Hepatologists, ID, Nephrologists, Pharmacists, [place], ACPs, RNs, SW, Coordinators, Dieticians, PT/OT and was deemed safe for discharge home with the following plan:     D/C plan:  [ ] Transaminitis s/p OLT  - Liver Doppler - unable to visualize R post TANG, otherwise patent   - MRI: Narrowing of CBD, small caliber L/R hepatic arteries  - LFTs improved during admission, ERCP held  - Ursodiol 300 bid, atarax & benadryl for pruritis  - ASA/Eliquis resumed    [ ] Immuno:   - FK 2/2, MMF held,  Pred 10  - PPx: Mepron/ PPI/Oscal/Mag      [ ] Leukopenia  - Valcyte/MMF Held  - CMV, BCx, & Parvovirus neg  - UCx growing 10K - 49K CFU Kleb/Aerococcus, patient asymptomatic (no treatment per ID)  - s/p Neupogen 300 (3/5 and 3/6), 480 on 3/7        Follow Up:  1. Please follow up in Transplant Clinic in 1 week  2. Follow up with Transplant Infectious Disease     63 yo F PMHx  ETOH cirrhosis S/p OLT 1/4/2025 (DCD pump) on ASA/ Eliquis, HTN, L breast stage 1 Invasive lobular carcinoma (HR+ HER2 neg) s/p lumpectomy and R cheek BCC s/p Mohs (2021) Umbilical Hernia Repair 9/2024.     Post op course s/p OLT treated for Donor with staph epi bacteremia (blood culture x 1), with Cefazolin (end date 1/8). started on po pred taper (2/7) for treatment of presumed rejection Pred 80mg -->15mg.     Patient presented as direct admit for elevated LFTs, reports c/o pruritis of face/neck and b/l arms       She was followed closely by our multidisciplinary transplant team:  Surgeons, Hepatologists, ID, Nephrologists, Pharmacists, [place], ACPs, RNs, SW, Coordinators, Dieticians, PT/OT and was deemed safe for discharge home with the following plan:     D/C plan:  [ ] Transaminitis s/p OLT  - Liver Doppler - unable to visualize R post TANG, otherwise patent   - MRI: Narrowing of CBD, small caliber L/R hepatic arteries  - LFTs improved during admission, ERCP held  - Ursodiol 300 bid, atarax & benadryl for pruritis  - ASA/Eliquis resumed    [ ] Immuno:   - FK 2/2, MMF held,  Pred 10  - PPx: Mepron/ PPI/Oscal/Mag      [ ] Leukopenia  - Valcyte/MMF Held  - CMV, BCx, & Parvovirus neg  - UCx growing 10K - 49K CFU Kleb/Aerococcus, patient asymptomatic (no treatment per ID)  - s/p Neupogen 300 (3/5 and 3/6), 480 on 3/7        Follow Up:  1. Please follow up in Transplant Clinic tomorrow Wednesday 3/12/25  2. Follow up with Transplant Infectious Disease     65 yo F PMHx  ETOH cirrhosis S/p OLT 1/4/2025 (DCD pump) on ASA/ Eliquis, HTN, L breast stage 1 Invasive lobular carcinoma (HR+ HER2 neg) s/p lumpectomy and R cheek BCC s/p Mohs (2021) Umbilical Hernia Repair 9/2024.     Post op course s/p OLT treated for Donor with staph epi bacteremia (blood culture x 1), with Cefazolin (end date 1/8). started on po pred taper (2/7) for treatment of presumed rejection Pred 80mg -->15mg.     Patient presented as direct admit for elevated LFTs, reports c/o pruritis of face/neck and b/l arms     [ ] Transaminitis s/p OLT  - Liver Doppler - unable to visualize R post TANG, otherwise patent   - MRI: Narrowing of CBD, small caliber L/R hepatic arteries, but patent  - Plan for ERCP held due to new Leukopenia (see below)  - LFTs relatively stable, though with Alk Phos in the 200s  - also found to have elevated FK level to 17, now improved on d/c   - Ursodiol 300 bid, atarax & benadryl for pruritis--unsure if completely biliary origin or derm  - ASA/Eliquis resumed without issues    [ ] Immunosuppression:   - FK 2/2, MMF 500BID,  Pred 15  - PPx: Mepron/ PPI/Oscal/Mag      [ ] Leukopenia  - Valcyte held  - CMV, BCx, & Parvovirus neg 3/4  - UCx growing 10K - 49K CFU Kleb/Aerococcus, patient asymptomatic (no treatment per ID)  - s/p Neupogen 300 (3/5 and 3/6), 480 on 3/7 with slow improvement of counts      [] DISPO  1. Please follow up in Transplant Clinic tomorrow Wednesday 3/12/25  2. Follow up with advanced GI in one week for potential ERCP as outpatient (if leukopenia continues to improve)  3. Will need Dermatology consult as outpatient for skin check/pruritis w/u.

## 2025-03-11 NOTE — DISCHARGE NOTE PROVIDER - NSDCMRMEDTOKEN_GEN_ALL_CORE_FT
apixaban 2.5 mg oral tablet: 1 tab(s) orally 2 times a day  aspirin 81 mg oral delayed release tablet: 1 tab(s) orally every 24 hours  atovaquone 750 mg/5 mL oral suspension: 10 milliliter(s) orally once a day  CellCept 500 mg oral tablet: 2 tab(s) orally 2 times a day  magnesium oxide: 400 milligram(s) orally 2 times a day  NexIUM 40 mg oral delayed release capsule: 1 cap(s) orally once a day  predniSONE 5 mg oral tablet: 3 tab(s) orally once a day  senna leaf extract oral tablet: 1 tab(s) orally once a day (at bedtime)  tacrolimus 1 mg oral capsule: 6 milligram(s) orally 2 times a day  ursodiol 300 mg oral capsule: 1 cap(s) orally 2 times a day  valGANciclovir 450 mg oral tablet: 1 tab(s) orally every 24 hours  vitamin D-calcium (as carbonate) 5 mcg-500 mg oral tablet: 1 tab(s) orally 2 times a day   apixaban 2.5 mg oral tablet: 1 tab(s) orally 2 times a day  aspirin 81 mg oral tablet, chewable: 1 tab(s) orally once a day  atovaquone 750 mg/5 mL oral suspension: 10 milliliter(s) orally once a day  calamine topical lotion: 1 Apply topically to affected area every 6 hours As needed Rash and/or Itching  magnesium oxide 400 mg oral tablet: 2 tab(s) orally 2 times a day (with meals)  mycophenolate mofetil 250 mg oral capsule: 500 milligram(s) orally 2 times a day  pantoprazole 40 mg oral delayed release tablet: 1 tab(s) orally once a day (before a meal)  predniSONE 5 mg oral tablet: 3 tab(s) orally once a day  senna leaf extract oral tablet: 1 tab(s) orally once a day (at bedtime)  tacrolimus 1 mg oral capsule: 2 cap(s) orally 2 times a day  ursodiol 300 mg oral capsule: 1 cap(s) orally 2 times a day  vitamin D-calcium (as carbonate) 5 mcg-500 mg oral tablet: 1 tab(s) orally 2 times a day

## 2025-03-11 NOTE — PROVIDER CONTACT NOTE (CRITICAL VALUE NOTIFICATION) - NAME OF MD/NP/PA/DO NOTIFIED:
EDNA Weller & EDNA Jerome
VADIM Nunes
EDNA Weller & EDNA Jerome
Dr Arsen Syed
EDNA Weller
Basilio Thorne
Sharmaine BISHOP

## 2025-03-11 NOTE — PROGRESS NOTE ADULT - SUBJECTIVE AND OBJECTIVE BOX
Transplant Surgery - Multidisciplinary Progress Note  --------------------------------------------------------------  OLT 1/4/2025    Present:  Patient seen and examined with multidisciplinary Transplant team including Surgeon Dr. Hull, Hepatologist Dr. Spicer, ACP EDNA Weller/Juan and bedside RN during AM rounds.   Disciplines not in attendance will be notified of the plan.    HPI: 65 yo F PMHx  ETOH cirrhosis S/p OLT 1/4/2025 with end-end single arterial reconstruction (Donor Right, Segment IV and Left Hepatic Arteries arising separately from celiac trunk, Redo hepatic artery anastomosis) on ASA/ Eliquis, HTN, L breast stage 1 Invasive lobular carcinoma (HR+ HER2 neg) s/p lumpectomy c/w PSH (on 2020. Off Letrazole now), and R cheek BCC s/p Mohs (2021) Umbilical Hernia Repair 9/2024. Post op course s/p OLT Pt was treated for Donor with staph epi bacteremia (blood culture x 1), with Cefazolin (end date 1/8). After discharge she had elevated LFTs and was started on po pred taper (2/7) for treatment of presumed rejection Pred 80mg -->15mg. US doppler (2/7/25)  Patent transplant hepatic vasculature. Patient presents as direct admit for elevated LFTs, reports c/o pruritis of face/neck and bilateral arms redness that began last night.    Interval Events:            Immunosuppression  - FK per level, MMF Held, Pred 10  - Ongoing monitoring for signs of rejection    Potential Discharge date: tbd   Education:  Medications  Plan of care:  See Below                                     Review of systems  All other systems were reviewed and are negative, except as noted.    PHYSICAL EXAM:  GENERAL: NAD  HEAD:  Atraumatic, Normocephalic  EYES: EOMI, conjunctiva and sclera clear  NECK: Supple, No JVD  CHEST/LUNG: Clear to auscultation bilaterally; No wheeze  HEART: Regular rate and rhythm; No murmurs, rubs, or gallops  ABDOMEN: Soft, Nontender, Nondistended; Bowel sounds present. Chevron incision well healed no s/s infection   EXTREMITIES:  2+ Peripheral Pulses, No clubbing, cyanosis, or edema  PSYCH: responsible   NEUROLOGY: A&Ox4  SKIN: Erythema on neck w/ excoriations . B/L arms w petechia and purpura --resolving  SKIN: No rashes or lesions  Transplant Surgery - Multidisciplinary Progress Note  --------------------------------------------------------------  OLT 1/4/2025    Present:  Patient seen and examined with multidisciplinary Transplant team including Surgeon Dr. Hull, Hepatologist Dr. Spicer, ACP EDNA Weller/Juan and bedside RN during AM rounds.   Disciplines not in attendance will be notified of the plan.    HPI: 63 yo F PMHx  ETOH cirrhosis S/p OLT 1/4/2025 with end-end single arterial reconstruction (Donor Right, Segment IV and Left Hepatic Arteries arising separately from celiac trunk, Redo hepatic artery anastomosis) on ASA/ Eliquis, HTN, L breast stage 1 Invasive lobular carcinoma (HR+ HER2 neg) s/p lumpectomy c/w PSH (on 2020. Off Letrazole now), and R cheek BCC s/p Mohs (2021) Umbilical Hernia Repair 9/2024. Post op course s/p OLT Pt was treated for Donor with staph epi bacteremia (blood culture x 1), with Cefazolin (end date 1/8). After discharge she had elevated LFTs and was started on po pred taper (2/7) for treatment of presumed rejection Pred 80mg -->15mg. US doppler (2/7/25)  Patent transplant hepatic vasculature. Patient presents as direct admit for elevated LFTs, reports c/o pruritis of face/neck and bilateral arms redness that began last night.    Interval Events:  - LFTs downtrending  - ERCP cancelled  - Resumed eliquis/ASA  - o/n: refused FK 2mg, took 1mg          Immunosuppression  - FK per level, MMF Held, Pred 10  - Ongoing monitoring for signs of rejection    Potential Discharge date: tbd   Education:  Medications  Plan of care:  See Below                                     Review of systems  All other systems were reviewed and are negative, except as noted.    PHYSICAL EXAM:  GENERAL: NAD  HEAD:  Atraumatic, Normocephalic  EYES: EOMI, conjunctiva and sclera clear  NECK: Supple, No JVD  CHEST/LUNG: Clear to auscultation bilaterally; No wheeze  HEART: Regular rate and rhythm; No murmurs, rubs, or gallops  ABDOMEN: Soft, Nontender, Nondistended; Bowel sounds present. Chevron incision well healed no s/s infection   EXTREMITIES:  2+ Peripheral Pulses, No clubbing, cyanosis, or edema  PSYCH: responsible   NEUROLOGY: A&Ox4  SKIN: Erythema on neck w/ excoriations . B/L arms w petechia and purpura --resolving  SKIN: No rashes or lesions  Transplant Surgery - Multidisciplinary Progress Note  --------------------------------------------------------------  OLT 1/4/2025    Present:  Patient seen and examined with multidisciplinary Transplant team including Surgeon Dr. Hull, Hepatologist Dr. Spicer, EDNA Martinez/Queenie and bedside RN during AM rounds.   Disciplines not in attendance will be notified of the plan.    HPI: 65 yo F PMHx  ETOH cirrhosis S/p OLT 1/4/2025 with end-end single arterial reconstruction (Donor Right, Segment IV and Left Hepatic Arteries arising separately from celiac trunk, Redo hepatic artery anastomosis) on ASA/ Eliquis, HTN, L breast stage 1 Invasive lobular carcinoma (HR+ HER2 neg) s/p lumpectomy c/w PSH (on 2020. Off Letrazole now), and R cheek BCC s/p Mohs (2021) Umbilical Hernia Repair 9/2024. Post op course s/p OLT Pt was treated for Donor with staph epi bacteremia (blood culture x 1), with Cefazolin (end date 1/8). After discharge she had elevated LFTs and was started on po pred taper (2/7) for treatment of presumed rejection Pred 80mg -->15mg. US doppler (2/7/25)  Patent transplant hepatic vasculature. Patient presents as direct admit for elevated LFTs, reports c/o pruritis of face/neck and bilateral arms redness that began last night.    Interval Events:  - LFTs downtrending  - ERCP cancelled  - Resumed eliquis/ASA  - o/n: refused FK 2mg, took 1mg      Immunosuppression  - FK per level, MMF 500BID, Pred 10  - Ongoing monitoring for signs of rejection    Potential Discharge date: tbd   Education:  Medications  Plan of care:  See Below       MEDICATIONS  (STANDING):  apixaban 2.5 milliGRAM(s) Oral two times a day  aspirin  chewable 81 milliGRAM(s) Oral daily  atovaquone  Suspension 1500 milliGRAM(s) Oral daily  calcium carbonate 1250 mG  + Vitamin D (OsCal 500 + D) 1 Tablet(s) Oral two times a day  influenza   Vaccine 0.5 milliLiter(s) IntraMuscular once  magnesium oxide 800 milliGRAM(s) Oral two times a day with meals  mycophenolate mofetil 500 milliGRAM(s) Oral <User Schedule>  pantoprazole    Tablet 40 milliGRAM(s) Oral before breakfast  polyethylene glycol 3350 17 Gram(s) Oral two times a day  senna 1 Tablet(s) Oral at bedtime  tacrolimus 2 milliGRAM(s) Oral <User Schedule>  ursodiol Capsule 300 milliGRAM(s) Oral two times a day    MEDICATIONS  (PRN):  calamine/zinc oxide Lotion 1 Application(s) Topical every 6 hours PRN Rash and/or Itching  diphenhydrAMINE 50 milliGRAM(s) Oral at bedtime PRN Rash and/or Itching  hydrOXYzine hydrochloride 50 milliGRAM(s) Oral every 6 hours PRN Itching      PAST MEDICAL & SURGICAL HISTORY:  Breast cancer, left      Mild alcohol use disorder      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      GERD (gastroesophageal reflux disease)      Skin cancer, basal cell      H/O lumpectomy      History of removal of skin mole      H/O ganglion cyst      Status post liver transplant          Vital Signs Last 24 Hrs  T(C): 36.7 (11 Mar 2025 12:57), Max: 36.9 (10 Mar 2025 17:00)  T(F): 98 (11 Mar 2025 12:57), Max: 98.4 (10 Mar 2025 17:00)  HR: 79 (11 Mar 2025 12:57) (76 - 83)  BP: 129/80 (11 Mar 2025 12:57) (103/68 - 129/80)  BP(mean): --  RR: 18 (11 Mar 2025 12:57) (16 - 18)  SpO2: 96% (11 Mar 2025 12:57) (96% - 99%)    Parameters below as of 11 Mar 2025 12:57  Patient On (Oxygen Delivery Method): room air        I&O's Summary    10 Mar 2025 07:01  -  11 Mar 2025 07:00  --------------------------------------------------------  IN: 1060 mL / OUT: 1485 mL / NET: -425 mL    11 Mar 2025 07:01  -  11 Mar 2025 15:01  --------------------------------------------------------  IN: 0 mL / OUT: 1025 mL / NET: -1025 mL                              10.1   3.37  )-----------( 113      ( 11 Mar 2025 06:44 )             32.1     03-11    140  |  103  |  24[H]  ----------------------------<  92  4.0   |  23  |  0.87    Ca    9.2      11 Mar 2025 06:43  Phos  3.9     03-11  Mg     1.8     03-11    TPro  5.9[L]  /  Alb  3.5  /  TBili  0.7  /  DBili  x   /  AST  32  /  ALT  86[H]  /  AlkPhos  252[H]  03-11    Tacrolimus (), Serum: 2.7 ng/mL (03-11 @ 06:44)        Culture - Blood (collected 03-05-25 @ 07:03)  Source: Blood Blood-Peripheral  Final Report (03-10-25 @ 09:00):    No growth at 5 days    Culture - Urine (collected 03-05-25 @ 07:02)  Source: Clean Catch Clean Catch (Midstream)  Final Report (03-07-25 @ 11:43):    10,000 - 49,000 CFU/mL Klebsiella aerogenes (Previously Enterobacter)    10,000 - 49,000 CFU/mL Aerococcus urinae    Isolates of Aerococcus spp. are predictably susceptible to penicillin,    ampicillin, and vancomycin. All isolates are resistant to sulfonamides.  Organism: Klebsiella aerogenes (Previously Enterobacter) (03-07-25 @ 11:43)  Organism: Klebsiella aerogenes (Previously Enterobacter) (03-07-25 @ 11:43)    Culture - Blood (collected 03-04-25 @ 23:16)  Source: Blood Blood-Peripheral  Final Report (03-10-25 @ 03:00):    No growth at 5 days                                            Review of systems  All other systems were reviewed and are negative, except as noted.    PHYSICAL EXAM:  GENERAL: NAD  HEAD:  Atraumatic, Normocephalic  EYES: EOMI, conjunctiva and sclera clear  NECK: Supple, No JVD  CHEST/LUNG: Clear to auscultation bilaterally; No wheeze  HEART: Regular rate and rhythm; No murmurs, rubs, or gallops  ABDOMEN: Soft, Nontender, Nondistended; Bowel sounds present. Chevron incision well healed no s/s infection   EXTREMITIES:  2+ Peripheral Pulses, No clubbing, cyanosis, or edema  PSYCH: responsible   NEUROLOGY: A&Ox4  SKIN: Erythema on neck w/ excoriations . B/L arms w petechia and purpura --resolving  SKIN: No rashes or lesions

## 2025-03-11 NOTE — DISCHARGE NOTE PROVIDER - NSDCFUADDAPPT_GEN_ALL_CORE_FT
Follow Up:  1. Please follow up in Transplant Clinic in 1 week  2. Follow up with Transplant Infectious Disease   Follow Up:  1. Please follow up in Transplant Clinic tomorrow Wednesday 3/12/25  2. Follow up with Transplant Infectious Disease   1. Please follow up in Transplant Clinic tomorrow Wednesday 3/12/25  2. Follow up with advanced GI in one week for potential ERCP as outpatient (if leukopenia continues to improve)  3. Will need Dermatology appointment in a week as outpatient

## 2025-03-11 NOTE — DISCHARGE NOTE NURSING/CASE MANAGEMENT/SOCIAL WORK - PATIENT PORTAL LINK FT
You can access the FollowMyHealth Patient Portal offered by [facility] by registering at the following website: [email or web address]. By joining [employer]’s FollowMyHealth portal, you will also be able to view your health information using other applications (apps) compatible with our system.

## 2025-03-11 NOTE — DISCHARGE NOTE NURSING/CASE MANAGEMENT/SOCIAL WORK - FINANCIAL ASSISTANCE
[facility] provides services at a reduced cost to those who are determined to be eligible through [facility]’s financial assistance program. Information regarding [facility]’s financial assistance program can be found by going to [email or web address] or by calling [phone].

## 2025-03-11 NOTE — DISCHARGE NOTE PROVIDER - CARE PROVIDER_API CALL
Chantal Black NP in Family Health  [address] [place] [id number]  Phone: [phone]  Fax: [phone]  Follow Up Time:

## 2025-03-11 NOTE — PROVIDER CONTACT NOTE (CRITICAL VALUE NOTIFICATION) - BACKGROUND
pt admitted from home due to out patient labs
63 yo F PMHx  ETOH cirrhosis S/p OLT 1/4/2025 on ASA/ Eliquis, HTN, L breast stage 1 Invasive lobular carcinoma (HR+ HER2 neg) s/p lumpectomy and R cheek BCC s/p Mohs (2021). Post op course s/p OLT treated for Donor with staph epi bacteremia (blood culture x 1), with Cefazolin (end date 1/8). started on po pred taper (2/7) for treatment of presumed rejection.
65 yo F PMHx  ETOH cirrhosis S/p OLT 1/4/2025 on ASA/ Eliquis, HTN, L breast stage 1 Invasive lobular carcinoma (HR+ HER2 neg) s/p lumpectomy and R cheek BCC s/p Mohs (2021). Post op course s/p OLT treated for Donor with staph epi bacteremia (blood culture x 1), with Cefazolin (end date 1/8). started on po pred taper (2/7) for treatment of presumed rejection.
63 yo F PMHx  ETOH cirrhosis S/p OLT 1/4/2025 on ASA/ Eliquis, HTN, L breast stage 1 Invasive lobular carcinoma (HR+ HER2 neg) s/p lumpectomy and R cheek BCC s/p Mohs (2021). Post op course s/p OLT treated for Donor with staph epi bacteremia (blood culture x 1), with Cefazolin (end date 1/8). started on po pred taper (2/7) for treatment of presumed rejection.
s/p OLT 1/4/2025 admitted for elevated LFTs and pruitis
65 yo F PMHx  ETOH cirrhosis S/p OLT 1/4/2025 on ASA/ Eliquis, HTN, L breast stage 1 Invasive lobular carcinoma (HR+ HER2 neg) s/p lumpectomy and R cheek BCC s/p Mohs (2021). Post op course s/p OLT treated for Donor with staph epi bacteremia (blood culture x 1), with Cefazolin (end date 1/8). started on po pred taper (2/7) for treatment of presumed rejection.

## 2025-03-11 NOTE — PROVIDER CONTACT NOTE (CRITICAL VALUE NOTIFICATION) - SITUATION
Pt critical lab notification WBC 0.57 from CBC
Lab reported preliminary results on urine culture, collected on 3/5, resulted gram negative rods 10,000-49,000.
UCx from, 3/5. 10,000 - 49,000 CFU/mL Klebsiella aerogenes (Previously Enterobacter)  10,000 - 49,000 CFU/mL Aerococcus urinae  Isolates of Aerococcus spp. are predictably susceptible to penicillin,  ampicillin, and vancomycin. All isolates are resistant to sulfonamides.
tacro level 2.7
Lab reported a critical value of WBC of 0.86
Lab reported a critical value of WBC of 1.0 for patient.

## 2025-03-11 NOTE — PROVIDER CONTACT NOTE (CRITICAL VALUE NOTIFICATION) - TEST AND RESULT REPORTED:
UCx results
UCx from, 3/5. 10,000 - 49,000 CFU/mL Klebsiella aerogenes (Previously Enterobacter)  10,000 - 49,000 CFU/mL Aerococcus urinae  Isolates of Aerococcus spp. are predictably susceptible to penicillin,  ampicillin, and vancomycin. All isolates are resistant to sulfonamides.
WBC 0.57 from CBC
tacrolimus 2.8
tacro level 2.7
WBC
WBC

## 2025-03-11 NOTE — PROGRESS NOTE ADULT - PROVIDER SPECIALTY LIST ADULT
Transplant Surgery

## 2025-03-11 NOTE — PROVIDER CONTACT NOTE (CRITICAL VALUE NOTIFICATION) - ACTION/TREATMENT ORDERED:
no further actions at this time awaiting orders
EDNA De Souza made aware, nothing to do currently
As per ID no interventions at this time.
ZARXIO was ordered for patient.
provider ordered Zarxio
As per ID no interventions at this time.

## 2025-03-11 NOTE — DISCHARGE NOTE NURSING/CASE MANAGEMENT/SOCIAL WORK - NSDCFUADDAPPT_GEN_ALL_CORE_FT
1. Please follow up in Transplant Clinic tomorrow Wednesday 3/12/25  2. Follow up with advanced GI in one week for potential ERCP as outpatient (if leukopenia continues to improve)  3. Will need Dermatology appointment in a week as outpatient

## 2025-03-11 NOTE — DISCHARGE NOTE PROVIDER - NSDCFUSCHEDAPPT_GEN_ALL_CORE_FT
[facility]  HEPATOLOGY [address] [place] [address]   Scheduled Appointment: 04/10/2025

## 2025-03-11 NOTE — PROGRESS NOTE ADULT - ASSESSMENT
63 yo F PMHx  ETOH cirrhosis S/p OLT 1/4/2025 on ASA/ Eliquis, HTN, L breast stage 1 Invasive lobular carcinoma (HR+ HER2 neg) s/p lumpectomy and R cheek BCC s/p Mohs (2021) Umbilical Hernia Repair 9/2024. Post op course s/p OLT treated for Donor with staph epi bacteremia (blood culture x 1), with Cefazolin (end date 1/8). started on po pred taper (2/7) for treatment of presumed rejection Pred 80mg -->15mg. Patient presents as direct admit for elevated LFTs, reports c/o pruritis of face/neck and b/l arms     [ ] Transaminitis s/p OLT  - Liver Doppler - unable to visualize R post TANG, otherwise patent   - MRI: Narrowing of CBD, small caliber L/R hepatic arteries  - LFTs showing improvement  - ERCP held 2/2 improving LFTs  - ASA/Eliquis resumed   - Ursodiol 300 bid   - atarax/benadryl prn itching     [ ] Immuno:   - FK per level (will dose slowly and monitor), MMF held,  Pred 10  - PPx: Mepron/ PPI/Oscal/Mag    [ ] Leukopenia  - Valcyte/MMF Held  - CMV neg  - BCx neg  - Parvo neg  - UCx growing 10K - 49K CFU Kleb/Aerococcus, patient asymptomatic (no treatment per ID)  - s/p Neupogen 300 (3/5 and 3/6), 480 on 3/7, will hold today and monitor response  - will involve Heme as needed 63 yo F PMHx  ETOH cirrhosis S/p OLT 1/4/2025 (DCD pump) on ASA/ Eliquis, HTN, L breast stage 1 Invasive lobular carcinoma (HR+ HER2 neg) s/p lumpectomy and R cheek BCC s/p Mohs (2021) Umbilical Hernia Repair 9/2024.     Post op course s/p OLT treated for Donor with staph epi bacteremia (blood culture x 1), with Cefazolin (end date 1/8). started on po pred taper (2/7) for treatment of presumed rejection Pred 80mg -->15mg.     Patient presented as direct admit for elevated LFTs, reports c/o pruritis of face/neck and b/l arms     [ ] Transaminitis s/p OLT  - Liver Doppler - unable to visualize R post TANG, otherwise patent   - MRI: Narrowing of CBD, small caliber L/R hepatic arteries, but patent  - Plan for ERCP held due to new Leukopenia (see below)  - LFTs relatively stable, though with Alk Phos in the 200s  - also found to have elevated FK level to 17, now improved on d/c   - Ursodiol 300 bid, atarax & benadryl for pruritis--unsure if completely biliary origin or derm  - ASA/Eliquis resumed without issues    [ ] Immunosuppression:   - FK 2/2, MMF 500BID,  Pred 15  - PPx: Mepron/ PPI/Oscal/Mag      [ ] Leukopenia  - Valcyte held  - CMV, BCx, & Parvovirus neg 3/4  - UCx growing 10K - 49K CFU Kleb/Aerococcus, patient asymptomatic (no treatment per ID)  - s/p Neupogen 300 (3/5 and 3/6), 480 on 3/7 with slow improvement of counts      [] DISPO  1. follow up in Transplant Clinic tomorrow Wednesday 3/12/25  2. Follow up with advanced GI in one week for potential ERCP as outpatient (if leukopenia continues to improve)  3. Will need Dermatology consult as outpatient for skin check/pruritis w/u.

## 2025-03-11 NOTE — DISCHARGE NOTE PROVIDER - NSDCCPCAREPLAN_GEN_ALL_CORE_FT
PRINCIPAL DISCHARGE DIAGNOSIS  Diagnosis: Transaminitis  Assessment and Plan of Treatment: You were readmitted to the hospital with elevation in your liver function tests. The elevation in these tests was found to have resolved with minimal intervention.   Please continue to take your immunosuppression regimen and medications as per your Transplant Team.   Please contact Transplant Clinician if you develop any of the following:   Call  the Translant team if  you experience any of the following:   [] Fever of 100.3F (38C) or above  [] Surgical incision is bleeding, red or warm to touch or there's discharge from the incision  [] Worsening abdominal pain, nausea or vomiting  [] Shortness of breath  [] Diffuculty with urinating such as burning, frequency or urgency, blood in urine         SECONDARY DISCHARGE DIAGNOSES  Diagnosis: Liver transplant status-Z94.4  Assessment and Plan of Treatment: Community Status: Active  -Activity: avoid strenouous activity/excercise and heavy lifting for the first 6-8 weeks after surgery. You should not lift anything over 10 lbs. You should not drive until cleared by your transplant team. Avoid straining. Use stool softners as needed. Stop stool softners if diarrhea develops.   -Diet: Follow FOOD SAFETY instruction provided to you in your patient education guide bookelet   -Women:  using adequate contraception is highly recommended. In [facility], women who receive a transplant should not become pregnant for at least 18 months after transplant.   -Skin care: Transplant medication can increase your risk for skin cancer. Avoid extended exposure to the sun and wear SPH 30 sunscreen or higher. Visit a dermologist at least once a year.   -You should have an annual eye exam.   -You should never smoke.   -You should avoid ALL types of alcohol  Immunosupression  - Transplant recipients are at risk for developing infection because immune system is lowered due to transplant medications.   - Avoid contact with sick people; practice good hand hygiene;   - Take medications as directed by your translant team. Never stop taking medications unless instructed by your transplant physician.  - Do NOT double up medication dose if you missed your dose; call the transplant office for instructions at [phone]  HTN  Be sure to follow a low salt diet, avoid caffeine, reduce alcohol intake.  If you have been prescribed antihypertensive medications to control your blood pressure, be sure to take them every day as prescribed and do not miss any doses, the medications do not work if they are not taken consistently. Follow up with your Primary Care Doctor and have your Blood Pressure checked regularly.   DM  If you have diabetes, you may need to monitor your blood sugar more frequently after transplant. Uncongrolled blood sugar levels can lead to poor wound healing and other complications. Follow a low carb and low sugar diet. Continue to take all anti-diabetic

## 2025-03-11 NOTE — PROVIDER CONTACT NOTE (CRITICAL VALUE NOTIFICATION) - ASSESSMENT
Vitals as per flowsheet.
vitals as per flowsheet.
Patient is asymptomatic.
Patient is asymptomatic.
pt A&Ox4
A&O x4. /65 HR 76 temp 98F. No complaints of discomfort or itchiness

## 2025-03-12 ENCOUNTER — APPOINTMENT (OUTPATIENT)
Dept: TRANSPLANT | Facility: CLINIC | Age: 65
End: 2025-03-12

## 2025-03-12 ENCOUNTER — LABORATORY RESULT (OUTPATIENT)
Age: 65
End: 2025-03-12

## 2025-03-12 VITALS
TEMPERATURE: 97.2 F | SYSTOLIC BLOOD PRESSURE: 154 MMHG | HEIGHT: 68 IN | WEIGHT: 150 LBS | BODY MASS INDEX: 22.73 KG/M2 | OXYGEN SATURATION: 99 % | DIASTOLIC BLOOD PRESSURE: 84 MMHG | HEART RATE: 80 BPM | RESPIRATION RATE: 16 BRPM

## 2025-03-12 DIAGNOSIS — Z79.60 LONG TERM (CURRENT) USE OF UNSPECIFIED IMMUNOMODULATORS AND IMMUNOSUPPRESSANTS: ICD-10-CM

## 2025-03-12 PROCEDURE — 99214 OFFICE O/P EST MOD 30 MIN: CPT | Mod: 24

## 2025-03-13 ENCOUNTER — NON-APPOINTMENT (OUTPATIENT)
Age: 65
End: 2025-03-13

## 2025-03-13 LAB
ALP BONE SERPL-MCNC: 30 % — SIGNIFICANT CHANGE UP (ref 14–68)
ALP FLD-CCNC: 246 IU/L — HIGH (ref 44–121)
ALP INTEST CFR SERPL: 7 % — SIGNIFICANT CHANGE UP (ref 0–18)
ALP LIVER SERPL-CCNC: 63 % — SIGNIFICANT CHANGE UP (ref 18–85)

## 2025-03-14 ENCOUNTER — NON-APPOINTMENT (OUTPATIENT)
Age: 65
End: 2025-03-14

## 2025-03-14 DIAGNOSIS — K83.1 OBSTRUCTION OF BILE DUCT: ICD-10-CM

## 2025-03-14 LAB
ALBUMIN SERPL ELPH-MCNC: 4.3 G/DL
ALP BLD-CCNC: 319 U/L
ALT SERPL-CCNC: 114 U/L
ANION GAP SERPL CALC-SCNC: 11 MMOL/L
AST SERPL-CCNC: 72 U/L
BASOPHILS # BLD AUTO: 0.04 K/UL
BASOPHILS NFR BLD AUTO: 0.9 %
BILE AC FLD-MCNC: 58.6 UMOL/L — HIGH (ref 0–10)
BILIRUB SERPL-MCNC: 1.1 MG/DL
BUN SERPL-MCNC: 26 MG/DL
CALCIUM SERPL-MCNC: 9.6 MG/DL
CHLORIDE SERPL-SCNC: 104 MMOL/L
CMV DNA SPEC QL NAA+PROBE: NOT DETECTED IU/ML
CMVPCR LOG: NOT DETECTED LOG10IU/ML
CO2 SERPL-SCNC: 24 MMOL/L
CREAT SERPL-MCNC: 0.91 MG/DL
EGFRCR SERPLBLD CKD-EPI 2021: 71 ML/MIN/1.73M2
EOSINOPHIL # BLD AUTO: 0.08 K/UL
EOSINOPHIL NFR BLD AUTO: 1.8 %
GGT SERPL-CCNC: 1083 U/L
GLUCOSE SERPL-MCNC: 132 MG/DL
HCT VFR BLD CALC: 38.1 %
HGB BLD-MCNC: 11.6 G/DL
LYMPHOCYTES # BLD AUTO: 0.5 K/UL
LYMPHOCYTES NFR BLD AUTO: 11.6 %
MAGNESIUM SERPL-MCNC: 1.9 MG/DL
MAN DIFF?: NORMAL
MCHC RBC-ENTMCNC: 30.4 G/DL
MCHC RBC-ENTMCNC: 30.5 PG
MCV RBC AUTO: 100.3 FL
MONOCYTES # BLD AUTO: 0.5 K/UL
MONOCYTES NFR BLD AUTO: 11.6 %
NEUTROPHILS # BLD AUTO: 2.62 K/UL
NEUTROPHILS NFR BLD AUTO: 55.3 %
PHOSPHATE SERPL-MCNC: 3.8 MG/DL
PLATELET # BLD AUTO: 139 K/UL
POTASSIUM SERPL-SCNC: 4.3 MMOL/L
PROT SERPL-MCNC: 6.6 G/DL
RBC # BLD: 3.8 M/UL
RBC # FLD: 16.5 %
SODIUM SERPL-SCNC: 139 MMOL/L
TACROLIMUS SERPL-MCNC: 3 NG/ML
WBC # FLD AUTO: 4.33 K/UL

## 2025-03-15 ENCOUNTER — LABORATORY RESULT (OUTPATIENT)
Age: 65
End: 2025-03-15

## 2025-03-18 ENCOUNTER — OUTPATIENT (OUTPATIENT)
Dept: OUTPATIENT SERVICES | Facility: HOSPITAL | Age: 65
LOS: 1 days | End: 2025-03-18
Payer: COMMERCIAL

## 2025-03-18 ENCOUNTER — LABORATORY RESULT (OUTPATIENT)
Age: 65
End: 2025-03-18

## 2025-03-18 VITALS
WEIGHT: 154.98 LBS | HEIGHT: 69 IN | TEMPERATURE: 98 F | SYSTOLIC BLOOD PRESSURE: 148 MMHG | OXYGEN SATURATION: 97 % | DIASTOLIC BLOOD PRESSURE: 90 MMHG

## 2025-03-18 DIAGNOSIS — Z98.890 OTHER SPECIFIED POSTPROCEDURAL STATES: Chronic | ICD-10-CM

## 2025-03-18 DIAGNOSIS — K83.1 OBSTRUCTION OF BILE DUCT: ICD-10-CM

## 2025-03-18 DIAGNOSIS — Z01.818 ENCOUNTER FOR OTHER PREPROCEDURAL EXAMINATION: ICD-10-CM

## 2025-03-18 DIAGNOSIS — Z94.4 LIVER TRANSPLANT STATUS: ICD-10-CM

## 2025-03-18 DIAGNOSIS — K70.10 ALCOHOLIC HEPATITIS WITHOUT ASCITES: ICD-10-CM

## 2025-03-18 DIAGNOSIS — Z94.4 LIVER TRANSPLANT STATUS: Chronic | ICD-10-CM

## 2025-03-18 DIAGNOSIS — Z87.39 PERSONAL HISTORY OF OTHER DISEASES OF THE MUSCULOSKELETAL SYSTEM AND CONNECTIVE TISSUE: Chronic | ICD-10-CM

## 2025-03-18 LAB
ALBUMIN SERPL ELPH-MCNC: 3.9 G/DL
ALP BLD-CCNC: 367 U/L
ALT SERPL-CCNC: 214 U/L
ANION GAP SERPL CALC-SCNC: 11 MMOL/L
AST SERPL-CCNC: 97 U/L
BASOPHILS # BLD AUTO: 0 K/UL
BASOPHILS NFR BLD AUTO: 0 %
BILIRUB SERPL-MCNC: 1.8 MG/DL
BUN SERPL-MCNC: 19 MG/DL
CALCIUM SERPL-MCNC: 9.2 MG/DL
CHLORIDE SERPL-SCNC: 110 MMOL/L
CMV DNA SPEC QL NAA+PROBE: NOT DETECTED IU/ML
CMVPCR LOG: NOT DETECTED LOG10IU/ML
CO2 SERPL-SCNC: 20 MMOL/L
CREAT SERPL-MCNC: 0.96 MG/DL
EGFRCR SERPLBLD CKD-EPI 2021: 67 ML/MIN/1.73M2
EOSINOPHIL # BLD AUTO: 0.04 K/UL
EOSINOPHIL NFR BLD AUTO: 0.9 %
GGT SERPL-CCNC: 1439 U/L
GLUCOSE SERPL-MCNC: 137 MG/DL
HCT VFR BLD CALC: 33.5 %
HGB BLD-MCNC: 10.3 G/DL
LYMPHOCYTES # BLD AUTO: 0.42 K/UL
LYMPHOCYTES NFR BLD AUTO: 8.8 %
MAGNESIUM SERPL-MCNC: 1.9 MG/DL
MAN DIFF?: NORMAL
MCHC RBC-ENTMCNC: 30.3 PG
MCHC RBC-ENTMCNC: 30.7 G/DL
MCV RBC AUTO: 98.5 FL
MONOCYTES # BLD AUTO: 0.3 K/UL
MONOCYTES NFR BLD AUTO: 6.2 %
NEUTROPHILS # BLD AUTO: 3.62 K/UL
NEUTROPHILS NFR BLD AUTO: 75.2 %
PHOSPHATE SERPL-MCNC: 3.1 MG/DL
PLATELET # BLD AUTO: 124 K/UL
POTASSIUM SERPL-SCNC: 5.1 MMOL/L
PROT SERPL-MCNC: 5.9 G/DL
RBC # BLD: 3.4 M/UL
RBC # FLD: 16 %
SODIUM SERPL-SCNC: 140 MMOL/L
TACROLIMUS SERPL-MCNC: 4.4 NG/ML
WBC # FLD AUTO: 4.76 K/UL

## 2025-03-18 PROCEDURE — G0463: CPT

## 2025-03-18 NOTE — H&P PST ADULT - PROBLEM SELECTOR PLAN 1
ERCP  recent labs w/ Transplant team  Preprocedure instructions discussed  patient has instructions to Hold Eliquis x 3 days, last dose was 3/17/2025   continue aspirin ERCP  recent labs 3/18 w/ Transplant team  Preprocedure instructions discussed  patient has instructions to Hold Eliquis x 3 days, last dose was 3/17/2025   continue aspirin  last transplant tram note on file

## 2025-03-18 NOTE — H&P PST ADULT - HISTORY OF PRESENT ILLNESS
63 yo F PMHx  ETOH cirrhosis S/p OLT 1/4/2025 with end-end single arterial reconstruction (Donor Right, Segment IV and Left Hepatic Arteries arising separately from celiac trunk, Redo hepatic artery anastomosis) on ASA/ Eliquis, HTN, L breast stage 1 Invasive lobular carcinoma (HR+ HER2 neg) s/p lumpectomy c/w PSH (on 2020. Off Letrazole now), and R cheek BCC s/p Mohs (2021) Umbilical Hernia Repair 9/2024. Post op course s/p OLT Pt was treated for Donor with staph epi bacteremia (blood culture x 1), with Cefazolin (end date 1/8). After discharge she had elevated LFTs and was started on po pred taper (2/7) for treatment of presumed rejection Pred 80mg -->15mg. US doppler (2/7/25)  Patent transplant hepatic vasculature. Patient presents as direct admit for elevated LFTs, reports c/o pruritis of face/neck and bilateral arms redness that began last night.      ***labs done @ Transplant center today 3/18    63 yo F PMHx  ETOH cirrhosis S/p OLT 1/4/2025 with end-end single arterial reconstruction (Donor Right, Segment IV and Left Hepatic Arteries arising separately from celiac trunk, Redo hepatic artery anastomosis) on ASA/ Eliquis ( Post op  complicated by Donor with staph epi bacteremia (blood culture x 1) treated with Cefazolin (end date 1/8), also constipation and ileus -- resolved, received a total of 3uPRBCs for decreased H/H), Left breast stage 1 Invasive lobular carcinoma s/p lumpectomy ( had chemo and radiation), and R cheek BCC s/p Mohs (2021) Umbilical Hernia Repair 9/2024, recent hospitalization 3/4-3/11/2025 with  elevated LFTs, pruritis of face/neck and bilateral arms / Leukopenia s/p Neupogen found to have CBD now planned for ERCP anesthesia on 3/21/2025 w/ Dr. Marlow.     ***labs done @ Transplant center today 3/18 ( results pending on HIE   ***On Eliquis and asa for hepatic prophylaxis    65 yo F PMHx  ETOH cirrhosis S/p OLT 1/4/2025 with end-end single arterial reconstruction (Donor Right, Segment IV and Left Hepatic Arteries arising separately from celiac trunk, Redo hepatic artery anastomosis) on ASA/ Eliquis ( Post op  complicated by Donor with staph epi bacteremia (blood culture x 1) treated with Cefazolin (end date 1/8), also constipation and ileus -- resolved, received a total of 3uPRBCs for decreased H/H), Left breast stage 1 Invasive lobular carcinoma s/p lumpectomy ( had chemo and radiation), and R cheek BCC s/p Mohs (2021) Umbilical Hernia Repair 9/2024, recent hospitalization 3/4-3/11/2025 with  elevated LFTs, pruritis of face/neck and bilateral arms / Leukopenia s/p Neupogen found to have CBD now planned for ERCP anesthesia on 3/21/2025 w/ Dr. Marlow.     ***labs done @ Transplant center today 3/18 ( results pending on HIE   ***On Eliquis and asa for hepatic prophylaxis   ***Last name on Insurance card " Grandinetti" and last name on Drivers licence " Frucci"

## 2025-03-18 NOTE — H&P PST ADULT - ASSESSMENT
DASI score: 5.7   DASIactivity: short distance walking/ stairs without cp/SOB  loose teeth or dentures: denies  airway mp2    DASI score: 6.8   DASIactivity: short distance walking/ stairs/ house chores without cp/SOB  loose teeth or dentures: denies  airway mp2

## 2025-03-18 NOTE — H&P PST ADULT - NSICDXPASTMEDICALHX_GEN_ALL_CORE_FT
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

## 2025-03-18 NOTE — H&P PST ADULT - NSICDXPASTSURGICALHX_GEN_ALL_CORE_FT
PAST SURGICAL HISTORY:  H/O ganglion cyst     H/O lumpectomy     H/O umbilical hernia repair     History of removal of skin mole     Status post liver transplant     Status post Mohs surgery

## 2025-03-19 PROBLEM — F10.10 ALCOHOL ABUSE, UNCOMPLICATED: Chronic | Status: INACTIVE | Noted: 2023-02-17 | Resolved: 2025-03-18

## 2025-03-20 LAB
ALBUMIN SERPL ELPH-MCNC: 4.2 G/DL
ALP BLD-CCNC: 386 U/L
ALT SERPL-CCNC: 192 U/L
ANION GAP SERPL CALC-SCNC: 11 MMOL/L
AST SERPL-CCNC: 84 U/L
BASOPHILS # BLD AUTO: 0 K/UL
BASOPHILS NFR BLD AUTO: 0 %
BILIRUB SERPL-MCNC: 1 MG/DL
BUN SERPL-MCNC: 20 MG/DL
CALCIUM SERPL-MCNC: 9.3 MG/DL
CHLORIDE SERPL-SCNC: 107 MMOL/L
CO2 SERPL-SCNC: 21 MMOL/L
CREAT SERPL-MCNC: 0.93 MG/DL
EGFRCR SERPLBLD CKD-EPI 2021: 69 ML/MIN/1.73M2
EOSINOPHIL # BLD AUTO: 0 K/UL
EOSINOPHIL NFR BLD AUTO: 0 %
GGT SERPL-CCNC: 1615 U/L
GLUCOSE SERPL-MCNC: 140 MG/DL
HCT VFR BLD CALC: 36.9 %
HGB BLD-MCNC: 11.4 G/DL
INR PPP: 1.01 RATIO
LYMPHOCYTES # BLD AUTO: 0.51 K/UL
LYMPHOCYTES NFR BLD AUTO: 8 %
MAGNESIUM SERPL-MCNC: 1.9 MG/DL
MAN DIFF?: NORMAL
MCHC RBC-ENTMCNC: 30.9 G/DL
MCHC RBC-ENTMCNC: 31 PG
MCV RBC AUTO: 100.3 FL
MONOCYTES # BLD AUTO: 0.22 K/UL
MONOCYTES NFR BLD AUTO: 3.5 %
NEUTROPHILS # BLD AUTO: 5.68 K/UL
NEUTROPHILS NFR BLD AUTO: 88.5 %
PETH 16:0/18:1: NEGATIVE NG/ML
PETH 16:0/18:2: NEGATIVE NG/ML
PETH COMMENTS: NORMAL
PHOSPHATE SERPL-MCNC: 3.7 MG/DL
PLATELET # BLD AUTO: 145 K/UL
POTASSIUM SERPL-SCNC: 5.3 MMOL/L
PROT SERPL-MCNC: 6.4 G/DL
PT BLD: 11.9 SEC
RBC # BLD: 3.68 M/UL
RBC # FLD: 15.8 %
SODIUM SERPL-SCNC: 139 MMOL/L
TACROLIMUS SERPL-MCNC: 3.7 NG/ML
WBC # FLD AUTO: 6.42 K/UL

## 2025-03-21 ENCOUNTER — TRANSCRIPTION ENCOUNTER (OUTPATIENT)
Age: 65
End: 2025-03-21

## 2025-03-21 ENCOUNTER — NON-APPOINTMENT (OUTPATIENT)
Age: 65
End: 2025-03-21

## 2025-03-21 ENCOUNTER — OUTPATIENT (OUTPATIENT)
Dept: OUTPATIENT SERVICES | Facility: HOSPITAL | Age: 65
LOS: 1 days | End: 2025-03-21
Payer: COMMERCIAL

## 2025-03-21 ENCOUNTER — RESULT REVIEW (OUTPATIENT)
Age: 65
End: 2025-03-21

## 2025-03-21 ENCOUNTER — APPOINTMENT (OUTPATIENT)
Dept: GASTROENTEROLOGY | Facility: HOSPITAL | Age: 65
End: 2025-03-21

## 2025-03-21 VITALS
OXYGEN SATURATION: 95 % | SYSTOLIC BLOOD PRESSURE: 138 MMHG | TEMPERATURE: 97 F | DIASTOLIC BLOOD PRESSURE: 69 MMHG | WEIGHT: 154.1 LBS | HEART RATE: 56 BPM | RESPIRATION RATE: 15 BRPM | HEIGHT: 69 IN

## 2025-03-21 VITALS
OXYGEN SATURATION: 98 % | DIASTOLIC BLOOD PRESSURE: 86 MMHG | HEART RATE: 54 BPM | RESPIRATION RATE: 18 BRPM | SYSTOLIC BLOOD PRESSURE: 188 MMHG

## 2025-03-21 DIAGNOSIS — Z94.4 LIVER TRANSPLANT STATUS: ICD-10-CM

## 2025-03-21 DIAGNOSIS — Z98.890 OTHER SPECIFIED POSTPROCEDURAL STATES: Chronic | ICD-10-CM

## 2025-03-21 DIAGNOSIS — K70.10 ALCOHOLIC HEPATITIS WITHOUT ASCITES: ICD-10-CM

## 2025-03-21 DIAGNOSIS — Z01.818 ENCOUNTER FOR OTHER PREPROCEDURAL EXAMINATION: ICD-10-CM

## 2025-03-21 DIAGNOSIS — Z87.39 PERSONAL HISTORY OF OTHER DISEASES OF THE MUSCULOSKELETAL SYSTEM AND CONNECTIVE TISSUE: Chronic | ICD-10-CM

## 2025-03-21 DIAGNOSIS — Z94.4 LIVER TRANSPLANT STATUS: Chronic | ICD-10-CM

## 2025-03-21 DIAGNOSIS — K83.1 OBSTRUCTION OF BILE DUCT: ICD-10-CM

## 2025-03-21 LAB
BILE AC SERPL-SCNC: 88 UMOL/L — HIGH
CDCAE SER-MCNC: 36 UMOL/L — HIGH
CHOLATE SER-MCNC: 10 UMOL/L — HIGH
DO-CHOLATE SER-MCNC: <0.1 UMOL/L — SIGNIFICANT CHANGE UP
PETH 16:0/18:1: NEGATIVE NG/ML
PETH 16:0/18:2: NEGATIVE NG/ML
PETH COMMENTS: NORMAL
URSODEOXYCHOLATE SERPL-SCNC: 42 UMOL/L — HIGH

## 2025-03-21 PROCEDURE — 43264 ERCP REMOVE DUCT CALCULI: CPT | Mod: 59

## 2025-03-21 PROCEDURE — 88305 TISSUE EXAM BY PATHOLOGIST: CPT

## 2025-03-21 PROCEDURE — 43274 ERCP DUCT STENT PLACEMENT: CPT

## 2025-03-21 PROCEDURE — 74328 X-RAY BILE DUCT ENDOSCOPY: CPT | Mod: 26

## 2025-03-21 PROCEDURE — C2625: CPT

## 2025-03-21 PROCEDURE — 74330 X-RAY BILE/PANC ENDOSCOPY: CPT

## 2025-03-21 PROCEDURE — 43264 ERCP REMOVE DUCT CALCULI: CPT

## 2025-03-21 PROCEDURE — 43239 EGD BIOPSY SINGLE/MULTIPLE: CPT

## 2025-03-21 PROCEDURE — 43239 EGD BIOPSY SINGLE/MULTIPLE: CPT | Mod: 59

## 2025-03-21 PROCEDURE — C1769: CPT

## 2025-03-21 PROCEDURE — 88305 TISSUE EXAM BY PATHOLOGIST: CPT | Mod: 26

## 2025-03-21 DEVICE — BLLN EXTRACT FUSION QUATRO 8.5 10 12 15MM: Type: IMPLANTABLE DEVICE | Status: FUNCTIONAL

## 2025-03-21 DEVICE — STENT ADVANIX BILIARY CTR BEND 10FRX9CM: Type: IMPLANTABLE DEVICE | Status: FUNCTIONAL

## 2025-03-21 DEVICE — HYDRATOME 44: Type: IMPLANTABLE DEVICE | Status: FUNCTIONAL

## 2025-03-21 DEVICE — CATH BLLN EXTRACT DIST GUIDE WIRE 15MM 3LUM: Type: IMPLANTABLE DEVICE | Status: FUNCTIONAL

## 2025-03-21 DEVICE — AUTOTOME CANNULATING SPHINCTEROTOME RX 44 20MM: Type: IMPLANTABLE DEVICE | Status: FUNCTIONAL

## 2025-03-21 DEVICE — GWIRE VISIGLIDE STRT TIP 270CM .035: Type: IMPLANTABLE DEVICE | Status: FUNCTIONAL

## 2025-03-21 RX ORDER — ESOMEPRAZOLE MAGNESIUM 40 MG/1
1 CAPSULE, DELAYED RELEASE ORAL
Refills: 0 | DISCHARGE

## 2025-03-21 RX ORDER — SODIUM CHLORIDE 9 G/1000ML
500 INJECTION, SOLUTION INTRAVENOUS
Refills: 0 | Status: COMPLETED | OUTPATIENT
Start: 2025-03-21 | End: 2025-03-21

## 2025-03-21 RX ORDER — PREDNISONE 20 MG/1
4 TABLET ORAL
Refills: 0 | DISCHARGE

## 2025-03-21 RX ADMIN — SODIUM CHLORIDE 30 MILLILITER(S): 9 INJECTION, SOLUTION INTRAVENOUS at 15:01

## 2025-03-21 NOTE — ASU DISCHARGE PLAN (ADULT/PEDIATRIC) - FINANCIAL ASSISTANCE
[facility] provides services at a reduced cost to those who are determined to be eligible through [facility]’s financial assistance program. Information regarding [facility]’s financial assistance program can be found by going to [email or web address] or by calling [phone].

## 2025-03-21 NOTE — ASU DISCHARGE PLAN (ADULT/PEDIATRIC) - ASU DC SPECIAL INSTRUCTIONSFT
Restart blood thinner (anticoagulant) on 3/22/25 if no bleeding.  Repeat ERCP in 2 months for further treatment of biliary stricture.

## 2025-03-21 NOTE — ASU PATIENT PROFILE, ADULT - FALL HARM RISK - UNIVERSAL INTERVENTIONS
Bed in lowest position, wheels locked, appropriate side rails in place/Call bell, personal items and telephone in reach/Instruct patient to call for assistance before getting out of bed or chair/Non-slip footwear when patient is out of bed/[place] to call system/Physically safe environment - no spills, clutter or unnecessary equipment/Purposeful Proactive Rounding/Room/bathroom lighting operational, light cord in reach

## 2025-03-21 NOTE — ASU DISCHARGE PLAN (ADULT/PEDIATRIC) - PROCEDURE
Upper endoscopy / ERCP / sphincterotomy / removal of biliary sludge / stent placement (10 Fr x 9 cm plastic stent)

## 2025-03-21 NOTE — PRE PROCEDURE NOTE - PRE PROCEDURE EVALUATION
Attending Physician:   [place]                         Procedure:  ERCP    Indication for Procedure:  post liver transplant anastomotic stricture, pruritis, abnormal LFTs  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      GERD (gastroesophageal reflux disease)      Skin cancer, basal cell      Liver transplant recipient      Obstruction of bile duct      Elevated LFTs      History of cirrhosis of liver      History of alcohol use      2019 novel coronavirus disease (COVID-19)      H/O lumpectomy      History of removal of skin mole      H/O ganglion cyst      Status post liver transplant      Status post Mohs surgery      H/O umbilical hernia repair        ALLERGIES:  No Known Allergies    HOME MEDICATIONS:  apixaban 2.5 mg oral tablet: 1 tab(s) orally 2 times a day  aspirin 81 mg oral tablet, chewable: 1 tab(s) orally once a day  atovaquone 750 mg/5 mL oral suspension: 10 milliliter(s) orally once a day  magnesium oxide 400 mg oral tablet: 1 tab(s) orally 2 times a day (with meals)  mycophenolate mofetil 250 mg oral capsule: 500 milligram(s) orally 2 times a day  NexIUM 40 mg oral delayed release capsule: 1 cap(s) orally once a day  predniSONE 10 mg oral tablet: 4 tab(s) orally once a day  tacrolimus 1 mg oral capsule: 3 cap(s) orally 2 times a day  ursodiol 300 mg oral capsule: 1 cap(s) orally 2 times a day  vitamin D-calcium (as carbonate) 5 mcg-500 mg oral tablet: 1 tab(s) orally 2 times a day    AICD/PPM: [ ] yes   [x ] no    PERTINENT LAB DATA:                      PHYSICAL EXAMINATION:    Height (cm): 175.3  Weight (kg): 69.9  BMI (kg/m2): 22.7  BSA (m2): 1.85T(C): 36.1  HR: 56  BP: 138/69  RR: 15  SpO2: 94%    Constitutional: NAD  HEENT: icteric  Neck:  No JVD  Respiratory: CTAB/L  Cardiovascular: S1 and S2  Gastrointestinal: BS+, soft, NT/ND  Extremities: No peripheral edema  Neurological: No confusion  Psychiatric: Normal mood, normal affect  Skin: Mild jaundice    ASA Class: I [ ]  II [ ]  III [x ]  IV [ ]    COMMENTS:    The patient is a suitable candidate for the planned procedure unless box checked [ ]  No, explain:     Attending Physician:   [place]                         Procedure:  ERCP    Indication for Procedure:  post liver transplant anastomotic stricture, pruritis, abnormal LFTs  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      GERD (gastroesophageal reflux disease)      Skin cancer, basal cell      Liver transplant recipient      Obstruction of bile duct      Elevated LFTs      History of cirrhosis of liver      History of alcohol use      2019 novel coronavirus disease (COVID-19)      H/O lumpectomy      History of removal of skin mole      H/O ganglion cyst      Status post liver transplant      Status post Mohs surgery      H/O umbilical hernia repair        ALLERGIES:  No Known Allergies    HOME MEDICATIONS:  apixaban 2.5 mg oral tablet: 1 tab(s) orally 2 times a day  aspirin 81 mg oral tablet, chewable: 1 tab(s) orally once a day  atovaquone 750 mg/5 mL oral suspension: 10 milliliter(s) orally once a day  magnesium oxide 400 mg oral tablet: 1 tab(s) orally 2 times a day (with meals)  mycophenolate mofetil 250 mg oral capsule: 500 milligram(s) orally 2 times a day  NexIUM 40 mg oral delayed release capsule: 1 cap(s) orally once a day  predniSONE 10 mg oral tablet: 4 tab(s) orally once a day  tacrolimus 1 mg oral capsule: 3 cap(s) orally 2 times a day  ursodiol 300 mg oral capsule: 1 cap(s) orally 2 times a day  vitamin D-calcium (as carbonate) 5 mcg-500 mg oral tablet: 1 tab(s) orally 2 times a day    AICD/PPM: [ ] yes   [x ] no    PERTINENT LAB DATA:                      PHYSICAL EXAMINATION:    Height (cm): 175.3  Weight (kg): 69.9  BMI (kg/m2): 22.7  BSA (m2): 1.85T(C): 36.1  HR: 56  BP: 138/69  RR: 15  SpO2: 94%    Constitutional: NAD  HEENT: anicteric  Neck:  No JVD  Respiratory: CTAB/L  Cardiovascular: S1 and S2  Gastrointestinal: BS+, soft, NT/ND  Extremities: No peripheral edema  Neurological: No confusion  Psychiatric: Normal mood, normal affect  Skin: No jaundice    ASA Class: I [ ]  II [ ]  III [x ]  IV [ ]    COMMENTS:    The patient is a suitable candidate for the planned procedure unless box checked [ ]  No, explain:

## 2025-03-24 ENCOUNTER — APPOINTMENT (OUTPATIENT)
Dept: TRANSPLANT | Facility: CLINIC | Age: 65
End: 2025-03-24
Payer: COMMERCIAL

## 2025-03-24 ENCOUNTER — APPOINTMENT (OUTPATIENT)
Dept: TRANSPLANT | Facility: CLINIC | Age: 65
End: 2025-03-24

## 2025-03-24 VITALS
BODY MASS INDEX: 23.95 KG/M2 | DIASTOLIC BLOOD PRESSURE: 88 MMHG | HEIGHT: 68 IN | OXYGEN SATURATION: 98 % | RESPIRATION RATE: 16 BRPM | TEMPERATURE: 97.6 F | WEIGHT: 158 LBS | HEART RATE: 72 BPM | SYSTOLIC BLOOD PRESSURE: 154 MMHG

## 2025-03-24 PROCEDURE — 99215 OFFICE O/P EST HI 40 MIN: CPT | Mod: 24

## 2025-03-25 LAB — SURGICAL PATHOLOGY STUDY: SIGNIFICANT CHANGE UP

## 2025-03-26 ENCOUNTER — NON-APPOINTMENT (OUTPATIENT)
Age: 65
End: 2025-03-26

## 2025-03-26 LAB
ALBUMIN SERPL ELPH-MCNC: 3.3 G/DL
ALBUMIN SERPL ELPH-MCNC: 3.7 G/DL
ALP BLD-CCNC: 255 U/L
ALP BLD-CCNC: 286 U/L
ALT SERPL-CCNC: 125 U/L
ALT SERPL-CCNC: 75 U/L
ANION GAP SERPL CALC-SCNC: 11 MMOL/L
ANION GAP SERPL CALC-SCNC: 13 MMOL/L
AST SERPL-CCNC: 15 U/L
AST SERPL-CCNC: 24 U/L
BASOPHILS # BLD AUTO: 0.02 K/UL
BASOPHILS # BLD AUTO: 0.04 K/UL
BASOPHILS NFR BLD AUTO: 0.4 %
BASOPHILS NFR BLD AUTO: 0.6 %
BILIRUB SERPL-MCNC: 0.8 MG/DL
BILIRUB SERPL-MCNC: 1 MG/DL
BUN SERPL-MCNC: 19 MG/DL
BUN SERPL-MCNC: 34 MG/DL
CALCIUM SERPL-MCNC: 8.9 MG/DL
CALCIUM SERPL-MCNC: 9.3 MG/DL
CHLORIDE SERPL-SCNC: 106 MMOL/L
CHLORIDE SERPL-SCNC: 107 MMOL/L
CMV DNA SPEC QL NAA+PROBE: NOT DETECTED IU/ML
CMV DNA SPEC QL NAA+PROBE: NOT DETECTED IU/ML
CMVPCR LOG: NOT DETECTED LOG10IU/ML
CMVPCR LOG: NOT DETECTED LOG10IU/ML
CO2 SERPL-SCNC: 20 MMOL/L
CO2 SERPL-SCNC: 25 MMOL/L
CREAT SERPL-MCNC: 0.91 MG/DL
CREAT SERPL-MCNC: 0.97 MG/DL
EGFRCR SERPLBLD CKD-EPI 2021: 66 ML/MIN/1.73M2
EGFRCR SERPLBLD CKD-EPI 2021: 71 ML/MIN/1.73M2
EOSINOPHIL # BLD AUTO: 0.05 K/UL
EOSINOPHIL # BLD AUTO: 0.09 K/UL
EOSINOPHIL NFR BLD AUTO: 0.7 %
EOSINOPHIL NFR BLD AUTO: 1.6 %
GGT SERPL-CCNC: 1137 U/L
GGT SERPL-CCNC: 1389 U/L
GLUCOSE SERPL-MCNC: 131 MG/DL
GLUCOSE SERPL-MCNC: 141 MG/DL
HCT VFR BLD CALC: 34.3 %
HCT VFR BLD CALC: 36.7 %
HGB BLD-MCNC: 11 G/DL
HGB BLD-MCNC: 11 G/DL
IMM GRANULOCYTES NFR BLD AUTO: 1.5 %
IMM GRANULOCYTES NFR BLD AUTO: 2.3 %
LYMPHOCYTES # BLD AUTO: 0.53 K/UL
LYMPHOCYTES # BLD AUTO: 0.68 K/UL
LYMPHOCYTES NFR BLD AUTO: 12 %
LYMPHOCYTES NFR BLD AUTO: 7.8 %
MAGNESIUM SERPL-MCNC: 1.8 MG/DL
MAGNESIUM SERPL-MCNC: 1.9 MG/DL
MAN DIFF?: NORMAL
MAN DIFF?: NORMAL
MCHC RBC-ENTMCNC: 30 G/DL
MCHC RBC-ENTMCNC: 30.6 PG
MCHC RBC-ENTMCNC: 30.9 PG
MCHC RBC-ENTMCNC: 32.1 G/DL
MCV RBC AUTO: 101.9 FL
MCV RBC AUTO: 96.3 FL
MONOCYTES # BLD AUTO: 0.4 K/UL
MONOCYTES # BLD AUTO: 0.48 K/UL
MONOCYTES NFR BLD AUTO: 7.1 %
MONOCYTES NFR BLD AUTO: 7.1 %
NEUTROPHILS # BLD AUTO: 4.35 K/UL
NEUTROPHILS # BLD AUTO: 5.57 K/UL
NEUTROPHILS NFR BLD AUTO: 76.6 %
NEUTROPHILS NFR BLD AUTO: 82.3 %
PHOSPHATE SERPL-MCNC: 3.7 MG/DL
PHOSPHATE SERPL-MCNC: 3.7 MG/DL
PLATELET # BLD AUTO: 151 K/UL
PLATELET # BLD AUTO: 166 K/UL
POTASSIUM SERPL-SCNC: 4.3 MMOL/L
POTASSIUM SERPL-SCNC: 5.1 MMOL/L
PROT SERPL-MCNC: 5.7 G/DL
PROT SERPL-MCNC: 6 G/DL
RBC # BLD: 3.56 M/UL
RBC # BLD: 3.6 M/UL
RBC # FLD: 15.1 %
RBC # FLD: 15.2 %
SODIUM SERPL-SCNC: 140 MMOL/L
SODIUM SERPL-SCNC: 142 MMOL/L
TACROLIMUS SERPL-MCNC: 3.6 NG/ML
TACROLIMUS SERPL-MCNC: 4.2 NG/ML
WBC # FLD AUTO: 5.67 K/UL
WBC # FLD AUTO: 6.77 K/UL

## 2025-03-26 RX ORDER — SULFAMETHOXAZOLE AND TRIMETHOPRIM 400; 80 MG/1; MG/1
400-80 TABLET ORAL
Qty: 30 | Refills: 5 | Status: ACTIVE | COMMUNITY
Start: 2025-03-26 | End: 1900-01-01

## 2025-03-28 ENCOUNTER — NON-APPOINTMENT (OUTPATIENT)
Age: 65
End: 2025-03-28

## 2025-04-04 LAB
ALBUMIN SERPL ELPH-MCNC: 3.7 G/DL
ALBUMIN SERPL ELPH-MCNC: 3.8 G/DL
ALP BLD-CCNC: 162 U/L
ALP BLD-CCNC: 216 U/L
ALT SERPL-CCNC: 26 U/L
ALT SERPL-CCNC: 96 U/L
ANION GAP SERPL CALC-SCNC: 10 MMOL/L
ANION GAP SERPL CALC-SCNC: 9 MMOL/L
AST SERPL-CCNC: 14 U/L
AST SERPL-CCNC: 33 U/L
BASOPHILS # BLD AUTO: 0.01 K/UL
BASOPHILS # BLD AUTO: 0.04 K/UL
BASOPHILS NFR BLD AUTO: 0.2 %
BASOPHILS NFR BLD AUTO: 0.7 %
BILIRUB SERPL-MCNC: 0.7 MG/DL
BILIRUB SERPL-MCNC: 0.8 MG/DL
BUN SERPL-MCNC: 21 MG/DL
BUN SERPL-MCNC: 33 MG/DL
CALCIUM SERPL-MCNC: 8.8 MG/DL
CALCIUM SERPL-MCNC: 8.9 MG/DL
CHLORIDE SERPL-SCNC: 108 MMOL/L
CHLORIDE SERPL-SCNC: 109 MMOL/L
CMV DNA SPEC QL NAA+PROBE: NOT DETECTED IU/ML
CMV DNA SPEC QL NAA+PROBE: NOT DETECTED IU/ML
CMVPCR LOG: NOT DETECTED LOG10IU/ML
CMVPCR LOG: NOT DETECTED LOG10IU/ML
CO2 SERPL-SCNC: 22 MMOL/L
CO2 SERPL-SCNC: 23 MMOL/L
CREAT SERPL-MCNC: 0.98 MG/DL
CREAT SERPL-MCNC: 1 MG/DL
EGFRCR SERPLBLD CKD-EPI 2021: 63 ML/MIN/1.73M2
EGFRCR SERPLBLD CKD-EPI 2021: 65 ML/MIN/1.73M2
EOSINOPHIL # BLD AUTO: 0.16 K/UL
EOSINOPHIL # BLD AUTO: 0.17 K/UL
EOSINOPHIL NFR BLD AUTO: 2.7 %
EOSINOPHIL NFR BLD AUTO: 3.3 %
GGT SERPL-CCNC: 1035 U/L
GGT SERPL-CCNC: 646 U/L
GLUCOSE SERPL-MCNC: 110 MG/DL
GLUCOSE SERPL-MCNC: 131 MG/DL
HCT VFR BLD CALC: 24.1 %
HCT VFR BLD CALC: 29.3 %
HGB BLD-MCNC: 7.7 G/DL
HGB BLD-MCNC: 9.5 G/DL
IMM GRANULOCYTES NFR BLD AUTO: 1.7 %
IMM GRANULOCYTES NFR BLD AUTO: 4.9 %
LYMPHOCYTES # BLD AUTO: 0.8 K/UL
LYMPHOCYTES # BLD AUTO: 0.86 K/UL
LYMPHOCYTES NFR BLD AUTO: 13.6 %
LYMPHOCYTES NFR BLD AUTO: 16.7 %
MAGNESIUM SERPL-MCNC: 1.7 MG/DL
MAGNESIUM SERPL-MCNC: 1.9 MG/DL
MAN DIFF?: NORMAL
MAN DIFF?: NORMAL
MCHC RBC-ENTMCNC: 30.8 PG
MCHC RBC-ENTMCNC: 31.8 PG
MCHC RBC-ENTMCNC: 32 G/DL
MCHC RBC-ENTMCNC: 32.4 G/DL
MCV RBC AUTO: 96.4 FL
MCV RBC AUTO: 98 FL
MONOCYTES # BLD AUTO: 0.34 K/UL
MONOCYTES # BLD AUTO: 0.38 K/UL
MONOCYTES NFR BLD AUTO: 6.5 %
MONOCYTES NFR BLD AUTO: 6.6 %
NEUTROPHILS # BLD AUTO: 3.68 K/UL
NEUTROPHILS # BLD AUTO: 4.22 K/UL
NEUTROPHILS NFR BLD AUTO: 71.5 %
NEUTROPHILS NFR BLD AUTO: 71.6 %
PHOSPHATE SERPL-MCNC: 2.6 MG/DL
PHOSPHATE SERPL-MCNC: 4 MG/DL
PLATELET # BLD AUTO: 133 K/UL
PLATELET # BLD AUTO: 147 K/UL
POTASSIUM SERPL-SCNC: 4.6 MMOL/L
POTASSIUM SERPL-SCNC: 5 MMOL/L
PROT SERPL-MCNC: 5.5 G/DL
PROT SERPL-MCNC: 5.7 G/DL
RBC # BLD: 2.5 M/UL
RBC # BLD: 2.99 M/UL
RBC # FLD: 16 %
RBC # FLD: 16.6 %
SODIUM SERPL-SCNC: 141 MMOL/L
SODIUM SERPL-SCNC: 142 MMOL/L
TACROLIMUS SERPL-MCNC: 4.1 NG/ML
TACROLIMUS SERPL-MCNC: 4.3 NG/ML
WBC # FLD AUTO: 5.15 K/UL
WBC # FLD AUTO: 5.89 K/UL

## 2025-04-07 ENCOUNTER — OUTPATIENT (OUTPATIENT)
Dept: OUTPATIENT SERVICES | Facility: HOSPITAL | Age: 65
LOS: 1 days | End: 2025-04-07
Payer: COMMERCIAL

## 2025-04-07 DIAGNOSIS — Z98.890 OTHER SPECIFIED POSTPROCEDURAL STATES: Chronic | ICD-10-CM

## 2025-04-07 DIAGNOSIS — Z94.4 LIVER TRANSPLANT STATUS: Chronic | ICD-10-CM

## 2025-04-07 DIAGNOSIS — Z94.4 LIVER TRANSPLANT STATUS: ICD-10-CM

## 2025-04-07 DIAGNOSIS — Z87.39 PERSONAL HISTORY OF OTHER DISEASES OF THE MUSCULOSKELETAL SYSTEM AND CONNECTIVE TISSUE: Chronic | ICD-10-CM

## 2025-04-07 PROBLEM — Z87.19 PERSONAL HISTORY OF OTHER DISEASES OF THE DIGESTIVE SYSTEM: Chronic | Status: ACTIVE | Noted: 2025-03-18

## 2025-04-07 PROBLEM — K83.1 OBSTRUCTION OF BILE DUCT: Chronic | Status: ACTIVE | Noted: 2025-03-18

## 2025-04-07 PROBLEM — R79.89 OTHER SPECIFIED ABNORMAL FINDINGS OF BLOOD CHEMISTRY: Chronic | Status: ACTIVE | Noted: 2025-03-18

## 2025-04-07 PROBLEM — U07.1 COVID-19: Chronic | Status: ACTIVE | Noted: 2025-03-18

## 2025-04-07 PROBLEM — Z87.898 PERSONAL HISTORY OF OTHER SPECIFIED CONDITIONS: Chronic | Status: ACTIVE | Noted: 2025-03-18

## 2025-04-07 LAB
ALBUMIN SERPL ELPH-MCNC: 3.5 G/DL
ALP BLD-CCNC: 201 U/L
ALT SERPL-CCNC: 57 U/L
ANION GAP SERPL CALC-SCNC: 10 MMOL/L
AST SERPL-CCNC: 14 U/L
BILIRUB SERPL-MCNC: 0.5 MG/DL
BUN SERPL-MCNC: 18 MG/DL
CALCIUM SERPL-MCNC: 8.8 MG/DL
CHLORIDE SERPL-SCNC: 108 MMOL/L
CMV DNA SPEC QL NAA+PROBE: NOT DETECTED IU/ML
CMVPCR LOG: NOT DETECTED LOG10IU/ML
CO2 SERPL-SCNC: 24 MMOL/L
CREAT SERPL-MCNC: 0.9 MG/DL
EGFRCR SERPLBLD CKD-EPI 2021: 72 ML/MIN/1.73M2
GGT SERPL-CCNC: 863 U/L
GLUCOSE SERPL-MCNC: 108 MG/DL
HCT VFR BLD CALC: 25.8 %
HGB BLD-MCNC: 7.6 G/DL
MAGNESIUM SERPL-MCNC: 1.9 MG/DL
MCHC RBC-ENTMCNC: 29.5 G/DL
MCHC RBC-ENTMCNC: 30 PG
MCV RBC AUTO: 102 FL
PHOSPHATE SERPL-MCNC: 3.4 MG/DL
PLATELET # BLD AUTO: 149 K/UL
POTASSIUM SERPL-SCNC: 5.6 MMOL/L
PROT SERPL-MCNC: 5.7 G/DL
RBC # BLD: 2.53 M/UL
RBC # FLD: 16.6 %
SODIUM SERPL-SCNC: 142 MMOL/L
TACROLIMUS SERPL-MCNC: 4 NG/ML
WBC # FLD AUTO: 5.14 K/UL

## 2025-04-07 PROCEDURE — 76705 ECHO EXAM OF ABDOMEN: CPT | Mod: 26,59

## 2025-04-07 PROCEDURE — 93975 VASCULAR STUDY: CPT | Mod: 26

## 2025-04-07 PROCEDURE — 76705 ECHO EXAM OF ABDOMEN: CPT

## 2025-04-07 PROCEDURE — 93975 VASCULAR STUDY: CPT

## 2025-04-08 LAB
ALBUMIN SERPL ELPH-MCNC: 3.9 G/DL
ALP BLD-CCNC: 169 U/L
ALT SERPL-CCNC: 30 U/L
ANION GAP SERPL CALC-SCNC: 10 MMOL/L
AST SERPL-CCNC: 10 U/L
BASOPHILS # BLD AUTO: 0.01 K/UL
BASOPHILS NFR BLD AUTO: 0.2 %
BILIRUB SERPL-MCNC: 0.7 MG/DL
BUN SERPL-MCNC: 21 MG/DL
CALCIUM SERPL-MCNC: 9.3 MG/DL
CHLORIDE SERPL-SCNC: 108 MMOL/L
CO2 SERPL-SCNC: 23 MMOL/L
CREAT SERPL-MCNC: 1.12 MG/DL
EGFRCR SERPLBLD CKD-EPI 2021: 55 ML/MIN/1.73M2
EOSINOPHIL # BLD AUTO: 0.14 K/UL
EOSINOPHIL NFR BLD AUTO: 3.3 %
GGT SERPL-CCNC: 730 U/L
GLUCOSE SERPL-MCNC: 109 MG/DL
HCT VFR BLD CALC: 27.2 %
HGB BLD-MCNC: 8.2 G/DL
IMM GRANULOCYTES NFR BLD AUTO: 1 %
LYMPHOCYTES # BLD AUTO: 1.01 K/UL
LYMPHOCYTES NFR BLD AUTO: 24.1 %
MAGNESIUM SERPL-MCNC: 1.8 MG/DL
MAN DIFF?: NORMAL
MCHC RBC-ENTMCNC: 30 PG
MCHC RBC-ENTMCNC: 30.1 G/DL
MCV RBC AUTO: 99.6 FL
MONOCYTES # BLD AUTO: 0.28 K/UL
MONOCYTES NFR BLD AUTO: 6.7 %
NEUTROPHILS # BLD AUTO: 2.71 K/UL
NEUTROPHILS NFR BLD AUTO: 64.7 %
PETH 16:0/18:1: NEGATIVE NG/ML
PETH 16:0/18:2: NEGATIVE NG/ML
PETH COMMENTS: NORMAL
PHOSPHATE SERPL-MCNC: 3.8 MG/DL
PLATELET # BLD AUTO: 152 K/UL
POTASSIUM SERPL-SCNC: 5.2 MMOL/L
PROT SERPL-MCNC: 5.9 G/DL
RBC # BLD: 2.73 M/UL
RBC # FLD: 16.3 %
SODIUM SERPL-SCNC: 141 MMOL/L
TACROLIMUS SERPL-MCNC: 4.3 NG/ML
WBC # FLD AUTO: 4.19 K/UL

## 2025-04-10 ENCOUNTER — APPOINTMENT (OUTPATIENT)
Dept: HEPATOLOGY | Facility: CLINIC | Age: 65
End: 2025-04-10
Payer: COMMERCIAL

## 2025-04-10 VITALS
HEART RATE: 81 BPM | SYSTOLIC BLOOD PRESSURE: 153 MMHG | RESPIRATION RATE: 16 BRPM | BODY MASS INDEX: 24.55 KG/M2 | OXYGEN SATURATION: 98 % | TEMPERATURE: 98.6 F | DIASTOLIC BLOOD PRESSURE: 76 MMHG | HEIGHT: 68 IN | WEIGHT: 162 LBS

## 2025-04-10 DIAGNOSIS — Z79.60 LONG TERM (CURRENT) USE OF UNSPECIFIED IMMUNOMODULATORS AND IMMUNOSUPPRESSANTS: ICD-10-CM

## 2025-04-10 LAB
CMV DNA SPEC QL NAA+PROBE: NOT DETECTED IU/ML
CMVPCR LOG: NOT DETECTED LOG10IU/ML

## 2025-04-10 PROCEDURE — 99215 OFFICE O/P EST HI 40 MIN: CPT

## 2025-04-14 ENCOUNTER — OUTPATIENT (OUTPATIENT)
Dept: OUTPATIENT SERVICES | Facility: HOSPITAL | Age: 65
LOS: 1 days | End: 2025-04-14
Payer: COMMERCIAL

## 2025-04-14 ENCOUNTER — APPOINTMENT (OUTPATIENT)
Dept: RADIOLOGY | Facility: IMAGING CENTER | Age: 65
End: 2025-04-14
Payer: COMMERCIAL

## 2025-04-14 DIAGNOSIS — Z98.890 OTHER SPECIFIED POSTPROCEDURAL STATES: Chronic | ICD-10-CM

## 2025-04-14 DIAGNOSIS — K72.91 HEPATIC FAILURE, UNSPECIFIED WITH COMA: ICD-10-CM

## 2025-04-14 DIAGNOSIS — Z94.4 LIVER TRANSPLANT STATUS: ICD-10-CM

## 2025-04-14 DIAGNOSIS — Z94.4 LIVER TRANSPLANT STATUS: Chronic | ICD-10-CM

## 2025-04-14 DIAGNOSIS — Z87.39 PERSONAL HISTORY OF OTHER DISEASES OF THE MUSCULOSKELETAL SYSTEM AND CONNECTIVE TISSUE: Chronic | ICD-10-CM

## 2025-04-14 LAB
PETH 16:0/18:1: NEGATIVE NG/ML
PETH 16:0/18:2: NEGATIVE NG/ML
PETH COMMENTS: NORMAL

## 2025-04-14 PROCEDURE — 72100 X-RAY EXAM L-S SPINE 2/3 VWS: CPT | Mod: 26

## 2025-04-14 PROCEDURE — 72100 X-RAY EXAM L-S SPINE 2/3 VWS: CPT

## 2025-04-15 PROBLEM — D50.9 IRON DEFICIENCY ANEMIA, UNSPECIFIED IRON DEFICIENCY ANEMIA TYPE: Status: ACTIVE | Noted: 2025-04-15

## 2025-04-15 LAB
ALBUMIN SERPL ELPH-MCNC: 3.9 G/DL
ALP BLD-CCNC: 202 U/L
ALT SERPL-CCNC: 42 U/L
ANION GAP SERPL CALC-SCNC: 12 MMOL/L
AST SERPL-CCNC: 21 U/L
BASOPHILS # BLD AUTO: 0.01 K/UL
BASOPHILS NFR BLD AUTO: 0.2 %
BILIRUB SERPL-MCNC: 0.6 MG/DL
BUN SERPL-MCNC: 17 MG/DL
CALCIUM SERPL-MCNC: 9.1 MG/DL
CHLORIDE SERPL-SCNC: 107 MMOL/L
CMV DNA SPEC QL NAA+PROBE: NOT DETECTED IU/ML
CMVPCR LOG: NOT DETECTED LOG10IU/ML
CO2 SERPL-SCNC: 22 MMOL/L
CREAT SERPL-MCNC: 0.99 MG/DL
EBV DNA SERPL NAA+PROBE-ACNC: NOT DETECTED IU/ML
EBVPCR LOG: NOT DETECTED LOG10IU/ML
EGFRCR SERPLBLD CKD-EPI 2021: 64 ML/MIN/1.73M2
EOSINOPHIL # BLD AUTO: 0.16 K/UL
EOSINOPHIL NFR BLD AUTO: 3.8 %
FERRITIN SERPL-MCNC: 19 NG/ML
GGT SERPL-CCNC: 876 U/L
GLUCOSE SERPL-MCNC: 102 MG/DL
HCT VFR BLD CALC: 26.1 %
HGB BLD-MCNC: 7.7 G/DL
IMM GRANULOCYTES NFR BLD AUTO: 1.4 %
IRON SATN MFR SERPL: 13 %
IRON SERPL-MCNC: 46 UG/DL
LYMPHOCYTES # BLD AUTO: 0.84 K/UL
LYMPHOCYTES NFR BLD AUTO: 20.1 %
MAGNESIUM SERPL-MCNC: 1.9 MG/DL
MAN DIFF?: NORMAL
MCHC RBC-ENTMCNC: 28.2 PG
MCHC RBC-ENTMCNC: 29.5 G/DL
MCV RBC AUTO: 95.6 FL
MONOCYTES # BLD AUTO: 0.32 K/UL
MONOCYTES NFR BLD AUTO: 7.7 %
NEUTROPHILS # BLD AUTO: 2.78 K/UL
NEUTROPHILS NFR BLD AUTO: 66.8 %
PLATELET # BLD AUTO: 131 K/UL
POTASSIUM SERPL-SCNC: 4.7 MMOL/L
PROT SERPL-MCNC: 6 G/DL
RBC # BLD: 2.7 M/UL
RBC # BLD: 2.73 M/UL
RBC # FLD: 16.4 %
RETICS # AUTO: 5.2 %
RETICS AGGREG/RBC NFR: 140.9 K/UL
SODIUM SERPL-SCNC: 142 MMOL/L
TACROLIMUS SERPL-MCNC: 3.6 NG/ML
TIBC SERPL-MCNC: 358 UG/DL
UIBC SERPL-MCNC: 312 UG/DL
WBC # FLD AUTO: 4.17 K/UL

## 2025-04-15 RX ORDER — FERROUS SULFATE 325(65) MG
325 (65 FE) TABLET ORAL DAILY
Qty: 30 | Refills: 2 | Status: ACTIVE | COMMUNITY
Start: 2025-04-15 | End: 1900-01-01

## 2025-04-16 ENCOUNTER — APPOINTMENT (OUTPATIENT)
Dept: GASTROENTEROLOGY | Facility: CLINIC | Age: 65
End: 2025-04-16
Payer: COMMERCIAL

## 2025-04-16 ENCOUNTER — INPATIENT (INPATIENT)
Facility: HOSPITAL | Age: 65
LOS: 5 days | Discharge: HOME HEALTH | DRG: 543 | End: 2025-04-22
Attending: STUDENT IN AN ORGANIZED HEALTH CARE EDUCATION/TRAINING PROGRAM | Admitting: STUDENT IN AN ORGANIZED HEALTH CARE EDUCATION/TRAINING PROGRAM
Payer: COMMERCIAL

## 2025-04-16 VITALS
HEIGHT: 69 IN | DIASTOLIC BLOOD PRESSURE: 84 MMHG | OXYGEN SATURATION: 100 % | HEART RATE: 70 BPM | SYSTOLIC BLOOD PRESSURE: 167 MMHG | TEMPERATURE: 98 F | WEIGHT: 149.91 LBS | RESPIRATION RATE: 18 BRPM

## 2025-04-16 DIAGNOSIS — Z98.890 OTHER SPECIFIED POSTPROCEDURAL STATES: Chronic | ICD-10-CM

## 2025-04-16 DIAGNOSIS — T86.49 OTHER COMPLICATIONS OF LIVER TRANSPLANT: ICD-10-CM

## 2025-04-16 DIAGNOSIS — Z94.4 LIVER TRANSPLANT STATUS: Chronic | ICD-10-CM

## 2025-04-16 DIAGNOSIS — K83.1 OTHER COMPLICATIONS OF LIVER TRANSPLANT: ICD-10-CM

## 2025-04-16 DIAGNOSIS — Z87.39 PERSONAL HISTORY OF OTHER DISEASES OF THE MUSCULOSKELETAL SYSTEM AND CONNECTIVE TISSUE: Chronic | ICD-10-CM

## 2025-04-16 LAB
ALBUMIN SERPL ELPH-MCNC: 3.9 G/DL — SIGNIFICANT CHANGE UP (ref 3.3–5)
ALP SERPL-CCNC: 228 U/L — HIGH (ref 40–120)
ALT FLD-CCNC: 52 U/L — HIGH (ref 10–45)
ANION GAP SERPL CALC-SCNC: 13 MMOL/L — SIGNIFICANT CHANGE UP (ref 5–17)
APPEARANCE UR: CLEAR — SIGNIFICANT CHANGE UP
APTT BLD: 30.4 SEC — SIGNIFICANT CHANGE UP (ref 24.5–35.6)
AST SERPL-CCNC: 31 U/L — SIGNIFICANT CHANGE UP (ref 10–40)
BASOPHILS # BLD AUTO: 0.01 K/UL — SIGNIFICANT CHANGE UP (ref 0–0.2)
BASOPHILS NFR BLD AUTO: 0.3 % — SIGNIFICANT CHANGE UP (ref 0–2)
BILIRUB SERPL-MCNC: 0.5 MG/DL — SIGNIFICANT CHANGE UP (ref 0.2–1.2)
BILIRUB UR-MCNC: NEGATIVE — SIGNIFICANT CHANGE UP
BUN SERPL-MCNC: 19 MG/DL — SIGNIFICANT CHANGE UP (ref 7–23)
CALCIUM SERPL-MCNC: 9 MG/DL — SIGNIFICANT CHANGE UP (ref 8.4–10.5)
CHLORIDE SERPL-SCNC: 107 MMOL/L — SIGNIFICANT CHANGE UP (ref 96–108)
CO2 SERPL-SCNC: 21 MMOL/L — LOW (ref 22–31)
COLOR SPEC: YELLOW — SIGNIFICANT CHANGE UP
CREAT SERPL-MCNC: 1 MG/DL — SIGNIFICANT CHANGE UP (ref 0.5–1.3)
DIFF PNL FLD: NEGATIVE — SIGNIFICANT CHANGE UP
EGFR: 63 ML/MIN/1.73M2 — SIGNIFICANT CHANGE UP
EGFR: 63 ML/MIN/1.73M2 — SIGNIFICANT CHANGE UP
EOSINOPHIL # BLD AUTO: 0.07 K/UL — SIGNIFICANT CHANGE UP (ref 0–0.5)
EOSINOPHIL NFR BLD AUTO: 1.8 % — SIGNIFICANT CHANGE UP (ref 0–6)
GLUCOSE SERPL-MCNC: 124 MG/DL — HIGH (ref 70–99)
GLUCOSE UR QL: NEGATIVE MG/DL — SIGNIFICANT CHANGE UP
HCT VFR BLD CALC: 25.3 % — LOW (ref 34.5–45)
HGB BLD-MCNC: 7.6 G/DL — LOW (ref 11.5–15.5)
IMM GRANULOCYTES NFR BLD AUTO: 0.8 % — SIGNIFICANT CHANGE UP (ref 0–0.9)
INR BLD: 1.14 RATIO — SIGNIFICANT CHANGE UP (ref 0.85–1.16)
KETONES UR-MCNC: NEGATIVE MG/DL — SIGNIFICANT CHANGE UP
LEUKOCYTE ESTERASE UR-ACNC: NEGATIVE — SIGNIFICANT CHANGE UP
LIDOCAIN IGE QN: 22 U/L — SIGNIFICANT CHANGE UP (ref 7–60)
LYMPHOCYTES # BLD AUTO: 0.61 K/UL — LOW (ref 1–3.3)
LYMPHOCYTES # BLD AUTO: 16 % — SIGNIFICANT CHANGE UP (ref 13–44)
MCHC RBC-ENTMCNC: 28.9 PG — SIGNIFICANT CHANGE UP (ref 27–34)
MCHC RBC-ENTMCNC: 30 G/DL — LOW (ref 32–36)
MCV RBC AUTO: 96.2 FL — SIGNIFICANT CHANGE UP (ref 80–100)
MONOCYTES # BLD AUTO: 0.26 K/UL — SIGNIFICANT CHANGE UP (ref 0–0.9)
MONOCYTES NFR BLD AUTO: 6.8 % — SIGNIFICANT CHANGE UP (ref 2–14)
NEUTROPHILS # BLD AUTO: 2.84 K/UL — SIGNIFICANT CHANGE UP (ref 1.8–7.4)
NEUTROPHILS NFR BLD AUTO: 74.3 % — SIGNIFICANT CHANGE UP (ref 43–77)
NITRITE UR-MCNC: NEGATIVE — SIGNIFICANT CHANGE UP
NRBC BLD AUTO-RTO: 0 /100 WBCS — SIGNIFICANT CHANGE UP (ref 0–0)
PH UR: 6.5 — SIGNIFICANT CHANGE UP (ref 5–8)
PLATELET # BLD AUTO: 119 K/UL — LOW (ref 150–400)
POTASSIUM SERPL-MCNC: 4.5 MMOL/L — SIGNIFICANT CHANGE UP (ref 3.5–5.3)
POTASSIUM SERPL-SCNC: 4.5 MMOL/L — SIGNIFICANT CHANGE UP (ref 3.5–5.3)
PROT SERPL-MCNC: 6.3 G/DL — SIGNIFICANT CHANGE UP (ref 6–8.3)
PROT UR-MCNC: NEGATIVE MG/DL — SIGNIFICANT CHANGE UP
PROTHROM AB SERPL-ACNC: 13 SEC — SIGNIFICANT CHANGE UP (ref 9.9–13.4)
RBC # BLD: 2.63 M/UL — LOW (ref 3.8–5.2)
RBC # FLD: 16.1 % — HIGH (ref 10.3–14.5)
SODIUM SERPL-SCNC: 141 MMOL/L — SIGNIFICANT CHANGE UP (ref 135–145)
SP GR SPEC: 1.02 — SIGNIFICANT CHANGE UP (ref 1–1.03)
UROBILINOGEN FLD QL: 0.2 MG/DL — SIGNIFICANT CHANGE UP (ref 0.2–1)
WBC # BLD: 3.82 K/UL — SIGNIFICANT CHANGE UP (ref 3.8–10.5)
WBC # FLD AUTO: 3.82 K/UL — SIGNIFICANT CHANGE UP (ref 3.8–10.5)

## 2025-04-16 PROCEDURE — 76705 ECHO EXAM OF ABDOMEN: CPT | Mod: 26,59

## 2025-04-16 PROCEDURE — 99202 OFFICE O/P NEW SF 15 MIN: CPT | Mod: 93

## 2025-04-16 PROCEDURE — 93975 VASCULAR STUDY: CPT | Mod: 26

## 2025-04-16 PROCEDURE — 99285 EMERGENCY DEPT VISIT HI MDM: CPT

## 2025-04-16 RX ORDER — TRAMADOL HYDROCHLORIDE 50 MG/1
25 TABLET, FILM COATED ORAL ONCE
Refills: 0 | Status: DISCONTINUED | OUTPATIENT
Start: 2025-04-16 | End: 2025-04-16

## 2025-04-16 RX ADMIN — TRAMADOL HYDROCHLORIDE 25 MILLIGRAM(S): 50 TABLET, FILM COATED ORAL at 21:46

## 2025-04-16 NOTE — ED PROVIDER NOTE - PHYSICAL EXAMINATION
A&Ox3, NAD, well appearing.   Lungs CTAB. No w/r/r  Cardiac  RRR  Abd soft, + mild L sided abdominal ttp, no rebound or guarding.   Extremities: cap refill <2, pulses in distal extremities 4+, no edema.   Skin without rash.   No focal Deficits, moving all extremities equally.

## 2025-04-16 NOTE — ED PROVIDER NOTE - ATTENDING APP SHARED VISIT CONTRIBUTION OF CARE
PMD [facility] Transplant Surg  65-year-old female past medical history AUD/cirrhosis status post liver transplant, GERD, skin cancer PSH umbilical hernia repair, liver transplant, Mohs comes to ER complaint back pain off and on for 5 years after treatment for breast cancer.  Patient states doing housework today suddenly got worse bilateral lower back laterally, radiating to the left lower quadrant.  Pain is moderately severe worse with movement standing walking.  There is no fever chills chest pain short of breath  Physical exam adult female awake alert GCS 15 looking uncomfortable.  HEENT normocephalic atraumatic  Chest clear A&P.  CV no rubs gallops murmur.  Abdomen multiple surgical scars no tenderness rebound guarding.  Mild right CVA tenderness, no L/S bony tenderness.  Pelvis stable.  Neuro GCS 15 speech fluent moves all extremities.  Jose Rivera MD, Facep

## 2025-04-16 NOTE — ED ADULT NURSE NOTE - OBJECTIVE STATEMENT
65y Female AOx4 with PMH breast CA (s/p chemo and radiation), liver transplant 1/2025 BIBA from home to the ED c/o back pain. Pt endorses mid back pain starting today, states she has lower back pain at baseline, but this feels different. Had f/u with hepatologist, who had outpatient lumbar XR performed. Denies N/V, fever/chills, SOB, chest pain. Spontaneous/unlabored respirations, speaking in full sentences. Side rails up, bed in lowest position, safety maintained.

## 2025-04-16 NOTE — ED ADULT TRIAGE NOTE - PATIENT ON (OXYGEN DELIVERY METHOD)
room air
Quality 137: Melanoma: Continuity Of Care - Recall System: Patient information entered into a recall system that includes: target date for the next exam specified AND a process to follow up with patients regarding missed or unscheduled appointments
Quality 130: Documentation Of Current Medications In The Medical Record: Current Medications Documented
Detail Level: Detailed
Quality 226: Preventive Care And Screening: Tobacco Use: Screening And Cessation Intervention: Patient screened for tobacco use and is an ex/non-smoker
Quality 47: Advance Care Plan: Advance Care Planning discussed and documented in the medical record; patient did not wish or was not able to name a surrogate decision maker or provide an advance care plan.
Quality 397: Melanoma: Reporting: Pathology report includes the pT Category, thickness, ulceration and mitotic rate, peripheral and deep margin status and presence or absence of microsatellitosis for invasive tumors.

## 2025-04-16 NOTE — ED PROVIDER NOTE - PROGRESS NOTE DETAILS
Spoke with liver transplant, requesting blood cultures and flu/COVID swab. -Jackie Pulido PA-C Endorsed to Dr ANTHONY Rivera MD, Facep Fei PGY3: NSGY consulted for new compression deformities. Will come evaluate Avelino Bahena MD (Attending Physician): I have assumed care of this patient. I have fully participated in the care of this patient. I have made amendments to the documentation where appropriate and have supervised the ongoing plan of care enacted by the Fellow/Resident/ACP/Student. I received signout on this patient at the usual time.  Patient evaluated at bedside with ED attending.  Patient reporting back pain worse with ambulation to the restroom, written for morphine, recently received Toradol and Ofirmev.  Received and tolerated morphine at approximately 12:50 AM.  Pending neurosurgery and transplant hepatology reevaluation this morning.  Patient expresses concern over dosages of rejection medication which was written by transplant team reports she recently had a change in her medication dosages as her medication lapsed which I took a picture of and sent via [email or web address] to transplant hepatology PA with patient permission.  They report they will change the dose accordingly.  Patient denies any neurologic phenomenon numbness, paresthesias, weakness that is new since her fall, denies saddle anesthesia bladder bowel incontinence or retention.  Hemoglobin 7.6 on labs on 3/11/2025 was 10.1.  Patient reports has been followed outpatient as noted on her uncertain etiology of anemia but was discontinued on Eliquis last week.  Patient reports that she had an endoscopic procedure and had a brief period of melena afterwards with since resolved.   Patient denies any active BRBPR or melena.  Denies any other bleeding.  Patient denies weakness, dizziness, lightheadedness, palpitations.  Repeat CBC ordered. I called discussed with neurosurgical resident and they report final recommendations are documented in their consult note recommend no acute surgical intervention at this time patient can have MRI inpatient or outpatient, given patient persistent pain discussed with patient will need admission for pain control.  Discussed with transplant hepatology team will request admission to their service. -Tobi Richards PA-C

## 2025-04-16 NOTE — ED ADULT NURSE NOTE - NSFALLRISKINTERV_ED_ALL_ED
Assistance OOB with selected safe patient handling equipment if applicable/Assistance with ambulation/Communicate fall risk and risk factors to all staff, patient, and family/Monitor gait and stability/Provide visual cue: yellow wristband, yellow gown, etc/Reinforce activity limits and safety measures with patient and family/Call bell, personal items and telephone in reach/Instruct patient to call for assistance before getting out of bed/chair/stretcher/Non-slip footwear applied when patient is off stretcher/[place] to call system/Physically safe environment - no spills, clutter or unnecessary equipment/Purposeful Proactive Rounding/Room/bathroom lighting operational, light cord in reach

## 2025-04-16 NOTE — ED PROVIDER NOTE - OBJECTIVE STATEMENT
65-year-old female with history of breast CVA, liver failure s/p transplant in January of this year here for evaluation of left-sided abdominal pain since this afternoon while she was vacuuming.  Patient states her back pain started to become a little worse on the right-hand side and also started to experience left-sided abdominal pain after vacuuming.  Denies any fevers or chills, chest pain, shortness of breath or difficulty breathing.  Took Tylenol which did not relieve the pain and came here for evaluation.

## 2025-04-16 NOTE — ED PROVIDER NOTE - CARE PLAN
Principal Discharge DX:	Compression fracture of vertebrae  Secondary Diagnosis:	Transplanted liver   1

## 2025-04-16 NOTE — ED ADULT NURSE REASSESSMENT NOTE - NS ED NURSE REASSESS COMMENT FT1
Patient found in stretcher breathing spontaneously and unlabored on Room Air. No signs of respiratory distress @ this time. The patient is alert and orientated x4 and responds appropriately. The patient presents with a Left AC 20G PIV that is C/D/I and saline locked @ this time. Pt denies chest pain, palpitations, shortness of breath, headache, visual disturbances, numbness/tingling, fever, chills, diaphoresis,  nausea, vomiting, constipation, diarrhea, or urinary symptoms. Safety and comfort measures provided, bed locked and in lowest position, side rails up for safety. VS to order and transported to CT Scan, Pending Results.

## 2025-04-16 NOTE — ED PROVIDER NOTE - SHIFT CHANGE DETAILS
Attending Charlotte: I have assumed care of this patient. I have fully participated in the care of this patient. I have made amendments to the documentation where appropriate and have supervised the ongoing plan of care enacted by the Fellow/Resident/GIANNA/Student.

## 2025-04-17 DIAGNOSIS — M48.50XA COLLAPSED VERTEBRA, NOT ELSEWHERE CLASSIFIED, SITE UNSPECIFIED, INITIAL ENCOUNTER FOR FRACTURE: ICD-10-CM

## 2025-04-17 LAB
B19V DNA FLD QL NAA+PROBE: NOT DETECTED IU/ML
FLUAV AG NPH QL: SIGNIFICANT CHANGE UP
FLUBV AG NPH QL: SIGNIFICANT CHANGE UP
HCT VFR BLD CALC: 24.7 % — LOW (ref 34.5–45)
HGB BLD-MCNC: 7.2 G/DL — LOW (ref 11.5–15.5)
MCHC RBC-ENTMCNC: 28 PG — SIGNIFICANT CHANGE UP (ref 27–34)
MCHC RBC-ENTMCNC: 29.1 G/DL — LOW (ref 32–36)
MCV RBC AUTO: 96.1 FL — SIGNIFICANT CHANGE UP (ref 80–100)
NRBC BLD AUTO-RTO: 0 /100 WBCS — SIGNIFICANT CHANGE UP (ref 0–0)
PLATELET # BLD AUTO: 100 K/UL — LOW (ref 150–400)
RBC # BLD: 2.57 M/UL — LOW (ref 3.8–5.2)
RBC # FLD: 16.1 % — HIGH (ref 10.3–14.5)
RSV RNA NPH QL NAA+NON-PROBE: SIGNIFICANT CHANGE UP
SARS-COV-2 RNA SPEC QL NAA+PROBE: SIGNIFICANT CHANGE UP
SOURCE RESPIRATORY: SIGNIFICANT CHANGE UP
TACROLIMUS SERPL-MCNC: 3.8 NG/ML — SIGNIFICANT CHANGE UP
TACROLIMUS SERPL-MCNC: 5 NG/ML — SIGNIFICANT CHANGE UP
WBC # BLD: 3.17 K/UL — LOW (ref 3.8–10.5)
WBC # FLD AUTO: 3.17 K/UL — LOW (ref 3.8–10.5)

## 2025-04-17 PROCEDURE — 99233 SBSQ HOSP IP/OBS HIGH 50: CPT | Mod: GC

## 2025-04-17 PROCEDURE — 74177 CT ABD & PELVIS W/CONTRAST: CPT | Mod: 26

## 2025-04-17 PROCEDURE — 72158 MRI LUMBAR SPINE W/O & W/DYE: CPT | Mod: 26

## 2025-04-17 PROCEDURE — 72157 MRI CHEST SPINE W/O & W/DYE: CPT | Mod: 26

## 2025-04-17 PROCEDURE — 72132 CT LUMBAR SPINE W/DYE: CPT | Mod: 26

## 2025-04-17 RX ORDER — PREDNISONE 20 MG/1
10 TABLET ORAL DAILY
Refills: 0 | Status: DISCONTINUED | OUTPATIENT
Start: 2025-04-17 | End: 2025-04-22

## 2025-04-17 RX ORDER — ASPIRIN 325 MG
81 TABLET ORAL DAILY
Refills: 0 | Status: DISCONTINUED | OUTPATIENT
Start: 2025-04-17 | End: 2025-04-22

## 2025-04-17 RX ORDER — TRAMADOL HYDROCHLORIDE 50 MG/1
50 TABLET, FILM COATED ORAL EVERY 8 HOURS
Refills: 0 | Status: DISCONTINUED | OUTPATIENT
Start: 2025-04-17 | End: 2025-04-18

## 2025-04-17 RX ORDER — URSODIOL 300 MG/1
300 CAPSULE ORAL
Refills: 0 | Status: DISCONTINUED | OUTPATIENT
Start: 2025-04-17 | End: 2025-04-22

## 2025-04-17 RX ORDER — TACROLIMUS 0.5 MG/1
1 CAPSULE ORAL ONCE
Refills: 0 | Status: COMPLETED | OUTPATIENT
Start: 2025-04-17 | End: 2025-04-17

## 2025-04-17 RX ORDER — ACETAMINOPHEN 500 MG/5ML
1000 LIQUID (ML) ORAL ONCE
Refills: 0 | Status: COMPLETED | OUTPATIENT
Start: 2025-04-17 | End: 2025-04-17

## 2025-04-17 RX ORDER — INFLUENZA A VIRUS A/IDAHO/07/2018 (H1N1) ANTIGEN (MDCK CELL DERIVED, PROPIOLACTONE INACTIVATED, INFLUENZA A VIRUS A/INDIANA/08/2018 (H3N2) ANTIGEN (MDCK CELL DERIVED, PROPIOLACTONE INACTIVATED), INFLUENZA B VIRUS B/SINGAPORE/INFTT-16-0610/2016 ANTIGEN (MDCK CELL DERIVED, PROPIOLACTONE INACTIVATED), INFLUENZA B VIRUS B/IOWA/06/2017 ANTIGEN (MDCK CELL DERIVED, PROPIOLACTONE INACTIVATED) 15; 15; 15; 15 UG/.5ML; UG/.5ML; UG/.5ML; UG/.5ML
0.5 INJECTION, SUSPENSION INTRAMUSCULAR ONCE
Refills: 0 | Status: DISCONTINUED | OUTPATIENT
Start: 2025-04-17 | End: 2025-04-22

## 2025-04-17 RX ORDER — TACROLIMUS 0.5 MG/1
4 CAPSULE ORAL
Refills: 0 | Status: DISCONTINUED | OUTPATIENT
Start: 2025-04-17 | End: 2025-04-21

## 2025-04-17 RX ORDER — DIPHENHYDRAMINE HCL 12.5MG/5ML
25 ELIXIR ORAL ONCE
Refills: 0 | Status: COMPLETED | OUTPATIENT
Start: 2025-04-17 | End: 2025-04-17

## 2025-04-17 RX ORDER — CEFTRIAXONE 500 MG/1
1000 INJECTION, POWDER, FOR SOLUTION INTRAMUSCULAR; INTRAVENOUS EVERY 24 HOURS
Refills: 0 | Status: DISCONTINUED | OUTPATIENT
Start: 2025-04-17 | End: 2025-04-17

## 2025-04-17 RX ORDER — MYCOPHENOLATE MOFETIL 500 MG/1
1000 TABLET, FILM COATED ORAL
Refills: 0 | Status: DISCONTINUED | OUTPATIENT
Start: 2025-04-17 | End: 2025-04-22

## 2025-04-17 RX ORDER — SULFAMETHOXAZOLE/TRIMETHOPRIM 800-160 MG
1 TABLET ORAL DAILY
Refills: 0 | Status: DISCONTINUED | OUTPATIENT
Start: 2025-04-17 | End: 2025-04-22

## 2025-04-17 RX ORDER — SULFAMETHOXAZOLE/TRIMETHOPRIM 800-160 MG
2 TABLET ORAL
Refills: 0 | DISCHARGE

## 2025-04-17 RX ORDER — CALCIUM CARBONATE/VITAMIN D3 500MG-5MCG
1 TABLET ORAL
Refills: 0 | Status: DISCONTINUED | OUTPATIENT
Start: 2025-04-17 | End: 2025-04-22

## 2025-04-17 RX ORDER — TACROLIMUS 0.5 MG/1
4 CAPSULE ORAL
Refills: 0 | Status: DISCONTINUED | OUTPATIENT
Start: 2025-04-18 | End: 2025-04-18

## 2025-04-17 RX ORDER — SENNA 187 MG
2 TABLET ORAL AT BEDTIME
Refills: 0 | Status: DISCONTINUED | OUTPATIENT
Start: 2025-04-17 | End: 2025-04-22

## 2025-04-17 RX ORDER — MAGNESIUM OXIDE 400 MG
400 TABLET ORAL
Refills: 0 | Status: DISCONTINUED | OUTPATIENT
Start: 2025-04-17 | End: 2025-04-22

## 2025-04-17 RX ORDER — TACROLIMUS 0.5 MG/1
4 CAPSULE ORAL
Refills: 0 | Status: DISCONTINUED | OUTPATIENT
Start: 2025-04-17 | End: 2025-04-17

## 2025-04-17 RX ORDER — TRAMADOL HYDROCHLORIDE 50 MG/1
50 TABLET, FILM COATED ORAL ONCE
Refills: 0 | Status: DISCONTINUED | OUTPATIENT
Start: 2025-04-17 | End: 2025-04-17

## 2025-04-17 RX ORDER — KETOROLAC TROMETHAMINE 30 MG/ML
15 INJECTION, SOLUTION INTRAMUSCULAR; INTRAVENOUS ONCE
Refills: 0 | Status: DISCONTINUED | OUTPATIENT
Start: 2025-04-17 | End: 2025-04-17

## 2025-04-17 RX ORDER — TACROLIMUS 0.5 MG/1
3 CAPSULE ORAL
Refills: 0 | Status: DISCONTINUED | OUTPATIENT
Start: 2025-04-17 | End: 2025-04-17

## 2025-04-17 RX ADMIN — MYCOPHENOLATE MOFETIL 1000 MILLIGRAM(S): 500 TABLET, FILM COATED ORAL at 07:42

## 2025-04-17 RX ADMIN — TRAMADOL HYDROCHLORIDE 50 MILLIGRAM(S): 50 TABLET, FILM COATED ORAL at 17:20

## 2025-04-17 RX ADMIN — Medication 4 MILLIGRAM(S): at 00:49

## 2025-04-17 RX ADMIN — Medication 81 MILLIGRAM(S): at 17:06

## 2025-04-17 RX ADMIN — Medication 2 TABLET(S): at 21:31

## 2025-04-17 RX ADMIN — Medication 1 TABLET(S): at 17:06

## 2025-04-17 RX ADMIN — TRAMADOL HYDROCHLORIDE 50 MILLIGRAM(S): 50 TABLET, FILM COATED ORAL at 15:31

## 2025-04-17 RX ADMIN — Medication 1000 MILLIGRAM(S): at 03:20

## 2025-04-17 RX ADMIN — TACROLIMUS 1 MILLIGRAM(S): 0.5 CAPSULE ORAL at 07:59

## 2025-04-17 RX ADMIN — KETOROLAC TROMETHAMINE 15 MILLIGRAM(S): 30 INJECTION, SOLUTION INTRAMUSCULAR; INTRAVENOUS at 06:16

## 2025-04-17 RX ADMIN — Medication 4 MILLIGRAM(S): at 01:19

## 2025-04-17 RX ADMIN — TRAMADOL HYDROCHLORIDE 50 MILLIGRAM(S): 50 TABLET, FILM COATED ORAL at 16:24

## 2025-04-17 RX ADMIN — TRAMADOL HYDROCHLORIDE 50 MILLIGRAM(S): 50 TABLET, FILM COATED ORAL at 22:30

## 2025-04-17 RX ADMIN — Medication 4 MILLIGRAM(S): at 07:42

## 2025-04-17 RX ADMIN — MYCOPHENOLATE MOFETIL 1000 MILLIGRAM(S): 500 TABLET, FILM COATED ORAL at 20:19

## 2025-04-17 RX ADMIN — Medication 400 MILLIGRAM(S): at 17:06

## 2025-04-17 RX ADMIN — Medication 1000 MILLIGRAM(S): at 04:37

## 2025-04-17 RX ADMIN — KETOROLAC TROMETHAMINE 15 MILLIGRAM(S): 30 INJECTION, SOLUTION INTRAMUSCULAR; INTRAVENOUS at 05:22

## 2025-04-17 RX ADMIN — TRAMADOL HYDROCHLORIDE 50 MILLIGRAM(S): 50 TABLET, FILM COATED ORAL at 21:31

## 2025-04-17 RX ADMIN — TRAMADOL HYDROCHLORIDE 50 MILLIGRAM(S): 50 TABLET, FILM COATED ORAL at 12:53

## 2025-04-17 RX ADMIN — PREDNISONE 10 MILLIGRAM(S): 20 TABLET ORAL at 07:42

## 2025-04-17 RX ADMIN — Medication 4 MILLIGRAM(S): at 08:00

## 2025-04-17 RX ADMIN — TACROLIMUS 4 MILLIGRAM(S): 0.5 CAPSULE ORAL at 20:19

## 2025-04-17 RX ADMIN — TACROLIMUS 3 MILLIGRAM(S): 0.5 CAPSULE ORAL at 07:42

## 2025-04-17 RX ADMIN — Medication 400 MILLIGRAM(S): at 03:05

## 2025-04-17 RX ADMIN — URSODIOL 300 MILLIGRAM(S): 300 CAPSULE ORAL at 17:06

## 2025-04-17 RX ADMIN — Medication 25 MILLIGRAM(S): at 21:31

## 2025-04-17 NOTE — H&P ADULT - NSHPLABSRESULTS_GEN_ALL_CORE
LABS:                        7.2    3.17  )-----------( 100      ( 2025 08:08 )             24.7         141  |  107  |  19  ----------------------------<  124[H]  4.5   |  21[L]  |  1.00    Ca    9.0      2025 21:43    TPro  6.3  /  Alb  3.9  /  TBili  0.5  /  DBili  x   /  AST  31  /  ALT  52[H]  /  AlkPhos  228[H]        PT/INR - ( 2025 21:43 )   PT: 13.0 sec;   INR: 1.14 ratio         PTT - ( 2025 21:43 )  PTT:30.4 sec  CAPILLARY BLOOD GLUCOSE                  Urinalysis Basic - ( 2025 23:51 )    Color: Yellow / Appearance: Clear / S.020 / pH: x  Gluc: x / Ketone: Negative mg/dL  / Bili: Negative / Urobili: 0.2 mg/dL   Blood: x / Protein: Negative mg/dL / Nitrite: Negative   Leuk Esterase: Negative / RBC: x / WBC x   Sq Epi: x / Non Sq Epi: x / Bacteria: x          LIVER FUNCTIONS - ( 2025 21:43 )  Alb: 3.9 g/dL / Pro: 6.3 g/dL / ALK PHOS: 228 U/L / ALT: 52 U/L / AST: 31 U/L / GGT: x                     I & O Summary:      Microbiology:    Urinalysis with Rflx Culture (collected 25 @ 23:51)        Urinalysis with Rflx Culture (collected 2025 23:51)        RADIOLOGY, EKG AND ADDITIONAL TESTS: Reviewed.

## 2025-04-17 NOTE — ED ADULT NURSE REASSESSMENT NOTE - NS ED NURSE REASSESS COMMENT FT1
0700 Report received from REGIS FLAHERTY Pt AAOx4, NAD, resp nonlabored, skin warm/dry, resting comfortably in bed with family at bedside . Pt denies headache, dizziness, chest pain, palpitations, SOB, abd pain, n/v/d, urinary symptoms, fevers, chills, weakness at this time. Pt awaiting for results . Safety maintained with call bell within reach.

## 2025-04-17 NOTE — H&P ADULT - ATTENDING COMMENTS
ALD s/p OLT 1/4/25  Here for spinal compression fracture    Graft function is great. Continue prograf per level, MMF BID (full dose), PPX agents.  Seen by Ortho. Plan for brace fitting tomorrow.  PT & pain management.  Daily labs. Tacrolimus per level should be obtained 30mins from am dose.      Louise Deleon MD/MPH  Transplant Hepatology.

## 2025-04-17 NOTE — CONSULT NOTE ADULT - ASSESSMENT
65F dentist on ASA/Eliquis, osteoporosis, Breast CA s/p lumpectomy, BCC Rt cheek s/p Mohs rsxn, ETOH cirrhosis s/p Liver xplnt 01/2025. P/w acute on chronic LBP. No Hx falls or trauma. Denies radic, paresthesias, no b/b sx. Chronic numb of plantar surface b/l feet 2/2 chemo. CT L spine w/ acute comp fx of T11 and L3, chronic comp fx of L5. PLTs 119, INR 1.14. Exam: neurointact. pain to palp along lower back.  -no acute nsgy intervention  -Pain ctrl/PT  -outpt f/u with Dr. Brooks in 1-2 wks 65F dentist on ASA/Eliquis, osteoporosis, Breast CA s/p lumpectomy, BCC Rt cheek s/p Mohs rsxn, ETOH cirrhosis s/p Liver xplnt 01/2025. P/w acute on chronic LBP. No Hx falls or trauma. Denies radic, paresthesias, no b/b sx. Chronic numb of plantar surface b/l feet 2/2 chemo. CT L spine w/ acute comp fx of T11 and L3, chronic comp fx of L5. PLTs 119, INR 1.14. Exam: neurointact. pain to palp along lower back.  -no acute nsgy intervention  -Pain ctrl/PT  -MRI T/L spine wwo, may be obtained outpt  -TLSO when oob      Plan pending d/w attg, do not d/c pt until confirmed plan

## 2025-04-17 NOTE — CONSULT NOTE ADULT - ASSESSMENT
65 yo F PMHx  ETOH cirrhosis S/p OLT 1/4/2025 (DCD pump) on ASA/ Eliquis, HTN, L breast stage 1 Invasive lobular carcinoma (HR+ HER2 neg) s/p lumpectomy and R cheek BCC s/p Mohs (2021) Umbilical Hernia Repair 9/2024. Post-op OLT c/b donor + stqph epi bacteremia & s/p treatment of presumed rejection with PO prednisone taper (2/7). Most recently readmitted for pruritis and elevated LFTs last month. Pt. had ERCP completed on 3/21/25 with placement of biliary stent.     Patient presents to the ED for x1 day of excruciating back pain that began after she tried to vaccuum her house. Endorses pain radiates from the R. midback to L. midback and toward her L. abdomen. Endorses some associated nausea and shaking 2/2 pain but denies any active tremor or seizure-like activity. Denies vomiting, endorses regular bowel movements & denies any other focal neurological symptoms. Has known numbness to b/l feet that occurred post-txp likely 2/2 medication.        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 Post-op OLT c/b donor + stqph epi bacteremia & s/p treatment of presumed rejection with PO prednisone taper (2/7). Most recently readmitted for pruritis and elevated LFTs last month. Pt. had ERCP completed on 3/21/25 with placement of biliary stent.     Patient presents to the ED for x1 day of excruciating back pain that began after she tried to vaccuum her house. Endorses pain radiates from the R. midback to L. midback and toward her L. abdomen. Endorses some associated nausea and shaking 2/2 pain but denies any active tremor or seizure-like activity. Denies vomiting, endorses regular bowel movements & denies any other focal neurological symptoms. Has known numbness to b/l feet that occurred post-txp likely 2/2 medication.        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

## 2025-04-17 NOTE — H&P ADULT - HISTORY OF PRESENT ILLNESS
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 Off Letrazole now), R cheek BCC s/p Mohs (2021), Umbilical Hernia Repair 9/2024. and HTN presenting with acute onset of lower back pain.     Patient was in her usual state of health until one day ago when she developed excruciating back pain that began after she tried to vaccuum her house. She endorses pain radiates from the right midback to L midback. She endorses some associated nausea and shaking 2/2 pain but denies any active tremor or seizure-like activity. Denies vomiting, endorses regular bowel movements & denies any other focal neurological symptoms.     In the ED, VSS. Labs notable for hgb 7.2 (previously 7.6) but all cell lines down. Liver synthetic tests wnl. CT lumbar with new compression deformities at the T11 and L3 superior endplates. CT A/P w/ nonspecific fat stranding in the region of the weston hepatis; CBD stent is in place; mild dilatation of the intrahepatic bile ducts. Seen by NSGY in the ED; no acute nsgy intervention, pain control/PT, MRI T/L spine, and TLSO when OOB.    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 Off Letrazole now), R cheek BCC s/p Mohs (2021), Umbilical Hernia Repair 9/2024. and HTN presenting with acute onset of lower back pain.     Patient was in her usual state of health until one day ago when she developed excruciating back pain that began after she tried to vaccuum her house. She endorses pain radiates from the right midback to L midback. She endorses some associated nausea and shaking 2/2 pain but denies any active tremor or seizure-like activity. Denies vomiting, endorses regular bowel movements & denies any other focal neurological symptoms.     In the ED, VSS. Labs notable for hgb 7.2 (previously 7.6) but all cell lines down. Liver synthetic tests wnl. CT lumbar with new compression deformities at the T11 and L3 superior endplates. CT A/P w/ nonspecific fat stranding in the region of the weston hepatis; CBD stent is in place; mild dilatation of the intrahepatic bile ducts. Seen by NSGY in the ED; no acute nsgy intervention, pain control/PT, MRI T/L spine, and TLSO when OOB. MRI T/L spine showing new T11 compression fracture, T8-T9 broad right central andT9-T10 broad left central disc protrusions (disc herniations), new L3 compression fracture, and old L5 compression fracture.

## 2025-04-17 NOTE — ED ADULT NURSE REASSESSMENT NOTE - NS ED NURSE REASSESS COMMENT FT1
1317 RN contacted MRI as soon finished the test transport to 7 tow,  tow RN Tiana BELL made aware that pt is going to MRI then will come to [address] [place] [address]

## 2025-04-17 NOTE — CONSULT NOTE ADULT - SUBJECTIVE AND OBJECTIVE BOX
p (1480)     HPI:  65F dentist on ASA/Eliquis, osteoporosis, Breast CA s/p lumpectomy, BCC Rt cheek s/p Mohs rsxn, ETOH cirrhosis s/p Liver xplnt 01/2025. P/w acute on chronic LBP. No Hx falls or trauma. Denies radic, paresthesias, no b/b sx. Chronic numb of plantar surface b/l feet 2/2 chemo. CT L spine w/ acute comp fx of T11 and L3, chronic comp fx of L5. PLTs 119, INR 1.14. Exam: neurointact. pain to palp along lower back.    =====================  PAST MEDICAL HISTORY   Breast cancer, left    H/O hepatitis    GERD (gastroesophageal reflux disease)    Skin cancer, basal cell    Liver transplant recipient    Obstruction of bile duct    Elevated LFTs    History of cirrhosis of liver    History of alcohol use    2019 novel coronavirus disease (COVID-19)      PAST SURGICAL HISTORY   H/O lumpectomy    History of removal of skin mole    H/O ganglion cyst    Status post liver transplant    Status post Mohs surgery    H/O umbilical hernia repair      No Known Allergies      MEDICATIONS:  Antibiotics:    Neuro:    Other:      SOCIAL HISTORY:   Occupation:   Marital Status:     FAMILY HISTORY:      ROS: Negative except per HPI    LABS:  PT/INR - ( 16 Apr 2025 21:43 )   PT: 13.0 sec;   INR: 1.14 ratio         PTT - ( 16 Apr 2025 21:43 )  PTT:30.4 sec                        7.6    3.82  )-----------( 119      ( 16 Apr 2025 21:43 )             25.3     04-16    141  |  107  |  19  ----------------------------<  124[H]  4.5   |  21[L]  |  1.00    Ca    9.0      16 Apr 2025 21:43    TPro  6.3  /  Alb  3.9  /  TBili  0.5  /  DBili  x   /  AST  31  /  ALT  52[H]  /  AlkPhos  228[H]  04-16      
Transplant Surgery - Multidisciplinary Rounds  --------------------------------------------------------------   Donor OLTx      Date: 2025      HPI: 65 yo F PMHx  ETOH cirrhosis S/p OLT 2025 (DCD pump) on ASA/ Eliquis, HTN, L breast stage 1 Invasive lobular carcinoma (HR+ HER2 neg) s/p lumpectomy and R cheek BCC s/p Mohs () Umbilical Hernia Repair 2024. Post-op OLT c/b donor + stqph epi bacteremia & s/p treatment of presumed rejection with PO prednisone taper (). Most recently readmitted for pruritis and elevated LFTs last month. Pt. had ERCP completed on 3/21/25 with placement of biliary stent.     Patient presents to the ED for x1 day of excruciating back pain that began after she tried to vaccuum her house. Endorses pain radiates from the R. midback to L. midback and toward her L. abdomen. Endorses some associated nausea and shaking 2/2 pain but denies any active tremor or seizure-like activity. Denies vomiting, endorses regular bowel movements & denies any other focal neurological symptoms. Has known numbness to b/l feet that occurred post-txp likely 2/2 medication.         Immunosupression:  Maintenance: FK/MMF/SST  Ongoing monitoring for signs of rejection     Potential Discharge date: TBD  Education:  Medications  Plan of care:  See Below    MEDICATIONS  (STANDING):    MEDICATIONS  (PRN):      PAST MEDICAL & SURGICAL HISTORY:  Breast cancer, left      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      GERD (gastroesophageal reflux disease)      Skin cancer, basal cell      Liver transplant recipient      Obstruction of bile duct      Elevated LFTs      History of cirrhosis of liver      History of alcohol use      2019 novel coronavirus disease (COVID-19)      H/O lumpectomy      History of removal of skin mole      H/O ganglion cyst      Status post liver transplant      Status post Mohs surgery      H/O umbilical hernia repair          Vital Signs Last 24 Hrs  T(C): 36.7 (2025 00:45), Max: 36.7 (2025 00:45)  T(F): 98.1 (2025 00:45), Max: 98.1 (2025 00:45)  HR: 66 (2025 00:45) (65 - 80)  BP: 156/85 (2025 00:45) (149/82 - 167/84)  BP(mean): 104 (2025 23:53) (104 - 104)  RR: 18 (2025 00:45) (18 - 19)  SpO2: 98% (2025 00:45) (98% - 100%)    Parameters below as of 2025 00:45  Patient On (Oxygen Delivery Method): room air        I&O's Summary                            7.6    3.82  )-----------( 119      ( 2025 21:43 )             25.3         141  |  107  |  19  ----------------------------<  124[H]  4.5   |  21[L]  |  1.00    Ca    9.0      2025 21:43    TPro  6.3  /  Alb  3.9  /  TBili  0.5  /  DBili  x   /  AST  31  /  ALT  52[H]  /  AlkPhos  228[H]  -        Review of systems  Gen: No weight changes, fatigue, fevers/chills, weakness  Skin: No rashes  Head/Eyes/Ears/Mouth: No headache; Normal hearing; Normal vision w/o blurriness; No sinus pain/discomfort, sore throat  Respiratory: No dyspnea, cough, wheezing, hemoptysis  CV: No chest pain, PND, orthopnea  GI: C/O occasional abdominal pain. no diarrhea, constipation, nausea, vomiting, melena, hematochezia  : No increased frequency, dysuria, hematuria, nocturia  MSK: No joint pain/swelling; no back pain; no edema  Neuro: No dizziness/lightheadedness, weakness, seizures, +numbness b/l feet (not new onset)  Heme: No easy bruising or bleeding  Endo: No heat/cold intolerance  Psych: No significant nervousness, anxiety, stress, depression  All other systems were reviewed and are negative, except as noted.      PHYSICAL EXAM:  Constitutional: Well developed / well nourished  Eyes: Anicteric, PERRLA  ENMT: nc/at, no thrush  Neck: supple  Respiratory: CTA B/L, regular depth & effort  Cardiovascular: RRR +audible murmur  Gastrointestinal: Soft, very minimally distended. healed chevron incision scar.   Genitourinary: Voiding spontaneously  Extremities: warm b/l upper and lower, no edema  Neurological: A&O x3  Skin: no rashes, ulcerations, lesions  Musculoskeletal: Moving all extremities  Psychiatric: Responsive

## 2025-04-17 NOTE — H&P ADULT - NSHPPHYSICALEXAM_GEN_ALL_CORE
T(C): 36.8 (04-17-25 @ 15:25), Max: 36.9 (04-17-25 @ 10:17)  HR: 67 (04-17-25 @ 15:25) (62 - 80)  BP: 146/77 (04-17-25 @ 15:25) (112/71 - 167/84)  RR: 17 (04-17-25 @ 15:25) (17 - 19)  SpO2: 97% (04-17-25 @ 15:25) (97% - 100%)    PHYSICAL EXAM:  General: AAOx3, in mild distress  HEENT: no scleral icterus  Pulmonary: no respiratory distress  Cardiovascular: RRR, normal S1/S2, no m/r/g  Abdominal: soft, NTND, +BS, +well healed surgical scar  Extremities: no peripheral edema or cyanosis  MSK: tenderness to the lower back   Neuro: AXO3, no gross deficits  Skin: warm, dry, no visible jaundice, no new rashes

## 2025-04-17 NOTE — H&P ADULT - ASSESSMENT
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 Off Letrazole now), R cheek BCC s/p Mohs (2021), Umbilical Hernia Repair 9/2024. and HTN presenting with acute onset of lower back pain.     #Acute lower back pain  1 day of acute lower back pain. CT lumbar with new compression deformities at the T11 and L3 superior endplates. MRI T/L spine showing new T11 compression fracture, T8-T9 broad right central andT9-T10 broad left central disc protrusions (disc herniations), new L3 compression fracture, and old L5 compression fracture.  - appreciate NSGY recs  - pain regimen: acetaminophen 1000mg TID standing and tramadol 50mg q8hrs PRN  - PT   - TLSO  - DVT ppx w/ heparin subq    #Hx of decompensated ETOH cirrhosis s/p OLT (1/4/25) c/b staph epi bacteremia s/p cefazolin  #Hx of CBD narrowing s/p ERCP (3/21/25)  Liver tests stable without normal synthetic function. Repeat ERCP due in 5/2025.   - c/w tacro 4mg BID. Tacro level to be drawn 30min to 1hr before AM dose. Goal tacro level 6-8  - c/w cellcept 1g BID  - c/w pred 10mg daily  - c/w ursodiol 300mg BID  - c/w ppx - bactrim, ASA, PPI  - trend liver tests and INR daily    #Normocytic anemia  No overt signs of GIB . 4/15 labs w/ ferritin 19, % sat 13. Last EGD from 7/2023 showing small EV, PHG, Type 1 gastroesophageal varices w/o bleeding. Last colonoscopy from 7/2023 w/ transverse colonic polyp (TA), descending colon polyp (hyperplastic).   - trend CBC daily; transfuse if hgb < 7    Blaine Allen, PGY-4  Gastroenterology & Hepatology Fellow  Available on TEAMS  Long range pager #: [phone]  Short range pager#: [phone]    For non-urgent consults, please send email to [email or web address] (for [facility]) or [email or web address] (for [facility])

## 2025-04-18 LAB
ALBUMIN SERPL ELPH-MCNC: 3.4 G/DL — SIGNIFICANT CHANGE UP (ref 3.3–5)
ALP SERPL-CCNC: 200 U/L — HIGH (ref 40–120)
ALT FLD-CCNC: 32 U/L — SIGNIFICANT CHANGE UP (ref 10–45)
ANION GAP SERPL CALC-SCNC: 12 MMOL/L — SIGNIFICANT CHANGE UP (ref 5–17)
APTT BLD: 30.2 SEC — SIGNIFICANT CHANGE UP (ref 24.5–35.6)
AST SERPL-CCNC: 15 U/L — SIGNIFICANT CHANGE UP (ref 10–40)
BASOPHILS # BLD AUTO: 0.01 K/UL — SIGNIFICANT CHANGE UP (ref 0–0.2)
BASOPHILS NFR BLD AUTO: 0.4 % — SIGNIFICANT CHANGE UP (ref 0–2)
BILIRUB SERPL-MCNC: 0.5 MG/DL — SIGNIFICANT CHANGE UP (ref 0.2–1.2)
BLD GP AB SCN SERPL QL: NEGATIVE — SIGNIFICANT CHANGE UP
BUN SERPL-MCNC: 23 MG/DL — SIGNIFICANT CHANGE UP (ref 7–23)
CALCIUM SERPL-MCNC: 9 MG/DL — SIGNIFICANT CHANGE UP (ref 8.4–10.5)
CHLORIDE SERPL-SCNC: 105 MMOL/L — SIGNIFICANT CHANGE UP (ref 96–108)
CO2 SERPL-SCNC: 21 MMOL/L — LOW (ref 22–31)
CREAT SERPL-MCNC: 1.23 MG/DL — SIGNIFICANT CHANGE UP (ref 0.5–1.3)
EGFR: 49 ML/MIN/1.73M2 — LOW
EGFR: 49 ML/MIN/1.73M2 — LOW
EOSINOPHIL # BLD AUTO: 0.08 K/UL — SIGNIFICANT CHANGE UP (ref 0–0.5)
EOSINOPHIL NFR BLD AUTO: 3 % — SIGNIFICANT CHANGE UP (ref 0–6)
GLUCOSE SERPL-MCNC: 88 MG/DL — SIGNIFICANT CHANGE UP (ref 70–99)
HCT VFR BLD CALC: 25.4 % — LOW (ref 34.5–45)
HGB BLD-MCNC: 7.5 G/DL — LOW (ref 11.5–15.5)
IMM GRANULOCYTES NFR BLD AUTO: 1.1 % — HIGH (ref 0–0.9)
INR BLD: 1.1 RATIO — SIGNIFICANT CHANGE UP (ref 0.85–1.16)
LYMPHOCYTES # BLD AUTO: 0.69 K/UL — LOW (ref 1–3.3)
LYMPHOCYTES # BLD AUTO: 25.5 % — SIGNIFICANT CHANGE UP (ref 13–44)
MAGNESIUM SERPL-MCNC: 2 MG/DL — SIGNIFICANT CHANGE UP (ref 1.6–2.6)
MCHC RBC-ENTMCNC: 28.1 PG — SIGNIFICANT CHANGE UP (ref 27–34)
MCHC RBC-ENTMCNC: 29.5 G/DL — LOW (ref 32–36)
MCV RBC AUTO: 95.1 FL — SIGNIFICANT CHANGE UP (ref 80–100)
MONOCYTES # BLD AUTO: 0.23 K/UL — SIGNIFICANT CHANGE UP (ref 0–0.9)
MONOCYTES NFR BLD AUTO: 8.5 % — SIGNIFICANT CHANGE UP (ref 2–14)
NEUTROPHILS # BLD AUTO: 1.67 K/UL — LOW (ref 1.8–7.4)
NEUTROPHILS NFR BLD AUTO: 61.5 % — SIGNIFICANT CHANGE UP (ref 43–77)
NRBC BLD AUTO-RTO: 0 /100 WBCS — SIGNIFICANT CHANGE UP (ref 0–0)
PETH 16:0/18:1: NEGATIVE NG/ML
PETH 16:0/18:2: NEGATIVE NG/ML
PETH COMMENTS: NORMAL
PHOSPHATE SERPL-MCNC: 4.4 MG/DL — SIGNIFICANT CHANGE UP (ref 2.5–4.5)
PLATELET # BLD AUTO: 115 K/UL — LOW (ref 150–400)
POTASSIUM SERPL-MCNC: 5.2 MMOL/L — SIGNIFICANT CHANGE UP (ref 3.5–5.3)
POTASSIUM SERPL-SCNC: 5.2 MMOL/L — SIGNIFICANT CHANGE UP (ref 3.5–5.3)
PROT SERPL-MCNC: 5.8 G/DL — LOW (ref 6–8.3)
PROTHROM AB SERPL-ACNC: 12.5 SEC — SIGNIFICANT CHANGE UP (ref 9.9–13.4)
RBC # BLD: 2.67 M/UL — LOW (ref 3.8–5.2)
RBC # FLD: 16.3 % — HIGH (ref 10.3–14.5)
RH IG SCN BLD-IMP: POSITIVE — SIGNIFICANT CHANGE UP
SODIUM SERPL-SCNC: 138 MMOL/L — SIGNIFICANT CHANGE UP (ref 135–145)
TACROLIMUS SERPL-MCNC: 4.5 NG/ML — SIGNIFICANT CHANGE UP
WBC # BLD: 2.71 K/UL — LOW (ref 3.8–10.5)
WBC # FLD AUTO: 2.71 K/UL — LOW (ref 3.8–10.5)

## 2025-04-18 PROCEDURE — 99232 SBSQ HOSP IP/OBS MODERATE 35: CPT | Mod: GC

## 2025-04-18 RX ORDER — TACROLIMUS 0.5 MG/1
1 CAPSULE ORAL ONCE
Refills: 0 | Status: COMPLETED | OUTPATIENT
Start: 2025-04-18 | End: 2025-04-18

## 2025-04-18 RX ORDER — DIPHENHYDRAMINE HCL 12.5MG/5ML
25 ELIXIR ORAL ONCE
Refills: 0 | Status: COMPLETED | OUTPATIENT
Start: 2025-04-18 | End: 2025-04-18

## 2025-04-18 RX ORDER — LIDOCAINE HYDROCHLORIDE 20 MG/ML
1 JELLY TOPICAL DAILY
Refills: 0 | Status: DISCONTINUED | OUTPATIENT
Start: 2025-04-18 | End: 2025-04-22

## 2025-04-18 RX ORDER — HEPARIN SODIUM 1000 [USP'U]/ML
5000 INJECTION INTRAVENOUS; SUBCUTANEOUS EVERY 12 HOURS
Refills: 0 | Status: DISCONTINUED | OUTPATIENT
Start: 2025-04-18 | End: 2025-04-22

## 2025-04-18 RX ORDER — ACETAMINOPHEN 500 MG/5ML
1000 LIQUID (ML) ORAL EVERY 8 HOURS
Refills: 0 | Status: DISCONTINUED | OUTPATIENT
Start: 2025-04-18 | End: 2025-04-22

## 2025-04-18 RX ORDER — TACROLIMUS 0.5 MG/1
5 CAPSULE ORAL
Refills: 0 | Status: DISCONTINUED | OUTPATIENT
Start: 2025-04-19 | End: 2025-04-21

## 2025-04-18 RX ORDER — SODIUM CHLORIDE 9 G/1000ML
500 INJECTION, SOLUTION INTRAVENOUS ONCE
Refills: 0 | Status: COMPLETED | OUTPATIENT
Start: 2025-04-18 | End: 2025-04-18

## 2025-04-18 RX ORDER — TRAMADOL HYDROCHLORIDE 50 MG/1
25 TABLET, FILM COATED ORAL ONCE
Refills: 0 | Status: DISCONTINUED | OUTPATIENT
Start: 2025-04-18 | End: 2025-04-18

## 2025-04-18 RX ORDER — TRAMADOL HYDROCHLORIDE 50 MG/1
50 TABLET, FILM COATED ORAL EVERY 8 HOURS
Refills: 0 | Status: DISCONTINUED | OUTPATIENT
Start: 2025-04-18 | End: 2025-04-22

## 2025-04-18 RX ORDER — HYDROMORPHONE/SOD CHLOR,ISO/PF 2 MG/10 ML
2 SYRINGE (ML) INJECTION EVERY 6 HOURS
Refills: 0 | Status: DISCONTINUED | OUTPATIENT
Start: 2025-04-18 | End: 2025-04-21

## 2025-04-18 RX ADMIN — Medication 2 MILLIGRAM(S): at 22:05

## 2025-04-18 RX ADMIN — Medication 400 MILLIGRAM(S): at 17:02

## 2025-04-18 RX ADMIN — Medication 2 MILLIGRAM(S): at 13:14

## 2025-04-18 RX ADMIN — Medication 2 TABLET(S): at 21:26

## 2025-04-18 RX ADMIN — Medication 81 MILLIGRAM(S): at 12:37

## 2025-04-18 RX ADMIN — Medication 1 TABLET(S): at 05:16

## 2025-04-18 RX ADMIN — TRAMADOL HYDROCHLORIDE 50 MILLIGRAM(S): 50 TABLET, FILM COATED ORAL at 19:30

## 2025-04-18 RX ADMIN — MYCOPHENOLATE MOFETIL 1000 MILLIGRAM(S): 500 TABLET, FILM COATED ORAL at 19:31

## 2025-04-18 RX ADMIN — HEPARIN SODIUM 5000 UNIT(S): 1000 INJECTION INTRAVENOUS; SUBCUTANEOUS at 17:02

## 2025-04-18 RX ADMIN — TACROLIMUS 4 MILLIGRAM(S): 0.5 CAPSULE ORAL at 09:06

## 2025-04-18 RX ADMIN — TRAMADOL HYDROCHLORIDE 25 MILLIGRAM(S): 50 TABLET, FILM COATED ORAL at 03:24

## 2025-04-18 RX ADMIN — TRAMADOL HYDROCHLORIDE 50 MILLIGRAM(S): 50 TABLET, FILM COATED ORAL at 06:58

## 2025-04-18 RX ADMIN — Medication 1 TABLET(S): at 12:36

## 2025-04-18 RX ADMIN — Medication 25 MILLIGRAM(S): at 21:25

## 2025-04-18 RX ADMIN — SODIUM CHLORIDE 2000 MILLILITER(S): 9 INJECTION, SOLUTION INTRAVENOUS at 10:39

## 2025-04-18 RX ADMIN — TRAMADOL HYDROCHLORIDE 50 MILLIGRAM(S): 50 TABLET, FILM COATED ORAL at 11:28

## 2025-04-18 RX ADMIN — Medication 1 TABLET(S): at 17:02

## 2025-04-18 RX ADMIN — TACROLIMUS 1 MILLIGRAM(S): 0.5 CAPSULE ORAL at 12:37

## 2025-04-18 RX ADMIN — URSODIOL 300 MILLIGRAM(S): 300 CAPSULE ORAL at 05:15

## 2025-04-18 RX ADMIN — LIDOCAINE HYDROCHLORIDE 1 PATCH: 20 JELLY TOPICAL at 19:17

## 2025-04-18 RX ADMIN — TACROLIMUS 4 MILLIGRAM(S): 0.5 CAPSULE ORAL at 19:31

## 2025-04-18 RX ADMIN — TRAMADOL HYDROCHLORIDE 50 MILLIGRAM(S): 50 TABLET, FILM COATED ORAL at 08:00

## 2025-04-18 RX ADMIN — MYCOPHENOLATE MOFETIL 1000 MILLIGRAM(S): 500 TABLET, FILM COATED ORAL at 09:06

## 2025-04-18 RX ADMIN — Medication 2 MILLIGRAM(S): at 14:15

## 2025-04-18 RX ADMIN — PREDNISONE 10 MILLIGRAM(S): 20 TABLET ORAL at 05:15

## 2025-04-18 RX ADMIN — TRAMADOL HYDROCHLORIDE 25 MILLIGRAM(S): 50 TABLET, FILM COATED ORAL at 01:54

## 2025-04-18 RX ADMIN — LIDOCAINE HYDROCHLORIDE 1 PATCH: 20 JELLY TOPICAL at 15:44

## 2025-04-18 RX ADMIN — Medication 400 MILLIGRAM(S): at 09:06

## 2025-04-18 RX ADMIN — TRAMADOL HYDROCHLORIDE 50 MILLIGRAM(S): 50 TABLET, FILM COATED ORAL at 10:39

## 2025-04-18 RX ADMIN — Medication 2 MILLIGRAM(S): at 23:09

## 2025-04-18 RX ADMIN — URSODIOL 300 MILLIGRAM(S): 300 CAPSULE ORAL at 17:02

## 2025-04-18 RX ADMIN — TRAMADOL HYDROCHLORIDE 50 MILLIGRAM(S): 50 TABLET, FILM COATED ORAL at 20:30

## 2025-04-18 NOTE — PHYSICAL THERAPY INITIAL EVALUATION ADULT - GENERAL OBSERVATIONS, REHAB EVAL
Chart reviewed events to date noted. Blood glucose reviewed. Pt tolerated 45min PT initial evaluation well. Rec'd in bed in NAD, agreeable to PT. TLSO donned at EOB; spinal precautions reviewed & maintained.

## 2025-04-18 NOTE — PHYSICAL THERAPY INITIAL EVALUATION ADULT - PERTINENT HX OF CURRENT PROBLEM, REHAB EVAL
63 yo F PMHx  ETOH cirrhosis S/p OLT 1/4/2025 (DCD pump) on ASA/ Eliquis, HTN, L breast stage 1 Invasive lobular carcinoma (HR+ HER2 neg) s/p lumpectomy and R cheek BCC s/p Mohs (2021) Umbilical Hernia Repair 9/2024. Post-op OLT c/b donor + stqph epi bacteremia & s/p treatment of presumed rejection with PO prednisone taper (2/7). Most recently readmitted for pruritis and elevated LFTs last month. Pt. had ERCP completed on 3/21/25 with placement of biliary stent.   Patient presents to the ED for x1 day of excruciating back pain that began after she tried to vaccuum her house. Endorses pain radiates from the R. midback to L. midback and toward her L. abdomen. Endorses some associated nausea and shaking 2/2 pain but denies any active tremor or seizure-like activity. Denies vomiting, endorses regular bowel movements & denies any other focal neurological symptoms. Has known numbness to b/l feet that occurred post-txp likely 2/2 medication.  MRI spine done: new T11 and L3 compression fractures, old L5 compression fx

## 2025-04-18 NOTE — PROGRESS NOTE ADULT - ATTENDING COMMENTS
ALD s/p OLT 1/4/25  Here for spinal compression fractures    Graft function is great. Continue prograf per level, MMF BID (full dose), PPX agents.  Seen by Ortho. Completed Brace fitting.  Seen by PT. D/C home with no skilled PT needs.  Pain management. Can be d/c'd once on a proper pain regimen.   Daily labs. Tacrolimus per level should be obtained 30mins from am dose.      Louise Deleon MD/MPH   Transplant hepatology

## 2025-04-18 NOTE — PHYSICAL THERAPY INITIAL EVALUATION ADULT - ADDITIONAL COMMENTS
Pt lives in private home with spouse, 3 steps to enter, second floor bedroom; independent prior to admission with no AD. pt owns single axis cane

## 2025-04-19 LAB
ALBUMIN SERPL ELPH-MCNC: 3.6 G/DL — SIGNIFICANT CHANGE UP (ref 3.3–5)
ALP SERPL-CCNC: 193 U/L — HIGH (ref 40–120)
ALT FLD-CCNC: 28 U/L — SIGNIFICANT CHANGE UP (ref 10–45)
ANION GAP SERPL CALC-SCNC: 15 MMOL/L — SIGNIFICANT CHANGE UP (ref 5–17)
APTT BLD: 28.8 SEC — SIGNIFICANT CHANGE UP (ref 24.5–35.6)
AST SERPL-CCNC: 15 U/L — SIGNIFICANT CHANGE UP (ref 10–40)
BASOPHILS # BLD AUTO: 0.02 K/UL — SIGNIFICANT CHANGE UP (ref 0–0.2)
BASOPHILS NFR BLD AUTO: 0.6 % — SIGNIFICANT CHANGE UP (ref 0–2)
BILIRUB SERPL-MCNC: 0.4 MG/DL — SIGNIFICANT CHANGE UP (ref 0.2–1.2)
BUN SERPL-MCNC: 20 MG/DL — SIGNIFICANT CHANGE UP (ref 7–23)
CALCIUM SERPL-MCNC: 8.8 MG/DL — SIGNIFICANT CHANGE UP (ref 8.4–10.5)
CHLORIDE SERPL-SCNC: 103 MMOL/L — SIGNIFICANT CHANGE UP (ref 96–108)
CO2 SERPL-SCNC: 21 MMOL/L — LOW (ref 22–31)
CREAT SERPL-MCNC: 1.09 MG/DL — SIGNIFICANT CHANGE UP (ref 0.5–1.3)
EGFR: 57 ML/MIN/1.73M2 — LOW
EGFR: 57 ML/MIN/1.73M2 — LOW
EOSINOPHIL # BLD AUTO: 0.1 K/UL — SIGNIFICANT CHANGE UP (ref 0–0.5)
EOSINOPHIL NFR BLD AUTO: 3.2 % — SIGNIFICANT CHANGE UP (ref 0–6)
GLUCOSE SERPL-MCNC: 111 MG/DL — HIGH (ref 70–99)
HCT VFR BLD CALC: 24.8 % — LOW (ref 34.5–45)
HGB BLD-MCNC: 7.6 G/DL — LOW (ref 11.5–15.5)
IMM GRANULOCYTES NFR BLD AUTO: 0.6 % — SIGNIFICANT CHANGE UP (ref 0–0.9)
INR BLD: 1.08 RATIO — SIGNIFICANT CHANGE UP (ref 0.85–1.16)
LYMPHOCYTES # BLD AUTO: 0.77 K/UL — LOW (ref 1–3.3)
LYMPHOCYTES # BLD AUTO: 24.6 % — SIGNIFICANT CHANGE UP (ref 13–44)
MAGNESIUM SERPL-MCNC: 1.7 MG/DL — SIGNIFICANT CHANGE UP (ref 1.6–2.6)
MCHC RBC-ENTMCNC: 28.8 PG — SIGNIFICANT CHANGE UP (ref 27–34)
MCHC RBC-ENTMCNC: 30.6 G/DL — LOW (ref 32–36)
MCV RBC AUTO: 93.9 FL — SIGNIFICANT CHANGE UP (ref 80–100)
MONOCYTES # BLD AUTO: 0.32 K/UL — SIGNIFICANT CHANGE UP (ref 0–0.9)
MONOCYTES NFR BLD AUTO: 10.2 % — SIGNIFICANT CHANGE UP (ref 2–14)
NEUTROPHILS # BLD AUTO: 1.9 K/UL — SIGNIFICANT CHANGE UP (ref 1.8–7.4)
NEUTROPHILS NFR BLD AUTO: 60.8 % — SIGNIFICANT CHANGE UP (ref 43–77)
NRBC BLD AUTO-RTO: 0 /100 WBCS — SIGNIFICANT CHANGE UP (ref 0–0)
PHOSPHATE SERPL-MCNC: 4.1 MG/DL — SIGNIFICANT CHANGE UP (ref 2.5–4.5)
PLATELET # BLD AUTO: 136 K/UL — LOW (ref 150–400)
POTASSIUM SERPL-MCNC: 4.3 MMOL/L — SIGNIFICANT CHANGE UP (ref 3.5–5.3)
POTASSIUM SERPL-SCNC: 4.3 MMOL/L — SIGNIFICANT CHANGE UP (ref 3.5–5.3)
PROT SERPL-MCNC: 5.9 G/DL — LOW (ref 6–8.3)
PROTHROM AB SERPL-ACNC: 12.4 SEC — SIGNIFICANT CHANGE UP (ref 9.9–13.4)
RBC # BLD: 2.64 M/UL — LOW (ref 3.8–5.2)
RBC # FLD: 16.4 % — HIGH (ref 10.3–14.5)
SODIUM SERPL-SCNC: 139 MMOL/L — SIGNIFICANT CHANGE UP (ref 135–145)
TACROLIMUS SERPL-MCNC: 7.6 NG/ML — SIGNIFICANT CHANGE UP
WBC # BLD: 3.13 K/UL — LOW (ref 3.8–10.5)
WBC # FLD AUTO: 3.13 K/UL — LOW (ref 3.8–10.5)

## 2025-04-19 RX ORDER — DIPHENHYDRAMINE HCL 12.5MG/5ML
25 ELIXIR ORAL EVERY 4 HOURS
Refills: 0 | Status: DISCONTINUED | OUTPATIENT
Start: 2025-04-19 | End: 2025-04-22

## 2025-04-19 RX ORDER — ACETAMINOPHEN 500 MG/5ML
1000 LIQUID (ML) ORAL ONCE
Refills: 0 | Status: COMPLETED | OUTPATIENT
Start: 2025-04-19 | End: 2025-04-19

## 2025-04-19 RX ORDER — GABAPENTIN 400 MG/1
200 CAPSULE ORAL
Refills: 0 | Status: DISCONTINUED | OUTPATIENT
Start: 2025-04-19 | End: 2025-04-20

## 2025-04-19 RX ORDER — POLYETHYLENE GLYCOL 3350 17 G/17G
17 POWDER, FOR SOLUTION ORAL
Refills: 0 | Status: DISCONTINUED | OUTPATIENT
Start: 2025-04-19 | End: 2025-04-22

## 2025-04-19 RX ADMIN — Medication 400 MILLIGRAM(S): at 17:08

## 2025-04-19 RX ADMIN — Medication 2 MILLIGRAM(S): at 05:00

## 2025-04-19 RX ADMIN — Medication 400 MILLIGRAM(S): at 21:38

## 2025-04-19 RX ADMIN — Medication 1 TABLET(S): at 17:08

## 2025-04-19 RX ADMIN — HEPARIN SODIUM 5000 UNIT(S): 1000 INJECTION INTRAVENOUS; SUBCUTANEOUS at 05:31

## 2025-04-19 RX ADMIN — POLYETHYLENE GLYCOL 3350 17 GRAM(S): 17 POWDER, FOR SOLUTION ORAL at 14:22

## 2025-04-19 RX ADMIN — Medication 2 MILLIGRAM(S): at 19:44

## 2025-04-19 RX ADMIN — Medication 2 MILLIGRAM(S): at 21:00

## 2025-04-19 RX ADMIN — LIDOCAINE HYDROCHLORIDE 1 PATCH: 20 JELLY TOPICAL at 22:10

## 2025-04-19 RX ADMIN — LIDOCAINE HYDROCHLORIDE 1 PATCH: 20 JELLY TOPICAL at 11:13

## 2025-04-19 RX ADMIN — TACROLIMUS 5 MILLIGRAM(S): 0.5 CAPSULE ORAL at 07:43

## 2025-04-19 RX ADMIN — Medication 25 MILLIGRAM(S): at 21:39

## 2025-04-19 RX ADMIN — Medication 400 MILLIGRAM(S): at 07:42

## 2025-04-19 RX ADMIN — PREDNISONE 10 MILLIGRAM(S): 20 TABLET ORAL at 05:31

## 2025-04-19 RX ADMIN — URSODIOL 300 MILLIGRAM(S): 300 CAPSULE ORAL at 05:30

## 2025-04-19 RX ADMIN — URSODIOL 300 MILLIGRAM(S): 300 CAPSULE ORAL at 17:08

## 2025-04-19 RX ADMIN — MYCOPHENOLATE MOFETIL 1000 MILLIGRAM(S): 500 TABLET, FILM COATED ORAL at 19:45

## 2025-04-19 RX ADMIN — LIDOCAINE HYDROCHLORIDE 1 PATCH: 20 JELLY TOPICAL at 02:57

## 2025-04-19 RX ADMIN — Medication 2 MILLIGRAM(S): at 03:59

## 2025-04-19 RX ADMIN — Medication 1 TABLET(S): at 11:13

## 2025-04-19 RX ADMIN — Medication 1 TABLET(S): at 05:30

## 2025-04-19 RX ADMIN — TACROLIMUS 4 MILLIGRAM(S): 0.5 CAPSULE ORAL at 19:45

## 2025-04-19 RX ADMIN — Medication 2 TABLET(S): at 21:39

## 2025-04-19 RX ADMIN — MYCOPHENOLATE MOFETIL 1000 MILLIGRAM(S): 500 TABLET, FILM COATED ORAL at 07:42

## 2025-04-19 RX ADMIN — GABAPENTIN 200 MILLIGRAM(S): 400 CAPSULE ORAL at 17:07

## 2025-04-19 RX ADMIN — Medication 1000 MILLIGRAM(S): at 22:13

## 2025-04-19 RX ADMIN — Medication 81 MILLIGRAM(S): at 11:13

## 2025-04-19 RX ADMIN — HEPARIN SODIUM 5000 UNIT(S): 1000 INJECTION INTRAVENOUS; SUBCUTANEOUS at 17:08

## 2025-04-19 RX ADMIN — LIDOCAINE HYDROCHLORIDE 1 PATCH: 20 JELLY TOPICAL at 19:40

## 2025-04-20 LAB
ALBUMIN SERPL ELPH-MCNC: 3.5 G/DL — SIGNIFICANT CHANGE UP (ref 3.3–5)
ALP SERPL-CCNC: 185 U/L — HIGH (ref 40–120)
ALT FLD-CCNC: 29 U/L — SIGNIFICANT CHANGE UP (ref 10–45)
ANION GAP SERPL CALC-SCNC: 12 MMOL/L — SIGNIFICANT CHANGE UP (ref 5–17)
APTT BLD: 28.9 SEC — SIGNIFICANT CHANGE UP (ref 24.5–35.6)
AST SERPL-CCNC: 15 U/L — SIGNIFICANT CHANGE UP (ref 10–40)
BASOPHILS # BLD AUTO: 0.01 K/UL — SIGNIFICANT CHANGE UP (ref 0–0.2)
BASOPHILS NFR BLD AUTO: 0.4 % — SIGNIFICANT CHANGE UP (ref 0–2)
BILIRUB SERPL-MCNC: 0.4 MG/DL — SIGNIFICANT CHANGE UP (ref 0.2–1.2)
BUN SERPL-MCNC: 25 MG/DL — HIGH (ref 7–23)
CALCIUM SERPL-MCNC: 8.8 MG/DL — SIGNIFICANT CHANGE UP (ref 8.4–10.5)
CHLORIDE SERPL-SCNC: 104 MMOL/L — SIGNIFICANT CHANGE UP (ref 96–108)
CO2 SERPL-SCNC: 21 MMOL/L — LOW (ref 22–31)
CREAT SERPL-MCNC: 1.07 MG/DL — SIGNIFICANT CHANGE UP (ref 0.5–1.3)
EGFR: 58 ML/MIN/1.73M2 — LOW
EGFR: 58 ML/MIN/1.73M2 — LOW
EOSINOPHIL # BLD AUTO: 0.07 K/UL — SIGNIFICANT CHANGE UP (ref 0–0.5)
EOSINOPHIL NFR BLD AUTO: 2.6 % — SIGNIFICANT CHANGE UP (ref 0–6)
GLUCOSE SERPL-MCNC: 110 MG/DL — HIGH (ref 70–99)
HCT VFR BLD CALC: 25 % — LOW (ref 34.5–45)
HGB BLD-MCNC: 7.3 G/DL — LOW (ref 11.5–15.5)
IMM GRANULOCYTES NFR BLD AUTO: 0.7 % — SIGNIFICANT CHANGE UP (ref 0–0.9)
INR BLD: 1.08 RATIO — SIGNIFICANT CHANGE UP (ref 0.85–1.16)
LYMPHOCYTES # BLD AUTO: 0.76 K/UL — LOW (ref 1–3.3)
LYMPHOCYTES # BLD AUTO: 28.4 % — SIGNIFICANT CHANGE UP (ref 13–44)
MAGNESIUM SERPL-MCNC: 2.1 MG/DL — SIGNIFICANT CHANGE UP (ref 1.6–2.6)
MCHC RBC-ENTMCNC: 27.5 PG — SIGNIFICANT CHANGE UP (ref 27–34)
MCHC RBC-ENTMCNC: 29.2 G/DL — LOW (ref 32–36)
MCV RBC AUTO: 94.3 FL — SIGNIFICANT CHANGE UP (ref 80–100)
MONOCYTES # BLD AUTO: 0.28 K/UL — SIGNIFICANT CHANGE UP (ref 0–0.9)
MONOCYTES NFR BLD AUTO: 10.4 % — SIGNIFICANT CHANGE UP (ref 2–14)
NEUTROPHILS # BLD AUTO: 1.54 K/UL — LOW (ref 1.8–7.4)
NEUTROPHILS NFR BLD AUTO: 57.5 % — SIGNIFICANT CHANGE UP (ref 43–77)
NRBC BLD AUTO-RTO: 0 /100 WBCS — SIGNIFICANT CHANGE UP (ref 0–0)
PHOSPHATE SERPL-MCNC: 3.9 MG/DL — SIGNIFICANT CHANGE UP (ref 2.5–4.5)
PLATELET # BLD AUTO: 134 K/UL — LOW (ref 150–400)
POTASSIUM SERPL-MCNC: 4.5 MMOL/L — SIGNIFICANT CHANGE UP (ref 3.5–5.3)
POTASSIUM SERPL-SCNC: 4.5 MMOL/L — SIGNIFICANT CHANGE UP (ref 3.5–5.3)
PROT SERPL-MCNC: 5.9 G/DL — LOW (ref 6–8.3)
PROTHROM AB SERPL-ACNC: 12.3 SEC — SIGNIFICANT CHANGE UP (ref 9.9–13.4)
RBC # BLD: 2.65 M/UL — LOW (ref 3.8–5.2)
RBC # FLD: 16.8 % — HIGH (ref 10.3–14.5)
SODIUM SERPL-SCNC: 137 MMOL/L — SIGNIFICANT CHANGE UP (ref 135–145)
TACROLIMUS SERPL-MCNC: 7.1 NG/ML — SIGNIFICANT CHANGE UP
WBC # BLD: 2.68 K/UL — LOW (ref 3.8–10.5)
WBC # FLD AUTO: 2.68 K/UL — LOW (ref 3.8–10.5)

## 2025-04-20 RX ORDER — CALCIUM CARBONATE/VITAMIN D3 500MG-5MCG
1 TABLET ORAL
Qty: 0 | Refills: 0 | DISCHARGE
Start: 2025-04-20

## 2025-04-20 RX ORDER — URSODIOL 300 MG/1
1 CAPSULE ORAL
Qty: 0 | Refills: 0 | DISCHARGE
Start: 2025-04-20

## 2025-04-20 RX ORDER — SULFAMETHOXAZOLE/TRIMETHOPRIM 800-160 MG
1 TABLET ORAL
Qty: 0 | Refills: 0 | DISCHARGE
Start: 2025-04-20

## 2025-04-20 RX ORDER — PREDNISONE 20 MG/1
1 TABLET ORAL
Qty: 0 | Refills: 0 | DISCHARGE
Start: 2025-04-20

## 2025-04-20 RX ORDER — LIDOCAINE HYDROCHLORIDE 20 MG/ML
1 JELLY TOPICAL
Qty: 10 | Refills: 0
Start: 2025-04-20

## 2025-04-20 RX ORDER — MAGNESIUM OXIDE 400 MG
1 TABLET ORAL
Qty: 0 | Refills: 0 | DISCHARGE
Start: 2025-04-20

## 2025-04-20 RX ORDER — SULFAMETHOXAZOLE/TRIMETHOPRIM 800-160 MG
1 TABLET ORAL
Refills: 0 | DISCHARGE

## 2025-04-20 RX ORDER — ASPIRIN 325 MG
1 TABLET ORAL
Qty: 0 | Refills: 0 | DISCHARGE
Start: 2025-04-20

## 2025-04-20 RX ADMIN — Medication 400 MILLIGRAM(S): at 08:38

## 2025-04-20 RX ADMIN — POLYETHYLENE GLYCOL 3350 17 GRAM(S): 17 POWDER, FOR SOLUTION ORAL at 05:21

## 2025-04-20 RX ADMIN — MYCOPHENOLATE MOFETIL 1000 MILLIGRAM(S): 500 TABLET, FILM COATED ORAL at 20:24

## 2025-04-20 RX ADMIN — TACROLIMUS 5 MILLIGRAM(S): 0.5 CAPSULE ORAL at 08:38

## 2025-04-20 RX ADMIN — HEPARIN SODIUM 5000 UNIT(S): 1000 INJECTION INTRAVENOUS; SUBCUTANEOUS at 17:16

## 2025-04-20 RX ADMIN — TRAMADOL HYDROCHLORIDE 50 MILLIGRAM(S): 50 TABLET, FILM COATED ORAL at 21:30

## 2025-04-20 RX ADMIN — LIDOCAINE HYDROCHLORIDE 1 PATCH: 20 JELLY TOPICAL at 11:27

## 2025-04-20 RX ADMIN — Medication 25 MILLIGRAM(S): at 21:12

## 2025-04-20 RX ADMIN — LIDOCAINE HYDROCHLORIDE 1 PATCH: 20 JELLY TOPICAL at 20:15

## 2025-04-20 RX ADMIN — GABAPENTIN 200 MILLIGRAM(S): 400 CAPSULE ORAL at 05:20

## 2025-04-20 RX ADMIN — HEPARIN SODIUM 5000 UNIT(S): 1000 INJECTION INTRAVENOUS; SUBCUTANEOUS at 05:22

## 2025-04-20 RX ADMIN — TACROLIMUS 4 MILLIGRAM(S): 0.5 CAPSULE ORAL at 20:24

## 2025-04-20 RX ADMIN — URSODIOL 300 MILLIGRAM(S): 300 CAPSULE ORAL at 05:22

## 2025-04-20 RX ADMIN — Medication 400 MILLIGRAM(S): at 17:15

## 2025-04-20 RX ADMIN — Medication 2 MILLIGRAM(S): at 03:15

## 2025-04-20 RX ADMIN — Medication 2 MILLIGRAM(S): at 02:24

## 2025-04-20 RX ADMIN — Medication 2 TABLET(S): at 21:12

## 2025-04-20 RX ADMIN — Medication 1 TABLET(S): at 17:15

## 2025-04-20 RX ADMIN — Medication 1 TABLET(S): at 11:28

## 2025-04-20 RX ADMIN — Medication 81 MILLIGRAM(S): at 14:32

## 2025-04-20 RX ADMIN — MYCOPHENOLATE MOFETIL 1000 MILLIGRAM(S): 500 TABLET, FILM COATED ORAL at 08:38

## 2025-04-20 RX ADMIN — Medication 2 MILLIGRAM(S): at 08:38

## 2025-04-20 RX ADMIN — URSODIOL 300 MILLIGRAM(S): 300 CAPSULE ORAL at 17:15

## 2025-04-20 RX ADMIN — PREDNISONE 10 MILLIGRAM(S): 20 TABLET ORAL at 05:22

## 2025-04-20 RX ADMIN — LIDOCAINE HYDROCHLORIDE 1 PATCH: 20 JELLY TOPICAL at 23:00

## 2025-04-20 RX ADMIN — Medication 1 TABLET(S): at 05:22

## 2025-04-20 RX ADMIN — Medication 2 MILLIGRAM(S): at 15:02

## 2025-04-20 RX ADMIN — POLYETHYLENE GLYCOL 3350 17 GRAM(S): 17 POWDER, FOR SOLUTION ORAL at 17:16

## 2025-04-20 RX ADMIN — TRAMADOL HYDROCHLORIDE 50 MILLIGRAM(S): 50 TABLET, FILM COATED ORAL at 20:24

## 2025-04-20 RX ADMIN — Medication 2 MILLIGRAM(S): at 14:31

## 2025-04-20 RX ADMIN — Medication 2 MILLIGRAM(S): at 09:15

## 2025-04-20 NOTE — DISCHARGE NOTE PROVIDER - HOSPITAL COURSE
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 Off Letrazole now), R cheek BCC s/p Mohs (2021), Umbilical Hernia Repair 9/2024. and HTN presenting with acute onset of lower back pain, was found to have new T11 compression fracture, T8-T9 broad right central andT9-T10 broad left central disc protrusions (disc herniations), new L3 compression fracture, and old L5 compression fracture on MRI. Patient was evaluated by neurosurgery, no acute intervention required. Patient was fitted for a TLSO brace, worked with physical therapy, and was managed with acetaminophen, tramadol, dilaudid for pain.  Graft function was maintained during this admission and patient was deemed safe for discharge by the multidisciplinary team with the following plan:   - FK --- , MMF 1BID, Pred 10   - pain reg:       Follow up in Transplant Clinic as scheduled with Dr Hand 4/24/25.  Follow up with Neurosurgeon Dr. Sadiq Brooks in 2-3 weeks. Call to schedule an appointment [phone].    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 Off Letrazole now), R cheek BCC s/p Mohs (2021), Umbilical Hernia Repair 9/2024 and HTN presenting with acute onset of lower back pain, was found to have new T11 compression fracture, T8-T9 broad right central andT9-T10 broad left central disc protrusions (disc herniations), new L3 compression fracture, and old L5 compression fracture on MRI. Patient was evaluated by neurosurgery, no acute intervention required. Patient was fitted for a TLSO brace, worked with physical therapy, and was managed with acetaminophen, tramadol, dilaudid for pain.  Graft function was maintained during this admission and patient was deemed safe for discharge by the multidisciplinary team with the following plan:   - FK 4mg bid, MMF 1BID, Pred 10       Follow up in Transplant Clinic as scheduled with Dr Hand 4/24/25.  Follow up with Neurosurgeon Dr. Sadiq Brooks in 2-3 weeks. Call to schedule an appointment [phone].

## 2025-04-20 NOTE — DISCHARGE NOTE PROVIDER - NSDCFUADDAPPT_GEN_ALL_CORE_FT
Follow up in Transplant Clinic as scheduled.   Follow up with Neurosurgeon Dr. Sadiq Brooks in 2-3 weeks. Call to schedule an appointment [phone].

## 2025-04-20 NOTE — DISCHARGE NOTE PROVIDER - NSDCCPCAREPLAN_GEN_ALL_CORE_FT
PRINCIPAL DISCHARGE DIAGNOSIS  Diagnosis: Compression fracture of vertebrae  Assessment and Plan of Treatment: CT and MR imaging of your spine were done and reveleaved new compression fracture of T11 and L#, as well as and old compression fracture of L5. You were seen by the neurosurgery team, who recommended pain manegment.   Please use your TLSO brace as instructed and follow up with neurosurgery outpatient.   Continue taking your [place] supplement.   Please go to ED if you develop worsening back pain, loss of control of urinary or bowel function, or any similar symptoms.        SECONDARY DISCHARGE DIAGNOSES  Diagnosis: Transplanted liver  Assessment and Plan of Treatment: Continue taking your immunosuppression as instructed.   Abstain from Alcohol.   Call transplant clinic or report to ED with any changes in health including fever, chills, abdominal pain, N/V, chest pain, shortness of breath.

## 2025-04-20 NOTE — DISCHARGE NOTE PROVIDER - NSDCFUSCHEDAPPT_GEN_ALL_CORE_FT
Rene Hand  [facility] Physician [facility]  HEPATOLOGY [address]   Scheduled Appointment: 04/24/2025    Chandana Marlow  [facility] Physician [facility]  [address]  Scheduled Appointment: 05/30/2025

## 2025-04-20 NOTE — DISCHARGE NOTE PROVIDER - NSDCMRMEDTOKEN_GEN_ALL_CORE_FT
apixaban 2.5 mg oral tablet: 1 tab(s) orally 2 times a day  aspirin 81 mg oral tablet, chewable: 1 tab(s) orally once a day  atovaquone 750 mg/5 mL oral suspension: 10 milliliter(s) orally once a day  lidocaine 4% topical film: Apply topically to affected area  magnesium oxide 400 mg oral tablet: 1 tab(s) orally 2 times a day (with meals)  mycophenolate mofetil 250 mg oral capsule: 500 milligram(s) orally 2 times a day  NexIUM 40 mg oral delayed release capsule: 1 cap(s) orally once a day  predniSONE 10 mg oral tablet: 1 tab(s) orally once a day  sulfamethoxazole-trimethoprim 400 mg-80 mg oral tablet: 1 tab(s) orally once a day  tacrolimus 1 mg oral capsule: 3 cap(s) orally 2 times a day  TSLO brace: as instructed  TSLO brace: as instructed  ursodiol 300 mg oral capsule: 1 cap(s) orally 2 times a day  vitamin D-calcium (as carbonate) 5 mcg-500 mg oral tablet: 1 tab(s) orally 2 times a day   acetaminophen 500 mg oral tablet: 2 tab(s) orally every 8 hours As needed Moderate Pain (4 - 6)  aspirin 81 mg oral tablet, chewable: 1 tab(s) orally once a day  gabapentin 300 mg oral capsule: 1 cap(s) orally 2 times a day MDD: 2  magnesium oxide 400 mg oral tablet: 1 tab(s) orally 2 times a day (with meals)  mycophenolate mofetil 250 mg oral capsule: 1 gram(s) orally 2 times a day  NexIUM 40 mg oral delayed release capsule: 1 cap(s) orally once a day  predniSONE 10 mg oral tablet: 1 tab(s) orally once a day  sulfamethoxazole-trimethoprim 400 mg-80 mg oral tablet: 1 tab(s) orally once a day  tacrolimus 1 mg oral capsule: 4 cap(s) orally 2 times a day  traMADol 50 mg oral tablet: 1 tab(s) orally every 8 hours as needed for Moderate Pain (4 - 6) MDD: 3  TSLO brace: as instructed  ursodiol 300 mg oral capsule: 1 cap(s) orally 2 times a day  vitamin D-calcium (as carbonate) 5 mcg-500 mg oral tablet: 1 tab(s) orally 2 times a day

## 2025-04-20 NOTE — DISCHARGE NOTE PROVIDER - CARE PROVIDER_API CALL
Rene Hand  Transplant Hepatology  [address] [place] [id number]  Phone: [phone]  Fax: [phone]  Follow Up Time: 1-3 days

## 2025-04-21 LAB
ALBUMIN SERPL ELPH-MCNC: 3.6 G/DL — SIGNIFICANT CHANGE UP (ref 3.3–5)
ALP SERPL-CCNC: 177 U/L — HIGH (ref 40–120)
ALT FLD-CCNC: 23 U/L — SIGNIFICANT CHANGE UP (ref 10–45)
ANION GAP SERPL CALC-SCNC: 13 MMOL/L — SIGNIFICANT CHANGE UP (ref 5–17)
ANISOCYTOSIS BLD QL: SLIGHT — SIGNIFICANT CHANGE UP
APTT BLD: 28.8 SEC — SIGNIFICANT CHANGE UP (ref 24.5–35.6)
AST SERPL-CCNC: 13 U/L — SIGNIFICANT CHANGE UP (ref 10–40)
BASOPHILS # BLD AUTO: 0 K/UL — SIGNIFICANT CHANGE UP (ref 0–0.2)
BASOPHILS NFR BLD AUTO: 0 % — SIGNIFICANT CHANGE UP (ref 0–2)
BILIRUB SERPL-MCNC: 0.4 MG/DL — SIGNIFICANT CHANGE UP (ref 0.2–1.2)
BLD GP AB SCN SERPL QL: NEGATIVE — SIGNIFICANT CHANGE UP
BUN SERPL-MCNC: 23 MG/DL — SIGNIFICANT CHANGE UP (ref 7–23)
CALCIUM SERPL-MCNC: 9 MG/DL — SIGNIFICANT CHANGE UP (ref 8.4–10.5)
CHLORIDE SERPL-SCNC: 104 MMOL/L — SIGNIFICANT CHANGE UP (ref 96–108)
CO2 SERPL-SCNC: 22 MMOL/L — SIGNIFICANT CHANGE UP (ref 22–31)
CREAT SERPL-MCNC: 0.99 MG/DL — SIGNIFICANT CHANGE UP (ref 0.5–1.3)
DACRYOCYTES BLD QL SMEAR: SLIGHT — SIGNIFICANT CHANGE UP
EGFR: 64 ML/MIN/1.73M2 — SIGNIFICANT CHANGE UP
EGFR: 64 ML/MIN/1.73M2 — SIGNIFICANT CHANGE UP
EOSINOPHIL # BLD AUTO: 0.05 K/UL — SIGNIFICANT CHANGE UP (ref 0–0.5)
EOSINOPHIL NFR BLD AUTO: 1.8 % — SIGNIFICANT CHANGE UP (ref 0–6)
GLUCOSE SERPL-MCNC: 108 MG/DL — HIGH (ref 70–99)
HCT VFR BLD CALC: 25 % — LOW (ref 34.5–45)
HGB BLD-MCNC: 7.6 G/DL — LOW (ref 11.5–15.5)
INR BLD: 1.1 RATIO — SIGNIFICANT CHANGE UP (ref 0.85–1.16)
LYMPHOCYTES # BLD AUTO: 0.75 K/UL — LOW (ref 1–3.3)
LYMPHOCYTES # BLD AUTO: 26.8 % — SIGNIFICANT CHANGE UP (ref 13–44)
MAGNESIUM SERPL-MCNC: 1.9 MG/DL — SIGNIFICANT CHANGE UP (ref 1.6–2.6)
MANUAL SMEAR VERIFICATION: SIGNIFICANT CHANGE UP
MCHC RBC-ENTMCNC: 28.4 PG — SIGNIFICANT CHANGE UP (ref 27–34)
MCHC RBC-ENTMCNC: 30.4 G/DL — LOW (ref 32–36)
MCV RBC AUTO: 93.3 FL — SIGNIFICANT CHANGE UP (ref 80–100)
MONOCYTES # BLD AUTO: 0.22 K/UL — SIGNIFICANT CHANGE UP (ref 0–0.9)
MONOCYTES NFR BLD AUTO: 8 % — SIGNIFICANT CHANGE UP (ref 2–14)
NEUTROPHILS # BLD AUTO: 1.78 K/UL — LOW (ref 1.8–7.4)
NEUTROPHILS NFR BLD AUTO: 63.4 % — SIGNIFICANT CHANGE UP (ref 43–77)
OVALOCYTES BLD QL SMEAR: SLIGHT — SIGNIFICANT CHANGE UP
PHOSPHATE SERPL-MCNC: 4 MG/DL — SIGNIFICANT CHANGE UP (ref 2.5–4.5)
PLAT MORPH BLD: NORMAL — SIGNIFICANT CHANGE UP
PLATELET # BLD AUTO: 144 K/UL — LOW (ref 150–400)
POIKILOCYTOSIS BLD QL AUTO: SLIGHT — SIGNIFICANT CHANGE UP
POLYCHROMASIA BLD QL SMEAR: SLIGHT — SIGNIFICANT CHANGE UP
POTASSIUM SERPL-MCNC: 4.3 MMOL/L — SIGNIFICANT CHANGE UP (ref 3.5–5.3)
POTASSIUM SERPL-SCNC: 4.3 MMOL/L — SIGNIFICANT CHANGE UP (ref 3.5–5.3)
PROT SERPL-MCNC: 5.8 G/DL — LOW (ref 6–8.3)
PROTHROM AB SERPL-ACNC: 12.5 SEC — SIGNIFICANT CHANGE UP (ref 9.9–13.4)
RBC # BLD: 2.68 M/UL — LOW (ref 3.8–5.2)
RBC # FLD: 17 % — HIGH (ref 10.3–14.5)
RBC BLD AUTO: ABNORMAL
RH IG SCN BLD-IMP: POSITIVE — SIGNIFICANT CHANGE UP
SODIUM SERPL-SCNC: 139 MMOL/L — SIGNIFICANT CHANGE UP (ref 135–145)
TACROLIMUS SERPL-MCNC: 8 NG/ML — SIGNIFICANT CHANGE UP
WBC # BLD: 2.8 K/UL — LOW (ref 3.8–10.5)
WBC # FLD AUTO: 2.8 K/UL — LOW (ref 3.8–10.5)

## 2025-04-21 RX ORDER — GABAPENTIN 400 MG/1
300 CAPSULE ORAL
Refills: 0 | Status: DISCONTINUED | OUTPATIENT
Start: 2025-04-21 | End: 2025-04-22

## 2025-04-21 RX ORDER — TACROLIMUS 0.5 MG/1
4 CAPSULE ORAL
Refills: 0 | Status: DISCONTINUED | OUTPATIENT
Start: 2025-04-22 | End: 2025-04-22

## 2025-04-21 RX ORDER — TACROLIMUS 0.5 MG/1
3 CAPSULE ORAL
Refills: 0 | Status: COMPLETED | OUTPATIENT
Start: 2025-04-21 | End: 2025-04-21

## 2025-04-21 RX ORDER — TACROLIMUS 0.5 MG/1
4 CAPSULE ORAL
Refills: 0 | Status: DISCONTINUED | OUTPATIENT
Start: 2025-04-21 | End: 2025-04-21

## 2025-04-21 RX ADMIN — LIDOCAINE HYDROCHLORIDE 1 PATCH: 20 JELLY TOPICAL at 19:00

## 2025-04-21 RX ADMIN — TACROLIMUS 5 MILLIGRAM(S): 0.5 CAPSULE ORAL at 08:11

## 2025-04-21 RX ADMIN — Medication 400 MILLIGRAM(S): at 17:27

## 2025-04-21 RX ADMIN — MYCOPHENOLATE MOFETIL 1000 MILLIGRAM(S): 500 TABLET, FILM COATED ORAL at 08:12

## 2025-04-21 RX ADMIN — PREDNISONE 10 MILLIGRAM(S): 20 TABLET ORAL at 04:51

## 2025-04-21 RX ADMIN — POLYETHYLENE GLYCOL 3350 17 GRAM(S): 17 POWDER, FOR SOLUTION ORAL at 04:52

## 2025-04-21 RX ADMIN — LIDOCAINE HYDROCHLORIDE 1 PATCH: 20 JELLY TOPICAL at 23:00

## 2025-04-21 RX ADMIN — Medication 2 TABLET(S): at 21:43

## 2025-04-21 RX ADMIN — GABAPENTIN 300 MILLIGRAM(S): 400 CAPSULE ORAL at 17:27

## 2025-04-21 RX ADMIN — TACROLIMUS 3 MILLIGRAM(S): 0.5 CAPSULE ORAL at 19:55

## 2025-04-21 RX ADMIN — TRAMADOL HYDROCHLORIDE 50 MILLIGRAM(S): 50 TABLET, FILM COATED ORAL at 22:43

## 2025-04-21 RX ADMIN — TRAMADOL HYDROCHLORIDE 50 MILLIGRAM(S): 50 TABLET, FILM COATED ORAL at 21:43

## 2025-04-21 RX ADMIN — TRAMADOL HYDROCHLORIDE 50 MILLIGRAM(S): 50 TABLET, FILM COATED ORAL at 12:47

## 2025-04-21 RX ADMIN — URSODIOL 300 MILLIGRAM(S): 300 CAPSULE ORAL at 04:51

## 2025-04-21 RX ADMIN — HEPARIN SODIUM 5000 UNIT(S): 1000 INJECTION INTRAVENOUS; SUBCUTANEOUS at 04:52

## 2025-04-21 RX ADMIN — Medication 400 MILLIGRAM(S): at 08:12

## 2025-04-21 RX ADMIN — Medication 2 MILLIGRAM(S): at 06:38

## 2025-04-21 RX ADMIN — Medication 1 TABLET(S): at 04:51

## 2025-04-21 RX ADMIN — TRAMADOL HYDROCHLORIDE 50 MILLIGRAM(S): 50 TABLET, FILM COATED ORAL at 13:47

## 2025-04-21 RX ADMIN — LIDOCAINE HYDROCHLORIDE 1 PATCH: 20 JELLY TOPICAL at 11:21

## 2025-04-21 RX ADMIN — MYCOPHENOLATE MOFETIL 1000 MILLIGRAM(S): 500 TABLET, FILM COATED ORAL at 19:55

## 2025-04-21 RX ADMIN — Medication 2 MILLIGRAM(S): at 07:38

## 2025-04-21 RX ADMIN — Medication 1 TABLET(S): at 11:20

## 2025-04-21 RX ADMIN — URSODIOL 300 MILLIGRAM(S): 300 CAPSULE ORAL at 17:27

## 2025-04-21 RX ADMIN — HEPARIN SODIUM 5000 UNIT(S): 1000 INJECTION INTRAVENOUS; SUBCUTANEOUS at 17:28

## 2025-04-21 RX ADMIN — Medication 81 MILLIGRAM(S): at 11:20

## 2025-04-21 RX ADMIN — Medication 1 TABLET(S): at 17:27

## 2025-04-22 ENCOUNTER — TRANSCRIPTION ENCOUNTER (OUTPATIENT)
Age: 65
End: 2025-04-22

## 2025-04-22 VITALS
DIASTOLIC BLOOD PRESSURE: 82 MMHG | TEMPERATURE: 98 F | OXYGEN SATURATION: 99 % | HEART RATE: 69 BPM | RESPIRATION RATE: 18 BRPM | SYSTOLIC BLOOD PRESSURE: 149 MMHG

## 2025-04-22 LAB
ALBUMIN SERPL ELPH-MCNC: 3.5 G/DL — SIGNIFICANT CHANGE UP (ref 3.3–5)
ALP SERPL-CCNC: 192 U/L — HIGH (ref 40–120)
ALT FLD-CCNC: 30 U/L — SIGNIFICANT CHANGE UP (ref 10–45)
ANION GAP SERPL CALC-SCNC: 15 MMOL/L — SIGNIFICANT CHANGE UP (ref 5–17)
APTT BLD: 29.2 SEC — SIGNIFICANT CHANGE UP (ref 24.5–35.6)
AST SERPL-CCNC: 32 U/L — SIGNIFICANT CHANGE UP (ref 10–40)
BASOPHILS # BLD AUTO: 0.01 K/UL — SIGNIFICANT CHANGE UP (ref 0–0.2)
BASOPHILS NFR BLD AUTO: 0.3 % — SIGNIFICANT CHANGE UP (ref 0–2)
BILIRUB SERPL-MCNC: 0.5 MG/DL — SIGNIFICANT CHANGE UP (ref 0.2–1.2)
BUN SERPL-MCNC: 28 MG/DL — HIGH (ref 7–23)
CALCIUM SERPL-MCNC: 8.9 MG/DL — SIGNIFICANT CHANGE UP (ref 8.4–10.5)
CHLORIDE SERPL-SCNC: 104 MMOL/L — SIGNIFICANT CHANGE UP (ref 96–108)
CO2 SERPL-SCNC: 20 MMOL/L — LOW (ref 22–31)
CREAT SERPL-MCNC: 1.08 MG/DL — SIGNIFICANT CHANGE UP (ref 0.5–1.3)
CULTURE RESULTS: SIGNIFICANT CHANGE UP
CULTURE RESULTS: SIGNIFICANT CHANGE UP
EGFR: 58 ML/MIN/1.73M2 — LOW
EGFR: 58 ML/MIN/1.73M2 — LOW
EOSINOPHIL # BLD AUTO: 0.08 K/UL — SIGNIFICANT CHANGE UP (ref 0–0.5)
EOSINOPHIL NFR BLD AUTO: 2.7 % — SIGNIFICANT CHANGE UP (ref 0–6)
GLUCOSE SERPL-MCNC: 94 MG/DL — SIGNIFICANT CHANGE UP (ref 70–99)
HCT VFR BLD CALC: 26.3 % — LOW (ref 34.5–45)
HGB BLD-MCNC: 7.9 G/DL — LOW (ref 11.5–15.5)
IMM GRANULOCYTES NFR BLD AUTO: 1 % — HIGH (ref 0–0.9)
INR BLD: 1.12 RATIO — SIGNIFICANT CHANGE UP (ref 0.85–1.16)
LYMPHOCYTES # BLD AUTO: 0.89 K/UL — LOW (ref 1–3.3)
LYMPHOCYTES # BLD AUTO: 30.1 % — SIGNIFICANT CHANGE UP (ref 13–44)
MAGNESIUM SERPL-MCNC: 1.9 MG/DL — SIGNIFICANT CHANGE UP (ref 1.6–2.6)
MCHC RBC-ENTMCNC: 27.9 PG — SIGNIFICANT CHANGE UP (ref 27–34)
MCHC RBC-ENTMCNC: 30 G/DL — LOW (ref 32–36)
MCV RBC AUTO: 92.9 FL — SIGNIFICANT CHANGE UP (ref 80–100)
MONOCYTES # BLD AUTO: 0.3 K/UL — SIGNIFICANT CHANGE UP (ref 0–0.9)
MONOCYTES NFR BLD AUTO: 10.1 % — SIGNIFICANT CHANGE UP (ref 2–14)
NEUTROPHILS # BLD AUTO: 1.65 K/UL — LOW (ref 1.8–7.4)
NEUTROPHILS NFR BLD AUTO: 55.8 % — SIGNIFICANT CHANGE UP (ref 43–77)
NRBC BLD AUTO-RTO: 0 /100 WBCS — SIGNIFICANT CHANGE UP (ref 0–0)
PHOSPHATE SERPL-MCNC: 4.8 MG/DL — HIGH (ref 2.5–4.5)
PLATELET # BLD AUTO: 166 K/UL — SIGNIFICANT CHANGE UP (ref 150–400)
POTASSIUM SERPL-MCNC: 4.6 MMOL/L — SIGNIFICANT CHANGE UP (ref 3.5–5.3)
POTASSIUM SERPL-SCNC: 4.6 MMOL/L — SIGNIFICANT CHANGE UP (ref 3.5–5.3)
PROT SERPL-MCNC: 5.9 G/DL — LOW (ref 6–8.3)
PROTHROM AB SERPL-ACNC: 12.9 SEC — SIGNIFICANT CHANGE UP (ref 9.9–13.4)
RBC # BLD: 2.83 M/UL — LOW (ref 3.8–5.2)
RBC # FLD: 17.1 % — HIGH (ref 10.3–14.5)
SODIUM SERPL-SCNC: 139 MMOL/L — SIGNIFICANT CHANGE UP (ref 135–145)
SPECIMEN SOURCE: SIGNIFICANT CHANGE UP
SPECIMEN SOURCE: SIGNIFICANT CHANGE UP
TACROLIMUS SERPL-MCNC: 7.4 NG/ML — SIGNIFICANT CHANGE UP
WBC # BLD: 2.96 K/UL — LOW (ref 3.8–10.5)
WBC # FLD AUTO: 2.96 K/UL — LOW (ref 3.8–10.5)

## 2025-04-22 PROCEDURE — 74177 CT ABD & PELVIS W/CONTRAST: CPT | Mod: MC

## 2025-04-22 PROCEDURE — 86901 BLOOD TYPING SEROLOGIC RH(D): CPT

## 2025-04-22 PROCEDURE — 72158 MRI LUMBAR SPINE W/O & W/DYE: CPT | Mod: MC

## 2025-04-22 PROCEDURE — 93975 VASCULAR STUDY: CPT

## 2025-04-22 PROCEDURE — 96376 TX/PRO/DX INJ SAME DRUG ADON: CPT

## 2025-04-22 PROCEDURE — 96374 THER/PROPH/DIAG INJ IV PUSH: CPT

## 2025-04-22 PROCEDURE — 87040 BLOOD CULTURE FOR BACTERIA: CPT

## 2025-04-22 PROCEDURE — 81003 URINALYSIS AUTO W/O SCOPE: CPT

## 2025-04-22 PROCEDURE — 86900 BLOOD TYPING SEROLOGIC ABO: CPT

## 2025-04-22 PROCEDURE — 80053 COMPREHEN METABOLIC PANEL: CPT

## 2025-04-22 PROCEDURE — 97110 THERAPEUTIC EXERCISES: CPT

## 2025-04-22 PROCEDURE — 97161 PT EVAL LOW COMPLEX 20 MIN: CPT

## 2025-04-22 PROCEDURE — 76705 ECHO EXAM OF ABDOMEN: CPT

## 2025-04-22 PROCEDURE — 99285 EMERGENCY DEPT VISIT HI MDM: CPT

## 2025-04-22 PROCEDURE — 85610 PROTHROMBIN TIME: CPT

## 2025-04-22 PROCEDURE — 72157 MRI CHEST SPINE W/O & W/DYE: CPT | Mod: MC

## 2025-04-22 PROCEDURE — 80197 ASSAY OF TACROLIMUS: CPT

## 2025-04-22 PROCEDURE — 85027 COMPLETE CBC AUTOMATED: CPT

## 2025-04-22 PROCEDURE — 97116 GAIT TRAINING THERAPY: CPT

## 2025-04-22 PROCEDURE — A9585: CPT

## 2025-04-22 PROCEDURE — 86850 RBC ANTIBODY SCREEN: CPT

## 2025-04-22 PROCEDURE — 85025 COMPLETE CBC W/AUTO DIFF WBC: CPT

## 2025-04-22 PROCEDURE — 83690 ASSAY OF LIPASE: CPT

## 2025-04-22 PROCEDURE — 84100 ASSAY OF PHOSPHORUS: CPT

## 2025-04-22 PROCEDURE — 36415 COLL VENOUS BLD VENIPUNCTURE: CPT

## 2025-04-22 PROCEDURE — 87637 SARSCOV2&INF A&B&RSV AMP PRB: CPT

## 2025-04-22 PROCEDURE — 83735 ASSAY OF MAGNESIUM: CPT

## 2025-04-22 PROCEDURE — 85730 THROMBOPLASTIN TIME PARTIAL: CPT

## 2025-04-22 PROCEDURE — 96375 TX/PRO/DX INJ NEW DRUG ADDON: CPT

## 2025-04-22 RX ORDER — ACETAMINOPHEN 500 MG/5ML
2 LIQUID (ML) ORAL
Qty: 0 | Refills: 0 | DISCHARGE
Start: 2025-04-22

## 2025-04-22 RX ORDER — MYCOPHENOLATE MOFETIL 500 MG/1
1 TABLET, FILM COATED ORAL
Qty: 0 | Refills: 0 | DISCHARGE
Start: 2025-04-22

## 2025-04-22 RX ORDER — LIDOCAINE HYDROCHLORIDE 20 MG/ML
1 JELLY TOPICAL
Qty: 2 | Refills: 0
Start: 2025-04-22 | End: 2025-05-21

## 2025-04-22 RX ORDER — GABAPENTIN 400 MG/1
1 CAPSULE ORAL
Qty: 60 | Refills: 0
Start: 2025-04-22 | End: 2025-05-21

## 2025-04-22 RX ORDER — TRAMADOL HYDROCHLORIDE 50 MG/1
1 TABLET, FILM COATED ORAL
Qty: 21 | Refills: 0
Start: 2025-04-22 | End: 2025-04-28

## 2025-04-22 RX ORDER — TACROLIMUS 0.5 MG/1
4 CAPSULE ORAL
Qty: 0 | Refills: 0 | DISCHARGE
Start: 2025-04-22

## 2025-04-22 RX ORDER — ACETAMINOPHEN 500 MG/5ML
2 LIQUID (ML) ORAL
Qty: 60 | Refills: 0
Start: 2025-04-22 | End: 2025-05-01

## 2025-04-22 RX ADMIN — URSODIOL 300 MILLIGRAM(S): 300 CAPSULE ORAL at 05:28

## 2025-04-22 RX ADMIN — TRAMADOL HYDROCHLORIDE 50 MILLIGRAM(S): 50 TABLET, FILM COATED ORAL at 07:55

## 2025-04-22 RX ADMIN — Medication 400 MILLIGRAM(S): at 07:55

## 2025-04-22 RX ADMIN — Medication 1000 MILLIGRAM(S): at 00:55

## 2025-04-22 RX ADMIN — GABAPENTIN 300 MILLIGRAM(S): 400 CAPSULE ORAL at 05:28

## 2025-04-22 RX ADMIN — HEPARIN SODIUM 5000 UNIT(S): 1000 INJECTION INTRAVENOUS; SUBCUTANEOUS at 05:29

## 2025-04-22 RX ADMIN — Medication 81 MILLIGRAM(S): at 11:09

## 2025-04-22 RX ADMIN — MYCOPHENOLATE MOFETIL 1000 MILLIGRAM(S): 500 TABLET, FILM COATED ORAL at 07:55

## 2025-04-22 RX ADMIN — TRAMADOL HYDROCHLORIDE 50 MILLIGRAM(S): 50 TABLET, FILM COATED ORAL at 15:56

## 2025-04-22 RX ADMIN — Medication 1 TABLET(S): at 05:28

## 2025-04-22 RX ADMIN — TRAMADOL HYDROCHLORIDE 50 MILLIGRAM(S): 50 TABLET, FILM COATED ORAL at 06:55

## 2025-04-22 RX ADMIN — LIDOCAINE HYDROCHLORIDE 1 PATCH: 20 JELLY TOPICAL at 11:10

## 2025-04-22 RX ADMIN — PREDNISONE 10 MILLIGRAM(S): 20 TABLET ORAL at 05:27

## 2025-04-22 RX ADMIN — TRAMADOL HYDROCHLORIDE 50 MILLIGRAM(S): 50 TABLET, FILM COATED ORAL at 14:56

## 2025-04-22 RX ADMIN — Medication 1000 MILLIGRAM(S): at 01:55

## 2025-04-22 RX ADMIN — POLYETHYLENE GLYCOL 3350 17 GRAM(S): 17 POWDER, FOR SOLUTION ORAL at 05:28

## 2025-04-22 RX ADMIN — Medication 1 TABLET(S): at 11:10

## 2025-04-22 RX ADMIN — TACROLIMUS 4 MILLIGRAM(S): 0.5 CAPSULE ORAL at 07:55

## 2025-04-22 NOTE — DISCHARGE NOTE NURSING/CASE MANAGEMENT/SOCIAL WORK - PATIENT PORTAL LINK FT
You can access the FollowMyHealth Patient Portal offered by [facility] by registering at the following website: [email or web address]. By joining [employer]’s FollowMyHealth portal, you will also be able to view your health information using other applications (apps) compatible with our system.

## 2025-04-22 NOTE — PROGRESS NOTE ADULT - ASSESSMENT
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 Off Letrazole now), R cheek BCC s/p Mohs (2021), Umbilical Hernia Repair 9/2024. and HTN presenting with acute onset of lower back pain.     #Acute compression fracture  1 day of acute lower back pain. CT lumbar with new compression deformities at the T11 and L3 superior endplates. MRI T/L spine showing new T11 compression fracture, T8-T9 broad right central and T9-T10 broad left central disc protrusions (disc herniations), new L3 compression fracture, and old L5 compression fracture. In the setting of glucocorticoid use and cirrhosis s/p OLT.  - appreciate NSGY recs  - pain regimen: gabapentin 300mg BID, acetaminophen 1000mg TID PRN, and tramadol 50mg q8hrs PRN (5-7 days for discharge)  - Lidocaine patch  - Bowel regimen while receiving narcotics: Miralax BID, Senna QHS  - PT (no skilled PT needs)   - TLSO  - DVT ppx w/ heparin subq    #Hx of decompensated ETOH cirrhosis s/p OLT (1/4/25) c/b staph epi bacteremia s/p cefazolin  #Hx of CBD narrowing s/p ERCP (3/21/25)  Liver tests stable without normal synthetic function. Repeat ERCP due in 5/2025.   - Tacro per level (Goal 6-8) - tacro 4/4  - c/w cellcept 1g BID  - c/w pred 10mg daily  - c/w ursodiol 300mg BID  - c/w ppx - bactrim, ASA, PPI  - trend liver tests and INR daily    #Normocytic anemia  No overt signs of GIB . 4/15 labs w/ ferritin 19, % sat 13. Last EGD from 7/2023 showing small EV, PHG, Type 1 gastroesophageal varices w/o bleeding. Last colonoscopy from 7/2023 w/ transverse colonic polyp (TA), descending colon polyp (hyperplastic).   - trend CBC daily; transfuse if hgb < 7    #Dispo: Medically cleared for discharge today without skilled PT needs. Will need close outpatient f/u with Dr. Peace Allen, PGY-4  Gastroenterology & Hepatology Fellow  Available on TEAMS  Long range pager #: [phone]  Short range pager#: [phone]    For non-urgent consults, please send email to [email or web address] (for NS) or [email or web address] (for [facility])  
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 Off Letrazole now), R cheek BCC s/p Mohs (2021), Umbilical Hernia Repair 9/2024. and HTN presenting with acute onset of lower back pain.     #Acute compression fracture  1 day of acute lower back pain. CT lumbar with new compression deformities at the T11 and L3 superior endplates. MRI T/L spine showing new T11 compression fracture, T8-T9 broad right central andT9-T10 broad left central disc protrusions (disc herniations), new L3 compression fracture, and old L5 compression fracture. In the setting of glucocorticoid use and cirrhosis s/p OLT.  - appreciate NSGY recs  - pain regimen: acetaminophen 1000mg TID standing, tramadol 50mg q8hrs PRN for moderate pain, and dilaudid PO 2mg q6hrs PRN for severe pain,   - stop gabapentin (lack of benefit)  - Lidocaine patch  - Bowel regimen while receiving narcotics: Miralax BID, Senna QHS  - PT   - TLSO  - DVT ppx w/ heparin subq    #Hx of decompensated ETOH cirrhosis s/p OLT (1/4/25) c/b staph epi bacteremia s/p cefazolin  #Hx of CBD narrowing s/p ERCP (3/21/25)  Liver tests stable without normal synthetic function. Repeat ERCP due in 5/2025.   - Tacro per level (Goal 6-8)  - c/w cellcept 1g BID  - c/w pred 10mg daily  - c/w ursodiol 300mg BID  - c/w ppx - bactrim, ASA, PPI  - trend liver tests and INR daily  - check ALP fx    #Normocytic anemia  No overt signs of GIB . 4/15 labs w/ ferritin 19, % sat 13. Last EGD from 7/2023 showing small EV, PHG, Type 1 gastroesophageal varices w/o bleeding. Last colonoscopy from 7/2023 w/ transverse colonic polyp (TA), descending colon polyp (hyperplastic).   - trend CBC daily; transfuse if hgb < 7
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 Off Letrazole now), R cheek BCC s/p Mohs (2021), Umbilical Hernia Repair 9/2024. and HTN presenting with acute onset of lower back pain.     #Acute lower back pain  1 day of acute lower back pain. CT lumbar with new compression deformities at the T11 and L3 superior endplates. MRI T/L spine showing new T11 compression fracture, T8-T9 broad right central andT9-T10 broad left central disc protrusions (disc herniations), new L3 compression fracture, and old L5 compression fracture.  - appreciate NSGY recs  - pain regimen: acetaminophen 1000mg TID standing, tramadol 50mg q8hrs PRN for moderate pain, and dilaudid PO 2mg q6hrs PRN for severe pain  - PT   - TLSO  - DVT ppx w/ heparin subq    #Hx of decompensated ETOH cirrhosis s/p OLT (1/4/25) c/b staph epi bacteremia s/p cefazolin  #Hx of CBD narrowing s/p ERCP (3/21/25)  Liver tests stable without normal synthetic function. Repeat ERCP due in 5/2025.   - increase tacro to 5/4. Tacro level to be drawn 30min to 1hr before AM dose. Goal tacro level 6-8  - c/w cellcept 1g BID  - c/w pred 10mg daily  - c/w ursodiol 300mg BID  - c/w ppx - bactrim, ASA, PPI  - trend liver tests and INR daily    #Normocytic anemia  No overt signs of GIB . 4/15 labs w/ ferritin 19, % sat 13. Last EGD from 7/2023 showing small EV, PHG, Type 1 gastroesophageal varices w/o bleeding. Last colonoscopy from 7/2023 w/ transverse colonic polyp (TA), descending colon polyp (hyperplastic).   - trend CBC daily; transfuse if hgb < 7    Blaine Allen, PGY-4  Gastroenterology & Hepatology Fellow  Available on TEAMS  Long range pager #: [phone]  Short range pager#: [phone]    For non-urgent consults, please send email to [email or web address] (for NS) or [email or web address] (for J)  
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 Off Letrazole now), R cheek BCC s/p Mohs (2021), Umbilical Hernia Repair 9/2024. and HTN presenting with acute onset of lower back pain.     #Acute compression fracture  1 day of acute lower back pain. CT lumbar with new compression deformities at the T11 and L3 superior endplates. MRI T/L spine showing new T11 compression fracture, T8-T9 broad right central andT9-T10 broad left central disc protrusions (disc herniations), new L3 compression fracture, and old L5 compression fracture. In the setting of glucocorticoid use and cirrhosis s/p OLT.  - appreciate NSGY recs  - pain regimen: acetaminophen 1000mg TID PRN, tramadol 50mg q8hrs PRN for moderate pain. DC dilaudid PO 2mg q6hrs PRN for severe pain   - resume gabapentin 300mg BID  - Lidocaine patch  - Bowel regimen while receiving narcotics: Miralax BID, Senna QHS  - PT   - TLSO  - DVT ppx w/ heparin subq    #Hx of decompensated ETOH cirrhosis s/p OLT (1/4/25) c/b staph epi bacteremia s/p cefazolin  #Hx of CBD narrowing s/p ERCP (3/21/25)  Liver tests stable without normal synthetic function. Repeat ERCP due in 5/2025.   - Tacro per level (Goal 6-8) - tacro 4/4  - c/w cellcept 1g BID  - c/w pred 10mg daily  - c/w ursodiol 300mg BID  - c/w ppx - bactrim, ASA, PPI  - trend liver tests and INR daily    #Normocytic anemia  No overt signs of GIB . 4/15 labs w/ ferritin 19, % sat 13. Last EGD from 7/2023 showing small EV, PHG, Type 1 gastroesophageal varices w/o bleeding. Last colonoscopy from 7/2023 w/ transverse colonic polyp (TA), descending colon polyp (hyperplastic).   - trend CBC daily; transfuse if hgb < 7    Blaine Tiffany, PGY-4  Gastroenterology & Hepatology Fellow  Available on TEAMS  Long range pager #: [phone]  Short range pager#: [phone]    For non-urgent consults, please send email to [email or web address] (for [facility]) or [email or web address] (for [facility])  
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 Off Letrazole now), R cheek BCC s/p Mohs (2021), Umbilical Hernia Repair 9/2024. and HTN presenting with acute onset of lower back pain.     #Acute compression fracture  1 day of acute lower back pain. CT lumbar with new compression deformities at the T11 and L3 superior endplates. MRI T/L spine showing new T11 compression fracture, T8-T9 broad right central andT9-T10 broad left central disc protrusions (disc herniations), new L3 compression fracture, and old L5 compression fracture. In the setting of glucocorticoid use and cirrhosis s/p OLT.  - appreciate NSGY recs  - pain regimen: acetaminophen 1000mg TID standing, tramadol 50mg q8hrs PRN for moderate pain, and dilaudid PO 2mg q6hrs PRN for severe pain,   - Start gabapentin 200 mg BID  - Lidocaine patch  - Bowel regimen while receiving narcotics: Miralax BID, Senna QHS  - PT   - TLSO  - DVT ppx w/ heparin subq    #Hx of decompensated ETOH cirrhosis s/p OLT (1/4/25) c/b staph epi bacteremia s/p cefazolin  #Hx of CBD narrowing s/p ERCP (3/21/25)  Liver tests stable without normal synthetic function. Repeat ERCP due in 5/2025.   - Tacro per level (Goal 6-8)  - c/w cellcept 1g BID  - c/w pred 10mg daily  - c/w ursodiol 300mg BID  - c/w ppx - bactrim, ASA, PPI  - trend liver tests and INR daily  - check ALP fx    #Normocytic anemia  No overt signs of GIB . 4/15 labs w/ ferritin 19, % sat 13. Last EGD from 7/2023 showing small EV, PHG, Type 1 gastroesophageal varices w/o bleeding. Last colonoscopy from 7/2023 w/ transverse colonic polyp (TA), descending colon polyp (hyperplastic).   - trend CBC daily; transfuse if hgb < 7

## 2025-04-22 NOTE — DISCHARGE NOTE NURSING/CASE MANAGEMENT/SOCIAL WORK - NSDCFUADDAPPT_GEN_ALL_CORE_FT
Follow up in Transplant Clinic as scheduled.   Follow up with Neurosurgeon Dr. Sadiq Brooks in 2-3 weeks. Call to schedule an appointment [phone].

## 2025-04-22 NOTE — DISCHARGE NOTE NURSING/CASE MANAGEMENT/SOCIAL WORK - FINANCIAL ASSISTANCE
[facility] provides services at a reduced cost to those who are determined to be eligible through [facility]’s financial assistance program. Information regarding [facility]’s financial assistance program can be found by going to [email or web address] or by calling [phone].

## 2025-04-22 NOTE — PROGRESS NOTE ADULT - SUBJECTIVE AND OBJECTIVE BOX
Interval Events:   - no acute events  - today, pain is better controlled and ambulating  - tacro 8 this AM  - will discharge likely tomorrow after further improvement in pain     Hospital Medications:  acetaminophen     Tablet .. 1000 milliGRAM(s) Oral every 8 hours PRN  aspirin  chewable 81 milliGRAM(s) Oral daily  calcium carbonate 1250 mG  + Vitamin D (OsCal 500 + D) 1 Tablet(s) Oral two times a day  diphenhydrAMINE 25 milliGRAM(s) Oral every 4 hours PRN  heparin   Injectable 5000 Unit(s) SubCutaneous every 12 hours  HYDROmorphone   Tablet 2 milliGRAM(s) Oral every 6 hours PRN  influenza   Vaccine 0.5 milliLiter(s) IntraMuscular once  lidocaine   4% Patch 1 Patch Transdermal daily  magnesium oxide 400 milliGRAM(s) Oral two times a day with meals  mycophenolate mofetil 1000 milliGRAM(s) Oral <User Schedule>  polyethylene glycol 3350 17 Gram(s) Oral two times a day  predniSONE   Tablet 10 milliGRAM(s) Oral daily  senna 2 Tablet(s) Oral at bedtime  tacrolimus 5 milliGRAM(s) Oral <User Schedule>  tacrolimus 4 milliGRAM(s) Oral <User Schedule>  traMADol 50 milliGRAM(s) Oral every 8 hours PRN  trimethoprim   80 mG/sulfamethoxazole 400 mG 1 Tablet(s) Oral daily  ursodiol Capsule 300 milliGRAM(s) Oral two times a day    ROS: See above.    PHYSICAL EXAM:   Vital Signs:  Vital Signs Last 24 Hrs  T(C): 36.6 (20 Apr 2025 08:43), Max: 36.9 (19 Apr 2025 12:52)  T(F): 97.9 (20 Apr 2025 08:43), Max: 98.5 (19 Apr 2025 12:52)  HR: 76 (20 Apr 2025 08:43) (67 - 76)  BP: 124/79 (20 Apr 2025 08:43) (124/79 - 152/82)  BP(mean): --  RR: 18 (20 Apr 2025 08:43) (18 - 18)  SpO2: 98% (20 Apr 2025 08:43) (97% - 98%)    Parameters below as of 20 Apr 2025 08:43  Patient On (Oxygen Delivery Method): room air    GENERAL:  NAD, resting comfortably in bed  HEENT:  sclera anicteric  RESPIRATORY:  Normal Effort  CARDIAC:  HDS  ABDOMEN:  Soft, non-tender, non-distended  SKIN:  Warm & Dry. No jaundice  NEURO:  Alert, conversant, no focal deficit    LABS:                        7.3    2.68  )-----------( 134      ( 20 Apr 2025 07:06 )             25.0     Mean Cell Volume: 94.3 fl (04-20-25 @ 07:06)    04-20    137  |  104  |  25[H]  ----------------------------<  110[H]  4.5   |  21[L]  |  1.07    Ca    8.8      20 Apr 2025 07:05  Phos  3.9     04-20  Mg     2.1     04-20    TPro  5.9[L]  /  Alb  3.5  /  TBili  0.4  /  DBili  x   /  AST  15  /  ALT  29  /  AlkPhos  185[H]  04-20    LIVER FUNCTIONS - ( 20 Apr 2025 07:05 )  Alb: 3.5 g/dL / Pro: 5.9 g/dL / ALK PHOS: 185 U/L / ALT: 29 U/L / AST: 15 U/L / GGT: x           PT/INR - ( 20 Apr 2025 07:07 )   PT: 12.3 sec;   INR: 1.08 ratio         PTT - ( 20 Apr 2025 07:07 )  PTT:28.9 sec  
Interval Events:   no acute events  pt wearing a back brace with some improvement  pain is improved when laying in bed, however severe when ambulating to the restroom  pain is responsive to current regimen of Tylenol, Tramadol & Dilaudid as well as lidocaine patch  c/o pruritis that improved with Benadryl  stable graft function    Hospital Medications:  acetaminophen     Tablet .. 1000 milliGRAM(s) Oral every 8 hours PRN  aspirin  chewable 81 milliGRAM(s) Oral daily  calcium carbonate 1250 mG  + Vitamin D (OsCal 500 + D) 1 Tablet(s) Oral two times a day  diphenhydrAMINE 25 milliGRAM(s) Oral every 4 hours PRN  gabapentin 200 milliGRAM(s) Oral two times a day  heparin   Injectable 5000 Unit(s) SubCutaneous every 12 hours  HYDROmorphone   Tablet 2 milliGRAM(s) Oral every 6 hours PRN  influenza   Vaccine 0.5 milliLiter(s) IntraMuscular once  lidocaine   4% Patch 1 Patch Transdermal daily  magnesium oxide 400 milliGRAM(s) Oral two times a day with meals  mycophenolate mofetil 1000 milliGRAM(s) Oral <User Schedule>  polyethylene glycol 3350 17 Gram(s) Oral two times a day  predniSONE   Tablet 10 milliGRAM(s) Oral daily  senna 2 Tablet(s) Oral at bedtime  tacrolimus 5 milliGRAM(s) Oral <User Schedule>  tacrolimus 4 milliGRAM(s) Oral <User Schedule>  traMADol 50 milliGRAM(s) Oral every 8 hours PRN  trimethoprim   80 mG/sulfamethoxazole 400 mG 1 Tablet(s) Oral daily  ursodiol Capsule 300 milliGRAM(s) Oral two times a day    ROS: See above.    PHYSICAL EXAM:   Vital Signs:  Vital Signs Last 24 Hrs  T(C): 36.9 (19 Apr 2025 12:52), Max: 36.9 (18 Apr 2025 21:19)  T(F): 98.5 (19 Apr 2025 12:52), Max: 98.5 (19 Apr 2025 00:27)  HR: 76 (19 Apr 2025 12:52) (70 - 78)  BP: 152/82 (19 Apr 2025 12:52) (125/76 - 153/84)  BP(mean): --  RR: 18 (19 Apr 2025 12:52) (18 - 18)  SpO2: 97% (19 Apr 2025 12:52) (96% - 98%)    Parameters below as of 19 Apr 2025 12:52  Patient On (Oxygen Delivery Method): room air      GENERAL:  NAD, resting comfortably in chair  HEENT:  sclera anicteric  RESPIRATORY:  Normal Effort  CARDIAC:  HDS  ABDOMEN:  Soft, non-tender, non-distended  SKIN:  Warm & Dry. No jaundice  NEURO:  Alert, conversant, no focal deficit    LABS:                        7.6    3.13  )-----------( 136      ( 19 Apr 2025 07:10 )             24.8     Mean Cell Volume: 93.9 fl (04-19-25 @ 07:10)    04-19    139  |  103  |  20  ----------------------------<  111[H]  4.3   |  21[L]  |  1.09    Ca    8.8      19 Apr 2025 07:17  Phos  4.1     04-19  Mg     1.7     04-19    TPro  5.9[L]  /  Alb  3.6  /  TBili  0.4  /  DBili  x   /  AST  15  /  ALT  28  /  AlkPhos  193[H]  04-19    LIVER FUNCTIONS - ( 19 Apr 2025 07:17 )  Alb: 3.6 g/dL / Pro: 5.9 g/dL / ALK PHOS: 193 U/L / ALT: 28 U/L / AST: 15 U/L / GGT: x           PT/INR - ( 19 Apr 2025 07:09 )   PT: 12.4 sec;   INR: 1.08 ratio         PTT - ( 19 Apr 2025 07:09 )  PTT:28.8 sec  
Patient 65 Y female  evaluated measured fitted  for TLSO  spinal orthosis  to treat T11 L3 L5 compression fracture spine pain  assist  in walking and weight bearing by control thoracic lumbar spine in all planes  TLSO will maintain proper neutral alignment of spine reduce stress and assist in  treatment  and pain control ordered by Neurosurgery . TLSO measured fitted to patient model  by Orthotist from [facility] [phone]
Interval Events:   - no acute events  - today, pain is better controlled and ambulating  - tacro 7.4 this AM  - will discharge today w/ pain regimen    Hospital Medications:  acetaminophen     Tablet .. 1000 milliGRAM(s) Oral every 8 hours PRN  aspirin  chewable 81 milliGRAM(s) Oral daily  calcium carbonate 1250 mG  + Vitamin D (OsCal 500 + D) 1 Tablet(s) Oral two times a day  diphenhydrAMINE 25 milliGRAM(s) Oral every 4 hours PRN  heparin   Injectable 5000 Unit(s) SubCutaneous every 12 hours  HYDROmorphone   Tablet 2 milliGRAM(s) Oral every 6 hours PRN  influenza   Vaccine 0.5 milliLiter(s) IntraMuscular once  lidocaine   4% Patch 1 Patch Transdermal daily  magnesium oxide 400 milliGRAM(s) Oral two times a day with meals  mycophenolate mofetil 1000 milliGRAM(s) Oral <User Schedule>  polyethylene glycol 3350 17 Gram(s) Oral two times a day  predniSONE   Tablet 10 milliGRAM(s) Oral daily  senna 2 Tablet(s) Oral at bedtime  tacrolimus 5 milliGRAM(s) Oral <User Schedule>  tacrolimus 4 milliGRAM(s) Oral <User Schedule>  traMADol 50 milliGRAM(s) Oral every 8 hours PRN  trimethoprim   80 mG/sulfamethoxazole 400 mG 1 Tablet(s) Oral daily  ursodiol Capsule 300 milliGRAM(s) Oral two times a day    ROS: See above.    PHYSICAL EXAM:   Vital Signs:  Vital Signs Last 24 Hrs  T(C): 36.6 (20 Apr 2025 08:43), Max: 36.9 (19 Apr 2025 12:52)  T(F): 97.9 (20 Apr 2025 08:43), Max: 98.5 (19 Apr 2025 12:52)  HR: 76 (20 Apr 2025 08:43) (67 - 76)  BP: 124/79 (20 Apr 2025 08:43) (124/79 - 152/82)  BP(mean): --  RR: 18 (20 Apr 2025 08:43) (18 - 18)  SpO2: 98% (20 Apr 2025 08:43) (97% - 98%)    Parameters below as of 20 Apr 2025 08:43  Patient On (Oxygen Delivery Method): room air    GENERAL:  NAD, resting comfortably in bed  HEENT:  sclera anicteric  RESPIRATORY:  Normal Effort  CARDIAC:  HDS  ABDOMEN:  Soft, non-tender, non-distended  SKIN:  Warm & Dry. No jaundice  NEURO:  Alert, conversant, no focal deficit    LABS:                        7.3    2.68  )-----------( 134      ( 20 Apr 2025 07:06 )             25.0     Mean Cell Volume: 94.3 fl (04-20-25 @ 07:06)    04-20    137  |  104  |  25[H]  ----------------------------<  110[H]  4.5   |  21[L]  |  1.07    Ca    8.8      20 Apr 2025 07:05  Phos  3.9     04-20  Mg     2.1     04-20    TPro  5.9[L]  /  Alb  3.5  /  TBili  0.4  /  DBili  x   /  AST  15  /  ALT  29  /  AlkPhos  185[H]  04-20    LIVER FUNCTIONS - ( 20 Apr 2025 07:05 )  Alb: 3.5 g/dL / Pro: 5.9 g/dL / ALK PHOS: 185 U/L / ALT: 29 U/L / AST: 15 U/L / GGT: x           PT/INR - ( 20 Apr 2025 07:07 )   PT: 12.3 sec;   INR: 1.08 ratio         PTT - ( 20 Apr 2025 07:07 )  PTT:28.9 sec  
Interval Events:   no acute events  patient required IV Tylenol overnight for breakthrough pain with improvement. she didn't feel any benefit from gabapentin. today pain is better controlled. patient has been walking around her room to the bathroom and up to chair wearing her brace  son present at bedside  vitals, exam and labs are stable      Hospital Medications:  acetaminophen     Tablet .. 1000 milliGRAM(s) Oral every 8 hours PRN  aspirin  chewable 81 milliGRAM(s) Oral daily  calcium carbonate 1250 mG  + Vitamin D (OsCal 500 + D) 1 Tablet(s) Oral two times a day  diphenhydrAMINE 25 milliGRAM(s) Oral every 4 hours PRN  heparin   Injectable 5000 Unit(s) SubCutaneous every 12 hours  HYDROmorphone   Tablet 2 milliGRAM(s) Oral every 6 hours PRN  influenza   Vaccine 0.5 milliLiter(s) IntraMuscular once  lidocaine   4% Patch 1 Patch Transdermal daily  magnesium oxide 400 milliGRAM(s) Oral two times a day with meals  mycophenolate mofetil 1000 milliGRAM(s) Oral <User Schedule>  polyethylene glycol 3350 17 Gram(s) Oral two times a day  predniSONE   Tablet 10 milliGRAM(s) Oral daily  senna 2 Tablet(s) Oral at bedtime  tacrolimus 5 milliGRAM(s) Oral <User Schedule>  tacrolimus 4 milliGRAM(s) Oral <User Schedule>  traMADol 50 milliGRAM(s) Oral every 8 hours PRN  trimethoprim   80 mG/sulfamethoxazole 400 mG 1 Tablet(s) Oral daily  ursodiol Capsule 300 milliGRAM(s) Oral two times a day    ROS: See above.    PHYSICAL EXAM:   Vital Signs:  Vital Signs Last 24 Hrs  T(C): 36.6 (20 Apr 2025 08:43), Max: 36.9 (19 Apr 2025 12:52)  T(F): 97.9 (20 Apr 2025 08:43), Max: 98.5 (19 Apr 2025 12:52)  HR: 76 (20 Apr 2025 08:43) (67 - 76)  BP: 124/79 (20 Apr 2025 08:43) (124/79 - 152/82)  BP(mean): --  RR: 18 (20 Apr 2025 08:43) (18 - 18)  SpO2: 98% (20 Apr 2025 08:43) (97% - 98%)    Parameters below as of 20 Apr 2025 08:43  Patient On (Oxygen Delivery Method): room air    GENERAL:  NAD, resting comfortably in bed  HEENT:  sclera anicteric  RESPIRATORY:  Normal Effort  CARDIAC:  HDS  ABDOMEN:  Soft, non-tender, non-distended  SKIN:  Warm & Dry. No jaundice  NEURO:  Alert, conversant, no focal deficit    LABS:                        7.3    2.68  )-----------( 134      ( 20 Apr 2025 07:06 )             25.0     Mean Cell Volume: 94.3 fl (04-20-25 @ 07:06)    04-20    137  |  104  |  25[H]  ----------------------------<  110[H]  4.5   |  21[L]  |  1.07    Ca    8.8      20 Apr 2025 07:05  Phos  3.9     04-20  Mg     2.1     04-20    TPro  5.9[L]  /  Alb  3.5  /  TBili  0.4  /  DBili  x   /  AST  15  /  ALT  29  /  AlkPhos  185[H]  04-20    LIVER FUNCTIONS - ( 20 Apr 2025 07:05 )  Alb: 3.5 g/dL / Pro: 5.9 g/dL / ALK PHOS: 185 U/L / ALT: 29 U/L / AST: 15 U/L / GGT: x           PT/INR - ( 20 Apr 2025 07:07 )   PT: 12.3 sec;   INR: 1.08 ratio         PTT - ( 20 Apr 2025 07:07 )  PTT:28.9 sec  
Progress Note     INTERVAL:  - TLSO fitted  - this AM, patient reports persistent back pain. No BM in last 1-2 days  - tacro level 4.5 this AM. No graft dysfunction.     OBJECTIVE:    VITAL SIGNS:  ICU Vital Signs Last 24 Hrs  T(C): 36.9 (18 Apr 2025 08:52), Max: 36.9 (18 Apr 2025 05:13)  T(F): 98.4 (18 Apr 2025 08:52), Max: 98.4 (18 Apr 2025 05:13)  HR: 68 (18 Apr 2025 08:52) (67 - 74)  BP: 125/76 (18 Apr 2025 08:52) (109/62 - 146/77)  BP(mean): --  ABP: --  ABP(mean): --  RR: 18 (18 Apr 2025 08:52) (17 - 18)  SpO2: 99% (18 Apr 2025 08:52) (96% - 99%)    O2 Parameters below as of 18 Apr 2025 08:52  Patient On (Oxygen Delivery Method): room air              04-17 @ 07:01  -  04-18 @ 07:00  --------------------------------------------------------  IN: 480 mL / OUT: 600 mL / NET: -120 mL    04-18 @ 07:01  -  04-18 @ 11:30  --------------------------------------------------------  IN: 0 mL / OUT: 100 mL / NET: -100 mL        PHYSICAL EXAM:    General: AAOx3, in mild distress  HEENT: no scleral icterus  Pulmonary: no respiratory distress  Cardiovascular: RRR, normal S1/S2, no m/r/g  Abdominal: soft, NTND, +BS, +well healed surgical scar  Extremities: no peripheral edema or cyanosis  MSK: tenderness to the lower back   Neuro: AXO3, no gross deficits  Skin: warm, dry, no visible jaundice, no new rashes    MEDICATIONS:  MEDICATIONS  (STANDING):  aspirin  chewable 81 milliGRAM(s) Oral daily  calcium carbonate 1250 mG  + Vitamin D (OsCal 500 + D) 1 Tablet(s) Oral two times a day  heparin   Injectable 5000 Unit(s) SubCutaneous every 12 hours  influenza   Vaccine 0.5 milliLiter(s) IntraMuscular once  lidocaine   4% Patch 1 Patch Transdermal daily  magnesium oxide 400 milliGRAM(s) Oral two times a day with meals  mycophenolate mofetil 1000 milliGRAM(s) Oral <User Schedule>  predniSONE   Tablet 10 milliGRAM(s) Oral daily  senna 2 Tablet(s) Oral at bedtime  tacrolimus 1 milliGRAM(s) Oral once  tacrolimus 4 milliGRAM(s) Oral <User Schedule>  trimethoprim   80 mG/sulfamethoxazole 400 mG 1 Tablet(s) Oral daily  ursodiol Capsule 300 milliGRAM(s) Oral two times a day    MEDICATIONS  (PRN):  acetaminophen     Tablet .. 1000 milliGRAM(s) Oral every 8 hours PRN Moderate Pain (4 - 6)  HYDROmorphone   Tablet 2 milliGRAM(s) Oral every 6 hours PRN Severe Pain (7 - 10)  traMADol 50 milliGRAM(s) Oral every 8 hours PRN Moderate Pain (4 - 6)      ALLERGIES:  Allergies    No Known Allergies    Intolerances        LABS:                        7.5    2.71  )-----------( 115      ( 18 Apr 2025 07:03 )             25.4     04-18    138  |  105  |  23  ----------------------------<  88  5.2   |  21[L]  |  1.23    Ca    9.0      18 Apr 2025 07:02  Phos  4.4     04-18  Mg     2.0     04-18    TPro  5.8[L]  /  Alb  3.4  /  TBili  0.5  /  DBili  x   /  AST  15  /  ALT  32  /  AlkPhos  200[H]  04-18    PT/INR - ( 18 Apr 2025 07:03 )   PT: 12.5 sec;   INR: 1.10 ratio         PTT - ( 18 Apr 2025 07:03 )  PTT:30.2 sec  Urinalysis Basic - ( 18 Apr 2025 07:02 )    Color: x / Appearance: x / SG: x / pH: x  Gluc: 88 mg/dL / Ketone: x  / Bili: x / Urobili: x   Blood: x / Protein: x / Nitrite: x   Leuk Esterase: x / RBC: x / WBC x   Sq Epi: x / Non Sq Epi: x / Bacteria: x

## 2025-04-22 NOTE — PROGRESS NOTE ADULT - PROVIDER SPECIALTY LIST ADULT
Orthopedics
Transplant Hepatology

## 2025-04-24 ENCOUNTER — APPOINTMENT (OUTPATIENT)
Dept: HEPATOLOGY | Facility: CLINIC | Age: 65
End: 2025-04-24
Payer: COMMERCIAL

## 2025-04-24 VITALS
OXYGEN SATURATION: 98 % | DIASTOLIC BLOOD PRESSURE: 85 MMHG | BODY MASS INDEX: 24.25 KG/M2 | SYSTOLIC BLOOD PRESSURE: 132 MMHG | TEMPERATURE: 97.9 F | WEIGHT: 160 LBS | HEIGHT: 68 IN | HEART RATE: 83 BPM | RESPIRATION RATE: 16 BRPM

## 2025-04-24 DIAGNOSIS — D50.9 IRON DEFICIENCY ANEMIA, UNSPECIFIED: ICD-10-CM

## 2025-04-24 PROBLEM — S32.000A COMPRESSION FRACTURE OF LUMBAR VERTEBRA: Status: ACTIVE | Noted: 2025-04-24

## 2025-04-24 PROCEDURE — 99215 OFFICE O/P EST HI 40 MIN: CPT

## 2025-04-24 RX ORDER — GABAPENTIN 300 MG/1
300 CAPSULE ORAL TWICE DAILY
Qty: 60 | Refills: 1 | Status: ACTIVE | COMMUNITY
Start: 2025-04-24 | End: 1900-01-01

## 2025-04-25 NOTE — PRE-ANESTHESIA EVALUATION ADULT - NSANTHTOTALSCORECAL_ENT_A_CORE
Child/Adolescent [facility] Daily Treatment Note      Number of patients in group today:  10    From 08:00-09:30  Pt participated in breakfast and a social skills group; Pt cooperative, participated in the activity, and interacted well with peers. Due to new group members joining pts introduced themselves and reviewed group rules and expectations.    From 09:30 to 10:30 The pt engaged in a process group. The pt presented with euthymic mood and affect and was noted to be cooperative and engaged for the duration of group.  The pt provided feedback to peers and engaged in active listening.     From 10:30 to 11:30  the PT engaged in a process group where they rated their mood a  5 on a scale from 1 through 10 where 10 is the highest rating. She presented with euthymic mood and affect.  Due to meeting with other staff members, the pt will process more in the future. Pt denied any current safety concerns.     From 11:30 to 12:30  Pt attended lunch, Pt was appropriate and interactive during lunch time while they ate lunch and watched a therapeutic movie.      From 12:30 to 1:30 Pts participated in a therapeutic move group. During the session, participants watched the film together in a group setting, with a focus on identifying key emotional or psychological themes that resonate with their personal experiences. After the movie, the group engaged in a facilitated discussion where they explored the characters' struggles, coping mechanisms, and therapeutic interventions seen in the film. This discussion encouraged participants to reflect on their own thoughts, feelings, and behaviors, while also promoting understanding of different therapeutic tools and approaches.     From 1:30 to 2:00 pts participated in a goal setting group. Pt expressed no safety concerns about returning home.      Karina Jones, VENKATESH    4/25/2025   1

## 2025-04-28 ENCOUNTER — NON-APPOINTMENT (OUTPATIENT)
Age: 65
End: 2025-04-28

## 2025-04-29 RX ORDER — TRAMADOL HYDROCHLORIDE 50 MG/1
50 TABLET, COATED ORAL EVERY 8 HOURS
Qty: 21 | Refills: 0 | Status: COMPLETED | COMMUNITY
Start: 2025-04-29 | End: 2025-05-06

## 2025-04-30 ENCOUNTER — NON-APPOINTMENT (OUTPATIENT)
Age: 65
End: 2025-04-30

## 2025-04-30 ENCOUNTER — LABORATORY RESULT (OUTPATIENT)
Age: 65
End: 2025-04-30

## 2025-05-01 DIAGNOSIS — K72.91 HEPATIC FAILURE, UNSPECIFIED WITH COMA: ICD-10-CM

## 2025-05-01 DIAGNOSIS — Z94.4 LIVER TRANSPLANT STATUS: ICD-10-CM

## 2025-05-01 LAB
ALBUMIN SERPL ELPH-MCNC: 3.9 G/DL
ALP BLD-CCNC: 184 U/L
ALT SERPL-CCNC: 22 U/L
ANION GAP SERPL CALC-SCNC: 12 MMOL/L
AST SERPL-CCNC: 14 U/L
BASOPHILS # BLD AUTO: 0.01 K/UL
BASOPHILS NFR BLD AUTO: 0.3 %
BILIRUB SERPL-MCNC: 0.5 MG/DL
BUN SERPL-MCNC: 24 MG/DL
CALCIUM SERPL-MCNC: 8.9 MG/DL
CHLORIDE SERPL-SCNC: 111 MMOL/L
CMV DNA SPEC QL NAA+PROBE: NOT DETECTED IU/ML
CMVPCR LOG: NOT DETECTED LOG10IU/ML
CO2 SERPL-SCNC: 20 MMOL/L
CREAT SERPL-MCNC: 1.09 MG/DL
EGFRCR SERPLBLD CKD-EPI 2021: 57 ML/MIN/1.73M2
EOSINOPHIL # BLD AUTO: 0.08 K/UL
EOSINOPHIL NFR BLD AUTO: 2.7 %
FERRITIN SERPL-MCNC: 69 NG/ML
GGT SERPL-CCNC: 543 U/L
GLUCOSE SERPL-MCNC: 113 MG/DL
HCT VFR BLD CALC: 25.2 %
HGB BLD-MCNC: 7.4 G/DL
IMM GRANULOCYTES NFR BLD AUTO: 0.3 %
IRON SATN MFR SERPL: 28 %
IRON SERPL-MCNC: 92 UG/DL
LYMPHOCYTES # BLD AUTO: 0.64 K/UL
LYMPHOCYTES NFR BLD AUTO: 21.4 %
MAGNESIUM SERPL-MCNC: 1.7 MG/DL
MAN DIFF?: NORMAL
MCHC RBC-ENTMCNC: 27.4 PG
MCHC RBC-ENTMCNC: 29.4 G/DL
MCV RBC AUTO: 93.3 FL
MONOCYTES # BLD AUTO: 0.29 K/UL
MONOCYTES NFR BLD AUTO: 9.7 %
NEUTROPHILS # BLD AUTO: 1.96 K/UL
NEUTROPHILS NFR BLD AUTO: 65.6 %
PHOSPHATE SERPL-MCNC: 3.7 MG/DL
PLATELET # BLD AUTO: 182 K/UL
POTASSIUM SERPL-SCNC: 5.2 MMOL/L
PROT SERPL-MCNC: 6 G/DL
RBC # BLD: 2.7 M/UL
RBC # FLD: 19 %
SODIUM SERPL-SCNC: 143 MMOL/L
TACROLIMUS SERPL-MCNC: 5.9 NG/ML
TIBC SERPL-MCNC: 326 UG/DL
UIBC SERPL-MCNC: 233 UG/DL
WBC # FLD AUTO: 2.99 K/UL

## 2025-05-02 ENCOUNTER — APPOINTMENT (OUTPATIENT)
Dept: ORTHOPEDIC SURGERY | Facility: CLINIC | Age: 65
End: 2025-05-02
Payer: COMMERCIAL

## 2025-05-02 DIAGNOSIS — M48.07 SPINAL STENOSIS, LUMBOSACRAL REGION: ICD-10-CM

## 2025-05-02 DIAGNOSIS — S32.000A WEDGE COMPRESSION FRACTURE OF UNSPECIFIED LUMBAR VERTEBRA, INITIAL ENCOUNTER FOR CLOSED FRACTURE: ICD-10-CM

## 2025-05-02 DIAGNOSIS — S22.009A UNSPECIFIED FRACTURE OF UNSPECIFIED THORACIC VERTEBRA, INITIAL ENCOUNTER FOR CLOSED FRACTURE: ICD-10-CM

## 2025-05-02 PROCEDURE — 99203 OFFICE O/P NEW LOW 30 MIN: CPT

## 2025-05-12 ENCOUNTER — LABORATORY RESULT (OUTPATIENT)
Age: 65
End: 2025-05-12

## 2025-05-13 ENCOUNTER — APPOINTMENT (OUTPATIENT)
Dept: HEPATOLOGY | Facility: CLINIC | Age: 65
End: 2025-05-13
Payer: COMMERCIAL

## 2025-05-13 VITALS
SYSTOLIC BLOOD PRESSURE: 128 MMHG | BODY MASS INDEX: 25.01 KG/M2 | WEIGHT: 165 LBS | DIASTOLIC BLOOD PRESSURE: 81 MMHG | OXYGEN SATURATION: 98 % | RESPIRATION RATE: 16 BRPM | TEMPERATURE: 97.3 F | HEART RATE: 92 BPM | HEIGHT: 68 IN

## 2025-05-13 DIAGNOSIS — Z94.4 LIVER TRANSPLANT STATUS: ICD-10-CM

## 2025-05-13 DIAGNOSIS — K83.1 OBSTRUCTION OF BILE DUCT: ICD-10-CM

## 2025-05-13 DIAGNOSIS — Z79.60 LONG TERM (CURRENT) USE OF UNSPECIFIED IMMUNOMODULATORS AND IMMUNOSUPPRESSANTS: ICD-10-CM

## 2025-05-13 PROCEDURE — 99214 OFFICE O/P EST MOD 30 MIN: CPT

## 2025-05-22 ENCOUNTER — OUTPATIENT (OUTPATIENT)
Dept: OUTPATIENT SERVICES | Facility: HOSPITAL | Age: 65
LOS: 1 days | End: 2025-05-22
Payer: COMMERCIAL

## 2025-05-22 VITALS
HEIGHT: 68 IN | WEIGHT: 164.91 LBS | OXYGEN SATURATION: 99 % | SYSTOLIC BLOOD PRESSURE: 115 MMHG | RESPIRATION RATE: 14 BRPM | TEMPERATURE: 98 F | DIASTOLIC BLOOD PRESSURE: 78 MMHG | HEART RATE: 99 BPM

## 2025-05-22 DIAGNOSIS — K83.1 OBSTRUCTION OF BILE DUCT: ICD-10-CM

## 2025-05-22 DIAGNOSIS — Z94.4 LIVER TRANSPLANT STATUS: ICD-10-CM

## 2025-05-22 DIAGNOSIS — Z87.39 PERSONAL HISTORY OF OTHER DISEASES OF THE MUSCULOSKELETAL SYSTEM AND CONNECTIVE TISSUE: Chronic | ICD-10-CM

## 2025-05-22 DIAGNOSIS — Z98.890 OTHER SPECIFIED POSTPROCEDURAL STATES: Chronic | ICD-10-CM

## 2025-05-22 DIAGNOSIS — Z94.4 LIVER TRANSPLANT STATUS: Chronic | ICD-10-CM

## 2025-05-22 DIAGNOSIS — Z01.818 ENCOUNTER FOR OTHER PREPROCEDURAL EXAMINATION: ICD-10-CM

## 2025-05-22 DIAGNOSIS — T86.49 OTHER COMPLICATIONS OF LIVER TRANSPLANT: ICD-10-CM

## 2025-05-22 LAB
ALBUMIN SERPL ELPH-MCNC: 3.7 G/DL — SIGNIFICANT CHANGE UP (ref 3.3–5)
ALP SERPL-CCNC: 123 U/L — HIGH (ref 40–120)
ALT FLD-CCNC: 12 U/L — SIGNIFICANT CHANGE UP (ref 10–45)
ANION GAP SERPL CALC-SCNC: 13 MMOL/L — SIGNIFICANT CHANGE UP (ref 5–17)
APTT BLD: 33.5 SEC — SIGNIFICANT CHANGE UP (ref 26.1–36.8)
AST SERPL-CCNC: 20 U/L — SIGNIFICANT CHANGE UP (ref 10–40)
BILIRUB SERPL-MCNC: 0.4 MG/DL — SIGNIFICANT CHANGE UP (ref 0.2–1.2)
BUN SERPL-MCNC: 20 MG/DL — SIGNIFICANT CHANGE UP (ref 7–23)
CALCIUM SERPL-MCNC: 9 MG/DL — SIGNIFICANT CHANGE UP (ref 8.4–10.5)
CHLORIDE SERPL-SCNC: 108 MMOL/L — SIGNIFICANT CHANGE UP (ref 96–108)
CO2 SERPL-SCNC: 17 MMOL/L — LOW (ref 22–31)
CREAT SERPL-MCNC: 1.37 MG/DL — HIGH (ref 0.5–1.3)
EGFR: 43 ML/MIN/1.73M2 — LOW
EGFR: 43 ML/MIN/1.73M2 — LOW
GLUCOSE SERPL-MCNC: 114 MG/DL — HIGH (ref 70–99)
HCT VFR BLD CALC: 25.7 % — LOW (ref 34.5–45)
HGB BLD-MCNC: 7.8 G/DL — LOW (ref 11.5–15.5)
INR BLD: 1.37 RATIO — HIGH (ref 0.85–1.16)
MCHC RBC-ENTMCNC: 28.1 PG — SIGNIFICANT CHANGE UP (ref 27–34)
MCHC RBC-ENTMCNC: 30.4 G/DL — LOW (ref 32–36)
MCV RBC AUTO: 92.4 FL — SIGNIFICANT CHANGE UP (ref 80–100)
NRBC BLD AUTO-RTO: 0 /100 WBCS — SIGNIFICANT CHANGE UP (ref 0–0)
PLATELET # BLD AUTO: 154 K/UL — SIGNIFICANT CHANGE UP (ref 150–400)
POTASSIUM SERPL-MCNC: 4.9 MMOL/L — SIGNIFICANT CHANGE UP (ref 3.5–5.3)
POTASSIUM SERPL-SCNC: 4.9 MMOL/L — SIGNIFICANT CHANGE UP (ref 3.5–5.3)
PROT SERPL-MCNC: 7.4 G/DL — SIGNIFICANT CHANGE UP (ref 6–8.3)
PROTHROM AB SERPL-ACNC: 15.6 SEC — HIGH (ref 9.9–13.4)
RBC # BLD: 2.78 M/UL — LOW (ref 3.8–5.2)
RBC # FLD: 18.9 % — HIGH (ref 10.3–14.5)
SODIUM SERPL-SCNC: 138 MMOL/L — SIGNIFICANT CHANGE UP (ref 135–145)
WBC # BLD: 2.22 K/UL — LOW (ref 3.8–10.5)
WBC # FLD AUTO: 2.22 K/UL — LOW (ref 3.8–10.5)

## 2025-05-22 PROCEDURE — 85027 COMPLETE CBC AUTOMATED: CPT

## 2025-05-22 PROCEDURE — 85730 THROMBOPLASTIN TIME PARTIAL: CPT

## 2025-05-22 PROCEDURE — 85610 PROTHROMBIN TIME: CPT

## 2025-05-22 PROCEDURE — G0463: CPT

## 2025-05-22 PROCEDURE — 80053 COMPREHEN METABOLIC PANEL: CPT

## 2025-05-22 NOTE — H&P PST ADULT - HISTORY OF PRESENT ILLNESS
66 yo F PMHx  ETOH cirrhosis S/p OLT 1/4/2025 with end-end single arterial reconstruction (Donor Right, Segment IV and Left Hepatic Arteries arising separately from celiac trunk, Redo hepatic artery anastomosis) on ASA/ Eliquis ( Post op  complicated by Donor with staph epi bacteremia (blood culture x 1) treated with Cefazolin (end date 1/8), also constipation and ileus -- resolved, received a total of 3uPRBCs for decreased H/H), Left breast stage 1 Invasive lobular carcinoma s/p lumpectomy ( had chemo and radiation), and R cheek BCC s/p Mohs (2021) Umbilical Hernia Repair 9/2024, recent hospitalization 3/4-3/11/2025 with  elevated LFTs, pruritis of face/neck and bilateral arms / Leukopenia s/p Neupogen found to have CBD now planned for ERCP anesthesia on 3/21/2025 w/ Dr. Marlow.     ***On Eliquis and asa for hepatic prophylaxis   ***Last name on Insurance card " Lauren" and last name on Drivers licence " Frucci"    66 yo F PMHx  ETOH cirrhosis S/p OLT 1/4/2025 with end-end single arterial reconstruction (Donor Right, Segment IV and Left Hepatic Arteries arising separately from celiac trunk, Redo hepatic artery anastomosis) on ASA/ Eliquis ( Post op  complicated by Donor with staph epi bacteremia (blood culture x 1) treated with Cefazolin (end date 1/8), also constipation and ileus -- resolved, received a total of 3uPRBCs for decreased H/H), Left breast stage 1 Invasive lobular carcinoma s/p lumpectomy ( had chemo and radiation), and R cheek BCC s/p Mohs (2021) Umbilical Hernia Repair 9/2024, recent hospitalization 3/4-3/11/2025 with  elevated LFTs, pruritis of face/neck and bilateral arms / Leukopenia s/p Neupogen found to have CBD now planned for ERCP anesthesia on 3/21/2025 w/ Dr. Marlow.    64 yo F PMHx  ETOH cirrhosis S/p OLT 1/4/2025 with end-end single arterial reconstruction (Donor Right, Segment IV and Left Hepatic Arteries arising separately from celiac trunk, Redo hepatic artery anastomosis) on ASA/ Eliquis ( Post op  complicated by Donor with staph epi bacteremia (blood culture x 1) treated with Cefazolin (end date 1/8), also constipation and ileus -- resolved, received a total of 3uPRBCs for decreased H/H), Left breast stage 1 Invasive lobular carcinoma s/p lumpectomy ( had chemo and radiation), and R cheek BCC s/p Mohs (2021) Umbilical Hernia Repair 9/2024, recent hospitalization 3/4-3/11/2025 with  elevated LFTs, pruritis of face/neck and bilateral arms / Leukopenia s/p Neupogen found to have CBD (s/p ERCP bile stent insertion 3/21/2025). pt reported her LFTs have been improving since stent insertion, now pt is scheduled for another ERCP new stent insertion, EGD mapping biopsies ANES on 5/30.  66 yo F PMHx  ETOH cirrhosis S/p OLT 1/4/2025 with end-end single arterial reconstruction (Donor Right, Segment IV and Left Hepatic Arteries arising separately from celiac trunk, Redo hepatic artery anastomosis) on ASA, Left breast stage 1 Invasive lobular carcinoma s/p lumpectomy ( had chemo and radiation), and R cheek BCC s/p Mohs (2021) Umbilical Hernia Repair 9/2024, recent hospitalization 3/4-3/11/2025 with  elevated LFTs, pruritis of face/neck and bilateral arms / Leukopenia s/p Neupogen found to have CBD (s/p ERCP bile stent insertion 3/21/2025). pt reported her LFTs have been improving since stent insertion, now pt is scheduled for another ERCP new stent insertion, EGD mapping biopsies ANES on 5/30.

## 2025-05-22 NOTE — H&P PST ADULT - MUSCULOSKELETAL COMMENTS
wears back brace for compression fractures of thoracic and lumbar.  takes gabapentin for back nerve pain

## 2025-05-22 NOTE — H&P PST ADULT - ASSESSMENT
DASI score:  DASI activity:  Loose teeth or denture:  DASI score: 5.5  DASI activity: walks around the housework, able to walk 20min, able to walk up 1 flight of stairs  Loose teeth or denture: denies

## 2025-05-23 ENCOUNTER — TRANSCRIPTION ENCOUNTER (OUTPATIENT)
Age: 65
End: 2025-05-23

## 2025-05-27 ENCOUNTER — TRANSCRIPTION ENCOUNTER (OUTPATIENT)
Age: 65
End: 2025-05-27

## 2025-05-29 ENCOUNTER — TRANSCRIPTION ENCOUNTER (OUTPATIENT)
Age: 65
End: 2025-05-29

## 2025-05-30 ENCOUNTER — APPOINTMENT (OUTPATIENT)
Dept: GASTROENTEROLOGY | Facility: HOSPITAL | Age: 65
End: 2025-05-30

## 2025-05-30 ENCOUNTER — TRANSCRIPTION ENCOUNTER (OUTPATIENT)
Age: 65
End: 2025-05-30

## 2025-05-30 ENCOUNTER — RESULT REVIEW (OUTPATIENT)
Age: 65
End: 2025-05-30

## 2025-05-30 ENCOUNTER — OUTPATIENT (OUTPATIENT)
Dept: OUTPATIENT SERVICES | Facility: HOSPITAL | Age: 65
LOS: 1 days | End: 2025-05-30
Payer: COMMERCIAL

## 2025-05-30 VITALS
SYSTOLIC BLOOD PRESSURE: 124 MMHG | RESPIRATION RATE: 16 BRPM | DIASTOLIC BLOOD PRESSURE: 68 MMHG | TEMPERATURE: 98 F | HEART RATE: 76 BPM | OXYGEN SATURATION: 98 %

## 2025-05-30 VITALS — WEIGHT: 164.91 LBS | HEIGHT: 69 IN

## 2025-05-30 DIAGNOSIS — Z98.890 OTHER SPECIFIED POSTPROCEDURAL STATES: Chronic | ICD-10-CM

## 2025-05-30 DIAGNOSIS — Z94.4 LIVER TRANSPLANT STATUS: ICD-10-CM

## 2025-05-30 DIAGNOSIS — Z94.4 LIVER TRANSPLANT STATUS: Chronic | ICD-10-CM

## 2025-05-30 DIAGNOSIS — T86.49 OTHER COMPLICATIONS OF LIVER TRANSPLANT: ICD-10-CM

## 2025-05-30 DIAGNOSIS — Z87.39 PERSONAL HISTORY OF OTHER DISEASES OF THE MUSCULOSKELETAL SYSTEM AND CONNECTIVE TISSUE: Chronic | ICD-10-CM

## 2025-05-30 PROCEDURE — 88305 TISSUE EXAM BY PATHOLOGIST: CPT | Mod: 26

## 2025-05-30 PROCEDURE — C1769: CPT

## 2025-05-30 PROCEDURE — 43273 ENDOSCOPIC PANCREATOSCOPY: CPT

## 2025-05-30 PROCEDURE — C9399: CPT

## 2025-05-30 PROCEDURE — 74328 X-RAY BILE DUCT ENDOSCOPY: CPT

## 2025-05-30 PROCEDURE — C1726: CPT

## 2025-05-30 PROCEDURE — 88305 TISSUE EXAM BY PATHOLOGIST: CPT

## 2025-05-30 PROCEDURE — C2625: CPT

## 2025-05-30 PROCEDURE — 43276 ERCP STENT EXCHANGE W/DILATE: CPT

## 2025-05-30 PROCEDURE — 43274 ERCP DUCT STENT PLACEMENT: CPT | Mod: GC,59

## 2025-05-30 PROCEDURE — 43239 EGD BIOPSY SINGLE/MULTIPLE: CPT | Mod: GC,59

## 2025-05-30 PROCEDURE — 74328 X-RAY BILE DUCT ENDOSCOPY: CPT | Mod: 26,GC

## 2025-05-30 PROCEDURE — 43264 ERCP REMOVE DUCT CALCULI: CPT

## 2025-05-30 PROCEDURE — 43264 ERCP REMOVE DUCT CALCULI: CPT | Mod: GC,59

## 2025-05-30 PROCEDURE — 43261 ENDO CHOLANGIOPANCREATOGRAPH: CPT | Mod: XU

## 2025-05-30 DEVICE — BLLN EXTRACT FUSION QUATRO 8.5 10 12 15MM: Type: IMPLANTABLE DEVICE | Status: FUNCTIONAL

## 2025-05-30 DEVICE — CATH KT BLLN DIL RX OLB 6MMX4CM: Type: IMPLANTABLE DEVICE | Status: FUNCTIONAL

## 2025-05-30 DEVICE — CATH BLLN EXTRACT DIST GUIDE WIRE 15MM 3LUM: Type: IMPLANTABLE DEVICE | Status: FUNCTIONAL

## 2025-05-30 DEVICE — HYDRATOME 44: Type: IMPLANTABLE DEVICE | Status: FUNCTIONAL

## 2025-05-30 DEVICE — STENT ADVANIX BILIARY CTR BEND 10FRX9CM: Type: IMPLANTABLE DEVICE | Status: FUNCTIONAL

## 2025-05-30 DEVICE — AUTOTOME CANNULATING SPHINCTEROTOME RX 44 20MM: Type: IMPLANTABLE DEVICE | Status: FUNCTIONAL

## 2025-05-30 RX ORDER — MYCOPHENOLATE MOFETIL 500 MG/1
2 TABLET, FILM COATED ORAL
Refills: 0 | DISCHARGE

## 2025-05-30 RX ORDER — SODIUM CHLORIDE 9 G/1000ML
500 INJECTION, SOLUTION INTRAVENOUS
Refills: 0 | Status: COMPLETED | OUTPATIENT
Start: 2025-05-30 | End: 2025-05-30

## 2025-05-30 RX ORDER — INDOMETHACIN 50 MG
100 CAPSULE ORAL ONCE
Refills: 0 | Status: ACTIVE | OUTPATIENT
Start: 2025-05-30

## 2025-05-30 RX ORDER — ONDANSETRON HCL/PF 4 MG/2 ML
4 VIAL (ML) INJECTION ONCE
Refills: 0 | Status: COMPLETED | OUTPATIENT
Start: 2025-05-30 | End: 2025-05-30

## 2025-05-30 RX ORDER — BACITRACIN 500 UNIT/G
1 OINTMENT (GRAM) TOPICAL ONCE
Refills: 0 | Status: COMPLETED | OUTPATIENT
Start: 2025-05-30 | End: 2025-05-30

## 2025-05-30 RX ADMIN — Medication 4 MILLIGRAM(S): at 14:40

## 2025-05-30 RX ADMIN — Medication 1 APPLICATION(S): at 14:35

## 2025-05-30 RX ADMIN — SODIUM CHLORIDE 30 MILLILITER(S): 9 INJECTION, SOLUTION INTRAVENOUS at 14:42

## 2025-05-30 NOTE — ASU DISCHARGE PLAN (ADULT/PEDIATRIC) - FINANCIAL ASSISTANCE
[facility] provides services at a reduced cost to those who are determined to be eligible through [facility]’s financial assistance program. Information regarding [facility]’s financial assistance program can be found by going to [email or web address] or by calling [phone].

## 2025-05-30 NOTE — ASU PATIENT PROFILE, ADULT - FALL HARM RISK - HARM RISK INTERVENTIONS

## 2025-05-30 NOTE — PRE PROCEDURE NOTE - PRE PROCEDURE EVALUATION
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      GERD (gastroesophageal reflux disease)      Skin cancer, basal cell      Liver transplant recipient      Obstruction of bile duct      Elevated LFTs      History of cirrhosis of liver      History of alcohol use      2019 novel coronavirus disease (COVID-19)      H/O lumpectomy      History of removal of skin mole      H/O ganglion cyst      Status post liver transplant      Status post Mohs surgery      H/O umbilical hernia repair        ALLERGIES:  No Known Allergies    HOME MEDICATIONS:  aspirin 81 mg oral tablet, chewable: 1 tab(s) orally once a day  CellCept 500 mg oral tablet: 2 tab(s) orally 2 times a day  iron  325mg  PO daily:   magnesium oxide 400 mg oral tablet: 1 tab(s) orally 2 times a day (with meals)  NexIUM 40 mg oral delayed release capsule: 1 cap(s) orally once a day  sulfamethoxazole-trimethoprim 400 mg-80 mg oral tablet: 1 tab(s) orally once a day  tacrolimus 1 mg oral capsule: 4 cap(s) orally 2 times a day  ursodiol 300 mg oral capsule: 1 cap(s) orally 2 times a day  vitamin D-calcium (as carbonate) 5 mcg-500 mg oral tablet: 1 tab(s) orally 2 times a day    AICD/PPM: [ ] yes   [x ] no    PERTINENT LAB DATA:                      PHYSICAL EXAMINATION:    Height (cm): 175.3  Weight (kg): 74.8  BMI (kg/m2): 24.3  BSA (m2): 1.9T(C): --  Vital signs stable  Constitutional: NAD  HEENT: anicteric   Neck:  No JVD  Respiratory: CTAB/L  Cardiovascular: S1 and S2  Gastrointestinal: BS+, soft, NT/ND  Extremities: No peripheral edema  Neurological: No confusion  Psychiatric: Normal mood, normal affect  Skin: No jaundice    ASA Class: I [ ]  II [ ]  III [ ]  IV [ ]    COMMENTS:    The patient is a suitable candidate for the planned procedure unless box checked [ ]  No, explain:

## 2025-05-30 NOTE — PRE-ANESTHESIA EVALUATION ADULT - NSPREOPDXFT_GEN_ALL_CORE
PT NEEDS A REFILL ON DILTIAZEM AND HYDRCHLOROTHIAZIDE.  PLEASE SEND TO [facility] [address].   CBD stricture

## 2025-06-02 ENCOUNTER — LABORATORY RESULT (OUTPATIENT)
Age: 65
End: 2025-06-02

## 2025-06-03 ENCOUNTER — APPOINTMENT (OUTPATIENT)
Dept: HEPATOLOGY | Facility: CLINIC | Age: 65
End: 2025-06-03
Payer: COMMERCIAL

## 2025-06-03 VITALS
RESPIRATION RATE: 16 BRPM | DIASTOLIC BLOOD PRESSURE: 77 MMHG | OXYGEN SATURATION: 98 % | HEIGHT: 68 IN | BODY MASS INDEX: 24.86 KG/M2 | WEIGHT: 164 LBS | HEART RATE: 97 BPM | TEMPERATURE: 98.1 F | SYSTOLIC BLOOD PRESSURE: 122 MMHG

## 2025-06-03 DIAGNOSIS — E87.5 HYPERKALEMIA: ICD-10-CM

## 2025-06-03 DIAGNOSIS — K83.1 OBSTRUCTION OF BILE DUCT: ICD-10-CM

## 2025-06-03 DIAGNOSIS — K72.91 HEPATIC FAILURE, UNSPECIFIED WITH COMA: ICD-10-CM

## 2025-06-03 DIAGNOSIS — K70.10 ALCOHOLIC HEPATITIS W/OUT ASCITES: ICD-10-CM

## 2025-06-03 DIAGNOSIS — R11.0 NAUSEA: ICD-10-CM

## 2025-06-03 DIAGNOSIS — Z94.4 LIVER TRANSPLANT STATUS: ICD-10-CM

## 2025-06-03 DIAGNOSIS — D50.9 IRON DEFICIENCY ANEMIA, UNSPECIFIED: ICD-10-CM

## 2025-06-03 LAB — SURGICAL PATHOLOGY STUDY: SIGNIFICANT CHANGE UP

## 2025-06-03 PROCEDURE — 99215 OFFICE O/P EST HI 40 MIN: CPT

## 2025-06-03 RX ORDER — ONDANSETRON 4 MG/1
4 TABLET ORAL EVERY 6 HOURS
Qty: 20 | Refills: 0 | Status: ACTIVE | COMMUNITY
Start: 2025-06-03 | End: 1900-01-01

## 2025-06-03 RX ORDER — ONDANSETRON 4 MG/1
4 TABLET, ORALLY DISINTEGRATING ORAL EVERY 8 HOURS
Qty: 21 | Refills: 0 | Status: DISCONTINUED | COMMUNITY
Start: 2025-06-03 | End: 2025-06-03

## 2025-06-06 RX ORDER — SODIUM ZIRCONIUM CYCLOSILICATE 5 G/5G
5 POWDER, FOR SUSPENSION ORAL EVERY 8 HOURS
Qty: 1 | Refills: 1 | Status: DISCONTINUED | COMMUNITY
Start: 2025-06-03 | End: 2025-06-06

## 2025-06-06 RX ORDER — PATIROMER 8.4 G/1
8.4 POWDER, FOR SUSPENSION ORAL DAILY
Qty: 1 | Refills: 0 | Status: ACTIVE | COMMUNITY
Start: 2025-06-06 | End: 1900-01-01

## 2025-06-06 RX ORDER — PATIROMER 8.4 G/1
8.4 POWDER, FOR SUSPENSION ORAL DAILY
Qty: 15 | Refills: 0 | Status: DISCONTINUED | COMMUNITY
Start: 2025-06-03 | End: 2025-06-06

## 2025-06-09 ENCOUNTER — NON-APPOINTMENT (OUTPATIENT)
Age: 65
End: 2025-06-09

## 2025-06-10 PROBLEM — R19.7 DIARRHEA: Status: ACTIVE | Noted: 2025-06-10

## 2025-06-10 LAB
ALBUMIN SERPL ELPH-MCNC: 3.7 G/DL
ALP BLD-CCNC: 110 U/L
ALT SERPL-CCNC: 10 U/L
ANION GAP SERPL CALC-SCNC: 12 MMOL/L
AST SERPL-CCNC: 20 U/L
BILIRUB SERPL-MCNC: 0.4 MG/DL
BUN SERPL-MCNC: 19 MG/DL
CALCIUM SERPL-MCNC: 8.9 MG/DL
CHLORIDE SERPL-SCNC: 108 MMOL/L
CO2 SERPL-SCNC: 19 MMOL/L
CREAT SERPL-MCNC: 1.15 MG/DL
EGFRCR SERPLBLD CKD-EPI 2021: 54 ML/MIN/1.73M2
GLUCOSE SERPL-MCNC: 124 MG/DL
HCT VFR BLD CALC: 25 %
HGB BLD-MCNC: 7.4 G/DL
MCHC RBC-ENTMCNC: 27.3 PG
MCHC RBC-ENTMCNC: 29.6 G/DL
MCV RBC AUTO: 92.3 FL
PLATELET # BLD AUTO: 127 K/UL
POTASSIUM SERPL-SCNC: 4.8 MMOL/L
PROT SERPL-MCNC: 5.8 G/DL
RBC # BLD: 2.71 M/UL
RBC # FLD: 18.6 %
SODIUM SERPL-SCNC: 139 MMOL/L
WBC # FLD AUTO: 1.91 K/UL

## 2025-06-11 ENCOUNTER — TRANSCRIPTION ENCOUNTER (OUTPATIENT)
Age: 65
End: 2025-06-11

## 2025-06-13 ENCOUNTER — TRANSCRIPTION ENCOUNTER (OUTPATIENT)
Age: 65
End: 2025-06-13

## 2025-06-16 ENCOUNTER — TRANSCRIPTION ENCOUNTER (OUTPATIENT)
Age: 65
End: 2025-06-16

## 2025-06-16 PROBLEM — N39.0 URINARY TRACT INFECTION: Status: ACTIVE | Noted: 2025-06-16 | Resolved: 2025-07-16

## 2025-06-17 ENCOUNTER — TRANSCRIPTION ENCOUNTER (OUTPATIENT)
Age: 65
End: 2025-06-17

## 2025-06-17 RX ORDER — VALGANCICLOVIR HYDROCHLORIDE 450 MG/1
450 TABLET ORAL
Qty: 180 | Refills: 1 | Status: ACTIVE | COMMUNITY
Start: 2025-06-11 | End: 1900-01-01

## 2025-06-19 ENCOUNTER — TRANSCRIPTION ENCOUNTER (OUTPATIENT)
Age: 65
End: 2025-06-19

## 2025-06-23 ENCOUNTER — TRANSCRIPTION ENCOUNTER (OUTPATIENT)
Age: 65
End: 2025-06-23

## 2025-06-24 LAB
ALBUMIN SERPL ELPH-MCNC: 3.5 G/DL
ALP BLD-CCNC: 151 U/L
ALT SERPL-CCNC: 21 U/L
ANION GAP SERPL CALC-SCNC: 11 MMOL/L
AST SERPL-CCNC: 29 U/L
BILIRUB SERPL-MCNC: 0.2 MG/DL
BUN SERPL-MCNC: 20 MG/DL
CALCIUM SERPL-MCNC: 9.2 MG/DL
CHLORIDE SERPL-SCNC: 108 MMOL/L
CO2 SERPL-SCNC: 19 MMOL/L
CREAT SERPL-MCNC: 1.35 MG/DL
EGFRCR SERPLBLD CKD-EPI 2021: 44 ML/MIN/1.73M2
GLUCOSE SERPL-MCNC: 117 MG/DL
HCT VFR BLD CALC: 30.7 %
HGB BLD-MCNC: 9 G/DL
MAGNESIUM SERPL-MCNC: 1.7 MG/DL
MCHC RBC-ENTMCNC: 26.8 PG
MCHC RBC-ENTMCNC: 29.3 G/DL
MCV RBC AUTO: 91.4 FL
PLATELET # BLD AUTO: 128 K/UL
POTASSIUM SERPL-SCNC: 5.8 MMOL/L
PROT SERPL-MCNC: 6.8 G/DL
RBC # BLD: 3.36 M/UL
RBC # FLD: 16.7 %
SODIUM SERPL-SCNC: 138 MMOL/L
TACROLIMUS SERPL-MCNC: 18.2 NG/ML
WBC # FLD AUTO: 4.56 K/UL

## 2025-06-25 ENCOUNTER — TRANSCRIPTION ENCOUNTER (OUTPATIENT)
Age: 65
End: 2025-06-25

## 2025-06-25 ENCOUNTER — NON-APPOINTMENT (OUTPATIENT)
Age: 65
End: 2025-06-25

## 2025-06-25 LAB
CMV DNA SPEC QL NAA+PROBE: ABNORMAL IU/ML
CMVPCR LOG: ABNORMAL LOG10IU/ML

## 2025-06-27 RX ORDER — CEFPODOXIME PROXETIL 100 MG/1
100 TABLET, FILM COATED ORAL TWICE DAILY
Qty: 10 | Refills: 0 | Status: DISCONTINUED | COMMUNITY
Start: 2025-06-16 | End: 2025-06-27

## 2025-07-01 ENCOUNTER — APPOINTMENT (OUTPATIENT)
Dept: HEPATOLOGY | Facility: CLINIC | Age: 65
End: 2025-07-01

## 2025-07-05 ENCOUNTER — TRANSCRIPTION ENCOUNTER (OUTPATIENT)
Age: 65
End: 2025-07-05

## 2025-07-07 ENCOUNTER — LABORATORY RESULT (OUTPATIENT)
Age: 65
End: 2025-07-07

## 2025-07-08 LAB
ALBUMIN SERPL ELPH-MCNC: 3.7 G/DL
ALP BLD-CCNC: 154 U/L
ALT SERPL-CCNC: 33 U/L
ANION GAP SERPL CALC-SCNC: 10 MMOL/L
AST SERPL-CCNC: 38 U/L
BASOPHILS # BLD AUTO: 0 K/UL
BASOPHILS NFR BLD AUTO: 0 %
BILIRUB SERPL-MCNC: 0.4 MG/DL
BUN SERPL-MCNC: 22 MG/DL
CALCIUM SERPL-MCNC: 9.9 MG/DL
CHLORIDE SERPL-SCNC: 100 MMOL/L
CMV DNA SPEC QL NAA+PROBE: NOT DETECTED IU/ML
CMVPCR LOG: NOT DETECTED LOG10IU/ML
CO2 SERPL-SCNC: 26 MMOL/L
CREAT SERPL-MCNC: 1.14 MG/DL
EGFRCR SERPLBLD CKD-EPI 2021: 54 ML/MIN/1.73M2
EOSINOPHIL # BLD AUTO: 0.13 K/UL
EOSINOPHIL NFR BLD AUTO: 2.6 %
GGT SERPL-CCNC: 75 U/L
GLUCOSE SERPL-MCNC: 109 MG/DL
HCT VFR BLD CALC: 31.8 %
HGB BLD-MCNC: 9.4 G/DL
LYMPHOCYTES # BLD AUTO: 2.86 K/UL
LYMPHOCYTES NFR BLD AUTO: 55.6 %
MAGNESIUM SERPL-MCNC: 1.6 MG/DL
MAN DIFF?: NORMAL
MCHC RBC-ENTMCNC: 27.1 PG
MCHC RBC-ENTMCNC: 29.6 G/DL
MCV RBC AUTO: 91.6 FL
MONOCYTES # BLD AUTO: 0.76 K/UL
MONOCYTES NFR BLD AUTO: 14.8 %
NEUTROPHILS # BLD AUTO: 1.21 K/UL
NEUTROPHILS NFR BLD AUTO: 23.5 %
PLATELET # BLD AUTO: 234 K/UL
POTASSIUM SERPL-SCNC: 5.2 MMOL/L
PROT SERPL-MCNC: 8.5 G/DL
RBC # BLD: 3.47 M/UL
RBC # FLD: 17.3 %
SODIUM SERPL-SCNC: 137 MMOL/L
TACROLIMUS SERPL-MCNC: 4.3 NG/ML
WBC # FLD AUTO: 5.14 K/UL

## 2025-07-09 ENCOUNTER — APPOINTMENT (OUTPATIENT)
Dept: GASTROENTEROLOGY | Facility: CLINIC | Age: 65
End: 2025-07-09

## 2025-07-09 PROBLEM — Z46.89 ENCOUNTER FOR REMOVAL OF BILIARY STENT: Status: ACTIVE | Noted: 2025-07-09

## 2025-07-09 PROCEDURE — 99202 OFFICE O/P NEW SF 15 MIN: CPT | Mod: 93

## 2025-07-10 ENCOUNTER — LABORATORY RESULT (OUTPATIENT)
Age: 65
End: 2025-07-10

## 2025-07-10 ENCOUNTER — APPOINTMENT (OUTPATIENT)
Dept: HEPATOLOGY | Facility: CLINIC | Age: 65
End: 2025-07-10
Payer: COMMERCIAL

## 2025-07-10 VITALS
TEMPERATURE: 98.3 F | BODY MASS INDEX: 23.79 KG/M2 | HEART RATE: 82 BPM | DIASTOLIC BLOOD PRESSURE: 81 MMHG | SYSTOLIC BLOOD PRESSURE: 132 MMHG | WEIGHT: 157 LBS | HEIGHT: 68 IN | RESPIRATION RATE: 16 BRPM | OXYGEN SATURATION: 99 %

## 2025-07-10 PROCEDURE — 99215 OFFICE O/P EST HI 40 MIN: CPT

## 2025-07-11 ENCOUNTER — NON-APPOINTMENT (OUTPATIENT)
Age: 65
End: 2025-07-11

## 2025-07-11 LAB
ALBUMIN SERPL ELPH-MCNC: 3.6 G/DL
ALP BLD-CCNC: 149 U/L
ALT SERPL-CCNC: 27 U/L
ANION GAP SERPL CALC-SCNC: 11 MMOL/L
AST SERPL-CCNC: 38 U/L
BASOPHILS # BLD AUTO: 0 K/UL
BASOPHILS NFR BLD AUTO: 0 %
BILIRUB SERPL-MCNC: 0.4 MG/DL
BUN SERPL-MCNC: 22 MG/DL
CALCIUM SERPL-MCNC: 9.6 MG/DL
CHLORIDE SERPL-SCNC: 105 MMOL/L
CO2 SERPL-SCNC: 24 MMOL/L
CREAT SERPL-MCNC: 1.01 MG/DL
EGFRCR SERPLBLD CKD-EPI 2021: 63 ML/MIN/1.73M2
EOSINOPHIL # BLD AUTO: 0.08 K/UL
EOSINOPHIL NFR BLD AUTO: 1.7 %
FERRITIN SERPL-MCNC: 116 NG/ML
GGT SERPL-CCNC: 72 U/L
GLUCOSE SERPL-MCNC: 87 MG/DL
HCT VFR BLD CALC: 32.9 %
HGB BLD-MCNC: 9.4 G/DL
IRON SATN MFR SERPL: 24 %
IRON SERPL-MCNC: 64 UG/DL
LYMPHOCYTES # BLD AUTO: 2.52 K/UL
LYMPHOCYTES NFR BLD AUTO: 50.9 %
MAN DIFF?: NORMAL
MCHC RBC-ENTMCNC: 26.4 PG
MCHC RBC-ENTMCNC: 28.6 G/DL
MCV RBC AUTO: 92.4 FL
MONOCYTES # BLD AUTO: 0.51 K/UL
MONOCYTES NFR BLD AUTO: 10.4 %
NEUTROPHILS # BLD AUTO: 1.24 K/UL
NEUTROPHILS NFR BLD AUTO: 25 %
PLATELET # BLD AUTO: 214 K/UL
POTASSIUM SERPL-SCNC: 4.5 MMOL/L
PROT SERPL-MCNC: 8.6 G/DL
RBC # BLD: 3.56 M/UL
RBC # FLD: 17 %
SODIUM SERPL-SCNC: 140 MMOL/L
TACROLIMUS SERPL-MCNC: 5.8 NG/ML
TIBC SERPL-MCNC: 273 UG/DL
UIBC SERPL-MCNC: 209 UG/DL
WBC # FLD AUTO: 4.95 K/UL

## 2025-07-14 ENCOUNTER — TRANSCRIPTION ENCOUNTER (OUTPATIENT)
Age: 65
End: 2025-07-14

## 2025-07-14 PROBLEM — J02.9 SORE THROAT: Status: ACTIVE | Noted: 2025-07-14 | Resolved: 2025-08-13

## 2025-07-15 ENCOUNTER — NON-APPOINTMENT (OUTPATIENT)
Age: 65
End: 2025-07-15

## 2025-07-17 ENCOUNTER — TRANSCRIPTION ENCOUNTER (OUTPATIENT)
Age: 65
End: 2025-07-17

## 2025-07-17 LAB
ALBUMIN SERPL ELPH-MCNC: 3.7 G/DL
ALP BLD-CCNC: 132 U/L
ALT SERPL-CCNC: 27 U/L
ANION GAP SERPL CALC-SCNC: 11 MMOL/L
AST SERPL-CCNC: 36 U/L
BASOPHILS # BLD AUTO: 0.04 K/UL
BASOPHILS NFR BLD AUTO: 0.6 %
BILIRUB SERPL-MCNC: 0.4 MG/DL
BUN SERPL-MCNC: 23 MG/DL
CALCIUM SERPL-MCNC: 9.2 MG/DL
CHLORIDE SERPL-SCNC: 105 MMOL/L
CO2 SERPL-SCNC: 21 MMOL/L
CREAT SERPL-MCNC: 1.04 MG/DL
EGFRCR SERPLBLD CKD-EPI 2021: 60 ML/MIN/1.73M2
EOSINOPHIL # BLD AUTO: 0.07 K/UL
EOSINOPHIL NFR BLD AUTO: 1.1 %
GLUCOSE SERPL-MCNC: 110 MG/DL
HCT VFR BLD CALC: 32.8 %
HGB BLD-MCNC: 9.4 G/DL
IMM GRANULOCYTES NFR BLD AUTO: 0.8 %
LYMPHOCYTES # BLD AUTO: 2.41 K/UL
LYMPHOCYTES NFR BLD AUTO: 38.4 %
MAN DIFF?: NORMAL
MCHC RBC-ENTMCNC: 27 PG
MCHC RBC-ENTMCNC: 28.7 G/DL
MCV RBC AUTO: 94.3 FL
MONOCYTES # BLD AUTO: 0.3 K/UL
MONOCYTES NFR BLD AUTO: 4.8 %
NEUTROPHILS # BLD AUTO: 3.41 K/UL
NEUTROPHILS NFR BLD AUTO: 54.3 %
PLATELET # BLD AUTO: 163 K/UL
POTASSIUM SERPL-SCNC: 4.7 MMOL/L
PROT SERPL-MCNC: 8.4 G/DL
RBC # BLD: 3.48 M/UL
RBC # FLD: 17.5 %
RESP PATH DNA+RNA PNL NPH NAA+NON-PROBE: NOT DETECTED
SARS-COV-2 RNA RESP QL NAA+PROBE: NOT DETECTED
SODIUM SERPL-SCNC: 137 MMOL/L
WBC # FLD AUTO: 6.28 K/UL

## 2025-07-18 ENCOUNTER — APPOINTMENT (OUTPATIENT)
Dept: ENDOCRINOLOGY | Facility: CLINIC | Age: 65
End: 2025-07-18
Payer: COMMERCIAL

## 2025-07-18 ENCOUNTER — TRANSCRIPTION ENCOUNTER (OUTPATIENT)
Age: 65
End: 2025-07-18

## 2025-07-18 VITALS — HEART RATE: 93 BPM | SYSTOLIC BLOOD PRESSURE: 128 MMHG | DIASTOLIC BLOOD PRESSURE: 78 MMHG | OXYGEN SATURATION: 99 %

## 2025-07-18 VITALS — WEIGHT: 155 LBS | HEIGHT: 67 IN | BODY MASS INDEX: 24.33 KG/M2

## 2025-07-18 PROCEDURE — 99204 OFFICE O/P NEW MOD 45 MIN: CPT

## 2025-07-18 PROCEDURE — 77080 DXA BONE DENSITY AXIAL: CPT

## 2025-07-20 LAB
25(OH)D3 SERPL-MCNC: 37.6 NG/ML
CALCIUM SERPL-MCNC: 9.6 MG/DL
ESTIMATED AVERAGE GLUCOSE: 103 MG/DL
HBA1C MFR BLD HPLC: 5.2 %
PARATHYROID HORMONE INTACT: 15 PG/ML
TSH SERPL-ACNC: 1.43 UIU/ML

## 2025-07-23 ENCOUNTER — OUTPATIENT (OUTPATIENT)
Dept: OUTPATIENT SERVICES | Facility: HOSPITAL | Age: 65
LOS: 1 days | End: 2025-07-23
Payer: COMMERCIAL

## 2025-07-23 ENCOUNTER — APPOINTMENT (OUTPATIENT)
Dept: GASTROENTEROLOGY | Facility: HOSPITAL | Age: 65
End: 2025-07-23

## 2025-07-23 ENCOUNTER — RESULT REVIEW (OUTPATIENT)
Age: 65
End: 2025-07-23

## 2025-07-23 ENCOUNTER — TRANSCRIPTION ENCOUNTER (OUTPATIENT)
Age: 65
End: 2025-07-23

## 2025-07-23 VITALS
OXYGEN SATURATION: 97 % | HEART RATE: 56 BPM | RESPIRATION RATE: 10 BRPM | DIASTOLIC BLOOD PRESSURE: 66 MMHG | SYSTOLIC BLOOD PRESSURE: 130 MMHG

## 2025-07-23 VITALS
SYSTOLIC BLOOD PRESSURE: 102 MMHG | OXYGEN SATURATION: 100 % | RESPIRATION RATE: 16 BRPM | HEART RATE: 63 BPM | WEIGHT: 154.98 LBS | HEIGHT: 67 IN | DIASTOLIC BLOOD PRESSURE: 50 MMHG | TEMPERATURE: 98 F

## 2025-07-23 DIAGNOSIS — Z94.4 LIVER TRANSPLANT STATUS: Chronic | ICD-10-CM

## 2025-07-23 DIAGNOSIS — Z98.890 OTHER SPECIFIED POSTPROCEDURAL STATES: Chronic | ICD-10-CM

## 2025-07-23 DIAGNOSIS — Z87.39 PERSONAL HISTORY OF OTHER DISEASES OF THE MUSCULOSKELETAL SYSTEM AND CONNECTIVE TISSUE: Chronic | ICD-10-CM

## 2025-07-23 DIAGNOSIS — Z46.89 ENCOUNTER FOR FITTING AND ADJUSTMENT OF OTHER SPECIFIED DEVICES: ICD-10-CM

## 2025-07-23 DIAGNOSIS — K83.1 OBSTRUCTION OF BILE DUCT: ICD-10-CM

## 2025-07-23 PROCEDURE — 43239 EGD BIOPSY SINGLE/MULTIPLE: CPT

## 2025-07-23 PROCEDURE — 88342 IMHCHEM/IMCYTCHM 1ST ANTB: CPT

## 2025-07-23 PROCEDURE — 43239 EGD BIOPSY SINGLE/MULTIPLE: CPT | Mod: GC,59

## 2025-07-23 PROCEDURE — 88305 TISSUE EXAM BY PATHOLOGIST: CPT

## 2025-07-23 PROCEDURE — 43275 ERCP REMOVE FORGN BODY DUCT: CPT | Mod: GC

## 2025-07-23 PROCEDURE — 88305 TISSUE EXAM BY PATHOLOGIST: CPT | Mod: 26

## 2025-07-23 PROCEDURE — 74330 X-RAY BILE/PANC ENDOSCOPY: CPT

## 2025-07-23 PROCEDURE — C1769: CPT

## 2025-07-23 PROCEDURE — 74328 X-RAY BILE DUCT ENDOSCOPY: CPT | Mod: 26,GC

## 2025-07-23 PROCEDURE — 43275 ERCP REMOVE FORGN BODY DUCT: CPT

## 2025-07-23 PROCEDURE — 43273 ENDOSCOPIC PANCREATOSCOPY: CPT

## 2025-07-23 PROCEDURE — 43264 ERCP REMOVE DUCT CALCULI: CPT

## 2025-07-23 PROCEDURE — 88342 IMHCHEM/IMCYTCHM 1ST ANTB: CPT | Mod: 26

## 2025-07-23 PROCEDURE — 43264 ERCP REMOVE DUCT CALCULI: CPT | Mod: GC,59

## 2025-07-23 DEVICE — AUTOTOME CANNULATING SPHINCTEROTOME RX 44 20MM: Type: IMPLANTABLE DEVICE | Status: FUNCTIONAL

## 2025-07-23 DEVICE — BLLN EXTRACT FUSION QUATRO 8.5 10 12 15MM: Type: IMPLANTABLE DEVICE | Status: FUNCTIONAL

## 2025-07-23 DEVICE — HYDRATOME 44: Type: IMPLANTABLE DEVICE | Status: FUNCTIONAL

## 2025-07-23 RX ORDER — INDOMETHACIN 50 MG
100 CAPSULE ORAL ONCE
Refills: 0 | Status: COMPLETED | OUTPATIENT
Start: 2025-07-23 | End: 2025-07-23

## 2025-07-23 RX ADMIN — Medication 100 MILLIGRAM(S): at 13:15

## 2025-07-25 DIAGNOSIS — M80.00XA AGE-RELATED OSTEOPOROSIS WITH CURRENT PATHOLOGICAL FRACTURE, UNSPECIFIED SITE, INITIAL ENCOUNTER FOR FRACTURE: ICD-10-CM

## 2025-07-28 ENCOUNTER — TRANSCRIPTION ENCOUNTER (OUTPATIENT)
Age: 65
End: 2025-07-28

## 2025-07-28 DIAGNOSIS — Z79.60 LONG TERM (CURRENT) USE OF UNSPECIFIED IMMUNOMODULATORS AND IMMUNOSUPPRESSANTS: ICD-10-CM

## 2025-07-29 LAB — SURGICAL PATHOLOGY STUDY: SIGNIFICANT CHANGE UP

## 2025-08-01 ENCOUNTER — TRANSCRIPTION ENCOUNTER (OUTPATIENT)
Age: 65
End: 2025-08-01

## 2025-08-01 LAB
ALBUMIN SERPL ELPH-MCNC: 3.8 G/DL
ALP BLD-CCNC: 111 U/L
ALT SERPL-CCNC: 22 U/L
ANION GAP SERPL CALC-SCNC: 15 MMOL/L
AST SERPL-CCNC: 22 U/L
BASOPHILS # BLD AUTO: 0.01 K/UL
BASOPHILS NFR BLD AUTO: 0.2 %
BILIRUB SERPL-MCNC: 0.3 MG/DL
BUN SERPL-MCNC: 27 MG/DL
CALCIUM SERPL-MCNC: 9.7 MG/DL
CHLORIDE SERPL-SCNC: 106 MMOL/L
CMV DNA SPEC QL NAA+PROBE: NOT DETECTED IU/ML
CMVPCR LOG: NOT DETECTED LOG10IU/ML
CO2 SERPL-SCNC: 18 MMOL/L
CREAT SERPL-MCNC: 0.97 MG/DL
EGFRCR SERPLBLD CKD-EPI 2021: 66 ML/MIN/1.73M2
EOSINOPHIL # BLD AUTO: 0.33 K/UL
EOSINOPHIL NFR BLD AUTO: 7.1 %
GGT SERPL-CCNC: 62 U/L
GLUCOSE SERPL-MCNC: 126 MG/DL
HCT VFR BLD CALC: 30.5 %
HGB BLD-MCNC: 9.3 G/DL
IMM GRANULOCYTES NFR BLD AUTO: 0.2 %
LYMPHOCYTES # BLD AUTO: 2.28 K/UL
LYMPHOCYTES NFR BLD AUTO: 48.7 %
MAGNESIUM SERPL-MCNC: 1.7 MG/DL
MAN DIFF?: NORMAL
MCHC RBC-ENTMCNC: 27.5 PG
MCHC RBC-ENTMCNC: 30.5 G/DL
MCV RBC AUTO: 90.2 FL
MONOCYTES # BLD AUTO: 0.36 K/UL
MONOCYTES NFR BLD AUTO: 7.7 %
NEUTROPHILS # BLD AUTO: 1.69 K/UL
NEUTROPHILS NFR BLD AUTO: 36.1 %
PLATELET # BLD AUTO: 139 K/UL
POTASSIUM SERPL-SCNC: 5.1 MMOL/L
PROT SERPL-MCNC: 8.5 G/DL
RBC # BLD: 3.38 M/UL
RBC # FLD: 17.8 %
SODIUM SERPL-SCNC: 139 MMOL/L
TACROLIMUS SERPL-MCNC: 5 NG/ML
WBC # FLD AUTO: 4.68 K/UL

## 2025-08-01 RX ORDER — MYCOPHENOLATE MOFETIL 250 MG/1
250 CAPSULE ORAL TWICE DAILY
Qty: 60 | Refills: 1 | Status: ACTIVE | COMMUNITY
Start: 2025-08-01 | End: 1900-01-01

## 2025-08-05 ENCOUNTER — APPOINTMENT (OUTPATIENT)
Dept: HEPATOLOGY | Facility: CLINIC | Age: 65
End: 2025-08-05
Payer: COMMERCIAL

## 2025-08-05 VITALS
OXYGEN SATURATION: 99 % | BODY MASS INDEX: 24.33 KG/M2 | SYSTOLIC BLOOD PRESSURE: 132 MMHG | HEART RATE: 83 BPM | WEIGHT: 155 LBS | DIASTOLIC BLOOD PRESSURE: 86 MMHG | HEIGHT: 67 IN | TEMPERATURE: 98.1 F

## 2025-08-05 DIAGNOSIS — Z94.4 LIVER TRANSPLANT STATUS: ICD-10-CM

## 2025-08-05 PROCEDURE — 99215 OFFICE O/P EST HI 40 MIN: CPT

## 2025-08-05 RX ORDER — CIPROFLOXACIN HYDROCHLORIDE 500 MG/1
500 TABLET, FILM COATED ORAL TWICE DAILY
Qty: 9 | Refills: 0 | Status: COMPLETED | COMMUNITY
Start: 2025-07-23 | End: 2025-08-05

## 2025-08-06 ENCOUNTER — LABORATORY RESULT (OUTPATIENT)
Age: 65
End: 2025-08-06

## 2025-08-06 DIAGNOSIS — R30.0 DYSURIA: ICD-10-CM

## 2025-08-07 ENCOUNTER — TRANSCRIPTION ENCOUNTER (OUTPATIENT)
Age: 65
End: 2025-08-07

## 2025-08-07 LAB
APPEARANCE: ABNORMAL
BILIRUBIN URINE: NEGATIVE
BLOOD URINE: NEGATIVE
COLOR: YELLOW
GLUCOSE QUALITATIVE U: NEGATIVE MG/DL
KETONES URINE: NEGATIVE MG/DL
LEUKOCYTE ESTERASE URINE: ABNORMAL
NITRITE URINE: NEGATIVE
PH URINE: 6
PROTEIN URINE: NORMAL MG/DL
SPECIFIC GRAVITY URINE: 1.02
UROBILINOGEN URINE: 0.2 MG/DL

## 2025-08-08 ENCOUNTER — NON-APPOINTMENT (OUTPATIENT)
Age: 65
End: 2025-08-08

## 2025-08-09 RX ORDER — NITROFURANTOIN (MONOHYDRATE/MACROCRYSTALS) 25; 75 MG/1; MG/1
100 CAPSULE ORAL
Qty: 14 | Refills: 0 | Status: ACTIVE | COMMUNITY
Start: 2025-08-09 | End: 1900-01-01

## 2025-08-12 ENCOUNTER — TRANSCRIPTION ENCOUNTER (OUTPATIENT)
Age: 65
End: 2025-08-12

## 2025-08-14 ENCOUNTER — TRANSCRIPTION ENCOUNTER (OUTPATIENT)
Age: 65
End: 2025-08-14

## 2025-08-14 RX ORDER — ALENDRONATE SODIUM 70 MG/1
70 TABLET ORAL
Qty: 1 | Refills: 5 | Status: ACTIVE | COMMUNITY
Start: 2025-08-14 | End: 1900-01-01

## 2025-08-14 RX ORDER — RISEDRONATE SODIUM 35 MG/1
35 TABLET, DELAYED RELEASE ORAL DAILY
Qty: 1 | Refills: 8 | Status: DISCONTINUED | COMMUNITY
Start: 2025-07-25 | End: 2025-08-14

## 2025-08-14 RX ORDER — RISEDRONATE SODIUM 35 MG/1
35 TABLET, DELAYED RELEASE ORAL DAILY
Qty: 3 | Refills: 3 | Status: DISCONTINUED | COMMUNITY
Start: 2025-07-29 | End: 2025-08-14

## 2025-08-15 ENCOUNTER — TRANSCRIPTION ENCOUNTER (OUTPATIENT)
Age: 65
End: 2025-08-15

## 2025-08-27 DIAGNOSIS — T86.49 OTHER COMPLICATIONS OF LIVER TRANSPLANT: ICD-10-CM

## 2025-08-27 DIAGNOSIS — K83.1 OTHER COMPLICATIONS OF LIVER TRANSPLANT: ICD-10-CM

## 2025-08-27 DIAGNOSIS — M19.90 UNSPECIFIED OSTEOARTHRITIS, UNSPECIFIED SITE: ICD-10-CM

## 2025-08-27 DIAGNOSIS — Z79.60 LONG TERM (CURRENT) USE OF UNSPECIFIED IMMUNOMODULATORS AND IMMUNOSUPPRESSANTS: ICD-10-CM

## 2025-08-27 RX ORDER — DICLOFENAC SODIUM 10 MG/G
1 GEL TOPICAL DAILY
Qty: 1 | Refills: 0 | Status: ACTIVE | COMMUNITY
Start: 2025-08-27 | End: 1900-01-01

## 2025-09-02 ENCOUNTER — APPOINTMENT (OUTPATIENT)
Dept: HEPATOLOGY | Facility: CLINIC | Age: 65
End: 2025-09-02
Payer: COMMERCIAL

## 2025-09-02 VITALS
OXYGEN SATURATION: 100 % | BODY MASS INDEX: 24.75 KG/M2 | HEART RATE: 84 BPM | TEMPERATURE: 98.2 F | SYSTOLIC BLOOD PRESSURE: 110 MMHG | DIASTOLIC BLOOD PRESSURE: 72 MMHG | WEIGHT: 158 LBS

## 2025-09-02 DIAGNOSIS — M79.676 PAIN IN UNSPECIFIED TOE(S): ICD-10-CM

## 2025-09-02 LAB
ALBUMIN SERPL ELPH-MCNC: 4 G/DL
ALP BLD-CCNC: 489 U/L
ALT SERPL-CCNC: 80 U/L
ANA TITR SER: ABNORMAL
ANA TITR SER: ABNORMAL
ANION GAP SERPL CALC-SCNC: 9 MMOL/L
AST SERPL-CCNC: 67 U/L
BASOPHILS # BLD AUTO: 0.01 K/UL
BASOPHILS NFR BLD AUTO: 0.2 %
BILIRUB SERPL-MCNC: 0.5 MG/DL
BUN SERPL-MCNC: 29 MG/DL
CALCIUM SERPL-MCNC: 10 MG/DL
CHLORIDE SERPL-SCNC: 105 MMOL/L
CO2 SERPL-SCNC: 23 MMOL/L
CREAT SERPL-MCNC: 0.93 MG/DL
CRP SERPL-MCNC: 6 MG/L
DEPRECATED KAPPA LC FREE/LAMBDA SER: 2.03 RATIO
EGFRCR SERPLBLD CKD-EPI 2021: 69 ML/MIN/1.73M2
EOSINOPHIL # BLD AUTO: 0.13 K/UL
EOSINOPHIL NFR BLD AUTO: 3.2 %
ERYTHROCYTE [SEDIMENTATION RATE] IN BLOOD BY WESTERGREN METHOD: 60 MM/HR
GGT SERPL-CCNC: 733 U/L
GLUCOSE SERPL-MCNC: 109 MG/DL
HCT VFR BLD CALC: 33.7 %
HGB BLD-MCNC: 9.8 G/DL
IGA SERPL-MCNC: 192 MG/DL
IGG SERPL-MCNC: 2842 MG/DL
IGM SERPL-MCNC: 128 MG/DL
IMM GRANULOCYTES NFR BLD AUTO: 0.5 %
KAPPA LC CSF-MCNC: 6.94 MG/DL
KAPPA LC SERPL-MCNC: 14.11 MG/DL
LYMPHOCYTES # BLD AUTO: 1.82 K/UL
LYMPHOCYTES NFR BLD AUTO: 44.4 %
MAN DIFF?: NORMAL
MCHC RBC-ENTMCNC: 27.4 PG
MCHC RBC-ENTMCNC: 29.1 G/DL
MCV RBC AUTO: 94.1 FL
MONOCYTES # BLD AUTO: 0.2 K/UL
MONOCYTES NFR BLD AUTO: 4.9 %
NEUTROPHILS # BLD AUTO: 1.92 K/UL
NEUTROPHILS NFR BLD AUTO: 46.8 %
PLATELET # BLD AUTO: 149 K/UL
POTASSIUM SERPL-SCNC: 5.2 MMOL/L
PROT SERPL-MCNC: 8.6 G/DL
RBC # BLD: 3.58 M/UL
RBC # FLD: 15.9 %
SMOOTH MUSCLE AB SER QL IF: NORMAL
SODIUM SERPL-SCNC: 137 MMOL/L
TACROLIMUS SERPL-MCNC: 6.2 NG/ML
URATE SERPL-MCNC: 5.5 MG/DL
WBC # FLD AUTO: 4.1 K/UL

## 2025-09-02 PROCEDURE — 99215 OFFICE O/P EST HI 40 MIN: CPT

## 2025-09-02 RX ORDER — PREDNISONE 10 MG/1
10 TABLET ORAL
Qty: 60 | Refills: 0 | Status: ACTIVE | COMMUNITY
Start: 2025-09-02 | End: 1900-01-01

## 2025-09-03 ENCOUNTER — APPOINTMENT (OUTPATIENT)
Dept: MRI IMAGING | Facility: IMAGING CENTER | Age: 65
End: 2025-09-03
Payer: COMMERCIAL

## 2025-09-03 PROCEDURE — 74183 MRI ABD W/O CNTR FLWD CNTR: CPT | Mod: 26

## 2025-09-05 ENCOUNTER — TRANSCRIPTION ENCOUNTER (OUTPATIENT)
Age: 65
End: 2025-09-05

## 2025-09-05 ENCOUNTER — APPOINTMENT (OUTPATIENT)
Dept: GASTROENTEROLOGY | Facility: CLINIC | Age: 65
End: 2025-09-05
Payer: COMMERCIAL

## 2025-09-05 DIAGNOSIS — K83.1 OBSTRUCTION OF BILE DUCT: ICD-10-CM

## 2025-09-05 LAB
ALBUMIN SERPL ELPH-MCNC: 4.1 G/DL
ALP BLD-CCNC: 333 U/L
ALT SERPL-CCNC: 44 U/L
ANION GAP SERPL CALC-SCNC: 10 MMOL/L
AST SERPL-CCNC: 26 U/L
BILIRUB SERPL-MCNC: 0.3 MG/DL
BUN SERPL-MCNC: 28 MG/DL
CALCIUM SERPL-MCNC: 9.2 MG/DL
CHLORIDE SERPL-SCNC: 107 MMOL/L
CO2 SERPL-SCNC: 21 MMOL/L
CREAT SERPL-MCNC: 0.93 MG/DL
EGFRCR SERPLBLD CKD-EPI 2021: 69 ML/MIN/1.73M2
GGT SERPL-CCNC: 594 U/L
GLUCOSE SERPL-MCNC: 91 MG/DL
HCT VFR BLD CALC: 32.4 %
HGB BLD-MCNC: 9.8 G/DL
MAGNESIUM SERPL-MCNC: 2.1 MG/DL
MCHC RBC-ENTMCNC: 28.5 PG
MCHC RBC-ENTMCNC: 30.2 G/DL
MCV RBC AUTO: 94.2 FL
PLATELET # BLD AUTO: 155 K/UL
POTASSIUM SERPL-SCNC: 4.7 MMOL/L
PROT SERPL-MCNC: 8.6 G/DL
RBC # BLD: 3.44 M/UL
RBC # FLD: 15.9 %
SODIUM SERPL-SCNC: 139 MMOL/L
WBC # FLD AUTO: 4.71 K/UL

## 2025-09-05 PROCEDURE — 99215 OFFICE O/P EST HI 40 MIN: CPT | Mod: 93

## 2025-09-08 LAB — TACROLIMUS SERPL-MCNC: 3.4 NG/ML

## (undated) DEVICE — SUT BOOT STANDARD (ASSORTED) 5 PAIR

## (undated) DEVICE — ELCTR HANDPIECE ARGON BEND A BEAM 6"

## (undated) DEVICE — CATH IV SAFE BC 20G X 1.16" (PINK)

## (undated) DEVICE — BALLOON US ENDO

## (undated) DEVICE — SUCTION YANKAUER NO CONTROL VENT

## (undated) DEVICE — TUBING FLUID ADMINISTRATION SET PRIM 70"

## (undated) DEVICE — SNARE POLYP SENS SM 13MM 240CM

## (undated) DEVICE — SPHINCTEROTOME CLEVERCUT WIRE 25MM  2.8MM X 170CM

## (undated) DEVICE — SENSOR O2 FINGER ADULT

## (undated) DEVICE — SUCTION YANKAUER OPEN TIP NO VENT CURVE

## (undated) DEVICE — SYR LUER LOK 20CC

## (undated) DEVICE — DRAPE C ARM UNIVERSAL

## (undated) DEVICE — TUBING IV SET GRAVITY 3Y 100" MACRO

## (undated) DEVICE — WARMING BLANKET FULL UNDERBODY

## (undated) DEVICE — BRUSH CYTO RAP EXCHG 3MM

## (undated) DEVICE — SYR LUER LOK 3CC

## (undated) DEVICE — GLV 8.5 PROTEXIS (WHITE)

## (undated) DEVICE — BLADE SCALPEL SAFETYLOCK #10

## (undated) DEVICE — WARMING BLANKET LOWER ADULT

## (undated) DEVICE — DRAPE ISOLATION BAG 20X20"

## (undated) DEVICE — PACK MAJOR ABDOMINAL SUPINE

## (undated) DEVICE — FILTER REINFUSION FOR SALVAGED BLOOD DISP

## (undated) DEVICE — DRAPE 1/2 SHEET 40X57"

## (undated) DEVICE — NDL INJ SCLERO INTERJECT 25G

## (undated) DEVICE — SUT PDS II 6-0 30" C-1

## (undated) DEVICE — RETRIEVER ROTH NET PLATINUM-UNIVERSAL

## (undated) DEVICE — TUBING SUCTION 20FT

## (undated) DEVICE — GLV 6.5 PROTEXIS (WHITE)

## (undated) DEVICE — SUT SOFSILK 3-0 18" V-20 (POP-OFF)

## (undated) DEVICE — SOL IRR POUR NS 0.9% 500ML

## (undated) DEVICE — DRSG XEROFORM 5 X 9"

## (undated) DEVICE — BIPOLAR FORCEP CODMAN VERSATRU 8" X 0.5MM (BLUE)

## (undated) DEVICE — NDL INJ SCLERO INTERJECT 23G

## (undated) DEVICE — SYR ALLIANCE II INFLATION 60ML

## (undated) DEVICE — BRUSH COLONOSCOPY CYTOLOGY

## (undated) DEVICE — CANNULA IRR ALCON ANTERIOR CHAMBER 27G

## (undated) DEVICE — DVC AUTO CAP RX LOKG

## (undated) DEVICE — VESSEL LOOP MAXI-RED  0.120" X 16"

## (undated) DEVICE — MEDICATION LABELS W MARKER

## (undated) DEVICE — SNARE POLYP MINI ACCUSNR 1.5 X 3CM

## (undated) DEVICE — SUT POLYSORB 3-0 30" V-20 UNDYED

## (undated) DEVICE — GLV 7.5 PROTEXIS (WHITE)

## (undated) DEVICE — SUT SOFSILK 0 30" TIES

## (undated) DEVICE — DRSG CURITY GAUZE SPONGE 4 X 4" 12-PLY

## (undated) DEVICE — BIOPSY FORCEP RADIAL JAW 4 STANDARD WITH NEEDLE

## (undated) DEVICE — ELCTR BOVIE PENCIL SMOKE EVACUATION

## (undated) DEVICE — SOL IRR BAG NS 0.9% 3000ML

## (undated) DEVICE — SUT PROLENE 3-0 36" SH

## (undated) DEVICE — SUT PROLENE 6-0 30" C-1

## (undated) DEVICE — Device

## (undated) DEVICE — PACK IV START WITH CHG

## (undated) DEVICE — OLYMPUS DISTAL COVER ENDOSCOPE

## (undated) DEVICE — BLADE SCALPEL SAFETYLOCK #11

## (undated) DEVICE — ELCTR GROUNDING PAD ADULT COVIDIEN

## (undated) DEVICE — SUT SOFSILK 2-0 30" TIES

## (undated) DEVICE — SUT SOFSILK 4-0 18" TIES

## (undated) DEVICE — SUT MONOSOF 3-0 18" C-14

## (undated) DEVICE — DRAPE IOBAN 33" X 23"

## (undated) DEVICE — FORCEP RADIAL JAW 4 JUMBO 2.8MM 3.2MM 240CM ORANGE DISP

## (undated) DEVICE — LIGASURE EXACT DISSECTOR

## (undated) DEVICE — ELCTR DEFIB PAD PRO-PADZ W 10FT LEAD WIRES ADULT

## (undated) DEVICE — DRSG TAPE MEDIPORE 3"

## (undated) DEVICE — PAPIL BILRTH II 6-5FRX0.035IN

## (undated) DEVICE — DRAPE SLUSH / WARMER 44 X 66"

## (undated) DEVICE — DRAIN RESERVOIR FOR JACKSON PRATT 100CC CARDINAL

## (undated) DEVICE — FOLEY HOLDER STATLOCK 2 WAY ADULT

## (undated) DEVICE — LAP PAD 4 X 18"

## (undated) DEVICE — DRAPE MAYO STAND 30"

## (undated) DEVICE — LAP PAD 18 X 18"

## (undated) DEVICE — POLY TRAP ETRAP

## (undated) DEVICE — SPECIMEN CONTAINER 100ML

## (undated) DEVICE — STAPLER SKIN VISI-STAT 35 WIDE

## (undated) DEVICE — BITE BLOCK ADULT 20 X 27MM (GREEN)

## (undated) DEVICE — LITHOTRIPY BASKET TRAPEZOID 2.5CM

## (undated) DEVICE — SUT SOFSILK 2-0 18" TIES

## (undated) DEVICE — DRAPE TOWEL BLUE 17" X 24"

## (undated) DEVICE — ELCTR BOVIE PENCIL HANDPIECE ROCKER SWITCH 15FT

## (undated) DEVICE — VISITEC 4X4

## (undated) DEVICE — DRAIN JACKSON PRATT 10MM FLAT FULL NO TROCAR

## (undated) DEVICE — CATH IV SAFE BC 22G X 1" (BLUE)

## (undated) DEVICE — TUBING TRUWAVE PRESSURE MALE/FEMALE 72"

## (undated) DEVICE — SUT SOFSILK 2 60" TIES

## (undated) DEVICE — NDL COUNTER FOAM AND MAGNET 40-70

## (undated) DEVICE — DRAPE LIGHT HANDLE COVER (BLUE)

## (undated) DEVICE — DRAPE ARMATEC MICROSSCOPE HANDLE 5" X 10"

## (undated) DEVICE — PREP CHLORAPREP HI-LITE ORANGE 26ML

## (undated) DEVICE — VENODYNE/SCD SLEEVE CALF MEDIUM

## (undated) DEVICE — ELCTR BIPOLAR CORD J&J 12FT DISP

## (undated) DEVICE — STAPLER ETHICON ECHELON FLEX 45MM X 340MM

## (undated) DEVICE — TUBING KIT FAST START ATF 40

## (undated) DEVICE — SYR ASEPTO

## (undated) DEVICE — SYR LUER LOK 30CC

## (undated) DEVICE — DRAPE INSTRUMENT POUCH 6.75" X 11"

## (undated) DEVICE — DRSG OPSITE 13.75 X 4"

## (undated) DEVICE — SNARE OVAL LOOP MICOR

## (undated) DEVICE — SOL INJ NS 0.9% 500ML 2 PORT

## (undated) DEVICE — NDL BOSTON SCIENTIFIC RX TRIPLE LUMEN NDL KNIFE XL 5.5F

## (undated) DEVICE — CATH NG SALEM SUMP 16FR

## (undated) DEVICE — SOL IRR POUR H2O 250ML

## (undated) DEVICE — SUT BIOSYN 4-0 27" P-12

## (undated) DEVICE — SUT PDS II PLUS 4-0 27" SH

## (undated) DEVICE — SUT PDS II 6-0 24" BV-1

## (undated) DEVICE — SYR LUER LOK 10CC

## (undated) DEVICE — NDL HYPO SAFE 18G X 1.5" (PINK)

## (undated) DEVICE — INJ SYS RAP REFIL

## (undated) DEVICE — SUT SURGIPRO 1 60" GS-26

## (undated) DEVICE — PACK CV SPLIT PACK II

## (undated) DEVICE — SNARE CAPTIVATOR II 15MM

## (undated) DEVICE — GLV 7.5 PROTEXIS (BLUE)

## (undated) DEVICE — POSITIONER FOAM HEAD CRADLE (PINK)

## (undated) DEVICE — GLV 8 PROTEXIS (WHITE)

## (undated) DEVICE — STOPCOCK 4-WAY W SWIVEL MALE LUER LOCK NON VENTED RED CAP

## (undated) DEVICE — GLV 7 PROTEXIS (WHITE)

## (undated) DEVICE — WARMING BLANKET UPPER ADULT

## (undated) DEVICE — VENODYNE/SCD SLEEVE FOOT

## (undated) DEVICE — PACK BASIN SPECIAL PROCEDURE

## (undated) DEVICE — GOWN TRIMAX LG

## (undated) DEVICE — SYR LUER LOK 50CC

## (undated) DEVICE — DRAPE LAPAROTOMY W POUCHES

## (undated) DEVICE — FOLEY TRAY 16FR 5CC LTX UMETER CLOSED

## (undated) DEVICE — SUT PROLENE 4-0 36" SH

## (undated) DEVICE — DRSG TEGADERM 6"X8"

## (undated) DEVICE — DRAPE MAGNETIC INSTRUMENT MEDIUM

## (undated) DEVICE — GOWN TRIMAX XXL

## (undated) DEVICE — SUT SOFSILK 0 18" TIES

## (undated) DEVICE — CLAMP BX HOT RAD JAW 3

## (undated) DEVICE — DRSG MASTISOL

## (undated) DEVICE — ONCORE 26 INSUFLATION DEVICE

## (undated) DEVICE — DRAPE 3/4 SHEET W REINFORCEMENT 56X77"

## (undated) DEVICE — MERCIAN VISABILITY BACKROUND YELLOW

## (undated) DEVICE — SUT SOFSILK 2-0 18" V-20 (POP-OFF)

## (undated) DEVICE — BLADE SCALPEL SAFETYLOCK #15

## (undated) DEVICE — POSITIONER FOAM EGG CRATE ULNAR 2PCS (PINK)

## (undated) DEVICE — GLV 6 PROTEXIS (WHITE)

## (undated) DEVICE — FOLEY TRAY 16FR SURESTEP LF URINE METER TEMP SENSING STATLOCK

## (undated) DEVICE — SUT PDS II 5-0 30" C-1

## (undated) DEVICE — BAG DECANTER IV STERILE

## (undated) DEVICE — DRSG STERISTRIPS 0.5 X 4"

## (undated) DEVICE — TUBING SUCTION CONN 6FT STERILE

## (undated) DEVICE — IRRIGATOR BIO SHIELD

## (undated) DEVICE — SUT SOFSILK 3-0 30" TIES

## (undated) DEVICE — SUT SOFSILK 4-0 30" TIES

## (undated) DEVICE — MARKING PEN W RULER

## (undated) DEVICE — SNARE CAPTIVATOR II 20MM

## (undated) DEVICE — SUT PDS II 3-0 36" SH